# Patient Record
Sex: FEMALE | Race: WHITE | NOT HISPANIC OR LATINO | URBAN - METROPOLITAN AREA
[De-identification: names, ages, dates, MRNs, and addresses within clinical notes are randomized per-mention and may not be internally consistent; named-entity substitution may affect disease eponyms.]

---

## 2024-04-08 ENCOUNTER — APPOINTMENT (EMERGENCY)
Dept: CT IMAGING | Facility: HOSPITAL | Age: 34
DRG: 282 | End: 2024-04-08
Payer: COMMERCIAL

## 2024-04-08 ENCOUNTER — APPOINTMENT (EMERGENCY)
Dept: RADIOLOGY | Facility: HOSPITAL | Age: 34
DRG: 282 | End: 2024-04-08
Payer: COMMERCIAL

## 2024-04-08 ENCOUNTER — HOSPITAL ENCOUNTER (INPATIENT)
Facility: HOSPITAL | Age: 34
LOS: 1 days | Discharge: LEFT BEFORE DISCHARGE | DRG: 282 | End: 2024-04-10
Attending: EMERGENCY MEDICINE | Admitting: INTERNAL MEDICINE
Payer: COMMERCIAL

## 2024-04-08 DIAGNOSIS — R56.9 SEIZURE (HCC): Primary | ICD-10-CM

## 2024-04-08 DIAGNOSIS — R79.89 ELEVATED LFTS: ICD-10-CM

## 2024-04-08 DIAGNOSIS — F10.939 ALCOHOL WITHDRAWAL (HCC): ICD-10-CM

## 2024-04-08 DIAGNOSIS — D61.818 PANCYTOPENIA (HCC): ICD-10-CM

## 2024-04-08 DIAGNOSIS — E87.6 HYPOKALEMIA: ICD-10-CM

## 2024-04-08 PROBLEM — R74.01 TRANSAMINITIS: Status: ACTIVE | Noted: 2024-04-08

## 2024-04-08 PROBLEM — I10 ESSENTIAL HYPERTENSION: Status: ACTIVE | Noted: 2024-04-08

## 2024-04-08 PROBLEM — Z72.0 TOBACCO ABUSE: Status: ACTIVE | Noted: 2024-04-08

## 2024-04-08 PROBLEM — R94.31 PROLONGED QT INTERVAL: Status: ACTIVE | Noted: 2024-04-08

## 2024-04-08 LAB
2HR DELTA HS TROPONIN: 0 NG/L
ALBUMIN SERPL BCP-MCNC: 4.6 G/DL (ref 3.5–5)
ALP SERPL-CCNC: 66 U/L (ref 34–104)
ALT SERPL W P-5'-P-CCNC: 56 U/L (ref 7–52)
AMPHETAMINES SERPL QL SCN: NEGATIVE
ANION GAP SERPL CALCULATED.3IONS-SCNC: 11 MMOL/L (ref 4–13)
ANION GAP SERPL CALCULATED.3IONS-SCNC: 25 MMOL/L (ref 4–13)
APAP SERPL-MCNC: <2 UG/ML (ref 10–20)
AST SERPL W P-5'-P-CCNC: 228 U/L (ref 13–39)
BACTERIA UR QL AUTO: ABNORMAL /HPF
BARBITURATES UR QL: NEGATIVE
BASOPHILS # BLD AUTO: 0.02 THOUSANDS/ÂΜL (ref 0–0.1)
BASOPHILS NFR BLD AUTO: 0 % (ref 0–1)
BENZODIAZ UR QL: NEGATIVE
BILIRUB SERPL-MCNC: 1.02 MG/DL (ref 0.2–1)
BILIRUB UR QL STRIP: NEGATIVE
BUN SERPL-MCNC: 9 MG/DL (ref 5–25)
BUN SERPL-MCNC: 9 MG/DL (ref 5–25)
CALCIUM SERPL-MCNC: 8.3 MG/DL (ref 8.4–10.2)
CALCIUM SERPL-MCNC: 9.3 MG/DL (ref 8.4–10.2)
CARDIAC TROPONIN I PNL SERPL HS: 4 NG/L
CARDIAC TROPONIN I PNL SERPL HS: 4 NG/L
CHLORIDE SERPL-SCNC: 89 MMOL/L (ref 96–108)
CHLORIDE SERPL-SCNC: 95 MMOL/L (ref 96–108)
CLARITY UR: ABNORMAL
CO2 SERPL-SCNC: 20 MMOL/L (ref 21–32)
CO2 SERPL-SCNC: 29 MMOL/L (ref 21–32)
COCAINE UR QL: NEGATIVE
COLOR UR: YELLOW
CREAT SERPL-MCNC: 0.65 MG/DL (ref 0.6–1.3)
CREAT SERPL-MCNC: 0.8 MG/DL (ref 0.6–1.3)
EOSINOPHIL # BLD AUTO: 0.03 THOUSAND/ÂΜL (ref 0–0.61)
EOSINOPHIL NFR BLD AUTO: 1 % (ref 0–6)
ERYTHROCYTE [DISTWIDTH] IN BLOOD BY AUTOMATED COUNT: 11.5 % (ref 11.6–15.1)
ETHANOL SERPL-MCNC: <10 MG/DL
EXT PREGNANCY TEST URINE: NEGATIVE
EXT. CONTROL: NORMAL
FENTANYL UR QL SCN: NEGATIVE
GFR SERPL CREATININE-BSD FRML MDRD: 117 ML/MIN/1.73SQ M
GFR SERPL CREATININE-BSD FRML MDRD: 97 ML/MIN/1.73SQ M
GLUCOSE SERPL-MCNC: 104 MG/DL (ref 65–140)
GLUCOSE SERPL-MCNC: 140 MG/DL (ref 65–140)
GLUCOSE SERPL-MCNC: 90 MG/DL (ref 65–140)
GLUCOSE UR STRIP-MCNC: NEGATIVE MG/DL
HCT VFR BLD AUTO: 39.1 % (ref 34.8–46.1)
HGB BLD-MCNC: 13.1 G/DL (ref 11.5–15.4)
HGB UR QL STRIP.AUTO: NEGATIVE
HYDROCODONE UR QL SCN: NEGATIVE
IMM GRANULOCYTES # BLD AUTO: 0.02 THOUSAND/UL (ref 0–0.2)
IMM GRANULOCYTES NFR BLD AUTO: 0 % (ref 0–2)
KETONES UR STRIP-MCNC: ABNORMAL MG/DL
LEUKOCYTE ESTERASE UR QL STRIP: ABNORMAL
LYMPHOCYTES # BLD AUTO: 1.39 THOUSANDS/ÂΜL (ref 0.6–4.47)
LYMPHOCYTES NFR BLD AUTO: 30 % (ref 14–44)
MAGNESIUM SERPL-MCNC: 1.8 MG/DL (ref 1.9–2.7)
MCH RBC QN AUTO: 34.5 PG (ref 26.8–34.3)
MCHC RBC AUTO-ENTMCNC: 33.5 G/DL (ref 31.4–37.4)
MCV RBC AUTO: 103 FL (ref 82–98)
METHADONE UR QL: NEGATIVE
MONOCYTES # BLD AUTO: 0.57 THOUSAND/ÂΜL (ref 0.17–1.22)
MONOCYTES NFR BLD AUTO: 12 % (ref 4–12)
MUCOUS THREADS UR QL AUTO: ABNORMAL
NEUTROPHILS # BLD AUTO: 2.62 THOUSANDS/ÂΜL (ref 1.85–7.62)
NEUTS SEG NFR BLD AUTO: 57 % (ref 43–75)
NITRITE UR QL STRIP: NEGATIVE
NON-SQ EPI CELLS URNS QL MICRO: ABNORMAL /HPF
NRBC BLD AUTO-RTO: 0 /100 WBCS
OPIATES UR QL SCN: NEGATIVE
OXYCODONE+OXYMORPHONE UR QL SCN: NEGATIVE
PCP UR QL: NEGATIVE
PH UR STRIP.AUTO: 7 [PH]
PLATELET # BLD AUTO: 107 THOUSANDS/UL (ref 149–390)
PMV BLD AUTO: 11.6 FL (ref 8.9–12.7)
POTASSIUM SERPL-SCNC: 3.1 MMOL/L (ref 3.5–5.3)
POTASSIUM SERPL-SCNC: 3.1 MMOL/L (ref 3.5–5.3)
PROT SERPL-MCNC: 7.3 G/DL (ref 6.4–8.4)
PROT UR STRIP-MCNC: ABNORMAL MG/DL
RBC # BLD AUTO: 3.8 MILLION/UL (ref 3.81–5.12)
RBC #/AREA URNS AUTO: ABNORMAL /HPF
SALICYLATES SERPL-MCNC: <5 MG/DL (ref 3–20)
SODIUM SERPL-SCNC: 134 MMOL/L (ref 135–147)
SODIUM SERPL-SCNC: 135 MMOL/L (ref 135–147)
SP GR UR STRIP.AUTO: 1.01 (ref 1–1.03)
THC UR QL: NEGATIVE
UROBILINOGEN UR STRIP-ACNC: 4 MG/DL
WBC # BLD AUTO: 4.65 THOUSAND/UL (ref 4.31–10.16)
WBC #/AREA URNS AUTO: ABNORMAL /HPF

## 2024-04-08 PROCEDURE — 81025 URINE PREGNANCY TEST: CPT | Performed by: EMERGENCY MEDICINE

## 2024-04-08 PROCEDURE — 82077 ASSAY SPEC XCP UR&BREATH IA: CPT | Performed by: EMERGENCY MEDICINE

## 2024-04-08 PROCEDURE — 93005 ELECTROCARDIOGRAM TRACING: CPT

## 2024-04-08 PROCEDURE — 99285 EMERGENCY DEPT VISIT HI MDM: CPT | Performed by: EMERGENCY MEDICINE

## 2024-04-08 PROCEDURE — 99285 EMERGENCY DEPT VISIT HI MDM: CPT

## 2024-04-08 PROCEDURE — 96374 THER/PROPH/DIAG INJ IV PUSH: CPT

## 2024-04-08 PROCEDURE — 80307 DRUG TEST PRSMV CHEM ANLYZR: CPT | Performed by: EMERGENCY MEDICINE

## 2024-04-08 PROCEDURE — 80143 DRUG ASSAY ACETAMINOPHEN: CPT | Performed by: EMERGENCY MEDICINE

## 2024-04-08 PROCEDURE — 80048 BASIC METABOLIC PNL TOTAL CA: CPT

## 2024-04-08 PROCEDURE — 84484 ASSAY OF TROPONIN QUANT: CPT | Performed by: EMERGENCY MEDICINE

## 2024-04-08 PROCEDURE — 70450 CT HEAD/BRAIN W/O DYE: CPT

## 2024-04-08 PROCEDURE — 85025 COMPLETE CBC W/AUTO DIFF WBC: CPT | Performed by: EMERGENCY MEDICINE

## 2024-04-08 PROCEDURE — 81001 URINALYSIS AUTO W/SCOPE: CPT | Performed by: EMERGENCY MEDICINE

## 2024-04-08 PROCEDURE — 82948 REAGENT STRIP/BLOOD GLUCOSE: CPT

## 2024-04-08 PROCEDURE — 83735 ASSAY OF MAGNESIUM: CPT

## 2024-04-08 PROCEDURE — 96361 HYDRATE IV INFUSION ADD-ON: CPT

## 2024-04-08 PROCEDURE — 99223 1ST HOSP IP/OBS HIGH 75: CPT | Performed by: INTERNAL MEDICINE

## 2024-04-08 PROCEDURE — 80179 DRUG ASSAY SALICYLATE: CPT | Performed by: EMERGENCY MEDICINE

## 2024-04-08 PROCEDURE — 71045 X-RAY EXAM CHEST 1 VIEW: CPT

## 2024-04-08 PROCEDURE — 80053 COMPREHEN METABOLIC PANEL: CPT | Performed by: EMERGENCY MEDICINE

## 2024-04-08 PROCEDURE — 36415 COLL VENOUS BLD VENIPUNCTURE: CPT | Performed by: EMERGENCY MEDICINE

## 2024-04-08 RX ORDER — NICOTINE 21 MG/24HR
21 PATCH, TRANSDERMAL 24 HOURS TRANSDERMAL
Status: DISCONTINUED | OUTPATIENT
Start: 2024-04-09 | End: 2024-04-10 | Stop reason: HOSPADM

## 2024-04-08 RX ORDER — NICOTINE 21 MG/24HR
21 PATCH, TRANSDERMAL 24 HOURS TRANSDERMAL ONCE
Status: COMPLETED | OUTPATIENT
Start: 2024-04-08 | End: 2024-04-09

## 2024-04-08 RX ORDER — FOLIC ACID 1 MG/1
1 TABLET ORAL DAILY
Status: DISCONTINUED | OUTPATIENT
Start: 2024-04-09 | End: 2024-04-08

## 2024-04-08 RX ORDER — ONDANSETRON 2 MG/ML
1 INJECTION INTRAMUSCULAR; INTRAVENOUS ONCE
Status: COMPLETED | OUTPATIENT
Start: 2024-04-08 | End: 2024-04-08

## 2024-04-08 RX ORDER — HYDRALAZINE HYDROCHLORIDE 20 MG/ML
5 INJECTION INTRAMUSCULAR; INTRAVENOUS EVERY 6 HOURS PRN
Status: DISCONTINUED | OUTPATIENT
Start: 2024-04-08 | End: 2024-04-10 | Stop reason: HOSPADM

## 2024-04-08 RX ORDER — POTASSIUM CHLORIDE 20 MEQ/1
40 TABLET, EXTENDED RELEASE ORAL ONCE
Status: COMPLETED | OUTPATIENT
Start: 2024-04-08 | End: 2024-04-08

## 2024-04-08 RX ORDER — LEVETIRACETAM 500 MG/5ML
3000 INJECTION, SOLUTION, CONCENTRATE INTRAVENOUS ONCE
Status: DISCONTINUED | OUTPATIENT
Start: 2024-04-08 | End: 2024-04-08

## 2024-04-08 RX ORDER — LORAZEPAM 1 MG/1
2 TABLET ORAL ONCE
Status: COMPLETED | OUTPATIENT
Start: 2024-04-08 | End: 2024-04-08

## 2024-04-08 RX ORDER — ONDANSETRON 2 MG/ML
4 INJECTION INTRAMUSCULAR; INTRAVENOUS ONCE
Status: COMPLETED | OUTPATIENT
Start: 2024-04-08 | End: 2024-04-08

## 2024-04-08 RX ORDER — LEVETIRACETAM 500 MG/1
500 TABLET ORAL EVERY 12 HOURS SCHEDULED
Status: DISCONTINUED | OUTPATIENT
Start: 2024-04-09 | End: 2024-04-08

## 2024-04-08 RX ORDER — MAGNESIUM SULFATE HEPTAHYDRATE 40 MG/ML
2 INJECTION, SOLUTION INTRAVENOUS ONCE
Status: COMPLETED | OUTPATIENT
Start: 2024-04-08 | End: 2024-04-08

## 2024-04-08 RX ORDER — SODIUM CHLORIDE, SODIUM GLUCONATE, SODIUM ACETATE, POTASSIUM CHLORIDE, MAGNESIUM CHLORIDE, SODIUM PHOSPHATE, DIBASIC, AND POTASSIUM PHOSPHATE .53; .5; .37; .037; .03; .012; .00082 G/100ML; G/100ML; G/100ML; G/100ML; G/100ML; G/100ML; G/100ML
100 INJECTION, SOLUTION INTRAVENOUS CONTINUOUS
Status: DISCONTINUED | OUTPATIENT
Start: 2024-04-08 | End: 2024-04-09

## 2024-04-08 RX ORDER — LANOLIN ALCOHOL/MO/W.PET/CERES
100 CREAM (GRAM) TOPICAL DAILY
Status: DISCONTINUED | OUTPATIENT
Start: 2024-04-09 | End: 2024-04-08

## 2024-04-08 RX ORDER — ACETAMINOPHEN 325 MG/1
650 TABLET ORAL ONCE
Status: COMPLETED | OUTPATIENT
Start: 2024-04-08 | End: 2024-04-08

## 2024-04-08 RX ADMIN — ONDANSETRON 4 MG: 2 INJECTION INTRAMUSCULAR; INTRAVENOUS at 17:21

## 2024-04-08 RX ADMIN — POTASSIUM CHLORIDE 40 MEQ: 1500 TABLET, EXTENDED RELEASE ORAL at 19:26

## 2024-04-08 RX ADMIN — POTASSIUM CHLORIDE 40 MEQ: 1500 TABLET, EXTENDED RELEASE ORAL at 22:04

## 2024-04-08 RX ADMIN — ACETAMINOPHEN 650 MG: 325 TABLET, FILM COATED ORAL at 22:31

## 2024-04-08 RX ADMIN — MAGNESIUM SULFATE HEPTAHYDRATE 2 G: 2 INJECTION, SOLUTION INTRAVENOUS at 20:40

## 2024-04-08 RX ADMIN — LEVETIRACETAM 3000 MG: 500 INJECTION, SOLUTION INTRAVENOUS at 19:42

## 2024-04-08 RX ADMIN — SODIUM CHLORIDE 1000 ML: 0.9 INJECTION, SOLUTION INTRAVENOUS at 17:22

## 2024-04-08 RX ADMIN — LORAZEPAM 2 MG: 1 TABLET ORAL at 22:31

## 2024-04-08 RX ADMIN — THIAMINE HYDROCHLORIDE: 100 INJECTION, SOLUTION INTRAMUSCULAR; INTRAVENOUS at 20:22

## 2024-04-08 RX ADMIN — SODIUM CHLORIDE, SODIUM GLUCONATE, SODIUM ACETATE, POTASSIUM CHLORIDE, MAGNESIUM CHLORIDE, SODIUM PHOSPHATE, DIBASIC, AND POTASSIUM PHOSPHATE 100 ML/HR: .53; .5; .37; .037; .03; .012; .00082 INJECTION, SOLUTION INTRAVENOUS at 22:04

## 2024-04-08 RX ADMIN — NICOTINE 21 MG: 21 PATCH, EXTENDED RELEASE TRANSDERMAL at 20:40

## 2024-04-08 NOTE — ED PROVIDER NOTES
"History  Chief Complaint   Patient presents with    Seizure - New Onset     Pt to ED for new onset seizure. Pt at work today, someone heard rattling sound and came and checked on her. Notes \"full body shaking\" for about 30 seconds. Feels back to baseline but a little anxious.     Patient is a 33-year-old female who is brought in by ambulance due to seizure activity.  Patient is very confused and thinks that she seized 3 days ago and not today and is unsure of where she works.  Per EMS, a coworker heard wrestling and found the patient having a generalized tonic-clonic seizure lasting approximately 30 seconds.       None       History reviewed. No pertinent past medical history.    History reviewed. No pertinent surgical history.    History reviewed. No pertinent family history.  I have reviewed and agree with the history as documented.    E-Cigarette/Vaping     E-Cigarette/Vaping Substances     Social History     Tobacco Use    Smoking status: Every Day     Current packs/day: 0.50     Types: Cigarettes     Passive exposure: Never    Smokeless tobacco: Never   Substance Use Topics    Alcohol use: Yes     Comment: social    Drug use: Never       Review of Systems   Constitutional:  Negative for chills and fever.   Eyes:  Negative for photophobia and visual disturbance.   Respiratory:  Negative for shortness of breath.    Cardiovascular:  Negative for chest pain.   Gastrointestinal:  Negative for abdominal pain, nausea and vomiting.   Musculoskeletal:  Negative for back pain and neck pain.   Neurological:  Positive for seizures. Negative for dizziness, light-headedness and headaches.   Psychiatric/Behavioral:  Positive for confusion.        Physical Exam  Physical Exam  Vitals and nursing note reviewed.   Constitutional:       General: She is not in acute distress.     Appearance: Normal appearance. She is not ill-appearing, toxic-appearing or diaphoretic.   HENT:      Head: Normocephalic and atraumatic.      " Mouth/Throat:      Mouth: Mucous membranes are moist.   Eyes:      Extraocular Movements: Extraocular movements intact.      Conjunctiva/sclera: Conjunctivae normal.      Pupils: Pupils are equal, round, and reactive to light.   Cardiovascular:      Rate and Rhythm: Normal rate and regular rhythm.      Pulses: Normal pulses.      Heart sounds: Normal heart sounds. No murmur heard.  Pulmonary:      Effort: Pulmonary effort is normal. No respiratory distress.      Breath sounds: Normal breath sounds. No stridor. No wheezing, rhonchi or rales.   Chest:      Chest wall: No tenderness.   Abdominal:      General: Bowel sounds are normal. There is no distension.      Palpations: Abdomen is soft.      Tenderness: There is no abdominal tenderness. There is no guarding or rebound.   Musculoskeletal:      Cervical back: Neck supple.      Right lower leg: No edema.      Left lower leg: No edema.      Comments: No midline c-t-l-spine tenderness, step offs, deformities  Pelvis stable      Skin:     General: Skin is warm and dry.   Neurological:      General: No focal deficit present.      Mental Status: She is alert. Mental status is at baseline. She is disoriented.      GCS: GCS eye subscore is 4. GCS verbal subscore is 4. GCS motor subscore is 6.   Psychiatric:         Mood and Affect: Mood normal.         Behavior: Behavior normal.         Vital Signs  ED Triage Vitals [04/08/24 1650]   Temperature Pulse Respirations Blood Pressure SpO2   98 °F (36.7 °C) 90 18 138/86 99 %      Temp src Heart Rate Source Patient Position - Orthostatic VS BP Location FiO2 (%)   -- Monitor Sitting Left arm --      Pain Score       No Pain           Vitals:    04/08/24 1650 04/08/24 1800   BP: 138/86 140/91   Pulse: 90 88   Patient Position - Orthostatic VS: Sitting Sitting         Visual Acuity      ED Medications  Medications   folic acid 1 mg, thiamine (VITAMIN B1) 100 mg in sodium chloride 0.9 % 100 mL IV piggyback ( Intravenous New Bag 4/8/24  2022)   multivitamin-minerals (CENTRUM) tablet 1 tablet (has no administration in time range)   folic acid 1 mg, thiamine (VITAMIN B1) 100 mg in sodium chloride 0.9 % 100 mL IV piggyback (has no administration in time range)   magnesium sulfate 2 g/50 mL IVPB (premix) 2 g (has no administration in time range)   multi-electrolyte (PLASMALYTE-A/ISOLYTE-S PH 7.4) IV solution (has no administration in time range)   nicotine (NICODERM CQ) 21 mg/24 hr TD 24 hr patch 21 mg (has no administration in time range)   ondansetron (FOR EMS ONLY) (ZOFRAN) 4 mg/2 mL injection 4 mg (0 mg Does not apply Given to EMS 4/8/24 1722)   sodium chloride 0.9 % bolus 1,000 mL (0 mL Intravenous Stopped 4/8/24 1822)   ondansetron (ZOFRAN) injection 4 mg (4 mg Intravenous Given 4/8/24 1721)   potassium chloride (Klor-Con M20) CR tablet 40 mEq (40 mEq Oral Given 4/8/24 1926)   levETIRAcetam (KEPPRA) 3,000 mg in sodium chloride 0.9 % 250 mL IVPB (0 mg Intravenous Stopped 4/8/24 1957)       Diagnostic Studies  Results Reviewed       Procedure Component Value Units Date/Time    Urine Microscopic [[id number]] Collected: 04/08/24 2026    Lab Status: In process Specimen: Urine, Clean Catch Updated: 04/08/24 2032    UA w Reflex to Microscopic w Reflex to Culture [[id number]] Collected: 04/08/24 2026    Lab Status: In process Specimen: Urine, Clean Catch Updated: 04/08/24 2030    Rapid drug screen, urine [[id number]] Collected: 04/08/24 2026    Lab Status: In process Specimen: Urine, Clean Catch Updated: 04/08/24 2029    HS Troponin I 2hr [[id number]]  (Normal) Collected: 04/08/24 1940    Lab Status: Final result Specimen: Blood from Arm, Left Updated: 04/08/24 2015     hs TnI 2hr 4 ng/L      Delta 2hr hsTnI 0 ng/L     Magnesium [[id number]]  (Abnormal) Collected: 04/08/24 1940    Lab Status: Final result Specimen: Blood from Arm, Left Updated: 04/08/24 2008     Magnesium 1.8 mg/dL     HS Troponin I 4hr [[id number]]     Lab Status: No result Specimen: Blood      HS Troponin 0hr (reflex protocol) [[id number]]  (Normal) Collected: 04/08/24 1721    Lab Status: Final result Specimen: Blood from Arm, Left Updated: 04/08/24 1752     hs TnI 0hr 4 ng/L     Comprehensive metabolic panel [[id number]]  (Abnormal) Collected: 04/08/24 1721    Lab Status: Final result Specimen: Blood from Arm, Left Updated: 04/08/24 1745     Sodium 134 mmol/L      Potassium 3.1 mmol/L      Chloride 89 mmol/L      CO2 20 mmol/L      ANION GAP 25 mmol/L      BUN 9 mg/dL      Creatinine 0.80 mg/dL      Glucose 104 mg/dL      Calcium 9.3 mg/dL       U/L      ALT 56 U/L      Alkaline Phosphatase 66 U/L      Total Protein 7.3 g/dL      Albumin 4.6 g/dL      Total Bilirubin 1.02 mg/dL      eGFR 97 ml/min/1.73sq m     Narrative:      Unable to calculate concentration. Result is lower than the dynamic range.  National Kid[employer] guidelines for Chronic Kidney Disease (CKD):     Stage 1 with normal or high GFR (GFR > 90 mL/min/1.73 square meters)    Stage 2 Mild CKD (GFR = 60-89 mL/min/1.73 square meters)    Stage 3A Moderate CKD (GFR = 45-59 mL/min/1.73 square meters)    Stage 3B Moderate CKD (GFR = 30-44 mL/min/1.73 square meters)    Stage 4 Severe CKD (GFR = 15-29 mL/min/1.73 square meters)    Stage 5 End Stage CKD (GFR <15 mL/min/1.73 square meters)  Note: GFR calculation is accurate only with a steady state creatinine    Salicylate level [[id number]]  (Normal) Collected: 04/08/24 1721    Lab Status: Final result Specimen: Blood from Arm, Left Updated: 04/08/24 1745     Salicylate Lvl <5.0 mg/dL     Narrative:      Unable to calculate concentration. Result is lower than the dynamic range.    Acetaminophen level-If concentration is detectable, please discuss with medical  on call. [[id number]]  (Abnormal) Collected: 04/08/24 1721    Lab Status: Final result Specimen: Blood from Arm, Left Updated: 04/08/24 1745     Acetaminophen Level <2 ug/mL     Narrative:      Unable to  calculate concentration. Result is lower than the dynamic range.    Ethanol [[id number]]  (Normal) Collected: 04/08/24 1721    Lab Status: Final result Specimen: Blood from Arm, Left Updated: 04/08/24 1744     Ethanol Lvl <10 mg/dL     CBC and differential [[id number]]  (Abnormal) Collected: 04/08/24 1721    Lab Status: Final result Specimen: Blood from Arm, Left Updated: 04/08/24 1738     WBC 4.65 Thousand/uL      RBC 3.80 Million/uL      Hemoglobin 13.1 g/dL      Hematocrit 39.1 %       fL      MCH 34.5 pg      MCHC 33.5 g/dL      RDW 11.5 %      MPV 11.6 fL      Platelets 107 Thousands/uL      nRBC 0 /100 WBCs      Neutrophils Relative 57 %      Immature Grans % 0 %      Lymphocytes Relative 30 %      Monocytes Relative 12 %      Eosinophils Relative 1 %      Basophils Relative 0 %      Neutrophils Absolute 2.62 Thousands/µL      Absolute Immature Grans 0.02 Thousand/uL      Absolute Lymphocytes 1.39 Thousands/µL      Absolute Monocytes 0.57 Thousand/µL      Eosinophils Absolute 0.03 Thousand/µL      Basophils Absolute 0.02 Thousands/µL     POCT pregnancy, urine [[id number]]     Lab Status: No result     Fingerstick Glucose (POCT) [[id number]]  (Normal) Collected: 04/08/24 1651    Lab Status: Final result Specimen: Blood Updated: 04/08/24 1652     POC Glucose 140 mg/dl                    CT head without contrast   Final Result by E. Alec Schoenberger, MD (04/08 1805)      No acute intracranial abnormality.                  Workstation performed: [id number]         XR chest 1 view portable    (Results Pending)              Procedures  Procedures         ED Course  ED Course as of 04/08/24 2035 Mon Apr 08, 2024 1850 Discussed case with Dr. Aguilar, neurology who recommends admission for Mri brain with without sz protocol, UDS, start keppra 3g load and then 500mg BID for maintenance   2003 Though patient initially denied any alcohol use, it was odd that her LFTs were elevated so I asked her family to leave  and spoke with the patient without family present.  Patient now admits that she drinks over 2 bottles of wine daily and last drank yesterday evening.  She states that 3 days ago when she had her other seizure, she had been trying to quit cold turkey, but then after having that seizure she started drinking again.  She reports that all of her prior seizures were in the setting of alcohol withdrawal.  I updated the hospitalist about this update, added CIWA protocol and called critical care for evaluation for stepdown level 1 admission as the patient adamantly declines transfer to [facility] detox or any type of alcohol rehab facility.   2011 Updated Dr. Aguilar, neurology. With new info, recommends no maintenance, but continue with mri sz protocol to see if she has underlying epilepsy tendency along with eeg routine   2033 Critical Care NP, Bianca Gallegos evaluated patient. Believes patient can be admitted to a med/surg [facility] level of care bed. Bianca will discuss reccs with hospitalist.                                SBIRT 22yo+      Flowsheet Row Most Recent Value   Initial Alcohol Screen: US AUDIT-C     1. How often do you have a drink containing alcohol? 0 Filed at: 04/08/2024 1650   2. How many drinks containing alcohol do you have on a typical day you are drinking?  0 Filed at: 04/08/2024 1650   3a. Male UNDER 65: How often do you have five or more drinks on one occasion? 0 Filed at: 04/08/2024 1650   3b. FEMALE Any Age, or MALE 65+: How often do you have 4 or more drinks on one occassion? 0 Filed at: 04/08/2024 1650   Audit-C Score 0 Filed at: 04/08/2024 1650   DUANE: How many times in the past year have you...    Used an illegal drug or used a prescription medication for non-medical reasons? Never Filed at: 04/08/2024 1650                      Medical Decision Making  Assessment and plan:  Differential includes new onset seizure versus syncope though syncope less likely as there is described  tonic-clonic activity.  Will get labs to evaluate for leukocytosis, anemia, electrolyte abnormalities, kidney and liver function; EKG/troponin to evaluate for arrhythmia/ischemia; CT head to rule out intracranial hemorrhage or mass.     Patient becoming less postictal and back to her baseline mental status, she now was able to answer all questions appropriately and reports to me that this is actually her fourth seizure in total.  She states that her initial for seizure was a few years ago, had an evaluation that was negative and was not started on medications then had another seizure in January and another seizure 3 days ago for which she did not have any evaluation for.  At this time, family is at bedside and patient denies any alcohol or drug use.  As such, discussed case with neurology in order to obtain recommendations for plan of care.  Dr. Aguilar recommended loading dose of Keppra and admission for MRI seizure protocol.    Later, patient's LFTs came back elevated and I asked her family to leave the room.  At that time, the patient admitted that she drinks minimum 2 bottles of wine daily and all of her seizures have been in the setting of alcohol withdrawal.  She admitted that she tried to quit cold turkey a few days ago and that is when she had the seizure 3 days ago.  She then drank and her last drink was yesterday evening.  She had to go to work this morning so she tried not to drink and seized at work.  I updated neurology as well as hospitalist.    Amount and/or Complexity of Data Reviewed  Labs: ordered.  Radiology: ordered.    Risk  Prescription drug management.  Decision regarding hospitalization.             Disposition  Final diagnoses:   Seizure (HCC)   Hypokalemia   Elevated LFTs   Alcohol withdrawal (HCC)     Time reflects when diagnosis was documented in both MDM as applicable and the Disposition within this note       Time User Action Codes Description Comment    4/8/2024  6:49 PM Donato  Maria Elena Roque [R56.9] Seizure (HCC)     4/8/2024  6:49 PM Maria Elena Sewell [E87.6] Hypokalemia     4/8/2024  6:49 PM Maria Elena Sewell [R79.89] Elevated LFTs     4/8/2024  8:27 PM Maria Elena Sewell [F10.939] Alcohol withdrawal (HCC)           ED Disposition       ED Disposition   Admit    Condition   Stable    Date/Time   Mon Apr 8, 2024 1849    Comment   Case was discussed with hospitalist and the patient's admission status was agreed to be Admission Status: observation status to the service of Dr. Morales .               Follow-up Information    None         Patient's Medications    No medications on file       No discharge procedures on file.    PDMP Review       None            ED Provider  Electronically Signed by             Maria Elena Sewell DO  04/08/24 2035

## 2024-04-09 ENCOUNTER — APPOINTMENT (INPATIENT)
Dept: MRI IMAGING | Facility: HOSPITAL | Age: 34
DRG: 282 | End: 2024-04-09
Payer: COMMERCIAL

## 2024-04-09 ENCOUNTER — APPOINTMENT (OUTPATIENT)
Dept: NEUROLOGY | Facility: HOSPITAL | Age: 34
DRG: 282 | End: 2024-04-09
Attending: INTERNAL MEDICINE
Payer: COMMERCIAL

## 2024-04-09 ENCOUNTER — APPOINTMENT (INPATIENT)
Dept: RADIOLOGY | Facility: HOSPITAL | Age: 34
DRG: 282 | End: 2024-04-09
Payer: COMMERCIAL

## 2024-04-09 PROBLEM — F10.10 ALCOHOL ABUSE: Status: ACTIVE | Noted: 2024-04-09

## 2024-04-09 PROBLEM — R56.9 SEIZURE (HCC): Status: ACTIVE | Noted: 2024-04-09

## 2024-04-09 LAB
ALBUMIN SERPL BCP-MCNC: 3.6 G/DL (ref 3.5–5)
ALBUMIN SERPL BCP-MCNC: 3.8 G/DL (ref 3.5–5)
ALP SERPL-CCNC: 50 U/L (ref 34–104)
ALP SERPL-CCNC: 55 U/L (ref 34–104)
ALT SERPL W P-5'-P-CCNC: 43 U/L (ref 7–52)
ALT SERPL W P-5'-P-CCNC: 45 U/L (ref 7–52)
ANION GAP SERPL CALCULATED.3IONS-SCNC: 7 MMOL/L (ref 4–13)
ANION GAP SERPL CALCULATED.3IONS-SCNC: 8 MMOL/L (ref 4–13)
AST SERPL W P-5'-P-CCNC: 127 U/L (ref 13–39)
AST SERPL W P-5'-P-CCNC: 139 U/L (ref 13–39)
ATRIAL RATE: 85 BPM
ATRIAL RATE: 94 BPM
ATRIAL RATE: 94 BPM
ATRIAL RATE: 95 BPM
ATRIAL RATE: 99 BPM
BASOPHILS # BLD AUTO: 0.03 THOUSANDS/ÂΜL (ref 0–0.1)
BASOPHILS # BLD AUTO: 0.04 THOUSANDS/ÂΜL (ref 0–0.1)
BASOPHILS NFR BLD AUTO: 1 % (ref 0–1)
BASOPHILS NFR BLD AUTO: 1 % (ref 0–1)
BILIRUB SERPL-MCNC: 0.66 MG/DL (ref 0.2–1)
BILIRUB SERPL-MCNC: 0.83 MG/DL (ref 0.2–1)
BILIRUB UR QL STRIP: NEGATIVE
BUN SERPL-MCNC: 5 MG/DL (ref 5–25)
BUN SERPL-MCNC: 6 MG/DL (ref 5–25)
CALCIUM SERPL-MCNC: 7.5 MG/DL (ref 8.4–10.2)
CALCIUM SERPL-MCNC: 8.8 MG/DL (ref 8.4–10.2)
CHLORIDE SERPL-SCNC: 101 MMOL/L (ref 96–108)
CHLORIDE SERPL-SCNC: 101 MMOL/L (ref 96–108)
CLARITY UR: CLEAR
CO2 SERPL-SCNC: 27 MMOL/L (ref 21–32)
CO2 SERPL-SCNC: 28 MMOL/L (ref 21–32)
COLOR UR: YELLOW
CREAT SERPL-MCNC: 0.64 MG/DL (ref 0.6–1.3)
CREAT SERPL-MCNC: 0.68 MG/DL (ref 0.6–1.3)
EOSINOPHIL # BLD AUTO: 0.01 THOUSAND/ÂΜL (ref 0–0.61)
EOSINOPHIL # BLD AUTO: 0.05 THOUSAND/ÂΜL (ref 0–0.61)
EOSINOPHIL NFR BLD AUTO: 0 % (ref 0–6)
EOSINOPHIL NFR BLD AUTO: 1 % (ref 0–6)
ERYTHROCYTE [DISTWIDTH] IN BLOOD BY AUTOMATED COUNT: 11.6 % (ref 11.6–15.1)
ERYTHROCYTE [DISTWIDTH] IN BLOOD BY AUTOMATED COUNT: 12.1 % (ref 11.6–15.1)
GFR SERPL CREATININE-BSD FRML MDRD: 116 ML/MIN/1.73SQ M
GFR SERPL CREATININE-BSD FRML MDRD: 118 ML/MIN/1.73SQ M
GLUCOSE P FAST SERPL-MCNC: 81 MG/DL (ref 65–99)
GLUCOSE SERPL-MCNC: 120 MG/DL (ref 65–140)
GLUCOSE SERPL-MCNC: 81 MG/DL (ref 65–140)
GLUCOSE UR STRIP-MCNC: NEGATIVE MG/DL
HCT VFR BLD AUTO: 33.5 % (ref 34.8–46.1)
HCT VFR BLD AUTO: 35.6 % (ref 34.8–46.1)
HGB BLD-MCNC: 11.4 G/DL (ref 11.5–15.4)
HGB BLD-MCNC: 11.7 G/DL (ref 11.5–15.4)
HGB UR QL STRIP.AUTO: NEGATIVE
IMM GRANULOCYTES # BLD AUTO: 0.02 THOUSAND/UL (ref 0–0.2)
IMM GRANULOCYTES # BLD AUTO: 0.02 THOUSAND/UL (ref 0–0.2)
IMM GRANULOCYTES NFR BLD AUTO: 1 % (ref 0–2)
IMM GRANULOCYTES NFR BLD AUTO: 1 % (ref 0–2)
KETONES UR STRIP-MCNC: NEGATIVE MG/DL
LACTATE SERPL-SCNC: 0.9 MMOL/L (ref 0.5–2)
LEUKOCYTE ESTERASE UR QL STRIP: NEGATIVE
LYMPHOCYTES # BLD AUTO: 0.59 THOUSANDS/ÂΜL (ref 0.6–4.47)
LYMPHOCYTES # BLD AUTO: 1.09 THOUSANDS/ÂΜL (ref 0.6–4.47)
LYMPHOCYTES NFR BLD AUTO: 18 % (ref 14–44)
LYMPHOCYTES NFR BLD AUTO: 30 % (ref 14–44)
MAGNESIUM SERPL-MCNC: 2.1 MG/DL (ref 1.9–2.7)
MCH RBC QN AUTO: 35.1 PG (ref 26.8–34.3)
MCH RBC QN AUTO: 35.3 PG (ref 26.8–34.3)
MCHC RBC AUTO-ENTMCNC: 32.9 G/DL (ref 31.4–37.4)
MCHC RBC AUTO-ENTMCNC: 34 G/DL (ref 31.4–37.4)
MCV RBC AUTO: 104 FL (ref 82–98)
MCV RBC AUTO: 107 FL (ref 82–98)
MONOCYTES # BLD AUTO: 0.37 THOUSAND/ÂΜL (ref 0.17–1.22)
MONOCYTES # BLD AUTO: 0.47 THOUSAND/ÂΜL (ref 0.17–1.22)
MONOCYTES NFR BLD AUTO: 10 % (ref 4–12)
MONOCYTES NFR BLD AUTO: 15 % (ref 4–12)
NEUTROPHILS # BLD AUTO: 2.09 THOUSANDS/ÂΜL (ref 1.85–7.62)
NEUTROPHILS # BLD AUTO: 2.1 THOUSANDS/ÂΜL (ref 1.85–7.62)
NEUTS SEG NFR BLD AUTO: 57 % (ref 43–75)
NEUTS SEG NFR BLD AUTO: 65 % (ref 43–75)
NITRITE UR QL STRIP: NEGATIVE
NRBC BLD AUTO-RTO: 0 /100 WBCS
NRBC BLD AUTO-RTO: 0 /100 WBCS
P AXIS: 42 DEGREES
P AXIS: 48 DEGREES
P AXIS: 54 DEGREES
P AXIS: 55 DEGREES
P AXIS: 65 DEGREES
PH UR STRIP.AUTO: 8 [PH]
PLATELET # BLD AUTO: 82 THOUSANDS/UL (ref 149–390)
PLATELET # BLD AUTO: 89 THOUSANDS/UL (ref 149–390)
PMV BLD AUTO: 11.6 FL (ref 8.9–12.7)
PMV BLD AUTO: 12.2 FL (ref 8.9–12.7)
POTASSIUM SERPL-SCNC: 3.2 MMOL/L (ref 3.5–5.3)
POTASSIUM SERPL-SCNC: 3.5 MMOL/L (ref 3.5–5.3)
PR INTERVAL: 124 MS
PR INTERVAL: 126 MS
PR INTERVAL: 134 MS
PR INTERVAL: 134 MS
PR INTERVAL: 216 MS
PROCALCITONIN SERPL-MCNC: 0.15 NG/ML
PROT SERPL-MCNC: 6.1 G/DL (ref 6.4–8.4)
PROT SERPL-MCNC: 6.7 G/DL (ref 6.4–8.4)
PROT UR STRIP-MCNC: NEGATIVE MG/DL
QRS AXIS: 17 DEGREES
QRS AXIS: 24 DEGREES
QRS AXIS: 4 DEGREES
QRS AXIS: 41 DEGREES
QRS AXIS: 6 DEGREES
QRSD INTERVAL: 82 MS
QRSD INTERVAL: 84 MS
QRSD INTERVAL: 84 MS
QRSD INTERVAL: 86 MS
QRSD INTERVAL: 86 MS
QT INTERVAL: 364 MS
QT INTERVAL: 366 MS
QT INTERVAL: 378 MS
QT INTERVAL: 386 MS
QT INTERVAL: 494 MS
QTC INTERVAL: 455 MS
QTC INTERVAL: 459 MS
QTC INTERVAL: 469 MS
QTC INTERVAL: 475 MS
QTC INTERVAL: 617 MS
RBC # BLD AUTO: 3.23 MILLION/UL (ref 3.81–5.12)
RBC # BLD AUTO: 3.33 MILLION/UL (ref 3.81–5.12)
SODIUM SERPL-SCNC: 135 MMOL/L (ref 135–147)
SODIUM SERPL-SCNC: 137 MMOL/L (ref 135–147)
SP GR UR STRIP.AUTO: 1.01 (ref 1–1.03)
T WAVE AXIS: 14 DEGREES
T WAVE AXIS: 22 DEGREES
T WAVE AXIS: 40 DEGREES
T WAVE AXIS: 47 DEGREES
T WAVE AXIS: 50 DEGREES
TSH SERPL DL<=0.05 MIU/L-ACNC: 3.45 UIU/ML (ref 0.45–4.5)
UROBILINOGEN UR STRIP-ACNC: <2 MG/DL
VENTRICULAR RATE: 85 BPM
VENTRICULAR RATE: 94 BPM
VENTRICULAR RATE: 94 BPM
VENTRICULAR RATE: 95 BPM
VENTRICULAR RATE: 99 BPM
VIT B12 SERPL-MCNC: 530 PG/ML (ref 180–914)
WBC # BLD AUTO: 3.22 THOUSAND/UL (ref 4.31–10.16)
WBC # BLD AUTO: 3.66 THOUSAND/UL (ref 4.31–10.16)

## 2024-04-09 PROCEDURE — 99245 OFF/OP CONSLTJ NEW/EST HI 55: CPT | Performed by: PSYCHIATRY & NEUROLOGY

## 2024-04-09 PROCEDURE — 80053 COMPREHEN METABOLIC PANEL: CPT | Performed by: INTERNAL MEDICINE

## 2024-04-09 PROCEDURE — 84145 PROCALCITONIN (PCT): CPT | Performed by: INTERNAL MEDICINE

## 2024-04-09 PROCEDURE — 87493 C DIFF AMPLIFIED PROBE: CPT | Performed by: INTERNAL MEDICINE

## 2024-04-09 PROCEDURE — 99232 SBSQ HOSP IP/OBS MODERATE 35: CPT | Performed by: INTERNAL MEDICINE

## 2024-04-09 PROCEDURE — 85025 COMPLETE CBC W/AUTO DIFF WBC: CPT | Performed by: INTERNAL MEDICINE

## 2024-04-09 PROCEDURE — 83605 ASSAY OF LACTIC ACID: CPT | Performed by: INTERNAL MEDICINE

## 2024-04-09 PROCEDURE — 83735 ASSAY OF MAGNESIUM: CPT | Performed by: INTERNAL MEDICINE

## 2024-04-09 PROCEDURE — 95816 EEG AWAKE AND DROWSY: CPT | Performed by: PSYCHIATRY & NEUROLOGY

## 2024-04-09 PROCEDURE — 71045 X-RAY EXAM CHEST 1 VIEW: CPT

## 2024-04-09 PROCEDURE — 70553 MRI BRAIN STEM W/O & W/DYE: CPT

## 2024-04-09 PROCEDURE — 93010 ELECTROCARDIOGRAM REPORT: CPT | Performed by: INTERNAL MEDICINE

## 2024-04-09 PROCEDURE — 87505 NFCT AGENT DETECTION GI: CPT | Performed by: INTERNAL MEDICINE

## 2024-04-09 PROCEDURE — A9585 GADOBUTROL INJECTION: HCPCS | Performed by: INTERNAL MEDICINE

## 2024-04-09 PROCEDURE — 82607 VITAMIN B-12: CPT | Performed by: PHYSICIAN ASSISTANT

## 2024-04-09 PROCEDURE — 81003 URINALYSIS AUTO W/O SCOPE: CPT | Performed by: INTERNAL MEDICINE

## 2024-04-09 PROCEDURE — 95816 EEG AWAKE AND DROWSY: CPT

## 2024-04-09 PROCEDURE — 87040 BLOOD CULTURE FOR BACTERIA: CPT | Performed by: INTERNAL MEDICINE

## 2024-04-09 PROCEDURE — 93005 ELECTROCARDIOGRAM TRACING: CPT

## 2024-04-09 PROCEDURE — 84443 ASSAY THYROID STIM HORMONE: CPT | Performed by: PHYSICIAN ASSISTANT

## 2024-04-09 RX ORDER — POTASSIUM CHLORIDE 20 MEQ/1
40 TABLET, EXTENDED RELEASE ORAL ONCE
Status: COMPLETED | OUTPATIENT
Start: 2024-04-09 | End: 2024-04-09

## 2024-04-09 RX ORDER — GADOBUTROL 604.72 MG/ML
6 INJECTION INTRAVENOUS
Status: COMPLETED | OUTPATIENT
Start: 2024-04-09 | End: 2024-04-09

## 2024-04-09 RX ORDER — CEFTRIAXONE 1 G/50ML
1000 INJECTION, SOLUTION INTRAVENOUS EVERY 24 HOURS
Status: DISCONTINUED | OUTPATIENT
Start: 2024-04-09 | End: 2024-04-10 | Stop reason: HOSPADM

## 2024-04-09 RX ORDER — LORAZEPAM 1 MG/1
2 TABLET ORAL ONCE
Status: COMPLETED | OUTPATIENT
Start: 2024-04-09 | End: 2024-04-09

## 2024-04-09 RX ORDER — CHLORDIAZEPOXIDE HYDROCHLORIDE 25 MG/1
25 CAPSULE, GELATIN COATED ORAL EVERY 8 HOURS SCHEDULED
Status: DISCONTINUED | OUTPATIENT
Start: 2024-04-09 | End: 2024-04-10

## 2024-04-09 RX ORDER — LORAZEPAM 2 MG/ML
1 INJECTION INTRAMUSCULAR
Status: DISCONTINUED | OUTPATIENT
Start: 2024-04-09 | End: 2024-04-10 | Stop reason: HOSPADM

## 2024-04-09 RX ORDER — SODIUM CHLORIDE 9 MG/ML
75 INJECTION, SOLUTION INTRAVENOUS CONTINUOUS
Status: DISCONTINUED | OUTPATIENT
Start: 2024-04-09 | End: 2024-04-10 | Stop reason: HOSPADM

## 2024-04-09 RX ORDER — ACETAMINOPHEN 325 MG/1
650 TABLET ORAL EVERY 6 HOURS PRN
Status: DISCONTINUED | OUTPATIENT
Start: 2024-04-09 | End: 2024-04-10 | Stop reason: HOSPADM

## 2024-04-09 RX ADMIN — GADOBUTROL 6 ML: 604.72 INJECTION INTRAVENOUS at 17:52

## 2024-04-09 RX ADMIN — MULTIPLE VITAMINS W/ MINERALS TAB 1 TABLET: TAB ORAL at 08:19

## 2024-04-09 RX ADMIN — LORAZEPAM 2 MG: 1 TABLET ORAL at 21:16

## 2024-04-09 RX ADMIN — CHLORDIAZEPOXIDE HYDROCHLORIDE 25 MG: 25 CAPSULE ORAL at 21:16

## 2024-04-09 RX ADMIN — LORAZEPAM 2 MG: 1 TABLET ORAL at 08:32

## 2024-04-09 RX ADMIN — NICOTINE 21 MG: 21 PATCH, EXTENDED RELEASE TRANSDERMAL at 20:34

## 2024-04-09 RX ADMIN — CHLORDIAZEPOXIDE HYDROCHLORIDE 25 MG: 25 CAPSULE ORAL at 13:40

## 2024-04-09 RX ADMIN — THIAMINE HYDROCHLORIDE: 100 INJECTION, SOLUTION INTRAMUSCULAR; INTRAVENOUS at 08:19

## 2024-04-09 RX ADMIN — ACETAMINOPHEN 650 MG: 325 TABLET, FILM COATED ORAL at 15:28

## 2024-04-09 RX ADMIN — SODIUM CHLORIDE 100 ML/HR: 0.9 INJECTION, SOLUTION INTRAVENOUS at 15:27

## 2024-04-09 RX ADMIN — CEFTRIAXONE 1000 MG: 1 INJECTION, SOLUTION INTRAVENOUS at 15:40

## 2024-04-09 RX ADMIN — TRIMETHOBENZAMIDE HYDROCHLORIDE 200 MG: 100 INJECTION INTRAMUSCULAR at 08:32

## 2024-04-09 RX ADMIN — POTASSIUM CHLORIDE 40 MEQ: 1500 TABLET, EXTENDED RELEASE ORAL at 12:27

## 2024-04-09 RX ADMIN — LORAZEPAM 2 MG: 1 TABLET ORAL at 12:27

## 2024-04-09 RX ADMIN — LORAZEPAM 2 MG: 1 TABLET ORAL at 05:03

## 2024-04-09 NOTE — ASSESSMENT & PLAN NOTE
33-year-old female with alcohol abuse and prior alcohol withdrawal seizures, hypertension and tobacco use who presented to the ED on 4/8/2024 following a witnessed generalized tonic-clonic seizure event while at work. Patient is amnestic to the actual event. + Tongue bite and urinary incontinence. Patient postictal in the ED.  And subsequent conversations, patient reports having several seizures all in the context of alcohol withdrawal.    Imaging/Work-up:  Initial labs include: Na 134, K 3.1, , ALT 56, Tbili 1.02, Mg 1.08, WBC 4.65-->3.66  UA negative  UDS negative  Urine oxycodone negative  ETOH level negative  Pregnancy test negative  CTH negative for acute abnormality  Routine EEG: Normal. Enhanced beta activities are likely a medication effect related to benzodiazepine administration.   TSH WNL  B12 530  MRI brain w wo/seizure protocol: Motion degraded examination. No acute infarction, edema, or pathologic intra-axial enhancement.    Neuro exam continues to be unremarkable.  Seizure workup has also been unrevealing.  Continue to suspect recurrent seizures in the setting of alcohol withdrawal and thus maintenance AED was not initiated.  [employer] form was [place] due to patient having recurrent events of loss of consciousness/seizure and patient is aware that she is not to drive at this time.  No further inpatient neurologic recommendations.    Plan:  Patient loaded with Keppra 3g  Will continue to hold off on maintenance doing at this time as seizure currently thought to be in the setting of alcohol withdrawl  Ativan prn for motor seizure >2 minutes  Tele  Seizure precautions  [employer] form completed and faxed. Patient is not to drive at this time.
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
## 2024-04-09 NOTE — ASSESSMENT & PLAN NOTE
"Hx of ETOH use disorder, drinking 2 bottles wine daily. Patient attempted to quit cold turkey 3 weeks ago resulting in in increased tremors thus started drinking a \"couple glasses of wine each day\". Today at work patient found by staff having seizure activity. EMS called. Initially refusing alcohol intake in the ER but then became more forthcoming about her drinking. Not interested in [facility] detox unit however is interested in medically assisted withdrawal. Discussed with ICU, admit to SD2.   Hx: Patient reports she has had multiple seizures over the past couple of years due to alcohol withdrawal. Has required intubation due to seizures as well.   Last ETOH intake: Night of 4/7  ETOH level neg  UDS pending   Current withdrawal sx: Tremors, anxiety, nausea.   ED provider discussed case with neuro prior to ETOH use disclosure.   S/P IV keppra load per ED --> hold further keppra/neuro consult at this time   Monitor tele + CIWA  IV thiamine/folic acid   Gentle IVF + supportive care   Seizure precautions   "

## 2024-04-09 NOTE — ASSESSMENT & PLAN NOTE
Alcohol level negative  CIWA protocol  Librium as per primary team  Folic acid/thiamine/MVI  Tele  Management as per primary team

## 2024-04-09 NOTE — UTILIZATION REVIEW
"Initial Clinical Review    Admission: Date/Time/Statement: 4/8/24 1927 observation   Admission Orders (From admission, onward)       Ordered        04/08/24 1927  Place in Observation  Once                          Orders Placed This Encounter   Procedures    Place in Observation     Standing Status:   Standing     Number of Occurrences:   1     Order Specific Question:   Level of Care     Answer:   Med Surg [16]     ED Arrival Information       Expected   -    Arrival   4/8/2024 16:44    Acuity   Emergent              Means of arrival   Ambulance    Escorted by   [facility]    Service   Hospitalist    Admission type   Emergency              Arrival complaint   seizure             Chief Complaint   Patient presents with    Seizure - New Onset     Pt to ED for new onset seizure. Pt at work today, someone heard rattling sound and came and checked on her. Notes \"full body shaking\" for about 30 seconds. Feels back to baseline but a little anxious.       Initial Presentation: 33 y.o. female  female to ED via EMS from work.    Admitted to observation with Dx: Alcohol withdrawal seizure/prolonged QT/Transaminitis.  Presented to ED with seizure activity occurring just prior to arrival,  a co worker heard wrestling and found patient having generalized tonic clonic seizure lasting about 30 seconds.  On arrival to ED confused. Initially denied alcohol use, then admitted stopped drinking cold turkey about 3 weeks ago as drinks about 2 bottles of wine daily, then after 1 to 2 days became shaky and started drinking small amount.  Last drink night of 4/7 - couple of glasses of wine. PMHx:  alcohol abuse with history of withdrawal seizures that required intubation, hypertension. On exam: disoriented.  Tremor.  Anxious tearful.  Mg 1.8.  na 134.  K 3.1.   anion gap 25.   .  ALT 56.  Ethanol < 10.    Imaging shows no acute finding on ct head.  Ecg Qtc 617.   ED treatment:  1 liter IVF bolus, Zofran, KCL, Keppra IV " bolus.    Plan includes:  start CIWA, hold Keppra and neurology consult.   Telemetry.   IV thiamine/Folic acid.  Continue IVF.  Seizure precautions.  Continue home lisinopril  and use IV hydralazine for SBP > 160. Replete electrolytes to maintain mag > 2, K > 4.  Ecg tomorrow.   Trend LFTs     ED Triage Vitals   Temperature Pulse Respirations Blood Pressure SpO2   04/08/24 1650 04/08/24 1650 04/08/24 1650 04/08/24 1650 04/08/24 1650   98 °F (36.7 °C) 90 18 138/86 99 %      Temp Source Heart Rate Source Patient Position - Orthostatic VS BP Location FiO2 (%)   04/09/24 0457 04/08/24 1650 04/08/24 1650 04/08/24 1650 --   Oral Monitor Sitting Left arm       Pain Score       04/08/24 1650       No Pain          Wt Readings from Last 1 Encounters:   04/08/24 63.5 kg (140 lb)     Additional Vital Signs:   04/08/24 2350 -- 92 18 139/87 106 94 % -- -- --   04/08/24 2210 -- 94 -- 140/88 -- -- -- -- --   04/08/24 2200 -- -- -- -- -- 95 % -- None (Room air) --   04/08/24 2130 -- 88 18 135/84 105 96 % -- None (Room air) Sitting   04/08/24 1948 -- -- -- -- -- 94 % 0 L/min None (Room air) --   04/08/24 1800 -- 88 18 140/91 111 98 % -- None (Room air) Sitting     CIWA  4/8/24 11 at 2210.   3 at 2350.     4/9/24   8 at 0457.    0 at 0600    Pertinent Labs/Diagnostic Test Results:   CT head without contrast   Final Result by E. Alec Schoenberger, MD (04/08 1805)      No acute intracranial abnormality.                  Workstation performed: [id number]         XR chest 1 view portable    (Results Pending)     4/8/24 ecg Normal sinus rhythm  Prolonged QT  Abnormal ECG  No previous ECGs available    4/8/24 ecg Sinus rhythm with 1st degree A-V block  Otherwise normal ECG  When compared with ECG of 08-APR-2024 16:49, (unconfirmed)  LA interval has increased    4/8/24 ecg Normal sinus rhythm  Normal ECG  When compared with ECG of 08-APR-2024 22:07, (unconfirmed)  No significant change was found     Results from last 7 days   Lab Units  04/09/24  0455 04/08/24  1721   WBC Thousand/uL 3.66* 4.65   HEMOGLOBIN g/dL 11.7 13.1   HEMATOCRIT % 35.6 39.1   PLATELETS Thousands/uL 89* 107*   NEUTROS ABS Thousands/µL 2.10 2.62     Results from last 7 days   Lab Units 04/08/24 1940 04/08/24  1721   SODIUM mmol/L 135 134*   POTASSIUM mmol/L 3.1* 3.1*   CHLORIDE mmol/L 95* 89*   CO2 mmol/L 29 20*   ANION GAP mmol/L 11 25*   BUN mg/dL 9 9   CREATININE mg/dL 0.65 0.80   EGFR ml/min/1.73sq m 117 97   CALCIUM mg/dL 8.3* 9.3   MAGNESIUM mg/dL 1.8*  --      Results from last 7 days   Lab Units 04/08/24  1721   AST U/L 228*   ALT U/L 56*   ALK PHOS U/L 66   TOTAL PROTEIN g/dL 7.3   ALBUMIN g/dL 4.6   TOTAL BILIRUBIN mg/dL 1.02*     Results from last 7 days   Lab Units 04/08/24  1651   POC GLUCOSE mg/dl 140     Results from last 7 days   Lab Units 04/08/24  1940 04/08/24  1721   GLUCOSE RANDOM mg/dL 90 104     Results from last 7 days   Lab Units 04/08/24  1940 04/08/24  1721   HS TNI 0HR ng/L  --  4   HS TNI 2HR ng/L 4  --    HSTNI D2 ng/L 0  --      Results from last 7 days   Lab Units 04/08/24 2026   CLARITY UA  Hazy   COLOR UA  Yellow   SPEC GRAV UA  1.015   PH UA  7.0   GLUCOSE UA mg/dl Negative   KETONES UA mg/dl 10 (1+)*   BLOOD UA  Negative   PROTEIN UA mg/dl 100 (2+)*   NITRITE UA  Negative   BILIRUBIN UA  Negative   UROBILINOGEN UA (BE) mg/dl 4.0*   LEUKOCYTES UA  Small*   WBC UA /hpf 4-10*   RBC UA /hpf None Seen   BACTERIA UA /hpf None Seen   EPITHELIAL CELLS WET PREP /hpf Occasional   MUCUS THREADS  Occasional*     Results from last 7 days   Lab Units 04/08/24 2026   AMPH/METH  Negative   BARBITURATE UR  Negative   BENZODIAZEPINE UR  Negative   COCAINE UR  Negative   METHADONE URINE  Negative   OPIATE UR  Negative   PCP UR  Negative   THC UR  Negative     Results from last 7 days   Lab Units 04/08/24  1721   ETHANOL LVL mg/dL <10   ACETAMINOPHEN LVL ug/mL <2*   SALICYLATE LVL mg/dL <5.0         ED Treatment:   Medication Administration from 04/08/2024  1644 to 04/08/2024 2157         Date/Time Order Dose Route Action Comments     04/08/2024 1722 EDT ondansetron (FOR EMS ONLY) (ZOFRAN) 4 mg/2 mL injection 4 mg 0 mg Does not apply Given to EMS --     04/08/2024 1722 EDT sodium chloride 0.9 % bolus 1,000 mL 1,000 mL Intravenous New Bag --     04/08/2024 1721 EDT ondansetron (ZOFRAN) injection 4 mg 4 mg Intravenous Given --     04/08/2024 1926 EDT potassium chloride (Klor-Con M20) CR tablet 40 mEq 40 mEq Oral Given --     04/08/2024 1942 EDT levETIRAcetam (KEPPRA) 3,000 mg in sodium chloride 0.9 % 250 mL IVPB 3,000 mg Intravenous New Bag --     04/08/2024 2022 EDT folic acid 1 mg, thiamine (VITAMIN B1) 100 mg in sodium chloride 0.9 % 100 mL IV piggyback -- Intravenous New Bag --     04/08/2024 2040 EDT magnesium sulfate 2 g/50 mL IVPB (premix) 2 g 2 g Intravenous New Bag --     04/08/2024 2040 EDT nicotine (NICODERM CQ) 21 mg/24 hr TD 24 hr patch 21 mg 21 mg Transdermal Medication Applied --          History reviewed. No pertinent past medical history.  Present on Admission:  **None**      Admitting Diagnosis: Alcohol withdrawal (HCC) [F10.939]  Hypokalemia [E87.6]  Seizure (HCC) [R56.9]  Elevated LFTs [R79.89]  Age/Sex: 33 y.o. female  Admission Orders:  Scheduled Medications:  folic acid 1 mg, thiamine (VITAMIN B1) 100 mg in sodium chloride 0.9 % 100 mL IV piggyback, , Intravenous, Daily  multivitamin-minerals, 1 tablet, Oral, Daily  nicotine, 21 mg, Transdermal, Once  nicotine, 21 mg, Transdermal, HS    acetaminophen (TYLENOL) tablet 650 mg  Dose: 650 mg  Freq: Once Route: PO  Indications of Use: FEVER,PAIN  Start: 04/08/24 2230 End: 04/08/24 2231     LORazepam (ATIVAN) tablet 2 mg  Dose: 2 mg  Freq: Once Route: PO  Indications of Use: ALCOHOL WITHDRAWAL SYNDROME  Start: 04/09/24 0500 End: 04/09/24 0503   LORazepam (ATIVAN) tablet 2 mg  Dose: 2 mg  Freq: Once Route: PO  Indications of Use: ALCOHOL WITHDRAWAL SYNDROME  Start: 04/08/24 2230 End: 04/08/24 2231      potassium chloride (Klor-Con M20) CR tablet 40 mEq  Dose: 40 mEq  Freq: Once Route: PO  Start: 04/08/24 2115 End: 04/08/24 2204     Continuous IV Infusions:  multi-electrolyte, 100 mL/hr, Intravenous, Continuous      PRN Meds: not used   hydrALAZINE, 5 mg, Intravenous, Q6H PRN  LORazepam, 1 mg, Intravenous, Q1H PRN  phenol, 1 spray, Mouth/Throat, Q2H PRN  trimethobenzamide, 200 mg, Intramuscular, Q6H PRN      Telemetry  CIWA  Continuous pulse oximetry   Aspiration precautions   Seizure precautions     IP CONSULT TO NEUROLOGY    Network Utilization Review Department  ATTENTION: Please call with any questions or concerns to [phone] and carefully listen to the prompts so that you are directed to the right person. All voicemails are confidential.   For Discharge needs, contact Care Management DC Support Team at [phone] opt. 2  Send all requests for admission clinical reviews, approved or denied determinations and any other requests to dedicated fax number below belonging to the [facility] where the patient is receiving treatment. List of dedicated fax numbers for the Facilities:  FACILITY NAME UR FAX NUMBER   ADMISSION DENIALS (Administrative/Medical Necessity) [phone]   DISCHARGE SUPPORT TEAM (NETWORK) [phone]   PARENT CHILD HEALTH (Maternity/NICU/Pediatrics) [phone]   [facility] [phone]   [facility] [phone]   [facility] [phone]   [facility] [phone]   [facility] [phone]   [facility] [phone]   [facility] [phone]   [facility] [phone]   [facility] [phone]   [facility] [phone]   [facility] [phone]   [facility]  [place] [phone]

## 2024-04-09 NOTE — ASSESSMENT & PLAN NOTE
"Hx of ETOH use disorder, drinking 2 bottles wine daily. Patient attempted to quit cold turkey 3 weeks ago resulting in in increased tremors thus started drinking a \"couple glasses of wine each day\". Today at work patient found by staff having seizure activity. EMS called. Initially refusing alcohol intake in the ER but then became more forthcoming about her drinking. Not interested in [facility] detox unit however is interested in medically assisted withdrawal. Discussed with ICU, admit to SD2.   Hx: Patient reports she has had multiple seizures over the past couple of years due to alcohol withdrawal. Has required intubation due to seizures as well.   Last ETOH intake: Night of 4/7  ETOH level neg  UDS pending   Current withdrawal sx: Tremors, anxiety, nausea.   ED provider discussed case with neuro prior to ETOH use disclosure.   S/P IV keppra load per ED --> hold further keppra and Neuro consult  Monitor tele + CIWA  IV thiamine/folic acid   Started on Librium  Neurology ordered MRI brain with and without contrast  EEG  Hold off on AED as per neurology  Gentle IVF + supportive care   Seizure precautions   "

## 2024-04-09 NOTE — PLAN OF CARE
Problem: PAIN - ADULT  Goal: Verbalizes/displays adequate comfort level or baseline comfort level  Description: Interventions:  - Encourage patient to monitor pain and request assistance  - Assess pain using appropriate pain scale  - Administer analgesics based on type and severity of pain and evaluate response  - Implement non-pharmacological measures as appropriate and evaluate response  - Consider cultural and social influences on pain and pain management  - Notify physician/advanced practitioner if interventions unsuccessful or patient reports new pain  Outcome: Progressing     Problem: INFECTION - ADULT  Goal: Absence or prevention of progression during hospitalization  Description: INTERVENTIONS:  - Assess and monitor for signs and symptoms of infection  - Monitor lab/diagnostic results  - Monitor all insertion sites, i.e. indwelling lines, tubes, and drains  - Monitor endotracheal if appropriate and nasal secretions for changes in amount and color  - [place] appropriate cooling/warming therapies per order  - Administer medications as ordered  - Instruct and encourage patient and family to use good hand hygiene technique  - Identify and instruct in appropriate isolation precautions for identified infection/condition  Outcome: Progressing  Goal: Absence of fever/infection during neutropenic period  Description: INTERVENTIONS:  - Monitor WBC    Outcome: Progressing     Problem: SAFETY ADULT  Goal: Patient will remain free of falls  Description: INTERVENTIONS:  - Educate patient/family on patient safety including physical limitations  - Instruct patient to call for assistance with activity   - Consult OT/PT to assist with strengthening/mobility   - Keep Call bell within reach  - Keep bed low and locked with side rails adjusted as appropriate  - Keep care items and personal belongings within reach  - Initiate and maintain comfort rounds  - Make Fall Risk Sign visible to staff  - Offer Toileting every  Hours,  in advance of need  - Initiate/Maintain alarm  - Obtain necessary fall risk management equipment:   - Apply yellow socks and bracelet for high fall risk patients  - Consider moving patient to room near nurses station  Outcome: Progressing  Goal: Maintain or return to baseline ADL function  Description: INTERVENTIONS:  -  Assess patient's ability to carry out ADLs; assess patient's baseline for ADL function and identify physical deficits which impact ability to perform ADLs (bathing, care of mouth/teeth, toileting, grooming, dressing, etc.)  - Assess/evaluate cause of self-care deficits   - Assess range of motion  - Assess patient's mobility; develop plan if impaired  - Assess patient's need for assistive devices and provide as appropriate  - Encourage maximum independence but intervene and supervise when necessary  - Involve family in performance of ADLs  - Assess for home care needs following discharge   - Consider OT consult to assist with ADL evaluation and planning for discharge  - Provide patient education as appropriate  Outcome: Progressing  Goal: Maintains/Returns to pre admission functional level  Description: INTERVENTIONS:  - Perform AM-PAC 6 Click Basic Mobility/ Daily Activity assessment daily.  - Set and communicate daily mobility goal to care team and patient/family/caregiver.   - Collaborate with rehabilitation services on mobility goals if consulted  - Perform Range of Motion  times a day.  - Reposition patient every  hours.  - Dangle patient  times a day  - Stand patient  times a day  - Ambulate patient  times a day  - Out of bed to chair  times a day   - Out of bed for meals  times a day  - Out of bed for toileting  - Record patient progress and toleration of activity level   Outcome: Progressing     Problem: DISCHARGE PLANNING  Goal: Discharge to home or other facility with appropriate resources  Description: INTERVENTIONS:  - Identify barriers to discharge w/patient and caregiver  - Arrange for  needed discharge resources and transportation as appropriate  - Identify discharge learning needs (meds, wound care, etc.)  - Arrange for interpretive services to assist at discharge as needed  - Refer to Case Management Department for coordinating discharge planning if the patient needs post-hospital services based on physician/advanced practitioner order or complex needs related to functional status, cognitive ability, or social support system  Outcome: Progressing     Problem: Knowledge Deficit  Goal: Patient/family/caregiver demonstrates understanding of disease process, treatment plan, medications, and discharge instructions  Description: Complete learning assessment and assess knowledge base.  Interventions:  - Provide teaching at level of understanding  - Provide teaching via preferred learning methods  Outcome: Progressing     Problem: NEUROSENSORY - ADULT  Goal: Achieves stable or improved neurological status  Description: INTERVENTIONS  - Monitor and report changes in neurological status  - Monitor vital signs such as temperature, blood pressure, glucose, and any other labs ordered   - Initiate measures to prevent increased intracranial pressure  - Monitor for seizure activity and implement precautions if appropriate      Outcome: Progressing  Goal: Remains free of injury related to seizures activity  Description: INTERVENTIONS  - Maintain airway, patient safety  and administer oxygen as ordered  - Monitor patient for seizure activity, document and report duration and description of seizure to physician/advanced practitioner  - If seizure occurs,  ensure patient safety during seizure  - Reorient patient post seizure  - Seizure pads on all 4 side rails  - Instruct patient/family to notify RN of any seizure activity including if an aura is experienced  - Instruct patient/family to call for assistance with activity based on nursing assessment  - Administer anti-seizure medications if ordered    Outcome:  Progressing  Goal: Achieves maximal functionality and self care  Description: INTERVENTIONS  - Monitor swallowing and airway patency with patient fatigue and changes in neurological status  - Encourage and assist patient to increase activity and self care.   - Encourage visually impaired, hearing impaired and aphasic patients to use assistive/communication devices  Outcome: Progressing     Problem: METABOLIC, FLUID AND ELECTROLYTES - ADULT  Goal: Electrolytes maintained within normal limits  Description: INTERVENTIONS:  - Monitor labs and assess patient for signs and symptoms of electrolyte imbalances  - Administer electrolyte replacement as ordered  - Monitor response to electrolyte replacements, including repeat lab results as appropriate  - Instruct patient on fluid and nutrition as appropriate  Outcome: Progressing  Goal: Fluid balance maintained  Description: INTERVENTIONS:  - Monitor labs   - Monitor I/O and WT  - Instruct patient on fluid and nutrition as appropriate  - Assess for signs & symptoms of volume excess or deficit  Outcome: Progressing  Goal: Glucose maintained within target range  Description: INTERVENTIONS:  - Monitor Blood Glucose as ordered  - Assess for signs and symptoms of hyperglycemia and hypoglycemia  - Administer ordered medications to maintain glucose within target range  - Assess nutritional intake and initiate nutrition service referral as needed  Outcome: Progressing

## 2024-04-09 NOTE — ASSESSMENT & PLAN NOTE
Home regimen: Lisinopril daily (unknown dose - pts family will let staff know tomorrow)   Utilize IV hydralazine for BP > 160

## 2024-04-09 NOTE — ASSESSMENT & PLAN NOTE
Mag 1.8, K 3.4  Replete electrolytes to maintain mag > 2, K > 4  Repeat ECG tomorrow  Avoid QT prolonging agents

## 2024-04-09 NOTE — PROGRESS NOTES
"[facility]  Progress Note  Name: Kaykay Holland I  MRN: [id number]  Unit/Bed#: -01 SDU I Date of Admission: 4/8/2024   Date of Service: 4/9/2024 I Hospital Day: 0    Assessment/Plan   Alcohol abuse  Assessment & Plan  On alcohol withdrawal protocol with CIWA score  Was started on thiamine, folic acid and Librium    Tobacco abuse  Assessment & Plan  Smokes 1/2-1 ppd.   Encourage cessation     Essential hypertension  Assessment & Plan  Home regimen: Lisinopril daily (unknown dose - pts family will let staff know tomorrow)   Utilize IV hydralazine for BP > 160     Prolonged QT interval  Assessment & Plan    Mag 1.8, K 3.4  Replete electrolytes to maintain mag > 2, K > 4  Repeat ECG   Avoid QT prolonging agents     Transaminitis  Assessment & Plan    ALT 56  T bili 1.02  Suspect related to alcoholic pancreatitis   Right upper quadrant ultrasound  Trend LFTs     * Alcohol withdrawal seizure (HCC)  Assessment & Plan  Hx of ETOH use disorder, drinking 2 bottles wine daily. Patient attempted to quit cold turkey 3 weeks ago resulting in in increased tremors thus started drinking a \"couple glasses of wine each day\". Today at work patient found by staff having seizure activity. EMS called. Initially refusing alcohol intake in the ER but then became more forthcoming about her drinking. Not interested in [facility] detox unit however is interested in medically assisted withdrawal. Discussed with ICU, admit to SD2.   Hx: Patient reports she has had multiple seizures over the past couple of years due to alcohol withdrawal. Has required intubation due to seizures as well.   Last ETOH intake: Night of 4/7  ETOH level neg  UDS pending   Current withdrawal sx: Tremors, anxiety, nausea.   ED provider discussed case with neuro prior to ETOH use disclosure.   S/P IV keppra load per ED --> hold further keppra and Neuro consult  Monitor tele + CIWA  IV thiamine/folic acid   [facility] " [facility]  Neurology ordered MRI brain with and without contrast  EEG  Hold off on AED as per neurology  Gentle IVF + supportive care   Seizure precautions              Labs & Imaging: I have personally reviewed pertinent reports.      VTE Prophylaxis: in place.    Code Status:   Level 1 - Full Code    Patient Centered Rounds: I have performed bedside rounds with nursing staff today.    Mobility:   Basic Mobility Inpatient Raw Score: 24  JH-HLM Goal: 8: Walk 250 feet or more  JH-HLM Achieved: 7: Walk 25 feet or more (per ER pt walked to bathroom)  JH-HLM Goal achieved. Continue to encourage appropriate mobility.    Discussions with Specialists or Other Care Team Provider: Neurology    Education and Discussions with Family / Patient: Shyla on phone    Total Time Spent on Date of Encounter in care of patient: 35 mins. This time was spent on one or more of the following: performing physical exam; counseling and coordination of care; obtaining or reviewing history; documenting in the medical record; reviewing/ordering tests, medications or procedures; communicating with other healthcare professionals and discussing with patient's family/caregivers.    Current Length of Stay: 0 day(s)    Current Patient Status: Observation   Certification Statement: The patient will continue to require additional inpatient hospital stay due to see my assessment and plan.     Subjective:   Patient is seen and examined at bedside.  No new complaints.  Afebrile  All other ROS are negative.    Objective:    Vitals: Blood pressure 132/85, pulse 80, temperature 98.7 °F (37.1 °C), temperature source Oral, resp. rate 22, height 5' 4\" (1.626 m), weight 63.5 kg (140 lb), last menstrual period 04/01/2024, SpO2 94%.,Body mass index is 24.03 kg/m².  SPO2 RA Rest      Flowsheet Row ED to Hosp-Admission (Current) from 4/8/2024 in [facility] Intensive [facility] Unit   SpO2 94 %   SpO2 Activity At Rest   O2 Device None (Room air)   O2 Flow " Rate 0 L/min          I&O:   Intake/Output Summary (Last 24 hours) at 4/9/2024 0752  Last data filed at 4/9/2024 0400  Gross per 24 hour   Intake 2013.33 ml   Output --   Net 2013.33 ml       Physical Exam:    General- Alert, lying comfortably in bed. Not in any acute distress.  Neck- Supple, No JVD  CVS- regular, S1 and S2 normal  Chest- Bilateral Air entry, No rhochi, crackles or wheezing present.  Abdomen- soft, nontender, not distended, no guarding or rigidity, BS+  Extremities-  No pedal edema, No calf tenderness                         Normal ROM in all extremities.  CNS-   Alert, awake and orientedx3. No focal deficits present.    Invasive Devices       Peripheral Intravenous Line  Duration             Peripheral IV 04/08/24 Left;Proximal;Ventral (anterior) Forearm <1 day                          Social History  reviewed  History reviewed. No pertinent family history. reviewed    Meds:  Current Facility-Administered Medications   Medication Dose Route Frequency Provider Last Rate Last Admin    folic acid 1 mg, thiamine (VITAMIN B1) 100 mg in sodium chloride 0.9 % 100 mL IV piggyback   Intravenous Daily Stephanie Reid PA-C        hydrALAZINE (APRESOLINE) injection 5 mg  5 mg Intravenous Q6H PRN Stephanie Reid PA-C        LORazepam (ATIVAN) injection 1 mg  1 mg Intravenous Q1H PRN Stephanie Reid PA-C        multi-electrolyte (PLASMALYTE-A/ISOLYTE-S PH 7.4) IV solution  100 mL/hr Intravenous Continuous Sylvia Pappas PA-C 100 mL/hr at 04/08/24 2204 100 mL/hr at 04/08/24 2204    multivitamin-minerals (CENTRUM) tablet 1 tablet  1 tablet Oral Daily Stephanie Reid PA-C        nicotine (NICODERM CQ) 21 mg/24 hr TD 24 hr patch 21 mg  21 mg Transdermal Once Maria Elena Sewell, DO   21 mg at 04/08/24 2040    nicotine (NICODERM CQ) 21 mg/24 hr TD 24 hr patch 21 mg  21 mg Transdermal HS Stephanie Reid PA-C        phenol (CHLORASEPTIC) 1.4 % mucosal liquid 1 spray  1 spray Mouth/Throat Q2H PRN PATRICIA Canales         "trimethobenzamide (TIGAN) IM injection 200 mg  200 mg Intramuscular Q6H PRN PATRICIA Canales          Medications Prior to Admission   Medication    LISINOPRIL PO       Labs:  Results from last 7 days   Lab Units 04/09/24  0455 04/08/24  1721   WBC Thousand/uL 3.66* 4.65   HEMOGLOBIN g/dL 11.7 13.1   HEMATOCRIT % 35.6 39.1   PLATELETS Thousands/uL 89* 107*   NEUTROS PCT % 57 57   LYMPHS PCT % 30 30   MONOS PCT % 10 12   EOS PCT % 1 1     Results from last 7 days   Lab Units 04/08/24  1940 04/08/24  1721   POTASSIUM mmol/L 3.1* 3.1*   CHLORIDE mmol/L 95* 89*   CO2 mmol/L 29 20*   BUN mg/dL 9 9   CREATININE mg/dL 0.65 0.80   CALCIUM mg/dL 8.3* 9.3   ALK PHOS U/L  --  66   ALT U/L  --  56*   AST U/L  --  228*     Lab Results   Component Value Date    TROPONINI 0.01 02/20/2021    TROPONINI <0.02 07/30/2019    TROPONINI <0.02 07/18/2019         No results found for: \"BLOODCX\", \"URINECX\", \"WOUNDCULT\", \"SPUTUMCULTUR\"      Imaging:  No results found for this or any previous visit.    No results found for this or any previous visit.      Last 24 Hours Medication List:   Current Facility-Administered Medications   Medication Dose Route Frequency Provider Last Rate    folic acid 1 mg, thiamine (VITAMIN B1) 100 mg in sodium chloride 0.9 % 100 mL IV piggyback   Intravenous Daily Stephanie Reid PA-C      hydrALAZINE  5 mg Intravenous Q6H PRN Stephanie Reid PA-C      LORazepam  1 mg Intravenous Q1H PRN Stephanie Reid PA-C      multi-electrolyte  100 mL/hr Intravenous Continuous Sylvia Pappas PA-C 100 mL/hr (04/08/24 2204)    multivitamin-minerals  1 tablet Oral Daily Stephanie Reid PA-C      nicotine  21 mg Transdermal Once Maria Elena Sewell DO      nicotine  21 mg Transdermal HS Stephnaie Reid PA-C      phenol  1 spray Mouth/Throat Q2H PRN PATRICIA Canales      trimethobenzamide  200 mg Intramuscular Q6H PRN PATRICIA Canales          Today, Patient Was Seen By: Liban Bowden MD    ** Please Note: " Dictation voice to text software may have been used in the creation of this document. **

## 2024-04-09 NOTE — ASSESSMENT & PLAN NOTE
ALT 56  T bili 1.02  Suspect related to alcoholic pancreatitis   Right upper quadrant ultrasound  Trend LFTs

## 2024-04-09 NOTE — H&P
"[facility]  H&P  Name: Kaykay Holland 33 y.o. female I MRN: [id number]  Unit/Bed#: -01 SDU I Date of Admission: 4/8/2024   Date of Service: 4/8/2024 I Hospital Day: 0      Assessment/Plan   * Alcohol withdrawal seizure (HCC)  Assessment & Plan  Hx of ETOH use disorder, drinking 2 bottles wine daily. Patient attempted to quit cold turkey 3 weeks ago resulting in in increased tremors thus started drinking a \"couple glasses of wine each day\". Today at work patient found by staff having seizure activity. EMS called. Initially refusing alcohol intake in the ER but then became more forthcoming about her drinking. Not interested in [facility] detox unit however is interested in medically assisted withdrawal. Discussed with ICU, admit to SD2.   Hx: Patient reports she has had multiple seizures over the past couple of years due to alcohol withdrawal. Has required intubation due to seizures as well.   Last ETOH intake: Night of 4/7  ETOH level neg  UDS pending   Current withdrawal sx: Tremors, anxiety, nausea.   ED provider discussed case with neuro prior to ETOH use disclosure.   S/P IV keppra load per ED --> hold further keppra/neuro consult at this time   Monitor tele + CIWA  IV thiamine/folic acid   Gentle IVF + supportive care   Seizure precautions     Tobacco abuse  Assessment & Plan  Smokes 1/2-1 ppd.   Encourage cessation     Essential hypertension  Assessment & Plan  Home regimen: Lisinopril daily (unknown dose - pts family will let staff know tomorrow)   Utilize IV hydralazine for BP > 160     Prolonged QT interval  Assessment & Plan    Mag 1.8, K 3.4  Replete electrolytes to maintain mag > 2, K > 4  Repeat ECG tomorrow  Avoid QT prolonging agents     Transaminitis  Assessment & Plan    ALT 56  T bili 1.02  Suspect related to alcoholic pancreatitis   Trend LFTs            VTE Pharmacologic Prophylaxis: VTE Score: 0 Low Risk (Score 0-2) - Encourage Ambulation.  Code " "Status: Level 1 - Full Code   Discussion with family: Patient declined call to .     Anticipated Length of Stay: Patient will be admitted on an observation basis with an anticipated length of stay of less than 2 midnights secondary to Alcohol withdrawal seizures.    Total Time Spent on Date of Encounter in care of patient: 55 mins. This time was spent on one or more of the following: performing physical exam; counseling and coordination of care; obtaining or reviewing history; documenting in the medical record; reviewing/ordering tests, medications or procedures; communicating with other healthcare professionals and discussing with patient's family/caregivers.    Chief Complaint: \"They told me I seized\"    History of Present Illness:  Kaykay Holland is a 33 y.o. female with a PMH of alcohol abuse with history of withdrawal, hypertension who presents to the hospital via EMS after seizure activity.  Patient initially denying alcohol usage to ED staff prompting full seizure workup.  Normal CT head and loaded with Keppra.  However, after further discussion with the ED staff patient reported that she had been drinking alcohol daily and then approximately 3 weeks ago attempted to quit cold turkey.  After becoming shaky due to stopping for 1 to 2 days she restarted to drink but at a smaller amount.  Since then she has been slowly trying to wean herself.  Last drink was night of 4/7.  Reports she drank \"a couple glasses of wine\". Woke up and went to work. She recalls feeling shaky, nauseous and anxious at work. Last think she remembers prior to the seizure was looking at the clock in the break room and noting her shift would be done in about 10 minutes. Then next thing she is able to recall is being in the ambulance on her way to the hospital. She was confused on why they were bringing her here.     Denies hallucinations.     Patient reports prior history of alcohol withdrawal with seizures. Most recent " about 1 year ago. She also recalls requiring intubation after a seizure in a past admission. She is not interested in going to [place] or other detox services at this time. Smokes about 1/2-1ppd. Denies drug use.     Review of Systems:  Review of Systems   Constitutional:  Negative for fatigue and fever.   HENT:  Negative for sore throat.         Tongue pain   Respiratory:  Negative for cough, chest tightness and shortness of breath.    Cardiovascular:  Negative for chest pain.   Gastrointestinal:  Positive for nausea. Negative for abdominal distention, abdominal pain, diarrhea and vomiting.   Genitourinary:  Negative for difficulty urinating.   Musculoskeletal:  Negative for arthralgias.   Neurological:  Positive for tremors and seizures. Negative for weakness and headaches.   Psychiatric/Behavioral:  Positive for dysphoric mood. Negative for agitation and behavioral problems. The patient is nervous/anxious.    All other systems reviewed and are negative.      Past Medical and Surgical History:   History reviewed. No pertinent past medical history.    History reviewed. No pertinent surgical history.    Meds/Allergies:  Prior to Admission medications    Not on File     I have reviewed home medications with patient personally.    Allergies: No Known Allergies    Social History:  Marital Status: Single   Occupation: unknown   Patient Pre-hospital Living Situation: Home  Patient Pre-hospital Level of Mobility: walks  Patient Pre-hospital Diet Restrictions: regular  Substance Use History:   Social History     Substance and Sexual Activity   Alcohol Use Yes    Comment: social     Social History     Tobacco Use   Smoking Status Every Day    Current packs/day: 0.50    Types: Cigarettes    Passive exposure: Never   Smokeless Tobacco Never     Social History     Substance and Sexual Activity   Drug Use Never       Family History:  History reviewed. No pertinent family history.    Physical Exam:     Vitals:   Blood  "Pressure: 140/88 (04/08/24 2210)  Pulse: 94 (04/08/24 2210)  Temperature: 98 °F (36.7 °C) (04/08/24 1650)  Respirations: 18 (04/08/24 2130)  Height: 5' 4\" (162.6 cm) (04/08/24 1650)  Weight - Scale: 63.5 kg (140 lb) (04/08/24 1650)  SpO2: 96 % (04/08/24 2130)    Physical Exam  Vitals and nursing note reviewed.   Constitutional:       Appearance: Normal appearance.   HENT:      Head: Normocephalic.      Mouth/Throat:      Comments: Bite marks on right side of tongue  Eyes:      Extraocular Movements: Extraocular movements intact.      Pupils: Pupils are equal, round, and reactive to light.   Cardiovascular:      Rate and Rhythm: Normal rate and regular rhythm.      Heart sounds: No murmur heard.  Pulmonary:      Effort: Pulmonary effort is normal. No respiratory distress.      Breath sounds: Normal breath sounds. No wheezing.   Abdominal:      General: Bowel sounds are normal. There is no distension.      Tenderness: There is no abdominal tenderness. There is no guarding.   Musculoskeletal:         General: Normal range of motion.      Cervical back: Normal range of motion.      Right lower leg: No edema.      Left lower leg: No edema.   Skin:     General: Skin is warm.   Neurological:      General: No focal deficit present.      Mental Status: She is alert and oriented to person, place, and time.      Motor: Tremor present.   Psychiatric:         Mood and Affect: Mood is anxious. Affect is tearful.         Behavior: Behavior normal.         Thought Content: Thought content normal.          Additional Data:     Lab Results:  Results from last 7 days   Lab Units 04/08/24  1721   WBC Thousand/uL 4.65   HEMOGLOBIN g/dL 13.1   HEMATOCRIT % 39.1   PLATELETS Thousands/uL 107*   NEUTROS PCT % 57   LYMPHS PCT % 30   MONOS PCT % 12   EOS PCT % 1     Results from last 7 days   Lab Units 04/08/24  1940 04/08/24  1721   SODIUM mmol/L 135 134*   POTASSIUM mmol/L 3.1* 3.1*   CHLORIDE mmol/L 95* 89*   CO2 mmol/L 29 20*   BUN mg/dL " 9 9   CREATININE mg/dL 0.65 0.80   ANION GAP mmol/L 11 25*   CALCIUM mg/dL 8.3* 9.3   ALBUMIN g/dL  --  4.6   TOTAL BILIRUBIN mg/dL  --  1.02*   ALK PHOS U/L  --  66   ALT U/L  --  56*   AST U/L  --  228*   GLUCOSE RANDOM mg/dL 90 104         Results from last 7 days   Lab Units 04/08/24  1651   POC GLUCOSE mg/dl 140               Lines/Drains:  Invasive Devices       Peripheral Intravenous Line  Duration             Peripheral IV 04/08/24 Left;Proximal;Ventral (anterior) Forearm <1 day                        Imaging: Reviewed radiology reports from this admission including: CT head  CT head without contrast   Final Result by E. Alec Schoenberger, MD (04/08 1805)      No acute intracranial abnormality.                  Workstation performed: [email or web address] LÃ¡         XR chest 1 view portable    (Results Pending)       EKG and Other Studies Reviewed on Admission:   EKG: Sinus Tachycardia. HR 95.    ** Please Note: This note has been constructed using a voice recognition system. **

## 2024-04-09 NOTE — UTILIZATION REVIEW
INPATIENT Stay Review    Admission: Date/Time/Statement: 4/8/24 1927 observation  and CHANGED 4/9/24 TO INPATIENT DUE TO NEED FOR > 2 MIDNIGHT STAY FOR ALCOHOL WITHDRAW WITH SEIZURE.  CONTINUE CIWA AND STARTED ON LIBRIUM, TELEMETRY, IV THIAMINE AND FOLATE. EEG.  NEUROLOGY ON CONSULT AND ORDERED MRI BRAIN.   SEIZURE PRECAUTIONS AND MONITORING OFF AED  Admission Orders (From admission, onward)       Ordered        04/09/24 1043  INPATIENT ADMISSION  Once                           Orders Placed This Encounter   Procedures    INPATIENT ADMISSION     Standing Status:   Standing     Number of Occurrences:   1     Order Specific Question:   Level of Care     Answer:   Level 2 Stepdown / HOT [14]     Order Specific Question:   Estimated length of stay     Answer:   More than 2 Midnights     Order Specific Question:   Certification     Answer:   I certify that inpatient services are medically necessary for this patient for a duration of greater than two midnights. See H&P and MD Progress Notes for additional information about the patient's course of treatment.      Date: 4/9/24                           Current Patient Class: INPATIENT  Current Level of Care: Level 2 Stepdown/HOT    HPI:33 y.o. female initially admitted on 4/8/24 to observation due to alcohol withdrawal seizure.  History of alcohol use disorder, withdrawal with seizures and requiring intubation.   Drinks 2 bottles wine daily, stopped about 3 weeks ago and after 1 to 2 days, tremors so started drinking 1 to 2 glasses daily.  Last alcohol 4/7 in evening.  Ethanol < 10.   Coworker witnessed seizure.  Initially patient denied alcohol and in ED Loaded with Keppra, ct head no acute findings. Plan is neurology consult.  Hold Keppra, continue IVF, telemetry, pulse oximetry.   CIWA.   IV thiamine and folic acid.    Declines detox unit.     Assessment/Plan  4/9/24 CHANGED TO INPATIENT:   4/9/24 - + withdrawal.  No further seizures.  On exam tremulous.   K 3.2.    calcium 7.5   .   Wbc 3.66.  Continue CIWA.  Add scheduled Librium.   Continue MVI, IV thiamine and folic acid.   IV hydralazine as needed.  MRI brain and eeg pending.     Per neurology 4/9/24:  alcohol withdrawal seizure/alcohol abuse/transaminitis.   Patient amnesic to seizure event.  Has + tongue bite.   Thinks this is her 4th seizure related to withdrawal over past few years.   Plan: hold maintenance   anti seizure medication.  Routine eeg.  MRI brain.  Telemetry.  Seizure precautions.  On CIWA, folic acid, thiamine and MVI.       Vital Signs:   04/09/24 1340 -- 96 -- 154/99 121 93 % -- -- --   04/09/24 1223 -- 103 -- 163/112 Abnormal  132 94 % -- -- --   04/09/24 1100 98 °F (36.7 °C) 105 16 158/104 Abnormal  126 94 % -- None (Room air) --   04/09/24 0900 -- 96 -- -- -- -- -- -- --   04/09/24 0801 98.2 °F (36.8 °C) 85 18 137/87 107 91 %                    04/09/24 0457 98.7 °F (37.1 °C) 80 22 132/85 104 94 % -- -- --   04/08/24 2350 -- 92 18 139/87 106 94 % -- -- --   04/08/24 2210 -- 94 -- 140/88 -- -- -- -- --   04/08/24 2200 -- -- -- -- -- 95 % -- None (Room air)      CIWA  4/8/24 11 at 2210.   3 at 2350.     4/9/24   8 at 0457.    0 at 0600.   9 at 0801.   3 at 0900.   3 at 1100.    9 at 1223.   4 at 1340.      Pertinent Labs/Diagnostic Results:   US right upper quadrant   Final Result by Conner Angeles MD (04/10 5016)      No gallstones. The common duct is normal in caliber.      The liver is mildly echogenic.      Workstation performed: [id number]         MRI brain seizure wo and w contrast   Final Result by Dane Vaz MD (04/10 0834)   Motion degraded examination.      No acute infarction, edema, or pathologic intra-axial enhancement.         Workstation performed: [id number]         XR chest portable   Final Result by Joaquin Patterson MD (04/10 0654)      No acute cardiopulmonary disease.            Workstation performed: [id number]         CT head without contrast   Final  Result by E. Alec Schoenberger, MD (04/08 1805)      No acute intracranial abnormality.                  Workstation performed: [id number]         XR chest 1 view portable   Final Result by Rei Mcguire MD (04/09 1425)      No acute cardiopulmonary disease.            Workstation performed: [id number]            4/9/24 eeg This Routine EEG recorded during wakefulness and drowsiness is normal.   Enhanced beta activities are likely a medication effect related to benzodiazepine administration.      Results from last 7 days   Lab Units 04/10/24  0535 04/09/24  1522 04/09/24  0455 04/08/24  1721   WBC Thousand/uL 3.38* 3.22* 3.66* 4.65   HEMOGLOBIN g/dL 11.4* 11.4* 11.7 13.1   HEMATOCRIT % 34.3* 33.5* 35.6 39.1   PLATELETS Thousands/uL 76* 82* 89* 107*   TOTAL NEUT ABS Thousands/µL 1.50* 2.09 2.10 2.62     Results from last 7 days   Lab Units 04/10/24  1217 04/10/24  0535 04/09/24  1522 04/09/24  0804 04/08/24  1940   SODIUM mmol/L 135 135 135 137 135   POTASSIUM mmol/L 3.3* 3.2* 3.5 3.2* 3.1*   CHLORIDE mmol/L 101 101 101 101 95*   CO2 mmol/L 23 26 27 28 29   ANION GAP mmol/L 11 8 7 8 11   BUN mg/dL 6 6 5 6 9   CREATININE mg/dL 0.54* 0.59* 0.68 0.64 0.65   EGFR ml/min/1.73sq m 125 121 116 118 117   CALCIUM mg/dL 8.7 8.3* 8.8 7.5* 8.3*   MAGNESIUM mg/dL  --  1.4*  --  2.1 1.8*     Results from last 7 days   Lab Units 04/10/24  1217 04/10/24  0535 04/09/24  1522 04/09/24  0804 04/08/24  1721   AST U/L 165* 128* 127* 139* 228*   ALT U/L 64* 49 45 43 56*   ALK PHOS U/L 47 47 55 50 66   TOTAL PROTEIN g/dL 6.3* 6.6 6.7 6.1* 7.3   ALBUMIN g/dL 3.8 3.7 3.8 3.6 4.6   TOTAL BILIRUBIN mg/dL 0.59 0.58 0.66 0.83 1.02*     Results from last 7 days   Lab Units 04/08/24  1651   POC GLUCOSE mg/dl 140     Results from last 7 days   Lab Units 04/10/24  1217 04/10/24  0535 04/09/24  1522 04/09/24  0804 04/08/24  1940 04/08/24  1721   GLUCOSE RANDOM mg/dL 135 85 120 81 90 104     Results from last 7 days   Lab Units 04/08/24  1940  04/08/24  1721   HS TNI 0HR ng/L  --  4   HS TNI 2HR ng/L 4  --    HSTNI D2 ng/L 0  --      Results from last 7 days   Lab Units 04/09/24  1539 04/08/24 2026   CLARITY UA  Clear Hazy   COLOR UA  Yellow Yellow   SPEC GRAV UA  1.015 1.015   PH UA  8.0 7.0   GLUCOSE UA mg/dl Negative Negative   KETONES UA mg/dl Negative 10 (1+)*   BLOOD UA  Negative Negative   PROTEIN UA mg/dl Negative 100 (2+)*   NITRITE UA  Negative Negative   BILIRUBIN UA  Negative Negative   UROBILINOGEN UA (BE) mg/dl <2.0 4.0*   LEUKOCYTES UA  Negative Small*   WBC UA /hpf  --  4-10*   RBC UA /hpf  --  None Seen   BACTERIA UA /hpf  --  None Seen   EPITHELIAL CELLS WET PREP /hpf  --  Occasional   MUCUS THREADS   --  Occasional*     Results from last 7 days   Lab Units 04/08/24 2026   AMPH/METH  Negative   BARBITURATE UR  Negative   BENZODIAZEPINE UR  Negative   COCAINE UR  Negative   METHADONE URINE  Negative   OPIATE UR  Negative   PCP UR  Negative   THC UR  Negative     Results from last 7 days   Lab Units 04/08/24  1721   ETHANOL LVL mg/dL <10   ACETAMINOPHEN LVL ug/mL <2*   SALICYLATE LVL mg/dL <5.0       Medications:   Scheduled Medications:  folic acid 1 mg, thiamine (VITAMIN B1) 100 mg in sodium chloride 0.9 % 100 mL IV piggyback, , Intravenous, Daily  multivitamin-minerals, 1 tablet, Oral, Daily  nicotine, 21 mg, Transdermal, Once  nicotine, 21 mg, Transdermal, HS    LORazepam (ATIVAN) tablet 2 mg  Dose: 2 mg  Freq: Once Route: PO  Indications of Use: ALCOHOL WITHDRAWAL SYNDROME  Start: 04/09/24 0500 End: 04/09/24 0503   chlordiazePOXIDE (LIBRIUM) capsule 25 mg  Dose: 25 mg  Freq: Every 8 hours scheduled Route: PO  Start: 04/09/24 1400   LORazepam (ATIVAN) tablet 2 mg  Dose: 2 mg  Freq: Once Route: PO  Indications of Use: ALCOHOL WITHDRAWAL SYNDROME  Start: 04/09/24 1230 End: 04/09/24 1227   LORazepam (ATIVAN) tablet 2 mg  Dose: 2 mg  Freq: Once Route: PO  Indications of Use: ALCOHOL WITHDRAWAL SYNDROME  Start: 04/09/24 0830 End: 04/09/24  0832   potassium chloride (Klor-Con M20) CR tablet 40 mEq  Dose: 40 mEq  Freq: Once Route: PO  Start: 04/09/24 1045 End: 04/09/24 1227     Continuous IV Infusions:  multi-electrolyte, 100 mL/hr, Intravenous, Continuous dc 4/9 at 1003      PRN Meds: not used.   hydrALAZINE, 5 mg, Intravenous, Q6H PRN  LORazepam, 1 mg, Intravenous, Q1H PRN  phenol, 1 spray, Mouth/Throat, Q2H PRN  trimethobenzamide, 200 mg, Intramuscular, Q6H PRN x 1 at 0832 on 4/9        Discharge Plan:  to be determined.     Network Utilization Review Department  ATTENTION: Please call with any questions or concerns to [phone] and carefully listen to the prompts so that you are directed to the right person. All voicemails are confidential.   For Discharge needs, contact Care Management DC Support Team at [phone] opt. 2  Send all requests for admission clinical reviews, approved or denied determinations and any other requests to dedicated fax number below belonging to the [place] where the patient is receiving treatment. List of dedicated fax numbers for the Facilities:  FACILITY NAME UR FAX NUMBER   ADMISSION DENIALS (Administrative/Medical Necessity) [phone]   DISCHARGE SUPPORT TEAM (NETWORK) [phone]   PARENT CHILD HEALTH (Maternity/NICU/Pediatrics) [phone]   [facility] [phone]   [facility] [phone]   [facility] [phone]   [facility] [phone]   [facility] [phone]   [facility] [phone]   [facility] [phone]   [facility] [phone]   [facility] [phone]   [facility] [phone]   [facility] [phone]   [facility] [phone]

## 2024-04-09 NOTE — QUICK NOTE
Progress Note - Triage Assessment   Kaykay Holland 33 y.o. female MRN: [id number]    Time Called: 2003  Date Called: 04/08/24  Room#: ED 5  Time Evaluated: 2010   Person requesting evaluation: Dr. Sewell    Called to ED to evaluate patient for possible admission to Critical Care Service, chart reviewed and patient evaluated.     Patient present with seizure this afternoon following discontinuation of ETOH use. Last drink was the night of 4/7. Only reported seizure at work with afternoon at 1540. Patient has a history of withdrawal seizures anytime she stops her habitual ETOH use.     +Tremors and anxiety. Ambulates to the bathroom with ease. Oriented. Denies hallucinations, HA, vomiting or abdominal pain.   Hemodynamically stable.   No indication for CC or SD1 at this time.     Discussed case with Sylvia Pappas PA-C  and they have agreed to accept patient to their service.    Recommendations discussed with ED attending Dr. Sewell.          Triage Assessment:     Patient appropriate to be admitted to med-surg level of care or SD2 per SLIM.    If any questions or concerns please call the critical care team at ext [phone]

## 2024-04-09 NOTE — ASSESSMENT & PLAN NOTE
In the context of alcohol abuse   , ALT 56, Tbili 1.02  RUQ US pending  Management as per primary team

## 2024-04-09 NOTE — ASSESSMENT & PLAN NOTE
Mag 1.8, K 3.4  Replete electrolytes to maintain mag > 2, K > 4  Repeat ECG   Avoid QT prolonging agents

## 2024-04-10 ENCOUNTER — APPOINTMENT (INPATIENT)
Dept: ULTRASOUND IMAGING | Facility: HOSPITAL | Age: 34
DRG: 282 | End: 2024-04-10
Payer: COMMERCIAL

## 2024-04-10 ENCOUNTER — TELEPHONE (OUTPATIENT)
Dept: HEMATOLOGY ONCOLOGY | Facility: CLINIC | Age: 34
End: 2024-04-10

## 2024-04-10 VITALS
SYSTOLIC BLOOD PRESSURE: 156 MMHG | HEART RATE: 100 BPM | WEIGHT: 140.43 LBS | DIASTOLIC BLOOD PRESSURE: 100 MMHG | BODY MASS INDEX: 23.98 KG/M2 | HEIGHT: 64 IN | OXYGEN SATURATION: 99 % | TEMPERATURE: 98.2 F | RESPIRATION RATE: 27 BRPM

## 2024-04-10 PROBLEM — A41.9 SEPSIS (HCC): Status: ACTIVE | Noted: 2024-04-10

## 2024-04-10 PROBLEM — D61.818 PANCYTOPENIA (HCC): Status: ACTIVE | Noted: 2024-04-10

## 2024-04-10 PROBLEM — R65.10 SIRS (SYSTEMIC INFLAMMATORY RESPONSE SYNDROME) (HCC): Status: ACTIVE | Noted: 2024-04-10

## 2024-04-10 LAB
ALBUMIN SERPL BCP-MCNC: 3.7 G/DL (ref 3.5–5)
ALBUMIN SERPL BCP-MCNC: 3.8 G/DL (ref 3.5–5)
ALP SERPL-CCNC: 47 U/L (ref 34–104)
ALP SERPL-CCNC: 47 U/L (ref 34–104)
ALT SERPL W P-5'-P-CCNC: 49 U/L (ref 7–52)
ALT SERPL W P-5'-P-CCNC: 64 U/L (ref 7–52)
ANION GAP SERPL CALCULATED.3IONS-SCNC: 11 MMOL/L (ref 4–13)
ANION GAP SERPL CALCULATED.3IONS-SCNC: 8 MMOL/L (ref 4–13)
AST SERPL W P-5'-P-CCNC: 128 U/L (ref 13–39)
AST SERPL W P-5'-P-CCNC: 165 U/L (ref 13–39)
BASOPHILS # BLD AUTO: 0.04 THOUSANDS/ÂΜL (ref 0–0.1)
BASOPHILS NFR BLD AUTO: 1 % (ref 0–1)
BILIRUB SERPL-MCNC: 0.58 MG/DL (ref 0.2–1)
BILIRUB SERPL-MCNC: 0.59 MG/DL (ref 0.2–1)
BUN SERPL-MCNC: 6 MG/DL (ref 5–25)
BUN SERPL-MCNC: 6 MG/DL (ref 5–25)
C COLI+JEJUNI TUF STL QL NAA+PROBE: NEGATIVE
C DIFF TOX GENS STL QL NAA+PROBE: NEGATIVE
CALCIUM SERPL-MCNC: 8.3 MG/DL (ref 8.4–10.2)
CALCIUM SERPL-MCNC: 8.7 MG/DL (ref 8.4–10.2)
CHLORIDE SERPL-SCNC: 101 MMOL/L (ref 96–108)
CHLORIDE SERPL-SCNC: 101 MMOL/L (ref 96–108)
CO2 SERPL-SCNC: 23 MMOL/L (ref 21–32)
CO2 SERPL-SCNC: 26 MMOL/L (ref 21–32)
CREAT SERPL-MCNC: 0.54 MG/DL (ref 0.6–1.3)
CREAT SERPL-MCNC: 0.59 MG/DL (ref 0.6–1.3)
EC STX1+STX2 GENES STL QL NAA+PROBE: NEGATIVE
EOSINOPHIL # BLD AUTO: 0.05 THOUSAND/ÂΜL (ref 0–0.61)
EOSINOPHIL NFR BLD AUTO: 2 % (ref 0–6)
ERYTHROCYTE [DISTWIDTH] IN BLOOD BY AUTOMATED COUNT: 11.4 % (ref 11.6–15.1)
FERRITIN SERPL-MCNC: 2560 NG/ML (ref 11–307)
FOLATE SERPL-MCNC: >22.3 NG/ML
GFR SERPL CREATININE-BSD FRML MDRD: 121 ML/MIN/1.73SQ M
GFR SERPL CREATININE-BSD FRML MDRD: 125 ML/MIN/1.73SQ M
GLUCOSE SERPL-MCNC: 135 MG/DL (ref 65–140)
GLUCOSE SERPL-MCNC: 85 MG/DL (ref 65–140)
HCT VFR BLD AUTO: 34.3 % (ref 34.8–46.1)
HGB BLD-MCNC: 11.4 G/DL (ref 11.5–15.4)
IMM GRANULOCYTES # BLD AUTO: 0.01 THOUSAND/UL (ref 0–0.2)
IMM GRANULOCYTES NFR BLD AUTO: 0 % (ref 0–2)
IRON SATN MFR SERPL: 16 % (ref 15–50)
IRON SERPL-MCNC: 44 UG/DL (ref 50–212)
LYMPHOCYTES # BLD AUTO: 1.28 THOUSANDS/ÂΜL (ref 0.6–4.47)
LYMPHOCYTES NFR BLD AUTO: 38 % (ref 14–44)
MAGNESIUM SERPL-MCNC: 1.4 MG/DL (ref 1.9–2.7)
MCH RBC QN AUTO: 35.2 PG (ref 26.8–34.3)
MCHC RBC AUTO-ENTMCNC: 33.2 G/DL (ref 31.4–37.4)
MCV RBC AUTO: 106 FL (ref 82–98)
MONOCYTES # BLD AUTO: 0.5 THOUSAND/ÂΜL (ref 0.17–1.22)
MONOCYTES NFR BLD AUTO: 15 % (ref 4–12)
NEUTROPHILS # BLD AUTO: 1.5 THOUSANDS/ÂΜL (ref 1.85–7.62)
NEUTS SEG NFR BLD AUTO: 44 % (ref 43–75)
NRBC BLD AUTO-RTO: 0 /100 WBCS
PLATELET # BLD AUTO: 76 THOUSANDS/UL (ref 149–390)
PMV BLD AUTO: 11.4 FL (ref 8.9–12.7)
POTASSIUM SERPL-SCNC: 3.2 MMOL/L (ref 3.5–5.3)
POTASSIUM SERPL-SCNC: 3.3 MMOL/L (ref 3.5–5.3)
PROCALCITONIN SERPL-MCNC: 0.14 NG/ML
PROT SERPL-MCNC: 6.3 G/DL (ref 6.4–8.4)
PROT SERPL-MCNC: 6.6 G/DL (ref 6.4–8.4)
RBC # BLD AUTO: 3.24 MILLION/UL (ref 3.81–5.12)
SALMONELLA SP SPAO STL QL NAA+PROBE: NEGATIVE
SHIGELLA SP+EIEC IPAH STL QL NAA+PROBE: NEGATIVE
SODIUM SERPL-SCNC: 135 MMOL/L (ref 135–147)
SODIUM SERPL-SCNC: 135 MMOL/L (ref 135–147)
TIBC SERPL-MCNC: 275 UG/DL (ref 250–450)
UIBC SERPL-MCNC: 231 UG/DL (ref 155–355)
WBC # BLD AUTO: 3.38 THOUSAND/UL (ref 4.31–10.16)

## 2024-04-10 PROCEDURE — 76705 ECHO EXAM OF ABDOMEN: CPT

## 2024-04-10 PROCEDURE — 99255 IP/OBS CONSLTJ NEW/EST HI 80: CPT | Performed by: NURSE PRACTITIONER

## 2024-04-10 PROCEDURE — 83735 ASSAY OF MAGNESIUM: CPT | Performed by: INTERNAL MEDICINE

## 2024-04-10 PROCEDURE — 82728 ASSAY OF FERRITIN: CPT | Performed by: NURSE PRACTITIONER

## 2024-04-10 PROCEDURE — 83918 ORGANIC ACIDS TOTAL QUANT: CPT | Performed by: NURSE PRACTITIONER

## 2024-04-10 PROCEDURE — 99232 SBSQ HOSP IP/OBS MODERATE 35: CPT | Performed by: INTERNAL MEDICINE

## 2024-04-10 PROCEDURE — 82746 ASSAY OF FOLIC ACID SERUM: CPT | Performed by: NURSE PRACTITIONER

## 2024-04-10 PROCEDURE — 99239 HOSP IP/OBS DSCHRG MGMT >30: CPT | Performed by: INTERNAL MEDICINE

## 2024-04-10 PROCEDURE — 80053 COMPREHEN METABOLIC PANEL: CPT | Performed by: INTERNAL MEDICINE

## 2024-04-10 PROCEDURE — 84145 PROCALCITONIN (PCT): CPT | Performed by: INTERNAL MEDICINE

## 2024-04-10 PROCEDURE — 83550 IRON BINDING TEST: CPT | Performed by: NURSE PRACTITIONER

## 2024-04-10 PROCEDURE — 83540 ASSAY OF IRON: CPT | Performed by: NURSE PRACTITIONER

## 2024-04-10 PROCEDURE — 99233 SBSQ HOSP IP/OBS HIGH 50: CPT | Performed by: PSYCHIATRY & NEUROLOGY

## 2024-04-10 PROCEDURE — 85025 COMPLETE CBC W/AUTO DIFF WBC: CPT | Performed by: INTERNAL MEDICINE

## 2024-04-10 RX ORDER — MAGNESIUM SULFATE HEPTAHYDRATE 40 MG/ML
2 INJECTION, SOLUTION INTRAVENOUS ONCE
Status: COMPLETED | OUTPATIENT
Start: 2024-04-10 | End: 2024-04-10

## 2024-04-10 RX ORDER — CHLORDIAZEPOXIDE HYDROCHLORIDE 5 MG/1
5 CAPSULE, GELATIN COATED ORAL 2 TIMES DAILY
Qty: 6 CAPSULE | Refills: 0 | Status: SHIPPED | OUTPATIENT
Start: 2024-04-10 | End: 2024-04-25 | Stop reason: ALTCHOICE

## 2024-04-10 RX ORDER — NICOTINE 21 MG/24HR
1 PATCH, TRANSDERMAL 24 HOURS TRANSDERMAL
Qty: 28 PATCH | Refills: 0 | Status: SHIPPED | OUTPATIENT
Start: 2024-04-10 | End: 2024-04-25

## 2024-04-10 RX ORDER — LANOLIN ALCOHOL/MO/W.PET/CERES
400 CREAM (GRAM) TOPICAL 2 TIMES DAILY
Status: DISCONTINUED | OUTPATIENT
Start: 2024-04-10 | End: 2024-04-10 | Stop reason: HOSPADM

## 2024-04-10 RX ORDER — POTASSIUM CHLORIDE 20 MEQ/1
40 TABLET, EXTENDED RELEASE ORAL ONCE
Status: COMPLETED | OUTPATIENT
Start: 2024-04-10 | End: 2024-04-10

## 2024-04-10 RX ORDER — CHLORDIAZEPOXIDE HYDROCHLORIDE 5 MG/1
10 CAPSULE, GELATIN COATED ORAL EVERY 8 HOURS SCHEDULED
Status: DISCONTINUED | OUTPATIENT
Start: 2024-04-10 | End: 2024-04-10 | Stop reason: HOSPADM

## 2024-04-10 RX ORDER — CEFUROXIME AXETIL 500 MG/1
500 TABLET ORAL EVERY 12 HOURS SCHEDULED
Qty: 10 TABLET | Refills: 0 | Status: SHIPPED | OUTPATIENT
Start: 2024-04-10 | End: 2024-04-15

## 2024-04-10 RX ADMIN — THIAMINE HYDROCHLORIDE: 100 INJECTION, SOLUTION INTRAMUSCULAR; INTRAVENOUS at 09:00

## 2024-04-10 RX ADMIN — POTASSIUM CHLORIDE 40 MEQ: 1500 TABLET, EXTENDED RELEASE ORAL at 10:18

## 2024-04-10 RX ADMIN — CEFTRIAXONE 1000 MG: 1 INJECTION, SOLUTION INTRAVENOUS at 14:44

## 2024-04-10 RX ADMIN — MAGNESIUM SULFATE HEPTAHYDRATE 2 G: 2 INJECTION, SOLUTION INTRAVENOUS at 10:18

## 2024-04-10 RX ADMIN — CHLORDIAZEPOXIDE HYDROCHLORIDE 25 MG: 25 CAPSULE ORAL at 14:44

## 2024-04-10 RX ADMIN — MULTIPLE VITAMINS W/ MINERALS TAB 1 TABLET: TAB ORAL at 08:59

## 2024-04-10 RX ADMIN — CHLORDIAZEPOXIDE HYDROCHLORIDE 25 MG: 25 CAPSULE ORAL at 05:29

## 2024-04-10 RX ADMIN — MAGNESIUM SULFATE HEPTAHYDRATE 2 G: 2 INJECTION, SOLUTION INTRAVENOUS at 12:08

## 2024-04-10 RX ADMIN — SODIUM CHLORIDE 100 ML/HR: 0.9 INJECTION, SOLUTION INTRAVENOUS at 05:40

## 2024-04-10 RX ADMIN — Medication 400 MG: at 12:09

## 2024-04-10 NOTE — ASSESSMENT & PLAN NOTE
Patient pancytopenic: WBC 3.38, Hgb 11.4, Plts 76  In the context of alcohol abuse  Work-up and Management as per primary team

## 2024-04-10 NOTE — ASSESSMENT & PLAN NOTE
"Hx of ETOH use disorder, drinking 2 bottles wine daily. Patient attempted to quit cold turkey 3 weeks ago resulting in in increased tremors thus started drinking a \"couple glasses of wine each day\". Today at work patient found by staff having seizure activity. EMS called. Initially refusing alcohol intake in the ER but then became more forthcoming about her drinking. Not interested in [facility] detox unit however is interested in medically assisted withdrawal. Discussed with ICU, admit to SD2.   Hx: Patient reports she has had multiple seizures over the past couple of years due to alcohol withdrawal. Has required intubation due to seizures as well.   Last ETOH intake: Night of 4/7  ETOH level neg  UDS pending   Current withdrawal sx: Tremors, anxiety, nausea.   ED provider discussed case with neuro prior to ETOH use disclosure.   S/P IV keppra load per ED --> hold further keppra and Neuro consult  Monitor tele + CIWA  IV thiamine/folic acid   On Librium  MRI brain with and without contrast is unremarkable  EEG is normal  Hold off on AED as per neurology  Gentle IVF + supportive care   Seizure precautions   "

## 2024-04-10 NOTE — DISCHARGE INSTR - AVS FIRST PAGE
Follow-up with PCP ASAP and repeat CBC in 1 week  Follow-up with hematology oncology as outpatient  Follow-up with neurology as outpatient  Patient was at [facility] from 4/8/2024 to 4/10/2024

## 2024-04-10 NOTE — ASSESSMENT & PLAN NOTE
Tmax on 4/9 at 1454 of 101.3F - Temp this AM 99.5F. HR 75, /90  Procal 0.14, WBC 3.38  UA negative, CXR negative  Stool PCR, Cdiff PCR, Blood cultures pending  Patient currently on empiric Ceftriaxone  Infectious work-up and management as per primary team  If no infectious etiology identified, fever/tachycardia may also be withdrawal symptoms.

## 2024-04-10 NOTE — PROGRESS NOTES
"[facility]  Progress Note  Name: Kaykay Holland I  MRN: [id number]  Unit/Bed#: -01 SDU I Date of Admission: 4/8/2024   Date of Service: 4/10/2024 I Hospital Day: 1    Assessment/Plan   Pancytopenia (HCC)  Assessment & Plan  Patient noted to have pancytopenia  Right upper quadrant ultrasound  Possible secondary to alcohol use?  Cirrhosis  Trend CBC  Will request hematology input    Sepsis (HCC)  Assessment & Plan  Patient found to have sepsis on 4/9 as evidenced by fever, tachycardia  Possible GI source  UA is negative.  Chest x-ray is unremarkable  Follow-up stool culture results  Denies any abdominal pain  Follow-up blood culture results  On Rocephin  Trend WBC and fever curve    Alcohol abuse  Assessment & Plan  On alcohol withdrawal protocol with CIWA score  Was started on thiamine, folic acid and Librium    Tobacco abuse  Assessment & Plan  Smokes 1/2-1 ppd.   Encourage cessation     Essential hypertension  Assessment & Plan  Home regimen: Lisinopril daily (unknown dose - pts family will let staff know tomorrow)   Utilize IV hydralazine for BP > 160     Prolonged QT interval  Assessment & Plan    Mag 1.8, K 3.4  Replete electrolytes to maintain mag > 2, K > 4  Avoid QT prolonging agents     Transaminitis  Assessment & Plan    ALT 56  T bili 1.02  Suspect related to alcoholic pancreatitis   Right upper quadrant ultrasound pending  Trend LFTs     * Alcohol withdrawal seizure (HCC)  Assessment & Plan  Hx of ETOH use disorder, drinking 2 bottles wine daily. Patient attempted to quit cold turkey 3 weeks ago resulting in in increased tremors thus started drinking a \"couple glasses of wine each day\". Today at work patient found by staff having seizure activity. EMS called. Initially refusing alcohol intake in the ER but then became more forthcoming about her drinking. Not interested in [facility] detox unit however is interested in medically assisted withdrawal. Discussed " with ICU, admit to SD2.   Hx: Patient reports she has had multiple seizures over the past couple of years due to alcohol withdrawal. Has required intubation due to seizures as well.   Last ETOH intake: Night of 4/7  ETOH level neg  UDS pending   Current withdrawal sx: Tremors, anxiety, nausea.   ED provider discussed case with neuro prior to ETOH use disclosure.   S/P IV keppra load per ED --> hold further keppra and Neuro consult  Monitor tele + CIWA  IV thiamine/folic acid   On Librium  MRI brain with and without contrast is unremarkable  EEG is normal  Hold off on AED as per neurology  Gentle IVF + supportive care   Seizure precautions              Labs & Imaging: I have personally reviewed pertinent reports.      VTE Prophylaxis: in place.    Code Status:   Level 1 - Full Code    Patient Centered Rounds: I have performed bedside rounds with nursing staff today.    Mobility:   Basic Mobility Inpatient Raw Score: 24  JH-HLM Goal: 8: Walk 250 feet or more  JH-HLM Achieved: 7: Walk 25 feet or more  JH-HLM Goal achieved. Continue to encourage appropriate mobility.    Discussions with Specialists or Other Care Team Provider: Neurology    Education and Discussions with Family / Patient: Mother    Total Time Spent on Date of Encounter in care of patient: 35 mins. This time was spent on one or more of the following: performing physical exam; counseling and coordination of care; obtaining or reviewing history; documenting in the medical record; reviewing/ordering tests, medications or procedures; communicating with other healthcare professionals and discussing with patient's family/caregivers.    Current Length of Stay: 1 day(s)    Current Patient Status: Inpatient   Certification Statement: The patient will continue to require additional inpatient hospital stay due to see my assessment and plan.     Subjective:   Patient is seen and examined at bedside.  No new complains. Afebrile  All other ROS are  "negative.    Objective:    Vitals: Blood pressure 138/90, pulse 75, temperature 99.5 °F (37.5 °C), temperature source Oral, resp. rate 22, height 5' 4\" (1.626 m), weight 63.7 kg (140 lb 6.9 oz), last menstrual period 04/01/2024, SpO2 96%.,Body mass index is 24.11 kg/m².  SPO2 RA Rest      Flowsheet Row ED to Hosp-Admission (Current) from 4/8/2024 in [facility]   SpO2 96 %   SpO2 Activity At Rest   O2 Device None (Room air)   O2 Flow Rate 0 L/min          I&O:   Intake/Output Summary (Last 24 hours) at 4/10/2024 0951  Last data filed at 4/10/2024 0901  Gross per 24 hour   Intake 2783.34 ml   Output 400 ml   Net 2383.34 ml       Physical Exam:    General- Alert, lying comfortably in bed. Not in any acute distress.  Neck- Supple, No JVD  CVS- regular, S1 and S2 normal  Chest- Bilateral Air entry, No rhochi, crackles or wheezing present.  Abdomen- soft, nontender, not distended, no guarding or rigidity, BS+  Extremities-  No pedal edema, No calf tenderness                         Normal ROM in all extremities.  CNS-   Alert, awake and orientedx3. No focal deficits present.    Invasive Devices       Peripheral Intravenous Line  Duration             Peripheral IV 04/08/24 Left;Proximal;Ventral (anterior) Forearm 1 day    Peripheral IV 04/09/24 Right;Ventral (anterior) Forearm <1 day                          Social History  reviewed  History reviewed. No pertinent family history. reviewed    Meds:  Current Facility-Administered Medications   Medication Dose Route Frequency Provider Last Rate Last Admin    acetaminophen (TYLENOL) tablet 650 mg  650 mg Oral Q6H PRN Liban Bowden MD   650 mg at 04/09/24 1528    cefTRIAXone (ROCEPHIN) IVPB (premix in dextrose) 1,000 mg 50 mL  1,000 mg Intravenous Q24H Liban Bowden MD   Stopped at 04/09/24 1610    chlordiazePOXIDE (LIBRIUM) capsule 25 mg  25 mg Oral Q8H [facility] Liban Bowden MD   25 mg at 04/10/24 0529    folic acid 1 mg, thiamine " (VITAMIN B1) 100 mg in sodium chloride 0.9 % 100 mL IV piggyback   Intravenous Daily Stephanie Reid PA-C 0 mL/hr at 04/09/24 1001 New Bag at 04/10/24 0900    hydrALAZINE (APRESOLINE) injection 5 mg  5 mg Intravenous Q6H PRN Stephanie Reid PA-C        LORazepam (ATIVAN) injection 1 mg  1 mg Intravenous Q1H PRN Stephanie Reid PA-C        magnesium sulfate 2 g/50 mL IVPB (premix) 2 g  2 g Intravenous Once Liban Bowden MD        magnesium sulfate 2 g/50 mL IVPB (premix) 2 g  2 g Intravenous Once Liban Bowden MD        multivitamin-minerals (CENTRUM) tablet 1 tablet  1 tablet Oral Daily Stephanie Reid PA-C   1 tablet at 04/10/24 0859    nicotine (NICODERM CQ) 21 mg/24 hr TD 24 hr patch 21 mg  21 mg Transdermal HS Stephanie Ried PA-C   21 mg at 04/09/24 2034    phenol (CHLORASEPTIC) 1.4 % mucosal liquid 1 spray  1 spray Mouth/Throat Q2H PRN PATRICIA Canales        potassium chloride (Klor-Con M20) CR tablet 40 mEq  40 mEq Oral Once Liban Bowden MD        sodium chloride 0.9 % infusion  100 mL/hr Intravenous Continuous Liban Bowden  mL/hr at 04/10/24 0540 100 mL/hr at 04/10/24 0540    trimethobenzamide (TIGAN) IM injection 200 mg  200 mg Intramuscular Q6H PRN PATRICIA Canales   200 mg at 04/09/24 0832      Medications Prior to Admission   Medication    LISINOPRIL PO       Labs:  Results from last 7 days   Lab Units 04/10/24  0535 04/09/24  1522 04/09/24  0455   WBC Thousand/uL 3.38* 3.22* 3.66*   HEMOGLOBIN g/dL 11.4* 11.4* 11.7   HEMATOCRIT % 34.3* 33.5* 35.6   PLATELETS Thousands/uL 76* 82* 89*   SEGS PCT % 44 65 57   LYMPHO PCT % 38 18 30   MONO PCT % 15* 15* 10   EOS PCT % 2 0 1     Results from last 7 days   Lab Units 04/10/24  0535 04/09/24  1522 04/09/24  0804   POTASSIUM mmol/L 3.2* 3.5 3.2*   CHLORIDE mmol/L 101 101 101   CO2 mmol/L 26 27 28   BUN mg/dL 6 5 6   CREATININE mg/dL 0.59* 0.68 0.64   CALCIUM mg/dL 8.3* 8.8 7.5*   ALK PHOS U/L 47 55 50   ALT U/L 49 45 43   AST U/L 128*  127* 139*     Lab Results   Component Value Date    TROPONINI 0.01 02/20/2021    TROPONINI <0.02 07/30/2019    TROPONINI <0.02 07/18/2019         Lab Results   Component Value Date    BLOODCX Received in Microbiology Lab. Culture in Progress. 04/09/2024    BLOODCX Received in Microbiology Lab. Culture in Progress. 04/09/2024         Imaging:  Results for orders placed during the hospital encounter of 04/08/24    XR chest portable    Narrative  XR CHEST PORTABLE    INDICATION: fever. Seizure        COMPARISON: 04/08/2024    FINDINGS:    Clear lungs. No pneumothorax or pleural effusion.    Normal cardiomediastinal silhouette.    Bones are unremarkable for age.    Normal upper abdomen.    Impression  No acute cardiopulmonary disease.        Workstation performed: [id number]    No results found for this or any previous visit.      Last 24 Hours Medication List:   Current Facility-Administered Medications   Medication Dose Route Frequency Provider Last Rate    acetaminophen  650 mg Oral Q6H PRN Liban Bowden MD      cefTRIAXone  1,000 mg Intravenous Q24H Liban Bowden MD Stopped (04/09/24 1610)    chlordiazePOXIDE  25 mg Oral Q8H RODRICK Liban Bowden MD      folic acid 1 mg, thiamine (VITAMIN B1) 100 mg in sodium chloride 0.9 % 100 mL IV piggyback   Intravenous Daily Stephanie Reid PA-C 0 mL/hr at 04/09/24 1001    hydrALAZINE  5 mg Intravenous Q6H PRN Stephanie Reid PA-C      LORazepam  1 mg Intravenous Q1H PRN Stephanie Reid PA-C      magnesium sulfate  2 g Intravenous Once Liban Bowden MD      magnesium sulfate  2 g Intravenous Once Liban Bowden MD      multivitamin-minerals  1 tablet Oral Daily Stephanie Reid PA-C      nicotine  21 mg Transdermal HS Stephanie Reid PA-C      phenol  1 spray Mouth/Throat Q2H PRN PATRICIA Canales      potassium chloride  40 mEq Oral Once Liban Bowden MD      sodium chloride  100 mL/hr Intravenous Continuous Liban Bowden  mL/hr (04/10/24 0540)     trimethobenzamide  200 mg Intramuscular Q6H PRN PATRICIA Canales          Today, Patient Was Seen By: Liban Bowden MD    ** Please Note: Dictation voice to text software may have been used in the creation of this document. **

## 2024-04-10 NOTE — UTILIZATION REVIEW
SEE INITIAL REVIEW AT BOTTOM    Continued Stay Review    Date: 4/10                          Current Patient Class: IP  Current Level of Care: MS    HPI:33 y.o. female initially admitted on 4/8      Assessment/Plan:   MRI Brain is negative.     Vital Signs:   Date/Time Temp Pulse Resp BP MAP (mmHg) SpO2 O2 Flow Rate (L/min) O2 Device Patient Position - Orthostatic VS   04/10/24 0700 99.5 °F (37.5 °C) 75 22 138/90 109 96 % -- None (Room air) Lying   04/10/24 0306 99.2 °F (37.3 °C) 99 25 Abnormal  168/110 Abnormal  134 97 % -- None (Room air) Lying   04/09/24 2319 99.5 °F (37.5 °C) 103 23 Abnormal  157/98 -- -- -- -- --   04/09/24 2233 99.5 °F (37.5 °C) 103 23 Abnormal  157/98 121 96 % -- None (Room air) Lying   04/09/24 2100 -- 99 -- 152/104 Abnormal  -- -- -- -- --   04/09/24 2000 -- 99 -- 152/104 Abnormal  -- -- -- -- --   04/09/24 1832 99.2 °F (37.3 °C) -- -- -- -- -- -- -- --   04/09/24 1630 -- 97 -- 140/94 113 93 % -- -- --   04/09/24 1615 -- 96 -- 149/97 118 94 % -- -- --   04/09/24 1600 -- 99 -- 153/97 120 93 % -- -- --   04/09/24 1545 -- 93 -- 154/94 119 93 % -- -- --   04/09/24 1530 -- 101 -- 150/101 Abnormal  122 94 % -- -- --   04/09/24 1528 -- 92 -- 148/98 119 93 % -- -- --   04/09/24 1454 101.3 °F (38.5 °C) Abnormal  95 18 160/101 Abnormal  125 93 % -- None (Room air) Lying   04/09/24 1340 -- 96 -- 154/99 121 93 % -- -- --   04/09/24 1223 -- 103 -- 163/112 Abnormal  132 94 % -- -- --   04/09/24 1100 98 °F (36.7 °C) 105 16 158/104 Abnormal  126 94 % -- None (Room air) --   04/09/24 0900 -- 96 -- -- -- -- -- -- --   04/09/24 0801 98.2 °F (36.8 °C) 85 18 137/87 107 91 % -- None (Room air) Lying   04/09/24 0457 98.7 °F (37.1 °C) 80 22 132/85 104 94 % -- -- --   04/08/24 2350 -- 92 18 139/87 106 94 % -- -- --   04/08/24 2210 -- 94 -- 140/88 -- -- -- -- --   04/08/24 2200 -- -- -- -- -- 95 % -- None (Room air) --   04/08/24 2130 -- 88 18 135/84 105 96 % -- None (Room air) Sitting   04/08/24 1948 -- -- -- -- --  94 % 0 L/min None (Room air) --   04/08/24 1800 -- 88 18 140/91 111 98 % -- None (Room air) Sitting   04/08/24 1650 98 °F (36.7 °C) 90 18 138/86 -- 99 % -- None (Room air) Sitting       Pertinent Labs/Diagnostic Results:     4/9 MRI Brain seizure w/ and w/o contrast - Motion degraded examination.  No acute infarction, edema, or pathologic intra-axial enhancement.           Results from last 7 days   Lab Units 04/10/24  0535 04/09/24  1522 04/09/24  0455 04/08/24  1721   WBC Thousand/uL 3.38* 3.22* 3.66* 4.65   HEMOGLOBIN g/dL 11.4* 11.4* 11.7 13.1   HEMATOCRIT % 34.3* 33.5* 35.6 39.1   PLATELETS Thousands/uL 76* 82* 89* 107*   TOTAL NEUT ABS Thousands/µL 1.50* 2.09 2.10 2.62         Results from last 7 days   Lab Units 04/10/24  0535 04/09/24  1522 04/09/24  0804 04/08/24  1940 04/08/24  1721   SODIUM mmol/L 135 135 137 135 134*   POTASSIUM mmol/L 3.2* 3.5 3.2* 3.1* 3.1*   CHLORIDE mmol/L 101 101 101 95* 89*   CO2 mmol/L 26 27 28 29 20*   ANION GAP mmol/L 8 7 8 11 25*   BUN mg/dL 6 5 6 9 9   CREATININE mg/dL 0.59* 0.68 0.64 0.65 0.80   EGFR ml/min/1.73sq m 121 116 118 117 97   CALCIUM mg/dL 8.3* 8.8 7.5* 8.3* 9.3   MAGNESIUM mg/dL 1.4*  --  2.1 1.8*  --      Results from last 7 days   Lab Units 04/10/24  0535 04/09/24  1522 04/09/24  0804 04/08/24  1721   AST U/L 128* 127* 139* 228*   ALT U/L 49 45 43 56*   ALK PHOS U/L 47 55 50 66   TOTAL PROTEIN g/dL 6.6 6.7 6.1* 7.3   ALBUMIN g/dL 3.7 3.8 3.6 4.6   TOTAL BILIRUBIN mg/dL 0.58 0.66 0.83 1.02*     Results from last 7 days   Lab Units 04/08/24  1651   POC GLUCOSE mg/dl 140     Results from last 7 days   Lab Units 04/10/24  0535 04/09/24  1522 04/09/24  0804 04/08/24  1940 04/08/24  1721   GLUCOSE RANDOM mg/dL 85 120 81 90 104     Results from last 7 days   Lab Units 04/08/24 1940 04/08/24  1721   HS TNI 0HR ng/L  --  4   HS TNI 2HR ng/L 4  --    HSTNI D2 ng/L 0  --              Results from last 7 days   Lab Units 04/09/24  0804   TSH 3RD GENERATON uIU/mL 3.450      Results from last 7 days   Lab Units 04/10/24  0535 04/09/24  1522   PROCALCITONIN ng/ml 0.14 0.15     Results from last 7 days   Lab Units 04/09/24  1522   LACTIC ACID mmol/L 0.9     Results from last 7 days   Lab Units 04/09/24  1539 04/08/24  2026   CLARITY UA  Clear Hazy   COLOR UA  Yellow Yellow   SPEC GRAV UA  1.015 1.015   PH UA  8.0 7.0   GLUCOSE UA mg/dl Negative Negative   KETONES UA mg/dl Negative 10 (1+)*   BLOOD UA  Negative Negative   PROTEIN UA mg/dl Negative 100 (2+)*   NITRITE UA  Negative Negative   BILIRUBIN UA  Negative Negative   UROBILINOGEN UA (BE) mg/dl <2.0 4.0*   LEUKOCYTES UA  Negative Small*   WBC UA /hpf  --  4-10*   RBC UA /hpf  --  None Seen   BACTERIA UA /hpf  --  None Seen   EPITHELIAL CELLS WET PREP /hpf  --  Occasional   MUCUS THREADS   --  Occasional*             Results from last 7 days   Lab Units 04/08/24  2026   AMPH/METH  Negative   BARBITURATE UR  Negative   BENZODIAZEPINE UR  Negative   COCAINE UR  Negative   METHADONE URINE  Negative   OPIATE UR  Negative   PCP UR  Negative   THC UR  Negative     Results from last 7 days   Lab Units 04/08/24  1721   ETHANOL LVL mg/dL <10   ACETAMINOPHEN LVL ug/mL <2*   SALICYLATE LVL mg/dL <5.0                 Results from last 7 days   Lab Units 04/09/24  1524 04/09/24  1523   BLOOD CULTURE  Received in Microbiology Lab. Culture in Progress. Received in Microbiology Lab. Culture in Progress.     Medications:   Scheduled Medications:  cefTRIAXone, 1,000 mg, Intravenous, Q24H  chlordiazePOXIDE, 25 mg, Oral, Q8H RODRICK  folic acid 1 mg, thiamine (VITAMIN B1) 100 mg in sodium chloride 0.9 % 100 mL IV piggyback, , Intravenous, Daily  magnesium sulfate, 2 g, Intravenous, Once  magnesium sulfate, 2 g, Intravenous, Once  multivitamin-minerals, 1 tablet, Oral, Daily  nicotine, 21 mg, Transdermal, HS  potassium chloride, 40 mEq, Oral, Once      Continuous IV Infusions:  sodium chloride, 100 mL/hr, Intravenous, Continuous      PRN  Meds:  acetaminophen, 650 mg, Oral, Q6H PRN - x 1 4/9  hydrALAZINE, 5 mg, Intravenous, Q6H PRN  LORazepam, 1 mg, Intravenous, Q1H PRN  phenol, 1 spray, Mouth/Throat, Q2H PRN  trimethobenzamide, 200 mg, Intramuscular, Q6H PRN - x 14/9      Discharge Plan: TBD    Network Utilization Review Department  ATTENTION: Please call with any questions or concerns to [phone] and carefully listen to the prompts so that you are directed to the right person. All voicemails are confidential.   For Discharge needs, contact Care Management DC Support Team at [phone] opt. [phone]  Send all requests for admission clinical reviews, approved or denied determinations and any other requests to dedicated fax number below belonging to the [place] where the patient is receiving treatment. List of dedicated fax numbers for the Facilities:  FACILITY NAME UR FAX NUMBER   ADMISSION DENIALS (Administrative/Medical Necessity) [phone]   DISCHARGE SUPPORT TEAM (NETWORK) [phone]   PARENT CHILD HEALTH (Maternity/NICU/Pediatrics) [phone]   [facility] [phone]   [facility] [phone]   [facility] [phone]   [facility] [phone]   Critical access hospital [phone]   [facility] [phone]   [facility] [phone]   [facility] [phone]   [facility] [phone]   Critical access hospital [phone]   [facility] [phone]   [facility] [phone]

## 2024-04-10 NOTE — DISCHARGE SUMMARY
[facility]  Discharge- Kaykay Holland 1990, 33 y.o. female MRN: [id number]  Unit/Bed#: -01 SDU Encounter: [id number]  Primary Care Provider: No primary care provider on file.   Date and time admitted to hospital: 4/8/2024  4:46 PM    Pancytopenia (HCC)  Assessment & Plan  Patient noted to have pancytopenia  Right upper quadrant ultrasound  Possible secondary to alcohol use?  Cirrhosis  Trend CBC  Hematology oncology consult appreciated  Patient signed out AGAINST MEDICAL ADVICE and is recommended follow-up with them as outpatient and repeat CBC in 1 week as outpatient    Sepsis (HCC)  Assessment & Plan  Patient found to have sepsis on 4/9 as evidenced by fever, tachycardia  Possible GI source  UA is negative.  Chest x-ray is unremarkable  Stool for C. difficile and cultures are negative  Denies any abdominal pain  Blood cultures are pending  On Rocephin  Trend WBC and fever curve  Patient wants to go home and signed out AGAINST MEDICAL ADVICE.  Patient is advised that she is at high risk for leukopenia and subsequent worsening sepsis and possible death and she verbalized the understanding of the risk and proceeded to sign out AGAINST MEDICAL ADVICE.  Discussed with mother  also.  Patient is recommended to follow-up with PCP and hematology oncology ASAP  She will be discharged with Ceftin to complete the course    Alcohol abuse  Assessment & Plan  On alcohol withdrawal protocol with CIWA score  Was started on thiamine, folic acid and Librium  Will discharge on Librium for 2 more days    Tobacco abuse  Assessment & Plan  Smokes 1/2-1 ppd.   Encourage cessation     Essential hypertension  Assessment & Plan  Home regimen: Lisinopril daily (unknown dose - pts family will let staff know tomorrow)   Utilize IV hydralazine for BP > 160     Prolonged QT interval  Assessment & Plan    Mag 1.8, K 3.4  Replete electrolytes to maintain mag > 2, K > 4  Avoid QT prolonging agents  "    Transaminitis  Assessment & Plan    ALT 56  T bili 1.02  Suspect related to alcoholic pancreatitis   Right upper quadrant ultrasound--\" No gallstones. The common duct is normal in caliber.The liver is mildly echogenic.\"  Trend LFTs     * Alcohol withdrawal seizure (HCC)  Assessment & Plan  Hx of ETOH use disorder, drinking 2 bottles wine daily. Patient attempted to quit cold turkey 3 weeks ago resulting in in increased tremors thus started drinking a \"couple glasses of wine each day\". Today at work patient found by staff having seizure activity. EMS called. Initially refusing alcohol intake in the ER but then became more forthcoming about her drinking. Not interested in [facility] detox unit however is interested in medically assisted withdrawal. Discussed with ICU, admit to SD2.   Hx: Patient reports she has had multiple seizures over the past couple of years due to alcohol withdrawal. Has required intubation due to seizures as well.   Last ETOH intake: Night of 4/7  ETOH level neg  UDS pending   Current withdrawal sx: Tremors, anxiety, nausea.   ED provider discussed case with neuro prior to ETOH use disclosure.   S/P IV keppra load per ED --> hold further keppra and Neuro consult  Monitor tele + CIWA  IV thiamine/folic acid   On Librium  MRI brain with and without contrast is unremarkable  EEG is normal  Hold off on AED as per neurology  Neurology did fill out [employer] patient is recommended to not drive  Seizure precautions       Hospital Course:     Kaykay Holland is a 33 y.o. female patient who originally presented to the hospital on   Admission Orders (From admission, onward)       Ordered        04/09/24 1043  INPATIENT ADMISSION  Once            04/08/24 1927  Place in Observation  Once                         due to seizure.  Patient was admitted with alcohol withdrawal seizure.  Patient was seen by neurology and had MRI brain and EEG done.  Refer to above for results.  Patient was found to have " pancytopenia and was seen by hematology oncology.  Patient was diagnosed with sepsis and was started on Rocephin.  Patient proceeded to sign out AGAINST MEDICAL ADVICE.  Refer to above  Recommend [address] and follow-up with PCP and oncology ASAP repeat CBC in 1 week  On Exam-  Chest-bilateral air entry, clear to auscultation  Abdomen-soft, nontender  Heart-S1 S2 regular  Extremities-no pedal edema or calf tenderness  Neuro-alert awake oriented x3.  No focal deficits    Please see above list of diagnoses and related plan for additional information.   Follow-up with PCP ASAP and repeat CBC in 1 week  Follow-up with hematology oncology as outpatient  Follow-up with neurology as outpatient  Patient was at [facility] from 4/8/2024 to 4/10/2024    Condition at Discharge:  good      Discharge instructions/Information to patient and family:   See after visit summary for information provided to patient and family.      Provisions for Follow-Up Care:  See after visit summary for information related to follow-up care and any pertinent home health orders.      Disposition:     Other: Signed out AGAINST MEDICAL ADVICE       Discharge Statement:  I spent 40 minutes discharging the patient. This time was spent on the day of discharge. I had direct contact with the patient on the day of discharge. Greater than 50% of the total time was spent examining patient, answering all patient questions, arranging and discussing plan of care with patient as well as directly providing post-discharge instructions.  Additional time then spent on discharge activities.    Discharge Medications:  See after visit summary for reconciled discharge medications provided to patient and family.      ** Please Note: This note has been constructed using a voice recognition system **

## 2024-04-10 NOTE — ASSESSMENT & PLAN NOTE
Patient noted to have pancytopenia  Right upper quadrant ultrasound  Possible secondary to alcohol use?  Cirrhosis  Trend CBC  Hematology oncology consult appreciated  Patient signed out AGAINST MEDICAL ADVICE and is recommended follow-up with them as outpatient and repeat CBC in 1 week as outpatient

## 2024-04-10 NOTE — PROGRESS NOTES
Progress Note - Neurology   Kaykay Holland 33 y.o. female [id number]  Unit/Bed#:  SDU/-01 S*    Assessment/Plan:  * Alcohol withdrawal seizure (HCC)  Assessment & Plan  33-year-old female with alcohol abuse and prior alcohol withdrawal seizures, hypertension and tobacco use who presented to the ED on 4/8/2024 following a witnessed generalized tonic-clonic seizure event while at work. Patient is amnestic to the actual event. + Tongue bite and urinary incontinence. Patient postictal in the ED.  And subsequent conversations, patient reports having several seizures all in the context of alcohol withdrawal.    Imaging/Work-up:  Initial labs include: Na 134, K 3.1, , ALT 56, Tbili 1.02, Mg 1.08, WBC 4.65-->3.66  UA negative  UDS negative  Urine oxycodone negative  ETOH level negative  Pregnancy test negative  CTH negative for acute abnormality  Routine EEG: Normal. Enhanced beta activities are likely a medication effect related to benzodiazepine administration.   TSH WNL  B12 530  MRI brain w wo/seizure protocol: Motion degraded examination. No acute infarction, edema, or pathologic intra-axial enhancement.    Neuro exam continues to be unremarkable.  Seizure workup has also been unrevealing.  Continue to suspect recurrent seizures in the setting of alcohol withdrawal and thus maintenance AED was not initiated.  [employer] [place] due to patient having recurrent events of loss of consciousness/seizure and patient is aware that she is not to drive at this time.  No further inpatient neurologic recommendations.    Plan:  Patient loaded with Keppra 3g  Will continue to hold off on maintenance doing at this time as seizure currently thought to be in the setting of alcohol withdrawl  Ativan prn for motor seizure >2 minutes  Tele  Seizure precautions  [employer] form completed and faxed. Patient is not to drive at this time.    Alcohol abuse  Assessment & Plan  Alcohol level negative  [facility]  protocol  Librium as per primary team  Folic acid/thiamine/MVI  Tele  Management as per primary team    Transaminitis  Assessment & Plan  In the context of alcohol abuse   , ALT 56, Tbili 1.02  RUQ US pending  Management as per primary team    SIRS (systemic inflammatory response syndrome) (HCC)  Assessment & Plan  Tmax on 4/9 at 1454 of 101.3F - Temp this AM 99.5F. HR 75, /90  Procal 0.14, WBC 3.38  UA negative, CXR negative  Stool PCR, Cdiff PCR, Blood cultures pending  Patient currently on empiric Ceftriaxone  Infectious work-up and management as per primary team  If no infectious etiology identified, fever/tachycardia may also be withdrawal symptoms.    Pancytopenia (HCC)  Assessment & Plan  Patient pancytopenic: WBC 3.38, Hgb 11.4, Plts 76  In the context of alcohol abuse  Work-up and Management as per primary team    Tobacco abuse  Assessment & Plan  Nicotine patch  Smoking cessation advised    Essential hypertension  Assessment & Plan  Goal of normotension    Prolonged QT interval  Assessment & Plan  Avoid QT prolonging medications        Kaykay Holland will need follow up in 4-6 weeks with epilepsy attending or advance practitioner. She will not require outpatient neurological testing.    Subjective:   Tmax on 4/9 at 1454 of 101.3F - Temp this AM 99.5F. HR 75, /90  Procal 0.14  Patient pancytopenic: WBC 3.38, Hgb 11.4, Plts 76  K 3.2  UA negative.  Stool PCR, Cdiff PCR, Blood cultures pending    On exam this morning, patient reports that she is doing well overall.  She reports that tremors have improved.  MRI and EEG results were discussed in detail.  Patient was also informed that a [employer] form was completed due to the recurrent nature of her seizures thought to be secondary to alcohol withdrawal.  Patient did ask a lot of questions about this process and was interested in following up with neurology with the hope that she continues to do well with alcohol cessation and has no  recurrent seizures, ultimately being allowed to drive again.  Additionally, patient became very emotional when discussing her new job.  She states that she was texted this morning that she was fired.  She reports having significant stressors and situational anxiety though does acknowledge having a good support system.    History reviewed. No pertinent past medical history.  History reviewed. No pertinent surgical history.  History reviewed. No pertinent family history.  Social History     Socioeconomic History    Marital status: Single     Spouse name: None    Number of children: None    Years of education: None    Highest education level: None   Occupational History    None   Tobacco Use    Smoking status: Every Day     Current packs/day: 0.50     Types: Cigarettes     Passive exposure: Never    Smokeless tobacco: Never   Substance and Sexual Activity    Alcohol use: Yes     Comment: social    Drug use: Never    Sexual activity: None   Other Topics Concern    None   Social History Narrative    None     Social Determinants of Health     Financial Resource Strain: Low Risk  (11/3/2023)    Received from [facility]    Overall Financial Resource Strain (CARDIA)     Difficulty of Paying Living Expenses: Not hard at all   Food Insecurity: No Food Insecurity (4/10/2024)    Hunger Vital Sign     Worried About Running Out of Food in the Last Year: Never true     Ran Out of Food in the Last Year: Never true   Transportation Needs: No Transportation Needs (4/10/2024)    PRAPARE - Transportation     Lack of Transportation (Medical): No     Lack of Transportation (Non-Medical): No   Physical Activity: Not on file   Stress: Not on file   Social Connections: Not on file   Intimate Partner Violence: Not At Risk (11/3/2023)    Received from [facility]    Humiliation, Afraid, Rape, and Kick questionnaire     Fear of Current or Ex-Partner: No     Emotionally Abused: No     Physically Abused: No      "Sexually Abused: No   Housing Stability: Low Risk  (4/10/2024)    Housing Stability Vital Sign     Unable to Pay for Housing in the Last Year: No     Number of Places Lived in the Last Year: 1     Unstable Housing in the Last Year: No       Medications: Reviewed in detail by me.    ROS: Review of Systems   HENT:  Negative for trouble swallowing.    Respiratory:  Negative for shortness of breath.    Cardiovascular:  Negative for chest pain.   Gastrointestinal:  Negative for diarrhea.   Musculoskeletal:  Negative for gait problem.   Neurological:  Positive for tremors (improved) and seizures (No further seizures in the hospital). Negative for weakness.   Psychiatric/Behavioral:  Negative for confusion. The patient is nervous/anxious.         Vitals: /100 (BP Location: Right arm)   Pulse 100   Temp 98.2 °F (36.8 °C) (Oral)   Resp (!) 27   Ht 5' 4\" (1.626 m)   Wt 63.7 kg (140 lb 6.9 oz)   LMP 04/01/2024 (Approximate)   SpO2 99%   BMI 24.11 kg/m²     Physical Exam:   Physical Exam  Constitutional:       General: She is not in acute distress.     Appearance: Normal appearance. She is well-developed. She is not ill-appearing or toxic-appearing.   HENT:      Head: Normocephalic and atraumatic.   Eyes:      General: No scleral icterus.     Extraocular Movements: Extraocular movements intact and EOM normal.      Conjunctiva/sclera: Conjunctivae normal.   Cardiovascular:      Rate and Rhythm: Normal rate.   Pulmonary:      Effort: Pulmonary effort is normal. No respiratory distress.   Musculoskeletal:         General: No tenderness or deformity. Normal range of motion.   Skin:     General: Skin is warm and dry.      Findings: No erythema or rash.   Neurological:      Mental Status: She is alert and oriented to person, place, and time.      Motor: Motor strength is normal.     Coordination: Finger-Nose-Finger Test and Heel to Shin Test normal.   Psychiatric:         Speech: Speech normal.       Neurologic Exam "     Mental Status   Oriented to person, place, and time.   Follows 2 step commands.   Attention: normal. Concentration: normal.   Speech: speech is normal (No dysarthria or aphasia)  Level of consciousness: alert  Knowledge: good.   Normal comprehension.     Cranial Nerves     CN II   Visual acuity: normal (grossly)  Right visual field deficit: none  Left visual field deficit: none     CN III, IV, VI   Extraocular motions are normal.   Nystagmus: none   Conjugate gaze: present    CN V   Facial sensation intact.     CN VII   Facial expression full, symmetric.     CN VIII   Hearing: intact    CN XII   Tongue deviation: none    Motor Exam   Muscle bulk: normal  Overall muscle tone: normal  Right arm pronator drift: absent  Left arm pronator drift: absent    Strength   Strength 5/5 throughout.     Sensory Exam   Light touch normal.     Gait, Coordination, and Reflexes     Coordination   Finger to nose coordination: normal  Heel to shin coordination: normal      Labs: Reviewed in detail by me.     Imaging: I have personally reviewed pertinent imaging and PACS reports.     VTE Prophylaxis:None currently

## 2024-04-10 NOTE — CONSULTS
Medical Oncology/Hematology Consult Note  Kaykay Holland, 33 y.o., 1990   SDU/-01 S*, [id number]  04/10/24    Assessment and Plan:    1.  Pancytopenia  2. Alcohol abuse     Patient is a 33-year-old female with years of alcohol abuse, history of macrocytosis and intermittent thrombocytopenia dating back to 2019 admitted with alcohol withdrawal seizure.    Last EtOH intake night of 4/7/2024 11/2023 CBC demonstrated normal white count, hemoglobin 12.9, , platelet count 110    This admission patient with findings of pancytopenia:  Component      Latest Ref Rng 4/8/2024 4/9/2024 4/10/2024   WBC      4.31 - 10.16 Thousand/uL 4.65  3.22 (L)  3.38 (L)    WBC        3.66 (L)     RBC      3.81 - 5.12 Million/uL 3.80 (L)  3.23 (L)  3.24 (L)    RBC        3.33 (L)     Hemoglobin      11.5 - 15.4 g/dL 13.1  11.4 (L)  11.4 (L)    Hemoglobin        11.7     Hematocrit      34.8 - 46.1 % 39.1  33.5 (L)  34.3 (L)    Hematocrit        35.6     MCV      82 - 98 fL 103 (H)  104 (H)  106 (H)    MCV        107 (H)     MCH      26.8 - 34.3 pg 34.5 (H)  35.3 (H)  35.2 (H)    MCH        35.1 (H)     MCHC      31.4 - 37.4 g/dL 33.5  34.0  33.2    MCHC        32.9     RDW      11.6 - 15.1 % 11.5 (L)  11.6  11.4 (L)    RDW        12.1     MPV      8.9 - 12.7 fL 11.6  11.6  11.4    MPV        12.2     Platelet Count      149 - 390 Thousands/uL 107 (L)  82 (L)  76 (L)    Platelet Count        89 (L)     nRBC      /100 WBCs 0  0  0    nRBC        0     Segmented %      43 - 75 % 57  65  44    Segmented %        57     Immature Grans %      0 - 2 % 0  1  0    Immature Grans %        1     Lymphocytes %      14 - 44 % 30  18  38    Lymphocytes %        30     Monocytes %      4 - 12 % 12  15 (H)  15 (H)    Monocytes %        10     Eosinophils %      0 - 6 % 1  0  2    Eosinophils %        1     Basophils %      0 - 1 % 0  1  1    Basophils %        1     Absolute Neutrophils      1.85 - 7.62 Thousands/µL 2.62  2.09   1.50 (L)    Absolute Neutrophils        2.10     Absolute Immature Grans      0.00 - 0.20 Thousand/uL 0.02  0.02  0.01    Absolute Immature Grans        0.02     Absolute Lymphocytes      0.60 - 4.47 Thousands/µL 1.39  0.59 (L)  1.28    Absolute Lymphocytes        1.09     Absolute Monocytes      0.17 - 1.22 Thousand/µL 0.57  0.47  0.50    Absolute Monocytes        0.37     Absolute Eosinophils      0.00 - 0.61 Thousand/µL 0.03  0.01  0.05    Absolute Eosinophils        0.05     Absolute Basophils      0.00 - 0.10 Thousands/µL 0.02  0.04  0.04    Absolute Basophils        0.03        CT imaging on file demonstrates liver steatosis.  No obvious cirrhosis.  Right upper quadrant ultrasound pending read    Strongly suspect cytopenias are secondary to bone marrow suppression from alcohol use. Chronic heavy alcohol consumption has fairly reproducible effects on the hematologic system and can cause a variety of hematologic complications including anemia, leukopenia and/or thrombocytopenia as well as macrocytosis  No B symptoms       PLAN:  Patient does not have longstanding history of pancytopenia.    Recommend observation of her counts while inpatient.  Check iron panel, B12, folate, MMA  Strongly encouraged alcohol cessation  Follow up abdominal ultrasound    Recommend outpatient follow-up for close monitoring of CBC and evaluation of cytopenias in the absence of alcohol use.  If persistent abnormalities remain would then consider workup    Outpatient follow up plan: I will help coordinate outpatient follow-up with hematology on discharge.  I did explain to patient that continued alcohol use will have detrimental effects including bone marrow suppression, development of cirrhosis.  Strongly encouraged alcohol cessation and rehab.  Patient is agreeable to outpatient follow-up with hematology    Case discussed with neurology and slim attending Dr. Bowden      Please do not hesitate to contact me if you have any questions  or need additional information. Thank you for this consult.    Evi Todd MSN, CRNP, OCN  Hematology Oncology Nurse Practitioner      Reason for Consultation: Pancytopenia        History of present illness:   Kaykay Holland is a 33 y.o. female with a medical history significant for alcohol use disorder, HTN, anxiety and depression, tobacco use disorder, EtOH pancreatitis with previous hospitalizations for alcohol withdrawal who presents seizure activity.  She has been seen by neurology and undergone full seizure workup and her seizures are suspected to be from alcohol withdrawal.  Patient reported to drinking 2 bottles of wine daily to ED staff and developed symptoms of shakiness approximately 3 weeks ago after attempting to stop alcohol consumption cold turkey.  Last drink was night of 4/7/2024  Patient was found with seizure activity while at work and coworkers called 911 patient presented via EMS to the ED.  She is admitted with [facility] protocol  On admission, she met sepsis criteria with fever, tachycardia.  Blood cultures pending    Hematology is consulted as CBC with differential demonstrates some mild pancytopenia likely secondary to bone marrow suppression from alcohol abuse    CBC on presentation demonstrated normal white count, hemoglobin 13.1,  platelets 107.  Normal differential  Today, white count 3.38, hemoglobin 11.4, , platelets 76, ANC 1.5  Per review of blood work on care everywhere, patient has a history of macrocytosis and intermittent thrombocytopenia dating back to 2020.    11/4/23 CTA abdomen and pelvis shows evidence of liver steatosis, pancreatitis.  No evidence of cirrhosis  Right upper quadrant abdominal ultrasound done earlier today pending read    Interval history:  Patient seen at bedside.  Appeared very tearful, anxious.  She just learned she has been fired from her job.  Endorses history of alcohol use as recorded in her H&P.  States she has been drinking alcohol  since college.   She is requesting discharge.  Refusing rehab  Denies B symptoms    Review of Systems   Constitutional: Negative.    Psychiatric/Behavioral:  Positive for dysphoric mood. The patient is nervous/anxious.    All other systems reviewed and are negative.    All other review of systems were negative.    Past medical history: History reviewed. No pertinent past medical history.    Past surgical history: History reviewed. No pertinent surgical history.    Allergies: No Known Allergies    Home medications:   Medications Prior to Admission   Medication    LISINOPRIL PO       Hospital medications:   Current Facility-Administered Medications:     acetaminophen (TYLENOL) tablet 650 mg, 650 mg, Oral, Q6H PRN, Liban Bowden MD, 650 mg at 04/09/24 1528    cefTRIAXone (ROCEPHIN) IVPB (premix in dextrose) 1,000 mg 50 mL, 1,000 mg, Intravenous, Q24H, Liban Bowden MD, Stopped at 04/09/24 1610    chlordiazePOXIDE (LIBRIUM) capsule 25 mg, 25 mg, Oral, Q8H RODRICK, Liban Bowden MD, 25 mg at 04/10/24 0529    folic acid 1 mg, thiamine (VITAMIN B1) 100 mg in sodium chloride 0.9 % 100 mL IV piggyback, , Intravenous, Daily, Stpehanie Reid PA-C, Last Rate: 0 mL/hr at 04/09/24 1001, New Bag at 04/10/24 0900    hydrALAZINE (APRESOLINE) injection 5 mg, 5 mg, Intravenous, Q6H PRN, Stephanie Reid PA-C    LORazepam (ATIVAN) injection 1 mg, 1 mg, Intravenous, Q1H PRN, Stephanie Reid PA-C    magnesium sulfate 2 g/50 mL IVPB (premix) 2 g, 2 g, Intravenous, Once, Liban Bowden MD, Last Rate: 25 mL/hr at 04/10/24 1018, 2 g at 04/10/24 1018    magnesium sulfate 2 g/50 mL IVPB (premix) 2 g, 2 g, Intravenous, Once, Liban Bowden MD    multivitamin-minerals (CENTRUM) tablet 1 tablet, 1 tablet, Oral, Daily, Stephanie Reid PA-C, 1 tablet at 04/10/24 0859    nicotine (NICODERM CQ) 21 mg/24 hr TD 24 hr patch 21 mg, 21 mg, Transdermal, HS, Stephanie Reid PA-C, 21 mg at 04/09/24 2034    phenol (CHLORASEPTIC) 1.4 % mucosal liquid 1 spray, 1  spray, Mouth/Throat, Q2H PRN, PATRICIA Canales    sodium chloride 0.9 % infusion, 100 mL/hr, Intravenous, Continuous, Liban Bowden MD, Last Rate: 100 mL/hr at 04/10/24 0540, 100 mL/hr at 04/10/24 0540    trimethobenzamide (TIGAN) IM injection 200 mg, 200 mg, Intramuscular, Q6H PRN, PATRICIA Canales, 200 mg at 04/09/24 0832    Social history:   Social History     Tobacco Use    Smoking status: Every Day     Current packs/day: 0.50     Types: Cigarettes     Passive exposure: Never    Smokeless tobacco: Never   Substance Use Topics    Alcohol use: Yes     Comment: social    Drug use: Never       Family history: History reviewed. No pertinent family history.    Vitals:  Vitals:    04/10/24 0700   BP: 138/90   Pulse: 75   Resp: 22   Temp: 99.5 °F (37.5 °C)   SpO2: 96%       Physical Exam  Vitals reviewed.   Constitutional:       General: She is not in acute distress.     Appearance: She is not toxic-appearing.   HENT:      Head: Normocephalic and atraumatic.   Eyes:      General: No scleral icterus.  Cardiovascular:      Rate and Rhythm: Normal rate and regular rhythm.   Pulmonary:      Effort: Pulmonary effort is normal. No respiratory distress.   Musculoskeletal:         General: Normal range of motion.      Cervical back: Normal range of motion.   Skin:     General: Skin is warm and dry.   Neurological:      General: No focal deficit present.      Mental Status: She is alert and oriented to person, place, and time.   Psychiatric:      Comments: Tearful, anxious         Recent Results (from the past 48 hour(s))   ECG 12 lead    Collection Time: 04/08/24  4:49 PM   Result Value Ref Range    Ventricular Rate 94 BPM    Atrial Rate 94 BPM    VA Interval 126 ms    QRSD Interval 84 ms    QT Interval 494 ms    QTC Interval 617 ms    P Axis 55 degrees    QRS Axis 17 degrees    T Wave Axis 47 degrees   Fingerstick Glucose (POCT)    Collection Time: 04/08/24  4:51 PM   Result Value Ref Range    POC  "Glucose 140 65 - 140 mg/dl   CBC and differential    Collection Time: 04/08/24  5:21 PM   Result Value Ref Range    WBC 4.65 4.31 - 10.16 Thousand/uL    RBC 3.80 (L) 3.81 - 5.12 Million/uL    Hemoglobin 13.1 11.5 - 15.4 g/dL    Hematocrit 39.1 34.8 - 46.1 %     (H) 82 - 98 fL    MCH 34.5 (H) 26.8 - 34.3 pg    MCHC 33.5 31.4 - 37.4 g/dL    RDW 11.5 (L) 11.6 - 15.1 %    MPV 11.6 8.9 - 12.7 fL    Platelets 107 (L) 149 - 390 Thousands/uL    nRBC 0 /100 WBCs    Segmented % 57 43 - 75 %    Immature Grans % 0 0 - 2 %    Lymphocytes % 30 14 - 44 %    Monocytes % 12 4 - 12 %    Eosinophils Relative 1 0 - 6 %    Basophils Relative 0 0 - 1 %    Absolute Neutrophils 2.62 1.85 - 7.62 Thousands/µL    Absolute Immature Grans 0.02 0.00 - 0.20 Thousand/uL    Absolute Lymphocytes 1.39 0.60 - 4.47 Thousands/µL    Absolute Monocytes 0.57 0.17 - 1.22 Thousand/µL    Eosinophils Absolute 0.03 0.00 - 0.61 Thousand/µL    Basophils Absolute 0.02 0.00 - 0.10 Thousands/µL   Comprehensive metabolic panel    Collection Time: 04/08/24  5:21 PM   Result Value Ref Range    Sodium 134 (L) 135 - 147 mmol/L    Potassium 3.1 (L) 3.5 - 5.3 mmol/L    Chloride 89 (L) 96 - 108 mmol/L    CO2 20 (L) 21 - 32 mmol/L    ANION GAP 25 (H) 4 - 13 mmol/L    BUN 9 5 - 25 mg/dL    Creatinine 0.80 0.60 - 1.30 mg/dL    Glucose 104 65 - 140 mg/dL    Calcium 9.3 8.4 - 10.2 mg/dL     (H) 13 - 39 U/L    ALT 56 (H) 7 - 52 U/L    Alkaline Phosphatase 66 34 - 104 U/L    Total Protein 7.3 6.4 - 8.4 g/dL    Albumin 4.6 3.5 - 5.0 g/dL    Total Bilirubin 1.02 (H) 0.20 - 1.00 mg/dL    eGFR 97 ml/min/1.73sq m   HS Troponin 0hr (reflex protocol)    Collection Time: 04/08/24  5:21 PM   Result Value Ref Range    hs TnI 0hr 4 \"Refer to ACS Flowchart\"- see link ng/L   Ethanol    Collection Time: 04/08/24  5:21 PM   Result Value Ref Range    Ethanol Lvl <10 <10 mg/dL   Salicylate level    Collection Time: 04/08/24  5:21 PM   Result Value Ref Range    Salicylate Lvl <5.0 3 - " "20 mg/dL   Acetaminophen level-If concentration is detectable, please discuss with medical  on call.    Collection Time: 04/08/24  5:21 PM   Result Value Ref Range    Acetaminophen Level <2 (L) 10 - 20 ug/mL   HS Troponin I 2hr    Collection Time: 04/08/24  7:40 PM   Result Value Ref Range    hs TnI 2hr 4 \"Refer to ACS Flowchart\"- see link ng/L    Delta 2hr hsTnI 0 <20 ng/L   Magnesium    Collection Time: 04/08/24  7:40 PM   Result Value Ref Range    Magnesium 1.8 (L) 1.9 - 2.7 mg/dL   Basic metabolic panel    Collection Time: 04/08/24  7:40 PM   Result Value Ref Range    Sodium 135 135 - 147 mmol/L    Potassium 3.1 (L) 3.5 - 5.3 mmol/L    Chloride 95 (L) 96 - 108 mmol/L    CO2 29 21 - 32 mmol/L    ANION GAP 11 4 - 13 mmol/L    BUN 9 5 - 25 mg/dL    Creatinine 0.65 0.60 - 1.30 mg/dL    Glucose 90 65 - 140 mg/dL    Calcium 8.3 (L) 8.4 - 10.2 mg/dL    eGFR 117 ml/min/1.73sq m   Rapid drug screen, urine    Collection Time: 04/08/24  8:26 PM   Result Value Ref Range    Amph/Meth UR Negative Negative    Barbiturate Ur Negative Negative    Benzodiazepine Urine Negative Negative    Cocaine Urine Negative Negative    Methadone Urine Negative Negative    Opiate Urine Negative Negative    PCP Ur Negative Negative    THC Urine Negative Negative    Oxycodone Urine Negative Negative    Fentanyl Urine Negative Negative    HYDROCODONE URINE Negative Negative   UA w Reflex to Microscopic w Reflex to Culture    Collection Time: 04/08/24  8:26 PM    Specimen: Urine, Clean Catch   Result Value Ref Range    Color, UA Yellow     Clarity, UA Hazy     Specific [place], UA 1.015 1.005 - 1.030    pH, UA 7.0 4.5, 5.0, 5.5, 6.0, 6.5, 7.0, 7.5, 8.0    Leukocytes, UA Small (A) Negative    Nitrite, UA Negative Negative    Protein,  (2+) (A) Negative mg/dl    Glucose, UA Negative Negative mg/dl    Ketones, UA 10 (1+) (A) Negative mg/dl    Urobilinogen, UA 4.0 (A) <2.0 mg/dl mg/dl    Bilirubin, UA Negative Negative    Occult " Blood, UA Negative Negative   Urine Microscopic    Collection Time: 04/08/24  8:26 PM   Result Value Ref Range    RBC, UA None Seen None Seen, 0-1, 1-2, 2-4, 0-5 /hpf    WBC, UA 4-10 (A) None Seen, 0-1, 1-2, 0-5, 2-4 /hpf    Epithelial Cells Occasional None Seen, Occasional /hpf    Bacteria, UA None Seen None Seen, Occasional /hpf    MUCUS THREADS Occasional (A) None Seen   POCT pregnancy, urine    Collection Time: 04/08/24  8:30 PM   Result Value Ref Range    EXT Preg Test, Ur Negative     Control Valid    ECG 12 lead    Collection Time: 04/08/24 10:07 PM   Result Value Ref Range    Ventricular Rate 99 BPM    Atrial Rate 99 BPM    KS Interval 216 ms    QRSD Interval 82 ms    QT Interval 366 ms    QTC Interval 469 ms    P Axis 42 degrees    QRS Axis 4 degrees    T Wave Axis 14 degrees   ECG 12 lead    Collection Time: 04/08/24 10:09 PM   Result Value Ref Range    Ventricular Rate 95 BPM    Atrial Rate 95 BPM    KS Interval 134 ms    QRSD Interval 86 ms    QT Interval 378 ms    QTC Interval 475 ms    P Axis 48 degrees    QRS Axis 6 degrees    T Wave Axis 22 degrees   CBC and differential    Collection Time: 04/09/24  4:55 AM   Result Value Ref Range    WBC 3.66 (L) 4.31 - 10.16 Thousand/uL    RBC 3.33 (L) 3.81 - 5.12 Million/uL    Hemoglobin 11.7 11.5 - 15.4 g/dL    Hematocrit 35.6 34.8 - 46.1 %     (H) 82 - 98 fL    MCH 35.1 (H) 26.8 - 34.3 pg    MCHC 32.9 31.4 - 37.4 g/dL    RDW 12.1 11.6 - 15.1 %    MPV 12.2 8.9 - 12.7 fL    Platelets 89 (L) 149 - 390 Thousands/uL    nRBC 0 /100 WBCs    Segmented % 57 43 - 75 %    Immature Grans % 1 0 - 2 %    Lymphocytes % 30 14 - 44 %    Monocytes % 10 4 - 12 %    Eosinophils Relative 1 0 - 6 %    Basophils Relative 1 0 - 1 %    Absolute Neutrophils 2.10 1.85 - 7.62 Thousands/µL    Absolute Immature Grans 0.02 0.00 - 0.20 Thousand/uL    Absolute Lymphocytes 1.09 0.60 - 4.47 Thousands/µL    Absolute Monocytes 0.37 0.17 - 1.22 Thousand/µL    Eosinophils Absolute 0.05 0.00 -  0.61 Thousand/µL    Basophils Absolute 0.03 0.00 - 0.10 Thousands/µL   Comprehensive metabolic panel    Collection Time: 04/09/24  8:04 AM   Result Value Ref Range    Sodium 137 135 - 147 mmol/L    Potassium 3.2 (L) 3.5 - 5.3 mmol/L    Chloride 101 96 - 108 mmol/L    CO2 28 21 - 32 mmol/L    ANION GAP 8 4 - 13 mmol/L    BUN 6 5 - 25 mg/dL    Creatinine 0.64 0.60 - 1.30 mg/dL    Glucose 81 65 - 140 mg/dL    Glucose, Fasting 81 65 - 99 mg/dL    Calcium 7.5 (L) 8.4 - 10.2 mg/dL     (H) 13 - 39 U/L    ALT 43 7 - 52 U/L    Alkaline Phosphatase 50 34 - 104 U/L    Total Protein 6.1 (L) 6.4 - 8.4 g/dL    Albumin 3.6 3.5 - 5.0 g/dL    Total Bilirubin 0.83 0.20 - 1.00 mg/dL    eGFR 118 ml/min/1.73sq m   Magnesium    Collection Time: 04/09/24  8:04 AM   Result Value Ref Range    Magnesium 2.1 1.9 - 2.7 mg/dL   TSH, 3rd generation with Free T4 reflex    Collection Time: 04/09/24  8:04 AM   Result Value Ref Range    TSH 3RD GENERATON 3.450 0.450 - 4.500 uIU/mL   ECG 12 lead    Collection Time: 04/09/24  8:05 AM   Result Value Ref Range    Ventricular Rate 85 BPM    Atrial Rate 85 BPM    MS Interval 134 ms    QRSD Interval 86 ms    QT Interval 386 ms    QTC Interval 459 ms    P Axis 65 degrees    QRS Axis 41 degrees    T Wave Axis 50 degrees   ECG 12 lead    Collection Time: 04/09/24  8:23 AM   Result Value Ref Range    Ventricular Rate 94 BPM    Atrial Rate 94 BPM    MS Interval 124 ms    QRSD Interval 84 ms    QT Interval 364 ms    QTC Interval 455 ms    P Axis 54 degrees    QRS Axis 24 degrees    T Wave Axis 40 degrees   Procalcitonin    Collection Time: 04/09/24  3:22 PM   Result Value Ref Range    Procalcitonin 0.15 <=0.25 ng/ml   CBC and differential    Collection Time: 04/09/24  3:22 PM   Result Value Ref Range    WBC 3.22 (L) 4.31 - 10.16 Thousand/uL    RBC 3.23 (L) 3.81 - 5.12 Million/uL    Hemoglobin 11.4 (L) 11.5 - 15.4 g/dL    Hematocrit 33.5 (L) 34.8 - 46.1 %     (H) 82 - 98 fL    MCH 35.3 (H) 26.8 -  34.3 pg    MCHC 34.0 31.4 - 37.4 g/dL    RDW 11.6 11.6 - 15.1 %    MPV 11.6 8.9 - 12.7 fL    Platelets 82 (L) 149 - 390 Thousands/uL    nRBC 0 /100 WBCs    Segmented % 65 43 - 75 %    Immature Grans % 1 0 - 2 %    Lymphocytes % 18 14 - 44 %    Monocytes % 15 (H) 4 - 12 %    Eosinophils Relative 0 0 - 6 %    Basophils Relative 1 0 - 1 %    Absolute Neutrophils 2.09 1.85 - 7.62 Thousands/µL    Absolute Immature Grans 0.02 0.00 - 0.20 Thousand/uL    Absolute Lymphocytes 0.59 (L) 0.60 - 4.47 Thousands/µL    Absolute Monocytes 0.47 0.17 - 1.22 Thousand/µL    Eosinophils Absolute 0.01 0.00 - 0.61 Thousand/µL    Basophils Absolute 0.04 0.00 - 0.10 Thousands/µL   Comprehensive metabolic panel    Collection Time: 04/09/24  3:22 PM   Result Value Ref Range    Sodium 135 135 - 147 mmol/L    Potassium 3.5 3.5 - 5.3 mmol/L    Chloride 101 96 - 108 mmol/L    CO2 27 21 - 32 mmol/L    ANION GAP 7 4 - 13 mmol/L    BUN 5 5 - 25 mg/dL    Creatinine 0.68 0.60 - 1.30 mg/dL    Glucose 120 65 - 140 mg/dL    Calcium 8.8 8.4 - 10.2 mg/dL     (H) 13 - 39 U/L    ALT 45 7 - 52 U/L    Alkaline Phosphatase 55 34 - 104 U/L    Total Protein 6.7 6.4 - 8.4 g/dL    Albumin 3.8 3.5 - 5.0 g/dL    Total Bilirubin 0.66 0.20 - 1.00 mg/dL    eGFR 116 ml/min/1.73sq m   Lactic acid, plasma (w/reflex if result > 2.0)    Collection Time: 04/09/24  3:22 PM   Result Value Ref Range    LACTIC ACID 0.9 0.5 - 2.0 mmol/L   Vitamin B12    Collection Time: 04/09/24  3:22 PM   Result Value Ref Range    Vitamin B-12 530 180 - 914 pg/mL   Blood culture    Collection Time: 04/09/24  3:23 PM    Specimen: Arm, Right; Blood   Result Value Ref Range    Blood Culture Received in Microbiology Lab. Culture in Progress.    Blood culture    Collection Time: 04/09/24  3:24 PM    Specimen: Hand, Right; Blood   Result Value Ref Range    Blood Culture Received in Microbiology Lab. Culture in Progress.    UA w Reflex to Microscopic w Reflex to Culture    Collection Time:  04/09/24  3:39 PM    Specimen: Urine, Clean Catch   Result Value Ref Range    Color, UA Yellow     Clarity, UA Clear     Specific Gravity, UA 1.015 1.005 - 1.030    pH, UA 8.0 4.5, 5.0, 5.5, 6.0, 6.5, 7.0, 7.5, 8.0    Leukocytes, UA Negative Negative    Nitrite, UA Negative Negative    Protein, UA Negative Negative mg/dl    Glucose, UA Negative Negative mg/dl    Ketones, UA Negative Negative mg/dl    Urobilinogen, UA <2.0 <2.0 mg/dl mg/dl    Bilirubin, UA Negative Negative    Occult Blood, UA Negative Negative   CBC and differential    Collection Time: 04/10/24  5:35 AM   Result Value Ref Range    WBC 3.38 (L) 4.31 - 10.16 Thousand/uL    RBC 3.24 (L) 3.81 - 5.12 Million/uL    Hemoglobin 11.4 (L) 11.5 - 15.4 g/dL    Hematocrit 34.3 (L) 34.8 - 46.1 %     (H) 82 - 98 fL    MCH 35.2 (H) 26.8 - 34.3 pg    MCHC 33.2 31.4 - 37.4 g/dL    RDW 11.4 (L) 11.6 - 15.1 %    MPV 11.4 8.9 - 12.7 fL    Platelets 76 (L) 149 - 390 Thousands/uL    nRBC 0 /100 WBCs    Segmented % 44 43 - 75 %    Immature Grans % 0 0 - 2 %    Lymphocytes % 38 14 - 44 %    Monocytes % 15 (H) 4 - 12 %    Eosinophils Relative 2 0 - 6 %    Basophils Relative 1 0 - 1 %    Absolute Neutrophils 1.50 (L) 1.85 - 7.62 Thousands/µL    Absolute Immature Grans 0.01 0.00 - 0.20 Thousand/uL    Absolute Lymphocytes 1.28 0.60 - 4.47 Thousands/µL    Absolute Monocytes 0.50 0.17 - 1.22 Thousand/µL    Eosinophils Absolute 0.05 0.00 - 0.61 Thousand/µL    Basophils Absolute 0.04 0.00 - 0.10 Thousands/µL   Comprehensive metabolic panel    Collection Time: 04/10/24  5:35 AM   Result Value Ref Range    Sodium 135 135 - 147 mmol/L    Potassium 3.2 (L) 3.5 - 5.3 mmol/L    Chloride 101 96 - 108 mmol/L    CO2 26 21 - 32 mmol/L    ANION GAP 8 4 - 13 mmol/L    BUN 6 5 - 25 mg/dL    Creatinine 0.59 (L) 0.60 - 1.30 mg/dL    Glucose 85 65 - 140 mg/dL    Calcium 8.3 (L) 8.4 - 10.2 mg/dL     (H) 13 - 39 U/L    ALT 49 7 - 52 U/L    Alkaline Phosphatase 47 34 - 104 U/L    Total  Protein 6.6 6.4 - 8.4 g/dL    Albumin 3.7 3.5 - 5.0 g/dL    Total Bilirubin 0.58 0.20 - 1.00 mg/dL    eGFR 121 ml/min/1.73sq m   Magnesium    Collection Time: 04/10/24  5:35 AM   Result Value Ref Range    Magnesium 1.4 (L) 1.9 - 2.7 mg/dL   Procalcitonin    Collection Time: 04/10/24  5:35 AM   Result Value Ref Range    Procalcitonin 0.14 <=0.25 ng/ml       Imaging Studies:   MRI brain seizure wo and w contrast    Result Date: 4/10/2024  Narrative: MRI  BRAIN  - WITH AND WITHOUT CONTRAST, SEIZURE PROTOCOL INDICATION: new onset seizure.   Seizure disorder. COMPARISON: CT head 4/8/2024 TECHNIQUE:  Multiplanar, multisequence imaging of the brain was performed before and after gadolinium administration. IV Contrast:  6 mL of Gadobutrol injection (SINGLE-DOSE) IMAGE QUALITY:   Motion degraded examination. FINDINGS: BRAIN PARENCHYMA:  There is no discrete mass, mass effect or midline shift.  Brainstem and cerebellum demonstrate normal signal. There is no intracranial hemorrhage.  There is no evidence of acute infarction and diffusion imaging is unremarkable.  There are no white matter changes in the cerebral hemispheres. Symmetric hippocampal formations with regard to size and signal. The postcontrast sequences are limited due to motion. Within limitations of the examination, there is no pathologic intra-axial enhancement. VENTRICLES:  Normal for the patient's age. SELLA AND PITUITARY GLAND:  Normal. ORBITS:  Normal. PARANASAL SINUSES: Mild ethmoidal and right maxillary sinus mucosal thickening. VASCULATURE:  Evaluation of the major intracranial vasculature demonstrates appropriate flow voids. CALVARIUM AND SKULL BASE: Marrow signal heterogeneity which can be seen with underlying red marrow proliferation. EXTRACRANIAL SOFT TISSUES:  Normal.     Impression: Motion degraded examination. No acute infarction, edema, or pathologic intra-axial enhancement. Workstation performed: [id number]     XR chest portable    Result  Date: 4/10/2024  Narrative: XR CHEST PORTABLE INDICATION: fever. Seizure COMPARISON: 2024 FINDINGS: Clear lungs. No pneumothorax or pleural effusion. Normal cardiomediastinal silhouette. Bones are unremarkable for age. Normal upper abdomen.     Impression: No acute cardiopulmonary disease. Workstation performed: [id number]     EEG Routine and awake    Result Date: 2024  Narrative: Table formatting from the original result was not included. ELECTROENCEPHALOGRAM (EEG) Patient Name:  Kaykay Holland  MRN: [id number] :  1990 File #: [id number] Age: 33 y.o. Ordering Provider: Liban Bowden MD Date performed: 2024        Report date: 2024      Study type: Routine EEG ICD 10 diagnosis: Seizure R56.9 Start time:  End time: 1149 --------------------------------------------------------------------------- ---------------------------------------- Patient History: This recording was performed in a 33 y.o. female with history of alcohol withdrawal and seizures to evaluate seizure risk. Medications include: Lorazepam --------------------------------------------------------------------------- ---------------------------------------- Description of Procedure: 32 channel digital recording with electrodes placed according to the International 10-20 system with additional T1/T2 electrodes, EOG, EKG, and simultaneous video. The recording was technically satisfactory. --------------------------------------------------------------------------- ---------------------------------------- EEG Description: The recording was performed with the patient awake, drowsy. She was oriented. During wakefulness, there were runs of well regulated, low amplitude, posteriorly dominant, symmetric 9 cps alpha rhythm that attenuated with eye opening. There were symmetric enhance diffuse beta activities. With drowsiness, alpha activity attenuated and was replaced by diffusely distributed theta and beta activities. Stage 2  sleep was not attained.  Activation Procedures: Hyperventilation was performed for 3-4 minutes and did not produce any changes. Stepped photic stimulation between 1-30 cps induced symmetric photic driving. Other findings: Samples of the single channel ECG demonstrated a regular rhythm. Events: No significant push buttons were activated. --------------------------------------------------------------------------- ---------------------------------------- EEG Interpretation: This Routine EEG recorded during wakefulness and drowsiness is normal. Enhanced beta activities are likely a medication effect related to benzodiazepine administration.  Suhas Redmond MD [facility] Diplomate, ABPN Neurology and Epilepsy     XR chest 1 view portable    Result Date: 4/9/2024  Narrative: XR CHEST PORTABLE INDICATION: seizure. COMPARISON: None FINDINGS: Clear lungs. No pneumothorax or pleural effusion. Normal cardiomediastinal silhouette. Bones are unremarkable for age. Normal upper abdomen.     Impression: No acute cardiopulmonary disease. Workstation performed: [id number]     CT head without contrast    Result Date: 4/8/2024  Narrative: CT BRAIN - WITHOUT CONTRAST INDICATION:   questionable new onset seizure. COMPARISON:  None. TECHNIQUE:  CT examination of the brain was performed.  Multiplanar 2D reformatted images were created from the source data. Radiation dose length product (DLP) for this visit:  855 mGy-cm .  This examination, like all CT scans performed in the [facility], was performed utilizing techniques to minimize radiation dose exposure, including the use of iterative reconstruction and automated exposure control. IMAGE QUALITY:  Diagnostic. FINDINGS: PARENCHYMA:  No intracranial mass, mass effect or midline shift. No CT signs of acute infarction.  No acute parenchymal hemorrhage. VENTRICLES AND EXTRA-AXIAL SPACES:  Normal for the patient's age. VISUALIZED ORBITS: Normal  visualized orbits. PARANASAL SINUSES: Normal visualized paranasal sinuses. CALVARIUM AND EXTRACRANIAL SOFT TISSUES:  Normal.     Impression: No acute intracranial abnormality. Workstation performed: [id number]       Counseling / Coordination of Care  Total floor / unit time spent today 75 minutes. Greater than 50% of total time was spent with the patient and / or family counseling and / or coordination of care.   PATRICIA Mcintyre    Please note:  This report has been generated by voice recognition software system. Therefore, there may be syntax, spelling and/or grammatical errors.  Please call if you have any questions.

## 2024-04-10 NOTE — TELEPHONE ENCOUNTER
New Patient Intake Form   Patient Details:    Kaykay Holland  1990    Appointment Information   Who is calling to schedule? Faith Jacoboinley   If not self, what is the caller's name?   NA   DID YOU CONFIRM INSURANCE WITH PATIENT? [place] [employer] P[place], Routed to finance   Referring provider PATRICIA West   What is the diagnosis? 1. Pancytopenia 2. Alcohol abuse      Is there a confirmed tissue diagnosis?   NA     Is there a biopsy ordered or pending?  Please specify dates  If yes, route to NN/OCC   NA     Is patient aware of diagnosis?   Yes     Have you had any imaging or labs done?  If yes, where?  (If imaging done outside of [facility], please remind patient to bring a disk.) Yes-[facility]     If imaging done at outside facility, did you instruct patient to obtain discs and bring to visit? NA   Have you been seen by another Oncologist/Hematologist?  If so, who and where? No   Are the records in [facility] or Care Everywhere? Yes   Does the patient have records at another facility/hospital?    If yes, Name of facility, city and state where facility is located. [facility]     Did you instruct patient to have records faxed to rightfax and provide rightfax number?   NA   Preferred [place]   Is the patient willing to be seen by another provider?  (This is for breast patients only) NA     Did you send new patient paperwork?  Email or mail? NA   Miscellaneous Information: The patient is scheduled for her HFU appointment with PATRICIA West on 5/3 at 1330 in the [facility] office.

## 2024-04-10 NOTE — ASSESSMENT & PLAN NOTE
On alcohol withdrawal protocol with CIWA score  Was started on thiamine, folic acid and Librium  Will discharge on Librium for 2 more days

## 2024-04-10 NOTE — CASE MANAGEMENT
Case Management Assessment & Discharge Planning Note    Patient name Kaykay Holland  Location  SDU/-01 S* MRN [id number]  : 1990 Date 4/10/2024       Current Admission Date: 2024  Current Admission Diagnosis:Alcohol withdrawal seizure (HCC)   Patient Active Problem List    Diagnosis Date Noted    Sepsis (HCC) 04/10/2024    Pancytopenia (HCC) 04/10/2024    Alcohol abuse 2024    Seizure (HCC) 2024    Alcohol withdrawal seizure (HCC) 2024    Transaminitis 2024    Prolonged QT interval 2024    Essential hypertension 2024    Tobacco abuse 2024      LOS (days): 1  Geometric Mean LOS (GMLOS) (days):   Days to GMLOS:     OBJECTIVE:    Risk of Unplanned Readmission Score: 10.71         Current admission status: Inpatient       Preferred Pharmacy:   [facility]pharmacy [id number] - [place] - [address]  [address]  [place] [address]  Phone: [phone] Fax: [phone]    Primary Care Provider: No primary care provider on file.    Primary Insurance: [employer] PENDING  Secondary Insurance:     ASSESSMENT:  Active Health Care Proxies       Chichi George [facility] Representative - Mother   Primary Phone: [phone] (Mobile)                 Advance Directives  Does patient have a Health Care POA?: No  Was patient offered paperwork?: Yes         Readmission Root Cause  30 Day Readmission: No    Patient Information  Admitted from:: Home  Mental Status: Alert  During Assessment patient was accompanied by: Not accompanied during assessment  Assessment information provided by:: Patient  Primary Caregiver: Self  Support Systems: Self  What city do you [place] in[place]  Home entry access options. Select all that apply.: Stairs  Number of steps to enter home.: One Flight  Do the steps have railings?: Yes  Type of Current Residence: 2 story home  Upon entering residence, is there a bedroom on the main floor (no further  steps)?: No  A bedroom is located on the following floor levels of residence (select all that apply):: 2nd Floor  Upon entering residence, is there a bathroom on the main floor (no further steps)?: Yes  Number of steps to 2nd floor from main floor: 6  Living Arrangements: Lives Alone  Is patient a ?: No    Activities of Daily Living Prior to Admission  Functional Status: Independent  Completes ADLs independently?: Yes  Ambulates independently?: Yes  Does patient use assisted devices?: No  Does patient currently own DME?: No  Does patient have a history of Outpatient Therapy (PT/OT)?: No  Does the patient have a history of Short-Term Rehab?: No  Does patient have a history of HHC?: No  Does patient currently have HHC?: No         Patient Information Continued  Income Source: Employed  Does patient have prescription coverage?: No  Does patient receive dialysis treatments?: No  Does patient have a history of substance abuse?: Yes  Historical substance use preference: Alcohol/ETOH  History of Withdrawal Symptoms: Seizures  Is patient currently in treatment for substance abuse?: No. Treatment options provided  Does patient have a history of Mental Health Diagnosis?: No         Means of Transportation  Means of Transport to Appts:: Drives Self      Social Determinants of Health (SDOH)      Flowsheet Row Most Recent Value   Housing Stability    In the last 12 months, was there a time when you were not able to pay the mortgage or rent on time? N   In the last 12 months, how many places have you lived? 1   In the last 12 months, was there a time when you did not have a steady place to sleep or slept in a shelter (including now)? N   Transportation Needs    In the past 12 months, has lack of transportation kept you from medical appointments or from getting medications? no   In the past 12 months, has lack of transportation kept you from meetings, work, or from getting things needed for daily living? No   Food  Insecurity    Within the past 12 months, you worried that your food would run out before you got the money to buy more. Never true   Within the past 12 months, the food you bought just didn't last and you didn't have money to get more. Never true   Utilities    In the past 12 months has the electric, gas, oil, or water company threatened to shut off services in your home? No            DISCHARGE DETAILS:    Discharge planning discussed with:: Pt at bedside  [place] of Choice: Yes     CM contacted family/caregiver?: No- see comments  Were Treatment Team discharge recommendations reviewed with patient/caregiver?: Yes  Did patient/caregiver verbalize understanding of patient care needs?: Yes  Were patient/caregiver advised of the risks associated with not following Treatment Team discharge recommendations?: Yes         Requested Home Health Care         Is the patient interested in HHC at discharge?: No    DME Referral Provided  Referral made for DME?: No    Other Referral/Resources/Interventions Provided:  Financial Resources Provided: Financial Counselor    Additional Comments: Met with pt at bedside to review CM role and possible discharge planning needs. Pt stated that she lives alone in a 2SH with 12 steps to enter. Pt stated that she has been independent with all ADL care prior to admission. Pt does not have insurance at this time, pt just started a new job. CM made [facility] referral. Pt has no hx with DME, HHC or STR. Pt reports that she has been drinking 2 bottles of wine per day for years and recently stopped cold turkey resulting in a seziure. Pt has hx of Inpt rehab for ETOH abuse but is not interested in an inpt stay at this time. Pt has no hx with  intp stays. Reviewed options for support services for her ETOH abuse with the pt at bedside and explained TMOMY. Pt is agreeable to someone from [facility] talking to her about rehab vs outpt options. CM called and made [facility] referral for pt.  Made pt aware of  ongoing CM availability for d/c planning needs.     Spoke with Nery from [facility] and provided referral information for the pt.

## 2024-04-10 NOTE — SEPSIS NOTE
Sepsis Note   Kaykay Holland 33 y.o. female MRN: [id number]  Unit/Bed#: -01 SDU Encounter: [id number]       Initial Sepsis Screening       Row Name 04/09/24 1530                Is the patient's history suggestive of a new or worsening infection? Yes (Proceed)  -MS        Suspected source of infection suspect infection, source unknown  -MS        Indicate SIRS criteria Tachycardia > 90 bpm;Leukocytosis (WBC > 95158 IJL) OR Leukopenia (WBC <4000 IJL) OR Bandemia (WBC >10% bands);Hyperthemia > 38.3C (100.9F) OR Hypothermia <36C (96.8F)  -MS        Are two or more of the above signs & symptoms of infection both present and new to the patient? Yes (Proceed)  -MS        Assess for evidence of organ dysfunction: Are any of the below criteria present within 6 hours of suspected infection and SIRS criteria that are NOT considered to be chronic conditions? --  NO  -MS                  User Key  (r) = Recorded By, (t) = Taken By, (c) = Cosigned By      Initials Name Provider Type    MS Liban Bowden MD Physician                        Body mass index is 24.11 kg/m².  Wt Readings from Last 1 Encounters:   04/10/24 63.7 kg (140 lb 6.9 oz)     IBW (Ideal Body Weight): 54.7 kg    Ideal body weight: 54.7 kg (120 lb 9.5 oz)  Adjusted ideal body weight: 58.3 kg (128 lb 8.5 oz)

## 2024-04-10 NOTE — ASSESSMENT & PLAN NOTE
ALT 56  T bili 1.02  Suspect related to alcoholic pancreatitis   Right upper quadrant ultrasound pending  Trend LFTs

## 2024-04-10 NOTE — ASSESSMENT & PLAN NOTE
Patient found to have sepsis on 4/9 as evidenced by fever, tachycardia  Possible GI source  UA is negative.  Chest x-ray is unremarkable  Stool for C. difficile and cultures are negative  Denies any abdominal pain  Blood cultures are pending  On Rocephin  Trend WBC and fever curve  Patient wants to go home and signed out AGAINST MEDICAL ADVICE.  Patient is advised that she is at high risk for leukopenia and subsequent worsening sepsis and possible death and she verbalized the understanding of the risk and proceeded to sign out AGAINST MEDICAL ADVICE.  Discussed with mother  also.  Patient is recommended to follow-up with PCP and hematology oncology ASAP  She will be discharged with Ceftin to complete the course

## 2024-04-10 NOTE — ASSESSMENT & PLAN NOTE
Patient noted to have pancytopenia  Right upper quadrant ultrasound  Possible secondary to alcohol use?  Cirrhosis  Trend CBC  Will request hematology input

## 2024-04-10 NOTE — ASSESSMENT & PLAN NOTE
Patient found to have sepsis on 4/9 as evidenced by fever, tachycardia  Possible GI source  UA is negative.  Chest x-ray is unremarkable  Follow-up stool culture results  Denies any abdominal pain  Follow-up blood culture results  On Rocephin  Trend WBC and fever curve

## 2024-04-10 NOTE — ASSESSMENT & PLAN NOTE
"  ALT 56  T bili 1.02  Suspect related to alcoholic pancreatitis   Right upper quadrant ultrasound--\" No gallstones. The common duct is normal in caliber.The liver is mildly echogenic.\"  Trend LFTs   "

## 2024-04-10 NOTE — ASSESSMENT & PLAN NOTE
"Hx of ETOH use disorder, drinking 2 bottles wine daily. Patient attempted to quit cold turkey 3 weeks ago resulting in in increased tremors thus started drinking a \"couple glasses of wine each day\". Today at work patient found by staff having seizure activity. EMS called. Initially refusing alcohol intake in the ER but then became more forthcoming about her drinking. Not interested in SH detox unit however is interested in medically assisted withdrawal. Discussed with ICU, admit to SD2.   Hx: Patient reports she has had multiple seizures over the past couple of years due to alcohol withdrawal. Has required intubation due to seizures as well.   Last ETOH intake: Night of 4/7  ETOH level neg  UDS pending   Current withdrawal sx: Tremors, anxiety, nausea.   ED provider discussed case with neuro prior to ETOH use disclosure.   S/P IV keppra load per ED --> hold further keppra and Neuro consult  Monitor tele + CIWA  IV thiamine/folic acid   On Librium  MRI brain with and without contrast is unremarkable  EEG is normal  Hold off on AED as per neurology  Neurology did fill out [employer] patient is recommended to not drive  Seizure precautions   "

## 2024-04-11 ENCOUNTER — PATIENT OUTREACH (OUTPATIENT)
Dept: HEMATOLOGY ONCOLOGY | Facility: CLINIC | Age: 34
End: 2024-04-11

## 2024-04-11 NOTE — PROGRESS NOTES
Oncology Finance Advocacy Intake and Intervention    Oncology Finance Counselor/Advocate placed call to patient. This writer informed patient that this writer is here to assist patient with billing questions, financial assistance, payment/payment plans, quotes, copayment assistance, insurance optimization, and insurance navigation.      This writer conducted a thorough benefit review of copayment, deductible, and out of pocket cost. This information is documented below and has been reviewed with patient.     Copayment: Self-Pay  Deductible: Self-Pay  Out Of Pocket Cost: Self-Pay  Insurance Optimization (Limited Benefit Vs Self-Pay): Self-Pay  Patient Assistance Status: [place] and [place] [facility] (Drafted)  Free Drug Applications: N/A  Oral Chemo Application: N/A  BIN Number:   PCN Number:   GRP Number:   Copay Amount: $  Interventions:   Outreached PT to discuss financial assistance opportunities. PT did not answer. Voicemail was left detailing the reason for the call, this writer's contact information, and a high-level overview of options.     Information above was review thoroughly with patient and patient was advise of possible assistance programs/interventions. If any question arise patient can contact this writer at below information. This information was given to patient at time of contact.      Cinthia King MPH  Phone: [phone]  Email: [email or web address]

## 2024-04-12 ENCOUNTER — DOCUMENTATION (OUTPATIENT)
Dept: HEMATOLOGY ONCOLOGY | Facility: CLINIC | Age: 34
End: 2024-04-12

## 2024-04-12 NOTE — PROGRESS NOTES
"PT return call to discuss financial assistance.   PT lost job \"due to emergency room visit\". PT is unemployed, uninsured, and without savings.   PT's bills are all due EOM.   PT is interested in brennan care and [place] applications.     Cinthia King MPH  Phone:[phone]  Email: [email or web address]  "

## 2024-04-14 LAB
BACTERIA BLD CULT: NORMAL
BACTERIA BLD CULT: NORMAL

## 2024-04-16 ENCOUNTER — DOCUMENTATION (OUTPATIENT)
Dept: HEMATOLOGY ONCOLOGY | Facility: CLINIC | Age: 34
End: 2024-04-16

## 2024-04-16 ENCOUNTER — TELEPHONE (OUTPATIENT)
Dept: HEMATOLOGY ONCOLOGY | Facility: CLINIC | Age: 34
End: 2024-04-16

## 2024-04-16 NOTE — PROGRESS NOTES
Intake Form: Kaykay Holland  : 1990   Financial Assistance: [employer] [employer], [place] PCP Referral, SNAP Application, LIHEAP, Food Bank Referral, Housing Assistance Referral, Employment and Training Referral, Lifeline Referral, and FindHelp Referral for Diagnosis Support.  Insurance Assistance: Medical Assistance Application   SDOH: PT uninsured & recently unemployed    To Do:  Schedule Appointment PCP at [facility]   Address: [address] [place] [address]  Phone Number: [phone]    Check Out FindHelp:  [facility] [place] by [employer] - Search and Connect to Social Care    Medical Assistance Application:   Pages:   1   7   8   9   10   11   12   15   Appendix B     Josey Care Application:  Sign, Print Name, and Date Acknowledgement Statement   Sign and Date Hardship Letter  Provide Following Documents:   3 Months of Current Paystubs    3 Months of Current Bank Statements     Tax Return (Signed 2nd Page of 1040)    Unemployment Application:  [place]’s Un[place] Compensation [place]UC[place]

## 2024-04-16 NOTE — TELEPHONE ENCOUNTER
I phoned the patient and left a VM message indicating that I was calling from [facility] to remind her of her upcoming appointment. I provided the appointment details ( 5/3, PATRICIA West, [facility] office, 9877), the office address and location, and the [facility] number as the office contact.

## 2024-04-17 ENCOUNTER — DOCUMENTATION (OUTPATIENT)
Dept: HEMATOLOGY ONCOLOGY | Facility: CLINIC | Age: 34
End: 2024-04-17

## 2024-04-17 LAB — METHYLMALONATE SERPL-SCNC: 146 NMOL/L (ref 0–378)

## 2024-04-17 NOTE — PROGRESS NOTES
Per request of PT, mailed all financial assistance documents.       Cinthia King MPH  Phone:[phone]  Email: [email or web address]

## 2024-04-19 ENCOUNTER — TELEPHONE (OUTPATIENT)
Dept: NEUROLOGY | Facility: CLINIC | Age: 34
End: 2024-04-19

## 2024-04-19 NOTE — TELEPHONE ENCOUNTER
1ST ATTEMPT,     Called pt no answer, LMOM.    Thank you,     Celeste MEADE / [employer]/ Alcohol withdrawal seizure     DC- HOME- 4/10/2024    Kaykay Holland will need follow up in 4-6 weeks with epilepsy attending or advance practitioner. She will not require outpatient neurological testing.

## 2024-04-23 ENCOUNTER — TELEPHONE (OUTPATIENT)
Dept: OTHER | Facility: HOSPITAL | Age: 34
End: 2024-04-23

## 2024-04-23 NOTE — TELEPHONE ENCOUNTER
Pt dropped off seizure form to [facility] to have [employer] form filled out. Eloy from hospitalist office called to state that they do not normally fill this form out. Pt is persistent that she needs this form filled out by 04/24/2024 or she has to loose her license.     Eloy explained to the pt it needs to be filled out at her HFU appt. Pt is freaking out that if the form is not filled out she will be without a license.     Eloy will be calling pt back to advise that she notified neurology.    Eloy is wondering if  would even be willing to sign off on the form since her appt is not till 05/21/2024.       Eloy asked if pt can be called back to explain the process.     Phone: [phone]

## 2024-04-23 NOTE — TELEPHONE ENCOUNTER
Pt called in to our office. She is asking for [place] [employer] forms to be filled out. I did explain that typically this needs to happen after pt is seen out pt by our provider.   She is asking for someone to call her back as she is needing to turn her license in tomorrow.   Please assist.

## 2024-04-23 NOTE — TELEPHONE ENCOUNTER
Asked to call patient. Spoke with Kaykay and re-explained [employer]/initial reporting form process. She states she is doing well and denies alcohol use and recurrent seizures. She was emotional stating that her license is being revoked. I explained that she should establish with a PCP and keep her follow-up appt with Dr. Suarez. She should further discuss driving with those providers at the time of her appt. Patient uncertain how to establish with a PCP without health insurance. I provided her with [employer] [facility] phone number. Patient is aware she is not to drive. All questions answered and patient appreciative.

## 2024-04-23 NOTE — TELEPHONE ENCOUNTER
Message left for patient advising unable to complete forms until seen in office. Would recommend turning in license if being requested to do so. Also noted that our office does not make the decision regarding licenses - that is up to [employer].

## 2024-04-23 NOTE — TELEPHONE ENCOUNTER
Pt called back again stating that VIKKI told her that paper work needs to be filled out buy the Dr who saw her at the hospital.    She took the papers to the hospital last week and she told the provider will fill out and sent it. She will be fine.    But she has talked with many people who has told different things. She is confused and worried. She does not want to lose her job. She never had this happen before,never had a seizure.    PT was advised of note from nurse that called. Pt is asking to please call her back.    Please call to assist [phone]

## 2024-04-23 NOTE — TELEPHONE ENCOUNTER
Spoke to patient, all questions answered. Added follow up to waitlist.    Maria Del Rosario - if sooner availability, please schedule with .

## 2024-04-24 ENCOUNTER — TELEPHONE (OUTPATIENT)
Dept: NEUROLOGY | Facility: CLINIC | Age: 34
End: 2024-04-24

## 2024-04-24 NOTE — TELEPHONE ENCOUNTER
INFORMED PT WE WOULD NOT KNOW COST OF APPT UNTIL SEEN.  INFORMED PT THAT THEY WILL COLLECT $30.00 AT TIME OF APPT AND SHE COULD BE BILLED FOR THE REMAINDER.  ALSO, SHE CAN MAKE PAYMENT ARRANGEMENTS WITH FINANCE DEPT

## 2024-04-25 ENCOUNTER — TELEPHONE (OUTPATIENT)
Dept: NEUROLOGY | Facility: CLINIC | Age: 34
End: 2024-04-25

## 2024-04-25 ENCOUNTER — OFFICE VISIT (OUTPATIENT)
Dept: NEUROLOGY | Facility: CLINIC | Age: 34
End: 2024-04-25

## 2024-04-25 VITALS
TEMPERATURE: 97.6 F | WEIGHT: 139.3 LBS | HEART RATE: 78 BPM | SYSTOLIC BLOOD PRESSURE: 110 MMHG | DIASTOLIC BLOOD PRESSURE: 72 MMHG | OXYGEN SATURATION: 99 % | BODY MASS INDEX: 23.78 KG/M2 | RESPIRATION RATE: 16 BRPM | HEIGHT: 64 IN

## 2024-04-25 DIAGNOSIS — F10.10 ALCOHOL ABUSE: ICD-10-CM

## 2024-04-25 DIAGNOSIS — R56.9 PROVOKED SEIZURES (HCC): ICD-10-CM

## 2024-04-25 DIAGNOSIS — F32.A DEPRESSION, UNSPECIFIED DEPRESSION TYPE: ICD-10-CM

## 2024-04-25 DIAGNOSIS — F10.930 ALCOHOL WITHDRAWAL SEIZURE WITHOUT COMPLICATION (HCC): Primary | ICD-10-CM

## 2024-04-25 DIAGNOSIS — R56.9 ALCOHOL WITHDRAWAL SEIZURE WITHOUT COMPLICATION (HCC): Primary | ICD-10-CM

## 2024-04-25 PROBLEM — R65.10 SIRS (SYSTEMIC INFLAMMATORY RESPONSE SYNDROME) (HCC): Status: RESOLVED | Noted: 2024-04-10 | Resolved: 2024-04-25

## 2024-04-25 PROBLEM — K76.0 HEPATIC STEATOSIS: Status: ACTIVE | Noted: 2023-11-03

## 2024-04-25 PROBLEM — F41.9 ANXIETY: Status: ACTIVE | Noted: 2019-07-16

## 2024-04-25 PROBLEM — K27.9 PEPTIC ULCER DISEASE: Status: ACTIVE | Noted: 2021-01-14

## 2024-04-25 PROBLEM — K85.20 ACUTE ALCOHOLIC PANCREATITIS: Status: ACTIVE | Noted: 2023-11-03

## 2024-04-25 PROBLEM — K85.20 ACUTE ALCOHOLIC PANCREATITIS: Status: RESOLVED | Noted: 2023-11-03 | Resolved: 2024-04-25

## 2024-04-25 PROBLEM — F10.939 ALCOHOL WITHDRAWAL (HCC): Status: ACTIVE | Noted: 2020-02-29

## 2024-04-25 PROCEDURE — 99417 PROLNG OP E/M EACH 15 MIN: CPT | Performed by: PSYCHIATRY & NEUROLOGY

## 2024-04-25 PROCEDURE — 99215 OFFICE O/P EST HI 40 MIN: CPT | Performed by: PSYCHIATRY & NEUROLOGY

## 2024-04-25 NOTE — TELEPHONE ENCOUNTER
----- Message from Linda Suarez MD sent at 4/25/2024  8:50 AM EDT -----  Regarding: community resource assistance  Can you help this patient finding community resources to get her life together.  She recently was dismissed from her job because of a seizure, in the setting of alcohol withdrawal.  She has no insurance, no primary care provider, and needs guidance as to finding an addiction specialist.    She lives in [facility] [place] [facility].  She has a substance abuse form that needs to be completed (but this should be by an internist or mental health specialist).  I've placed referrals for family practice and psych services.  But she will need to enroll in medicaid.    Zarina

## 2024-04-25 NOTE — TELEPHONE ENCOUNTER
As I have explained to the patient, I am not going to sign off on Substance Abuse.   I am not going to attest that she has stopped drinking alcohol.  Alcoholism is a chronic medical condition that needs to be evaluated by a primary care doctor or behavioral health specialist.  She is at risk for relapsing into alcohol abuse again.   I cannot cannot complete the form because it is ask for a treatment plan regarding this condition.  This I cannot answer.

## 2024-04-25 NOTE — PROGRESS NOTES
Review of Systems   Constitutional:  Negative for appetite change, fatigue and fever.   HENT: Negative.  Negative for hearing loss, tinnitus, trouble swallowing and voice change.    Eyes: Negative.  Negative for photophobia, pain and visual disturbance.   Respiratory: Negative.  Negative for shortness of breath.    Cardiovascular: Negative.  Negative for palpitations.   Gastrointestinal: Negative.  Negative for nausea and vomiting.   Endocrine: Negative.  Negative for cold intolerance.   Genitourinary: Negative.  Negative for dysuria, frequency and urgency.   Musculoskeletal:  Negative for back pain, gait problem, myalgias, neck pain and neck stiffness.   Skin: Negative.  Negative for rash.   Allergic/Immunologic: Negative.    Neurological:  Positive for seizures (on 4/8/24 not sure about the time). Negative for dizziness, tremors, syncope, facial asymmetry, speech difficulty, weakness, light-headedness, numbness and headaches.   Hematological: Negative.  Does not bruise/bleed easily.   Psychiatric/Behavioral: Negative.  Negative for confusion, hallucinations and sleep disturbance.    All other systems reviewed and are negative.

## 2024-04-25 NOTE — TELEPHONE ENCOUNTER
Pt called PCP office to have substance abuse form filled out. Pt stated she was told by PCP office that  needs to fill this form out since he filled out the seizure form.    Pt is just looking for who should fill out the form.     Please advise pt on where form should be filled out.  [phone]

## 2024-04-25 NOTE — PATIENT INSTRUCTIONS
Plan:   Provoked seizure in the setting of alcohol withdrawal.  - no diagnosis of epilepsy or seizure disorder at this time.    Chronic use of alcohol can lead to multiple neurological disorders including neurocognitive impairment (dementia), ataxia (gait imbalance), sensori-motor neuropathies, and seizure disorder.  Alcohol is a toxin to the brain, excessive consumption can cause further brain damanage.  - follow-up with your therapist about alcohol dependency and management of this mental illness.  - call the office if you have a seizure without alcohol or in the absence of alcohol withdrawal; we will need another EEG study prior to your follow-up visit.  - if you have a convulsion go to hospital, call 911/EMS, or emergency department for evaluation of provocation.      Alcohol dependency  - referral to Psych Services    Referral to primary care.    Requested neurology social work to assist in finding community resources for insurance and alcohol rehab programs.    Follow-up as needed.

## 2024-04-25 NOTE — TELEPHONE ENCOUNTER
MSW phoned pt who informed that she called [facility] and they declined to complete substance abuse form.     MSW offered to contact  [facility]   Information Center with patient on the line. this writer phoned [phone] and spoke with Steph who confirmed that they received the seizure forms that neuro submitted today. At this point they only need the completed substance form. Steph added that the substance abuse form can be completed by a Neurologist or any MD, , ARJUN or PATRICIA.  Form cannot be completed by a mental health counselor, alternative option is a Psychiatrist.        Dr. Suarez,  Pt is tearful as she informed that she lost her job, has no income, and has no insurance. She saw her PCP 3 years ago and she is not established with a Psychiatrist. Pt is worried as she wants to move forward with her life, get back up again but she is unable to do so if she is not cleared to drive again by [employer]. Pt informed that she has a job interview tomorrow at 2pm.Pt is agreeable to apply for Medicaid tomorrow.  She is aware that it can take up to 30 days for [facility] assistance office to make a determination regarding her eligibility. This writer will help pt get established with a PCP/Psychiatry however this may take some time.      Pt would like to know if you can reconsider completing the form as she does not have any other provider who is willing to complete it at this time. Please advise and thank you

## 2024-04-26 NOTE — TELEPHONE ENCOUNTER
MSW phoned pt at [phone] and informed that she has to get established with a PCP in order for him to review paperwork from [facility]., pt is agreeable for MSS[facility]AN to schedule appt for her in the [place] area. Pt will be contacting [place] # to apply for Medicaid. Phone number was provided [phone]. Pt informed that she will apply today.       MSW phoned [facility] Primary care at   Phone: [phone]  Nurse Practitioner Filipe Ford [address] [place]   2nd floor   4/30/2024  Appt is 11:20am   $165  Deposit of $30    MSW phoned pt and lvm informing of appt information.

## 2024-04-29 ENCOUNTER — TELEPHONE (OUTPATIENT)
Dept: PSYCHIATRY | Facility: CLINIC | Age: 34
End: 2024-04-29

## 2024-04-29 NOTE — TELEPHONE ENCOUNTER
Pt referred to [facility] in [place] due to pts residency.   Pt was given treatment centers phone number  Message removed from IMB received from [facility].

## 2024-04-29 NOTE — TELEPHONE ENCOUNTER
MSW phoned pt and lvm to confirm if she applied for MA. Also to provide PCP appt info for tomorrow. Please call back.

## 2024-04-29 NOTE — TELEPHONE ENCOUNTER
Pt lvm requesting a call back as she was not able to complete MA ines.     MSW phoned pt and she is agreeable to call statewide number tomorrow to complete MA application. Pt is having job interviews today.

## 2024-04-30 ENCOUNTER — OFFICE VISIT (OUTPATIENT)
Dept: FAMILY MEDICINE CLINIC | Facility: HOSPITAL | Age: 34
End: 2024-04-30

## 2024-04-30 ENCOUNTER — TELEPHONE (OUTPATIENT)
Age: 34
End: 2024-04-30

## 2024-04-30 VITALS
TEMPERATURE: 98.6 F | SYSTOLIC BLOOD PRESSURE: 140 MMHG | HEIGHT: 64 IN | WEIGHT: 135.4 LBS | DIASTOLIC BLOOD PRESSURE: 98 MMHG | BODY MASS INDEX: 23.12 KG/M2 | HEART RATE: 89 BPM

## 2024-04-30 DIAGNOSIS — I10 PRIMARY HYPERTENSION: Primary | ICD-10-CM

## 2024-04-30 DIAGNOSIS — Z09 HOSPITAL DISCHARGE FOLLOW-UP: Primary | ICD-10-CM

## 2024-04-30 DIAGNOSIS — F32.A DEPRESSION, UNSPECIFIED DEPRESSION TYPE: ICD-10-CM

## 2024-04-30 DIAGNOSIS — I10 ESSENTIAL HYPERTENSION: ICD-10-CM

## 2024-04-30 DIAGNOSIS — F10.930 ALCOHOL WITHDRAWAL SEIZURE WITHOUT COMPLICATION (HCC): ICD-10-CM

## 2024-04-30 DIAGNOSIS — F10.10 ALCOHOL ABUSE: ICD-10-CM

## 2024-04-30 DIAGNOSIS — R56.9 ALCOHOL WITHDRAWAL SEIZURE WITHOUT COMPLICATION (HCC): ICD-10-CM

## 2024-04-30 PROCEDURE — 99203 OFFICE O/P NEW LOW 30 MIN: CPT | Performed by: NURSE PRACTITIONER

## 2024-04-30 RX ORDER — LISINOPRIL 10 MG/1
10 TABLET ORAL DAILY
Qty: 90 TABLET | Refills: 0 | Status: SHIPPED | OUTPATIENT
Start: 2024-04-30 | End: 2024-07-29

## 2024-04-30 NOTE — TELEPHONE ENCOUNTER
MSW phoned pt and she was agreeable to apply for Medicaid.     MSW and pt phoned in a 3 way line (statewide number [phone]) and applied for Medicaid.     [employer] account password   [email or web address]    All documents must have     [id number]    Can mail   Fax or hand deliver [place] :      Proof of ID   Medical bills   Proof of expenses (bills electric, water, phone, gas, trash,)   Checking [facility] ([place]   [address]  [address]   [place]   Fax [phone]      MSW will continue to follow up with patient tomorrow.

## 2024-04-30 NOTE — TELEPHONE ENCOUNTER
MSW phoned pt, PCP completed [employer]ot form. She will go to post office and send it as a certified mail.   Pt would like a follow up call on Tuesday.

## 2024-04-30 NOTE — TELEPHONE ENCOUNTER
Patient unaware of appt, offered to reschedule but patient preferred to speak to her primary to see if this appt is needed.  If yes patient will call back to reschedule.

## 2024-04-30 NOTE — ASSESSMENT & PLAN NOTE
Clinically improved/asymptomatic s/p hospitalization - negative seizure work up w/EEG and brain MRI  She remains seizure free abstaining from ETOH x23 days  [employer] substance form completed as she requests based on history provided by pt today

## 2024-04-30 NOTE — ASSESSMENT & PLAN NOTE
She has abstained from ETOH use x23 days since hospitalization  She is utilizing support from AA and plans to resume therapy as was previously beneficial  Commended her cessation efforts and encouraged ongoing

## 2024-04-30 NOTE — PROGRESS NOTES
Name: Kaykay Holland      : 1990      MRN: [id number]  Encounter Provider: PATRICIA Moran  Encounter Date: 2024   Encounter department: [facility] PRIMARY C[facility] [address]     Assessment & Plan     1. Hospital discharge follow-up    2. Alcohol withdrawal seizure without complication (HCC)  Assessment & Plan:  Clinically improved/asymptomatic s/p hospitalization - negative seizure work up w/EEG and brain MRI  She remains seizure free abstaining from ETOH x23 days  [employer] substance form completed as she requests based on history provided by pt today      3. Alcohol abuse  Assessment & Plan:  She has abstained from ETOH use x23 days since hospitalization  She is utilizing support from AA and plans to resume therapy as was previously beneficial  Commended her cessation efforts and encouraged ongoing    Orders:  -     Ambulatory referral to Family Practice    4. Essential hypertension  Assessment & Plan:  BP elevated in office - likely due to anxiety  Continue same lisinopril dose - confirmed w/pharmacy (10mg QD) & refill issued  Return in 4 weeks for BP recheck - continue to monitor and record home readings in the meantime      5. Depression, unspecified depression type  Assessment & Plan:  PHQ score of 5 today currently off medication  Return w/mood concerns/changes    Orders:  -     Ambulatory referral to Family Practice    Update annual PE and review other health needs at next appt        Subjective      New patient states she was referred here by neurology. States she had a seizure on  after she stopped drinking wine. She doesn't recall what happened, but it was witnessed by co-workers then she was taken to and admitted to the hospital. Hospital records available and reviewed/discussed. Was discharged home to family and didn't go to rehab.  States she has been sober since  and is going to AA. States she had her license suspended on  and has forms to be completed for DMV.  "[facility] (see scan in chart).  Saw neurology in follow up and no further work up recommended. Inpatient brain MRI and EEG were normal.   Has a home BP cuff and states it has been low recently  - last reading was 130/88.     PMH: ETOH abuse, HTN, seizures x2 secondary to alcohol withdrawal   Social: denies illicit drug use, daily cigarette smoker (1-2/day)  Meds: lisinopril (she is unsure of dose or who prescribes this)        Review of Systems   Neurological:  Positive for seizures.   Psychiatric/Behavioral:  Positive for dysphoric mood. Negative for suicidal ideas.        Current Outpatient Medications on File Prior to Visit   Medication Sig    [DISCONTINUED] LISINOPRIL PO Take 10 mg by mouth in the morning       Objective     /98   Pulse 89   Temp 98.6 °F (37 °C)   Ht 5' 4\" (1.626 m)   Wt 61.4 kg (135 lb 6.4 oz)   LMP 04/01/2024 (Approximate)   BMI 23.24 kg/m²       Physical Exam  Vitals reviewed.   Constitutional:       General: She is not in acute distress.     Appearance: Normal appearance.   HENT:      Head: Normocephalic.   Eyes:      General: No scleral icterus.  Neck:      Thyroid: No thyromegaly.   Cardiovascular:      Rate and Rhythm: Regular rhythm.      Heart sounds: No murmur heard.  Pulmonary:      Effort: Pulmonary effort is normal. No respiratory distress.      Breath sounds: Normal breath sounds.   Musculoskeletal:      Cervical back: Normal range of motion.   Lymphadenopathy:      Cervical: No cervical adenopathy.   Skin:     General: Skin is warm and dry.   Neurological:      General: No focal deficit present.      Mental Status: She is alert and oriented to person, place, and time.      Cranial Nerves: No cranial nerve deficit.      Motor: No weakness.      Gait: Gait normal.   Psychiatric:         Attention and Perception: Attention normal.         Mood and Affect: Mood is anxious. Affect is tearful.         Speech: Speech normal.         " Behavior: Behavior normal. Behavior is cooperative.         Thought Content: Thought content normal.         Cognition and Memory: Cognition and memory normal.         PATRICIA Moran

## 2024-04-30 NOTE — ASSESSMENT & PLAN NOTE
BP elevated in office - likely due to anxiety  Continue same lisinopril dose - confirmed w/pharmacy (10mg QD) & refill issued  Return in 4 weeks for BP recheck - continue to monitor and record home readings in the meantime

## 2024-05-01 ENCOUNTER — TELEPHONE (OUTPATIENT)
Age: 34
End: 2024-05-01

## 2024-05-01 NOTE — TELEPHONE ENCOUNTER
Patient called in, requesting the form that Filipe Oneill had filled out at the visit on 04/30/2024. Needs to be faxed or email, over to [place] department of transportation    Form is scanned into patients chart under media.     Fax Number: [phone]    Or Email: [email or web address]

## 2024-05-07 NOTE — TELEPHONE ENCOUNTER
MSW phoned pt to follow up with her regarding [employer] form. [facility]  [facility] that [facility] form.    Prophylactic measure

## 2024-05-09 NOTE — TELEPHONE ENCOUNTER
MSW phoned pt at [phone]  to follow up with her regarding [employer] form and regarding documents that [place] needs. Please call [facility]  to confirm that they received form. Please call back.

## 2024-06-03 ENCOUNTER — DOCUMENTATION (OUTPATIENT)
Dept: HEMATOLOGY ONCOLOGY | Facility: CLINIC | Age: 34
End: 2024-06-03

## 2024-06-03 NOTE — PROGRESS NOTES
PT inquired if financial assistance return documents have been received. They have not yet. PT is aware.       Cinthia King MPH  Phone:[phone]  Email: [email or web address]

## 2024-06-05 ENCOUNTER — PATIENT OUTREACH (OUTPATIENT)
Dept: FAMILY MEDICINE CLINIC | Facility: HOSPITAL | Age: 34
End: 2024-06-05

## 2024-06-05 DIAGNOSIS — Z78.9 NEED FOR FOLLOW-UP BY SOCIAL WORKER: Primary | ICD-10-CM

## 2024-06-05 NOTE — PROGRESS NOTES
BUDDY BLOOD received a referral from , cinthia King who stated that patient needed assistance with submitting MA application, [facility] application and other financial resources.     BUDDY BLOOD reviewed chart and found that Neurology  Elise Lopez is currently working with patient. BUDDY BLOOD called Elise via teams and she confirmed she is working with patient and has submitted above already.     BUDDY BLOOD sent Cinthia King an e-mail back to discuss that Elise is already following. BUDDY BLOOD will close, Please re consult BUDDY BLOOD as needed.

## 2024-09-24 ENCOUNTER — APPOINTMENT (EMERGENCY)
Dept: CT IMAGING | Facility: HOSPITAL | Age: 34
DRG: 438 | End: 2024-09-24
Payer: COMMERCIAL

## 2024-09-24 ENCOUNTER — HOSPITAL ENCOUNTER (INPATIENT)
Facility: HOSPITAL | Age: 34
LOS: 2 days | Discharge: HOME | DRG: 438 | End: 2024-09-26
Attending: EMERGENCY MEDICINE | Admitting: HOSPITALIST
Payer: COMMERCIAL

## 2024-09-24 DIAGNOSIS — K92.0 HEMATEMESIS: ICD-10-CM

## 2024-09-24 DIAGNOSIS — F10.10 ALCOHOL ABUSE: ICD-10-CM

## 2024-09-24 DIAGNOSIS — K92.0 HEMATEMESIS WITH NAUSEA: ICD-10-CM

## 2024-09-24 DIAGNOSIS — K85.90 ACUTE PANCREATITIS: Primary | ICD-10-CM

## 2024-09-24 DIAGNOSIS — E83.42 HYPOMAGNESEMIA: ICD-10-CM

## 2024-09-24 DIAGNOSIS — F10.930 ALCOHOL WITHDRAWAL SYNDROME WITHOUT COMPLICATION (HCC): ICD-10-CM

## 2024-09-24 DIAGNOSIS — R07.9 CHEST PAIN: ICD-10-CM

## 2024-09-24 PROBLEM — R79.89 ELEVATED LFTS: Status: ACTIVE | Noted: 2024-09-24

## 2024-09-24 LAB
2HR DELTA HS TROPONIN: -1 NG/L
ALBUMIN SERPL BCG-MCNC: 4.7 G/DL (ref 3.5–5)
ALP SERPL-CCNC: 81 U/L (ref 34–104)
ALT SERPL W P-5'-P-CCNC: 81 U/L (ref 7–52)
ANION GAP SERPL CALCULATED.3IONS-SCNC: 11 MMOL/L (ref 4–13)
AST SERPL W P-5'-P-CCNC: 213 U/L (ref 13–39)
BASOPHILS # BLD AUTO: 0.05 THOUSANDS/ΜL (ref 0–0.1)
BASOPHILS NFR BLD AUTO: 1 % (ref 0–1)
BILIRUB SERPL-MCNC: 0.73 MG/DL (ref 0.2–1)
BUN SERPL-MCNC: 8 MG/DL (ref 5–25)
CALCIUM SERPL-MCNC: 9.3 MG/DL (ref 8.4–10.2)
CARDIAC TROPONIN I PNL SERPL HS: 4 NG/L
CARDIAC TROPONIN I PNL SERPL HS: 5 NG/L
CHLORIDE SERPL-SCNC: 100 MMOL/L (ref 96–108)
CO2 SERPL-SCNC: 28 MMOL/L (ref 21–32)
CREAT SERPL-MCNC: 0.48 MG/DL (ref 0.6–1.3)
EOSINOPHIL # BLD AUTO: 0.11 THOUSAND/ΜL (ref 0–0.61)
EOSINOPHIL NFR BLD AUTO: 2 % (ref 0–6)
ERYTHROCYTE [DISTWIDTH] IN BLOOD BY AUTOMATED COUNT: 11.3 % (ref 11.6–15.1)
ETHANOL SERPL-MCNC: 388 MG/DL
EXT PREGNANCY TEST URINE: NEGATIVE
EXT. CONTROL: NORMAL
GFR SERPL CREATININE-BSD FRML MDRD: 129 ML/MIN/1.73SQ M
GLUCOSE SERPL-MCNC: 97 MG/DL (ref 65–140)
HCT VFR BLD AUTO: 38.7 % (ref 34.8–46.1)
HGB BLD-MCNC: 13.2 G/DL (ref 11.5–15.4)
IMM GRANULOCYTES # BLD AUTO: 0.02 THOUSAND/UL (ref 0–0.2)
IMM GRANULOCYTES NFR BLD AUTO: 0 % (ref 0–2)
LIPASE SERPL-CCNC: 523 U/L (ref 11–82)
LYMPHOCYTES # BLD AUTO: 2.23 THOUSANDS/ΜL (ref 0.6–4.47)
LYMPHOCYTES NFR BLD AUTO: 41 % (ref 14–44)
MAGNESIUM SERPL-MCNC: 1.6 MG/DL (ref 1.9–2.7)
MCH RBC QN AUTO: 37.2 PG (ref 26.8–34.3)
MCHC RBC AUTO-ENTMCNC: 34.1 G/DL (ref 31.4–37.4)
MCV RBC AUTO: 109 FL (ref 82–98)
MONOCYTES # BLD AUTO: 0.56 THOUSAND/ΜL (ref 0.17–1.22)
MONOCYTES NFR BLD AUTO: 10 % (ref 4–12)
NEUTROPHILS # BLD AUTO: 2.44 THOUSANDS/ΜL (ref 1.85–7.62)
NEUTS SEG NFR BLD AUTO: 46 % (ref 43–75)
NRBC BLD AUTO-RTO: 0 /100 WBCS
PHOSPHATE SERPL-MCNC: 3.9 MG/DL (ref 2.7–4.5)
PLATELET # BLD AUTO: 166 THOUSANDS/UL (ref 149–390)
PMV BLD AUTO: 10.9 FL (ref 8.9–12.7)
POTASSIUM SERPL-SCNC: 3.7 MMOL/L (ref 3.5–5.3)
PROT SERPL-MCNC: 7.5 G/DL (ref 6.4–8.4)
RBC # BLD AUTO: 3.55 MILLION/UL (ref 3.81–5.12)
SODIUM SERPL-SCNC: 139 MMOL/L (ref 135–147)
WBC # BLD AUTO: 5.41 THOUSAND/UL (ref 4.31–10.16)

## 2024-09-24 PROCEDURE — 36415 COLL VENOUS BLD VENIPUNCTURE: CPT | Performed by: EMERGENCY MEDICINE

## 2024-09-24 PROCEDURE — 74177 CT ABD & PELVIS W/CONTRAST: CPT

## 2024-09-24 PROCEDURE — 81025 URINE PREGNANCY TEST: CPT | Performed by: PHYSICIAN ASSISTANT

## 2024-09-24 PROCEDURE — 99285 EMERGENCY DEPT VISIT HI MDM: CPT

## 2024-09-24 PROCEDURE — 84484 ASSAY OF TROPONIN QUANT: CPT | Performed by: PHYSICIAN ASSISTANT

## 2024-09-24 PROCEDURE — 99223 1ST HOSP IP/OBS HIGH 75: CPT | Performed by: PHYSICIAN ASSISTANT

## 2024-09-24 PROCEDURE — 93005 ELECTROCARDIOGRAM TRACING: CPT

## 2024-09-24 PROCEDURE — 96365 THER/PROPH/DIAG IV INF INIT: CPT

## 2024-09-24 PROCEDURE — 84100 ASSAY OF PHOSPHORUS: CPT | Performed by: PHYSICIAN ASSISTANT

## 2024-09-24 PROCEDURE — 80053 COMPREHEN METABOLIC PANEL: CPT | Performed by: EMERGENCY MEDICINE

## 2024-09-24 PROCEDURE — 83735 ASSAY OF MAGNESIUM: CPT | Performed by: PHYSICIAN ASSISTANT

## 2024-09-24 PROCEDURE — 99285 EMERGENCY DEPT VISIT HI MDM: CPT | Performed by: PHYSICIAN ASSISTANT

## 2024-09-24 PROCEDURE — 82077 ASSAY SPEC XCP UR&BREATH IA: CPT | Performed by: PHYSICIAN ASSISTANT

## 2024-09-24 PROCEDURE — 85025 COMPLETE CBC W/AUTO DIFF WBC: CPT | Performed by: EMERGENCY MEDICINE

## 2024-09-24 PROCEDURE — 83690 ASSAY OF LIPASE: CPT | Performed by: EMERGENCY MEDICINE

## 2024-09-24 PROCEDURE — 96372 THER/PROPH/DIAG INJ SC/IM: CPT

## 2024-09-24 RX ORDER — SODIUM CHLORIDE, SODIUM GLUCONATE, SODIUM ACETATE, POTASSIUM CHLORIDE, MAGNESIUM CHLORIDE, SODIUM PHOSPHATE, DIBASIC, AND POTASSIUM PHOSPHATE .53; .5; .37; .037; .03; .012; .00082 G/100ML; G/100ML; G/100ML; G/100ML; G/100ML; G/100ML; G/100ML
200 INJECTION, SOLUTION INTRAVENOUS CONTINUOUS
Status: DISCONTINUED | OUTPATIENT
Start: 2024-09-24 | End: 2024-09-26 | Stop reason: HOSPADM

## 2024-09-24 RX ORDER — HYDROMORPHONE HCL/PF 1 MG/ML
0.5 SYRINGE (ML) INJECTION EVERY 6 HOURS PRN
Status: DISCONTINUED | OUTPATIENT
Start: 2024-09-24 | End: 2024-09-25

## 2024-09-24 RX ORDER — THIAMINE HYDROCHLORIDE 100 MG/ML
100 INJECTION, SOLUTION INTRAMUSCULAR; INTRAVENOUS ONCE
Status: COMPLETED | OUTPATIENT
Start: 2024-09-24 | End: 2024-09-24

## 2024-09-24 RX ORDER — NICOTINE 21 MG/24HR
1 PATCH, TRANSDERMAL 24 HOURS TRANSDERMAL DAILY
Status: DISCONTINUED | OUTPATIENT
Start: 2024-09-24 | End: 2024-09-26 | Stop reason: HOSPADM

## 2024-09-24 RX ORDER — NICOTINE 21 MG/24HR
1 PATCH, TRANSDERMAL 24 HOURS TRANSDERMAL DAILY
Status: DISCONTINUED | OUTPATIENT
Start: 2024-09-25 | End: 2024-09-24

## 2024-09-24 RX ORDER — LORAZEPAM 2 MG/ML
2 INJECTION INTRAMUSCULAR ONCE
Status: COMPLETED | OUTPATIENT
Start: 2024-09-24 | End: 2024-09-24

## 2024-09-24 RX ORDER — ONDANSETRON 2 MG/ML
4 INJECTION INTRAMUSCULAR; INTRAVENOUS EVERY 6 HOURS PRN
Status: DISCONTINUED | OUTPATIENT
Start: 2024-09-24 | End: 2024-09-26 | Stop reason: HOSPADM

## 2024-09-24 RX ORDER — MAGNESIUM SULFATE HEPTAHYDRATE 40 MG/ML
2 INJECTION, SOLUTION INTRAVENOUS ONCE
Status: COMPLETED | OUTPATIENT
Start: 2024-09-24 | End: 2024-09-24

## 2024-09-24 RX ORDER — FOLIC ACID 1 MG/1
1 TABLET ORAL ONCE
Status: COMPLETED | OUTPATIENT
Start: 2024-09-24 | End: 2024-09-24

## 2024-09-24 RX ADMIN — FOLIC ACID 1 MG: 1 TABLET ORAL at 15:24

## 2024-09-24 RX ADMIN — THIAMINE HYDROCHLORIDE 100 MG: 100 INJECTION, SOLUTION INTRAMUSCULAR; INTRAVENOUS at 15:24

## 2024-09-24 RX ADMIN — MAGNESIUM SULFATE HEPTAHYDRATE 2 G: 2 INJECTION, SOLUTION INTRAVENOUS at 15:23

## 2024-09-24 RX ADMIN — LORAZEPAM 2 MG: 2 INJECTION INTRAMUSCULAR; INTRAVENOUS at 20:49

## 2024-09-24 RX ADMIN — SODIUM CHLORIDE, SODIUM GLUCONATE, SODIUM ACETATE, POTASSIUM CHLORIDE, MAGNESIUM CHLORIDE, SODIUM PHOSPHATE, DIBASIC, AND POTASSIUM PHOSPHATE 200 ML/HR: .53; .5; .37; .037; .03; .012; .00082 INJECTION, SOLUTION INTRAVENOUS at 19:27

## 2024-09-24 RX ADMIN — SODIUM CHLORIDE 1000 ML: 0.9 INJECTION, SOLUTION INTRAVENOUS at 15:24

## 2024-09-24 RX ADMIN — NICOTINE 1 PATCH: 14 PATCH, EXTENDED RELEASE TRANSDERMAL at 19:59

## 2024-09-24 RX ADMIN — IOHEXOL 100 ML: 350 INJECTION, SOLUTION INTRAVENOUS at 16:05

## 2024-09-24 NOTE — ASSESSMENT & PLAN NOTE
Patient reports daily alcohol use, drinking upwards of 2-3 bottles of wine per day  Last drink earlier today prior to presentation  Does report history of seizures related to alcohol withdrawal  Ethanol 388  Monitor via CIWA protocol  IV thiamine  Consult CM - patient interested in alcohol treatment resources

## 2024-09-24 NOTE — LETTER
[facility]  [address]  [place] [id number]  Dept: [phone]    September 26, 2024     Patient: Kaykay Holland   YOB: 1990   Date of Visit: 9/24/2024       To Whom it May Concern:    Kaykay Holland is under my professional care. She was seen in the hospital from 9/24/2024 to 09/26/24. She may return to work on 09/27/24 without limitations.    If you have any questions or concerns, please don't hesitate to call.         Sincerely,          Jordana Lewis PA-C

## 2024-09-24 NOTE — ASSESSMENT & PLAN NOTE
Mildly elevated , ALT 81  Suspect in setting of alcohol abuse, also noted hepatic steatosis on imaging  Continue to trend  No gallstones seen on CT  Trend LFTs

## 2024-09-24 NOTE — ED PROVIDER NOTES
1. Acute pancreatitis    2. Alcohol abuse    3. Chest pain    4. Hypomagnesemia      ED Disposition       ED Disposition   Admit    Condition   Stable    Date/Time   Tue Sep 24, 2024  4:51 PM    Comment   Case was discussed with Dr. Tijerina and the patient's admission status was agreed to be Admission Status: inpatient status to the service of Dr. Tijerina .               Assessment & Plan       Medical Decision Making  Amount and/or Complexity of Data Reviewed  Labs: ordered.  Radiology: ordered.    Risk  Prescription drug management.  Decision regarding hospitalization.      Patient presents requesting detox. SHe also c/o chest pain and abdominal pain, will order labs, CT scan, EKG to r/o ACS, cardiac arrhythmia, electrolyte abnormality, anemia, pancreatitis, gastritis.  Patient declined detox.  Patient with elevated lipase and pancreatitis on CT scan, will admit for IV hydration and further monitoring and treatment.                   Medications   multi-electrolyte (PLASMALYTE-A/ISOLYTE-S PH 7.4) IV solution (has no administration in time range)   nicotine (NICODERM CQ) 14 mg/24hr TD 24 hr patch 1 patch (has no administration in time range)   thiamine (VITAMIN B1) 100 mg in sodium chloride 0.9 % 50 mL IVPB (has no administration in time range)   HYDROmorphone (DILAUDID) injection 0.5 mg (has no administration in time range)   ondansetron (ZOFRAN) injection 4 mg (has no administration in time range)   sodium chloride 0.9 % bolus 1,000 mL (0 mL Intravenous Stopped 9/24/24 1624)   thiamine (VITAMIN B1) injection 100 mg (100 mg Intramuscular Given 9/24/24 1524)   folic acid (FOLVITE) tablet 1 mg (1 mg Oral Given 9/24/24 1524)   magnesium sulfate 2 g/50 mL IVPB (premix) 2 g (0 g Intravenous Stopped 9/24/24 1623)   iohexol (OMNIPAQUE) 350 MG/ML injection (MULTI-DOSE) 100 mL (100 mL Intravenous Given 9/24/24 1605)       History of Present Illness     Patient is a 33 y/o F with h/o alcohol abuse, pancreatitis, depression that  presents to the ED requesting detox.  Patient states she drinks 2-3 bottles of wine daily.  She does have a history of withdrawal seizures.  She states her last drink was 2 hours before coming to the ER.  She states she drank half a bottle of wine today.  She denies headache, hallucinations, tremors, nausea or vomiting.  She is c/o left sided abdominal pain for about 3 weeks.  SHe also has chest pain for 3 weeks.  She denies SOB.  She denies history of liver disease.      Abdominal Pain  Associated symptoms: chest pain    Associated symptoms: no chills, no cough, no diarrhea, no dysuria, no fever, no nausea, no shortness of breath and no vomiting        Review of Systems   Constitutional:  Negative for chills and fever.   HENT: Negative.     Respiratory:  Negative for cough and shortness of breath.    Cardiovascular:  Positive for chest pain. Negative for palpitations and leg swelling.   Gastrointestinal:  Positive for abdominal pain. Negative for blood in stool, diarrhea, nausea and vomiting.   Genitourinary:  Negative for dysuria.   Musculoskeletal:  Negative for back pain and neck pain.   Skin:  Negative for color change and rash.   Neurological:  Negative for dizziness, weakness, numbness and headaches.   Psychiatric/Behavioral:  Negative for confusion.    All other systems reviewed and are negative.          Objective     ED Triage Vitals [09/24/24 1431]   Temperature Pulse Blood Pressure Respirations SpO2 Patient Position - Orthostatic VS   (!) 97.1 °F (36.2 °C) 101 (!) 149/108 18 98 % Sitting      Temp Source Heart Rate Source BP Location FiO2 (%) Pain Score    Temporal Monitor Right arm -- 7        Physical Exam  Vitals and nursing note reviewed.   Constitutional:       General: She is not in acute distress.     Appearance: She is well-developed and well-groomed. She is not ill-appearing or diaphoretic.      Comments: Patient appears intoxicated.    HENT:      Head: Normocephalic and atraumatic.      Right  Ear: Hearing normal.      Left Ear: Hearing normal.      Mouth/Throat:      Mouth: Mucous membranes are dry.   Eyes:      Conjunctiva/sclera: Conjunctivae normal.      Pupils: Pupils are equal.   Cardiovascular:      Rate and Rhythm: Regular rhythm. Tachycardia present.      Heart sounds: Normal heart sounds.   Pulmonary:      Effort: Pulmonary effort is normal.      Breath sounds: Normal breath sounds. No wheezing, rhonchi or rales.   Abdominal:      General: Abdomen is flat. Bowel sounds are normal.      Palpations: Abdomen is soft.      Tenderness: There is abdominal tenderness in the epigastric area, left upper quadrant and left lower quadrant. There is no guarding or rebound.   Musculoskeletal:         General: Normal range of motion.      Cervical back: Normal range of motion and neck supple.      Right lower leg: No edema.      Left lower leg: No edema.   Skin:     General: Skin is warm and dry.      Coloration: Skin is not jaundiced or pale.      Findings: No rash.   Neurological:      General: No focal deficit present.      Mental Status: She is alert and oriented to person, place, and time.      Motor: No weakness.   Psychiatric:         Mood and Affect: Mood normal.         Behavior: Behavior is cooperative.         Labs Reviewed   CBC AND DIFFERENTIAL - Abnormal       Result Value    WBC 5.41      RBC 3.55 (*)     Hemoglobin 13.2      Hematocrit 38.7       (*)     MCH 37.2 (*)     MCHC 34.1      RDW 11.3 (*)     MPV 10.9      Platelets 166      nRBC 0      Segmented % 46      Immature Grans % 0      Lymphocytes % 41      Monocytes % 10      Eosinophils Relative 2      Basophils Relative 1      Absolute Neutrophils 2.44      Absolute Immature Grans 0.02      Absolute Lymphocytes 2.23      Absolute Monocytes 0.56      Eosinophils Absolute 0.11      Basophils Absolute 0.05      Narrative:     This is an appended report.  These results have been appended to a previously verified report.    COMPREHENSIVE METABOLIC PANEL - Abnormal    Sodium 139      Potassium 3.7      Chloride 100      CO2 28      ANION GAP 11      BUN 8      Creatinine 0.48 (*)     Glucose 97      Calcium 9.3       (*)     ALT 81 (*)     Alkaline Phosphatase 81      Total Protein 7.5      Albumin 4.7      Total Bilirubin 0.73      eGFR 129      Narrative:     National Kidney Disease Foundation guidelines for Chronic Kidney Disease (CKD):     Stage 1 with normal or high GFR (GFR > 90 mL/min/1.73 square meters)    Stage 2 Mild CKD (GFR = 60-89 mL/min/1.73 square meters)    Stage 3A Moderate CKD (GFR = 45-59 mL/min/1.73 square meters)    Stage 3B Moderate CKD (GFR = 30-44 mL/min/1.73 square meters)    Stage 4 Severe CKD (GFR = 15-29 mL/min/1.73 square meters)    Stage 5 End Stage CKD (GFR <15 mL/min/1.73 square meters)  Note: GFR calculation is accurate only with a steady state creatinine   LIPASE - Abnormal    Lipase 523 (*)    MAGNESIUM - Abnormal    Magnesium 1.6 (*)    MEDICAL ALCOHOL - Abnormal    Ethanol Lvl 388 (*)    PHOSPHORUS - Normal    Phosphorus 3.9     HS TROPONIN I 0HR - Normal    hs TnI 0hr 5     POCT PREGNANCY, URINE - Normal    EXT Preg Test, Ur Negative      Control Valid     HS TROPONIN I 2HR   HS TROPONIN I 4HR     CT abdomen pelvis with contrast   Final Interpretation by Todd Mcguire MD (09/24 1628)      Acute pancreatitis as described above with surrounding peripancreatic fluid and inflammation. Small cystic foci seen within the head and tail of the pancreas. A follow-up MRI examination including MRCP may be helpful as follow-up.      Hepatic steatosis.      Tiny left renal cyst.               Workstation performed: [id number]             ECG 12 Lead Documentation Only    Date/Time: 9/24/2024 3:33 PM    Performed by: Maria Esther Luong PA-C  Authorized by: Maria Esther Luong PA-C    Indications / Diagnosis:  Tachycardia  ECG reviewed by me, the ED Provider: yes    Patient location:   ED  Previous ECG:     Previous ECG:  Compared to current    Similarity:  No change  Rate:     ECG rate:  86  Rhythm:     Rhythm: sinus rhythm    Conduction:     Conduction: normal    ST segments:     ST segments:  Normal  T waves:     T waves: normal        ED Medication and Procedure Management   Prior to Admission Medications   Prescriptions Last Dose Informant Patient Reported? Taking?   lisinopril (ZESTRIL) 10 mg tablet   No No   Sig: Take 1 tablet (10 mg total) by mouth in the morning      Facility-Administered Medications: None     Patient's Medications   Discharge Prescriptions    No medications on file     No discharge procedures on file.     Maria Esther Luong PA-C  09/24/24 4796

## 2024-09-24 NOTE — H&P
"H&P - Hospitalist   Name: Kaykay Holland 34 y.o. female I MRN: [id number]  Unit/Bed#: -01 I Date of Admission: 9/24/2024   Date of Service: 9/24/2024 I Hospital Day: 0     Assessment & Plan  Acute pancreatitis  Presented to the emergency department with worsening epigastric pain, nausea and vomiting  CT abdomen/pelvis: \"Acute pancreatitis as described above with surrounding peripancreatic fluid and inflammation. Small cystic foci seen within the head and tail of the pancreas. A follow-up MRI examination including MRCP may be helpful as follow-up.  Hepatic steatosis.\"  Defer to GI regarding inpt vs outpt MRI  Lipase 523  Suspect alcohol induced  Continue IV fluids, pain control, NPO  Recommend complete alcohol cessation  Gastroenterology consulted  Essential hypertension  Initially hypertensive, now improved without intervention  Hold lisinopril for now  Alcohol abuse  Patient reports daily alcohol use, drinking upwards of 2-3 bottles of wine per day  Last drink earlier today prior to presentation  Does report history of seizures related to alcohol withdrawal  Ethanol 388  Monitor via CIWA protocol  IV thiamine  Consult CM - patient interested in alcohol treatment resources  Elevated LFTs  Mildly elevated , ALT 81  Suspect in setting of alcohol abuse, also noted hepatic steatosis on imaging  Continue to trend  No gallstones seen on CT  Trend LFTs  Hypomagnesemia  Magnesium 1.6  Repleted  Repeat tomorrow    VTE Pharmacologic Prophylaxis: VTE Score: 0 Low Risk (Score 0-2) - Encourage Ambulation.  Code Status: Level 1 - Full Code   Discussion with family: Updated  (mother) at bedside.    Anticipated Length of Stay: Patient will be admitted on an inpatient basis with an anticipated length of stay of greater than 2 midnights secondary to acute pancreatitis.    History of Present Illness   Chief Complaint: Abdominal pain    Kaykay Holland is a 34 y.o. female with a PMH of alcohol abuse who " presents with abdominal pain.    Patient presents to the emergency department with worsening epigastric pain, bilious nausea and vomiting.  Denies chest pain/palpitations, shortness of breath.  Reports intermittent constipation and diarrhea, no rectal bleeding.  Denies fever/chills.  Does report daily alcohol use, per report upwards of 2-3 bottles of wine daily.  Last drink of alcohol earlier today.  Does report history of seizures due to alcohol withdrawal.    Review of Systems   Constitutional:  Positive for fatigue. Negative for chills, fever and unexpected weight change.   HENT:  Negative for congestion, sore throat and trouble swallowing.    Eyes:  Negative for photophobia, pain and visual disturbance.   Respiratory:  Negative for cough, shortness of breath and wheezing.    Cardiovascular:  Negative for chest pain, palpitations and leg swelling.   Gastrointestinal:  Positive for abdominal pain, nausea and vomiting. Negative for constipation and diarrhea.   Endocrine: Negative for polyuria.   Genitourinary:  Negative for difficulty urinating, dysuria, flank pain, hematuria and urgency.   Musculoskeletal:  Negative for back pain, myalgias, neck pain and neck stiffness.   Skin:  Negative for pallor and rash.   Neurological:  Negative for dizziness, tremors, syncope, weakness, light-headedness, numbness and headaches.   Hematological:  Does not bruise/bleed easily.   Psychiatric/Behavioral:  Negative for agitation and confusion.        I have reviewed the patient's PMH, PSH, Social History, Family History, Meds, and Allergies  Social History:  Marital Status: Single   Occupation:   Patient Pre-hospital Living Situation: Home  Patient Pre-hospital Level of Mobility: walks  Patient Pre-hospital Diet Restrictions: None    Objective     Vitals:   Blood Pressure: 123/86 (09/24/24 1633)  Pulse: 77 (09/24/24 1633)  Temperature: (!) 97.1 °F (36.2 °C) (09/24/24 1431)  Temp Source: Temporal (09/24/24 1431)  Respirations: 22  "(09/24/24 1633)  SpO2: 92 % (09/24/24 1633)    Physical Exam    Lines/Drains:  Lines/Drains/Airways       Active Status       None                        Additional Data:   Lab Results: I have reviewed the following results: CBC/BMP:   .     09/24/24  1445 09/24/24  1450 09/24/24  1458   WBC  --   --  5.41   HGB  --   --  13.2   HCT  --   --  38.7   PLT  --   --  166   SODIUM 139  --   --    K 3.7  --   --      --   --    CO2 28  --   --    BUN 8  --   --    CREATININE 0.48*  --   --    GLUC 97  --   --    MG  --  1.6*  --    PHOS  --  3.9  --     , Creatinine Clearance: Estimated Creatinine Clearance: 144 mL/min (A) (by C-G formula based on SCr of 0.48 mg/dL (L))., LFTs:   .     09/24/24  1445   *   ALT 81*   ALB 4.7   TBILI 0.73   ALKPHOS 81    , Lipase:   Lab Results   Component Value Date    LIPASE 523 (H) 09/24/2024     Results from last 7 days   Lab Units 09/24/24  1458   WBC Thousand/uL 5.41   HEMOGLOBIN g/dL 13.2   HEMATOCRIT % 38.7   PLATELETS Thousands/uL 166   SEGS PCT % 46   LYMPHO PCT % 41   MONO PCT % 10   EOS PCT % 2     Results from last 7 days   Lab Units 09/24/24  1445   SODIUM mmol/L 139   POTASSIUM mmol/L 3.7   CHLORIDE mmol/L 100   CO2 mmol/L 28   BUN mg/dL 8   CREATININE mg/dL 0.48*   ANION GAP mmol/L 11   CALCIUM mg/dL 9.3   ALBUMIN g/dL 4.7   TOTAL BILIRUBIN mg/dL 0.73   ALK PHOS U/L 81   ALT U/L 81*   AST U/L 213*   GLUCOSE RANDOM mg/dL 97             No results found for: \"HGBA1C\"        Imaging Review: Reviewed radiology reports from this admission including: CT abdomen/pelvis.  Other Studies: EKG was reviewed.     Administrative Statements   I have spent a total time of 60 minutes in caring for this patient on the day of the visit/encounter including Diagnostic results, Patient and family education, Importance of tx compliance, Risk factor reductions, Counseling / Coordination of care, Documenting in the medical record, Reviewing / ordering tests, medicine, procedures  , and " Obtaining or reviewing history  .    ** Please Note: This note has been constructed using a voice recognition system. **

## 2024-09-24 NOTE — ASSESSMENT & PLAN NOTE
"Presented to the emergency department with worsening epigastric pain, nausea and vomiting  CT abdomen/pelvis: \"Acute pancreatitis as described above with surrounding peripancreatic fluid and inflammation. Small cystic foci seen within the head and tail of the pancreas. A follow-up MRI examination including MRCP may be helpful as follow-up.  Hepatic steatosis.\"  Defer to GI regarding inpt vs outpt MRI  Lipase 523  Suspect alcohol induced  Continue IV fluids, pain control, NPO  Recommend complete alcohol cessation  Gastroenterology consulted  "

## 2024-09-24 NOTE — PLAN OF CARE
Problem: Potential for Falls  Goal: Patient will remain free of falls  Description: INTERVENTIONS:  - Educate patient/family on patient safety including physical limitations  - Instruct patient to call for assistance with activity   - Consult OT/PT to assist with strengthening/mobility   - Keep Call bell within reach  - Keep bed low and locked with side rails adjusted as appropriate  - Keep care items and personal belongings within reach  - Initiate and maintain comfort rounds  - Make Fall Risk Sign visible to staff  - Offer Toileting every 2 Hours, in advance of need  - Initiate/Maintain shift alarm  - Obtain necessary fall risk management equipment: socks   - Apply yellow socks and bracelet for high fall risk patients  - Consider moving patient to room near nurses station  Outcome: Progressing     Problem: PAIN - ADULT  Goal: Verbalizes/displays adequate comfort level or baseline comfort level  Description: Interventions:  - Encourage patient to monitor pain and request assistance  - Assess pain using appropriate pain scale  - Administer analgesics based on type and severity of pain and evaluate response  - Implement non-pharmacological measures as appropriate and evaluate response  - Consider cultural and social influences on pain and pain management  - Notify physician/advanced practitioner if interventions unsuccessful or patient reports new pain  Outcome: Progressing     Problem: INFECTION - ADULT  Goal: Absence or prevention of progression during hospitalization  Description: INTERVENTIONS:  - Assess and monitor for signs and symptoms of infection  - Monitor lab/diagnostic results  - Monitor all insertion sites, i.e. indwelling lines, tubes, and drains  - Monitor endotracheal if appropriate and nasal secretions for changes in amount and color  - [place] appropriate cooling/warming therapies per order  - Administer medications as ordered  - Instruct and encourage patient and family to use good hand hygiene  technique  - Identify and instruct in appropriate isolation precautions for identified infection/condition  Outcome: Progressing  Goal: Absence of fever/infection during neutropenic period  Description: INTERVENTIONS:  - Monitor WBC    Outcome: Progressing     Problem: SAFETY ADULT  Goal: Patient will remain free of falls  Description: INTERVENTIONS:  - Educate patient/family on patient safety including physical limitations  - Instruct patient to call for assistance with activity   - Consult OT/PT to assist with strengthening/mobility   - Keep Call bell within reach  - Keep bed low and locked with side rails adjusted as appropriate  - Keep care items and personal belongings within reach  - Initiate and maintain comfort rounds  - Make Fall Risk Sign visible to staff  - Offer Toileting every 2 Hours, in advance of need  - Initiate/Maintain shift alarm  - Obtain necessary fall risk management equipment: socks   - Apply yellow socks and bracelet for high fall risk patients  - Consider moving patient to room near nurses station  Outcome: Progressing  Goal: Maintain or return to baseline ADL function  Description: INTERVENTIONS:  - Educate patient/family on patient safety including physical limitations  - Instruct patient to call for assistance with activity   - Consult OT/PT to assist with strengthening/mobility   - Keep Call bell within reach  - Keep bed low and locked with side rails adjusted as appropriate  - Keep care items and personal belongings within reach  - Initiate and maintain comfort rounds  - Make Fall Risk Sign visible to staff  - Offer Toileting every 2 Hours, in advance of need  - Initiate/Maintain shift alarm  - Obtain necessary fall risk management equipment: socks   - Apply yellow socks and bracelet for high fall risk patients  - Consider moving patient to room near nurses station  Outcome: Progressing  Goal: Maintains/Returns to pre admission functional level  Description: INTERVENTIONS:  - Perform  AM-PAC 6 Click Basic Mobility/ Daily Activity assessment daily.  - Set and communicate daily mobility goal to care team and patient/family/caregiver.   - Collaborate with rehabilitation services on mobility goals if consulted  - Perform Range of Motion 2 times a day.  - Reposition patient every 2 hours.  - Dangle patient 2 times a day  - Stand patient 2 times a day  - Ambulate patient 2 times a day  - Out of bed to chair 2 times a day   - Out of bed for meals 2 times a day  - Out of bed for toileting  - Record patient progress and toleration of activity level   Outcome: Progressing     Problem: DISCHARGE PLANNING  Goal: Discharge to home or other facility with appropriate resources  Description: INTERVENTIONS:  - Identify barriers to discharge w/patient and caregiver  - Arrange for needed discharge resources and transportation as appropriate  - Identify discharge learning needs (meds, wound care, etc.)  - Arrange for interpretive services to assist at discharge as needed  - Refer to Case Management Department for coordinating discharge planning if the patient needs post-hospital services based on physician/advanced practitioner order or complex needs related to functional status, cognitive ability, or social support system  Outcome: Progressing     Problem: Knowledge Deficit  Goal: Patient/family/caregiver demonstrates understanding of disease process, treatment plan, medications, and discharge instructions  Description: Complete learning assessment and assess knowledge base.  Interventions:  - Provide teaching at level of understanding  - Provide teaching via preferred learning methods  Outcome: Progressing     Problem: GASTROINTESTINAL - ADULT  Goal: Minimal or absence of nausea and/or vomiting  Description: INTERVENTIONS:  - Administer IV fluids if ordered to ensure adequate hydration  - Maintain NPO status until nausea and vomiting are resolved  - Nasogastric tube if ordered  - Administer ordered antiemetic  medications as needed  - Provide nonpharmacologic comfort measures as appropriate  - Advance diet as tolerated, if ordered  - Consider nutrition services referral to assist patient with adequate nutrition and appropriate food choices  Outcome: Progressing  Goal: Maintains adequate nutritional intake  Description: INTERVENTIONS:  - Monitor percentage of each meal consumed  - Identify factors contributing to decreased intake, treat as appropriate  - Assist with meals as needed  - Monitor I&O, weight, and lab values if indicated  - Obtain nutrition services referral as needed  Outcome: Progressing

## 2024-09-25 PROBLEM — K92.0 HEMATEMESIS: Status: ACTIVE | Noted: 2024-09-25

## 2024-09-25 LAB
ALBUMIN SERPL BCG-MCNC: 3.6 G/DL (ref 3.5–5)
ALP SERPL-CCNC: 64 U/L (ref 34–104)
ALT SERPL W P-5'-P-CCNC: 57 U/L (ref 7–52)
ANION GAP SERPL CALCULATED.3IONS-SCNC: 10 MMOL/L (ref 4–13)
AST SERPL W P-5'-P-CCNC: 127 U/L (ref 13–39)
BASOPHILS # BLD AUTO: 0.03 THOUSANDS/ΜL (ref 0–0.1)
BASOPHILS NFR BLD AUTO: 1 % (ref 0–1)
BILIRUB SERPL-MCNC: 0.85 MG/DL (ref 0.2–1)
BUN SERPL-MCNC: 5 MG/DL (ref 5–25)
CALCIUM SERPL-MCNC: 7.6 MG/DL (ref 8.4–10.2)
CHLORIDE SERPL-SCNC: 102 MMOL/L (ref 96–108)
CO2 SERPL-SCNC: 26 MMOL/L (ref 21–32)
CREAT SERPL-MCNC: 0.38 MG/DL (ref 0.6–1.3)
EOSINOPHIL # BLD AUTO: 0.1 THOUSAND/ΜL (ref 0–0.61)
EOSINOPHIL NFR BLD AUTO: 3 % (ref 0–6)
ERYTHROCYTE [DISTWIDTH] IN BLOOD BY AUTOMATED COUNT: 11.4 % (ref 11.6–15.1)
FOLATE SERPL-MCNC: 5.9 NG/ML
GFR SERPL CREATININE-BSD FRML MDRD: 139 ML/MIN/1.73SQ M
GLUCOSE SERPL-MCNC: 77 MG/DL (ref 65–140)
HCT VFR BLD AUTO: 32.5 % (ref 34.8–46.1)
HGB BLD-MCNC: 10.8 G/DL (ref 11.5–15.4)
IMM GRANULOCYTES # BLD AUTO: 0.01 THOUSAND/UL (ref 0–0.2)
IMM GRANULOCYTES NFR BLD AUTO: 0 % (ref 0–2)
LIPASE SERPL-CCNC: 269 U/L (ref 11–82)
LYMPHOCYTES # BLD AUTO: 1.32 THOUSANDS/ΜL (ref 0.6–4.47)
LYMPHOCYTES NFR BLD AUTO: 37 % (ref 14–44)
MAGNESIUM SERPL-MCNC: 1.7 MG/DL (ref 1.9–2.7)
MCH RBC QN AUTO: 36.4 PG (ref 26.8–34.3)
MCHC RBC AUTO-ENTMCNC: 33.2 G/DL (ref 31.4–37.4)
MCV RBC AUTO: 109 FL (ref 82–98)
MONOCYTES # BLD AUTO: 0.35 THOUSAND/ΜL (ref 0.17–1.22)
MONOCYTES NFR BLD AUTO: 10 % (ref 4–12)
NEUTROPHILS # BLD AUTO: 1.73 THOUSANDS/ΜL (ref 1.85–7.62)
NEUTS SEG NFR BLD AUTO: 49 % (ref 43–75)
NRBC BLD AUTO-RTO: 1 /100 WBCS
PLATELET # BLD AUTO: 115 THOUSANDS/UL (ref 149–390)
PMV BLD AUTO: 11.3 FL (ref 8.9–12.7)
POTASSIUM SERPL-SCNC: 4.2 MMOL/L (ref 3.5–5.3)
PROT SERPL-MCNC: 5.8 G/DL (ref 6.4–8.4)
RBC # BLD AUTO: 2.97 MILLION/UL (ref 3.81–5.12)
SODIUM SERPL-SCNC: 138 MMOL/L (ref 135–147)
TRIGL SERPL-MCNC: 47 MG/DL
TSH SERPL DL<=0.05 MIU/L-ACNC: 4.2 UIU/ML (ref 0.45–4.5)
VIT B12 SERPL-MCNC: 281 PG/ML (ref 180–914)
WBC # BLD AUTO: 3.54 THOUSAND/UL (ref 4.31–10.16)

## 2024-09-25 PROCEDURE — 84478 ASSAY OF TRIGLYCERIDES: CPT | Performed by: PHYSICIAN ASSISTANT

## 2024-09-25 PROCEDURE — 82607 VITAMIN B-12: CPT | Performed by: PHYSICIAN ASSISTANT

## 2024-09-25 PROCEDURE — 85025 COMPLETE CBC W/AUTO DIFF WBC: CPT | Performed by: HOSPITALIST

## 2024-09-25 PROCEDURE — 84443 ASSAY THYROID STIM HORMONE: CPT | Performed by: PHYSICIAN ASSISTANT

## 2024-09-25 PROCEDURE — 82746 ASSAY OF FOLIC ACID SERUM: CPT | Performed by: PHYSICIAN ASSISTANT

## 2024-09-25 PROCEDURE — 83690 ASSAY OF LIPASE: CPT | Performed by: HOSPITALIST

## 2024-09-25 PROCEDURE — 99254 IP/OBS CNSLTJ NEW/EST MOD 60: CPT | Performed by: INTERNAL MEDICINE

## 2024-09-25 PROCEDURE — 83735 ASSAY OF MAGNESIUM: CPT | Performed by: HOSPITALIST

## 2024-09-25 PROCEDURE — 80053 COMPREHEN METABOLIC PANEL: CPT | Performed by: HOSPITALIST

## 2024-09-25 PROCEDURE — 99232 SBSQ HOSP IP/OBS MODERATE 35: CPT | Performed by: PHYSICIAN ASSISTANT

## 2024-09-25 RX ORDER — OXYCODONE HYDROCHLORIDE 5 MG/1
5 TABLET ORAL EVERY 6 HOURS PRN
Status: DISCONTINUED | OUTPATIENT
Start: 2024-09-25 | End: 2024-09-26 | Stop reason: HOSPADM

## 2024-09-25 RX ORDER — LORAZEPAM 2 MG/ML
2 INJECTION INTRAMUSCULAR ONCE
Status: COMPLETED | OUTPATIENT
Start: 2024-09-25 | End: 2024-09-25

## 2024-09-25 RX ORDER — PANTOPRAZOLE SODIUM 40 MG/10ML
40 INJECTION, POWDER, LYOPHILIZED, FOR SOLUTION INTRAVENOUS EVERY 12 HOURS SCHEDULED
Status: DISCONTINUED | OUTPATIENT
Start: 2024-09-25 | End: 2024-09-26 | Stop reason: HOSPADM

## 2024-09-25 RX ORDER — HYDROMORPHONE HCL/PF 1 MG/ML
0.5 SYRINGE (ML) INJECTION EVERY 6 HOURS PRN
Status: DISCONTINUED | OUTPATIENT
Start: 2024-09-25 | End: 2024-09-26 | Stop reason: HOSPADM

## 2024-09-25 RX ORDER — MAGNESIUM SULFATE HEPTAHYDRATE 40 MG/ML
2 INJECTION, SOLUTION INTRAVENOUS ONCE
Status: COMPLETED | OUTPATIENT
Start: 2024-09-25 | End: 2024-09-25

## 2024-09-25 RX ORDER — CHLORDIAZEPOXIDE HYDROCHLORIDE 25 MG/1
25 CAPSULE, GELATIN COATED ORAL EVERY 8 HOURS SCHEDULED
Status: DISCONTINUED | OUTPATIENT
Start: 2024-09-25 | End: 2024-09-26 | Stop reason: HOSPADM

## 2024-09-25 RX ADMIN — SODIUM CHLORIDE, SODIUM GLUCONATE, SODIUM ACETATE, POTASSIUM CHLORIDE, MAGNESIUM CHLORIDE, SODIUM PHOSPHATE, DIBASIC, AND POTASSIUM PHOSPHATE 200 ML/HR: .53; .5; .37; .037; .03; .012; .00082 INJECTION, SOLUTION INTRAVENOUS at 16:42

## 2024-09-25 RX ADMIN — OXYCODONE HYDROCHLORIDE 5 MG: 5 TABLET ORAL at 19:53

## 2024-09-25 RX ADMIN — SODIUM CHLORIDE, SODIUM GLUCONATE, SODIUM ACETATE, POTASSIUM CHLORIDE, MAGNESIUM CHLORIDE, SODIUM PHOSPHATE, DIBASIC, AND POTASSIUM PHOSPHATE 200 ML/HR: .53; .5; .37; .037; .03; .012; .00082 INJECTION, SOLUTION INTRAVENOUS at 00:48

## 2024-09-25 RX ADMIN — ONDANSETRON 4 MG: 2 INJECTION, SOLUTION INTRAMUSCULAR; INTRAVENOUS at 00:50

## 2024-09-25 RX ADMIN — PANTOPRAZOLE SODIUM 40 MG: 40 INJECTION, POWDER, FOR SOLUTION INTRAVENOUS at 10:57

## 2024-09-25 RX ADMIN — THIAMINE HYDROCHLORIDE 100 MG: 100 INJECTION, SOLUTION INTRAMUSCULAR; INTRAVENOUS at 10:11

## 2024-09-25 RX ADMIN — OXYCODONE HYDROCHLORIDE 5 MG: 5 TABLET ORAL at 12:41

## 2024-09-25 RX ADMIN — CHLORDIAZEPOXIDE HYDROCHLORIDE 25 MG: 25 CAPSULE ORAL at 21:16

## 2024-09-25 RX ADMIN — SODIUM CHLORIDE, SODIUM GLUCONATE, SODIUM ACETATE, POTASSIUM CHLORIDE, MAGNESIUM CHLORIDE, SODIUM PHOSPHATE, DIBASIC, AND POTASSIUM PHOSPHATE 200 ML/HR: .53; .5; .37; .037; .03; .012; .00082 INJECTION, SOLUTION INTRAVENOUS at 21:28

## 2024-09-25 RX ADMIN — LORAZEPAM 2 MG: 2 INJECTION INTRAMUSCULAR; INTRAVENOUS at 02:12

## 2024-09-25 RX ADMIN — PANTOPRAZOLE SODIUM 40 MG: 40 INJECTION, POWDER, FOR SOLUTION INTRAVENOUS at 21:18

## 2024-09-25 RX ADMIN — CHLORDIAZEPOXIDE HYDROCHLORIDE 25 MG: 25 CAPSULE ORAL at 07:58

## 2024-09-25 RX ADMIN — SODIUM CHLORIDE, SODIUM GLUCONATE, SODIUM ACETATE, POTASSIUM CHLORIDE, MAGNESIUM CHLORIDE, SODIUM PHOSPHATE, DIBASIC, AND POTASSIUM PHOSPHATE 200 ML/HR: .53; .5; .37; .037; .03; .012; .00082 INJECTION, SOLUTION INTRAVENOUS at 11:13

## 2024-09-25 RX ADMIN — CHLORDIAZEPOXIDE HYDROCHLORIDE 25 MG: 25 CAPSULE ORAL at 13:08

## 2024-09-25 RX ADMIN — NICOTINE 1 PATCH: 14 PATCH, EXTENDED RELEASE TRANSDERMAL at 19:53

## 2024-09-25 RX ADMIN — MAGNESIUM SULFATE HEPTAHYDRATE 2 G: 2 INJECTION, SOLUTION INTRAVENOUS at 07:58

## 2024-09-25 RX ADMIN — HYDROMORPHONE HYDROCHLORIDE 0.5 MG: 1 INJECTION, SOLUTION INTRAMUSCULAR; INTRAVENOUS; SUBCUTANEOUS at 08:23

## 2024-09-25 RX ADMIN — HYDROMORPHONE HYDROCHLORIDE 0.5 MG: 1 INJECTION, SOLUTION INTRAMUSCULAR; INTRAVENOUS; SUBCUTANEOUS at 00:55

## 2024-09-25 NOTE — CASE MANAGEMENT
Case Management Assessment & Discharge Planning Note    Patient name Kaykay Holland  Location /-01 MRN [id number]  : 1990 Date 2024       Current Admission Date: 2024  Current Admission Diagnosis:Acute pancreatitis   Patient Active Problem List    Diagnosis Date Noted Date Diagnosed    Hematemesis 2024     Acute pancreatitis 2024     Elevated LFTs 2024     Hypomagnesemia 2024     Pancytopenia (HCC) 04/10/2024     Alcohol abuse 2024     Provoked seizures (HCC) 2024     Alcohol withdrawal seizure (HCC) 2024     Transaminitis 2024     Prolonged QT interval 2024     Essential hypertension 2024     Tobacco abuse 2024     Hepatic steatosis 2023     Peptic ulcer disease 2021     Alcohol withdrawal (HCC) 2020     Anxiety 2019     Depression 2019       LOS (days): 1  Geometric Mean LOS (GMLOS) (days):   Days to GMLOS:     OBJECTIVE:    Risk of Unplanned Readmission Score: 12.84         Current admission status: Inpatient       Preferred Pharmacy:   [id number] [place] - [address]  [address]  [facility] [address]  Phone: [phone] Fax: [phone]    Primary Care Provider: PATRICIA Moran    Primary Insurance: AET[employer]  Secondary Insurance:     ASSESSMENT:  Active Health Care Proxies       Chichi George Health Care Representative - Mother   Primary Phone: [phone] (Mobile)                 Advance Directives  Does patient have a Health Care POA?: No  Was patient offered paperwork?: Yes (Declined.)  Does patient currently have a Health Care decision maker?: Yes, please see Health Care Proxy section  Does patient have Advance Directives?: No  Was patient offered paperwork?: Yes  Primary Contact: Chichi Beckham, .         Readmission Root Cause  30 Day Readmission: No    Patient Information  Admitted from:: Home  Mental Status:  Alert  During Assessment patient was accompanied by: Not accompanied during assessment  Assessment information provided by:: Patient  Primary Caregiver: Self  Support Systems: Self, Parent  County of Residence: [facility]  What city do you live in?: [place]  Home entry access options. Select all that apply.: Stairs  Number of steps to enter home.: 4  Do the steps have railings?: Yes  Type of Current Residence: 2 story home  Upon entering residence, is there a bedroom on the main floor (no further steps)?: No  A bedroom is located on the following floor levels of residence (select all that apply):: 2nd Floor  Upon entering residence, is there a bathroom on the main floor (no further steps)?: No  Indicate which floors of current residence have a bathroom (select all the apply):: 2nd Floor  Number of steps to 2nd floor from main floor: One Flight  Living Arrangements: Lives Alone  Is patient a ?: No    Activities of Daily Living Prior to Admission  Functional Status: Independent  Completes ADLs independently?: Yes  Ambulates independently?: Yes  Does patient use assisted devices?: No  Does patient currently own DME?: No  Does patient have a history of Outpatient Therapy (PT/OT)?: No  Does the patient have a history of Short-Term Rehab?: No  Does patient have a history of HHC?: No  Does patient currently have HHC?: No         Patient Information Continued  Income Source: Employed  Does patient have prescription coverage?: Yes  Does patient receive dialysis treatments?: No  Does patient have a history of substance abuse?: Yes  Historical substance use preference: Alcohol/ETOH  History of Withdrawal Symptoms: Seizures  Is patient currently in treatment for substance abuse?: No. Patient declined treatment information.  Does patient have a history of Mental Health Diagnosis?: Yes  Is patient receiving treatment for mental health?: Yes  Has patient received inpatient treatment related to mental health in the last 2 years?:  No         Means of Transportation  Means of Transport to Appts:: Drives Self      Social Determinants of Health (SDOH)      Flowsheet Row Most Recent Value   Housing Stability    In the last 12 months, was there a time when you were not able to pay the mortgage or rent on time? N   In the past 12 months, how many times have you moved where you were living? 0   At any time in the past 12 months, were you homeless or living in a shelter (including now)? N   Transportation Needs    In the past 12 months, has lack of transportation kept you from medical appointments or from getting medications? no   In the past 12 months, has lack of transportation kept you from meetings, work, or from getting things needed for daily living? No   Food Insecurity    Within the past 12 months, you worried that your food would run out before you got the money to buy more. Never true   Within the past 12 months, the food you bought just didn't last and you didn't have money to get more. Never true   Utilities    In the past 12 months has the electric, gas, oil, or water company threatened to shut off services in your home? No            DISCHARGE DETAILS:    Discharge planning discussed with:: PT  Freedom of Choice: Yes  Comments - Freedom of Choice: Pt is declining ETOH treatment options/[facility] referral.  CM contacted family/caregiver?: No- see comments (Pt is in contact with family. Asked not to contact.)  Were Treatment Team discharge recommendations reviewed with patient/caregiver?: Yes  Did patient/caregiver verbalize understanding of patient care needs?: Yes  Were patient/caregiver advised of the risks associated with not following Treatment Team discharge recommendations?: Yes         Requested Home Health Care         Is the patient interested in HHC at discharge?: No    DME Referral Provided  Referral made for DME?: No                    Transport at Discharge : Family                                      Additional Comments: JEREMI  met with patient at bedside to discern discharge needs. Pt lives alone in a 2sh, 4STE, bed and bath in the upstairs. Pt reports being independent with ADLs and walking PTA. Uses and owns no DME. Employed and drives. Asked CM if she could get a doctor's note for her abscence at work. No hx of OPPT, HHC, or STR. No current SDOH concerns. No inpt psych stays. No past hx of D&A treatment besides AA meetings. Pt is currently declining [facility] referral at this time. CM mentioned there was a note stating pt was interested in detox, but pt stated she changed her mind and would like to go home. CM stated that if she would like inpatient or outpatient Alcohol treatment options, she could ask for CM and CM could make a [facility] referral. Pt understood. No Medical POA. Declined information on how to obtain one. Mother of pt would be transport home.

## 2024-09-25 NOTE — PROGRESS NOTES
"Progress Note - Hospitalist   Name: Kaykay Holland 34 y.o. female I MRN: [id number]  Unit/Bed#: [facility] I Date of Admission: 9/24/2024   Date of Service: 9/25/2024 I Hospital Day: 1     Assessment & Plan  Acute pancreatitis  Presented to the emergency department with worsening epigastric pain, nausea and vomiting  CT abdomen/pelvis: \"Acute pancreatitis as described above with surrounding peripancreatic fluid and inflammation. Small cystic foci seen within the head and tail of the pancreas. A follow-up MRI examination including MRCP may be helpful as follow-up.  Hepatic steatosis.\"  Defer to GI regarding inpt vs outpt MRI  Lipase 523 -> 269  Suspect alcohol induced  Continue IV fluids, pain control, NPO  Recommend complete alcohol cessation  Gastroenterology consulted  Essential hypertension  Initially hypertensive, now improved without intervention  Hold lisinopril for now  Alcohol abuse  Patient reports daily alcohol use, drinking upwards of 2-3 bottles of wine per day  Last drink earlier today prior to presentation  Does report history of seizures related to alcohol withdrawal  Ethanol 388  Monitor via CIWA protocol  IV thiamine  Start lithium  Consult CM - patient currently declining resources  Elevated LFTs  Mildly elevated , ALT 81  Suspect in setting of alcohol abuse, also noted hepatic steatosis on imaging  Continue to trend  No gallstones seen on CT  Trend LFTs  Hypomagnesemia  Magnesium 1.7  Repleted  Repeat tomorrow  Hematemesis  Patient reports small amount of hematemesis overnight; primarily bilious vomiting with small streaks of blood  Possible gastritis/esophagitis, rich sliverman tear  Start IV protonix  Consider EGD - GI following    VTE Pharmacologic Prophylaxis: VTE Score: 0 Low Risk (Score 0-2) - Encourage Ambulation.    Mobility:   Basic Mobility Inpatient Raw Score: 24  JH-HLM Goal: 8: Walk 250 feet or more  JH-HLM Achieved: 6: Walk 10 steps or more  JH-HLM Goal NOT achieved. Continue " with multidisciplinary rounding and encourage appropriate mobility to improve upon [facility] goals.    Patient Centered Rounds: I performed bedside rounds with nursing staff today.   Discussions with Specialists or Other Care Team Provider: CM, GI    Education and Discussions with Family / Patient: Patient declined call to .     Current Length of Stay: 1 day(s)  Current Patient Status: Inpatient   Certification Statement: The patient will continue to require additional inpatient hospital stay due to acute pancreatitis  Discharge Plan: Anticipate discharge in 24-48 hrs to home.    Code Status: Level 1 - Full Code    Subjective   Patient reports feeling better from yesterday, did have episode of vomiting overnight.  Described as bilious but with specks of blood.  Denies chest pain/palpitations.  Epigastric pain improving.      Objective     Vitals:   Temp (24hrs), Av.1 °F (36.7 °C), Min:97.1 °F (36.2 °C), Max:98.8 °F (37.1 °C)    Temp:  [97.1 °F (36.2 °C)-98.8 °F (37.1 °C)] 98.8 °F (37.1 °C)  HR:  [] 76  Resp:  [16-22] 21  BP: (114-149)/() 118/84  SpO2:  [91 %-98 %] 95 %  Body mass index is 21.63 kg/m².     Input and Output Summary (last 24 hours):     Intake/Output Summary (Last 24 hours) at 2024 1031  Last data filed at 2024 0601  Gross per 24 hour   Intake 3163.33 ml   Output 10 ml   Net 3153.33 ml       Physical Exam  Vitals and nursing note reviewed.   Constitutional:       Appearance: Normal appearance.      Comments: No acute distress   HENT:      Head: Normocephalic.   Eyes:      General: No scleral icterus.     Extraocular Movements: Extraocular movements intact.      Conjunctiva/sclera: Conjunctivae normal.   Cardiovascular:      Rate and Rhythm: Normal rate and regular rhythm.      Heart sounds: Normal heart sounds. No murmur heard.  Pulmonary:      Effort: Pulmonary effort is normal. No respiratory distress.      Breath sounds: Normal breath sounds. No wheezing, rhonchi  or rales.   Abdominal:      General: Bowel sounds are normal.      Palpations: Abdomen is soft.      Tenderness: There is abdominal tenderness in the epigastric area.   Musculoskeletal:         General: No edema.      Cervical back: Normal range of motion.      Comments: Able to move upper/lower ext bilaterally, no edema   Skin:     General: Skin is warm and dry.   Neurological:      Mental Status: She is alert and oriented to person, place, and time.   Psychiatric:         Mood and Affect: Mood normal.         Speech: Speech normal.         Behavior: Behavior normal.          Lines/Drains:  Lines/Drains/Airways       Active Status       None                      Telemetry:  Telemetry Orders (From admission, onward)               24 Hour Telemetry Monitoring  (ED Bridging Orders Panel)  Continuous x 24 Hours (Telem)        Question:  Reason for 24 Hour Telemetry  Answer:  Metabolic/electrolyte disturbance with high probability of dysrhythmia. K level <3 or >6 OR KCL infusion >10mEq/hr                     Telemetry Reviewed: Normal Sinus Rhythm  Indication for Continued Telemetry Use: Metabolic/electrolyte disturbance with high probability of dysrhythmia               Lab Results: I have reviewed the following results: CBC/BMP:   .     09/24/24  1450 09/24/24  1458 09/25/24  0518   WBC  --    < > 3.54*   HGB  --    < > 10.8*   HCT  --    < > 32.5*   PLT  --    < > 115*   SODIUM  --   --  138   K  --   --  4.2   CL  --   --  102   CO2  --   --  26   BUN  --   --  5   CREATININE  --   --  0.38*   GLUC  --   --  77   MG 1.6*  --  1.7*   PHOS 3.9  --   --     < > = values in this interval not displayed.    , LFTs:   .     09/25/24  0518   *   ALT 57*   ALB 3.6   TBILI 0.85   ALKPHOS 64    , Lipase:   Lab Results   Component Value Date    LIPASE 269 (H) 09/25/2024      Results from last 7 days   Lab Units 09/25/24  0518   WBC Thousand/uL 3.54*   HEMOGLOBIN g/dL 10.8*   HEMATOCRIT % 32.5*   PLATELETS Thousands/uL  115*   SEGS PCT % 49   LYMPHO PCT % 37   MONO PCT % 10   EOS PCT % 3     Results from last 7 days   Lab Units 09/25/24  0518   SODIUM mmol/L 138   POTASSIUM mmol/L 4.2   CHLORIDE mmol/L 102   CO2 mmol/L 26   BUN mg/dL 5   CREATININE mg/dL 0.38*   ANION GAP mmol/L 10   CALCIUM mg/dL 7.6*   ALBUMIN g/dL 3.6   TOTAL BILIRUBIN mg/dL 0.85   ALK PHOS U/L 64   ALT U/L 57*   AST U/L 127*   GLUCOSE RANDOM mg/dL 77                       Recent Cultures (last 7 days):         Imaging Review: No pertinent imaging studies reviewed.  Other Studies: No additional pertinent studies reviewed.    Last 24 Hours Medication List:     Current Facility-Administered Medications:     chlordiazePOXIDE (LIBRIUM) capsule 25 mg, Q8H RODRICK    HYDROmorphone (DILAUDID) injection 0.5 mg, Q6H PRN    multi-electrolyte (PLASMALYTE-A/ISOLYTE-S PH 7.4) IV solution, Continuous, Last Rate: 200 mL/hr (09/25/24 0048)    nicotine (NICODERM CQ) 14 mg/24hr TD 24 hr patch 1 patch, Daily    ondansetron (ZOFRAN) injection 4 mg, Q6H PRN    oxyCODONE (ROXICODONE) IR tablet 5 mg, Q6H PRN    pantoprazole (PROTONIX) injection 40 mg, Q12H RODRICK    thiamine (VITAMIN B1) 100 mg in sodium chloride 0.9 % 50 mL IVPB, Daily, Last Rate: 100 mg (09/25/24 1011)    Administrative Statements   Today, Patient Was Seen By: Jordana Lewis PA-C  I have spent a total time of 40 minutes in caring for this patient on the day of the visit/encounter including Diagnostic results, Instructions for management, Patient and family education, Importance of tx compliance, Counseling / Coordination of care, Documenting in the medical record, Reviewing / ordering tests, medicine, procedures  , and Communicating with other healthcare professionals .    **Please Note: This note may have been constructed using a voice recognition system.**

## 2024-09-25 NOTE — PLAN OF CARE
Problem: Potential for Falls  Goal: Patient will remain free of falls  Description: INTERVENTIONS:  - Educate patient/family on patient safety including physical limitations  - Instruct patient to call for assistance with activity   - Consult OT/PT to assist with strengthening/mobility   - Keep Call bell within reach  - Keep bed low and locked with side rails adjusted as appropriate  - Keep care items and personal belongings within reach  - Initiate and maintain comfort rounds  - Make Fall Risk Sign visible to staff  - Offer Toileting every 2 Hours, in advance of need  - Initiate/Maintain shift alarm  - Obtain necessary fall risk management equipment: socks   - Apply yellow socks and bracelet for high fall risk patients  - Consider moving patient to room near nurses station  9/25/2024 0032 by Rebeca Karimi  Outcome: Progressing  9/25/2024 0032 by Rebeca Karimi  Outcome: Progressing     Problem: PAIN - ADULT  Goal: Verbalizes/displays adequate comfort level or baseline comfort level  Description: Interventions:  - Encourage patient to monitor pain and request assistance  - Assess pain using appropriate pain scale  - Administer analgesics based on type and severity of pain and evaluate response  - Implement non-pharmacological measures as appropriate and evaluate response  - Consider cultural and social influences on pain and pain management  - Notify physician/advanced practitioner if interventions unsuccessful or patient reports new pain  9/25/2024 0032 by Rebeca Karimi  Outcome: Progressing  9/25/2024 0032 by Rebeca Karimi  Outcome: Progressing     Problem: INFECTION - ADULT  Goal: Absence or prevention of progression during hospitalization  Description: INTERVENTIONS:  - Assess and monitor for signs and symptoms of infection  - Monitor lab/diagnostic results  - Monitor all insertion sites, i.e. indwelling lines, tubes, and drains  - Monitor endotracheal if appropriate and nasal secretions for changes in  amount and color  - [place] appropriate cooling/warming therapies per order  - Administer medications as ordered  - Instruct and encourage patient and family to use good hand hygiene technique  - Identify and instruct in appropriate isolation precautions for identified infection/condition  9/25/2024 0032 by Rebeca Karimi  Outcome: Progressing  9/25/2024 0032 by Rebeca Karimi  Outcome: Progressing  Goal: Absence of fever/infection during neutropenic period  Description: INTERVENTIONS:  - Monitor WBC    9/25/2024 0032 by Rebeca Karimi  Outcome: Progressing  9/25/2024 0032 by Rebeca Karimi  Outcome: Progressing     Problem: SAFETY ADULT  Goal: Patient will remain free of falls  Description: INTERVENTIONS:  - Educate patient/family on patient safety including physical limitations  - Instruct patient to call for assistance with activity   - Consult OT/PT to assist with strengthening/mobility   - Keep Call bell within reach  - Keep bed low and locked with side rails adjusted as appropriate  - Keep care items and personal belongings within reach  - Initiate and maintain comfort rounds  - Make Fall Risk Sign visible to staff  - Offer Toileting every 2 Hours, in advance of need  - Initiate/Maintain shift alarm  - Obtain necessary fall risk management equipment: socks   - Apply yellow socks and bracelet for high fall risk patients  - Consider moving patient to room near nurses station  9/25/2024 0032 by Rebeca Karimi  Outcome: Progressing  9/25/2024 0032 by Rebeca Karimi  Outcome: Progressing  Goal: Maintain or return to baseline ADL function  Description: INTERVENTIONS:  - Educate patient/family on patient safety including physical limitations  - Instruct patient to call for assistance with activity   - Consult OT/PT to assist with strengthening/mobility   - Keep Call bell within reach  - Keep bed low and locked with side rails adjusted as appropriate  - Keep care items and personal belongings within reach  -  Initiate and maintain comfort rounds  - Make Fall Risk Sign visible to staff  - Offer Toileting every 2 Hours, in advance of need  - Initiate/Maintain shift alarm  - Obtain necessary fall risk management equipment: socks   - Apply yellow socks and bracelet for high fall risk patients  - Consider moving patient to room near nurses station  9/25/2024 0032 by Rebeca Karimi  Outcome: Progressing  9/25/2024 0032 by Rebeca Karimi  Outcome: Progressing  Goal: Maintains/Returns to pre admission functional level  Description: INTERVENTIONS:  - Perform AM-PAC 6 Click Basic Mobility/ Daily Activity assessment daily.  - Set and communicate daily mobility goal to care team and patient/family/caregiver.   - Collaborate with rehabilitation services on mobility goals if consulted  - Perform Range of Motion 3 times a day.  - Reposition patient every 2 hours.  - Dangle patient 3 times a day  - Stand patient 3 times a day  - Ambulate patient 3 times a day  - Out of bed to chair 3 times a day   - Out of bed for meals 3 times a day  - Out of bed for toileting  - Record patient progress and toleration of activity level   9/25/2024 0032 by Rebeca Karimi  Outcome: Progressing  9/25/2024 0032 by Rebeca Karimi  Outcome: Progressing     Problem: DISCHARGE PLANNING  Goal: Discharge to home or other facility with appropriate resources  Description: INTERVENTIONS:  - Identify barriers to discharge w/patient and caregiver  - Arrange for needed discharge resources and transportation as appropriate  - Identify discharge learning needs (meds, wound care, etc.)  - Arrange for interpretive services to assist at discharge as needed  - Refer to Case Management Department for coordinating discharge planning if the patient needs post-hospital services based on physician/advanced practitioner order or complex needs related to functional status, cognitive ability, or social support system  9/25/2024 0032 by Rebeca Karimi  Outcome:  Progressing  9/25/2024 0032 by Rebeca Karimi  Outcome: Progressing     Problem: Knowledge Deficit  Goal: Patient/family/caregiver demonstrates understanding of disease process, treatment plan, medications, and discharge instructions  Description: Complete learning assessment and assess knowledge base.  Interventions:  - Provide teaching at level of understanding  - Provide teaching via preferred learning methods  9/25/2024 0032 by Rebeca Karimi  Outcome: Progressing  9/25/2024 0032 by Rebeca Karimi  Outcome: Progressing     Problem: GASTROINTESTINAL - ADULT  Goal: Minimal or absence of nausea and/or vomiting  Description: INTERVENTIONS:  - Administer IV fluids if ordered to ensure adequate hydration  - Maintain NPO status until nausea and vomiting are resolved  - Nasogastric tube if ordered  - Administer ordered antiemetic medications as needed  - Provide nonpharmacologic comfort measures as appropriate  - Advance diet as tolerated, if ordered  - Consider nutrition services referral to assist patient with adequate nutrition and appropriate food choices  9/25/2024 0032 by Rebeca Karimi  Outcome: Progressing  9/25/2024 0032 by Rebeca Karimi  Outcome: Progressing  Goal: Maintains adequate nutritional intake  Description: INTERVENTIONS:  - Monitor percentage of each meal consumed  - Identify factors contributing to decreased intake, treat as appropriate  - Assist with meals as needed  - Monitor I&O, weight, and lab values if indicated  - Obtain nutrition services referral as needed  9/25/2024 0032 by Rebeca Karimi  Outcome: Progressing  9/25/2024 0032 by Rebeac Karimi  Outcome: Progressing

## 2024-09-25 NOTE — ASSESSMENT & PLAN NOTE
Patient reports daily alcohol use, drinking upwards of 2-3 bottles of wine per day  Last drink earlier today prior to presentation  Does report history of seizures related to alcohol withdrawal  Ethanol 388  Monitor via CIWA protocol  IV thiamine  Start lithium  Consult CM - patient currently declining resources

## 2024-09-25 NOTE — ASSESSMENT & PLAN NOTE
Patient reports small amount of hematemesis overnight; primarily bilious vomiting with small streaks of blood  Possible gastritis/esophagitis, rich silverman tear  Start IV protonix  Consider EGD - GI following

## 2024-09-25 NOTE — UTILIZATION REVIEW
"Initial Clinical Review    Admission: Date/Time/Statement:   Admission Orders (From admission, onward)       Ordered        09/24/24 1651  INPATIENT ADMISSION  Once                          Orders Placed This Encounter   Procedures    INPATIENT ADMISSION     Standing Status:   Standing     Number of Occurrences:   1     Order Specific Question:   Level of Care     Answer:   Med Surg [16]     Order Specific Question:   Estimated length of stay     Answer:   More than 2 Midnights     Order Specific Question:   Certification     Answer:   I certify that inpatient services are medically necessary for this patient for a duration of greater than two midnights. See H&P and MD Progress Notes for additional information about the patient's course of treatment.     ED Arrival Information       Expected   -    Arrival   9/24/2024 14:25    Acuity   Emergent              Means of arrival   Walk-In    Escorted by   Family Member    Service   Hospitalist    Admission type   Emergency              Arrival complaint   ab pain  vomiting             Chief Complaint   Patient presents with    Abdominal Pain     Pt reports left sided pain. Pt reports stabbing pain constant. Pt reports lack of appetitite and unable to tolerate PO. Pt reports getting dizzy and lightheaded. Pt reports similar pain to pancreatitis. Pt reports \"I would like detox.\" Pt reports withdrawal seizures. Pt reports last drink PTA       Initial Presentation: 34 y.o. female  to ED via walk in from home.    Admitted to inpatient with Dx: Acute pancreatitis/alcohol abuse/hypertension/hypomagnesia/elevated LFTs.  Presented to ED with request of detox, drinks 2 to 3 bottles of wine daily with last drink 2 hours prior to arrival and drank 1/2 bottle on day of arrival.  Complaints of left sided abdominal pain  and chest pain starting 3 weeks ago. PMHx: alcohol abuse . On exam: appears intoxicated.  Mucous membranes dry.  Tachycardia.   Abdominal tenderness in epigastric, " left upper and lower abdominal quadrant.  .  ALT 81.   Lipase 523.  Mg 1.6.  ethanol 388.  .   Imaging shows acute pancreatitis.   ED treatment:  IVF bolus of 1 liter, Mg, started on IV thiamine, folic acid.    Plan includes to continue aggressive IVF, trend BMP, Mg and replete electrolytes.  Trend LFTs.   Alcohol cessation.  Start CIWA, IV thiamine, folic acid, ativan as needed.  Consult GI.   Hold home lisinopril.       Anticipated Length of Stay/Certification Statement: Patient will be admitted on an inpatient basis with an anticipated length of stay of greater than 2 midnights secondary to acute pancreatitis.     Date: 9/25/24    Day 2: overnight with episode of vomiting - bilious with specks of blood.   Epigastric pain is improving.  On exam: epigastric tenderness.   Wbc 3.54.  H&H 10.8/32.5.  platelets 115.  .  ALT 57.  Lipase 269.  Continue IV fluids, pain control, NPO.  Home lisinopril on hold.   Trend LFTs.  Replete electrolytes as needed.     9/25/24 per GI - acute recurrent alcoholic pancreatitis/abnormal ct abdomen/elevated LFTs/Hematemesis/Dysphagia and GERD/pancytopenia likely due to bone marrow suppression from alcohol abuse. Suspect hematemesis due to esophagitis, gastritis or Minnie-Morrell tear.  Continue CIWA, thiamine, folic acid, MVI.  NPO and aggressive IVF, antiemetics and analgesia.  MRI abdomen in 4 to 6 weeks. Monitor CBC, transfuse for Hg < 7 (Hg 10.8, baseline 11.4)     ED Triage Vitals [09/24/24 1431]   Temperature Pulse Respirations Blood Pressure SpO2 Pain Score   (!) 97.1 °F (36.2 °C) 101 18 (!) 149/108 98 % 7     Weight (last 2 days)       Date/Time Weight    09/24/24 2006 59 (130)            Vital Signs (last 3 days)       Date/Time Temp Pulse Resp BP MAP (mmHg) SpO2 O2 Device Patient Position - Orthostatic VS Dulce Maria Coma Scale Score CIWA-Ar Total Pain    09/25/24 1241 -- -- -- -- -- -- -- -- -- -- 7    09/25/24 1235 -- -- -- -- -- -- -- -- -- 7 --    09/25/24 0900 --  -- -- -- -- -- -- -- -- 4 --    09/25/24 0823 -- -- -- -- -- -- -- -- -- -- 8    09/25/24 07:50:24 98.8 °F (37.1 °C) 76 21 118/84 95 95 % None (Room air) Lying -- -- --    09/25/24 05:17:32 -- 72 -- 114/83 93 95 % -- -- -- -- --    09/25/24 0516 -- -- -- 114/83 -- -- -- -- -- 7 --    09/25/24 0300 -- -- -- 122/80 -- -- -- -- -- 4 --    09/25/24 02:07:29 -- 79 -- 130/88 102 91 % -- -- -- 14 --    09/25/24 0100 -- -- -- -- -- -- -- -- -- 14 --    09/25/24 0055 -- -- -- -- -- -- -- -- -- -- 9 09/25/24 00:41:44 -- 81 -- 131/90 104 96 % -- -- -- -- --    09/24/24 2200 -- -- -- 131/90 -- -- -- -- -- 7 --    09/24/24 20:13:15 98 °F (36.7 °C) 76 16 128/89 102 94 % None (Room air) Lying -- -- --    09/24/24 2006 98.3 °F (36.8 °C) 84 16 128/89 -- -- -- -- -- 8 --    09/24/24 2002 -- -- -- -- -- -- None (Room air) -- 15 -- 7 09/24/24 1830 -- -- -- 125/88 -- -- -- -- -- 1 --    09/24/24 1758 -- -- -- -- -- -- -- -- -- -- 7 09/24/24 17:36:31 98.3 °F (36.8 °C) 81 16 125/88 100 96 % -- -- -- -- --    09/24/24 1700 -- -- -- -- -- 95 % None (Room air) -- -- -- --    09/24/24 1633 -- 77 22 123/86 101 92 % -- Sitting -- -- --    09/24/24 1630 -- 74 17 123/86 101 96 % None (Room air) Sitting -- -- --    09/24/24 1530 -- 74 20 124/80 97 96 % -- -- -- -- --    09/24/24 1445 -- -- -- -- -- -- None (Room air) -- 15 -- --    09/24/24 1440 -- 77 -- 123/79 -- -- -- -- -- 4 --    09/24/24 1431 97.1 °F (36.2 °C) 101 18 149/108 -- 98 % None (Room air) Sitting -- -- 7           CIWA-Ar Score       Row Name 09/25/24 1235 09/25/24 0900 09/25/24 0516       CIWA-Ar    BP -- -- 114/83    Nausea and Vomiting 1 1 1    Tactile Disturbances 1 1 1    Tremor 2 2 2    Auditory Disturbances 0 0 0    Paroxysmal Sweats 0 0 0    Visual Disturbances 0 0 0    Anxiety 1 0 1    Headache, Fullness in Head 2 0 2    Agitation 0 0 0    Orientation and Clouding of Sensorium 0 0 0    CIWA-Ar Total 7 4 7      Row Name 09/25/24 0300 09/25/24 02:07:29 09/25/24 0100        CIWA-Ar    /80 -- --    Nausea and Vomiting 1 5 5    Tactile Disturbances 1 1 1    Tremor 1 2 2    Auditory Disturbances 0 0 0    Paroxysmal Sweats 0 1 1    Visual Disturbances 0 0 0    Anxiety 1 3 3    Headache, Fullness in Head 0 2 2    Agitation 0 0 0    Orientation and Clouding of Sensorium 0 0 0    CIWA-Ar Total 4 14 14      Row Name 09/24/24 2200 09/24/24 2006 09/24/24 1830       CIWA-Ar    /90 128/89 125/88    Nausea and Vomiting 5 1 0    Tactile Disturbances 0 1 0    Tremor 1 0 0    Auditory Disturbances 0 0 0    Paroxysmal Sweats 0 0 0    Visual Disturbances 0 0 0    Anxiety 1 4 1    Headache, Fullness in Head 0 2 0    Agitation 0 0 0    Orientation and Clouding of Sensorium 0 0 0    CIWA-Ar Total 7 8 1      Row Name 09/24/24 1617 09/24/24 1440          CIWA-Ar    BP -- 123/79     Pulse -- 77     Nausea and Vomiting -- 0     Tactile Disturbances -- 0     Tremor -- 0     Auditory Disturbances -- 0     Paroxysmal Sweats -- 0     Visual Disturbances -- 0     Anxiety -- 1     Headache, Fullness in Head -- 3     Agitation -- 0     Orientation and Clouding of Sensorium -- 0     CIWA-Ar Total -- 4                     Pertinent Labs/Diagnostic Test Results:   Radiology:  CT abdomen pelvis with contrast   Final Interpretation by Todd Mcguire MD (09/24 6728)      Acute pancreatitis as described above with surrounding peripancreatic fluid and inflammation. Small cystic foci seen within the head and tail of the pancreas. A follow-up MRI examination including MRCP may be helpful as follow-up.      Hepatic steatosis.      Tiny left renal cyst.               Workstation performed: [id number]           Cardiology:  No orders to display   Date/Time: 9/24/2024 3:33 PM   Indications / Diagnosis:  Tachycardia   ECG reviewed by the ED Provider: yes    Patient location:  ED   Previous ECG:     Previous ECG:  Compared to current     Similarity:  No change   Rate:     ECG rate:  86   Rhythm:     Rhythm: sinus  rhythm    Conduction:     Conduction: normal    ST segments:     ST segments:  Normal   T waves:     T waves: normal     GI:  No orders to display     Results from last 7 days   Lab Units 09/25/24  0518 09/24/24  1458   WBC Thousand/uL 3.54* 5.41   HEMOGLOBIN g/dL 10.8* 13.2   HEMATOCRIT % 32.5* 38.7   PLATELETS Thousands/uL 115* 166   TOTAL NEUT ABS Thousands/µL 1.73* 2.44     Results from last 7 days   Lab Units 09/25/24  0518 09/24/24  1450 09/24/24  1445   SODIUM mmol/L 138  --  139   POTASSIUM mmol/L 4.2  --  3.7   CHLORIDE mmol/L 102  --  100   CO2 mmol/L 26  --  28   ANION GAP mmol/L 10  --  11   BUN mg/dL 5  --  8   CREATININE mg/dL 0.38*  --  0.48*   EGFR ml/min/1.73sq m 139  --  129   CALCIUM mg/dL 7.6*  --  9.3   MAGNESIUM mg/dL 1.7* 1.6*  --    PHOSPHORUS mg/dL  --  3.9  --      Results from last 7 days   Lab Units 09/25/24  0518 09/24/24  1445   AST U/L 127* 213*   ALT U/L 57* 81*   ALK PHOS U/L 64 81   TOTAL PROTEIN g/dL 5.8* 7.5   ALBUMIN g/dL 3.6 4.7   TOTAL BILIRUBIN mg/dL 0.85 0.73     Results from last 7 days   Lab Units 09/25/24  0518 09/24/24  1445   GLUCOSE RANDOM mg/dL 77 97     Results from last 7 days   Lab Units 09/24/24  1655 09/24/24  1450   HS TNI 0HR ng/L  --  5   HS TNI 2HR ng/L 4  --    HSTNI D2 ng/L -1  --      Results from last 7 days   Lab Units 09/25/24  0518 09/24/24  1445   LIPASE u/L 269* 523*     Results from last 7 days   Lab Units 09/24/24  1450   ETHANOL LVL mg/dL 388*       ED Treatment-Medication Administration from 09/24/2024 1425 to 09/24/2024 1723         Date/Time Order Dose Route Action     09/24/2024 1524 sodium chloride 0.9 % bolus 1,000 mL 1,000 mL Intravenous New Bag     09/24/2024 1524 thiamine (VITAMIN B1) injection 100 mg 100 mg Intramuscular Given     09/24/2024 1524 folic acid (FOLVITE) tablet 1 mg 1 mg Oral Given     09/24/2024 1523 magnesium sulfate 2 g/50 mL IVPB (premix) 2 g 2 g Intravenous New Bag            Past Medical History:   Diagnosis Date     Acute alcoholic pancreatitis 11/03/2023    Hypertension     SIRS (systemic inflammatory response syndrome) ([facility]) 04/10/2024     Present on Admission:   Essential hypertension   Alcohol abuse      Admitting Diagnosis: Acute pancreatitis [K85.90]  Hypomagnesemia [E83.42]  Alcohol abuse [F10.10]  Chest pain [R07.9]  Abdominal pain [R10.9]  Age/Sex: 34 y.o. female  Admission Orders:  Scheduled Medications:  chlordiazePOXIDE, 25 mg, Oral, Q8H RODRICK  magnesium sulfate, 2 g, Intravenous, Once 0758 on 9/25/24   nicotine, 1 patch, Transdermal, Daily  thiamine, 100 mg, Intravenous, Daily    LORazepam (ATIVAN) injection 2 mg  Dose: 2 mg  Freq: Once Route: IV  Start: 09/25/24 0215 End: 09/25/24 0212    Continuous IV Infusions:  multi-electrolyte, 200 mL/hr, Intravenous, Continuous      PRN Meds:  HYDROmorphone, 0.5 mg, Intravenous, Q6H PRN x 2 9/25/24   ondansetron, 4 mg, Intravenous, Q6H PRN x 1 9/25/24     Telemetry.  Continuous pulse oximetry   CIWA  Seizure precautions.  Aspiration precautions.   NPO    IP CONSULT TO GASTROENTEROLOGY      Network Utilization Review Department  ATTENTION: Please call with any questions or concerns to [phone] and carefully listen to the prompts so that you are directed to the right person. All voicemails are confidential.   For Discharge needs, contact Care Management DC Support Team at [phone] opt. [phone]  Send all requests for admission clinical reviews, approved or denied determinations and any other requests to dedicated fax number below belonging to the [facility] where the patient is receiving treatment. List of dedicated fax numbers for the Facilities:  FACILITY NAME UR FAX NUMBER   ADMISSION DENIALS (Administrative/Medical Necessity) [phone]   DISCHARGE SUPPORT TEAM (NETWORK) [phone]   PARENT CHILD HEALTH (Maternity/NICU/Pediatrics) [phone]   [facility] [phone]   [facility] [phone]   [facility]  [facility] [phone]   [facility] [phone]   [facility] [phone]   [facility] [phone]   [facility] [phone]   [facility] [phone]   [facility] [phone]   [facility] [phone]   [facility] [phone]   [facility] [phone]

## 2024-09-25 NOTE — PLAN OF CARE
Problem: Potential for Falls  Goal: Patient will remain free of falls  Description: INTERVENTIONS:  - Educate patient/family on patient safety including physical limitations  - Instruct patient to call for assistance with activity   - Consult OT/PT to assist with strengthening/mobility   - Keep Call bell within reach  - Keep bed low and locked with side rails adjusted as appropriate  - Keep care items and personal belongings within reach  - Initiate and maintain comfort rounds  - Make Fall Risk Sign visible to staff  - Offer Toileting every 2 Hours, in advance of need  - Initiate/Maintain shift alarm  - Obtain necessary fall risk management equipment: socks   - Apply yellow socks and bracelet for high fall risk patients  - Consider moving patient to room near nurses station  Outcome: Progressing     Problem: PAIN - ADULT  Goal: Verbalizes/displays adequate comfort level or baseline comfort level  Description: Interventions:  - Encourage patient to monitor pain and request assistance  - Assess pain using appropriate pain scale  - Administer analgesics based on type and severity of pain and evaluate response  - Implement non-pharmacological measures as appropriate and evaluate response  - Consider cultural and social influences on pain and pain management  - Notify physician/advanced practitioner if interventions unsuccessful or patient reports new pain  Outcome: Progressing     Problem: INFECTION - ADULT  Goal: Absence or prevention of progression during hospitalization  Description: INTERVENTIONS:  - Assess and monitor for signs and symptoms of infection  - Monitor lab/diagnostic results  - Monitor all insertion sites, i.e. indwelling lines, tubes, and drains  - Monitor endotracheal if appropriate and nasal secretions for changes in amount and color  - [place] appropriate cooling/warming therapies per order  - Administer medications as ordered  - Instruct and encourage patient and family to use good hand hygiene  technique  - Identify and instruct in appropriate isolation precautions for identified infection/condition  Outcome: Progressing  Goal: Absence of fever/infection during neutropenic period  Description: INTERVENTIONS:  - Monitor WBC    Outcome: Progressing     Problem: SAFETY ADULT  Goal: Patient will remain free of falls  Description: INTERVENTIONS:  - Educate patient/family on patient safety including physical limitations  - Instruct patient to call for assistance with activity   - Consult OT/PT to assist with strengthening/mobility   - Keep Call bell within reach  - Keep bed low and locked with side rails adjusted as appropriate  - Keep care items and personal belongings within reach  - Initiate and maintain comfort rounds  - Make Fall Risk Sign visible to staff  - Offer Toileting every 2 Hours, in advance of need  - Initiate/Maintain shift alarm  - Obtain necessary fall risk management equipment: socks   - Apply yellow socks and bracelet for high fall risk patients  - Consider moving patient to room near nurses station  Outcome: Progressing  Goal: Maintain or return to baseline ADL function  Description: INTERVENTIONS:  - Educate patient/family on patient safety including physical limitations  - Instruct patient to call for assistance with activity   - Consult OT/PT to assist with strengthening/mobility   - Keep Call bell within reach  - Keep bed low and locked with side rails adjusted as appropriate  - Keep care items and personal belongings within reach  - Initiate and maintain comfort rounds  - Make Fall Risk Sign visible to staff  - Offer Toileting every 2 Hours, in advance of need  - Initiate/Maintain shift alarm  - Obtain necessary fall risk management equipment: socks   - Apply yellow socks and bracelet for high fall risk patients  - Consider moving patient to room near nurses station  Outcome: Progressing  Goal: Maintains/Returns to pre admission functional level  Description: INTERVENTIONS:  - Educate  patient/family on patient safety including physical limitations  - Instruct patient to call for assistance with activity   - Consult OT/PT to assist with strengthening/mobility   - Keep Call bell within reach  - Keep bed low and locked with side rails adjusted as appropriate  - Keep care items and personal belongings within reach  - Initiate and maintain comfort rounds  - Make Fall Risk Sign visible to staff  - Offer Toileting every 2 Hours, in advance of need  - Initiate/Maintain shift alarm  - Obtain necessary fall risk management equipment: socks   - Apply yellow socks and bracelet for high fall risk patients  - Consider moving patient to room near nurses station  Outcome: Progressing     Problem: DISCHARGE PLANNING  Goal: Discharge to home or other facility with appropriate resources  Description: INTERVENTIONS:  - Identify barriers to discharge w/patient and caregiver  - Arrange for needed discharge resources and transportation as appropriate  - Identify discharge learning needs (meds, wound care, etc.)  - Arrange for interpretive services to assist at discharge as needed  - Refer to Case Management Department for coordinating discharge planning if the patient needs post-hospital services based on physician/advanced practitioner order or complex needs related to functional status, cognitive ability, or social support system  Outcome: Progressing     Problem: Knowledge Deficit  Goal: Patient/family/caregiver demonstrates understanding of disease process, treatment plan, medications, and discharge instructions  Description: Complete learning assessment and assess knowledge base.  Interventions:  - Provide teaching at level of understanding  - Provide teaching via preferred learning methods  Outcome: Progressing     Problem: GASTROINTESTINAL - ADULT  Goal: Minimal or absence of nausea and/or vomiting  Description: INTERVENTIONS:  - Administer IV fluids if ordered to ensure adequate hydration  - Maintain NPO status  until nausea and vomiting are resolved  - Nasogastric tube if ordered  - Administer ordered antiemetic medications as needed  - Provide nonpharmacologic comfort measures as appropriate  - Advance diet as tolerated, if ordered  - Consider nutrition services referral to assist patient with adequate nutrition and appropriate food choices  Outcome: Progressing  Goal: Maintains adequate nutritional intake  Description: INTERVENTIONS:  - Monitor percentage of each meal consumed  - Identify factors contributing to decreased intake, treat as appropriate  - Assist with meals as needed  - Monitor I&O, weight, and lab values if indicated  - Obtain nutrition services referral as needed  Outcome: Progressing

## 2024-09-25 NOTE — CONSULTS
Consultation - [facility]     Kaykay Holland 34 y.o. female MRN: [id number]  Unit/Bed#: -01 Encounter: [id number]    Inpatient consult to gastroenterology  Consult performed by: Tamie Richardson PA-C  Consult ordered by: Jordana Lewis PA-C          ASSESSMENT and PLAN  Kaykay Holland is a 34 y.o. year old female with a past medical history of hypertension, alcohol abuse (2-3 bottles of wine daily), withdrawal seizures, pancreatitis in the past with necrosis, grade D esophagitis presents to the ER with worsening chronic epigastric pain and vomiting with blood streaks.  States this has been going on on a daily basis for the past year but worse in the last week.  This admission labs show pancytopenia, ALT 57, , lipase 269.  Alcohol level 388.  CT A/P with contrast shows Acute pancreatitis with surrounding peripancreatic fluid and inflammation. Small cystic foci seen within the head and tail of the pancreas. A follow-up MRI/MRCP may be helpful, Hepatic steatosis.     Patient with acute recurrent alcoholic pancreatitis.  CT with cystic foci in the pancreas for which follow-up MRI recommended.  Suspect poor oral intake due to vomiting and alcohol abuse    1.  Acute recurrent alcoholic pancreatitis  2.  Abnormal CT abdomen  3.  Elevated LFTs   -N.p.o., aggressive IV fluids, antiemetics, pain control   -Check triglyceride level consider repeating right upper quadrant ultrasound   -Check acute viral hepatitis panel and INR, trend LFT's   -EtOH cessation (pt not interested)   -Counseled on complications of continued alcohol use including chronic pancreatitis cirrhosis, ETC  -agree with CIWA protocol, thiamine/folic acid/MVI replacement  --MRI abd likely in 4--6 weeks after acute inflammation resolved rule out pancreatic pathology    4.  Hematemesis  5.  Dysphagia and GERD   -NPO/IVF/antiemetics   -start PPI 40 mg IV BID   -Suspect hematemesis due to esophagitis, gastritis or  "Minnie-Morrell tear   -if active GI bleeding consider EGD    6.  Pancytopenia- suspect due to bone marrow suppression from ETOH abuse   -Monitor CBC, transfuse for Hg < 7 (Hg 10.8, baseline 11.4)   -Check B12 and folate    7.  Anorexia and weight loss-Suspect poor oral intake due to vomiting and alcohol abuse  8.  Irregular bowel habits    -check TSH   -MRI abd likely in 4--6 weeks after acute inflammation resolved rule out pancreatic pathology   -outpt colonoscopy       Chief Complaint   Patient presents with    Abdominal Pain     Pt reports left sided pain. Pt reports stabbing pain constant. Pt reports lack of appetitite and unable to tolerate PO. Pt reports getting dizzy and lightheaded. Pt reports similar pain to pancreatitis. Pt reports \"I would like detox.\" Pt reports withdrawal seizures. Pt reports last drink PTA       Physician Requesting Consult: Kevan Tijerina MD    HPI    Kaykay Holland is a 34 y.o. year old female with a past medical history of hypertension, alcohol abuse, withdrawal seizures, pancreatitis in the past with necrosis, grade D esophagitis presents to the ER with worsening chronic epigastric pain and vomiting.  States this has been going on on a daily basis for the past year but worse in the last week.  Feels like prior pancreatitis.  Last hospitalized for ETOH withdrawal seizure April 2024.  States she drinks 2-3 bottles of wine per day, drank earlier yesterday prior to admission.  Reports intermittent nonradiating 10/10 upper abdominal pain worse with eating.  Daily vomiting, for the last 1 week has had blood streaks in emesis.  Has reflux and takes PPI as needed without much improvement. She notes intermittent dysphagia to solids and liquids without regurgitation.  Has alternating loose and formed stools without bleeding.  Reports poor appetite and 15 pound unintentional weight loss in last 4 months.  Denies history of cirrhosis, she does smoke cigarettes daily.    Never had " colonoscopy.  EGD 8/2019 at [facility] for hematemesis showed grade D esophagitis and gastritis biopsy negative for H. Pylori, recommend repeat in 8 weeks but patient did not follow-up.    This admission labs show pancytopenia, ALT 57, , lipase 269.  Alcohol level 388.  CT A/P with contrast shows Acute pancreatitis with surrounding peripancreatic fluid and inflammation. Small cystic foci seen within the head and tail of the pancreas. A follow-up MRI/MRCP may be helpful, Hepatic steatosis. RUQ US 4/2024 showed fatty liver.           ROS:   Constitutional: denies fatigue, fever.  HEENT: denies visual disturbance, postnasal drip, sore throat.  Respiratory: denies cough, shortness of breath.  Cardiovascular: denies chest pain, leg swelling.  Gastrointestinal: as noted above in HPI.  : denies difficulty urinating, dysuria.  Musculoskeletal: denies arthralgias, back pain.  Neurological: denies dizziness, syncope.  Psychiatric: denies confusion, anxiety.    Historical Information   Past Medical History:   Diagnosis Date    Acute alcoholic pancreatitis 11/03/2023    Hypertension     SIRS (systemic inflammatory response syndrome) (HCC) 04/10/2024     Past Surgical History:   Procedure Laterality Date    WISDOM TOOTH EXTRACTION       Social History   Social History     Substance and Sexual Activity   Alcohol Use Yes    Alcohol/week: 21.0 standard drinks of alcohol    Types: 21 Glasses of wine per week    Comment: she reports stopping on 4/4/2024. Per pt last drink was today  //     Social History     Substance and Sexual Activity   Drug Use Never     Social History     Tobacco Use   Smoking Status Every Day    Current packs/day: 0.50    Types: Cigarettes    Passive exposure: Never   Smokeless Tobacco Never   Tobacco Comments    Per pt last drink was today 9/24/24 one glass of wine. Had previously stop on 4/4/24 for 1 month      History reviewed. No pertinent family history.    Meds/Allergies       Current  Facility-Administered Medications:     chlordiazePOXIDE (LIBRIUM) capsule 25 mg, Q8H RODRICK    HYDROmorphone (DILAUDID) injection 0.5 mg, Q6H PRN    multi-electrolyte (PLASMALYTE-A/ISOLYTE-S PH 7.4) IV solution, Continuous, Last Rate: 200 mL/hr (09/25/24 0048)    nicotine (NICODERM CQ) 14 mg/24hr TD 24 hr patch 1 patch, Daily    ondansetron (ZOFRAN) injection 4 mg, Q6H PRN    pantoprazole (PROTONIX) injection 40 mg, Q12H RODRICK    thiamine (VITAMIN B1) 100 mg in sodium chloride 0.9 % 50 mL IVPB, Daily    Medications Prior to Admission:     lisinopril (ZESTRIL) 10 mg tablet    Allergies   Allergen Reactions    Other Edema     Bee stings (localized edema)       PHYSICAL EXAM:    Constitutional: Well-developed, no acute distress  HEENT: normocephalic, mucous membranes moist.  Neck: Supple  Skin: warm and dry  Respiratory: Lungs are clear to auscultation B/L.  Cardiovascular: Heart is regular rate and rhythm.  Gastrointestinal: Soft, +upper abd tenderness, nondistended with normal active bowel sounds.  No masses, guarding, rebound.   Rectal Exam: Deferred.  Extremities: No edema.  Neurologic: Nonfocal. A & O ×3.   Psychiatric: Normal affect.      Lab Results   Component Value Date    CALCIUM 7.6 (L) 09/25/2024    K 4.2 09/25/2024    CO2 26 09/25/2024     09/25/2024    BUN 5 09/25/2024    CREATININE 0.38 (L) 09/25/2024    CREATININE 0.48 (L) 09/24/2024    CREATININE 0.54 (L) 04/10/2024     Lab Results   Component Value Date    WBC 3.54 (L) 09/25/2024    WBC 5.41 09/24/2024    WBC 3.38 (L) 04/10/2024    HGB 10.8 (L) 09/25/2024    HGB 13.2 09/24/2024    HGB 11.4 (L) 04/10/2024     (H) 09/25/2024     (L) 09/25/2024     09/24/2024    PLT 76 (L) 04/10/2024     Lab Results   Component Value Date    ALT 57 (H) 09/25/2024    ALT 81 (H) 09/24/2024    ALT 64 (H) 04/10/2024     (H) 09/25/2024     (H) 09/24/2024     (H) 04/10/2024    ALKPHOS 64 09/25/2024    ALKPHOS 81 09/24/2024    ALKPHOS  "47 04/10/2024    TBILI 0.85 09/25/2024    TBILI 0.73 09/24/2024    TBILI 0.59 04/10/2024     No results found for: \"AMYLASE\"  Lab Results   Component Value Date    LIPASE 269 (H) 09/25/2024     Lab Results   Component Value Date    IRON 44 (L) 04/10/2024    TIBC 275 04/10/2024    FERRITIN 2,560 (H) 04/10/2024     Lab Results   Component Value Date    INR 1.2 11/04/2023    INR 1.1 07/30/2019       CT abdomen pelvis with contrast    Result Date: 9/24/2024  Narrative: CT ABDOMEN AND PELVIS WITH IV CONTRAST INDICATION: epigastric pain, h/o pancreatitis.. . COMPARISON: Correlation is made with the prior ultrasound study dated 4/10/2024. TECHNIQUE: CT examination of the abdomen and pelvis was performed. Multiplanar 2D reformatted images were created from the source data. This examination, like all CT scans performed in the [facility], was performed utilizing techniques to minimize radiation dose exposure, including the use of iterative reconstruction and automated exposure control. Radiation dose length product (DLP) for this visit: 459.73 mGy-cm IV Contrast: 100 mL of iohexol (OMNIPAQUE) Enteric Contrast: Not administered. FINDINGS: ABDOMEN LOWER CHEST: Minimal dependent atelectasis in the visualized lower lobes. LIVER/BILIARY TREE: There is mild fatty infiltration with an area of fatty sparing seen adjacent to the gallbladder. No biliary ductal dilatation. GALLBLADDER: No calcified gallstones. No pericholecystic inflammatory change. SPLEEN: Unremarkable. PANCREAS: There is evidence of acute pancreatitis, which will be described based on the revised Rebekah Classification of Acute Pancreatitis: - TIME OF ONSET: less than or equal to 4 weeks - NECROSIS: There is peripancreatic edema but no evidence of pancreatic or peripancreatic necrosis, therefore this is interstitial edematous pancreatitis (IEP.) - LOCAL COMPLICATIONS: There are small cystic foci within the head of the pancreas as well as in the distal " tail. - INFECTION: There is no abnormal gas in or around the pancreas to suggest infection. ADRENAL GLANDS: Unremarkable. KIDNEYS/URETERS: Tiny left renal cyst. No hydronephrosis or hydroureter. STOMACH AND BOWEL: There is no bowel obstruction or focal inflammatory process. APPENDIX: Normal. ABDOMINOPELVIC CAVITY: Trace peripancreatic fluid. Otherwise, no significant ascites. No pneumoperitoneum. No lymphadenopathy VESSELS: Unremarkable for patient's age. PELVIS REPRODUCTIVE ORGANS: There is a 1.5 cm dominant follicle in the right ovary. URINARY BLADDER: Unremarkable. ABDOMINAL WALL/INGUINAL REGIONS: Tiny fat-containing periumbilical hernia. 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 No spondylolisthesis.     Impression: Acute pancreatitis as described above with surrounding peripancreatic fluid and inflammation. Small cystic foci seen within the head and tail of the pancreas. A follow-up MRI examination including MRCP may be helpful as follow-up. Hepatic steatosis. Tiny left renal cyst. Workstation performed: [id number]       Imaging Studies: I have personally reviewed pertinent reports.      Pathology, and Other Studies: I have personally reviewed pertinent reports.      Patient expressed understanding and had all questions and concerns addressed.    Tamie Richardson PA-C  09/25/24   10:03 AM     Counseling / Coordination of Care  Total floor / unit time spent today 45 minutes.     This chart was completed in part utilizing [employer] speech voice recognition software. Random word insertions, pronoun errors, and incomplete sentences are an occasional consequence of this system due to software limitations, and ambient noise. Any questions or concerns about the content, text, or information contained within the body of this dictation should be directly addressed to the provider for clarification.

## 2024-09-25 NOTE — ASSESSMENT & PLAN NOTE
"Presented to the emergency department with worsening epigastric pain, nausea and vomiting  CT abdomen/pelvis: \"Acute pancreatitis as described above with surrounding peripancreatic fluid and inflammation. Small cystic foci seen within the head and tail of the pancreas. A follow-up MRI examination including MRCP may be helpful as follow-up.  Hepatic steatosis.\"  Defer to GI regarding inpt vs outpt MRI  Lipase 523 -> 269  Suspect alcohol induced  Continue IV fluids, pain control, NPO  Recommend complete alcohol cessation  Gastroenterology consulted  "

## 2024-09-26 ENCOUNTER — TELEPHONE (OUTPATIENT)
Dept: GASTROENTEROLOGY | Facility: CLINIC | Age: 34
End: 2024-09-26

## 2024-09-26 ENCOUNTER — TRANSITIONAL CARE MANAGEMENT (OUTPATIENT)
Dept: FAMILY MEDICINE CLINIC | Facility: HOSPITAL | Age: 34
End: 2024-09-26

## 2024-09-26 ENCOUNTER — HOSPITAL ENCOUNTER (EMERGENCY)
Facility: HOSPITAL | Age: 34
Discharge: HOME | End: 2024-09-26
Attending: EMERGENCY MEDICINE
Payer: COMMERCIAL

## 2024-09-26 VITALS
RESPIRATION RATE: 18 BRPM | DIASTOLIC BLOOD PRESSURE: 96 MMHG | OXYGEN SATURATION: 96 % | HEIGHT: 65 IN | SYSTOLIC BLOOD PRESSURE: 147 MMHG | WEIGHT: 130 LBS | TEMPERATURE: 98.6 F | BODY MASS INDEX: 21.66 KG/M2 | HEART RATE: 76 BPM

## 2024-09-26 VITALS
HEIGHT: 65 IN | WEIGHT: 130 LBS | HEART RATE: 67 BPM | DIASTOLIC BLOOD PRESSURE: 85 MMHG | SYSTOLIC BLOOD PRESSURE: 133 MMHG | BODY MASS INDEX: 21.66 KG/M2 | RESPIRATION RATE: 24 BRPM | OXYGEN SATURATION: 98 % | TEMPERATURE: 98.2 F

## 2024-09-26 DIAGNOSIS — K85.20 ACUTE ALCOHOLIC PANCREATITIS: Primary | ICD-10-CM

## 2024-09-26 PROBLEM — E83.42 HYPOMAGNESEMIA: Status: RESOLVED | Noted: 2024-09-24 | Resolved: 2024-09-26

## 2024-09-26 PROBLEM — K85.90 ACUTE PANCREATITIS: Status: RESOLVED | Noted: 2024-09-24 | Resolved: 2024-09-26

## 2024-09-26 PROBLEM — K92.0 HEMATEMESIS: Status: RESOLVED | Noted: 2024-09-25 | Resolved: 2024-09-26

## 2024-09-26 PROBLEM — R79.89 ELEVATED LFTS: Status: RESOLVED | Noted: 2024-09-24 | Resolved: 2024-09-26

## 2024-09-26 LAB
ALBUMIN SERPL BCG-MCNC: 4.3 G/DL (ref 3.5–5)
ALBUMIN SERPL BCG-MCNC: 4.4 G/DL (ref 3.5–5)
ALP SERPL-CCNC: 75 U/L (ref 34–104)
ALP SERPL-CCNC: 76 U/L (ref 34–104)
ALT SERPL W P-5'-P-CCNC: 44 U/L (ref 7–52)
ALT SERPL W P-5'-P-CCNC: 47 U/L (ref 7–52)
ANION GAP SERPL CALCULATED.3IONS-SCNC: 10 MMOL/L (ref 4–13)
ANION GAP SERPL CALCULATED.3IONS-SCNC: 12 MMOL/L (ref 4–13)
AST SERPL W P-5'-P-CCNC: 68 U/L (ref 13–39)
AST SERPL W P-5'-P-CCNC: 84 U/L (ref 13–39)
BASOPHILS # BLD AUTO: 0.03 THOUSANDS/ΜL (ref 0–0.1)
BASOPHILS # BLD AUTO: 0.03 THOUSANDS/ΜL (ref 0–0.1)
BASOPHILS NFR BLD AUTO: 1 % (ref 0–1)
BASOPHILS NFR BLD AUTO: 1 % (ref 0–1)
BILIRUB SERPL-MCNC: 0.95 MG/DL (ref 0.2–1)
BILIRUB SERPL-MCNC: 1.69 MG/DL (ref 0.2–1)
BUN SERPL-MCNC: 3 MG/DL (ref 5–25)
BUN SERPL-MCNC: 4 MG/DL (ref 5–25)
CALCIUM SERPL-MCNC: 8.6 MG/DL (ref 8.4–10.2)
CALCIUM SERPL-MCNC: 9.6 MG/DL (ref 8.4–10.2)
CHLORIDE SERPL-SCNC: 101 MMOL/L (ref 96–108)
CHLORIDE SERPL-SCNC: 95 MMOL/L (ref 96–108)
CO2 SERPL-SCNC: 23 MMOL/L (ref 21–32)
CO2 SERPL-SCNC: 29 MMOL/L (ref 21–32)
CREAT SERPL-MCNC: 0.48 MG/DL (ref 0.6–1.3)
CREAT SERPL-MCNC: 0.5 MG/DL (ref 0.6–1.3)
EOSINOPHIL # BLD AUTO: 0.13 THOUSAND/ΜL (ref 0–0.61)
EOSINOPHIL # BLD AUTO: 0.16 THOUSAND/ΜL (ref 0–0.61)
EOSINOPHIL NFR BLD AUTO: 3 % (ref 0–6)
EOSINOPHIL NFR BLD AUTO: 4 % (ref 0–6)
ERYTHROCYTE [DISTWIDTH] IN BLOOD BY AUTOMATED COUNT: 10.9 % (ref 11.6–15.1)
ERYTHROCYTE [DISTWIDTH] IN BLOOD BY AUTOMATED COUNT: 11 % (ref 11.6–15.1)
GFR SERPL CREATININE-BSD FRML MDRD: 127 ML/MIN/1.73SQ M
GFR SERPL CREATININE-BSD FRML MDRD: 129 ML/MIN/1.73SQ M
GLUCOSE SERPL-MCNC: 84 MG/DL (ref 65–140)
GLUCOSE SERPL-MCNC: 94 MG/DL (ref 65–140)
HAV IGM SER QL: NORMAL
HBV CORE IGM SER QL: NORMAL
HBV SURFACE AG SER QL: NORMAL
HCT VFR BLD AUTO: 38.5 % (ref 34.8–46.1)
HCT VFR BLD AUTO: 38.5 % (ref 34.8–46.1)
HCV AB SER QL: NORMAL
HGB BLD-MCNC: 13 G/DL (ref 11.5–15.4)
HGB BLD-MCNC: 13.1 G/DL (ref 11.5–15.4)
IMM GRANULOCYTES # BLD AUTO: 0.01 THOUSAND/UL (ref 0–0.2)
IMM GRANULOCYTES # BLD AUTO: 0.01 THOUSAND/UL (ref 0–0.2)
IMM GRANULOCYTES NFR BLD AUTO: 0 % (ref 0–2)
IMM GRANULOCYTES NFR BLD AUTO: 0 % (ref 0–2)
INR PPP: 1.04 (ref 0.85–1.19)
LIPASE SERPL-CCNC: 593 U/L (ref 11–82)
LYMPHOCYTES # BLD AUTO: 1.22 THOUSANDS/ΜL (ref 0.6–4.47)
LYMPHOCYTES # BLD AUTO: 1.32 THOUSANDS/ΜL (ref 0.6–4.47)
LYMPHOCYTES NFR BLD AUTO: 27 % (ref 14–44)
LYMPHOCYTES NFR BLD AUTO: 34 % (ref 14–44)
MAGNESIUM SERPL-MCNC: 2.1 MG/DL (ref 1.9–2.7)
MCH RBC QN AUTO: 37.4 PG (ref 26.8–34.3)
MCH RBC QN AUTO: 37.6 PG (ref 26.8–34.3)
MCHC RBC AUTO-ENTMCNC: 33.8 G/DL (ref 31.4–37.4)
MCHC RBC AUTO-ENTMCNC: 34 G/DL (ref 31.4–37.4)
MCV RBC AUTO: 111 FL (ref 82–98)
MCV RBC AUTO: 111 FL (ref 82–98)
MONOCYTES # BLD AUTO: 0.37 THOUSAND/ΜL (ref 0.17–1.22)
MONOCYTES # BLD AUTO: 0.57 THOUSAND/ΜL (ref 0.17–1.22)
MONOCYTES NFR BLD AUTO: 10 % (ref 4–12)
MONOCYTES NFR BLD AUTO: 13 % (ref 4–12)
NEUTROPHILS # BLD AUTO: 1.99 THOUSANDS/ΜL (ref 1.85–7.62)
NEUTROPHILS # BLD AUTO: 2.56 THOUSANDS/ΜL (ref 1.85–7.62)
NEUTS SEG NFR BLD AUTO: 52 % (ref 43–75)
NEUTS SEG NFR BLD AUTO: 55 % (ref 43–75)
NRBC BLD AUTO-RTO: 0 /100 WBCS
NRBC BLD AUTO-RTO: 1 /100 WBCS
PLATELET # BLD AUTO: 119 THOUSANDS/UL (ref 149–390)
PLATELET # BLD AUTO: 135 THOUSANDS/UL (ref 149–390)
PMV BLD AUTO: 11.5 FL (ref 8.9–12.7)
PMV BLD AUTO: 11.6 FL (ref 8.9–12.7)
POTASSIUM SERPL-SCNC: 3.9 MMOL/L (ref 3.5–5.3)
POTASSIUM SERPL-SCNC: 4.2 MMOL/L (ref 3.5–5.3)
PROT SERPL-MCNC: 7 G/DL (ref 6.4–8.4)
PROT SERPL-MCNC: 7.2 G/DL (ref 6.4–8.4)
PROTHROMBIN TIME: 14.1 SECONDS (ref 12.3–15)
RBC # BLD AUTO: 3.48 MILLION/UL (ref 3.81–5.12)
RBC # BLD AUTO: 3.48 MILLION/UL (ref 3.81–5.12)
SODIUM SERPL-SCNC: 134 MMOL/L (ref 135–147)
SODIUM SERPL-SCNC: 136 MMOL/L (ref 135–147)
WBC # BLD AUTO: 3.85 THOUSAND/UL (ref 4.31–10.16)
WBC # BLD AUTO: 4.55 THOUSAND/UL (ref 4.31–10.16)

## 2024-09-26 PROCEDURE — 83690 ASSAY OF LIPASE: CPT | Performed by: EMERGENCY MEDICINE

## 2024-09-26 PROCEDURE — 99232 SBSQ HOSP IP/OBS MODERATE 35: CPT | Performed by: INTERNAL MEDICINE

## 2024-09-26 PROCEDURE — 96375 TX/PRO/DX INJ NEW DRUG ADDON: CPT

## 2024-09-26 PROCEDURE — 85610 PROTHROMBIN TIME: CPT | Performed by: PHYSICIAN ASSISTANT

## 2024-09-26 PROCEDURE — 80074 ACUTE HEPATITIS PANEL: CPT | Performed by: PHYSICIAN ASSISTANT

## 2024-09-26 PROCEDURE — 99284 EMERGENCY DEPT VISIT MOD MDM: CPT | Performed by: EMERGENCY MEDICINE

## 2024-09-26 PROCEDURE — 83735 ASSAY OF MAGNESIUM: CPT | Performed by: PHYSICIAN ASSISTANT

## 2024-09-26 PROCEDURE — 36415 COLL VENOUS BLD VENIPUNCTURE: CPT | Performed by: EMERGENCY MEDICINE

## 2024-09-26 PROCEDURE — 80053 COMPREHEN METABOLIC PANEL: CPT | Performed by: EMERGENCY MEDICINE

## 2024-09-26 PROCEDURE — 85025 COMPLETE CBC W/AUTO DIFF WBC: CPT | Performed by: PHYSICIAN ASSISTANT

## 2024-09-26 PROCEDURE — 85025 COMPLETE CBC W/AUTO DIFF WBC: CPT | Performed by: EMERGENCY MEDICINE

## 2024-09-26 PROCEDURE — 99239 HOSP IP/OBS DSCHRG MGMT >30: CPT | Performed by: PHYSICIAN ASSISTANT

## 2024-09-26 PROCEDURE — 96365 THER/PROPH/DIAG IV INF INIT: CPT

## 2024-09-26 PROCEDURE — 99284 EMERGENCY DEPT VISIT MOD MDM: CPT

## 2024-09-26 PROCEDURE — 80053 COMPREHEN METABOLIC PANEL: CPT | Performed by: PHYSICIAN ASSISTANT

## 2024-09-26 RX ORDER — FOLIC ACID 1 MG/1
1 TABLET ORAL DAILY
Qty: 30 TABLET | Refills: 0 | Status: SHIPPED | OUTPATIENT
Start: 2024-09-26

## 2024-09-26 RX ORDER — PANTOPRAZOLE SODIUM 40 MG/1
40 TABLET, DELAYED RELEASE ORAL DAILY
Qty: 30 TABLET | Refills: 0 | Status: SHIPPED | OUTPATIENT
Start: 2024-09-26 | End: 2024-09-26

## 2024-09-26 RX ORDER — LORAZEPAM 1 MG/1
2 TABLET ORAL ONCE
Status: COMPLETED | OUTPATIENT
Start: 2024-09-26 | End: 2024-09-26

## 2024-09-26 RX ORDER — LORAZEPAM 2 MG/ML
0.5 INJECTION INTRAMUSCULAR ONCE
Status: COMPLETED | OUTPATIENT
Start: 2024-09-26 | End: 2024-09-26

## 2024-09-26 RX ORDER — PANTOPRAZOLE SODIUM 40 MG/1
40 TABLET, DELAYED RELEASE ORAL 2 TIMES DAILY
Qty: 60 TABLET | Refills: 0 | Status: SHIPPED | OUTPATIENT
Start: 2024-09-26

## 2024-09-26 RX ORDER — MORPHINE SULFATE 4 MG/ML
4 INJECTION, SOLUTION INTRAMUSCULAR; INTRAVENOUS ONCE
Status: COMPLETED | OUTPATIENT
Start: 2024-09-26 | End: 2024-09-26

## 2024-09-26 RX ORDER — ONDANSETRON 2 MG/ML
4 INJECTION INTRAMUSCULAR; INTRAVENOUS ONCE
Status: COMPLETED | OUTPATIENT
Start: 2024-09-26 | End: 2024-09-26

## 2024-09-26 RX ORDER — OXYCODONE HYDROCHLORIDE 5 MG/1
5 TABLET ORAL EVERY 6 HOURS PRN
Qty: 10 TABLET | Refills: 0 | Status: SHIPPED | OUTPATIENT
Start: 2024-09-26

## 2024-09-26 RX ORDER — ONDANSETRON 4 MG/1
4 TABLET, ORALLY DISINTEGRATING ORAL EVERY 6 HOURS PRN
Qty: 20 TABLET | Refills: 0 | Status: SHIPPED | OUTPATIENT
Start: 2024-09-26

## 2024-09-26 RX ORDER — CHLORDIAZEPOXIDE HYDROCHLORIDE 25 MG/1
CAPSULE, GELATIN COATED ORAL
Qty: 6 CAPSULE | Refills: 0 | Status: SHIPPED | OUTPATIENT
Start: 2024-09-26 | End: 2024-09-29

## 2024-09-26 RX ADMIN — THIAMINE HYDROCHLORIDE 100 MG: 100 INJECTION, SOLUTION INTRAMUSCULAR; INTRAVENOUS at 08:12

## 2024-09-26 RX ADMIN — MORPHINE SULFATE 4 MG: 4 INJECTION, SOLUTION INTRAMUSCULAR; INTRAVENOUS at 21:33

## 2024-09-26 RX ADMIN — SODIUM CHLORIDE, SODIUM GLUCONATE, SODIUM ACETATE, POTASSIUM CHLORIDE, MAGNESIUM CHLORIDE, SODIUM PHOSPHATE, DIBASIC, AND POTASSIUM PHOSPHATE 200 ML/HR: .53; .5; .37; .037; .03; .012; .00082 INJECTION, SOLUTION INTRAVENOUS at 02:25

## 2024-09-26 RX ADMIN — SODIUM CHLORIDE, SODIUM LACTATE, POTASSIUM CHLORIDE, AND CALCIUM CHLORIDE 1000 ML: .6; .31; .03; .02 INJECTION, SOLUTION INTRAVENOUS at 21:18

## 2024-09-26 RX ADMIN — NICOTINE 1 PATCH: 14 PATCH, EXTENDED RELEASE TRANSDERMAL at 08:17

## 2024-09-26 RX ADMIN — ONDANSETRON 4 MG: 2 INJECTION, SOLUTION INTRAMUSCULAR; INTRAVENOUS at 21:33

## 2024-09-26 RX ADMIN — CHLORDIAZEPOXIDE HYDROCHLORIDE 25 MG: 25 CAPSULE ORAL at 05:41

## 2024-09-26 RX ADMIN — OXYCODONE HYDROCHLORIDE 5 MG: 5 TABLET ORAL at 05:52

## 2024-09-26 RX ADMIN — LORAZEPAM 0.5 MG: 2 INJECTION INTRAMUSCULAR; INTRAVENOUS at 08:47

## 2024-09-26 RX ADMIN — PANTOPRAZOLE SODIUM 40 MG: 40 INJECTION, POWDER, FOR SOLUTION INTRAVENOUS at 08:07

## 2024-09-26 RX ADMIN — HYDROMORPHONE HYDROCHLORIDE 0.5 MG: 1 INJECTION, SOLUTION INTRAMUSCULAR; INTRAVENOUS; SUBCUTANEOUS at 00:18

## 2024-09-26 RX ADMIN — SODIUM CHLORIDE, SODIUM GLUCONATE, SODIUM ACETATE, POTASSIUM CHLORIDE, MAGNESIUM CHLORIDE, SODIUM PHOSPHATE, DIBASIC, AND POTASSIUM PHOSPHATE 200 ML/HR: .53; .5; .37; .037; .03; .012; .00082 INJECTION, SOLUTION INTRAVENOUS at 07:44

## 2024-09-26 RX ADMIN — LORAZEPAM 2 MG: 1 TABLET ORAL at 04:41

## 2024-09-26 NOTE — ASSESSMENT & PLAN NOTE
Patient reports daily alcohol use, drinking upwards of 2-3 bottles of wine per day  Consult CM - patient currently declining resources

## 2024-09-26 NOTE — PLAN OF CARE
Problem: Potential for Falls  Goal: Patient will remain free of falls  Description: INTERVENTIONS:  - Educate patient/family on patient safety including physical limitations  - Instruct patient to call for assistance with activity   - Consult OT/PT to assist with strengthening/mobility   - Keep Call bell within reach  - Keep bed low and locked with side rails adjusted as appropriate  - Keep care items and personal belongings within reach  - Initiate and maintain comfort rounds  - Make Fall Risk Sign visible to staff  - Offer Toileting every 2 Hours, in advance of need  - Initiate/Maintain shift alarm  - Obtain necessary fall risk management equipment: socks   - Apply yellow socks and bracelet for high fall risk patients  - Consider moving patient to room near nurses station  Outcome: Progressing     Problem: PAIN - ADULT  Goal: Verbalizes/displays adequate comfort level or baseline comfort level  Description: Interventions:  - Encourage patient to monitor pain and request assistance  - Assess pain using appropriate pain scale  - Administer analgesics based on type and severity of pain and evaluate response  - Implement non-pharmacological measures as appropriate and evaluate response  - Consider cultural and social influences on pain and pain management  - Notify physician/advanced practitioner if interventions unsuccessful or patient reports new pain  Outcome: Progressing     Problem: INFECTION - ADULT  Goal: Absence or prevention of progression during hospitalization  Description: INTERVENTIONS:  - Assess and monitor for signs and symptoms of infection  - Monitor lab/diagnostic results  - Monitor all insertion sites, i.e. indwelling lines, tubes, and drains  - Monitor endotracheal if appropriate and nasal secretions for changes in amount and color  - [place] appropriate cooling/warming therapies per order  - Administer medications as ordered  - Instruct and encourage patient and family to use good hand hygiene  technique  - Identify and instruct in appropriate isolation precautions for identified infection/condition  Outcome: Progressing  Goal: Absence of fever/infection during neutropenic period  Description: INTERVENTIONS:  - Monitor WBC    Outcome: Progressing     Problem: SAFETY ADULT  Goal: Patient will remain free of falls  Description: INTERVENTIONS:  - Educate patient/family on patient safety including physical limitations  - Instruct patient to call for assistance with activity   - Consult OT/PT to assist with strengthening/mobility   - Keep Call bell within reach  - Keep bed low and locked with side rails adjusted as appropriate  - Keep care items and personal belongings within reach  - Initiate and maintain comfort rounds  - Make Fall Risk Sign visible to staff  - Offer Toileting every 2 Hours, in advance of need  - Initiate/Maintain shift alarm  - Obtain necessary fall risk management equipment: socks   - Apply yellow socks and bracelet for high fall risk patients  - Consider moving patient to room near nurses station  Outcome: Progressing  Goal: Maintain or return to baseline ADL function  Description: INTERVENTIONS:  - Educate patient/family on patient safety including physical limitations  - Instruct patient to call for assistance with activity   - Consult OT/PT to assist with strengthening/mobility   - Keep Call bell within reach  - Keep bed low and locked with side rails adjusted as appropriate  - Keep care items and personal belongings within reach  - Initiate and maintain comfort rounds  - Make Fall Risk Sign visible to staff  - Offer Toileting every 2 Hours, in advance of need  - Initiate/Maintain shift alarm  - Obtain necessary fall risk management equipment: socks   - Apply yellow socks and bracelet for high fall risk patients  - Consider moving patient to room near nurses station  Outcome: Progressing  Goal: Maintains/Returns to pre admission functional level  Description: INTERVENTIONS:  - Perform  AM-PAC 6 Click Basic Mobility/ Daily Activity assessment daily.  - Set and communicate daily mobility goal to care team and patient/family/caregiver.   - Collaborate with rehabilitation services on mobility goals if consulted  - Perform Range of Motion 3 times a day.  - Reposition patient every 2 hours.  - Dangle patient 3 times a day  - Stand patient 3 times a day  - Ambulate patient 3 times a day  - Out of bed to chair 3 times a day   - Out of bed for meals 3 times a day  - Out of bed for toileting  - Record patient progress and toleration of activity level   Outcome: Progressing     Problem: DISCHARGE PLANNING  Goal: Discharge to home or other facility with appropriate resources  Description: INTERVENTIONS:  - Identify barriers to discharge w/patient and caregiver  - Arrange for needed discharge resources and transportation as appropriate  - Identify discharge learning needs (meds, wound care, etc.)  - Arrange for interpretive services to assist at discharge as needed  - Refer to Case Management Department for coordinating discharge planning if the patient needs post-hospital services based on physician/advanced practitioner order or complex needs related to functional status, cognitive ability, or social support system  Outcome: Progressing     Problem: Knowledge Deficit  Goal: Patient/family/caregiver demonstrates understanding of disease process, treatment plan, medications, and discharge instructions  Description: Complete learning assessment and assess knowledge base.  Interventions:  - Provide teaching at level of understanding  - Provide teaching via preferred learning methods  Outcome: Progressing     Problem: GASTROINTESTINAL - ADULT  Goal: Minimal or absence of nausea and/or vomiting  Description: INTERVENTIONS:  - Administer IV fluids if ordered to ensure adequate hydration  - Maintain NPO status until nausea and vomiting are resolved  - Nasogastric tube if ordered  - Administer ordered antiemetic  medications as needed  - Provide nonpharmacologic comfort measures as appropriate  - Advance diet as tolerated, if ordered  - Consider nutrition services referral to assist patient with adequate nutrition and appropriate food choices  Outcome: Progressing  Goal: Maintains adequate nutritional intake  Description: INTERVENTIONS:  - Monitor percentage of each meal consumed  - Identify factors contributing to decreased intake, treat as appropriate  - Assist with meals as needed  - Monitor I&O, weight, and lab values if indicated  - Obtain nutrition services referral as needed  Outcome: Progressing

## 2024-09-26 NOTE — DISCHARGE SUMMARY
"Discharge Summary - Hospitalist   Name: Kaykay Holland 34 y.o. female I MRN: [id number]  Unit/Bed#: -01 I Date of Admission: 9/24/2024   Date of Service: 9/26/2024 I Hospital Day: 2     Assessment & Plan  Essential hypertension  Initially hypertensive, now improved without intervention  Reports no longer taking lisinopril  Alcohol abuse  Patient reports daily alcohol use, drinking upwards of 2-3 bottles of wine per day  Consult CM - patient currently declining resources  Acute pancreatitis  Presented to the emergency department with worsening epigastric pain, nausea and vomiting  CT abdomen/pelvis: \"Acute pancreatitis as described above with surrounding peripancreatic fluid and inflammation. Small cystic foci seen within the head and tail of the pancreas. A follow-up MRI examination including MRCP may be helpful as follow-up.  Hepatic steatosis.\"  Outpt MRI in 4-6 weeks  Lipase 523 -> 269  Suspect alcohol induced  Diet advanced without difficulty  Recommend complete alcohol cessation.  Not interested in outpt resources  Hematemesis  Patient reports small amount of hematemesis prior to admit; primarily bilious vomiting with small streaks of blood  Possible gastritis/esophagitis, rich silverman tear  Patient denies any further episodes of vomiting.  Hgb stable  Patient declining inpatient EGD at this time  Discharge on protonix 40 mg BID  Outpt follow up with GI     Medical Problems       Resolved Problems  Date Reviewed: 9/25/2024            Resolved    Elevated LFTs 9/26/2024     Resolved by  Jordana Lewis PA-C    Hypomagnesemia 9/26/2024     Resolved by  Jordana Lewis PA-C    Hematemesis 9/26/2024     Resolved by  Jordana Lewis PA-C        Discharging Physician / Practitioner: Jordana Lewis PA-C  PCP: PATRICIA Moran  Admission Date:   Admission Orders (From admission, onward)       Ordered        09/24/24 1651  INPATIENT ADMISSION  Once                          Discharge Date: " "09/26/24    Consultations During Hospital Stay:  Gastroenterology    Procedures Performed:   None    Significant Findings / Test Results:   CT abdomen/pelvis: Acute pancreatitis as described above with surrounding peripancreatic fluid and inflammation. Small cystic foci seen within the head and tail of the pancreas. A follow-up MRI examination including MRCP may be helpful as follow-up.  Hepatic steatosis.  Tiny left renal cyst.  Lipase 523    Incidental Findings:   None     Test Results Pending at Discharge (will require follow up):   Acute hepatitis panel     Outpatient Tests Requested:  Outpt EGD per GI  Outpt MRI per GI    Complications:  None    Reason for Admission: Acute pancreatitis    Hospital Course:   Kaykay Holland is a 34 y.o. female patient who originally presented to the hospital on 9/24/2024 due to abdominal pain.    Past medical history significant for alcohol abuse.  Presented to the emergency department with epigastric pain, found to have pancreatitis.  Started on bowel rest, aggressive IV fluids, pain control.  Gastroenterology was consulted, given prior report of hematemesis originally was recommended for EGD.  No further episodes of vomiting or hematemesis since admission.  Patient declining inpatient EGD at this time.  Diet advanced without difficulty.  Also declined alcohol resources.  On day of discharge patient was afebrile, hemodynamically stable and verbalized understanding for requested outpatient follow-up.    Please see above list of diagnoses and related plan for additional information.     Condition at Discharge: stable    Discharge Day Visit / Exam:   Subjective:  Reports feeling back to baseline, eager for discharge.  Declining inpatient EGD.  Vitals: Blood Pressure: 133/85 (09/26/24 0854)  Pulse: 67 (09/26/24 0854)  Temperature: 98.2 °F (36.8 °C) (09/26/24 0754)  Temp Source: Oral (09/26/24 0430)  Respirations: (!) 24 (09/26/24 0754)  Height: 5' 5\" (165.1 cm) (09/24/24 " 2006)  Weight - Scale: 59 kg (130 lb) (09/24/24 2006)  SpO2: 98 % (09/26/24 0854)  Exam:   Physical Exam  Vitals and nursing note reviewed.   Constitutional:       Appearance: Normal appearance.      Comments: No acute distress   HENT:      Head: Normocephalic.   Eyes:      General: No scleral icterus.     Extraocular Movements: Extraocular movements intact.      Conjunctiva/sclera: Conjunctivae normal.   Cardiovascular:      Rate and Rhythm: Normal rate and regular rhythm.      Heart sounds: Normal heart sounds. No murmur heard.  Pulmonary:      Effort: Pulmonary effort is normal. No respiratory distress.      Breath sounds: Normal breath sounds. No wheezing, rhonchi or rales.   Abdominal:      General: Bowel sounds are normal.      Palpations: Abdomen is soft.      Tenderness: There is no abdominal tenderness. There is no guarding or rebound.   Musculoskeletal:         General: No edema.      Cervical back: Normal range of motion.      Comments: Ambulating room without difficulty, no edema   Skin:     General: Skin is warm and dry.   Neurological:      Mental Status: She is alert and oriented to person, place, and time.   Psychiatric:         Mood and Affect: Mood is anxious.          Discussion with Family: Patient declined call to .     Discharge instructions/Information to patient and family:   See after visit summary for information provided to patient and family.      Provisions for Follow-Up Care:  See after visit summary for information related to follow-up care and any pertinent home health orders.      Mobility at time of Discharge:   Basic Mobility Inpatient Raw Score: 23  JH-HLM Goal: 7: Walk 25 feet or more  JH-HLM Achieved: 7: Walk 25 feet or more  HLM Goal achieved. Continue to encourage appropriate mobility.     Disposition:   Home    Planned Readmission: None    Discharge Medications:  See after visit summary for reconciled discharge medications provided to patient and/or family.       Administrative Statements   Discharge Statement:  I have spent a total time of 50 minutes in caring for this patient on the day of the visit/encounter. >30 minutes of time was spent on: Diagnostic results, Instructions for management, Importance of tx compliance, Counseling / Coordination of care, Documenting in the medical record, Reviewing / ordering tests, medicine, procedures  , and Communicating with other healthcare professionals .    **Please Note: This note may have been constructed using a voice recognition system**

## 2024-09-26 NOTE — TELEPHONE ENCOUNTER
Admitted to [facility] with acute recurrent alcoholic pancreatitis.  Had hematemesis but refused EGD inpt,  also with possible pancreatic cysts on CT and recommend outpatient MRI.  Please set up for office visit in 4 weeks to set up EGD and MRI.    Thanks,  Tamie Richardson PA-C

## 2024-09-26 NOTE — ASSESSMENT & PLAN NOTE
"Presented to the emergency department with worsening epigastric pain, nausea and vomiting  CT abdomen/pelvis: \"Acute pancreatitis as described above with surrounding peripancreatic fluid and inflammation. Small cystic foci seen within the head and tail of the pancreas. A follow-up MRI examination including MRCP may be helpful as follow-up.  Hepatic steatosis.\"  Outpt MRI in 4-6 weeks  Lipase 523 -> 269  Suspect alcohol induced  Diet advanced without difficulty  Recommend complete alcohol cessation.  Not interested in outpt resources  "

## 2024-09-26 NOTE — ASSESSMENT & PLAN NOTE
Patient reports small amount of hematemesis prior to admit; primarily bilious vomiting with small streaks of blood  Possible gastritis/esophagitis, rich silverman tear  Patient denies any further episodes of vomiting.  Hgb stable  Patient declining inpatient EGD at this time  Discharge on protonix 40 mg BID  Outpt follow up with GI

## 2024-09-26 NOTE — DISCHARGE INSTR - AVS FIRST PAGE
Follow-up with PCP within 1 week for posthospitalization follow-up  Recommend outpatient follow-up with gastroenterology  Recommend outpatient MRI in 4-6 weeks per GI    Return to the emergency department for further evaluation with any chest pain/palpitations, shortness of breath, nausea/vomiting, abdominal pain, fever/chills.

## 2024-09-26 NOTE — PROGRESS NOTES
Progress note - [facility] Gastroenterology   Kaykya Holland 34 y.o. female MRN: [id number]  Unit/Bed#: -01 Encounter: [id number]    ASSESSMENT and PLAN  Kaykay Holland is a 34 y.o. year old female with a past medical history of hypertension, alcohol abuse (2-3 bottles of wine daily), withdrawal seizures, pancreatitis in the past with necrosis, grade D esophagitis presents to the ER with worsening chronic epigastric pain and vomiting with blood streaks.  States this has been going on on a daily basis for the past year but worse in the last week.  This admission labs show pancytopenia, ALT 57, , lipase 269.  Alcohol level 388.  CT A/P with contrast shows Acute pancreatitis with surrounding peripancreatic fluid and inflammation. Small cystic foci seen within the head and tail of the pancreas. A follow-up MRI/MRCP may be helpful, Hepatic steatosis.      Patient with acute recurrent alcoholic pancreatitis.  CT with cystic foci in the pancreas for which follow-up MRI recommended.  Suspect poor oral intake due to vomiting and alcohol abuse.  Patient refusing EGD.  Prefers to advance diet and if tolerates be DC'd today.  Recommend outpatient GI follow-up in 4 weeks     1.  Acute recurrent alcoholic pancreatitis  2.  Abnormal CT abdomen  3.  Elevated LFTs              -advance to low fat diet              -trig level normal.               -acute viral hepatitis pending, INR normal, LFT's improving              -EtOH cessation (pt not interested)              -Counseled on complications of continued alcohol use including chronic pancreatitis cirrhosis, ETC  -agree with CIWA protocol, thiamine/folic acid/MVI replacement  --MRI abd likely in 4--6 weeks after acute inflammation resolved rule out pancreatic pathology  -outpt GI follow up 4w     4.  Hematemesis  5.  Dysphagia and GERD              -advance to low fat diet              -cont PPI 40 mg BID change IV to PO              -Suspect hematemesis  "due to esophagitis, gastritis or Minnie-Morrell tear              -Pt declined EGD     6.  Pancytopenia- suspect due to bone marrow suppression from ETOH abuse              -Monitor CBC, transfuse for Hg < 7 (Hg 10.8, baseline 11.4) 9/26 Hg 13 (? Accurate result)              -B12 low normal, folate low, start supplementation on d/c d/w SLIM     7.  Anorexia and weight loss-Suspect poor oral intake due to vomiting and alcohol abuse  8.  Irregular bowel habits                   -TSH normal              -MRI abd likely in 4-6 weeks after acute inflammation resolved rule out pancreatic pathology              -outpt colonoscopy      Chief Complaint   Patient presents with    Abdominal Pain     Pt reports left sided pain. Pt reports stabbing pain constant. Pt reports lack of appetitite and unable to tolerate PO. Pt reports getting dizzy and lightheaded. Pt reports similar pain to pancreatitis. Pt reports \"I would like detox.\" Pt reports withdrawal seizures. Pt reports last drink PTA       SUBJECTIVE  Patient seen and examined.  Tremulous, very anxious to go home.  No nausea vomiting hematemesis or abdominal pain.  Refusing to wait until this afternoon for EGD. Wants to try food and if tolerates D/C today.  Discussed with hospitalist, agreeable      Temp:  [97.9 °F (36.6 °C)-98.3 °F (36.8 °C)] 98.2 °F (36.8 °C)  HR:  [66-86] 67  Resp:  [17-24] 24  BP: (119-157)/() 133/85     PHYSICAL EXAM:    Constitutional: Well-developed, no acute distress  HEENT: normocephalic, mucous membranes moist.  Neck: Supple  Skin: warm and dry  Respiratory: Lungs are clear to auscultation B/L.  Cardiovascular: Heart is regular rate and rhythm.  Gastrointestinal: Soft, nontender, nondistended with normal active bowel sounds.  No masses, guarding, rebound.   Rectal Exam: Deferred.  Extremities: No edema.  Neurologic: Nonfocal. A & O ×3.   Psychiatric: Normal affect.      LABS & STUDIES  Lab Results   Component Value Date    CALCIUM 8.6 " "09/26/2024    K 3.9 09/26/2024    CO2 29 09/26/2024    CL 95 (L) 09/26/2024    BUN 4 (L) 09/26/2024    CREATININE 0.50 (L) 09/26/2024    CREATININE 0.38 (L) 09/25/2024    CREATININE 0.48 (L) 09/24/2024     Lab Results   Component Value Date    WBC 3.85 (L) 09/26/2024    WBC 3.54 (L) 09/25/2024    WBC 5.41 09/24/2024    HGB 13.0 09/26/2024    HGB 10.8 (L) 09/25/2024    HGB 13.2 09/24/2024     (H) 09/26/2024     (L) 09/26/2024     (L) 09/25/2024     09/24/2024     Lab Results   Component Value Date    ALT 47 09/26/2024    ALT 57 (H) 09/25/2024    ALT 81 (H) 09/24/2024    AST 68 (H) 09/26/2024     (H) 09/25/2024     (H) 09/24/2024    ALKPHOS 76 09/26/2024    ALKPHOS 64 09/25/2024    ALKPHOS 81 09/24/2024    TBILI 1.69 (H) 09/26/2024    TBILI 0.85 09/25/2024    TBILI 0.73 09/24/2024     No results found for: \"AMYLASE\"  Lab Results   Component Value Date    LIPASE 269 (H) 09/25/2024     Lab Results   Component Value Date    IRON 44 (L) 04/10/2024    TIBC 275 04/10/2024    FERRITIN 2,560 (H) 04/10/2024     Lab Results   Component Value Date    INR 1.04 09/26/2024    INR 1.2 11/04/2023    INR 1.1 07/30/2019       CT abdomen pelvis with contrast    Result Date: 9/24/2024  Narrative: CT ABDOMEN AND PELVIS WITH IV CONTRAST INDICATION: epigastric pain, h/o pancreatitis.. . COMPARISON: Correlation is made with the prior ultrasound study dated 4/10/2024. TECHNIQUE: CT examination of the abdomen and pelvis was performed. Multiplanar 2D reformatted images were created from the source data. This examination, like all CT scans performed in the [facility], was performed utilizing techniques to minimize radiation dose exposure, including the use of iterative reconstruction and automated exposure control. Radiation dose length product (DLP) for this visit: 459.73 mGy-cm IV Contrast: 100 mL of iohexol (OMNIPAQUE) Enteric Contrast: Not administered. FINDINGS: ABDOMEN LOWER CHEST: " Minimal dependent atelectasis in the visualized lower lobes. LIVER/BILIARY TREE: There is mild fatty infiltration with an area of fatty sparing seen adjacent to the gallbladder. No biliary ductal dilatation. GALLBLADDER: No calcified gallstones. No pericholecystic inflammatory change. SPLEEN: Unremarkable. PANCREAS: There is evidence of acute pancreatitis, which will be described based on the revised Rebekah Classification of Acute Pancreatitis: - TIME OF ONSET: less than or equal to 4 weeks - NECROSIS: There is peripancreatic edema but no evidence of pancreatic or peripancreatic necrosis, therefore this is interstitial edematous pancreatitis (IEP.) - LOCAL COMPLICATIONS: There are small cystic foci within the head of the pancreas as well as in the distal tail. - INFECTION: There is no abnormal gas in or around the pancreas to suggest infection. ADRENAL GLANDS: Unremarkable. KIDNEYS/URETERS: Tiny left renal cyst. No hydronephrosis or hydroureter. STOMACH AND BOWEL: There is no bowel obstruction or focal inflammatory process. APPENDIX: Normal. ABDOMINOPELVIC CAVITY: Trace peripancreatic fluid. Otherwise, no significant ascites. No pneumoperitoneum. No lymphadenopathy VESSELS: Unremarkable for patient's age. PELVIS REPRODUCTIVE ORGANS: There is a 1.5 cm dominant follicle in the right ovary. URINARY BLADDER: Unremarkable. ABDOMINAL WALL/INGUINAL REGIONS: Tiny fat-containing periumbilical hernia. 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 No spondylolisthesis.     Impression: Acute pancreatitis as described above with surrounding peripancreatic fluid and inflammation. Small cystic foci seen within the head and tail of the pancreas. A follow-up MRI examination including MRCP may be helpful as follow-up. Hepatic steatosis. Tiny left renal cyst. Workstation performed: [id number]       Patient expressed understanding and had all questions and concerns addressed.    Tamie Richardson PA-C    09/26/24  12:05 PM    This chart was completed in part utilizing [email or web address] speech voice recognition software. Random word insertions, pronoun errors, and incomplete sentences are an occasional consequence of this system due to software limitations, and ambient noise. Any questions or concerns about the content, text, or information contained within the body of this dictation should be directly addressed to the provider for clarification.

## 2024-09-27 ENCOUNTER — PATIENT OUTREACH (OUTPATIENT)
Dept: CASE MANAGEMENT | Facility: OTHER | Age: 34
End: 2024-09-27

## 2024-09-27 DIAGNOSIS — Z71.89 COMPLEX CARE COORDINATION: Primary | ICD-10-CM

## 2024-09-27 LAB
ATRIAL RATE: 86 BPM
P AXIS: 52 DEGREES
PR INTERVAL: 136 MS
QRS AXIS: 12 DEGREES
QRSD INTERVAL: 88 MS
QT INTERVAL: 398 MS
QTC INTERVAL: 476 MS
T WAVE AXIS: 46 DEGREES
VENTRICULAR RATE: 86 BPM

## 2024-09-27 PROCEDURE — 93010 ELECTROCARDIOGRAM REPORT: CPT | Performed by: INTERNAL MEDICINE

## 2024-09-27 NOTE — UTILIZATION REVIEW
NOTIFICATION OF ADMISSION DISCHARGE   This is a Notification of Discharge from [facility]. Please be advised that this patient has been discharge from our facility. Below you will find the admission and discharge date and time including the patient’s disposition.   UTILIZATION REVIEW CONTACT:  Sana Mckeon  Utilization   Network Utilization Review Department  Phone: [phone] x carefully listen to the prompts. All voicemails are confidential.  Email: [email or web address]     ADMISSION INFORMATION  PRESENTATION DATE: 9/24/2024  2:37 PM  OBERVATION ADMISSION DATE: N/A  INPATIENT ADMISSION DATE: 9/24/24  4:51 PM   DISCHARGE DATE: 9/26/2024  1:25 PM   DISPOSITION:Home/Self Care    Network Utilization Review Department  ATTENTION: Please call with any questions or concerns to [phone] and carefully listen to the prompts so that you are directed to the right person. All voicemails are confidential.   For Discharge needs, contact Care Management DC Support Team at [phone] opt. 2  Send all requests for admission clinical reviews, approved or denied determinations and any other requests to dedicated fax number below belonging to the campus where the patient is receiving treatment. List of dedicated fax numbers for the Facilities:  FACILITY NAME UR FAX NUMBER   ADMISSION DENIALS (Administrative/Medical Necessity) [phone]   DISCHARGE SUPPORT TEAM ([facility]) [phone]   PARENT CHILD HEALTH (Maternity/NICU/Pediatrics) [phone]   [facility] [phone]   [facility] [phone]   [facility] [phone]   [facility] [phone]   [facility] [phone]   [facility] [phone]   [facility] [phone]   [facility] [phone]    [facility] [phone]   Critical access hospital [phone]   [facility] [phone]   [facility] [phone]

## 2024-09-27 NOTE — PROGRESS NOTES
"Outpatient Care Management Note;  In basket for care management received after recent hospitalization.  Chart review completed.     History includes HTN,  alcohol withdrawal induced seizures, alcohol abuse, depression and anxiety.     9/24/2024-Presented to ER with abdominal pain, N/V and hematemesis.  CT scan showed acute pancreatitis.  Alcohol level on admission 388. Discharged on Protonix with GI follow up on 9/26/2024.     9/26/2024-Presented to ER with abdominal pain.  Discharged on oxycodone and zofran.      Outreach call placed to Ms. Holland.  Introduced myself and my role.  She states she is dong \"OK.\"  Declined any need for outreach, she is not interested in ETOH resources.   "

## 2024-09-27 NOTE — DISCHARGE INSTRUCTIONS
You have been seen for abdominal pain in the setting of pancreatitis. Please take Zofran as needed for nausea.  Take oxycodone for severe pain.. Return to the emergency department if you develop worsening pain, vomiting, fevers or any other symptoms of concern. Please follow up with a PCP and GI by calling the number provided.

## 2024-09-27 NOTE — ED PROVIDER NOTES
Final diagnoses:   Acute alcoholic pancreatitis     ED Disposition       ED Disposition   Discharge    Condition   Stable    Date/Time   Thu Sep 26, 2024 10:02 PM    Comment   Kaykaykevin Holland discharge to home/self care.                   Assessment & Plan       Medical Decision Making    34 y.o. female presenting for abdominal pain.  Chart reviewed. Patient recently admitted for pancreatitis and symptoms are similar to prior.  Vital stable, nontoxic-appearing.  Will order labs to screen for SIRS, worsening biliary disease or pancreatitis.  If labs worsening may require repeat CT to evaluate for worsening pancreatitis.  No tremor on exam, I do not suspect symptoms are due to alcohol withdrawal.  Will treat symptomatically.    Reassessment: Pain resolved, resting comfortably in the room.  Reviewed lab results with the patient.  Mild elevation of lipase noted. No evidence of sepsis or anemia.  As patient's symptoms have resolved and she is comfortable appearing she is agreeable to forego repeat CT at this time.    Disposition: I have discussed with the patient our plan to discharge them from the ED and the patient is in agreement with this plan.     Discharge Plan: Rx for oxycodone, zofran. Given prior hematemesis would avoid NSAIDs. Cautioned extensively regarding concomitant use of librium and oxycodone. Will provide Rx for naloxone. Discussed possibility of disease progression and failure of outpatient treatment. RTED precautions emphasized. The patient was provided a written after visit summary with strict RTED precautions.     Followup: I have discussed with the patient plan to follow up with a PCP and GI. Contact information provided in AVS.    Amount and/or Complexity of Data Reviewed  Labs: ordered. Decision-making details documented in ED Course.    Risk  Prescription drug management.        ED Course as of 09/26/24 2327   Thu Sep 26, 2024   2131 WBC: 4.55   2131 Hemoglobin: 13.1   2131 Platelet Count(!):  135  Improved from prior   2157 Patient reassessed.  She is resting comfortably.  No vomiting.  Her pain is considerably improved.  I reviewed the lab results.  No evidence of sepsis.  Mild elevation of lipase noted.  Given symptom resolution patient agreeable to forego CT at this time and will prescribe medications for symptom management.  Emphasized strict return to ED symptoms and need for prompt follow-up with a PCP and GI.       Medications   lactated ringers bolus 1,000 mL (0 mL Intravenous Stopped 9/26/24 2158)   ondansetron (ZOFRAN) injection 4 mg (4 mg Intravenous Given 9/26/24 2133)   morphine injection 4 mg (4 mg Intravenous Given 9/26/24 2133)       ED Risk Strat Scores                           SBIRT 22yo+      Flowsheet Row Most Recent Value   Initial Alcohol Screen: US AUDIT-C     1. How often do you have a drink containing alcohol? 0 Filed at: 09/26/2024 2110   2. How many drinks containing alcohol do you have on a typical day you are drinking?  0 Filed at: 09/26/2024 2110   3a. Male UNDER 65: How often do you have five or more drinks on one occasion? 0 Filed at: 09/26/2024 2110   3b. FEMALE Any Age, or MALE 65+: How often do you have 4 or more drinks on one occassion? 0 Filed at: 09/26/2024 2110   Audit-C Score 0 Filed at: 09/26/2024 2110   DUANE: How many times in the past year have you...    Used an illegal drug or used a prescription medication for non-medical reasons? Never Filed at: 09/26/2024 2110                            History of Present Illness       Chief Complaint   Patient presents with    Abdominal Pain     Pt was admitted here, d/venkata today dx with pancreatitis. Pt said she is unable to manage the pain at home       Past Medical History:   Diagnosis Date    Acute alcoholic pancreatitis 11/03/2023    Hypertension     SIRS (systemic inflammatory response syndrome) (HCC) 04/10/2024      Past Surgical History:   Procedure Laterality Date    WISDOM TOOTH EXTRACTION        History reviewed.  No pertinent family history.   Social History     Tobacco Use    Smoking status: Every Day     Current packs/day: 0.50     Types: Cigarettes     Passive exposure: Never    Smokeless tobacco: Never    Tobacco comments:     Per pt last drink was today 9/24/24 one glass of wine. Had previously stop on 4/4/24 for 1 month    Vaping Use    Vaping status: Never Used   Substance Use Topics    Alcohol use: Yes     Alcohol/week: 21.0 standard drinks of alcohol     Types: 21 Glasses of wine per week     Comment: she reports stopping on 4/4/2024. Per pt last drink was today  //    Drug use: Never      E-Cigarette/Vaping    E-Cigarette Use Never User       E-Cigarette/Vaping Substances    Nicotine No     THC No     CBD No     Flavoring No     Other No     Unknown No       I have reviewed and agree with the history as documented.     Kaykay Holland is a 34 y.o. year old female presenting to the [facility] ED for abdominal pain. Patient admitted to hospital 9/24 - 9/26 for alcoholic pancreatitis. Patient felt well at time of discharge earlier today however two hours after leaving hospital developed recurrence of abdominal pain. Pain is similar to prior located in the LUQ of the abdomen and radiating toward the back. Patient had three episodes of nonbloody vomiting this afternoon/evening. Patient denies chest pain or dyspnea. Patient denies associated fevers/chills. The patient has not taken/received any medications at home for relief of symptoms. She had medications prescribed for pain however was unable to get them from the pharmacy.  She denies alcohol consumption since hospital discharge.      History provided by:  Medical records and patient   used: No    Abdominal Pain  Associated symptoms: nausea and vomiting    Associated symptoms: no chest pain, no chills, no diarrhea, no dysuria, no fever, no hematuria and no shortness of breath        Review of Systems   Constitutional:  Negative for chills and  fever.   Respiratory:  Negative for shortness of breath.    Cardiovascular:  Negative for chest pain.   Gastrointestinal:  Positive for abdominal pain, nausea and vomiting. Negative for diarrhea.   Genitourinary:  Negative for dysuria, flank pain and hematuria.   Musculoskeletal:  Negative for back pain.   All other systems reviewed and are negative.          Objective       ED Triage Vitals   Temperature Pulse Blood Pressure Respirations SpO2 Patient Position - Orthostatic VS   09/26/24 2110 09/26/24 2110 09/26/24 2110 09/26/24 2110 09/26/24 2110 09/26/24 2110   98.6 °F (37 °C) 85 159/99 20 98 % Lying      Temp Source Heart Rate Source BP Location FiO2 (%) Pain Score    09/26/24 2110 09/26/24 2110 09/26/24 2110 -- 09/26/24 2108    Temporal Monitor Right arm  9      Vitals      Date and Time Temp Pulse SpO2 Resp BP Pain Score FACES Pain Rating User   09/26/24 2200 -- 76 96 % 18 147/96 -- -- SV   09/26/24 2133 -- -- -- -- -- 9 -- SV   09/26/24 2130 -- 77 99 % -- 158/98 -- -- SV   09/26/24 2110 98.6 °F (37 °C) 85 98 % 20 159/99 -- -- KK   09/26/24 2108 -- -- -- -- -- 9 -- KK            Physical Exam  Vitals and nursing note reviewed.   Constitutional:       General: She is not in acute distress.     Appearance: Normal appearance. She is well-developed. She is not ill-appearing, toxic-appearing or diaphoretic.   HENT:      Head: Normocephalic and atraumatic.      Nose: No congestion or rhinorrhea.   Eyes:      General:         Right eye: No discharge.         Left eye: No discharge.   Cardiovascular:      Rate and Rhythm: Normal rate and regular rhythm.   Pulmonary:      Effort: Pulmonary effort is normal. No accessory muscle usage or respiratory distress.      Breath sounds: Normal breath sounds. No stridor. No decreased breath sounds, wheezing, rhonchi or rales.   Abdominal:      General: Bowel sounds are normal. There is no distension.      Palpations: Abdomen is soft.      Tenderness: There is abdominal tenderness  in the left upper quadrant. There is left CVA tenderness. There is no right CVA tenderness, guarding or rebound.   Musculoskeletal:      Cervical back: Normal range of motion. No rigidity.      Right lower leg: No tenderness.      Left lower leg: No tenderness.   Skin:     Capillary Refill: Capillary refill takes less than 2 seconds.      Findings: No bruising, ecchymosis or rash.      Comments: No bruising or ecchymosis over the abdomen or flank area.   Neurological:      Mental Status: She is alert and oriented to person, place, and time.      GCS: GCS eye subscore is 4. GCS verbal subscore is 5. GCS motor subscore is 6.      Motor: No tremor.   Psychiatric:         Mood and Affect: Mood normal.         Behavior: Behavior normal.         Results Reviewed       Procedure Component Value Units Date/Time    Lipase [[id number]]  (Abnormal) Collected: 09/26/24 2118    Lab Status: Final result Specimen: Blood from Arm, Left Updated: 09/26/24 2141     Lipase 593 u/L     Comprehensive metabolic panel [[id number]]  (Abnormal) Collected: 09/26/24 2118    Lab Status: Final result Specimen: Blood from Arm, Left Updated: 09/26/24 2141     Sodium 136 mmol/L      Potassium 4.2 mmol/L      Chloride 101 mmol/L      CO2 23 mmol/L      ANION GAP 12 mmol/L      BUN 3 mg/dL      Creatinine 0.48 mg/dL      Glucose 84 mg/dL      Calcium 9.6 mg/dL      AST 84 U/L      ALT 44 U/L      Alkaline Phosphatase 75 U/L      Total Protein 7.2 g/dL      Albumin 4.4 g/dL      Total Bilirubin 0.95 mg/dL      eGFR 129 ml/min/1.73sq m     Narrative:      National Kid[employer] Foundation guidelines for Chronic Kidney Disease (CKD):     Stage 1 with normal or high GFR (GFR > 90 mL/min/1.73 square meters)    Stage 2 Mild CKD (GFR = 60-89 mL/min/1.73 square meters)    Stage 3A Moderate CKD (GFR = 45-59 mL/min/1.73 square meters)    Stage 3B Moderate CKD (GFR = 30-44 mL/min/1.73 square meters)    Stage 4 Severe CKD (GFR = 15-29 mL/min/1.73 square meters)     Stage 5 End Stage CKD (GFR <15 mL/min/1.73 square meters)  Note: GFR calculation is accurate only with a steady state creatinine    CBC and differential [[id number]]  (Abnormal) Collected: 09/26/24 2118    Lab Status: Final result Specimen: Blood from Arm, Left Updated: 09/26/24 2130     WBC 4.55 Thousand/uL      RBC 3.48 Million/uL      Hemoglobin 13.1 g/dL      Hematocrit 38.5 %       fL      MCH 37.6 pg      MCHC 34.0 g/dL      RDW 11.0 %      MPV 11.6 fL      Platelets 135 Thousands/uL      nRBC 0 /100 WBCs      Segmented % 55 %      Immature Grans % 0 %      Lymphocytes % 27 %      Monocytes % 13 %      Eosinophils Relative 4 %      Basophils Relative 1 %      Absolute Neutrophils 2.56 Thousands/µL      Absolute Immature Grans 0.01 Thousand/uL      Absolute Lymphocytes 1.22 Thousands/µL      Absolute Monocytes 0.57 Thousand/µL      Eosinophils Absolute 0.16 Thousand/µL      Basophils Absolute 0.03 Thousands/µL             No orders to display       Procedures    ED Medication and Procedure Management   Prior to Admission Medications   Prescriptions Last Dose Informant Patient Reported? Taking?   chlordiazePOXIDE (LIBRIUM) 25 mg capsule   No No   Sig: Take 1 capsule (25 mg total) by mouth every 8 (eight) hours for 1 day, THEN 1 capsule (25 mg total) every 12 (twelve) hours for 1 day, THEN 1 capsule (25 mg total) in the morning for 1 day.   folic acid (KP Folic Acid) 1 mg tablet   No No   Sig: Take 1 tablet (1 mg total) by mouth daily   pantoprazole (PROTONIX) 40 mg tablet   No No   Sig: Take 1 tablet (40 mg total) by mouth 2 (two) times a day      Facility-Administered Medications: None     Discharge Medication List as of 9/26/2024 10:09 PM        START taking these medications    Details   naloxone (NARCAN) 4 mg/0.1 mL nasal spray Administer 1 spray into a nostril. If no response after 2-3 minutes, give another dose in the other nostril using a new spray., Normal      ondansetron (ZOFRAN-ODT) 4 mg  disintegrating tablet Take 1 tablet (4 mg total) by mouth every 6 (six) hours as needed for nausea or vomiting, Starting u 9/26/2024, Normal      oxyCODONE (Roxicodone) 5 immediate release tablet Take 1 tablet (5 mg total) by mouth every 6 (six) hours as needed for moderate pain or severe pain for up to 10 doses Max Daily Amount: 20 mg, Starting u 9/26/2024, Normal           CONTINUE these medications which have NOT CHANGED    Details   chlordiazePOXIDE (LIBRIUM) 25 mg capsule Multiple Dosages:Starting Thu 9/26/2024, Until Thu 9/26/2024 at 2359, THEN Starting Fri 9/27/2024, Until Fri 9/27/2024 at 2359, THEN Starting Sat 9/28/2024, Until Sat 9/28/2024 at 2359Take 1 capsule (25 mg total) by mouth every 8 (eight) hours for 1  day, THEN 1 capsule (25 mg total) every 12 (twelve) hours for 1 day, THEN 1 capsule (25 mg total) in the morning for 1 day., Normal      folic acid (KP Folic Acid) 1 mg tablet Take 1 tablet (1 mg total) by mouth daily, Starting Thu 9/26/2024, Normal      pantoprazole (PROTONIX) 40 mg tablet Take 1 tablet (40 mg total) by mouth 2 (two) times a day, Starting Thu 9/26/2024, Normal           No discharge procedures on file.  ED SEPSIS DOCUMENTATION   Time reflects when diagnosis was documented in both MDM as applicable and the Disposition within this note       Time User Action Codes Description Comment    9/26/2024  9:59 PM Jorge Lyles Add [K85.20] Acute alcoholic pancreatitis                  Jorge Lyles, DO  09/26/24 [id number]

## 2024-09-30 ENCOUNTER — TELEPHONE (OUTPATIENT)
Dept: NEUROLOGY | Facility: CLINIC | Age: 34
End: 2024-09-30

## 2024-09-30 NOTE — TELEPHONE ENCOUNTER
MSW phoned [facility] [employer] at [phone] and spoke with Tanil   She was able that she e-verified insurance     MSW phoned pt and lvm informing that [facility] is aware that she had [place] coverage in the month of April.     -at this time patient does not have any medical bills     MSW remains available for future social needs.

## 2024-10-10 ENCOUNTER — TELEPHONE (OUTPATIENT)
Dept: GASTROENTEROLOGY | Facility: CLINIC | Age: 34
End: 2024-10-10

## 2024-11-07 ENCOUNTER — APPOINTMENT (EMERGENCY)
Dept: CT IMAGING | Facility: HOSPITAL | Age: 34
End: 2024-11-07
Payer: COMMERCIAL

## 2024-11-07 ENCOUNTER — HOSPITAL ENCOUNTER (EMERGENCY)
Facility: HOSPITAL | Age: 34
Discharge: HOME | End: 2024-11-07
Attending: EMERGENCY MEDICINE
Payer: COMMERCIAL

## 2024-11-07 VITALS
OXYGEN SATURATION: 100 % | SYSTOLIC BLOOD PRESSURE: 148 MMHG | TEMPERATURE: 98.4 F | DIASTOLIC BLOOD PRESSURE: 92 MMHG | RESPIRATION RATE: 16 BRPM | HEART RATE: 70 BPM

## 2024-11-07 DIAGNOSIS — K85.90 ACUTE PANCREATITIS: Primary | ICD-10-CM

## 2024-11-07 LAB
ALBUMIN SERPL BCG-MCNC: 4.2 G/DL (ref 3.5–5)
ALP SERPL-CCNC: 79 U/L (ref 34–104)
ALT SERPL W P-5'-P-CCNC: 52 U/L (ref 7–52)
ANION GAP SERPL CALCULATED.3IONS-SCNC: 11 MMOL/L (ref 4–13)
AST SERPL W P-5'-P-CCNC: 103 U/L (ref 13–39)
BASOPHILS # BLD AUTO: 0.08 THOUSANDS/ΜL (ref 0–0.1)
BASOPHILS NFR BLD AUTO: 1 % (ref 0–1)
BILIRUB SERPL-MCNC: 0.64 MG/DL (ref 0.2–1)
BILIRUB UR QL STRIP: NEGATIVE
BUN SERPL-MCNC: 6 MG/DL (ref 5–25)
CALCIUM SERPL-MCNC: 8.5 MG/DL (ref 8.4–10.2)
CARDIAC TROPONIN I PNL SERPL HS: 4 NG/L
CHLORIDE SERPL-SCNC: 98 MMOL/L (ref 96–108)
CLARITY UR: CLEAR
CO2 SERPL-SCNC: 28 MMOL/L (ref 21–32)
COLOR UR: ABNORMAL
CREAT SERPL-MCNC: 0.45 MG/DL (ref 0.6–1.3)
EOSINOPHIL # BLD AUTO: 0.05 THOUSAND/ΜL (ref 0–0.61)
EOSINOPHIL NFR BLD AUTO: 1 % (ref 0–6)
ERYTHROCYTE [DISTWIDTH] IN BLOOD BY AUTOMATED COUNT: 12 % (ref 11.6–15.1)
EXT PREGNANCY TEST URINE: NEGATIVE
EXT. CONTROL: NORMAL
GFR SERPL CREATININE-BSD FRML MDRD: 132 ML/MIN/1.73SQ M
GLUCOSE SERPL-MCNC: 92 MG/DL (ref 65–140)
GLUCOSE UR STRIP-MCNC: NEGATIVE MG/DL
HCT VFR BLD AUTO: 39.7 % (ref 34.8–46.1)
HGB BLD-MCNC: 13.5 G/DL (ref 11.5–15.4)
HGB UR QL STRIP.AUTO: NEGATIVE
IMM GRANULOCYTES # BLD AUTO: 0.03 THOUSAND/UL (ref 0–0.2)
IMM GRANULOCYTES NFR BLD AUTO: 1 % (ref 0–2)
KETONES UR STRIP-MCNC: NEGATIVE MG/DL
LEUKOCYTE ESTERASE UR QL STRIP: NEGATIVE
LIPASE SERPL-CCNC: 488 U/L (ref 11–82)
LYMPHOCYTES # BLD AUTO: 1.58 THOUSANDS/ΜL (ref 0.6–4.47)
LYMPHOCYTES NFR BLD AUTO: 24 % (ref 14–44)
MCH RBC QN AUTO: 36.3 PG (ref 26.8–34.3)
MCHC RBC AUTO-ENTMCNC: 34 G/DL (ref 31.4–37.4)
MCV RBC AUTO: 107 FL (ref 82–98)
MONOCYTES # BLD AUTO: 0.94 THOUSAND/ΜL (ref 0.17–1.22)
MONOCYTES NFR BLD AUTO: 14 % (ref 4–12)
NEUTROPHILS # BLD AUTO: 3.94 THOUSANDS/ΜL (ref 1.85–7.62)
NEUTS SEG NFR BLD AUTO: 59 % (ref 43–75)
NITRITE UR QL STRIP: NEGATIVE
NRBC BLD AUTO-RTO: 0 /100 WBCS
PH UR STRIP.AUTO: 6 [PH]
PLATELET # BLD AUTO: 191 THOUSANDS/UL (ref 149–390)
PMV BLD AUTO: 11 FL (ref 8.9–12.7)
POTASSIUM SERPL-SCNC: 3.7 MMOL/L (ref 3.5–5.3)
PROT SERPL-MCNC: 7.2 G/DL (ref 6.4–8.4)
PROT UR STRIP-MCNC: NEGATIVE MG/DL
RBC # BLD AUTO: 3.72 MILLION/UL (ref 3.81–5.12)
SODIUM SERPL-SCNC: 137 MMOL/L (ref 135–147)
SP GR UR STRIP.AUTO: <1.005 (ref 1–1.03)
UROBILINOGEN UR STRIP-ACNC: <2 MG/DL
WBC # BLD AUTO: 6.62 THOUSAND/UL (ref 4.31–10.16)

## 2024-11-07 PROCEDURE — 36415 COLL VENOUS BLD VENIPUNCTURE: CPT

## 2024-11-07 PROCEDURE — 96374 THER/PROPH/DIAG INJ IV PUSH: CPT

## 2024-11-07 PROCEDURE — 81025 URINE PREGNANCY TEST: CPT

## 2024-11-07 PROCEDURE — 84484 ASSAY OF TROPONIN QUANT: CPT

## 2024-11-07 PROCEDURE — 85025 COMPLETE CBC W/AUTO DIFF WBC: CPT

## 2024-11-07 PROCEDURE — 99284 EMERGENCY DEPT VISIT MOD MDM: CPT

## 2024-11-07 PROCEDURE — 81003 URINALYSIS AUTO W/O SCOPE: CPT

## 2024-11-07 PROCEDURE — 99285 EMERGENCY DEPT VISIT HI MDM: CPT

## 2024-11-07 PROCEDURE — 83690 ASSAY OF LIPASE: CPT

## 2024-11-07 PROCEDURE — 93005 ELECTROCARDIOGRAM TRACING: CPT

## 2024-11-07 PROCEDURE — 96375 TX/PRO/DX INJ NEW DRUG ADDON: CPT

## 2024-11-07 PROCEDURE — 96361 HYDRATE IV INFUSION ADD-ON: CPT

## 2024-11-07 PROCEDURE — 80053 COMPREHEN METABOLIC PANEL: CPT

## 2024-11-07 PROCEDURE — 74177 CT ABD & PELVIS W/CONTRAST: CPT

## 2024-11-07 RX ORDER — OXYCODONE HYDROCHLORIDE 5 MG/1
5 TABLET ORAL EVERY 6 HOURS PRN
Qty: 10 TABLET | Refills: 0 | Status: SHIPPED | OUTPATIENT
Start: 2024-11-07

## 2024-11-07 RX ORDER — ONDANSETRON 4 MG/1
4 TABLET, ORALLY DISINTEGRATING ORAL EVERY 6 HOURS PRN
Qty: 20 TABLET | Refills: 0 | Status: SHIPPED | OUTPATIENT
Start: 2024-11-07

## 2024-11-07 RX ORDER — ACETAMINOPHEN 500 MG
1000 TABLET ORAL 3 TIMES DAILY PRN
Qty: 30 TABLET | Refills: 0 | Status: SHIPPED | OUTPATIENT
Start: 2024-11-07 | End: 2024-11-12

## 2024-11-07 RX ORDER — IBUPROFEN 600 MG/1
TABLET, FILM COATED ORAL
Qty: 18 TABLET | Refills: 0 | Status: SHIPPED | OUTPATIENT
Start: 2024-11-07 | End: 2024-11-12

## 2024-11-07 RX ORDER — ACETAMINOPHEN 325 MG/1
650 TABLET ORAL ONCE
Status: COMPLETED | OUTPATIENT
Start: 2024-11-07 | End: 2024-11-07

## 2024-11-07 RX ORDER — KETOROLAC TROMETHAMINE 30 MG/ML
15 INJECTION, SOLUTION INTRAMUSCULAR; INTRAVENOUS ONCE
Status: COMPLETED | OUTPATIENT
Start: 2024-11-07 | End: 2024-11-07

## 2024-11-07 RX ORDER — ONDANSETRON 2 MG/ML
4 INJECTION INTRAMUSCULAR; INTRAVENOUS ONCE
Status: COMPLETED | OUTPATIENT
Start: 2024-11-07 | End: 2024-11-07

## 2024-11-07 RX ORDER — OXYCODONE HYDROCHLORIDE 5 MG/1
5 TABLET ORAL ONCE
Status: COMPLETED | OUTPATIENT
Start: 2024-11-07 | End: 2024-11-07

## 2024-11-07 RX ADMIN — KETOROLAC TROMETHAMINE 15 MG: 30 INJECTION, SOLUTION INTRAMUSCULAR; INTRAVENOUS at 10:31

## 2024-11-07 RX ADMIN — OXYCODONE HYDROCHLORIDE 5 MG: 5 TABLET ORAL at 12:40

## 2024-11-07 RX ADMIN — ACETAMINOPHEN 650 MG: 325 TABLET, FILM COATED ORAL at 12:40

## 2024-11-07 RX ADMIN — ONDANSETRON 4 MG: 2 INJECTION INTRAMUSCULAR; INTRAVENOUS at 10:32

## 2024-11-07 RX ADMIN — SODIUM CHLORIDE 1000 ML: 0.9 INJECTION, SOLUTION INTRAVENOUS at 10:31

## 2024-11-07 RX ADMIN — IOHEXOL 100 ML: 350 INJECTION, SOLUTION INTRAVENOUS at 11:13

## 2024-11-07 NOTE — ED PROVIDER NOTES
Time reflects when diagnosis was documented in both MDM as applicable and the Disposition within this note       Time User Action Codes Description Comment    11/7/2024 12:30 PM Lala Coulter Add [K85.90] Acute pancreatitis           ED Disposition       ED Disposition   Discharge    Condition   Stable    Date/Time   Thu Nov 7, 2024 12:30 PM    Comment   Kaykay Holland discharge to home/self care.                   Assessment & Plan       Medical Decision Making  DDx including but not limited to: GERD, gastritis, pancreatitis, cholecystitis, appendicitis, UTI    Patient with 1 day of acute left upper quadrant abdominal pain with associated N/V/D.  On exam does have most notable tenderness to the left upper quadrant as well as guarding of the right lower quadrant.  Suspicion is lower for acute appendicitis, however given her exam will proceed with labs, UA, and CT imaging to further evaluate for acute appendicitis as well as pancreatitis.  Will medicate with fluids, Zofran, and Toradol.    Problems Addressed:  Acute pancreatitis: acute illness or injury    Amount and/or Complexity of Data Reviewed  Labs: ordered. Decision-making details documented in ED Course.  Radiology: ordered. Decision-making details documented in ED Course.  ECG/medicine tests: ordered and independent interpretation performed.    Risk  OTC drugs.  Prescription drug management.  Parenteral controlled substances.  Decision regarding hospitalization.        ED Course as of 11/07/24 1242   Thu Nov 07, 2024   1034 PREGNANCY TEST URINE: Negative  Noted negative   1101 LIPASE(!): 488  Consistent with episode of acute on chronic pancreatitis, awaiting CT results   1102 hs TnI 0hr: 4  Doubt atypical ACS, will discontinue 2 and 4-hour troponins   1139 CT abdomen pelvis with contrast     IMPRESSION:     Acute pancreatitis as described with surrounding peripancreatic fluid and small cystic foci within the head and tail of the pancreas, slightly increased  compared to 9/24/2024.     Hepatic steatosis.     1201 On reevaluation, patient resting comfortably.  No current complaint of pain.  I reviewed the results with her.  She does have another case of acute pancreatitis.  She reports she does not want to stay in the hospital because she is afraid she will lose her job.  Will start with clear liquids to p.o. challenge and determine if she is able to tolerate them as well as the pain.  If she is able to, will prepare her for discharge with nausea medications, pain medications, and strict return precautions.  I message Dr. Coleman to run this plan by her and to get her formal recommendations from the inpatient team   1230 Patient able to tolerate fluids without nausea.  She does report a return of the left upper quadrant abdominal pain that she rates as a 5 out of 10.  I offered her admission once more, she continues to want to go home to try to go to her job.  Plan made for scheduled course of Tylenol, NSAIDs, Zofran as needed, and small amount of oxycodone as needed for severe pain.  I discussed strict return precautions which the patient demonstrates by teach back method.       Medications   sodium chloride 0.9 % bolus 1,000 mL (0 mL Intravenous Stopped 11/7/24 1131)   ondansetron (ZOFRAN) injection 4 mg (4 mg Intravenous Given 11/7/24 1032)   ketorolac (TORADOL) injection 15 mg (15 mg Intravenous Given 11/7/24 1031)   iohexol (OMNIPAQUE) 350 MG/ML injection (MULTI-DOSE) 100 mL (100 mL Intravenous Given 11/7/24 1113)   acetaminophen (TYLENOL) tablet 650 mg (650 mg Oral Given 11/7/24 1240)   oxyCODONE (ROXICODONE) IR tablet 5 mg (5 mg Oral Given 11/7/24 1240)       ED Risk Strat Scores                           SBIRT 20yo+      Flowsheet Row Most Recent Value   Initial Alcohol Screen: US AUDIT-C     1. How often do you have a drink containing alcohol? 0 Filed at: 11/07/2024 0957   2. How many drinks containing alcohol do you have on a typical day you are drinking?   "0 Filed at: 11/07/2024 0957   3a. Male UNDER 65: How often do you have five or more drinks on one occasion? 0 Filed at: 11/07/2024 0957   3b. FEMALE Any Age, or MALE 65+: How often do you have 4 or more drinks on one occassion? 0 Filed at: 11/07/2024 0957   Audit-C Score 0 Filed at: 11/07/2024 0957   DUANE: How many times in the past year have you...    Used an illegal drug or used a prescription medication for non-medical reasons? Never Filed at: 11/07/2024 0957                            History of Present Illness       Chief Complaint   Patient presents with    Abdominal Pain     Left abd, history of pancreatitis         Past Medical History:   Diagnosis Date    Acute alcoholic pancreatitis 11/03/2023    Hypertension     SIRS (systemic inflammatory response syndrome) (HCC) 04/10/2024      Past Surgical History:   Procedure Laterality Date    WISDOM TOOTH EXTRACTION        No family history on file.   Social History     Tobacco Use    Smoking status: Every Day     Current packs/day: 0.50     Types: Cigarettes     Passive exposure: Never    Smokeless tobacco: Never    Tobacco comments:     Per pt last drink was today 9/24/24 one glass of wine. Had previously stop on 4/4/24 for 1 month    Vaping Use    Vaping status: Never Used   Substance Use Topics    Alcohol use: Yes     Alcohol/week: 21.0 standard drinks of alcohol     Types: 21 Glasses of wine per week     Comment: \"occasional\"    Drug use: Never      E-Cigarette/Vaping    E-Cigarette Use Never User       E-Cigarette/Vaping Substances    Nicotine No     THC No     CBD No     Flavoring No     Other No     Unknown No       I have reviewed and agree with the history as documented.     The patient is a 34-year-old female with PMH of acute alcoholic pancreatitis and HTN presenting for evaluation of 1 day of left upper quadrant abdominal pain.  The patient notes starting last night with intermittent severe cramping spasm-like pains left upper quadrant which is most " like her last case of acute pancreatitis.  She endorses associated nausea and is vomited 2 times yellow bile-like appearance.  She has not yet taken anything for this pain.  She notes drinking 2-3 times weekly with last drink last night being wine.  She also reports having 3 loose within normal bowel movements today (denies blood and melanotic appearance).  She denies associated fevers, chills, chest pain, shortness of breath, and palpitations.  She continues to smoke half a pack a day of cigarettes.      History provided by:  Patient   used: No        Review of Systems   Constitutional:  Negative for chills and fever.   Respiratory:  Negative for cough and shortness of breath.    Cardiovascular:  Negative for chest pain.   Gastrointestinal:  Positive for abdominal pain, diarrhea, nausea and vomiting.   Musculoskeletal:  Positive for back pain (Some radiation of the left upper quadrant pain to the left lower back). Negative for gait problem.   Skin:  Negative for rash and wound.   All other systems reviewed and are negative.          Objective       ED Triage Vitals   Temperature Pulse Blood Pressure Respirations SpO2 Patient Position - Orthostatic VS   11/07/24 0951 11/07/24 0951 11/07/24 0951 11/07/24 0951 11/07/24 0951 11/07/24 0951   98.4 °F (36.9 °C) 78 145/95 18 99 % Sitting      Temp Source Heart Rate Source BP Location FiO2 (%) Pain Score    11/07/24 0951 11/07/24 0951 11/07/24 0951 -- 11/07/24 1031    Oral Monitor Right arm  9      Vitals      Date and Time Temp Pulse SpO2 Resp BP Pain Score FACES Pain Rating User   11/07/24 1240 -- -- -- -- -- 7 -- MS   11/07/24 1200 -- 74 99 % 18 140/86 -- -- MS   11/07/24 1045 -- 58 98 % 20 129/80 -- -- RD   11/07/24 1031 -- -- -- -- -- 9 -- RD   11/07/24 0951 98.4 °F (36.9 °C) 78 99 % 18 145/95 -- -- RD            Physical Exam  Vitals and nursing note reviewed.   Constitutional:       General: She is not in acute distress.     Appearance: Normal  appearance. She is well-developed. She is not ill-appearing, toxic-appearing or diaphoretic.      Comments: Evidencing moderate discomfort   HENT:      Head: Normocephalic and atraumatic.      Jaw: There is normal jaw occlusion.      Nose: Nose normal.      Mouth/Throat:      Mouth: Mucous membranes are moist.      Pharynx: Oropharynx is clear.   Eyes:      Extraocular Movements: Extraocular movements intact.      Conjunctiva/sclera: Conjunctivae normal.   Cardiovascular:      Rate and Rhythm: Normal rate and regular rhythm.      Pulses:           Radial pulses are 2+ on the right side and 2+ on the left side.        Dorsalis pedis pulses are 2+ on the right side and 2+ on the left side.      Heart sounds: Normal heart sounds, S1 normal and S2 normal.   Pulmonary:      Effort: Pulmonary effort is normal. No respiratory distress.      Breath sounds: Normal breath sounds and air entry.   Abdominal:      General: Bowel sounds are normal. There is no distension.      Palpations: Abdomen is soft.      Tenderness: There is generalized abdominal tenderness and tenderness in the right lower quadrant and left upper quadrant. There is guarding. There is no rebound. Negative signs include Watson's sign.   Musculoskeletal:         General: Normal range of motion.      Cervical back: Normal range of motion and neck supple.   Skin:     General: Skin is warm and dry.      Capillary Refill: Capillary refill takes less than 2 seconds.   Neurological:      General: No focal deficit present.      Mental Status: She is alert and oriented to person, place, and time. Mental status is at baseline.         Results Reviewed       Procedure Component Value Units Date/Time    HS Troponin I 4hr [[id number]]     Lab Status: No result Specimen: Blood     HS Troponin 0hr (reflex protocol) [[id number]]  (Normal) Collected: 11/07/24 1028    Lab Status: Final result Specimen: Blood from Arm, Left Updated: 11/07/24 1101     hs TnI 0hr 4 ng/L     HS  Troponin I 2hr [[id number]]     Lab Status: No result Specimen: Blood     Comprehensive metabolic panel [[id number]]  (Abnormal) Collected: 11/07/24 1028    Lab Status: Final result Specimen: Blood from Arm, Left Updated: 11/07/24 1101     Sodium 137 mmol/L      Potassium 3.7 mmol/L      Chloride 98 mmol/L      CO2 28 mmol/L      ANION GAP 11 mmol/L      BUN 6 mg/dL      Creatinine 0.45 mg/dL      Glucose 92 mg/dL      Calcium 8.5 mg/dL       U/L      ALT 52 U/L      Alkaline Phosphatase 79 U/L      Total Protein 7.2 g/dL      Albumin 4.2 g/dL      Total Bilirubin 0.64 mg/dL      eGFR 132 ml/min/1.73sq m     Narrative:      National Kid[employer] Foundation guidelines for Chronic Kidney Disease (CKD):     Stage 1 with normal or high GFR (GFR > 90 mL/min/1.73 square meters)    Stage 2 Mild CKD (GFR = 60-89 mL/min/1.73 square meters)    Stage 3A Moderate CKD (GFR = 45-59 mL/min/1.73 square meters)    Stage 3B Moderate CKD (GFR = 30-44 mL/min/1.73 square meters)    Stage 4 Severe CKD (GFR = 15-29 mL/min/1.73 square meters)    Stage 5 End Stage CKD (GFR <15 mL/min/1.73 square meters)  Note: GFR calculation is accurate only with a steady state creatinine    Lipase [[id number]]  (Abnormal) Collected: 11/07/24 1028    Lab Status: Final result Specimen: Blood from Arm, Left Updated: 11/07/24 1101     Lipase 488 u/L     UA w Reflex to Microscopic w Reflex to Culture [[id number]]  (Abnormal) Collected: 11/07/24 1030    Lab Status: Final result Specimen: Urine, Clean Catch Updated: 11/07/24 1048     Color, UA Light Yellow     Clarity, UA Clear     Specific Gravity, UA <1.005     pH, UA 6.0     Leukocytes, UA Negative     Nitrite, UA Negative     Protein, UA Negative mg/dl      Glucose, UA Negative mg/dl      Ketones, UA Negative mg/dl      Urobilinogen, UA <2.0 mg/dl      Bilirubin, UA Negative     Occult Blood, UA Negative    CBC and differential [[id number]]  (Abnormal) Collected: 11/07/24 1028    Lab Status: Final result  Specimen: Blood from Arm, Left Updated: 11/07/24 1039     WBC 6.62 Thousand/uL      RBC 3.72 Million/uL      Hemoglobin 13.5 g/dL      Hematocrit 39.7 %       fL      MCH 36.3 pg      MCHC 34.0 g/dL      RDW 12.0 %      MPV 11.0 fL      Platelets 191 Thousands/uL      nRBC 0 /100 WBCs      Segmented % 59 %      Immature Grans % 1 %      Lymphocytes % 24 %      Monocytes % 14 %      Eosinophils Relative 1 %      Basophils Relative 1 %      Absolute Neutrophils 3.94 Thousands/µL      Absolute Immature Grans 0.03 Thousand/uL      Absolute Lymphocytes 1.58 Thousands/µL      Absolute Monocytes 0.94 Thousand/µL      Eosinophils Absolute 0.05 Thousand/µL      Basophils Absolute 0.08 Thousands/µL     POCT pregnancy, urine [[id number]]  (Normal) Resulted: 11/07/24 1030    Lab Status: Final result Updated: 11/07/24 1030     EXT Preg Test, Ur Negative     Control Valid            CT abdomen pelvis with contrast   Final Interpretation by Reymundo Moura MD (11/07 1127)      Acute pancreatitis as described with surrounding peripancreatic fluid and small cystic foci within the head and tail of the pancreas, slightly increased compared to 9/24/2024.      Hepatic steatosis.      Workstation performed: [id number]             ECG 12 Lead Documentation Only    Date/Time: 11/7/2024 10:38 AM    Performed by: PATRICIA Dorado  Authorized by: PATRICIA Dorado    Indications / Diagnosis:  Left upper quadrant abdominal pain, N/V  ECG reviewed by me, the ED Provider: yes    Patient location:  ED  Previous ECG:     Previous ECG:  Compared to current    Comparison ECG info:  September 24, 2024    Similarity:  No change    Comparison to cardiac monitor: Yes    Interpretation:     Interpretation: normal    Rate:     ECG rate:  73    ECG rate assessment: normal    Rhythm:     Rhythm: sinus rhythm    Ectopy:     Ectopy: none    QRS:     QRS axis:  Normal    QRS intervals:  Normal  Conduction:     Conduction: normal    ST  segments:     ST segments:  Normal  T waves:     T waves: normal    Comments:      Sinus rhythm, normal axis, normal intervals, no acute ischemic changes read by me      ED Medication and Procedure Management   Prior to Admission Medications   Prescriptions Last Dose Informant Patient Reported? Taking?   chlordiazePOXIDE (LIBRIUM) 25 mg capsule   No No   Sig: Take 1 capsule (25 mg total) by mouth every 8 (eight) hours for 1 day, THEN 1 capsule (25 mg total) every 12 (twelve) hours for 1 day, THEN 1 capsule (25 mg total) in the morning for 1 day.   folic acid ( Folic Acid) 1 mg tablet   No No   Sig: Take 1 tablet (1 mg total) by mouth daily   naloxone (NARCAN) 4 mg/0.1 mL nasal spray   No No   Sig: Administer 1 spray into a nostril. If no response after 2-3 minutes, give another dose in the other nostril using a new spray.   ondansetron (ZOFRAN-ODT) 4 mg disintegrating tablet   No No   Sig: Take 1 tablet (4 mg total) by mouth every 6 (six) hours as needed for nausea or vomiting   oxyCODONE (Roxicodone) 5 immediate release tablet   No No   Sig: Take 1 tablet (5 mg total) by mouth every 6 (six) hours as needed for moderate pain or severe pain for up to 10 doses Max Daily Amount: 20 mg   pantoprazole (PROTONIX) 40 mg tablet   No No   Sig: Take 1 tablet (40 mg total) by mouth 2 (two) times a day      Facility-Administered Medications: None     Patient's Medications   Discharge Prescriptions    ACETAMINOPHEN (TYLENOL) 500 MG TABLET    Take 2 tablets (1,000 mg total) by mouth 3 (three) times a day as needed for mild pain or moderate pain for up to 5 days       Start Date: 11/7/2024 End Date: 11/12/2024       Order Dose: 1,000 mg       Quantity: 30 tablet    Refills: 0    IBUPROFEN (MOTRIN) 600 MG TABLET    Take 1 tablet (600 mg total) by mouth 3 (three) times a day with meals for 3 days, THEN 1 tablet (600 mg total) 3 (three) times a day as needed for mild pain for up to 3 days.       Start Date: 11/7/2024 End Date:  11/12/2024       Order Dose: --       Quantity: 18 tablet    Refills: 0    ONDANSETRON (ZOFRAN-ODT) 4 MG DISINTEGRATING TABLET    Take 1 tablet (4 mg total) by mouth every 6 (six) hours as needed for nausea or vomiting       Start Date: 11/7/2024 End Date: --       Order Dose: 4 mg       Quantity: 20 tablet    Refills: 0    OXYCODONE (ROXICODONE) 5 IMMEDIATE RELEASE TABLET    Take 1 tablet (5 mg total) by mouth every 6 (six) hours as needed for severe pain Max Daily Amount: 20 mg       Start Date: 11/7/2024 End Date: --       Order Dose: 5 mg       Quantity: 10 tablet    Refills: 0       ED SEPSIS DOCUMENTATION   Time reflects when diagnosis was documented in both MDM as applicable and the Disposition within this note       Time User Action Codes Description Comment    11/7/2024 12:30 PM Lala Coulter [K85.90] Acute pancreatitis                  PATRICIA Dorado  11/07/24 1243

## 2024-11-07 NOTE — DISCHARGE INSTRUCTIONS
Abstain from drinking alcohol.  Cut down on your cigarette use and try to quit smoking.  Drink clear liquids for the next 1 to 2 days.  Use the prescribed medications as directed for pain and nausea.  Follow-up with primary care and GI for reevaluation in 2 to 3 days.  Return to the ER if develop fever, persistent vomiting, uncontrolled pain, weakness, confusion, or lethargy.

## 2024-11-08 LAB
ATRIAL RATE: 73 BPM
P AXIS: 80 DEGREES
PR INTERVAL: 134 MS
QRS AXIS: 95 DEGREES
QRSD INTERVAL: 88 MS
QT INTERVAL: 430 MS
QTC INTERVAL: 473 MS
T WAVE AXIS: 73 DEGREES
VENTRICULAR RATE: 73 BPM

## 2024-11-08 PROCEDURE — 93010 ELECTROCARDIOGRAM REPORT: CPT | Performed by: INTERNAL MEDICINE

## 2024-11-23 ENCOUNTER — APPOINTMENT (EMERGENCY)
Dept: CT IMAGING | Facility: HOSPITAL | Age: 34
DRG: 439 | End: 2024-11-23
Payer: COMMERCIAL

## 2024-11-23 ENCOUNTER — HOSPITAL ENCOUNTER (INPATIENT)
Facility: HOSPITAL | Age: 34
LOS: 6 days | Discharge: HOME | DRG: 439 | End: 2024-11-29
Attending: EMERGENCY MEDICINE | Admitting: STUDENT IN AN ORGANIZED HEALTH CARE EDUCATION/TRAINING PROGRAM
Payer: COMMERCIAL

## 2024-11-23 DIAGNOSIS — K85.20 ALCOHOL-INDUCED ACUTE PANCREATITIS WITHOUT INFECTION OR NECROSIS: Primary | ICD-10-CM

## 2024-11-23 DIAGNOSIS — K85.90 ACUTE PANCREATITIS: ICD-10-CM

## 2024-11-23 DIAGNOSIS — F10.10 ALCOHOL ABUSE: ICD-10-CM

## 2024-11-23 DIAGNOSIS — K85.91 NECROTIZING PANCREATITIS: ICD-10-CM

## 2024-11-23 DIAGNOSIS — K85.90 PANCREATITIS: ICD-10-CM

## 2024-11-23 PROBLEM — E87.20 METABOLIC ACIDOSIS: Status: ACTIVE | Noted: 2024-11-23

## 2024-11-23 PROBLEM — F10.90 ALCOHOL USE DISORDER: Status: ACTIVE | Noted: 2024-11-23

## 2024-11-23 LAB
2HR DELTA HS TROPONIN: -1 NG/L
ALBUMIN SERPL BCG-MCNC: 4.1 G/DL (ref 3.5–5)
ALP SERPL-CCNC: 90 U/L (ref 34–104)
ALT SERPL W P-5'-P-CCNC: 48 U/L (ref 7–52)
ANION GAP SERPL CALCULATED.3IONS-SCNC: 13 MMOL/L (ref 4–13)
ANION GAP SERPL CALCULATED.3IONS-SCNC: 17 MMOL/L (ref 4–13)
AST SERPL W P-5'-P-CCNC: 84 U/L (ref 13–39)
ATRIAL RATE: 85 BPM
BACTERIA UR QL AUTO: ABNORMAL /HPF
BASOPHILS # BLD AUTO: 0.05 THOUSANDS/ÂΜL (ref 0–0.1)
BASOPHILS NFR BLD AUTO: 1 % (ref 0–1)
BILIRUB SERPL-MCNC: 1.58 MG/DL (ref 0.2–1)
BILIRUB UR QL STRIP: ABNORMAL
BUN SERPL-MCNC: 8 MG/DL (ref 5–25)
BUN SERPL-MCNC: 8 MG/DL (ref 5–25)
CALCIUM SERPL-MCNC: 7.8 MG/DL (ref 8.4–10.2)
CALCIUM SERPL-MCNC: 8.9 MG/DL (ref 8.4–10.2)
CARDIAC TROPONIN I PNL SERPL HS: 3 NG/L (ref ?–50)
CARDIAC TROPONIN I PNL SERPL HS: 4 NG/L (ref ?–50)
CHLORIDE SERPL-SCNC: 94 MMOL/L (ref 96–108)
CHLORIDE SERPL-SCNC: 98 MMOL/L (ref 96–108)
CLARITY UR: ABNORMAL
CO2 SERPL-SCNC: 28 MMOL/L (ref 21–32)
CO2 SERPL-SCNC: 28 MMOL/L (ref 21–32)
COLOR UR: YELLOW
CREAT SERPL-MCNC: 0.46 MG/DL (ref 0.6–1.3)
CREAT SERPL-MCNC: 0.49 MG/DL (ref 0.6–1.3)
EOSINOPHIL # BLD AUTO: 0.02 THOUSAND/ÂΜL (ref 0–0.61)
EOSINOPHIL NFR BLD AUTO: 0 % (ref 0–6)
ERYTHROCYTE [DISTWIDTH] IN BLOOD BY AUTOMATED COUNT: 12 % (ref 11.6–15.1)
EXT PREGNANCY TEST URINE: NEGATIVE
EXT. CONTROL: NORMAL
GFR SERPL CREATININE-BSD FRML MDRD: 128 ML/MIN/1.73SQ M
GFR SERPL CREATININE-BSD FRML MDRD: 131 ML/MIN/1.73SQ M
GLUCOSE SERPL-MCNC: 92 MG/DL (ref 65–140)
GLUCOSE SERPL-MCNC: 99 MG/DL (ref 65–140)
GLUCOSE UR STRIP-MCNC: NEGATIVE MG/DL
HCT VFR BLD AUTO: 42 % (ref 34.8–46.1)
HGB BLD-MCNC: 13.9 G/DL (ref 11.5–15.4)
HGB UR QL STRIP.AUTO: ABNORMAL
IMM GRANULOCYTES # BLD AUTO: 0.02 THOUSAND/UL (ref 0–0.2)
IMM GRANULOCYTES NFR BLD AUTO: 0 % (ref 0–2)
KETONES UR STRIP-MCNC: ABNORMAL MG/DL
LACTATE SERPL-SCNC: 1.3 MMOL/L (ref 0.5–2)
LDH SERPL-CCNC: 188 U/L (ref 140–271)
LEUKOCYTE ESTERASE UR QL STRIP: ABNORMAL
LIPASE SERPL-CCNC: 993 U/L (ref 11–82)
LYMPHOCYTES # BLD AUTO: 0.82 THOUSANDS/ÂΜL (ref 0.6–4.47)
LYMPHOCYTES NFR BLD AUTO: 10 % (ref 14–44)
MCH RBC QN AUTO: 35.6 PG (ref 26.8–34.3)
MCHC RBC AUTO-ENTMCNC: 33.1 G/DL (ref 31.4–37.4)
MCV RBC AUTO: 108 FL (ref 82–98)
MONOCYTES # BLD AUTO: 0.83 THOUSAND/ÂΜL (ref 0.17–1.22)
MONOCYTES NFR BLD AUTO: 10 % (ref 4–12)
MUCOUS THREADS UR QL AUTO: ABNORMAL
NEUTROPHILS # BLD AUTO: 6.61 THOUSANDS/ÂΜL (ref 1.85–7.62)
NEUTS SEG NFR BLD AUTO: 79 % (ref 43–75)
NITRITE UR QL STRIP: NEGATIVE
NON-SQ EPI CELLS URNS QL MICRO: ABNORMAL /HPF
NRBC BLD AUTO-RTO: 0 /100 WBCS
P AXIS: 60 DEGREES
PH UR STRIP.AUTO: 6 [PH]
PLATELET # BLD AUTO: 201 THOUSANDS/UL (ref 149–390)
PMV BLD AUTO: 10.9 FL (ref 8.9–12.7)
POTASSIUM SERPL-SCNC: 3.5 MMOL/L (ref 3.5–5.3)
POTASSIUM SERPL-SCNC: 3.8 MMOL/L (ref 3.5–5.3)
PR INTERVAL: 114 MS
PROT SERPL-MCNC: 7.3 G/DL (ref 6.4–8.4)
PROT UR STRIP-MCNC: ABNORMAL MG/DL
QRS AXIS: 20 DEGREES
QRSD INTERVAL: 100 MS
QT INTERVAL: 342 MS
QTC INTERVAL: 406 MS
RBC # BLD AUTO: 3.9 MILLION/UL (ref 3.81–5.12)
RBC #/AREA URNS AUTO: ABNORMAL /HPF
SODIUM SERPL-SCNC: 139 MMOL/L (ref 135–147)
SODIUM SERPL-SCNC: 139 MMOL/L (ref 135–147)
SP GR UR STRIP.AUTO: >=1.03 (ref 1–1.03)
T WAVE AXIS: 89 DEGREES
UROBILINOGEN UR STRIP-ACNC: 2 MG/DL
VENTRICULAR RATE: 85 BPM
WBC # BLD AUTO: 8.35 THOUSAND/UL (ref 4.31–10.16)
WBC #/AREA URNS AUTO: ABNORMAL /HPF

## 2024-11-23 PROCEDURE — 99284 EMERGENCY DEPT VISIT MOD MDM: CPT

## 2024-11-23 PROCEDURE — 85025 COMPLETE CBC W/AUTO DIFF WBC: CPT | Performed by: EMERGENCY MEDICINE

## 2024-11-23 PROCEDURE — 96361 HYDRATE IV INFUSION ADD-ON: CPT

## 2024-11-23 PROCEDURE — 74177 CT ABD & PELVIS W/CONTRAST: CPT

## 2024-11-23 PROCEDURE — 80048 BASIC METABOLIC PNL TOTAL CA: CPT | Performed by: PHYSICIAN ASSISTANT

## 2024-11-23 PROCEDURE — 80053 COMPREHEN METABOLIC PANEL: CPT | Performed by: EMERGENCY MEDICINE

## 2024-11-23 PROCEDURE — 96375 TX/PRO/DX INJ NEW DRUG ADDON: CPT

## 2024-11-23 PROCEDURE — 99254 IP/OBS CNSLTJ NEW/EST MOD 60: CPT | Performed by: PHYSICIAN ASSISTANT

## 2024-11-23 PROCEDURE — 99285 EMERGENCY DEPT VISIT HI MDM: CPT | Performed by: EMERGENCY MEDICINE

## 2024-11-23 PROCEDURE — 99223 1ST HOSP IP/OBS HIGH 75: CPT | Performed by: INTERNAL MEDICINE

## 2024-11-23 PROCEDURE — 83605 ASSAY OF LACTIC ACID: CPT | Performed by: PHYSICIAN ASSISTANT

## 2024-11-23 PROCEDURE — 87086 URINE CULTURE/COLONY COUNT: CPT | Performed by: EMERGENCY MEDICINE

## 2024-11-23 PROCEDURE — 83615 LACTATE (LD) (LDH) ENZYME: CPT

## 2024-11-23 PROCEDURE — 81001 URINALYSIS AUTO W/SCOPE: CPT | Performed by: EMERGENCY MEDICINE

## 2024-11-23 PROCEDURE — 93010 ELECTROCARDIOGRAM REPORT: CPT | Performed by: INTERNAL MEDICINE

## 2024-11-23 PROCEDURE — 96374 THER/PROPH/DIAG INJ IV PUSH: CPT

## 2024-11-23 PROCEDURE — 83690 ASSAY OF LIPASE: CPT | Performed by: EMERGENCY MEDICINE

## 2024-11-23 PROCEDURE — 84484 ASSAY OF TROPONIN QUANT: CPT | Performed by: EMERGENCY MEDICINE

## 2024-11-23 PROCEDURE — 93005 ELECTROCARDIOGRAM TRACING: CPT

## 2024-11-23 PROCEDURE — 81025 URINE PREGNANCY TEST: CPT | Performed by: EMERGENCY MEDICINE

## 2024-11-23 PROCEDURE — 36415 COLL VENOUS BLD VENIPUNCTURE: CPT | Performed by: EMERGENCY MEDICINE

## 2024-11-23 RX ORDER — SODIUM CHLORIDE, SODIUM LACTATE, POTASSIUM CHLORIDE, CALCIUM CHLORIDE 600; 310; 30; 20 MG/100ML; MG/100ML; MG/100ML; MG/100ML
100 INJECTION, SOLUTION INTRAVENOUS CONTINUOUS
Status: DISCONTINUED | OUTPATIENT
Start: 2024-11-23 | End: 2024-11-27

## 2024-11-23 RX ORDER — DROPERIDOL 2.5 MG/ML
0.62 INJECTION, SOLUTION INTRAMUSCULAR; INTRAVENOUS ONCE
Status: COMPLETED | OUTPATIENT
Start: 2024-11-23 | End: 2024-11-23

## 2024-11-23 RX ORDER — HYDROMORPHONE HCL/PF 1 MG/ML
1 SYRINGE (ML) INJECTION EVERY 4 HOURS PRN
Status: DISCONTINUED | OUTPATIENT
Start: 2024-11-23 | End: 2024-11-26

## 2024-11-23 RX ORDER — SODIUM CHLORIDE, SODIUM GLUCONATE, SODIUM ACETATE, POTASSIUM CHLORIDE, MAGNESIUM CHLORIDE, SODIUM PHOSPHATE, DIBASIC, AND POTASSIUM PHOSPHATE .53; .5; .37; .037; .03; .012; .00082 G/100ML; G/100ML; G/100ML; G/100ML; G/100ML; G/100ML; G/100ML
200 INJECTION, SOLUTION INTRAVENOUS CONTINUOUS
Status: DISCONTINUED | OUTPATIENT
Start: 2024-11-23 | End: 2024-11-23

## 2024-11-23 RX ORDER — HYDROMORPHONE HCL/PF 1 MG/ML
1 SYRINGE (ML) INJECTION ONCE
Refills: 0 | Status: COMPLETED | OUTPATIENT
Start: 2024-11-23 | End: 2024-11-23

## 2024-11-23 RX ORDER — PANTOPRAZOLE SODIUM 40 MG/10ML
40 INJECTION, POWDER, LYOPHILIZED, FOR SOLUTION INTRAVENOUS EVERY 12 HOURS SCHEDULED
Status: DISCONTINUED | OUTPATIENT
Start: 2024-11-23 | End: 2024-11-29 | Stop reason: HOSPADM

## 2024-11-23 RX ORDER — ONDANSETRON 2 MG/ML
4 INJECTION INTRAMUSCULAR; INTRAVENOUS ONCE
Status: COMPLETED | OUTPATIENT
Start: 2024-11-23 | End: 2024-11-23

## 2024-11-23 RX ORDER — CHLORDIAZEPOXIDE HYDROCHLORIDE 25 MG/1
25 CAPSULE, GELATIN COATED ORAL EVERY 8 HOURS
Status: DISCONTINUED | OUTPATIENT
Start: 2024-11-23 | End: 2024-11-25

## 2024-11-23 RX ORDER — ONDANSETRON 2 MG/ML
4 INJECTION INTRAMUSCULAR; INTRAVENOUS EVERY 6 HOURS PRN
Status: DISCONTINUED | OUTPATIENT
Start: 2024-11-23 | End: 2024-11-29 | Stop reason: HOSPADM

## 2024-11-23 RX ORDER — NICOTINE 21 MG/24HR
21 PATCH, TRANSDERMAL 24 HOURS TRANSDERMAL EVERY EVENING
Status: DISCONTINUED | OUTPATIENT
Start: 2024-11-23 | End: 2024-11-29 | Stop reason: HOSPADM

## 2024-11-23 RX ADMIN — PANTOPRAZOLE SODIUM 40 MG: 40 INJECTION, POWDER, FOR SOLUTION INTRAVENOUS at 15:34

## 2024-11-23 RX ADMIN — THIAMINE HYDROCHLORIDE: 100 INJECTION, SOLUTION INTRAMUSCULAR; INTRAVENOUS at 16:48

## 2024-11-23 RX ADMIN — CHLORDIAZEPOXIDE HYDROCHLORIDE 25 MG: 25 CAPSULE ORAL at 22:04

## 2024-11-23 RX ADMIN — HYDROMORPHONE HYDROCHLORIDE 1 MG: 1 INJECTION, SOLUTION INTRAMUSCULAR; INTRAVENOUS; SUBCUTANEOUS at 20:15

## 2024-11-23 RX ADMIN — HYDROMORPHONE HYDROCHLORIDE 1 MG: 1 INJECTION, SOLUTION INTRAMUSCULAR; INTRAVENOUS; SUBCUTANEOUS at 15:24

## 2024-11-23 RX ADMIN — SODIUM CHLORIDE, SODIUM LACTATE, POTASSIUM CHLORIDE, AND CALCIUM CHLORIDE 200 ML/HR: .6; .31; .03; .02 INJECTION, SOLUTION INTRAVENOUS at 15:23

## 2024-11-23 RX ADMIN — CHLORDIAZEPOXIDE HYDROCHLORIDE 25 MG: 25 CAPSULE ORAL at 15:34

## 2024-11-23 RX ADMIN — IOHEXOL 100 ML: 350 INJECTION, SOLUTION INTRAVENOUS at 13:47

## 2024-11-23 RX ADMIN — HYDROMORPHONE HYDROCHLORIDE 1 MG: 1 INJECTION, SOLUTION INTRAMUSCULAR; INTRAVENOUS; SUBCUTANEOUS at 11:43

## 2024-11-23 RX ADMIN — ONDANSETRON 4 MG: 2 INJECTION INTRAMUSCULAR; INTRAVENOUS at 11:42

## 2024-11-23 RX ADMIN — ONDANSETRON 4 MG: 2 INJECTION INTRAMUSCULAR; INTRAVENOUS at 22:04

## 2024-11-23 RX ADMIN — NICOTINE 21 MG: 21 PATCH, EXTENDED RELEASE TRANSDERMAL at 15:34

## 2024-11-23 RX ADMIN — DROPERIDOL 0.62 MG: 2.5 INJECTION, SOLUTION INTRAMUSCULAR; INTRAVENOUS at 13:43

## 2024-11-23 RX ADMIN — SODIUM CHLORIDE, SODIUM LACTATE, POTASSIUM CHLORIDE, AND CALCIUM CHLORIDE 200 ML/HR: .6; .31; .03; .02 INJECTION, SOLUTION INTRAVENOUS at 22:03

## 2024-11-23 RX ADMIN — SODIUM CHLORIDE 1000 ML: 0.9 INJECTION, SOLUTION INTRAVENOUS at 11:46

## 2024-11-23 NOTE — LETTER
[facility] MED [facility]G [facility]  [address] [id number]  Dept: [phone]    November 29, 2024     Patient: Kaykay Holland   YOB: 1990   Date of Visit: 11/23/2024       To Whom it May Concern:    Kaykay Holland is under my professional care. She was seen in the hospital from 11/23/2024 to 11/29/24. She may return to work on 12/2/24 without limitations.    If you have any questions or concerns, please don't hesitate to call.         Sincerely,          Amira Ruiz MD

## 2024-11-23 NOTE — ASSESSMENT & PLAN NOTE
Patient reports ongoing alcohol use, previously had reported drinking upwards of 2-3 bottles daily  States recently she has been having intermittent glasses of wine, last reported drink earlier this week  Does have remote history of withdrawal seizures  IV thiamine/folic acid  Start librium  CIWA protocol  Encouraged complete alcohol cessation.  Not currently interested in rehab resources

## 2024-11-23 NOTE — ASSESSMENT & PLAN NOTE
Presented to the emergency department with 1 day history of epigastric pain, nausea and bilious vomiting similar to prior episodes of pancreatitis  Suspect alcohol induced  CT abdomen/pelvis: Ongoing acute pancreatitis, now with findings of pancreatic necrosis and acute necrotic collection as described.  Hepatic steatosis.  Lipase 993  S/p 2 L IVF with normal saline in the ED - start IV resuscitation with lactated ringers  NPO, pain control  Discussed with GI - hold off on abx unless leukocytosis, fever  Given new findings of necrosis and necrotic collection, will consult general surgery   Discussed complete alcohol cessation

## 2024-11-23 NOTE — PLAN OF CARE
Problem: Potential for Falls  Goal: Patient will remain free of falls  Description: INTERVENTIONS:  - Educate patient/family on patient safety including physical limitations  - Instruct patient to call for assistance with activity   - Consult OT/PT to assist with strengthening/mobility   - Keep Call bell within reach  - Keep bed low and locked with side rails adjusted as appropriate  - Keep care items and personal belongings within reach  - Initiate and maintain comfort rounds  - Make Fall Risk Sign visible to staff  - Offer Toileting every 2 Hours, in advance of need  - Initiate/Maintain shift alarm  - Obtain necessary fall risk management equipment: socks  - Apply yellow socks and bracelet for high fall risk patients  - Consider moving patient to room near nurses station  Outcome: Progressing     Problem: PAIN - ADULT  Goal: Verbalizes/displays adequate comfort level or baseline comfort level  Description: Interventions:  - Encourage patient to monitor pain and request assistance  - Assess pain using appropriate pain scale  - Administer analgesics based on type and severity of pain and evaluate response  - Implement non-pharmacological measures as appropriate and evaluate response  - Consider cultural and social influences on pain and pain management  - Notify physician/advanced practitioner if interventions unsuccessful or patient reports new pain  Outcome: Progressing     Problem: INFECTION - ADULT  Goal: Absence or prevention of progression during hospitalization  Description: INTERVENTIONS:  - Assess and monitor for signs and symptoms of infection  - Monitor lab/diagnostic results  - Monitor all insertion sites, i.e. indwelling lines, tubes, and drains  - Monitor endotracheal if appropriate and nasal secretions for changes in amount and color  - [place] appropriate cooling/warming therapies per order  - Administer medications as ordered  - Instruct and encourage patient and family to use good hand hygiene  technique  - Identify and instruct in appropriate isolation precautions for identified infection/condition  Outcome: Progressing  Goal: Absence of fever/infection during neutropenic period  Description: INTERVENTIONS:  - Monitor WBC    Outcome: Progressing     Problem: SAFETY ADULT  Goal: Patient will remain free of falls  Description: INTERVENTIONS:  - Educate patient/family on patient safety including physical limitations  - Instruct patient to call for assistance with activity   - Consult OT/PT to assist with strengthening/mobility   - Keep Call bell within reach  - Keep bed low and locked with side rails adjusted as appropriate  - Keep care items and personal belongings within reach  - Initiate and maintain comfort rounds  - Make Fall Risk Sign visible to staff  - Offer Toileting every 2 Hours, in advance of need  - Initiate/Maintain shift alarm  - Obtain necessary fall risk management equipment: socks  - Apply yellow socks and bracelet for high fall risk patients  - Consider moving patient to room near nurses station  Outcome: Progressing  Goal: Maintain or return to baseline ADL function  Description: INTERVENTIONS:  -  Assess patient's ability to carry out ADLs; assess patient's baseline for ADL function and identify physical deficits which impact ability to perform ADLs (bathing, care of mouth/teeth, toileting, grooming, dressing, etc.)  - Assess/evaluate cause of self-care deficits   - Assess range of motion  - Assess patient's mobility; develop plan if impaired  - Assess patient's need for assistive devices and provide as appropriate  - Encourage maximum independence but intervene and supervise when necessary  - Involve family in performance of ADLs  - Assess for home care needs following discharge   - Consider OT consult to assist with ADL evaluation and planning for discharge  - Provide patient education as appropriate  Outcome: Progressing  Goal: Maintains/Returns to pre admission functional  level  Description: INTERVENTIONS:  - Perform AM-PAC 6 Click Basic Mobility/ Daily Activity assessment daily.  - Set and communicate daily mobility goal to care team and patient/family/caregiver.   - Collaborate with rehabilitation services on mobility goals if consulted  - Perform Range of Motion 2 times a day.  - Reposition patient every 2 hours.  - Dangle patient 2 times a day  - Stand patient 2 times a day  - Ambulate patient 2 times a day  - Out of bed to chair 2 times a day   - Out of bed for meals 2 times a day  - Out of bed for toileting  - Record patient progress and toleration of activity level   Outcome: Progressing     Problem: DISCHARGE PLANNING  Goal: Discharge to home or other facility with appropriate resources  Description: INTERVENTIONS:  - Identify barriers to discharge w/patient and caregiver  - Arrange for needed discharge resources and transportation as appropriate  - Identify discharge learning needs (meds, wound care, etc.)  - Arrange for interpretive services to assist at discharge as needed  - Refer to Case Management Department for coordinating discharge planning if the patient needs post-hospital services based on physician/advanced practitioner order or complex needs related to functional status, cognitive ability, or social support system  Outcome: Progressing     Problem: Knowledge Deficit  Goal: Patient/family/caregiver demonstrates understanding of disease process, treatment plan, medications, and discharge instructions  Description: Complete learning assessment and assess knowledge base.  Interventions:  - Provide teaching at level of understanding  - Provide teaching via preferred learning methods  Outcome: Progressing     Problem: GASTROINTESTINAL - ADULT  Goal: Minimal or absence of nausea and/or vomiting  Description: INTERVENTIONS:  - Administer IV fluids if ordered to ensure adequate hydration  - Maintain NPO status until nausea and vomiting are resolved  - Nasogastric tube if  ordered  - Administer ordered antiemetic medications as needed  - Provide nonpharmacologic comfort measures as appropriate  - Advance diet as tolerated, if ordered  - Consider nutrition services referral to assist patient with adequate nutrition and appropriate food choices  Outcome: Progressing  Goal: Maintains adequate nutritional intake  Description: INTERVENTIONS:  - Monitor percentage of each meal consumed  - Identify factors contributing to decreased intake, treat as appropriate  - Assist with meals as needed  - Monitor I&O, weight, and lab values if indicated  - Obtain nutrition services referral as needed  Outcome: Progressing

## 2024-11-23 NOTE — CONSULTS
Consultation - Surgery-General   Name: Kaykay Holland 34 y.o. female I MRN: [id number]  Unit/Bed#: ED 08 I Date of Admission: 11/23/2024   Date of Service: 11/23/2024 I Hospital Day: 0   Inpatient consult to Acute Care Surgery  Consult performed by: Kari Azar PA-C  Consult ordered by: Jordana Lewis PA-C        Physician Requesting Evaluation: Soheila Coleman MD   Reason for Evaluation / Principal Problem: Acute on chronic alcoholic pancreatitis    Assessment & Plan  Necrotizing pancreatitis  Pt is 35 y/o female with pmh for HTN, prolonged QT, tobacco use, ETOH abuse, prior h/o ETOH pancreatitis, PUD and seizures who presents to the ER with 2 day h/o intractable N/V.    CT: Ongoing acute pancreatitis, now with findings of pancreatic necrosis and acute necrotic collection as described.     VSS, afeb  WBC 8.35  Lipase 993  Tbil 1.58  Lactic pending    Exam: LUQ TTP with guarding, decreased BS    Plan:  Strict NPO  Aggressive IVF  GI eval  Serial exams  Trend lipase, vitals, wbc  Pain control  Close monitoring   If continues to vomit may require NGT  If pt becomes septic/infected would consider tx to BE  Plan discussed with Dr Saba   No plan for acute surgical intervention at this time.    Tobacco abuse  Nicotine patch   Hepatic steatosis  H/o ETOH abuse  Alcohol use disorder  -CIWA  Metabolic acidosis  -anion gap of 17 on admit  -IVF resuscitation  -trend labs, lactic bmp         History of Present Illness   Kaykay Holland is a 34 y.o. female with pmh for HTN, prolonged QT, tobacco use, ETOH abuse, ETOH pancreatitis, PUD and seizures who presents to the ER with 2 day h/o intractable N/V. She states no BM for 2 days but, also unable to eat for 2 days. No fevers, chills. No CP or SOB. She does report LUQ abd pain.     Review of Systems   Constitutional:  Negative for chills and fever.   HENT:  Negative for ear pain and sore throat.    Eyes:  Negative for pain and visual disturbance.    Respiratory:  Negative for cough and shortness of breath.    Cardiovascular:  Negative for chest pain and palpitations.   Gastrointestinal:  Positive for abdominal pain, nausea and vomiting.   Genitourinary:  Negative for dysuria and hematuria.   Musculoskeletal:  Negative for arthralgias and back pain.   Skin:  Negative for color change and rash.   Neurological:  Positive for dizziness and seizures. Negative for syncope.   All other systems reviewed and are negative.    I have reviewed the patient's PMH, PSH, Social History, Family History, Meds, and Allergies    Objective :  Temp:  [97.5 °F (36.4 °C)] 97.5 °F (36.4 °C)  HR:  [69-90] 84  BP: (145-187)/() 187/95  Resp:  [16-20] 20  SpO2:  [95 %-100 %] 95 %  O2 Device: None (Room air)      Physical Exam  Vitals and nursing note reviewed.   Constitutional:       General: She is not in acute distress.     Appearance: She is well-developed.   HENT:      Head: Normocephalic and atraumatic.   Eyes:      Conjunctiva/sclera: Conjunctivae normal.   Cardiovascular:      Rate and Rhythm: Normal rate and regular rhythm.      Heart sounds: No murmur heard.  Pulmonary:      Effort: Pulmonary effort is normal. No respiratory distress.      Breath sounds: Normal breath sounds.   Abdominal:      General: There is no distension.      Palpations: Abdomen is soft.      Tenderness: There is abdominal tenderness. There is guarding.      Comments: LUQ tenderness with guarding noted. Decreased BS noted.    Musculoskeletal:         General: No swelling.      Cervical back: Neck supple.   Skin:     General: Skin is warm and dry.      Capillary Refill: Capillary refill takes less than 2 seconds.   Neurological:      Mental Status: She is alert.   Psychiatric:         Mood and Affect: Mood normal.         Lab Results: I have reviewed the following results:  Recent Labs     11/23/24  1135   WBC 8.35   HGB 13.9   HCT 42.0      SODIUM 139   K 3.5   CL 94*   CO2 28   BUN 8    CREATININE 0.46*   GLUC 92   AST 84*   ALT 48   ALB 4.1   TBILI 1.58*   ALKPHOS 90   HSTNI0 4       Imaging Results Review: I reviewed radiology reports from this admission including: CT abdomen/pelvis.  Other Study Results Review: No additional pertinent studies reviewed.    VTE Pharmacologic Prophylaxis: Sequential compression device (Venodyne)   VTE Mechanical Prophylaxis: sequential compression device

## 2024-11-23 NOTE — ASSESSMENT & PLAN NOTE
Noted anion gap of 17 on admission  Suspect acidosis in setting of dehydration/pancreatitis, alcohol abuse  Check lactic acid and repeat BMP  Received 2 L IVF NSS - avoid further normal saline

## 2024-11-23 NOTE — ASSESSMENT & PLAN NOTE
Pt is 35 y/o female with pmh for HTN, prolonged QT, tobacco use, ETOH abuse, prior h/o ETOH pancreatitis, PUD and seizures who presents to the ER with 2 day h/o intractable N/V.    CT: Ongoing acute pancreatitis, now with findings of pancreatic necrosis and acute necrotic collection as described.     VSS, afeb  WBC 8.35  Lipase 993  Tbil 1.58  Lactic pending    Exam: LUQ TTP with guarding, decreased BS    Plan:  Strict NPO  Aggressive IVF  GI eval  Serial exams  Trend lipase, vitals, wbc  Pain control  Close monitoring   If continues to vomit may require NGT  If pt becomes septic/infected would consider tx to BE  Plan discussed with Dr Saba   No plan for acute surgical intervention at this time.

## 2024-11-23 NOTE — H&P
H&P - Hospitalist   Name: Kaykay Holland 34 y.o. female I MRN: [id number]  Unit/Bed#: ED 08 I Date of Admission: 11/23/2024   Date of Service: 11/23/2024 I Hospital Day: 0     Assessment & Plan  Necrotizing pancreatitis  Presented to the emergency department with 1 day history of epigastric pain, nausea and bilious vomiting similar to prior episodes of pancreatitis  Suspect alcohol induced  CT abdomen/pelvis: Ongoing acute pancreatitis, now with findings of pancreatic necrosis and acute necrotic collection as described.  Hepatic steatosis.  Lipase 993  S/p 2 L IVF with normal saline in the ED - start IV resuscitation with lactated ringers  NPO, pain control  Discussed with GI - hold off on abx unless leukocytosis, fever  Given new findings of necrosis and necrotic collection, will consult general surgery   Discussed complete alcohol cessation  Alcohol use disorder  Patient reports ongoing alcohol use, previously had reported drinking upwards of 2-3 bottles daily  States recently she has been having intermittent glasses of wine, last reported drink earlier this week  Does have remote history of withdrawal seizures  IV thiamine/folic acid  Start librium  CIWA protocol  Encouraged complete alcohol cessation.  Not currently interested in rehab resources  Tobacco abuse  Smokes 1 ppd  Nicotine patch ordered  Hepatic steatosis  Noted on CT imaging  Likely in setting of ongoing alcohol abuse  Metabolic acidosis  Noted anion gap of 17 on admission  Suspect acidosis in setting of dehydration/pancreatitis, alcohol abuse  Check lactic acid and repeat BMP  Received 2 L IVF NSS - avoid further normal saline     VTE Pharmacologic Prophylaxis: VTE Score: 0 Low Risk (Score 0-2) - Encourage Ambulation.  Code Status: Level 1 - Full Code   Discussion with family: Updated  (friend) at bedside.    Anticipated Length of Stay: Patient will be admitted on an inpatient basis with an anticipated length of stay of greater than  2 midnights secondary to necrotizing pancreatitis.    History of Present Illness   Chief Complaint: Abdominal pain    Kaykay Holland is a 34 y.o. female with a PMH of alcohol use disorder, prior pancreatitis, tobacco use, hepatic steatosis who presents with abdominal pain.    Patient presented to the emergency department with recurrent epigastric pain, nausea and bilious vomiting.  Of note patient has had several occurrences of acute pancreatitis related to alcohol use disorder.  Patient currently reports she is drinking intermittently several glasses of wine, last drink earlier this week.  Does report remote history of alcohol withdrawal seizures.  Currently denies chest pain/palpitations, constipation, diarrhea, fever/chills.    Review of Systems   Constitutional:  Negative for chills, fatigue, fever and unexpected weight change.   HENT:  Negative for congestion, sore throat and trouble swallowing.    Eyes:  Negative for photophobia, pain and visual disturbance.   Respiratory:  Negative for cough, shortness of breath and wheezing.    Cardiovascular:  Negative for chest pain, palpitations and leg swelling.   Gastrointestinal:  Positive for abdominal pain, nausea and vomiting. Negative for constipation and diarrhea.   Endocrine: Negative for polyuria.   Genitourinary:  Negative for difficulty urinating, dysuria, flank pain, hematuria and urgency.   Musculoskeletal:  Negative for back pain, myalgias, neck pain and neck stiffness.   Skin:  Negative for pallor and rash.   Neurological:  Negative for dizziness, tremors, syncope, weakness, light-headedness, numbness and headaches.   Hematological:  Does not bruise/bleed easily.   Psychiatric/Behavioral:  Negative for agitation and confusion.        Historical Information   Past Medical History:   Diagnosis Date    Acute alcoholic pancreatitis 11/03/2023    Hypertension     SIRS (systemic inflammatory response syndrome) (HCC) 04/10/2024     Past Surgical History:  "  Procedure Laterality Date    WISDOM TOOTH EXTRACTION       Social History     Tobacco Use    Smoking status: Every Day     Current packs/day: 0.50     Types: Cigarettes     Passive exposure: Never    Smokeless tobacco: Never    Tobacco comments:     Per pt last drink was today 9/24/24 one glass of wine. Had previously stop on 4/4/24 for 1 month    Vaping Use    Vaping status: Never Used   Substance and Sexual Activity    Alcohol use: Yes     Alcohol/week: 21.0 standard drinks of alcohol     Types: 21 Glasses of wine per week     Comment: \"occasional\"    Drug use: Never    Sexual activity: Not on file     E-Cigarette/Vaping    E-Cigarette Use Never User      E-Cigarette/Vaping Substances    Nicotine No     THC No     CBD No     Flavoring No     Other No     Unknown No      History reviewed. No pertinent family history.  Social History:  Marital Status: Single   Occupation:   Patient Pre-hospital Living Situation: Home  Patient Pre-hospital Level of Mobility: walks  Patient Pre-hospital Diet Restrictions:     Meds/Allergies   I have reviewed home medications with patient personally.  Prior to Admission medications    Medication Sig Start Date End Date Taking? Authorizing Provider   chlordiazePOXIDE (LIBRIUM) 25 mg capsule Take 1 capsule (25 mg total) by mouth every 8 (eight) hours for 1 day, THEN 1 capsule (25 mg total) every 12 (twelve) hours for 1 day, THEN 1 capsule (25 mg total) in the morning for 1 day. 9/26/24 9/29/24  Jordana Lewis PA-C   folic acid (KP Folic Acid) 1 mg tablet Take 1 tablet (1 mg total) by mouth daily 9/26/24   Jordana Lewis PA-C   ibuprofen (MOTRIN) 600 mg tablet Take 1 tablet (600 mg total) by mouth 3 (three) times a day with meals for 3 days, THEN 1 tablet (600 mg total) 3 (three) times a day as needed for mild pain for up to 3 days. 11/7/24 11/12/24  PATRICIA Dorado   naloxone (NARCAN) 4 mg/0.1 mL nasal spray Administer 1 spray into a nostril. If no response after 2-3 " minutes, give another dose in the other nostril using a new spray. 9/26/24 9/26/25  Jorge Lyles,    ondansetron (ZOFRAN-ODT) 4 mg disintegrating tablet Take 1 tablet (4 mg total) by mouth every 6 (six) hours as needed for nausea or vomiting 11/7/24   Lala Shugars, CRNP   oxyCODONE (Roxicodone) 5 immediate release tablet Take 1 tablet (5 mg total) by mouth every 6 (six) hours as needed for severe pain Max Daily Amount: 20 mg 11/7/24   Lala Shugars, CRNP   pantoprazole (PROTONIX) 40 mg tablet Take 1 tablet (40 mg total) by mouth 2 (two) times a day 9/26/24   Jordana Lewis PA-C     Allergies   Allergen Reactions    Other Edema     Bee stings (localized edema)       Objective :  Temp:  [97.5 °F (36.4 °C)] 97.5 °F (36.4 °C)  HR:  [69-90] 84  BP: (145-187)/() 187/95  Resp:  [16-20] 20  SpO2:  [95 %-100 %] 95 %  O2 Device: None (Room air)    Physical Exam  Vitals and nursing note reviewed.   Constitutional:       Appearance: Normal appearance.      Comments: No acute distress   HENT:      Head: Normocephalic.   Eyes:      General: No scleral icterus.     Extraocular Movements: Extraocular movements intact.      Conjunctiva/sclera: Conjunctivae normal.   Cardiovascular:      Rate and Rhythm: Normal rate and regular rhythm.   Pulmonary:      Effort: Pulmonary effort is normal.      Breath sounds: Normal breath sounds. No wheezing, rhonchi or rales.   Abdominal:      General: Bowel sounds are normal.      Palpations: Abdomen is soft.      Tenderness: There is abdominal tenderness in the epigastric area. There is no guarding or rebound.   Musculoskeletal:         General: No swelling, tenderness or deformity.      Cervical back: Normal range of motion.      Comments: Able to move upper/lower extremities bilaterally, no edema   Skin:     General: Skin is warm and dry.   Neurological:      Mental Status: She is alert and oriented to person, place, and time.   Psychiatric:         Mood and Affect: Mood is  "anxious.          Lines/Drains:            Lab Results: I have reviewed the following results:  Results from last 7 days   Lab Units 11/23/24  1135   WBC Thousand/uL 8.35   HEMOGLOBIN g/dL 13.9   HEMATOCRIT % 42.0   PLATELETS Thousands/uL 201   SEGS PCT % 79*   LYMPHO PCT % 10*   MONO PCT % 10   EOS PCT % 0     Results from last 7 days   Lab Units 11/23/24  1135   SODIUM mmol/L 139   POTASSIUM mmol/L 3.5   CHLORIDE mmol/L 94*   CO2 mmol/L 28   BUN mg/dL 8   CREATININE mg/dL 0.46*   ANION GAP mmol/L 17*   CALCIUM mg/dL 8.9   ALBUMIN g/dL 4.1   TOTAL BILIRUBIN mg/dL 1.58*   ALK PHOS U/L 90   ALT U/L 48   AST U/L 84*   GLUCOSE RANDOM mg/dL 92             No results found for: \"HGBA1C\"        Imaging Results Review: I reviewed radiology reports from this admission including: CT abdomen/pelvis.  Other Study Results Review: EKG was reviewed.     Administrative Statements   I have spent a total time of 70 minutes in caring for this patient on the day of the visit/encounter including Diagnostic results, Instructions for management, Patient and family education, Importance of tx compliance, Risk factor reductions, Counseling / Coordination of care, Documenting in the medical record, Reviewing / ordering tests, medicine, procedures  , Obtaining or reviewing history  , and Communicating with other healthcare professionals .    ** Please Note: This note has been constructed using a voice recognition system. **    "

## 2024-11-24 PROBLEM — E87.20 METABOLIC ACIDOSIS: Status: RESOLVED | Noted: 2024-11-23 | Resolved: 2024-11-24

## 2024-11-24 LAB
ALBUMIN SERPL BCG-MCNC: 3.1 G/DL (ref 3.5–5)
ALP SERPL-CCNC: 62 U/L (ref 34–104)
ALT SERPL W P-5'-P-CCNC: 28 U/L (ref 7–52)
ANION GAP SERPL CALCULATED.3IONS-SCNC: 9 MMOL/L (ref 4–13)
AST SERPL W P-5'-P-CCNC: 40 U/L (ref 13–39)
BASOPHILS # BLD AUTO: 0.03 THOUSANDS/ΜL (ref 0–0.1)
BASOPHILS NFR BLD AUTO: 1 % (ref 0–1)
BILIRUB SERPL-MCNC: 1.62 MG/DL (ref 0.2–1)
BUN SERPL-MCNC: 7 MG/DL (ref 5–25)
CALCIUM ALBUM COR SERPL-MCNC: 8.6 MG/DL (ref 8.3–10.1)
CALCIUM SERPL-MCNC: 7.9 MG/DL (ref 8.4–10.2)
CHLORIDE SERPL-SCNC: 96 MMOL/L (ref 96–108)
CO2 SERPL-SCNC: 29 MMOL/L (ref 21–32)
CREAT SERPL-MCNC: 0.49 MG/DL (ref 0.6–1.3)
EOSINOPHIL # BLD AUTO: 0.1 THOUSAND/ΜL (ref 0–0.61)
EOSINOPHIL NFR BLD AUTO: 2 % (ref 0–6)
ERYTHROCYTE [DISTWIDTH] IN BLOOD BY AUTOMATED COUNT: 12 % (ref 11.6–15.1)
GFR SERPL CREATININE-BSD FRML MDRD: 128 ML/MIN/1.73SQ M
GLUCOSE SERPL-MCNC: 66 MG/DL (ref 65–140)
HCT VFR BLD AUTO: 32.2 % (ref 34.8–46.1)
HGB BLD-MCNC: 10.5 G/DL (ref 11.5–15.4)
IMM GRANULOCYTES # BLD AUTO: 0.02 THOUSAND/UL (ref 0–0.2)
IMM GRANULOCYTES NFR BLD AUTO: 0 % (ref 0–2)
LIPASE SERPL-CCNC: 479 U/L (ref 11–82)
LYMPHOCYTES # BLD AUTO: 1.35 THOUSANDS/ΜL (ref 0.6–4.47)
LYMPHOCYTES NFR BLD AUTO: 29 % (ref 14–44)
MCH RBC QN AUTO: 35.8 PG (ref 26.8–34.3)
MCHC RBC AUTO-ENTMCNC: 32.6 G/DL (ref 31.4–37.4)
MCV RBC AUTO: 110 FL (ref 82–98)
MONOCYTES # BLD AUTO: 0.51 THOUSAND/ΜL (ref 0.17–1.22)
MONOCYTES NFR BLD AUTO: 11 % (ref 4–12)
NEUTROPHILS # BLD AUTO: 2.59 THOUSANDS/ΜL (ref 1.85–7.62)
NEUTS SEG NFR BLD AUTO: 57 % (ref 43–75)
NRBC BLD AUTO-RTO: 0 /100 WBCS
PLATELET # BLD AUTO: 120 THOUSANDS/UL (ref 149–390)
PMV BLD AUTO: 11.8 FL (ref 8.9–12.7)
POTASSIUM SERPL-SCNC: 3.6 MMOL/L (ref 3.5–5.3)
PROT SERPL-MCNC: 5.4 G/DL (ref 6.4–8.4)
RBC # BLD AUTO: 2.93 MILLION/UL (ref 3.81–5.12)
SODIUM SERPL-SCNC: 134 MMOL/L (ref 135–147)
WBC # BLD AUTO: 4.6 THOUSAND/UL (ref 4.31–10.16)

## 2024-11-24 PROCEDURE — 83690 ASSAY OF LIPASE: CPT | Performed by: INTERNAL MEDICINE

## 2024-11-24 PROCEDURE — 99232 SBSQ HOSP IP/OBS MODERATE 35: CPT | Performed by: SPECIALIST

## 2024-11-24 PROCEDURE — 85025 COMPLETE CBC W/AUTO DIFF WBC: CPT | Performed by: INTERNAL MEDICINE

## 2024-11-24 PROCEDURE — 99232 SBSQ HOSP IP/OBS MODERATE 35: CPT | Performed by: PHYSICIAN ASSISTANT

## 2024-11-24 PROCEDURE — 80053 COMPREHEN METABOLIC PANEL: CPT | Performed by: INTERNAL MEDICINE

## 2024-11-24 PROCEDURE — 99222 1ST HOSP IP/OBS MODERATE 55: CPT | Performed by: INTERNAL MEDICINE

## 2024-11-24 RX ORDER — DIPHENHYDRAMINE HCL 25 MG
25 TABLET ORAL EVERY 6 HOURS PRN
Status: DISCONTINUED | OUTPATIENT
Start: 2024-11-24 | End: 2024-11-29 | Stop reason: HOSPADM

## 2024-11-24 RX ORDER — DIPHENHYDRAMINE HYDROCHLORIDE 50 MG/ML
25 INJECTION INTRAMUSCULAR; INTRAVENOUS ONCE
Status: COMPLETED | OUTPATIENT
Start: 2024-11-24 | End: 2024-11-24

## 2024-11-24 RX ADMIN — DIPHENHYDRAMINE HYDROCHLORIDE 25 MG: 50 INJECTION, SOLUTION INTRAMUSCULAR; INTRAVENOUS at 03:14

## 2024-11-24 RX ADMIN — HYDROMORPHONE HYDROCHLORIDE 1 MG: 1 INJECTION, SOLUTION INTRAMUSCULAR; INTRAVENOUS; SUBCUTANEOUS at 14:23

## 2024-11-24 RX ADMIN — CHLORDIAZEPOXIDE HYDROCHLORIDE 25 MG: 25 CAPSULE ORAL at 21:46

## 2024-11-24 RX ADMIN — NICOTINE 21 MG: 21 PATCH, EXTENDED RELEASE TRANSDERMAL at 16:58

## 2024-11-24 RX ADMIN — THIAMINE HYDROCHLORIDE: 100 INJECTION, SOLUTION INTRAMUSCULAR; INTRAVENOUS at 09:31

## 2024-11-24 RX ADMIN — CHLORDIAZEPOXIDE HYDROCHLORIDE 25 MG: 25 CAPSULE ORAL at 08:03

## 2024-11-24 RX ADMIN — DIPHENHYDRAMINE HYDROCHLORIDE 25 MG: 25 TABLET ORAL at 22:58

## 2024-11-24 RX ADMIN — CHLORDIAZEPOXIDE HYDROCHLORIDE 25 MG: 25 CAPSULE ORAL at 15:05

## 2024-11-24 RX ADMIN — HYDROMORPHONE HYDROCHLORIDE 1 MG: 1 INJECTION, SOLUTION INTRAMUSCULAR; INTRAVENOUS; SUBCUTANEOUS at 04:17

## 2024-11-24 RX ADMIN — SODIUM CHLORIDE, SODIUM LACTATE, POTASSIUM CHLORIDE, AND CALCIUM CHLORIDE 200 ML/HR: .6; .31; .03; .02 INJECTION, SOLUTION INTRAVENOUS at 17:00

## 2024-11-24 RX ADMIN — PANTOPRAZOLE SODIUM 40 MG: 40 INJECTION, POWDER, FOR SOLUTION INTRAVENOUS at 08:03

## 2024-11-24 RX ADMIN — SODIUM CHLORIDE, SODIUM LACTATE, POTASSIUM CHLORIDE, AND CALCIUM CHLORIDE 200 ML/HR: .6; .31; .03; .02 INJECTION, SOLUTION INTRAVENOUS at 11:52

## 2024-11-24 RX ADMIN — HYDROMORPHONE HYDROCHLORIDE 1 MG: 1 INJECTION, SOLUTION INTRAMUSCULAR; INTRAVENOUS; SUBCUTANEOUS at 20:41

## 2024-11-24 RX ADMIN — PANTOPRAZOLE SODIUM 40 MG: 40 INJECTION, POWDER, FOR SOLUTION INTRAVENOUS at 20:41

## 2024-11-24 NOTE — ED PROVIDER NOTES
Time reflects when diagnosis was documented in both MDM as applicable and the Disposition within this note       Time User Action Codes Description Comment    11/23/2024  3:03 PM Jordana Lewis [K85.20] Alcohol-induced acute pancreatitis without infection or necrosis     11/23/2024  3:03 PM EdouardVel [K85.90] Pancreatitis     11/23/2024  3:11 PM Jordana Lewis [K85.91] Necrotizing pancreatitis           ED Disposition       ED Disposition   Admit    Condition   Stable    Date/Time   Sat Nov 23, 2024  3:03 PM    Comment   Case was discussed with JIMMY and the patient's admission status was agreed to be Admission Status: inpatient status to the service of Dr. Coleman .               Assessment & Plan       Medical Decision Making  34-year-old female presenting with abdominal pain.  Suspect pancreatitis.  Obtain labs, symptom control, CT abdomen pelvis.    Discussed all results with patient.  Agreeable for admission.    Amount and/or Complexity of Data Reviewed  Labs: ordered.  Radiology: ordered.    Risk  Prescription drug management.  Decision regarding hospitalization.             Medications   sodium chloride 0.9 % bolus 1,000 mL (1,000 mL Intravenous Not Given 11/23/24 1529)   folic acid 1 mg, thiamine (VITAMIN B1) 100 mg in sodium chloride 0.9 % 100 mL IV piggyback (has no administration in time range)   lactated ringers infusion (200 mL/hr Intravenous New Bag 11/23/24 1523)   nicotine (NICODERM CQ) 21 mg/24 hr TD 24 hr patch 21 mg (21 mg Transdermal Medication Applied 11/23/24 1534)   ondansetron (ZOFRAN) injection 4 mg (has no administration in time range)   HYDROmorphone (DILAUDID) injection 1 mg (has no administration in time range)   pantoprazole (PROTONIX) injection 40 mg (40 mg Intravenous Given 11/23/24 1534)   chlordiazePOXIDE (LIBRIUM) capsule 25 mg (25 mg Oral Given 11/23/24 1534)   ondansetron (ZOFRAN) injection 4 mg (4 mg Intravenous Given 11/23/24 1142)   HYDROmorphone  (DILAUDID) injection 1 mg (1 mg Intravenous Given 11/23/24 1143)   sodium chloride 0.9 % bolus 1,000 mL (0 mL Intravenous Stopped 11/23/24 1339)   droperidol (INAPSINE) injection 0.625 mg (0.625 mg Intravenous Given 11/23/24 1343)   iohexol (OMNIPAQUE) 350 MG/ML injection (MULTI-DOSE) 100 mL (100 mL Intravenous Given 11/23/24 1347)   HYDROmorphone (DILAUDID) injection 1 mg (1 mg Intravenous Given 11/23/24 1524)       ED Risk Strat Scores                CIWA-Ar Score       Row Name 11/23/24 1529             CIWA-Ar    Blood Pressure 187/95      Pulse 84      Nausea and Vomiting 5      Tactile Disturbances 0      Tremor 5      Auditory Disturbances 0      Paroxysmal Sweats 0      Visual Disturbances 0      Anxiety 1      Headache, Fullness in Head 0      Agitation 0      Orientation and Clouding of Sensorium 0      CIWA-Ar Total 11                              SBIRT 22yo+      Flowsheet Row Most Recent Value   Initial Alcohol Screen: US AUDIT-C     1. How often do you have a drink containing alcohol? 6  [Pt states that she hasn't had any drinks in a couple of weeks] Filed at: 11/23/2024 1137   2. How many drinks containing alcohol do you have on a typical day you are drinking?  2 Filed at: 11/23/2024 1137   3b. FEMALE Any Age, or MALE 65+: How often do you have 4 or more drinks on one occassion? 1 Filed at: 11/23/2024 1137   Audit-C Score 9 Filed at: 11/23/2024 1137   Full Alcohol Screen: US AUDIT    4. How often during the last year have you found that you were not able to stop drinking once you had started? 0 Filed at: 11/23/2024 1137   5. How often during past year have you failed to do what was normally expected of you because of drinking?  0 Filed at: 11/23/2024 1137   6. How often in past year have you needed a first drink in the morning to get yourself going after a heavy drinking session?  0 Filed at: 11/23/2024 1137   7. How often in past year have you had feeling of guilt or remorse after drinking?  0 Filed  "at: 11/23/2024 1137   8. How often in past year have you been unable to remember what happened night before because you had been drinking?  0 Filed at: 11/23/2024 1137   9. Have you or someone else been injured as a result of your drinking?  0 Filed at: 11/23/2024 1137   10. Has a relative, friend, doctor or other health worker been concerned about your drinking and suggested you cut down?  0 Filed at: 11/23/2024 1137   AUDIT Total Score 9 Filed at: 11/23/2024 1137   DUANE: How many times in the past year have you...    Used an illegal drug or used a prescription medication for non-medical reasons? Never Filed at: 11/23/2024 1132                            History of Present Illness       Chief Complaint   Patient presents with    Abdominal Pain     Pt reports b/l lower abd pain with vomiting. Pt reports Vomiting started last evening.       Past Medical History:   Diagnosis Date    Acute alcoholic pancreatitis 11/03/2023    Hypertension     SIRS (systemic inflammatory response syndrome) (HCC) 04/10/2024      Past Surgical History:   Procedure Laterality Date    WISDOM TOOTH EXTRACTION        History reviewed. No pertinent family history.   Social History     Tobacco Use    Smoking status: Every Day     Current packs/day: 0.50     Types: Cigarettes     Passive exposure: Never    Smokeless tobacco: Never    Tobacco comments:     Per pt last drink was today 9/24/24 one glass of wine. Had previously stop on 4/4/24 for 1 month    Vaping Use    Vaping status: Never Used   Substance Use Topics    Alcohol use: Yes     Alcohol/week: 21.0 standard drinks of alcohol     Types: 21 Glasses of wine per week     Comment: \"occasional\"    Drug use: Never      E-Cigarette/Vaping    E-Cigarette Use Never User       E-Cigarette/Vaping Substances    Nicotine No     THC No     CBD No     Flavoring No     Other No     Unknown No       I have reviewed and agree with the history as documented.     34-year-old female presents for evaluation " of diffuse abdominal pain.  Patient with a history of pancreatitis and states it feels similar.  Last episode was a few weeks ago.  Patient denies alcohol consumption.  Uncertain of etiology of pancreatitis per patient.        Review of Systems   Gastrointestinal:  Positive for abdominal pain.           Objective       ED Triage Vitals [11/23/24 1115]   Temperature Pulse Blood Pressure Respirations SpO2 Patient Position - Orthostatic VS   97.5 °F (36.4 °C) 90 (!) 169/101 16 100 % Lying      Temp Source Heart Rate Source BP Location FiO2 (%) Pain Score    Temporal Monitor Right arm -- 10 - Worst Possible Pain      Vitals      Date and Time Temp Pulse SpO2 Resp BP Pain Score FACES Pain Rating User   11/23/24 1810 97.8 °F (36.6 °C) 67 -- 20 152/99 -- -- KR   11/23/24 1725 -- -- -- -- -- 6 -- KR   11/23/24 1557 97.8 °F (36.6 °C) 67 93 % 20 152/99 -- -- DII   11/23/24 1529 -- 84 -- -- 187/95 -- -- AS   11/23/24 1524 -- -- -- -- -- 8 -- AS   11/23/24 1500 -- 82 95 % 20 147/96 -- -- MB   11/23/24 1300 -- 69 98 % 17 148/92 -- -- MB   11/23/24 1200 -- 74 99 % 20 145/95 -- -- LK   11/23/24 1143 -- -- -- -- -- 10 - Worst Possible Pain -- AS   11/23/24 1130 -- 71 100 % 19 163/98 -- -- MB   11/23/24 1115 97.5 °F (36.4 °C) 90 100 % 16 169/101 10 - Worst Possible Pain -- CM            Physical Exam  Vitals and nursing note reviewed.   Constitutional:       Appearance: She is well-developed.   HENT:      Head: Normocephalic and atraumatic.      Right Ear: External ear normal.      Left Ear: External ear normal.      Nose: Nose normal.   Eyes:      General: No scleral icterus.  Cardiovascular:      Rate and Rhythm: Normal rate.   Pulmonary:      Effort: Pulmonary effort is normal. No respiratory distress.   Abdominal:      General: There is no distension.      Tenderness: There is generalized abdominal tenderness and tenderness in the epigastric area.   Musculoskeletal:         General: No deformity. Normal range of motion.       Cervical back: Normal range of motion.   Skin:     Findings: No rash.   Neurological:      General: No focal deficit present.      Mental Status: She is alert and oriented to person, place, and time.   Psychiatric:         Mood and Affect: Mood normal.         Results Reviewed       Procedure Component Value Units Date/Time    HS Troponin I 2hr [[id number]]  (Normal) Collected: 11/23/24 1636    Lab Status: Final result Specimen: Blood from Arm, Left Updated: 11/23/24 1705     hs TnI 2hr 3 ng/L      Delta 2hr hsTnI -1 ng/L     Basic metabolic panel [[id number]]  (Abnormal) Collected: 11/23/24 1636    Lab Status: Final result Specimen: Blood from Arm, Left Updated: 11/23/24 1701     Sodium 139 mmol/L      Potassium 3.8 mmol/L      Chloride 98 mmol/L      CO2 28 mmol/L      ANION GAP 13 mmol/L      BUN 8 mg/dL      Creatinine 0.49 mg/dL      Glucose 99 mg/dL      Calcium 7.8 mg/dL      eGFR 128 ml/min/1.73sq m     Narrative:      National Kidney Disease Foundation guidelines for Chronic Kidney Disease (CKD):     Stage 1 with normal or high GFR (GFR > 90 mL/min/1.73 square meters)    Stage 2 Mild CKD (GFR = 60-89 mL/min/1.73 square meters)    Stage 3A Moderate CKD (GFR = 45-59 mL/min/1.73 square meters)    Stage 3B Moderate CKD (GFR = 30-44 mL/min/1.73 square meters)    Stage 4 Severe CKD (GFR = 15-29 mL/min/1.73 square meters)    Stage 5 End Stage CKD (GFR <15 mL/min/1.73 square meters)  Note: GFR calculation is accurate only with a steady state creatinine    Lactic acid, plasma (w/reflex if result > 2.0) [[id number]]  (Normal) Collected: 11/23/24 1636    Lab Status: Final result Specimen: Blood from Arm, Left Updated: 11/23/24 1701     LACTIC ACID 1.3 mmol/L     Narrative:      Result may be elevated if tourniquet was used during collection.    Urine Microscopic [[id number]]  (Abnormal) Collected: 11/23/24 1335    Lab Status: Final result Specimen: Urine, Clean Catch Updated: 11/23/24 1421     RBC, UA 10-20 /hpf      WBC,  UA 10-20 /hpf      Epithelial Cells Moderate /hpf      Bacteria, UA Occasional /hpf      MUCUS THREADS Moderate    Urine culture [[id number]] Collected: 11/23/24 1335    Lab Status: In process Specimen: Urine, Clean Catch Updated: 11/23/24 1421    UA w Reflex to Microscopic w Reflex to Culture [[id number]]  (Abnormal) Collected: 11/23/24 1335    Lab Status: Final result Specimen: Urine, Clean Catch Updated: 11/23/24 1421     Color, UA Yellow     Clarity, UA Slightly Cloudy     Specific Gravity, UA >=1.030     pH, UA 6.0     Leukocytes, UA Moderate     Nitrite, UA Negative     Protein, UA 30 (1+) mg/dl      Glucose, UA Negative mg/dl      Ketones, UA 80 (3+) mg/dl      Urobilinogen, UA 2.0 mg/dl      Bilirubin, UA Small     Occult Blood, UA Large    POCT pregnancy, urine [[id number]]  (Normal) Collected: 11/23/24 1330    Lab Status: Final result Specimen: Urine Updated: 11/23/24 1335     EXT Preg Test, Ur Negative     Control Valid    Lipase [[id number]]  (Abnormal) Collected: 11/23/24 1135    Lab Status: Final result Specimen: Blood from Arm, Right Updated: 11/23/24 1213     Lipase 993 u/L     CMP [[id number]]  (Abnormal) Collected: 11/23/24 1135    Lab Status: Final result Specimen: Blood from Arm, Right Updated: 11/23/24 1205     Sodium 139 mmol/L      Potassium 3.5 mmol/L      Chloride 94 mmol/L      CO2 28 mmol/L      ANION GAP 17 mmol/L      BUN 8 mg/dL      Creatinine 0.46 mg/dL      Glucose 92 mg/dL      Calcium 8.9 mg/dL      AST 84 U/L      ALT 48 U/L      Alkaline Phosphatase 90 U/L      Total Protein 7.3 g/dL      Albumin 4.1 g/dL      Total Bilirubin 1.58 mg/dL      eGFR 131 ml/min/1.73sq m     Narrative:      National Kidney Disease Foundation guidelines for Chronic Kidney Disease (CKD):     Stage 1 with normal or high GFR (GFR > 90 mL/min/1.73 square meters)    Stage 2 Mild CKD (GFR = 60-89 mL/min/1.73 square meters)    Stage 3A Moderate CKD (GFR = 45-59 mL/min/1.73 square meters)    Stage 3B Moderate CKD  (GFR = 30-44 mL/min/1.73 square meters)    Stage 4 Severe CKD (GFR = 15-29 mL/min/1.73 square meters)    Stage 5 End Stage CKD (GFR <15 mL/min/1.73 square meters)  Note: GFR calculation is accurate only with a steady state creatinine    HS Troponin I 4hr [[id number]]     Lab Status: No result Specimen: Blood     HS Troponin 0hr (reflex protocol) [[id number]]  (Normal) Collected: 11/23/24 1135    Lab Status: Final result Specimen: Blood from Arm, Right Updated: 11/23/24 1205     hs TnI 0hr 4 ng/L     CBC and differential [[id number]]  (Abnormal) Collected: 11/23/24 1135    Lab Status: Final result Specimen: Blood from Arm, Right Updated: 11/23/24 1143     WBC 8.35 Thousand/uL      RBC 3.90 Million/uL      Hemoglobin 13.9 g/dL      Hematocrit 42.0 %       fL      MCH 35.6 pg      MCHC 33.1 g/dL      RDW 12.0 %      MPV 10.9 fL      Platelets 201 Thousands/uL      nRBC 0 /100 WBCs      Segmented % 79 %      Immature Grans % 0 %      Lymphocytes % 10 %      Monocytes % 10 %      Eosinophils Relative 0 %      Basophils Relative 1 %      Absolute Neutrophils 6.61 Thousands/µL      Absolute Immature Grans 0.02 Thousand/uL      Absolute Lymphocytes 0.82 Thousands/µL      Absolute Monocytes 0.83 Thousand/µL      Eosinophils Absolute 0.02 Thousand/µL      Basophils Absolute 0.05 Thousands/µL             CT Abdomen pelvis with contrast   Final Interpretation by Reymundo Moura MD (11/23 1452)      Ongoing acute pancreatitis, now with findings of pancreatic necrosis and acute necrotic collection as described.      Hepatic steatosis.      The study was marked in EPIC for immediate notification.      Workstation performed: [id number]             Procedures    ED Medication and Procedure Management   Prior to Admission Medications   Prescriptions Last Dose Informant Patient Reported? Taking?   chlordiazePOXIDE (LIBRIUM) 25 mg capsule 11/22/2024  No Yes   Sig: Take 1 capsule (25 mg total) by mouth every 8 (eight)  hours for 1 day, THEN 1 capsule (25 mg total) every 12 (twelve) hours for 1 day, THEN 1 capsule (25 mg total) in the morning for 1 day.   folic acid ( Folic Acid) 1 mg tablet 11/22/2024  No Yes   Sig: Take 1 tablet (1 mg total) by mouth daily   ibuprofen (MOTRIN) 600 mg tablet 11/22/2024  No Yes   Sig: Take 1 tablet (600 mg total) by mouth 3 (three) times a day with meals for 3 days, THEN 1 tablet (600 mg total) 3 (three) times a day as needed for mild pain for up to 3 days.   naloxone (NARCAN) 4 mg/0.1 mL nasal spray Not Taking  No No   Sig: Administer 1 spray into a nostril. If no response after 2-3 minutes, give another dose in the other nostril using a new spray.   Patient not taking: Reported on 11/23/2024   ondansetron (ZOFRAN-ODT) 4 mg disintegrating tablet 11/22/2024  No Yes   Sig: Take 1 tablet (4 mg total) by mouth every 6 (six) hours as needed for nausea or vomiting   oxyCODONE (Roxicodone) 5 immediate release tablet 11/22/2024  No Yes   Sig: Take 1 tablet (5 mg total) by mouth every 6 (six) hours as needed for severe pain Max Daily Amount: 20 mg   pantoprazole (PROTONIX) 40 mg tablet Not Taking  No No   Sig: Take 1 tablet (40 mg total) by mouth 2 (two) times a day   Patient not taking: Reported on 11/23/2024      Facility-Administered Medications: None     Current Discharge Medication List        CONTINUE these medications which have NOT CHANGED    Details   chlordiazePOXIDE (LIBRIUM) 25 mg capsule Take 1 capsule (25 mg total) by mouth every 8 (eight) hours for 1 day, THEN 1 capsule (25 mg total) every 12 (twelve) hours for 1 day, THEN 1 capsule (25 mg total) in the morning for 1 day.  Qty: 6 capsule, Refills: 0    Associated Diagnoses: Alcohol withdrawal syndrome without complication (HCC)      folic acid (KP Folic Acid) 1 mg tablet Take 1 tablet (1 mg total) by mouth daily  Qty: 30 tablet, Refills: 0    Associated Diagnoses: Alcohol abuse      ibuprofen (MOTRIN) 600 mg tablet Take 1 tablet (600 mg  total) by mouth 3 (three) times a day with meals for 3 days, THEN 1 tablet (600 mg total) 3 (three) times a day as needed for mild pain for up to 3 days.  Qty: 18 tablet, Refills: 0    Associated Diagnoses: Acute pancreatitis      ondansetron (ZOFRAN-ODT) 4 mg disintegrating tablet Take 1 tablet (4 mg total) by mouth every 6 (six) hours as needed for nausea or vomiting  Qty: 20 tablet, Refills: 0    Associated Diagnoses: Acute pancreatitis      oxyCODONE (Roxicodone) 5 immediate release tablet Take 1 tablet (5 mg total) by mouth every 6 (six) hours as needed for severe pain Max Daily Amount: 20 mg  Qty: 10 tablet, Refills: 0    Associated Diagnoses: Acute pancreatitis      naloxone (NARCAN) 4 mg/0.1 mL nasal spray Administer 1 spray into a nostril. If no response after 2-3 minutes, give another dose in the other nostril using a new spray.  Qty: 1 each, Refills: 0    Associated Diagnoses: Acute alcoholic pancreatitis      pantoprazole (PROTONIX) 40 mg tablet Take 1 tablet (40 mg total) by mouth 2 (two) times a day  Qty: 60 tablet, Refills: 0    Associated Diagnoses: Hematemesis           No discharge procedures on file.  ED SEPSIS DOCUMENTATION   Time reflects when diagnosis was documented in both MDM as applicable and the Disposition within this note       Time User Action Codes Description Comment    11/23/2024  3:03 PM Jordana Lewis [K85.20] Alcohol-induced acute pancreatitis without infection or necrosis     11/23/2024  3:03 PM Vel Edouard [K85.90] Pancreatitis     11/23/2024  3:11 PM Jordana Lewis [K85.91] Necrotizing pancreatitis                  Vel Edouard DO  11/23/24 1948

## 2024-11-24 NOTE — ASSESSMENT & PLAN NOTE
Noted anion gap of 17 on admission  Suspect acidosis in setting of dehydration/pancreatitis, alcohol abuse  Received 2 L IVF NSS - avoid further normal saline   Resolved

## 2024-11-24 NOTE — PLAN OF CARE
Problem: Potential for Falls  Goal: Patient will remain free of falls  Description: INTERVENTIONS:  - Educate patient/family on patient safety including physical limitations  - Instruct patient to call for assistance with activity   - Consult OT/PT to assist with strengthening/mobility   - Keep Call bell within reach  - Keep bed low and locked with side rails adjusted as appropriate  - Keep care items and personal belongings within reach  - Initiate and maintain comfort rounds  - Make Fall Risk Sign visible to staff  - Offer Toileting every 2 Hours, in advance of need  - Initiate/Maintain shift alarm  - Obtain necessary fall risk management equipment: socks  - Apply yellow socks and bracelet for high fall risk patients  - Consider moving patient to room near nurses station  Outcome: Progressing     Problem: PAIN - ADULT  Goal: Verbalizes/displays adequate comfort level or baseline comfort level  Description: Interventions:  - Encourage patient to monitor pain and request assistance  - Assess pain using appropriate pain scale  - Administer analgesics based on type and severity of pain and evaluate response  - Implement non-pharmacological measures as appropriate and evaluate response  - Consider cultural and social influences on pain and pain management  - Notify physician/advanced practitioner if interventions unsuccessful or patient reports new pain  Outcome: Progressing     Problem: INFECTION - ADULT  Goal: Absence or prevention of progression during hospitalization  Description: INTERVENTIONS:  - Assess and monitor for signs and symptoms of infection  - Monitor lab/diagnostic results  - Monitor all insertion sites, i.e. indwelling lines, tubes, and drains  - Monitor endotracheal if appropriate and nasal secretions for changes in amount and color  - [place] appropriate cooling/warming therapies per order  - Administer medications as ordered  - Instruct and encourage patient and family to use good hand hygiene  technique  - Identify and instruct in appropriate isolation precautions for identified infection/condition  Outcome: Progressing  Goal: Absence of fever/infection during neutropenic period  Description: INTERVENTIONS:  - Monitor WBC    Outcome: Progressing     Problem: SAFETY ADULT  Goal: Patient will remain free of falls  Description: INTERVENTIONS:  - Educate patient/family on patient safety including physical limitations  - Instruct patient to call for assistance with activity   - Consult OT/PT to assist with strengthening/mobility   - Keep Call bell within reach  - Keep bed low and locked with side rails adjusted as appropriate  - Keep care items and personal belongings within reach  - Initiate and maintain comfort rounds  - Make Fall Risk Sign visible to staff  - Offer Toileting every 2 Hours, in advance of need  - Initiate/Maintain shift alarm  - Obtain necessary fall risk management equipment: socks  - Apply yellow socks and bracelet for high fall risk patients  - Consider moving patient to room near nurses station  Outcome: Progressing  Goal: Maintain or return to baseline ADL function  Description: INTERVENTIONS:  -  Assess patient's ability to carry out ADLs; assess patient's baseline for ADL function and identify physical deficits which impact ability to perform ADLs (bathing, care of mouth/teeth, toileting, grooming, dressing, etc.)  - Assess/evaluate cause of self-care deficits   - Assess range of motion  - Assess patient's mobility; develop plan if impaired  - Assess patient's need for assistive devices and provide as appropriate  - Encourage maximum independence but intervene and supervise when necessary  - Involve family in performance of ADLs  - Assess for home care needs following discharge   - Consider OT consult to assist with ADL evaluation and planning for discharge  - Provide patient education as appropriate  Outcome: Progressing  Goal: Maintains/Returns to pre admission functional  level  Description: INTERVENTIONS:  - Perform AM-PAC 6 Click Basic Mobility/ Daily Activity assessment daily.  - Set and communicate daily mobility goal to care team and patient/family/caregiver.   - Collaborate with rehabilitation services on mobility goals if consulted  - Perform Range of Motion 2 times a day.  - Reposition patient every 2 hours.  - Dangle patient 2 times a day  - Stand patient 2 times a day  - Ambulate patient 2 times a day  - Out of bed to chair 2 times a day   - Out of bed for meals 2 times a day  - Out of bed for toileting  - Record patient progress and toleration of activity level   Outcome: Progressing     Problem: DISCHARGE PLANNING  Goal: Discharge to home or other facility with appropriate resources  Description: INTERVENTIONS:  - Identify barriers to discharge w/patient and caregiver  - Arrange for needed discharge resources and transportation as appropriate  - Identify discharge learning needs (meds, wound care, etc.)  - Arrange for interpretive services to assist at discharge as needed  - Refer to Case Management Department for coordinating discharge planning if the patient needs post-hospital services based on physician/advanced practitioner order or complex needs related to functional status, cognitive ability, or social support system  Outcome: Progressing     Problem: Knowledge Deficit  Goal: Patient/family/caregiver demonstrates understanding of disease process, treatment plan, medications, and discharge instructions  Description: Complete learning assessment and assess knowledge base.  Interventions:  - Provide teaching at level of understanding  - Provide teaching via preferred learning methods  Outcome: Progressing     Problem: GASTROINTESTINAL - ADULT  Goal: Minimal or absence of nausea and/or vomiting  Description: INTERVENTIONS:  - Administer IV fluids if ordered to ensure adequate hydration  - Maintain NPO status until nausea and vomiting are resolved  - Nasogastric tube if  ordered  - Administer ordered antiemetic medications as needed  - Provide nonpharmacologic comfort measures as appropriate  - Advance diet as tolerated, if ordered  - Consider nutrition services referral to assist patient with adequate nutrition and appropriate food choices  Outcome: Progressing  Goal: Maintains adequate nutritional intake  Description: INTERVENTIONS:  - Monitor percentage of each meal consumed  - Identify factors contributing to decreased intake, treat as appropriate  - Assist with meals as needed  - Monitor I&O, weight, and lab values if indicated  - Obtain nutrition services referral as needed  Outcome: Progressing

## 2024-11-24 NOTE — PLAN OF CARE
Problem: Potential for Falls  Goal: Patient will remain free of falls  Description: INTERVENTIONS:  - Educate patient/family on patient safety including physical limitations  - Instruct patient to call for assistance with activity   - Consult OT/PT to assist with strengthening/mobility   - Keep Call bell within reach  - Keep bed low and locked with side rails adjusted as appropriate  - Keep care items and personal belongings within reach  - Initiate and maintain comfort rounds  - Make Fall Risk Sign visible to staff  - Offer Toileting every 2 Hours, in advance of need  - Initiate/Maintain shift alarm  - Obtain necessary fall risk management equipment: socks  - Apply yellow socks and bracelet for high fall risk patients  - Consider moving patient to room near nurses station  Outcome: Progressing     Problem: PAIN - ADULT  Goal: Verbalizes/displays adequate comfort level or baseline comfort level  Description: Interventions:  - Encourage patient to monitor pain and request assistance  - Assess pain using appropriate pain scale  - Administer analgesics based on type and severity of pain and evaluate response  - Implement non-pharmacological measures as appropriate and evaluate response  - Consider cultural and social influences on pain and pain management  - Notify physician/advanced practitioner if interventions unsuccessful or patient reports new pain  Outcome: Progressing     Problem: INFECTION - ADULT  Goal: Absence or prevention of progression during hospitalization  Description: INTERVENTIONS:  - Assess and monitor for signs and symptoms of infection  - Monitor lab/diagnostic results  - Monitor all insertion sites, i.e. indwelling lines, tubes, and drains  - Monitor endotracheal if appropriate and nasal secretions for changes in amount and color  - [place] appropriate cooling/warming therapies per order  - Administer medications as ordered  - Instruct and encourage patient and family to use good hand hygiene  technique  - Identify and instruct in appropriate isolation precautions for identified infection/condition  Outcome: Progressing  Goal: Absence of fever/infection during neutropenic period  Description: INTERVENTIONS:  - Monitor WBC    Outcome: Progressing     Problem: SAFETY ADULT  Goal: Patient will remain free of falls  Description: INTERVENTIONS:  - Educate patient/family on patient safety including physical limitations  - Instruct patient to call for assistance with activity   - Consult OT/PT to assist with strengthening/mobility   - Keep Call bell within reach  - Keep bed low and locked with side rails adjusted as appropriate  - Keep care items and personal belongings within reach  - Initiate and maintain comfort rounds  - Make Fall Risk Sign visible to staff  - Offer Toileting every 2 Hours, in advance of need  - Initiate/Maintain shift alarm  - Obtain necessary fall risk management equipment: socks  - Apply yellow socks and bracelet for high fall risk patients  - Consider moving patient to room near nurses station  Outcome: Progressing  Goal: Maintain or return to baseline ADL function  Description: INTERVENTIONS:  -  Assess patient's ability to carry out ADLs; assess patient's baseline for ADL function and identify physical deficits which impact ability to perform ADLs (bathing, care of mouth/teeth, toileting, grooming, dressing, etc.)  - Assess/evaluate cause of self-care deficits   - Assess range of motion  - Assess patient's mobility; develop plan if impaired  - Assess patient's need for assistive devices and provide as appropriate  - Encourage maximum independence but intervene and supervise when necessary  - Involve family in performance of ADLs  - Assess for home care needs following discharge   - Consider OT consult to assist with ADL evaluation and planning for discharge  - Provide patient education as appropriate  Outcome: Progressing  Goal: Maintains/Returns to pre admission functional  level  Description: INTERVENTIONS:  - Perform AM-PAC 6 Click Basic Mobility/ Daily Activity assessment daily.  - Set and communicate daily mobility goal to care team and patient/family/caregiver.   - Collaborate with rehabilitation services on mobility goals if consulted  - Perform Range of Motion 2 times a day.  - Reposition patient every 2 hours.  - Dangle patient 2 times a day  - Stand patient 2 times a day  - Ambulate patient 2 times a day  - Out of bed to chair 2 times a day   - Out of bed for meals 2 times a day  - Out of bed for toileting  - Record patient progress and toleration of activity level   Outcome: Progressing     Problem: DISCHARGE PLANNING  Goal: Discharge to home or other facility with appropriate resources  Description: INTERVENTIONS:  - Identify barriers to discharge w/patient and caregiver  - Arrange for needed discharge resources and transportation as appropriate  - Identify discharge learning needs (meds, wound care, etc.)  - Arrange for interpretive services to assist at discharge as needed  - Refer to Case Management Department for coordinating discharge planning if the patient needs post-hospital services based on physician/advanced practitioner order or complex needs related to functional status, cognitive ability, or social support system  Outcome: Progressing     Problem: Knowledge Deficit  Goal: Patient/family/caregiver demonstrates understanding of disease process, treatment plan, medications, and discharge instructions  Description: Complete learning assessment and assess knowledge base.  Interventions:  - Provide teaching at level of understanding  - Provide teaching via preferred learning methods  Outcome: Progressing     Problem: GASTROINTESTINAL - ADULT  Goal: Minimal or absence of nausea and/or vomiting  Description: INTERVENTIONS:  - Administer IV fluids if ordered to ensure adequate hydration  - Maintain NPO status until nausea and vomiting are resolved  - Nasogastric tube if  ordered  - Administer ordered antiemetic medications as needed  - Provide nonpharmacologic comfort measures as appropriate  - Advance diet as tolerated, if ordered  - Consider nutrition services referral to assist patient with adequate nutrition and appropriate food choices  Outcome: Progressing  Goal: Maintains adequate nutritional intake  Description: INTERVENTIONS:  - Monitor percentage of each meal consumed  - Identify factors contributing to decreased intake, treat as appropriate  - Assist with meals as needed  - Monitor I&O, weight, and lab values if indicated  - Obtain nutrition services referral as needed  Outcome: Progressing

## 2024-11-24 NOTE — ASSESSMENT & PLAN NOTE
Pt is 33 y/o female with pmh for HTN, prolonged QT, tobacco use, ETOH abuse, prior h/o ETOH pancreatitis, PUD and seizures who presents to the ER with 2 day h/o intractable N/V.    CT: Ongoing acute pancreatitis, now with findings of pancreatic necrosis and acute necrotic collection as described.     VSS, afeb  WBC 4.6  Lipase 479 from 993  Tbil 1.62 from 1.58  Lactic acid: 1.3      Plan:  NPO, diet advancement per primary team  Aggressive IVF  GI eval  Serial exams  Trend lipase, vitals, wbc  Pain control  Close monitoring   If continues to vomit may require NGT  If pt becomes septic/infected would consider tx to BE  No plan for acute surgical intervention at this time, care per primary team and GI

## 2024-11-24 NOTE — ASSESSMENT & PLAN NOTE
Presented to the emergency department with 1 day history of epigastric pain, nausea and bilious vomiting similar to prior episodes of pancreatitis  Suspect alcohol induced  CT abdomen/pelvis: Ongoing acute pancreatitis, now with findings of pancreatic necrosis and acute necrotic collection as described.  Hepatic steatosis.  Lipase 993 -> 479  S/p 2 L IVF with normal saline in the ED - continue IV resuscitation with lactated ringers  Continue pain control  OK for NPO w/ sips of clears per GI  Discussed with GI - hold off on abx unless leukocytosis, fever  Given new findings of necrosis and necrotic collection, general surgery following  Discussed complete alcohol cessation

## 2024-11-24 NOTE — PROGRESS NOTES
Progress Note - Surgery-General   Name: Kaykay Holland 34 y.o. female I MRN: [id number]  Unit/Bed#: -[facility] I Date of Admission: 11/23/2024   Date of Service: 11/24/2024 I Hospital Day: 1    Assessment & Plan  Necrotizing pancreatitis  Pt is 35 y/o female with pmh for HTN, prolonged QT, tobacco use, ETOH abuse, prior h/o ETOH pancreatitis, PUD and seizures who presents to the ER with 2 day h/o intractable N/V.    CT: Ongoing acute pancreatitis, now with findings of pancreatic necrosis and acute necrotic collection as described.     VSS, afeb  WBC 4.6  Lipase 479 from 993  Tbil 1.62 from 1.58  Lactic acid: 1.3      Plan:  NPO, diet advancement per primary team  Aggressive IVF  GI eval  Serial exams  Trend lipase, vitals, wbc  Pain control  Close monitoring   If continues to vomit may require NGT  If pt becomes septic/infected would consider tx to BE  No plan for acute surgical intervention at this time, care per primary team and GI    Tobacco abuse  Nicotine patch   Hepatic steatosis  H/o ETOH abuse  Alcohol use disorder  -CIWA    Please contact the SecureChat role for the Surgery-General service with any questions/concerns.    Subjective   Improving this morning, less abdominal pain. Feels hungry today. Voiding, reports dark urine.     Objective :  Temp:  [97.5 °F (36.4 °C)-98 °F (36.7 °C)] 98 °F (36.7 °C)  HR:  [67-98] 85  BP: (126-187)/() 131/95  Resp:  [16-20] 16  SpO2:  [93 %-100 %] 97 %  O2 Device: None (Room air)    I/O         11/22 0701  11/23 0700 11/23 0701  11/24 0700 11/24 0701  11/25 0700    P.O.  0     I.V. (mL/kg)  2000 (36.8)     Total Intake(mL/kg)  2000 (36.8)     Urine (mL/kg/hr)  500     Total Output  500     Net  +1500                    Physical Exam  Constitutional:       General: She is not in acute distress.     Appearance: She is normal weight. She is not ill-appearing or toxic-appearing.   Cardiovascular:      Rate and Rhythm: Normal rate.   Pulmonary:      Effort: Pulmonary  effort is normal. No respiratory distress.   Abdominal:      General: Abdomen is flat. There is no distension.      Palpations: Abdomen is soft.      Tenderness: There is abdominal tenderness in the left upper quadrant. There is no guarding or rebound.   Musculoskeletal:      Right lower leg: No edema.      Left lower leg: No edema.   Skin:     General: Skin is warm and dry.   Neurological:      General: No focal deficit present.      Mental Status: She is alert.   Psychiatric:         Mood and Affect: Mood normal.         Behavior: Behavior normal.           Lab Results: I have reviewed the following results:  Recent Labs     11/23/24  1135 11/23/24  1636 11/24/24  0255   WBC 8.35  --  4.60   HGB 13.9  --  10.5*   HCT 42.0  --  32.2*     --  120*   SODIUM 139 139 134*   K 3.5 3.8 3.6   CL 94* 98 96   CO2 28 28 29   BUN 8 8 7   CREATININE 0.46* 0.49* 0.49*   GLUC 92 99 66   AST 84*  --  40*   ALT 48  --  28   ALB 4.1  --  3.1*   TBILI 1.58*  --  1.62*   ALKPHOS 90  --  62   HSTNI0 4  --   --    HSTNI2  --  3  --    LACTICACID  --  1.3  --        Imaging Results Review: No pertinent imaging studies reviewed.  Other Study Results Review: No additional pertinent studies reviewed.    VTE Pharmacologic Prophylaxis: VTE covered by:    None     VTE Mechanical Prophylaxis: sequential compression device

## 2024-11-24 NOTE — ASSESSMENT & PLAN NOTE
Patient reports ongoing alcohol use, previously had reported drinking upwards of 2-3 bottles daily  States recently she has been having intermittent glasses of wine, last reported drink earlier this week  Does have remote history of withdrawal seizures  IV thiamine/folic acid  Continue librium  CIWA protocol  Encouraged complete alcohol cessation.  Not currently interested in rehab resources

## 2024-11-24 NOTE — CONSULTS
Consultation - GI   Kaykay Holland 34 y.o. female MRN: [id number]  Unit/Bed#: -01 Encounter: [id number]      Assessment & Plan     34 y.o. year old female with past medical history of alcohol use disorder, tobacco use, hepatic steatosis and prior pancreatitis presented with abdominal pain.  Patient states she has been having reoccurring epigastric discomfort, nausea and vomiting.  [facility] and was instructed to come to the hospital however patient continue to self treat at home with clear liquids.  She states by Thursday or Friday she could not keep any liquids down and finally presented to the emergency room last evening.  Since October 2023 this is patient's fifth admission for acute pancreatitis.  GI consulted for alcohol induced necrotizing acute pancreatitis.    1.  Necrotizing pancreatitis  2.  Alcohol use disorder  3.  Hepatic steatosis  4.  Nausea and vomiting  5.  Elevated liver enzymes  Patient presented to the emergency room last evening with abdominal pain, nausea and vomiting.  Patient continues with generalized abdominal tenderness with palpation.  States since admission has had some nausea however no vomiting.  CT A/P on admission noted acute pancreatitis with necrosis.  First CAT scan to note necrosis.  Liver enzymes; T. bili 1.62 (1.58, 0.64), AST 40 (84, 103), hemoglobin 10.5 (13.9), platelets 120 (201), lipase 479 (993).  Afebrile/VSS.    -Monitor liver enzymes, lipase and CBC  -Continue IV fluids 200 mL/h  -Sips of clears as tolerated  -Pain management and antiemetics as necessary  -Encourage out of bed to the chair and ambulation  -Monitor for alcohol withdrawal  -Discussed with patient importance of cessation of alcohol use  -Discussed with the patient outpatient rehab/B cares, patient deferred at this time.  -Encouraged patient to follow-up with GI as outpatient.  She was scheduled for appointment in September however canceled due to work  conflict.    Surgery consulted; no plan for acute surgical intervention at this time.  If patient becomes septic/infected would consider transfer to [facility].    With last admission CT did note small cystic foci seen within the head and tail of the pancreas.  Follow-up MRI MRCP may be helpful.  Patient had canceled her outpatient follow-up appointment.    6.  Tobacco use  Admits to 1 pack/day    7.  Gastroesophageal reflux disease.  Patient states she does have occasional acid reflux symptoms which she does take omeprazole 20 mg OTC.    -Pantoprazole 40 mg IVP twice daily.      Discuss patient with Dr. Antonio on rounds.  Conservative measures at this time and reinforce alcohol cessation and follow-up.    8.  Tremors  Upon exam noted patient having increased upper extremity tremors with upper body tremors at times.  Discussed with patient's nurse.    -Withdrawal protocol      Inpatient consult to gastroenterology  Consult performed by: PATRICIA Donato  Consult ordered by: Jordana Lewis PA-C        Physician Requesting Consult: Kevan Tijerina MD  Reason for Consult / Principal Problem: Alcohol acute pancreatitis with necrosis    HPI: Kaykay Holland is a 34 y.o. year old female with past medical history of alcohol use disorder, tobacco use, hepatic steatosis and prior pancreatitis presented with abdominal pain.  Patient states she has been having reoccurring epigastric discomfort, nausea and vomiting.  [facility] and was instructed to come to the hospital however patient continue to self treat at home with clear liquids.  She states by Thursday or Friday she could not keep any liquids down and finally presented to the emergency room last evening.  Patient's mother and grandmother are at the bedside.  Patient does admit to continued alcohol use.  Patient has also had at least 5 admissions since November 2023 for acute pancreatitis.  Apparently  "[facility] having symptoms and self treats at home.  CT A/P 11/23, evidence of acute pancreatitis.  Both pancreatic parenchyma necrosis and peripancreatic necrosis.  Hepatic steatosis.  Begin lab work on admission; lipase 993, T. bili 158, WBCs 8.35, platelets 201, hemoglobin 13.9.  Patient afebrile.  On exam, patient denies nausea or vomiting today however states she had prior to coming into the hospital and her last episode of vomiting was yesterday.  She states she does take Prilosec OTC over at home for acid reflux symptoms.  Patient does have generalized abdominal tenderness increased to the left side.  She states her last bowel movement was yesterday and was normal.  Discussed with patient importance of cessation of alcohol and follow-up with GI.  Patient was scheduled to have a GI appointment after last admission however canceled due to work conflict.  Patient does state is difficult to get off work for appointments.  Discussed with patient to have outpatient rehab however patient declines at this time.      Review of Systems: Negative for 14 point exam except for HPI.    Historical Information   Past Medical History:   Diagnosis Date   • Acute alcoholic pancreatitis 11/03/2023   • Hypertension    • SIRS (systemic inflammatory response syndrome) (HCC) 04/10/2024     Past Surgical History:   Procedure Laterality Date   • WISDOM TOOTH EXTRACTION       Social History   Social History     Substance and Sexual Activity   Alcohol Use Yes   • Alcohol/week: 21.0 standard drinks of alcohol   • Types: 21 Glasses of wine per week    Comment: \"occasional\"     Social History     Substance and Sexual Activity   Drug Use Never     Social History     Tobacco Use   Smoking Status Every Day   • Current packs/day: 0.50   • Types: Cigarettes   • Passive exposure: Never   Smokeless Tobacco Never   Tobacco Comments    Per pt last drink was today 9/24/24 one glass of wine. Had previously stop on 4/4/24 for 1 " month      History reviewed. No pertinent family history.    Meds/Allergies     Current Facility-Administered Medications:   •  chlordiazePOXIDE (LIBRIUM) capsule 25 mg, Q8H  •  folic acid 1 mg, thiamine (VITAMIN B1) 100 mg in sodium chloride 0.9 % 100 mL IV piggyback, Daily  •  HYDROmorphone (DILAUDID) injection 1 mg, Q4H PRN  •  lactated ringers infusion, Continuous, Last Rate: 200 mL/hr (11/23/24 2203)  •  nicotine (NICODERM CQ) 21 mg/24 hr TD 24 hr patch 21 mg, QPM  •  ondansetron (ZOFRAN) injection 4 mg, Q6H PRN  •  pantoprazole (PROTONIX) injection 40 mg, Q12H RODRICK  •  sodium chloride 0.9 % bolus 1,000 mL, Once    Medications Prior to Admission:   •  chlordiazePOXIDE (LIBRIUM) 25 mg capsule  •  folic acid (KP Folic Acid) 1 mg tablet  •  ibuprofen (MOTRIN) 600 mg tablet  •  ondansetron (ZOFRAN-ODT) 4 mg disintegrating tablet  •  oxyCODONE (Roxicodone) 5 immediate release tablet  •  naloxone (NARCAN) 4 mg/0.1 mL nasal spray  •  pantoprazole (PROTONIX) 40 mg tablet    Allergies   Allergen Reactions   • Other Edema     Bee stings (localized edema)           Physical Exam   Constitutional: Is oriented to person, place, and time. Appears well-developed and well-nourished.   HENT: WNL  Head: Normocephalic and atraumatic.   Eyes: Pupils are equal, round, and reactive to light.   Neck: Normal range of motion. Neck supple.   Cardiovascular: Normal rate and regular rhythm.    Pulmonary/Chest: Effort normal and breath sounds normal.   Abdominal: Soft.  Generalized abdominal tenderness increased on the left with palpation.  Bowel sounds are normal.  Complains of nausea no vomiting.  Musculoskeletal: Normal range of motion.   Extremities:  No edema  Neurological: Is alert and oriented to person, place, and time.   Skin: Skin is warm and dry.   Psychiatric: Has a normal mood and affect.       Lab Results:   Admission on 11/23/2024   Component Date Value   • WBC 11/23/2024 8.35    • RBC 11/23/2024 3.90    • Hemoglobin  11/23/2024 13.9    • Hematocrit 11/23/2024 42.0    • MCV 11/23/2024 108 (H)    • MCH 11/23/2024 35.6 (H)    • MCHC 11/23/2024 33.1    • RDW 11/23/2024 12.0    • MPV 11/23/2024 10.9    • Platelets 11/23/2024 201    • nRBC 11/23/2024 0    • Segmented % 11/23/2024 79 (H)    • Immature Grans % 11/23/2024 0    • Lymphocytes % 11/23/2024 10 (L)    • Monocytes % 11/23/2024 10    • Eosinophils Relative 11/23/2024 0    • Basophils Relative 11/23/2024 1    • Absolute Neutrophils 11/23/2024 6.61    • Absolute Immature Grans 11/23/2024 0.02    • Absolute Lymphocytes 11/23/2024 0.82    • Absolute Monocytes 11/23/2024 0.83    • Eosinophils Absolute 11/23/2024 0.02    • Basophils Absolute 11/23/2024 0.05    • Sodium 11/23/2024 139    • Potassium 11/23/2024 3.5    • Chloride 11/23/2024 94 (L)    • CO2 11/23/2024 28    • ANION GAP 11/23/2024 17 (H)    • BUN 11/23/2024 8    • Creatinine 11/23/2024 0.46 (L)    • Glucose 11/23/2024 92    • Calcium 11/23/2024 8.9    • AST 11/23/2024 84 (H)    • ALT 11/23/2024 48    • Alkaline Phosphatase 11/23/2024 90    • Total Protein 11/23/2024 7.3    • Albumin 11/23/2024 4.1    • Total Bilirubin 11/23/2024 1.58 (H)    • eGFR 11/23/2024 131    • Lipase 11/23/2024 993 (H)    • Color, UA 11/23/2024 Yellow    • Clarity, UA 11/23/2024 Slightly Cloudy    • Specific Gravity, UA 11/23/2024 >=1.030    • pH, UA 11/23/2024 6.0    • Leukocytes, UA 11/23/2024 Moderate (A)    • Nitrite, UA 11/23/2024 Negative    • Protein, UA 11/23/2024 30 (1+) (A)    • Glucose, UA 11/23/2024 Negative    • Ketones, UA 11/23/2024 80 (3+) (A)    • Urobilinogen, UA 11/23/2024 2.0 (A)    • Bilirubin, UA 11/23/2024 Small (A)    • Occult Blood, UA 11/23/2024 Large (A)    • EXT Preg Test, Ur 11/23/2024 Negative    • Control 11/23/2024 Valid    • hs TnI 0hr 11/23/2024 4    • Ventricular Rate 11/23/2024 85    • Atrial Rate 11/23/2024 85    • MO Interval 11/23/2024 114    • QRSD Interval 11/23/2024 100    • QT Interval 11/23/2024 342    •  QTC Interval 11/23/2024 406    • P Axis 11/23/2024 60    • QRS Axis 11/23/2024 20    • T Wave Axis 11/23/2024 89    • hs TnI 2hr 11/23/2024 3    • Delta 2hr hsTnI 11/23/2024 -1    • RBC, UA 11/23/2024 10-20 (A)    • WBC, UA 11/23/2024 10-20 (A)    • Epithelial Cells 11/23/2024 Moderate (A)    • Bacteria, UA 11/23/2024 Occasional    • MUCUS THREADS 11/23/2024 Moderate (A)    • Sodium 11/23/2024 139    • Potassium 11/23/2024 3.8    • Chloride 11/23/2024 98    • CO2 11/23/2024 28    • ANION GAP 11/23/2024 13    • BUN 11/23/2024 8    • Creatinine 11/23/2024 0.49 (L)    • Glucose 11/23/2024 99    • Calcium 11/23/2024 7.8 (L)    • eGFR 11/23/2024 128    • LACTIC ACID 11/23/2024 1.3    • LD 11/23/2024 188    • WBC 11/24/2024 4.60    • RBC 11/24/2024 2.93 (L)    • Hemoglobin 11/24/2024 10.5 (L)    • Hematocrit 11/24/2024 32.2 (L)    • MCV 11/24/2024 110 (H)    • MCH 11/24/2024 35.8 (H)    • MCHC 11/24/2024 32.6    • RDW 11/24/2024 12.0    • MPV 11/24/2024 11.8    • Platelets 11/24/2024 120 (L)    • nRBC 11/24/2024 0    • Segmented % 11/24/2024 57    • Immature Grans % 11/24/2024 0    • Lymphocytes % 11/24/2024 29    • Monocytes % 11/24/2024 11    • Eosinophils Relative 11/24/2024 2    • Basophils Relative 11/24/2024 1    • Absolute Neutrophils 11/24/2024 2.59    • Absolute Immature Grans 11/24/2024 0.02    • Absolute Lymphocytes 11/24/2024 1.35    • Absolute Monocytes 11/24/2024 0.51    • Eosinophils Absolute 11/24/2024 0.10    • Basophils Absolute 11/24/2024 0.03    • Sodium 11/24/2024 134 (L)    • Potassium 11/24/2024 3.6    • Chloride 11/24/2024 96    • CO2 11/24/2024 29    • ANION GAP 11/24/2024 9    • BUN 11/24/2024 7    • Creatinine 11/24/2024 0.49 (L)    • Glucose 11/24/2024 66    • Calcium 11/24/2024 7.9 (L)    • Corrected Calcium 11/24/2024 8.6    • AST 11/24/2024 40 (H)    • ALT 11/24/2024 28    • Alkaline Phosphatase 11/24/2024 62    • Total Protein 11/24/2024 5.4 (L)    • Albumin 11/24/2024 3.1 (L)    • Total  Bilirubin 11/24/2024 1.62 (H)    • eGFR 11/24/2024 128    • Lipase 11/24/2024 479 (H)      Imaging Studies: Results Review Statement: I reviewed radiology reports from this admission including:   and CT abdomen/pelvis.    EKG, Pathology, and Other Studies: Results Review Statement: I reviewed radiology reports from this admission including: CT abdomen/pelvis.  Ultrasound, lab work and previous record including admission notes, imaging, lab work   Counseling / Coordination of Care  Total floor / unit time spent today 45 minutes.

## 2024-11-24 NOTE — PROGRESS NOTES
Progress Note - Hospitalist   Name: Kaykay Holland 34 y.o. female I MRN: [id number]  Unit/Bed#: -[facility] I Date of Admission: 11/23/2024   Date of Service: 11/24/2024 I Hospital Day: 1     Assessment & Plan  Necrotizing pancreatitis  Presented to the emergency department with 1 day history of epigastric pain, nausea and bilious vomiting similar to prior episodes of pancreatitis  Suspect alcohol induced  CT abdomen/pelvis: Ongoing acute pancreatitis, now with findings of pancreatic necrosis and acute necrotic collection as described.  Hepatic steatosis.  Lipase 993 -> 479  S/p 2 L IVF with normal saline in the ED - continue IV resuscitation with lactated ringers  Continue pain control  OK for NPO w/ sips of clears per GI  Discussed with GI - hold off on abx unless leukocytosis, fever  Given new findings of necrosis and necrotic collection, general surgery following  Discussed complete alcohol cessation  Alcohol use disorder  Patient reports ongoing alcohol use, previously had reported drinking upwards of 2-3 bottles daily  States recently she has been having intermittent glasses of wine, last reported drink earlier this week  Does have remote history of withdrawal seizures  IV thiamine/folic acid  Continue librium  CIWA protocol  Encouraged complete alcohol cessation.  Not currently interested in rehab resources  Tobacco abuse  Smokes 1 ppd  Nicotine patch ordered  Hepatic steatosis  Noted on CT imaging  Likely in setting of ongoing alcohol abuse  Metabolic acidosis (Resolved: 11/24/2024)  Noted anion gap of 17 on admission  Suspect acidosis in setting of dehydration/pancreatitis, alcohol abuse  Received 2 L IVF NSS - avoid further normal saline   Resolved    VTE Pharmacologic Prophylaxis: VTE Score: 0 Low Risk (Score 0-2) - Encourage Ambulation.    Mobility:   Basic Mobility Inpatient Raw Score: 22  JH-HLM Goal: 7: Walk 25 feet or more  JH-HLM Achieved: 7: Walk 25 feet or more  JH-HLM Goal achieved. Continue to  encourage appropriate mobility.    Patient Centered Rounds: I performed bedside rounds with nursing staff today.   Discussions with Specialists or Other Care Team Provider: GI AP    Education and Discussions with Family / Patient: Patient declined call to .     Current Length of Stay: 1 day(s)  Current Patient Status: Inpatient   Certification Statement: The patient will continue to require additional inpatient hospital stay due to acute pancreatitis w/ necrosis  Discharge Plan: Anticipate discharge in 48 hrs to home.    Code Status: Level 1 - Full Code    Subjective   Patient feels improved from yesterday.  Pain improved.  Denies further vomiting, mild nausea.  States she is hungry.      Objective :  Temp:  [97.5 °F (36.4 °C)-98 °F (36.7 °C)] 98 °F (36.7 °C)  HR:  [67-98] 85  BP: (126-187)/() 131/95  Resp:  [16-20] 16  SpO2:  [93 %-100 %] 97 %  O2 Device: None (Room air)    Body mass index is 19.97 kg/m².     Input and Output Summary (last 24 hours):     Intake/Output Summary (Last 24 hours) at 11/24/2024 1029  Last data filed at 11/24/2024 0501  Gross per 24 hour   Intake 2000 ml   Output 500 ml   Net 1500 ml       Physical Exam  Vitals and nursing note reviewed.   Constitutional:       Appearance: Normal appearance.      Comments: No acute distress   HENT:      Head: Normocephalic.   Eyes:      General: No scleral icterus.     Extraocular Movements: Extraocular movements intact.      Conjunctiva/sclera: Conjunctivae normal.   Cardiovascular:      Rate and Rhythm: Normal rate and regular rhythm.   Pulmonary:      Effort: Pulmonary effort is normal.      Breath sounds: Normal breath sounds. No wheezing, rhonchi or rales.   Abdominal:      General: Bowel sounds are normal.      Palpations: Abdomen is soft.      Tenderness: There is abdominal tenderness in the epigastric area. There is no guarding or rebound.   Musculoskeletal:         General: No swelling, tenderness or deformity.      Cervical  back: Normal range of motion.      Comments: Able to move upper/lower ext bilaterally, no edema   Skin:     General: Skin is warm and dry.   Neurological:      Mental Status: She is alert and oriented to person, place, and time.   Psychiatric:         Mood and Affect: Mood normal.         Speech: Speech normal.         Behavior: Behavior normal.           Lines/Drains:              Lab Results: I have reviewed the following results:   Results from last 7 days   Lab Units 11/24/24  0255   WBC Thousand/uL 4.60   HEMOGLOBIN g/dL 10.5*   HEMATOCRIT % 32.2*   PLATELETS Thousands/uL 120*   SEGS PCT % 57   LYMPHO PCT % 29   MONO PCT % 11   EOS PCT % 2     Results from last 7 days   Lab Units 11/24/24  0255   SODIUM mmol/L 134*   POTASSIUM mmol/L 3.6   CHLORIDE mmol/L 96   CO2 mmol/L 29   BUN mg/dL 7   CREATININE mg/dL 0.49*   ANION GAP mmol/L 9   CALCIUM mg/dL 7.9*   ALBUMIN g/dL 3.1*   TOTAL BILIRUBIN mg/dL 1.62*   ALK PHOS U/L 62   ALT U/L 28   AST U/L 40*   GLUCOSE RANDOM mg/dL 66                 Results from last 7 days   Lab Units 11/23/24  1636   LACTIC ACID mmol/L 1.3       Recent Cultures (last 7 days):         Imaging Results Review: No pertinent imaging studies reviewed.  Other Study Results Review: No additional pertinent studies reviewed.    Last 24 Hours Medication List:     Current Facility-Administered Medications:     chlordiazePOXIDE (LIBRIUM) capsule 25 mg, Q8H    folic acid 1 mg, thiamine (VITAMIN B1) 100 mg in sodium chloride 0.9 % 100 mL IV piggyback, Daily    HYDROmorphone (DILAUDID) injection 1 mg, Q4H PRN    lactated ringers infusion, Continuous, Last Rate: 200 mL/hr (11/23/24 2203)    nicotine (NICODERM CQ) 21 mg/24 hr TD 24 hr patch 21 mg, QPM    ondansetron (ZOFRAN) injection 4 mg, Q6H PRN    pantoprazole (PROTONIX) injection 40 mg, Q12H RODRICK    sodium chloride 0.9 % bolus 1,000 mL, Once    Administrative Statements   Today, Patient Was Seen By: Jordana Lewis PA-C  I have spent a total time  of 35 minutes in caring for this patient on the day of the visit/encounter including Diagnostic results, Instructions for management, Patient and family education, Importance of tx compliance, Risk factor reductions, Counseling / Coordination of care, Documenting in the medical record, Reviewing / ordering tests, medicine, procedures  , and Communicating with other healthcare professionals .    **Please Note: This note may have been constructed using a voice recognition system.**

## 2024-11-25 PROBLEM — E87.8 ELECTROLYTE ABNORMALITY: Status: ACTIVE | Noted: 2024-11-25

## 2024-11-25 LAB
ALBUMIN SERPL BCG-MCNC: 3 G/DL (ref 3.5–5)
ALP SERPL-CCNC: 55 U/L (ref 34–104)
ALT SERPL W P-5'-P-CCNC: 22 U/L (ref 7–52)
ANION GAP SERPL CALCULATED.3IONS-SCNC: 7 MMOL/L (ref 4–13)
AST SERPL W P-5'-P-CCNC: 30 U/L (ref 13–39)
BACTERIA UR CULT: NORMAL
BASOPHILS # BLD AUTO: 0.02 THOUSANDS/ΜL (ref 0–0.1)
BASOPHILS NFR BLD AUTO: 0 % (ref 0–1)
BILIRUB SERPL-MCNC: 1.19 MG/DL (ref 0.2–1)
BUN SERPL-MCNC: 4 MG/DL (ref 5–25)
CALCIUM ALBUM COR SERPL-MCNC: 8.9 MG/DL (ref 8.3–10.1)
CALCIUM SERPL-MCNC: 8.1 MG/DL (ref 8.4–10.2)
CHLORIDE SERPL-SCNC: 97 MMOL/L (ref 96–108)
CO2 SERPL-SCNC: 32 MMOL/L (ref 21–32)
CREAT SERPL-MCNC: 0.46 MG/DL (ref 0.6–1.3)
EOSINOPHIL # BLD AUTO: 0.16 THOUSAND/ΜL (ref 0–0.61)
EOSINOPHIL NFR BLD AUTO: 4 % (ref 0–6)
ERYTHROCYTE [DISTWIDTH] IN BLOOD BY AUTOMATED COUNT: 11.7 % (ref 11.6–15.1)
GFR SERPL CREATININE-BSD FRML MDRD: 131 ML/MIN/1.73SQ M
GLUCOSE SERPL-MCNC: 77 MG/DL (ref 65–140)
HCT VFR BLD AUTO: 35.9 % (ref 34.8–46.1)
HGB BLD-MCNC: 11.8 G/DL (ref 11.5–15.4)
IMM GRANULOCYTES # BLD AUTO: 0.01 THOUSAND/UL (ref 0–0.2)
IMM GRANULOCYTES NFR BLD AUTO: 0 % (ref 0–2)
LIPASE SERPL-CCNC: 251 U/L (ref 11–82)
LYMPHOCYTES # BLD AUTO: 1.54 THOUSANDS/ΜL (ref 0.6–4.47)
LYMPHOCYTES NFR BLD AUTO: 34 % (ref 14–44)
MAGNESIUM SERPL-MCNC: 1.3 MG/DL (ref 1.9–2.7)
MCH RBC QN AUTO: 36.3 PG (ref 26.8–34.3)
MCHC RBC AUTO-ENTMCNC: 32.9 G/DL (ref 31.4–37.4)
MCV RBC AUTO: 111 FL (ref 82–98)
MONOCYTES # BLD AUTO: 0.47 THOUSAND/ΜL (ref 0.17–1.22)
MONOCYTES NFR BLD AUTO: 10 % (ref 4–12)
NEUTROPHILS # BLD AUTO: 2.34 THOUSANDS/ΜL (ref 1.85–7.62)
NEUTS SEG NFR BLD AUTO: 52 % (ref 43–75)
NRBC BLD AUTO-RTO: 0 /100 WBCS
PLATELET # BLD AUTO: 133 THOUSANDS/UL (ref 149–390)
PMV BLD AUTO: 11.4 FL (ref 8.9–12.7)
POTASSIUM SERPL-SCNC: 3 MMOL/L (ref 3.5–5.3)
PROT SERPL-MCNC: 5.3 G/DL (ref 6.4–8.4)
RBC # BLD AUTO: 3.25 MILLION/UL (ref 3.81–5.12)
SODIUM SERPL-SCNC: 136 MMOL/L (ref 135–147)
WBC # BLD AUTO: 4.54 THOUSAND/UL (ref 4.31–10.16)

## 2024-11-25 PROCEDURE — 80053 COMPREHEN METABOLIC PANEL: CPT | Performed by: HOSPITALIST

## 2024-11-25 PROCEDURE — 99232 SBSQ HOSP IP/OBS MODERATE 35: CPT | Performed by: INTERNAL MEDICINE

## 2024-11-25 PROCEDURE — 83735 ASSAY OF MAGNESIUM: CPT | Performed by: HOSPITALIST

## 2024-11-25 PROCEDURE — 83690 ASSAY OF LIPASE: CPT | Performed by: HOSPITALIST

## 2024-11-25 PROCEDURE — 85025 COMPLETE CBC W/AUTO DIFF WBC: CPT | Performed by: HOSPITALIST

## 2024-11-25 PROCEDURE — 99232 SBSQ HOSP IP/OBS MODERATE 35: CPT | Performed by: PHYSICIAN ASSISTANT

## 2024-11-25 PROCEDURE — 99233 SBSQ HOSP IP/OBS HIGH 50: CPT | Performed by: PHYSICIAN ASSISTANT

## 2024-11-25 PROCEDURE — 99255 IP/OBS CONSLTJ NEW/EST HI 80: CPT | Performed by: INTERNAL MEDICINE

## 2024-11-25 PROCEDURE — NC001 PR NO CHARGE: Performed by: INTERNAL MEDICINE

## 2024-11-25 RX ORDER — OLANZAPINE 10 MG/2ML
10 INJECTION, POWDER, FOR SOLUTION INTRAMUSCULAR ONCE
Status: COMPLETED | OUTPATIENT
Start: 2024-11-25 | End: 2024-11-25

## 2024-11-25 RX ORDER — DEXMEDETOMIDINE HYDROCHLORIDE 4 UG/ML
.1-.7 INJECTION, SOLUTION INTRAVENOUS
Status: DISCONTINUED | OUTPATIENT
Start: 2024-11-25 | End: 2024-11-26

## 2024-11-25 RX ORDER — MAGNESIUM SULFATE HEPTAHYDRATE 40 MG/ML
2 INJECTION, SOLUTION INTRAVENOUS ONCE
Status: COMPLETED | OUTPATIENT
Start: 2024-11-25 | End: 2024-11-26

## 2024-11-25 RX ORDER — LORAZEPAM 2 MG/ML
1 INJECTION INTRAMUSCULAR ONCE
Status: COMPLETED | OUTPATIENT
Start: 2024-11-25 | End: 2024-11-25

## 2024-11-25 RX ORDER — HEPARIN SODIUM 5000 [USP'U]/ML
5000 INJECTION, SOLUTION INTRAVENOUS; SUBCUTANEOUS EVERY 8 HOURS SCHEDULED
Status: DISCONTINUED | OUTPATIENT
Start: 2024-11-25 | End: 2024-11-29 | Stop reason: HOSPADM

## 2024-11-25 RX ORDER — LORAZEPAM 1 MG/1
2 TABLET ORAL EVERY 8 HOURS PRN
Status: COMPLETED | OUTPATIENT
Start: 2024-11-25 | End: 2024-11-25

## 2024-11-25 RX ORDER — LORAZEPAM 2 MG/ML
0.5 INJECTION INTRAMUSCULAR ONCE
Status: COMPLETED | OUTPATIENT
Start: 2024-11-25 | End: 2024-11-25

## 2024-11-25 RX ORDER — POTASSIUM CHLORIDE 14.9 MG/ML
20 INJECTION INTRAVENOUS
Status: COMPLETED | OUTPATIENT
Start: 2024-11-25 | End: 2024-11-26

## 2024-11-25 RX ORDER — LORAZEPAM 1 MG/1
2 TABLET ORAL ONCE
Status: DISCONTINUED | OUTPATIENT
Start: 2024-11-25 | End: 2024-11-25

## 2024-11-25 RX ORDER — LORAZEPAM 1 MG/1
2 TABLET ORAL ONCE
Status: COMPLETED | OUTPATIENT
Start: 2024-11-25 | End: 2024-11-25

## 2024-11-25 RX ORDER — PHENOBARBITAL SODIUM 65 MG/ML
130 INJECTION, SOLUTION INTRAMUSCULAR; INTRAVENOUS EVERY 6 HOURS PRN
Status: DISCONTINUED | OUTPATIENT
Start: 2024-11-25 | End: 2024-11-26

## 2024-11-25 RX ADMIN — HYDROMORPHONE HYDROCHLORIDE 1 MG: 1 INJECTION, SOLUTION INTRAMUSCULAR; INTRAVENOUS; SUBCUTANEOUS at 08:13

## 2024-11-25 RX ADMIN — PHENOBARBITAL SODIUM 130 MG: 65 INJECTION INTRAMUSCULAR at 22:13

## 2024-11-25 RX ADMIN — THIAMINE HYDROCHLORIDE: 100 INJECTION, SOLUTION INTRAMUSCULAR; INTRAVENOUS at 08:13

## 2024-11-25 RX ADMIN — PANTOPRAZOLE SODIUM 40 MG: 40 INJECTION, POWDER, FOR SOLUTION INTRAVENOUS at 08:13

## 2024-11-25 RX ADMIN — POTASSIUM CHLORIDE 20 MEQ: 14.9 INJECTION, SOLUTION INTRAVENOUS at 12:19

## 2024-11-25 RX ADMIN — SODIUM CHLORIDE, SODIUM LACTATE, POTASSIUM CHLORIDE, AND CALCIUM CHLORIDE 200 ML/HR: .6; .31; .03; .02 INJECTION, SOLUTION INTRAVENOUS at 08:13

## 2024-11-25 RX ADMIN — NICOTINE 21 MG: 21 PATCH, EXTENDED RELEASE TRANSDERMAL at 18:06

## 2024-11-25 RX ADMIN — MAGNESIUM SULFATE HEPTAHYDRATE 2 G: 2 INJECTION, SOLUTION INTRAVENOUS at 10:55

## 2024-11-25 RX ADMIN — HEPARIN SODIUM 5000 UNITS: 5000 INJECTION, SOLUTION INTRAVENOUS; SUBCUTANEOUS at 23:24

## 2024-11-25 RX ADMIN — HYDROMORPHONE HYDROCHLORIDE 1 MG: 1 INJECTION, SOLUTION INTRAMUSCULAR; INTRAVENOUS; SUBCUTANEOUS at 01:41

## 2024-11-25 RX ADMIN — SODIUM CHLORIDE, SODIUM LACTATE, POTASSIUM CHLORIDE, AND CALCIUM CHLORIDE 200 ML/HR: .6; .31; .03; .02 INJECTION, SOLUTION INTRAVENOUS at 11:00

## 2024-11-25 RX ADMIN — PHENOBARBITAL SODIUM 544 MG: 65 INJECTION INTRAMUSCULAR; INTRAVENOUS at 17:07

## 2024-11-25 RX ADMIN — CHLORDIAZEPOXIDE HYDROCHLORIDE 25 MG: 25 CAPSULE ORAL at 08:13

## 2024-11-25 RX ADMIN — LORAZEPAM 1 MG: 2 INJECTION INTRAMUSCULAR; INTRAVENOUS at 15:51

## 2024-11-25 RX ADMIN — OLANZAPINE 10 MG: 10 INJECTION, POWDER, FOR SOLUTION INTRAMUSCULAR at 16:32

## 2024-11-25 RX ADMIN — LORAZEPAM 2 MG: 1 TABLET ORAL at 11:40

## 2024-11-25 RX ADMIN — LORAZEPAM 0.5 MG: 2 INJECTION INTRAMUSCULAR; INTRAVENOUS at 15:09

## 2024-11-25 RX ADMIN — SODIUM CHLORIDE, SODIUM LACTATE, POTASSIUM CHLORIDE, AND CALCIUM CHLORIDE 200 ML/HR: .6; .31; .03; .02 INJECTION, SOLUTION INTRAVENOUS at 03:17

## 2024-11-25 RX ADMIN — POTASSIUM CHLORIDE 20 MEQ: 14.9 INJECTION, SOLUTION INTRAVENOUS at 14:38

## 2024-11-25 RX ADMIN — THIAMINE HYDROCHLORIDE 500 MG: 100 INJECTION, SOLUTION INTRAMUSCULAR; INTRAVENOUS at 18:04

## 2024-11-25 RX ADMIN — PANTOPRAZOLE SODIUM 40 MG: 40 INJECTION, POWDER, FOR SOLUTION INTRAVENOUS at 21:01

## 2024-11-25 RX ADMIN — CHLORDIAZEPOXIDE HYDROCHLORIDE 25 MG: 25 CAPSULE ORAL at 14:38

## 2024-11-25 RX ADMIN — DEXMEDETOMIDINE HYDROCHLORIDE 0.2 MCG/KG/HR: 400 INJECTION INTRAVENOUS at 17:13

## 2024-11-25 RX ADMIN — SODIUM CHLORIDE, SODIUM LACTATE, POTASSIUM CHLORIDE, AND CALCIUM CHLORIDE 200 ML/HR: .6; .31; .03; .02 INJECTION, SOLUTION INTRAVENOUS at 21:02

## 2024-11-25 RX ADMIN — LORAZEPAM 2 MG: 1 TABLET ORAL at 14:40

## 2024-11-25 NOTE — ASSESSMENT & PLAN NOTE
Presented to the emergency department with 1 day history of epigastric pain, nausea and bilious vomiting similar to prior episodes of pancreatitis  Suspect alcohol induced  CT abdomen/pelvis: Ongoing acute pancreatitis, now with findings of pancreatic necrosis and acute necrotic collection as described.  Hepatic steatosis.  Lipase 993 -> 479 -> 251  S/p 2 L IVF with normal saline in the ED - continue IV resuscitation with lactated ringers  Continue pain control  Diet advanced to crackers and toast, tolerating OK without postprandial n/v/worsening pain although overall still with abdominal pain/tenderness   Discussed with GI - hold off on abx unless leukocytosis, fever  Given new findings of necrosis and necrotic collection, general surgery following but no plans for surgical intervention thusfar  Discussed complete alcohol cessation

## 2024-11-25 NOTE — PLAN OF CARE
Problem: Potential for Falls  Goal: Patient will remain free of falls  Description: INTERVENTIONS:  - Educate patient/family on patient safety including physical limitations  - Instruct patient to call for assistance with activity   - Consult OT/PT to assist with strengthening/mobility   - Keep Call bell within reach  - Keep bed low and locked with side rails adjusted as appropriate  - Keep care items and personal belongings within reach  - Initiate and maintain comfort rounds  - Make Fall Risk Sign visible to staff  - Offer Toileting every 2 Hours, in advance of need  - Initiate/Maintain shift alarm  - Obtain necessary fall risk management equipment: socks  - Apply yellow socks and bracelet for high fall risk patients  - Consider moving patient to room near nurses station  Outcome: Progressing     Problem: PAIN - ADULT  Goal: Verbalizes/displays adequate comfort level or baseline comfort level  Description: Interventions:  - Encourage patient to monitor pain and request assistance  - Assess pain using appropriate pain scale  - Administer analgesics based on type and severity of pain and evaluate response  - Implement non-pharmacological measures as appropriate and evaluate response  - Consider cultural and social influences on pain and pain management  - Notify physician/advanced practitioner if interventions unsuccessful or patient reports new pain  Outcome: Progressing     Problem: INFECTION - ADULT  Goal: Absence or prevention of progression during hospitalization  Description: INTERVENTIONS:  - Assess and monitor for signs and symptoms of infection  - Monitor lab/diagnostic results  - Monitor all insertion sites, i.e. indwelling lines, tubes, and drains  - Monitor endotracheal if appropriate and nasal secretions for changes in amount and color  - Flushing appropriate cooling/warming therapies per order  - Administer medications as ordered  - Instruct and encourage patient and family to use good hand hygiene  technique  - Identify and instruct in appropriate isolation precautions for identified infection/condition  Outcome: Progressing  Goal: Absence of fever/infection during neutropenic period  Description: INTERVENTIONS:  - Monitor WBC    Outcome: Progressing     Problem: SAFETY ADULT  Goal: Patient will remain free of falls  Description: INTERVENTIONS:  - Educate patient/family on patient safety including physical limitations  - Instruct patient to call for assistance with activity   - Consult OT/PT to assist with strengthening/mobility   - Keep Call bell within reach  - Keep bed low and locked with side rails adjusted as appropriate  - Keep care items and personal belongings within reach  - Initiate and maintain comfort rounds  - Make Fall Risk Sign visible to staff  - Offer Toileting every 2 Hours, in advance of need  - Initiate/Maintain shift alarm  - Obtain necessary fall risk management equipment: socks  - Apply yellow socks and bracelet for high fall risk patients  - Consider moving patient to room near nurses station  Outcome: Progressing  Goal: Maintain or return to baseline ADL function  Description: INTERVENTIONS:  -  Assess patient's ability to carry out ADLs; assess patient's baseline for ADL function and identify physical deficits which impact ability to perform ADLs (bathing, care of mouth/teeth, toileting, grooming, dressing, etc.)  - Assess/evaluate cause of self-care deficits   - Assess range of motion  - Assess patient's mobility; develop plan if impaired  - Assess patient's need for assistive devices and provide as appropriate  - Encourage maximum independence but intervene and supervise when necessary  - Involve family in performance of ADLs  - Assess for home care needs following discharge   - Consider OT consult to assist with ADL evaluation and planning for discharge  - Provide patient education as appropriate  Outcome: Progressing  Goal: Maintains/Returns to pre admission functional  level  Description: INTERVENTIONS:  - Perform AM-PAC 6 Click Basic Mobility/ Daily Activity assessment daily.  - Set and communicate daily mobility goal to care team and patient/family/caregiver.   - Collaborate with rehabilitation services on mobility goals if consulted  - Perform Range of Motion 2 times a day.  - Reposition patient every 2 hours.  - Dangle patient 2 times a day  - Stand patient 2 times a day  - Ambulate patient 2 times a day  - Out of bed to chair 2 times a day   - Out of bed for meals 2 times a day  - Out of bed for toileting  - Record patient progress and toleration of activity level   Outcome: Progressing     Problem: DISCHARGE PLANNING  Goal: Discharge to home or other facility with appropriate resources  Description: INTERVENTIONS:  - Identify barriers to discharge w/patient and caregiver  - Arrange for needed discharge resources and transportation as appropriate  - Identify discharge learning needs (meds, wound care, etc.)  - Arrange for interpretive services to assist at discharge as needed  - Refer to Case Management Department for coordinating discharge planning if the patient needs post-hospital services based on physician/advanced practitioner order or complex needs related to functional status, cognitive ability, or social support system  Outcome: Progressing     Problem: Knowledge Deficit  Goal: Patient/family/caregiver demonstrates understanding of disease process, treatment plan, medications, and discharge instructions  Description: Complete learning assessment and assess knowledge base.  Interventions:  - Provide teaching at level of understanding  - Provide teaching via preferred learning methods  Outcome: Progressing     Problem: GASTROINTESTINAL - ADULT  Goal: Minimal or absence of nausea and/or vomiting  Description: INTERVENTIONS:  - Administer IV fluids if ordered to ensure adequate hydration  - Maintain NPO status until nausea and vomiting are resolved  - Nasogastric tube if  ordered  - Administer ordered antiemetic medications as needed  - Provide nonpharmacologic comfort measures as appropriate  - Advance diet as tolerated, if ordered  - Consider nutrition services referral to assist patient with adequate nutrition and appropriate food choices  Outcome: Progressing  Goal: Maintains adequate nutritional intake  Description: INTERVENTIONS:  - Monitor percentage of each meal consumed  - Identify factors contributing to decreased intake, treat as appropriate  - Assist with meals as needed  - Monitor I&O, weight, and lab values if indicated  - Obtain nutrition services referral as needed  Outcome: Progressing

## 2024-11-25 NOTE — PROGRESS NOTES
Progress Note - Hospitalist   Name: Kaykay Holland 34 y.o. female I MRN: [id number]  Unit/Bed#: [facility] I Date of Admission: 11/23/2024   Date of Service: 11/25/2024 I Hospital Day: 2    Assessment & Plan  Necrotizing pancreatitis  Presented to the emergency department with 1 day history of epigastric pain, nausea and bilious vomiting similar to prior episodes of pancreatitis  Suspect alcohol induced  CT abdomen/pelvis: Ongoing acute pancreatitis, now with findings of pancreatic necrosis and acute necrotic collection as described.  Hepatic steatosis.  Lipase 993 -> 479 -> 251  S/p 2 L IVF with normal saline in the ED - continue IV resuscitation with lactated ringers  Continue pain control  Diet advanced to crackers and toast, tolerating OK without postprandial n/v/worsening pain although overall still with abdominal pain/tenderness   Discussed with GI - hold off on abx unless leukocytosis, fever  Given new findings of necrosis and necrotic collection, general surgery following but no plans for surgical intervention thusfar  Discussed complete alcohol cessation  Alcohol use disorder  Patient reports ongoing alcohol use, previously had reported drinking upwards of 2-3 bottles daily  States recently she has been having intermittent glasses of wine, last reported drink earlier this week  Does have remote history of withdrawal seizures  IV thiamine/folic acid  Continue librium  CIWA protocol  Encouraged complete alcohol cessation.  Not currently interested in rehab resources  Tobacco abuse  Smokes 1 ppd  Nicotine patch ordered  Hepatic steatosis  Noted on CT imaging  Likely in setting of ongoing alcohol abuse  Electrolyte abnormality  Hypomagnesemic and hypokalemic today  Repleted  Check a.m. lytes    VTE Pharmacologic Prophylaxis: VTE Score: 0 Low Risk (Score 0-2) - Encourage Ambulation.    Mobility:   Basic Mobility Inpatient Raw Score: 22  -M Goal: 7: Walk 25 feet or more  -HLM Achieved: 7: Walk 25 feet or  more  JH-HLM Goal achieved. Continue to encourage appropriate mobility.    Patient Centered Rounds: I performed bedside rounds with nursing staff today.   Discussions with Specialists or Other Care Team Provider: nursing, case management    Education and Discussions with Family / Patient: Updated  (mother) via phone.    Current Length of Stay: 2 day(s)  Current Patient Status: Inpatient   Certification Statement: The patient will continue to require additional inpatient hospital stay due to continued pain and nausea on IVF  Discharge Plan: Anticipate discharge in 48-72 hrs to home.    Code Status: Level 1 - Full Code    Subjective   States she feels very dizzy and fatigued today.  She does states she had some crackers and toast for lunch and had no worsening of pain or nausea, did not vomit but overall states she still feels abdominal tenderness at a 6 most times of the day.  She tells me she became very dizzy after bending to  her purse.  She is worried about being able to return to work on Friday and requests a work note.  Denies fever or chills.    Objective :  Temp:  [97.5 °F (36.4 °C)-98.7 °F (37.1 °C)] 97.5 °F (36.4 °C)  HR:  [73-82] 73  BP: (131-146)/(92-96) 135/92  Resp:  [16-18] 16  SpO2:  [94 %-95 %] 94 %  O2 Device: None (Room air)    Body mass index is 19.97 kg/m².     Input and Output Summary (last 24 hours):     Intake/Output Summary (Last 24 hours) at 11/25/2024 1143  Last data filed at 11/25/2024 0813  Gross per 24 hour   Intake 1380 ml   Output 0 ml   Net 1380 ml       Physical Exam  Vitals and nursing note reviewed.   Constitutional:       General: She is not in acute distress.     Appearance: Normal appearance. She is normal weight. She is not ill-appearing or toxic-appearing.   HENT:      Head: Normocephalic and atraumatic.      Right Ear: External ear normal.      Left Ear: External ear normal.      Nose: Nose normal.      Mouth/Throat:      Mouth: Mucous membranes are  moist.      Pharynx: Oropharynx is clear.   Eyes:      Conjunctiva/sclera: Conjunctivae normal.   Cardiovascular:      Rate and Rhythm: Normal rate and regular rhythm.      Pulses: Normal pulses.      Heart sounds: Normal heart sounds. No murmur heard.     No gallop.   Pulmonary:      Effort: Pulmonary effort is normal. No respiratory distress.      Breath sounds: Normal breath sounds. No stridor. No wheezing, rhonchi or rales.   Abdominal:      General: Abdomen is flat. Bowel sounds are normal. There is no distension.      Palpations: Abdomen is soft. There is no mass.      Tenderness: There is abdominal tenderness. There is no guarding.      Hernia: No hernia is present.      Comments: Diffuse mild abdominal tenderness to palpation   Musculoskeletal:      Cervical back: Normal range of motion.      Right lower leg: No edema.      Left lower leg: No edema.   Skin:     General: Skin is warm and dry.   Neurological:      Mental Status: She is alert. Mental status is at baseline.   Psychiatric:         Mood and Affect: Mood normal.           Lines/Drains:              Lab Results: I have reviewed the following results:   Results from last 7 days   Lab Units 11/25/24  0601   WBC Thousand/uL 4.54   HEMOGLOBIN g/dL 11.8   HEMATOCRIT % 35.9   PLATELETS Thousands/uL 133*   SEGS PCT % 52   LYMPHO PCT % 34   MONO PCT % 10   EOS PCT % 4     Results from last 7 days   Lab Units 11/25/24  0405   SODIUM mmol/L 136   POTASSIUM mmol/L 3.0*   CHLORIDE mmol/L 97   CO2 mmol/L 32   BUN mg/dL 4*   CREATININE mg/dL 0.46*   ANION GAP mmol/L 7   CALCIUM mg/dL 8.1*   ALBUMIN g/dL 3.0*   TOTAL BILIRUBIN mg/dL 1.19*   ALK PHOS U/L 55   ALT U/L 22   AST U/L 30   GLUCOSE RANDOM mg/dL 77                 Results from last 7 days   Lab Units 11/23/24  1636   LACTIC ACID mmol/L 1.3       Recent Cultures (last 7 days):   Results from last 7 days   Lab Units 11/23/24  1335   URINE CULTURE  Culture too young- will reincubate       Imaging Results  Review: I reviewed radiology reports from this admission including: CT abdomen/pelvis.  Other Study Results Review: EKG was reviewed.     Last 24 Hours Medication List:     Current Facility-Administered Medications:     chlordiazePOXIDE (LIBRIUM) capsule 25 mg, Q8H    diphenhydrAMINE (BENADRYL) tablet 25 mg, Q6H PRN    folic acid 1 mg, thiamine (VITAMIN B1) 100 mg in sodium chloride 0.9 % 100 mL IV piggyback, Daily, Last Rate: 200 mL/hr at 11/25/24 0813    HYDROmorphone (DILAUDID) injection 1 mg, Q4H PRN    lactated ringers infusion, Continuous, Last Rate: 200 mL/hr (11/25/24 1100)    magnesium sulfate 2 g/50 mL IVPB (premix) 2 g, Once, Last Rate: 2 g (11/25/24 1055)    nicotine (NICODERM CQ) 21 mg/24 hr TD 24 hr patch 21 mg, QPM    ondansetron (ZOFRAN) injection 4 mg, Q6H PRN    pantoprazole (PROTONIX) injection 40 mg, Q12H RODRICK    potassium chloride 20 mEq IVPB (premix), Q2H    sodium chloride 0.9 % bolus 1,000 mL, Once    Administrative Statements   Today, Patient Was Seen By: Faith Huggins PA-C  I have spent a total time of 45 minutes in caring for this patient on the day of the visit/encounter including Diagnostic results, Patient and family education, Impressions, Counseling / Coordination of care, Documenting in the medical record, Reviewing / ordering tests, medicine, procedures  , Obtaining or reviewing history  , and Communicating with other healthcare professionals .    **Please Note: This note may have been constructed using a voice recognition system.**

## 2024-11-25 NOTE — CASE MANAGEMENT
Case Management Assessment & Discharge Planning Note    Patient name Kaykay Holland  Location /-01 MRN [id number]  : 1990 Date 2024       Current Admission Date: 2024  Current Admission Diagnosis:Necrotizing pancreatitis   Patient Active Problem List    Diagnosis Date Noted Date Diagnosed    Electrolyte abnormality 2024     Alcohol use disorder 2024     Hematemesis 2024     Necrotizing pancreatitis 2024     Pancytopenia (HCC) 04/10/2024     Alcohol abuse 2024     Provoked seizures (HCC) 2024     Alcohol withdrawal seizure (HCC) 2024     Transaminitis 2024     Prolonged QT interval 2024     Essential hypertension 2024     Tobacco abuse 2024     Acute alcoholic pancreatitis 2023     Hepatic steatosis 2023     Peptic ulcer disease 2021     Alcohol withdrawal (HCC) 2020     Anxiety 2019     Depression 2019       LOS (days): 2  Geometric Mean LOS (GMLOS) (days):   Days to GMLOS:     OBJECTIVE:    Risk of Unplanned Readmission Score: 13.68         Current admission status: Inpatient       Preferred Pharmacy:   [id number] - [place] - [address]  [address]  [place] [address]  Phone: [phone] Fax: [phone]    Primary Care Provider: PATRICIA Moran    Primary Insurance: [employer]ET[employer]  Secondary Insurance:     ASSESSMENT:  Active Health Care Proxies       [facility] Care Representative - Mother   Primary Phone: [phone] (Mobile)                 Advance Directives  Does patient have a Health Care POA?: No  Was patient offered paperwork?: Yes  Does patient currently have a Health Care decision maker?: Yes, please see Health Care Proxy section  Does patient have Advance Directives?: No  Was patient offered paperwork?: Yes  Primary Contact: José Gaspar         Readmission Root Cause  30 Day Readmission:  No    Patient Information  Admitted from:: Home  Mental Status: Alert  During Assessment patient was accompanied by: Not accompanied during assessment  Assessment information provided by:: Patient  Primary Caregiver: Self  Support Systems: Friend, Family members, Self  County of Residence: [facility]  What city do you live in?: [place]  Home entry access options. Select all that apply.: Stairs  Number of steps to enter home.: 4  Type of Current Residence: 2 story home  Upon entering residence, is there a bedroom on the main floor (no further steps)?: No  A bedroom is located on the following floor levels of residence (select all that apply):: 2nd Floor  Upon entering residence, is there a bathroom on the main floor (no further steps)?: No  Indicate which floors of current residence have a bathroom (select all the apply):: 2nd Floor  Number of steps to 2nd floor from main floor: One Flight  Living Arrangements: Lives Alone  Is patient a ?: No    Activities of Daily Living Prior to Admission  Functional Status: Independent  Completes ADLs independently?: Yes  Ambulates independently?: Yes  Does patient use assisted devices?: No  Does patient currently own DME?: No  Does patient have a history of Outpatient Therapy (PT/OT)?: No  Does the patient have a history of Short-Term Rehab?: No  Does patient have a history of HHC?: No  Does patient currently have HHC?: No         Patient Information Continued  Income Source: Unemployed  Does patient have prescription coverage?: Yes  Does patient receive dialysis treatments?: No  Does patient have a history of substance abuse?: Yes  Historical substance use preference: Alcohol/ETOH  History of Withdrawal Symptoms: Seizures  Is patient currently in treatment for substance abuse?: No. Patient declined treatment information.  Does patient have a history of Mental Health Diagnosis?: Yes  Is patient receiving treatment for mental health?: Yes  Has patient received inpatient treatment  related to mental health in the last 2 years?: No         Means of Transportation  Means of Transport to Appts:: Drives Self          DISCHARGE DETAILS:    Discharge planning discussed with:: Pt  Freedom of Choice: Yes     CM contacted family/caregiver?: No- see comments (Pt reports being in contact with family.)  Were Treatment Team discharge recommendations reviewed with patient/caregiver?: Yes  Did patient/caregiver verbalize understanding of patient care needs?: Yes  Were patient/caregiver advised of the risks associated with not following Treatment Team discharge recommendations?: Yes         Requested Home Health Care         Is the patient interested in HHC at discharge?: No    DME Referral Provided  Referral made for DME?: No    Other Referral/Resources/Interventions Provided:  Referral Comments: CM talked with pt about alcohol treatment options. Pt declining [facility] referral at this time. CM stated that if pt changes mind, she can asked for CM and CM could make referral. CM to follow.                                                      Additional Comments: CM met with pt to discern discharge needs. Pt lives alone in a 2sh, 4STE, bed and bathroom upstairs. Pt reports being independent with ADLs and walking PTA. Does not use or own DME. Drives. No hx of OPPT, HHC, or STR. Hx of inpatient psych in [id number]. Hx of D&A treatment, but pt could not remember exactly when she was in treatment. Reported being there for two weeks. Hx of AA meetings. Does not have a medical POA. Open to receiving information on how to obtain one. Friend will be transport at discharge. Per chart review, [facility] contracted for services, but pt declined.

## 2024-11-25 NOTE — PLAN OF CARE
Problem: Potential for Falls  Goal: Patient will remain free of falls  Description: INTERVENTIONS:  - Educate patient/family on patient safety including physical limitations  - Instruct patient to call for assistance with activity   - Consult OT/PT to assist with strengthening/mobility   - Keep Call bell within reach  - Keep bed low and locked with side rails adjusted as appropriate  - Keep care items and personal belongings within reach  - Initiate and maintain comfort rounds  - Make Fall Risk Sign visible to staff  - Offer Toileting every 2 Hours, in advance of need  - Initiate/Maintain shift alarm  - Obtain necessary fall risk management equipment: socks  - Apply yellow socks and bracelet for high fall risk patients  - Consider moving patient to room near nurses station  Outcome: Progressing     Problem: PAIN - ADULT  Goal: Verbalizes/displays adequate comfort level or baseline comfort level  Description: Interventions:  - Encourage patient to monitor pain and request assistance  - Assess pain using appropriate pain scale  - Administer analgesics based on type and severity of pain and evaluate response  - Implement non-pharmacological measures as appropriate and evaluate response  - Consider cultural and social influences on pain and pain management  - Notify physician/advanced practitioner if interventions unsuccessful or patient reports new pain  Outcome: Progressing

## 2024-11-25 NOTE — ASSESSMENT & PLAN NOTE
Pt is 35 y/o female with pmh for HTN, prolonged QT, tobacco use, ETOH abuse, prior h/o ETOH pancreatitis, PUD and seizures who presents to the ER with 2 day h/o intractable N/V.    CT: Ongoing acute pancreatitis, now with findings of pancreatic necrosis and acute necrotic collection as described.     VSS, afeb  WBC 4.54, 4.6  Lipase 251, 479 from 993  Tbil 1.19, 1.62 from 1.58    Fei clears, abd is soft with mild LUQ TTP    Plan:  Aggressive IVF  GI eval  Serial exams  Trend lipase, vitals, wbc  Pain control  No plan for acute surgical intervention at this time, care per primary team and GI

## 2024-11-25 NOTE — PROGRESS NOTES
"Progress note - [facility]   Kaykay Holland 34 y.o. female MRN: [id number]  Unit/Bed#: -[facility] Encounter: [id number]    ASSESSMENT and PLAN    34F with alcohol use disorder, tobacco use, hepatic steatosis and prior pancreatitis presented with abdominal pain, found to have pancreatitis with acute necrotic collection on CT. Since October 2023 this is patient's fifth admission for acute pancreatitis.      1.  Necrotizing pancreatitis  2.  Alcohol use disorder  3.  Hepatic steatosis  4.  Elevated liver enzymes    Gradually responding to supportive care including IV fluids and bowel rest.  Tolerating toast and crackers without exacerbation of pain.  I ordered a trial of low-fat diet for dinner    Taper analgesics as tolerated  Continue withdrawal protocol along with multivitamin thiamine and folate is not meet criteria for steroids for alcoholic hepatitis      Reviewed the importance of total alcohol cessation    Will require close outpatient follow-up and surveillance imaging to ensure resolution of necrosis.       5.  Tobacco use  1 pack/day, discussed tobacco cessation in the setting of pancreatitis     6.  Gastroesophageal reflux disease.  Prilosec OTC at home, continue IV Protonix, transitioning to p.o. when she is reliably tolerating a diet        Chief Complaint   Patient presents with    Abdominal Pain     Pt reports b/l lower abd pain with vomiting. Pt reports Vomiting started last evening.       SUBJECTIVE/HPI   Tolerating clears with toast and crackers with less abdominal pain.  No nausea or vomiting.  Denies fevers or chills.    /92   Pulse 73   Temp 97.5 °F (36.4 °C)   Resp 16   Ht 5' 5\" (1.651 m)   Wt 54.4 kg (120 lb)   LMP 11/18/2024 (Exact Date)   SpO2 94%   BMI 19.97 kg/m²     PHYSICALEXAM  General appearance: alert, appears stated age and cooperative  Eyes: PERLLA, EOMI, no icterus   Head: Normocephalic, without obvious abnormality, atraumatic  Lungs: clear to " "auscultation bilaterally  Heart: regular rate and rhythm, S1, S2 normal, no murmur, click, rub or gallop  Abdomen: soft, improving epigastric tenderness without rebound.; bowel sounds normal; no masses,  no organomegaly  Extremities: extremities normal, atraumatic, no cyanosis or edema  Neurologic: Grossly normal    Lab Results   Component Value Date    CALCIUM 8.1 (L) 11/25/2024    K 3.0 (L) 11/25/2024    CO2 32 11/25/2024    CL 97 11/25/2024    BUN 4 (L) 11/25/2024    CREATININE 0.46 (L) 11/25/2024     Lab Results   Component Value Date    WBC 4.54 11/25/2024    HGB 11.8 11/25/2024    HCT 35.9 11/25/2024     (H) 11/25/2024     (L) 11/25/2024     Lab Results   Component Value Date    ALT 22 11/25/2024    AST 30 11/25/2024    ALKPHOS 55 11/25/2024     No results found for: \"AMYLASE\"  Lab Results   Component Value Date    LIPASE 251 (H) 11/25/2024     Lab Results   Component Value Date    IRON 44 (L) 04/10/2024    TIBC 275 04/10/2024    FERRITIN 2,560 (H) 04/10/2024     Lab Results   Component Value Date    INR 1.04 09/26/2024     "

## 2024-11-25 NOTE — PROGRESS NOTES
Progress Note - Surgery-General   Name: Kaykay Holland 34 y.o. female I MRN: [id number]  Unit/Bed#: -[facility] I Date of Admission: 11/23/2024   Date of Service: 11/25/2024 I Hospital Day: 2    Assessment & Plan  Necrotizing pancreatitis  Pt is 33 y/o female with pmh for HTN, prolonged QT, tobacco use, ETOH abuse, prior h/o ETOH pancreatitis, PUD and seizures who presents to the ER with 2 day h/o intractable N/V.    CT: Ongoing acute pancreatitis, now with findings of pancreatic necrosis and acute necrotic collection as described.     VSS, afeb  WBC 4.54, 4.6  Lipase 251, 479 from 993  Tbil 1.19, 1.62 from 1.58    Fei clears, abd is soft with mild LUQ TTP    Plan:  Aggressive IVF  GI eval  Serial exams  Trend lipase, vitals, wbc  Pain control  No plan for acute surgical intervention at this time, care per primary team and GI    Tobacco abuse  Nicotine patch   Hepatic steatosis  H/o ETOH abuse  Alcohol use disorder  -CIWA        Subjective   I feel much better today. Still no BM.     Objective :  Temp:  [97.5 °F (36.4 °C)-98.7 °F (37.1 °C)] 97.5 °F (36.4 °C)  HR:  [73-82] 73  BP: (131-146)/(92-96) 135/92  Resp:  [16-18] 16  SpO2:  [94 %-95 %] 94 %  O2 Device: None (Room air)    I/O         11/23 0701  11/24 0700 11/24 0701  11/25 0700 11/25 0701  11/26 0700    P.O. 0 900     I.V. (mL/kg) 2000 (36.8)      Total Intake(mL/kg) 2000 (36.8) 900 (16.5)     Urine (mL/kg/hr) 500 0 (0)     Total Output 500 0     Net +1500 +900            Unmeasured Urine Occurrence  5 x             Physical Exam  Vitals and nursing note reviewed.   Constitutional:       General: She is not in acute distress.     Appearance: She is well-developed.   HENT:      Head: Normocephalic and atraumatic.   Eyes:      Conjunctiva/sclera: Conjunctivae normal.   Cardiovascular:      Rate and Rhythm: Normal rate and regular rhythm.      Heart sounds: No murmur heard.  Pulmonary:      Effort: Pulmonary effort is normal. No respiratory distress.       Breath sounds: Normal breath sounds.   Abdominal:      General: Bowel sounds are normal. There is no distension.      Palpations: Abdomen is soft.      Tenderness: There is abdominal tenderness. There is no guarding.      Comments: TTP LUQ without guarding.    Musculoskeletal:         General: No swelling.      Cervical back: Neck supple.   Skin:     General: Skin is warm and dry.      Capillary Refill: Capillary refill takes less than 2 seconds.   Neurological:      General: No focal deficit present.      Mental Status: She is alert.   Psychiatric:         Mood and Affect: Mood normal.           Lab Results: I have reviewed the following results:  Recent Labs     11/23/24  1135 11/23/24  1636 11/24/24  0255 11/25/24  0405 11/25/24  0601   WBC 8.35  --    < >  --  4.54   HGB 13.9  --    < >  --  11.8   HCT 42.0  --    < >  --  35.9     --    < >  --  133*   SODIUM 139 139   < > 136  --    K 3.5 3.8   < > 3.0*  --    CL 94* 98   < > 97  --    CO2 28 28   < > 32  --    BUN 8 8   < > 4*  --    CREATININE 0.46* 0.49*   < > 0.46*  --    GLUC 92 99   < > 77  --    MG  --   --   --  1.3*  --    AST 84*  --    < > 30  --    ALT 48  --    < > 22  --    ALB 4.1  --    < > 3.0*  --    TBILI 1.58*  --    < > 1.19*  --    ALKPHOS 90  --    < > 55  --    HSTNI0 4  --   --   --   --    HSTNI2  --  3  --   --   --    LACTICACID  --  1.3  --   --   --     < > = values in this interval not displayed.       Imaging Results Review: No pertinent imaging studies reviewed.  Other Study Results Review: No additional pertinent studies reviewed.    VTE Pharmacologic Prophylaxis: Sequential compression device (Venodyne)   VTE Mechanical Prophylaxis: sequential compression device

## 2024-11-25 NOTE — UTILIZATION REVIEW
Initial Clinical Review    Admission: Date/Time/Statement:   Admission Orders (From admission, onward)       Ordered        11/23/24 1503  INPATIENT ADMISSION  Once                          Orders Placed This Encounter   Procedures    INPATIENT ADMISSION     Standing Status:   Standing     Number of Occurrences:   1     Level of Care:   Med Surg [16]     Estimated length of stay:   More than 2 Midnights     Certification:   I certify that inpatient services are medically necessary for this patient for a duration of greater than two midnights. See H&P and MD Progress Notes for additional information about the patient's course of treatment.     ED Arrival Information       Expected   -    Arrival   11/23/2024 11:08    Acuity   Urgent              Means of arrival   Walk-In    Escorted by   Family Member    Service   Critical Care/ICU    Admission type   Emergency              Arrival complaint   Abdominal pain & vomiting             Chief Complaint   Patient presents with    Abdominal Pain     Pt reports b/l lower abd pain with vomiting. Pt reports Vomiting started last evening.       Initial Presentation: 34 y.o. female who presented with family to [facility] ED. Admitted as Inpatient for evaluation and treatment of Necrotizing Pancreatitis.     PMHx:  has a past medical history of Acute alcoholic pancreatitis (11/03/2023), Hypertension, and SIRS (systemic inflammatory response syndrome) (HCC) (04/10/2024).      Presented w/ one day of epigastric pain, nausea and bilious vomiting. On exam, abd tenderness in the epigastric area without guarding. . Abnormal Labs Lipase 993. Anion gap 17. CT abdomen/pelvis: Ongoing acute pancreatitis, now with findings of pancreatic necrosis and acute necrotic collection as described.  Hepatic steatosis. See below med list for meds given in the ED.    Plan: NPO, IV fluids, Pain control, Monitor off abx for now, serial abd exam. Start Librium, IV thiamine/folic acid. Check  lactic acid, BMP and CBC in am.GI and Surgery consulted.    General Surgery: Acute on chronic alcoholic pancreatitis. Plan: Strict NPO, Aggressive IVF, GI eval, Serial exams, Trend Lipase, WBC and vitals, Pain control. If continues to vomit, may require NGT. No surgical intervention at this time.      Anticipated Length of Stay/Certification Statement: Patient will be admitted on an inpatient basis with an anticipated length of stay of greater than 2 midnights secondary to necrotizing pancreatitis.     Date: 11/24/24   Day 2:   PT reports feels improved from yesterday. Pain improved. Denies further vomiting, still has mild nausea but reports she's hungry.  Abnormal labs: K 30, Crat 0.46, Total bilit 1.19, Mg 1.3, Lipase 251. Plan: continue IV fluids, advance diet per GI, continue Pain control, Antiemetics, serial abd exam. Librium, IV thiamine/folic acid. CBC and CMP in am.  GI consult: alcohol induced necrotizing acute pancreatitis. Plan: continue supportive care w/ IV fluids, Analgesics and sips of clears advance diet as tolerated. Continue withdrawal  protocol w/ multivitamin, thiamine folate.     Certification Statement: The patient will continue to require additional inpatient hospital stay due to acute pancreatitis w/ necrosis  Discharge Plan: Anticipate discharge in 48 hrs to home.    11/25/24 Pt reports feeling dizzy and fatigued today. Pt had some crackers and toast for lunch and had no worsening pain or nausea but did vomit. Abd tenderness 6/10. CIWA 9 - Pt required the following today: Ativan PO  x 2, Dilaudid IV x2  Precedex gtt ordered to start, Zyprexa IM given x1, Ativan IV x2 given. Plan: continue to monitor alcohol withdrawal, Continue Multivitamin, thiamine/folate IV, advance diet as tolerated. IV fluids, IV Protonix, Pain and antiemetics PRN.     Certification Statement: The patient will continue to require additional inpatient hospital stay due to continued pain and nausea on IVF  Discharge Plan:  Anticipate discharge in 48-72 hrs to home    ED Treatment-Medication Administration from 11/23/2024 1108 to 11/23/2024 1551         Date/Time Order Dose Route Action     11/23/2024 1142 ondansetron (ZOFRAN) injection 4 mg 4 mg Intravenous Given     11/23/2024 1143 HYDROmorphone (DILAUDID) injection 1 mg 1 mg Intravenous Given     11/23/2024 1146 sodium chloride 0.9 % bolus 1,000 mL 1,000 mL Intravenous New Bag     11/23/2024 1343 droperidol (INAPSINE) injection 0.625 mg 0.625 mg Intravenous Given     11/23/2024 1347 iohexol (OMNIPAQUE) 350 MG/ML injection (MULTI-DOSE) 100 mL 100 mL Intravenous Given     11/23/2024 1524 HYDROmorphone (DILAUDID) injection 1 mg 1 mg Intravenous Given     11/23/2024 1523 lactated ringers infusion 200 mL/hr Intravenous New Bag     11/23/2024 1534 nicotine (NICODERM CQ) 21 mg/24 hr TD 24 hr patch 21 mg 21 mg Transdermal Medication Applied     11/23/2024 1534 pantoprazole (PROTONIX) injection 40 mg 40 mg Intravenous Given     11/23/2024 1534 chlordiazePOXIDE (LIBRIUM) capsule 25 mg 25 mg Oral Given            Scheduled Medications:  chlordiazePOXIDE, 25 mg, Oral, Q8H  folic acid 1 mg, thiamine (VITAMIN B1) 100 mg in sodium chloride 0.9 % 100 mL IV piggyback, , Intravenous, Daily  nicotine, 21 mg, Transdermal, QPM  pantoprazole, 40 mg, Intravenous, Q12H RODRICK  PHENobarbital, 10 mg/kg, Intravenous, Once  potassium chloride, 20 mEq, Intravenous, Q2H given 11/25  sodium chloride, 1,000 mL, Intravenous, Once  thiamine, 500 mg, Intravenous, Q8H   Followed by  [START ON 11/27/2024] thiamine, 250 mg, Intravenous, Q8H   Followed by  [START ON 11/29/2024] thiamine, 100 mg, Intravenous, Q8H  LORazepam (ATIVAN) injection 0.5 mg  Dose: 0.5 mg  Freq: Once Route: IV  Start: 11/25/24 1515 End: 11/25/24 1509     LORazepam (ATIVAN) injection 1 mg  Dose: 1 mg  Freq: Once Route: IM  Start: 11/25/24 1545 End: 11/25/24 1551     LORazepam (ATIVAN) tablet 2 mg  Dose: 2 mg  Freq: Once Route: PO  Indications of Use:  ALCOHOL WITHDRAWAL SYNDROME  Start: 11/25/24 1445 End: 11/25/24 1440   OLANZapine (ZyPREXA) IM injection 10 mg  Dose: 10 mg  Freq: Once Route: IM  Start: 11/25/24 1630 End: 11/25/24 1632      magnesium sulfate 2 g/50 mL IVPB (premix) 2 g  Dose: 2 g  Freq: Once Route: IV  Last Dose: 2 g (11/25/24 1055)  Start: 11/25/24 1000 End: 11/25/24 1255       Continuous IV Infusions:  dexmedetomidine, 0.1-0.7 mcg/kg/hr, Intravenous, Titrated  lactated ringers, 200 mL/hr, Intravenous, Continuous      PRN Meds:  diphenhydrAMINE, 25 mg, Oral, Q6H PRN  HYDROmorphone, 1 mg, Intravenous, Q4H PRN given x1 11/23, x3 11/24, x2 11/25   ondansetron, 4 mg, Intravenous, Q6H PRN  PHENobarbital, 130 mg, Intravenous, Q6H PRN  LORazepam (ATIVAN) tablet 2 mg  Dose: 2 mg  Freq: Every 8 hours PRN Route: PO  PRN Reason: anxiety  Indications of Use: ALCOHOL WITHDRAWAL SYNDROME  Start: 11/25/24 1059 End: 11/25/24 1140- given x1 11/25       ED Triage Vitals [11/23/24 1115]   Temperature Pulse Respirations Blood Pressure SpO2 Pain Score   97.5 °F (36.4 °C) 90 16 (!) 169/101 100 % 10 - Worst Possible Pain     Weight (last 2 days)       Date/Time Weight    11/23/24 1810 54.4 (120)    11/23/24 1115 54.4 (120)            Vital Signs (last 3 days)       Date/Time Temp Pulse Resp BP MAP (mmHg) SpO2 O2 Device Patient Position - Orthostatic VS Dulce Maria Coma Scale Score CIWA-Ar Total Pain    11/25/24 14:27:03 -- 81 -- 164/115 131 96 % -- -- -- 9 --    11/25/24 0813 -- -- -- -- -- -- -- -- -- -- 7    11/25/24 08:07:24 97.5 °F (36.4 °C) 73 16 135/92 106 94 % -- -- -- 9 --    11/25/24 0141 -- -- -- -- -- -- -- -- -- -- 8    11/24/24 2251 -- -- -- 138/96 -- -- None (Room air) -- 15 4 --    11/24/24 2041 -- -- -- -- -- -- -- -- -- -- 9    11/24/24 20:39:32 97.9 °F (36.6 °C) 82 18 146/96 113 95 % None (Room air) Lying -- 4 --    11/24/24 16:43:12 98.7 °F (37.1 °C) -- 16 131/92 105 -- None (Room air) Sitting -- -- --    11/24/24 1600 -- -- -- 131/92 -- -- -- -- -- 4 --     11/24/24 1423 -- -- -- -- -- -- -- -- -- -- 9    11/24/24 1200 -- -- -- 131/92 -- -- -- -- -- 4 --    11/24/24 0900 -- -- -- -- -- -- -- -- 15 -- --    11/24/24 0800 -- -- -- 131/95 -- -- -- -- -- 4 --    11/24/24 07:20:01 98 °F (36.7 °C) 85 16 131/95 107 97 % None (Room air) -- -- -- 5    11/24/24 0538 -- -- -- 126/78 -- -- -- -- -- 5 --    11/24/24 0417 -- -- -- -- -- -- -- -- -- -- 10 - Worst Possible Pain    11/24/24 0200 -- -- -- 132/75 -- -- -- -- -- 5 --    11/23/24 22:07:46 97.5 °F (36.4 °C) 98 18 137/94 108 95 % None (Room air) Lying -- 6 --    11/23/24 2100 -- -- -- -- -- -- None (Room air) -- 15 -- --    11/23/24 2015 -- -- -- -- -- -- -- -- -- -- 9    11/23/24 1810 97.8 °F (36.6 °C) 67 20 152/99 -- -- -- -- -- 4 --    11/23/24 1725 -- -- -- -- -- -- -- -- 15 -- 6    11/23/24 15:57:10 97.8 °F (36.6 °C) 67 20 152/99 117 93 % None (Room air) Sitting -- -- --    11/23/24 1529 -- 84 -- 187/95 -- -- -- -- -- 11 --    11/23/24 1524 -- -- -- -- -- -- -- -- -- -- 8    11/23/24 1500 -- 82 20 147/96 117 95 % -- -- -- -- --    11/23/24 1300 -- 69 17 148/92 115 98 % -- -- -- -- --    11/23/24 1200 -- 74 20 145/95 115 99 % None (Room air) -- -- -- --    11/23/24 1143 -- -- -- -- -- -- -- -- -- -- 10 - Worst Possible Pain    11/23/24 1135 -- -- -- -- -- -- None (Room air) -- -- -- --    11/23/24 1133 -- -- -- -- -- -- None (Room air) -- 15 -- --    11/23/24 1130 -- 71 19 163/98 125 100 % -- -- -- -- --    11/23/24 1115 97.5 °F (36.4 °C) 90 16 169/101 -- 100 % None (Room air) Lying -- -- 10 - Worst Possible Pain           CIWA-Ar Score       Row Name 11/25/24 14:27:03 11/25/24 08:07:24 11/24/24 7439       CIWA-Ar    Nausea and Vomiting 1 1 0    Tactile Disturbances 0 0 1    Tremor 3 3 2    Auditory Disturbances 0 0 0    Paroxysmal Sweats 0 0 0    Visual Disturbances 0 0 0    Anxiety 2 2 1    Headache, Fullness in Head 1 2 0    Agitation 0 0 0    Orientation and Clouding of Sensorium 2 1 0    CIWA-Ar Total 9 9 4       Row Name 11/24/24 20:39:32 11/24/24 1600 11/24/24 1200       CIWA-Ar    BP -- 131/92 131/92    Nausea and Vomiting 0 0 0    Tactile Disturbances 1 1 1    Tremor 2 1 1    Auditory Disturbances 0 0 0    Paroxysmal Sweats 0 0 0    Visual Disturbances 0 0 0    Anxiety 1 2 2    Headache, Fullness in Head 0 0 0    Agitation 0 0 0    Orientation and Clouding of Sensorium 0 0 0    CIWA-Ar Total 4 4 4      Row Name 11/24/24 0800 11/24/24 0538 11/24/24 0200       CIWA-Ar    /95 -- 132/75    Nausea and Vomiting 0 0 0    Tactile Disturbances 0 1 1    Tremor 2 2 2    Auditory Disturbances 0 0 0    Paroxysmal Sweats 0 1 1    Visual Disturbances 0 0 0    Anxiety 2 1 1    Headache, Fullness in Head 0 0 0    Agitation 0 0 0    Orientation and Clouding of Sensorium 0 0 0    CIWA-Ar Total 4 5 5      Row Name 11/23/24 22:07:46 11/23/24 1810 11/23/24 1529       CIWA-Ar    BP -- -- 187/95    Pulse -- -- 84    Nausea and Vomiting 1 1 5    Tactile Disturbances 1 0 0    Tremor 2 2 5    Auditory Disturbances 0 0 0    Paroxysmal Sweats 1 0 0    Visual Disturbances 0 0 0    Anxiety 1 1 1    Headache, Fullness in Head 0 0 0    Agitation 0 0 0    Orientation and Clouding of Sensorium 0 0 0    CIWA-Ar Total 6 4 11                    Pertinent Labs/Diagnostic Test Results:   Radiology:  CT Abdomen pelvis with contrast   Final Interpretation by Reymundo Moura MD (11/23 3642)      Ongoing acute pancreatitis, now with findings of pancreatic necrosis and acute necrotic collection as described.      Hepatic steatosis.      The study was marked in EPIC for immediate notification.      Workstation performed: [id number]           Cardiology:  ECG 12 lead   Final Result by Puneet Tan MD (11/23 0652)   Normal sinus rhythm   Nonspecific ST and T wave abnormality   Abnormal ECG   When compared with ECG of 07-Nov-2024 10:32,   Questionable change in QRS axis   Nonspecific T wave abnormality now evident in Anterolateral leads   Confirmed by  Dominick Puneet ([id number]) on 11/23/2024 3:52:21 PM        GI:  No orders to display           Results from last 7 days   Lab Units 11/25/24  0601 11/24/24  0255 11/23/24  1135   WBC Thousand/uL 4.54 4.60 8.35   HEMOGLOBIN g/dL 11.8 10.5* 13.9   HEMATOCRIT % 35.9 32.2* 42.0   PLATELETS Thousands/uL 133* 120* 201   TOTAL NEUT ABS Thousands/µL 2.34 2.59 6.61         Results from last 7 days   Lab Units 11/25/24  0405 11/24/24  0255 11/23/24  1636 11/23/24  1135   SODIUM mmol/L 136 134* 139 139   POTASSIUM mmol/L 3.0* 3.6 3.8 3.5   CHLORIDE mmol/L 97 96 98 94*   CO2 mmol/L 32 29 28 28   ANION GAP mmol/L 7 9 13 17*   BUN mg/dL 4* 7 8 8   CREATININE mg/dL 0.46* 0.49* 0.49* 0.46*   EGFR ml/min/1.73sq m 131 128 128 131   CALCIUM mg/dL 8.1* 7.9* 7.8* 8.9   MAGNESIUM mg/dL 1.3*  --   --   --      Results from last 7 days   Lab Units 11/25/24  0405 11/24/24  0255 11/23/24  1135   AST U/L 30 40* 84*   ALT U/L 22 28 48   ALK PHOS U/L 55 62 90   TOTAL PROTEIN g/dL 5.3* 5.4* 7.3   ALBUMIN g/dL 3.0* 3.1* 4.1   TOTAL BILIRUBIN mg/dL 1.19* 1.62* 1.58*         Results from last 7 days   Lab Units 11/25/24  0405 11/24/24  0255 11/23/24  1636 11/23/24  1135   GLUCOSE RANDOM mg/dL 77 66 99 92           Results from last 7 days   Lab Units 11/23/24  1636 11/23/24  1135   HS TNI 0HR ng/L  --  4   HS TNI 2HR ng/L 3  --    HSTNI D2 ng/L -1  --                      Results from last 7 days   Lab Units 11/23/24  1636   LACTIC ACID mmol/L 1.3         Results from last 7 days   Lab Units 11/25/24  0405 11/24/24  0255 11/23/24  1135   LIPASE u/L 251* 479* 993*     Results from last 7 days   Lab Units 11/23/24  1335   CLARITY UA  Slightly Cloudy   COLOR UA  Yellow   SPEC GRAV UA  >=1.030   PH UA  6.0   GLUCOSE UA mg/dl Negative   KETONES UA mg/dl 80 (3+)*   BLOOD UA  Large*   PROTEIN UA mg/dl 30 (1+)*   NITRITE UA  Negative   BILIRUBIN UA  Small*   UROBILINOGEN UA (BE) mg/dl 2.0*   LEUKOCYTES UA  Moderate*   WBC UA /hpf 10-20*   RBC UA /hpf 10-20*    BACTERIA UA /hpf Occasional   EPITHELIAL CELLS WET PREP /hpf Moderate*   MUCUS THREADS  Moderate*         Results from last 7 days   Lab Units 11/23/24  1335   URINE CULTURE  Culture too young- will reincubate         Past Medical History:   Diagnosis Date    Acute alcoholic pancreatitis 11/03/2023    Hypertension     SIRS (systemic inflammatory response syndrome) (HCC) 04/10/2024     Present on Admission:   Necrotizing pancreatitis   Tobacco abuse   Hepatic steatosis      Admitting Diagnosis: Pancreatitis [K85.90]  Vomiting [R11.10]  Abdominal pain [R10.9]  Necrotizing pancreatitis [K85.91]  Alcohol-induced acute pancreatitis without infection or necrosis [K85.20]  Age/Sex: 34 y.o. female    Network Utilization Review Department  ATTENTION: Please call with any questions or concerns to [phone] and carefully listen to the prompts so that you are directed to the right person. All voicemails are confidential.   For Discharge needs, contact Care Management DC Support Team at [phone] opt. [phone]  Send all requests for admission clinical reviews, approved or denied determinations and any other requests to dedicated fax number below belonging to the [place] where the patient is receiving treatment. List of dedicated fax numbers for the Facilities:  FACILITY NAME UR FAX NUMBER   ADMISSION DENIALS (Administrative/Medical Necessity) [phone]   DISCHARGE SUPPORT TEAM (NETWORK) [phone]   PARENT CHILD HEALTH (Maternity/NICU/Pediatrics) [phone]   [facility] [phone]   [facility] [phone]   [facility] [phone]   [facility] [phone]   [facility] [phone]   [facility] [phone]   [facility] [phone]   [facility] [phone]   [facility]  [phone]   [facility] [phone]   [facility] [phone]   [facility] [phone]

## 2024-11-26 PROBLEM — K21.9 GASTROESOPHAGEAL REFLUX DISEASE WITHOUT ESOPHAGITIS: Status: ACTIVE | Noted: 2024-11-26

## 2024-11-26 LAB
ALBUMIN SERPL BCG-MCNC: 3.1 G/DL (ref 3.5–5)
ALP SERPL-CCNC: 61 U/L (ref 34–104)
ALT SERPL W P-5'-P-CCNC: 20 U/L (ref 7–52)
ANION GAP SERPL CALCULATED.3IONS-SCNC: 6 MMOL/L (ref 4–13)
AST SERPL W P-5'-P-CCNC: 33 U/L (ref 13–39)
BASOPHILS # BLD AUTO: 0.02 THOUSANDS/ΜL (ref 0–0.1)
BASOPHILS NFR BLD AUTO: 1 % (ref 0–1)
BILIRUB SERPL-MCNC: 0.82 MG/DL (ref 0.2–1)
BUN SERPL-MCNC: 3 MG/DL (ref 5–25)
CALCIUM ALBUM COR SERPL-MCNC: 9.1 MG/DL (ref 8.3–10.1)
CALCIUM SERPL-MCNC: 8.4 MG/DL (ref 8.4–10.2)
CHLORIDE SERPL-SCNC: 101 MMOL/L (ref 96–108)
CO2 SERPL-SCNC: 30 MMOL/L (ref 21–32)
CREAT SERPL-MCNC: 0.38 MG/DL (ref 0.6–1.3)
EOSINOPHIL # BLD AUTO: 0.16 THOUSAND/ΜL (ref 0–0.61)
EOSINOPHIL NFR BLD AUTO: 5 % (ref 0–6)
ERYTHROCYTE [DISTWIDTH] IN BLOOD BY AUTOMATED COUNT: 11.7 % (ref 11.6–15.1)
GFR SERPL CREATININE-BSD FRML MDRD: 139 ML/MIN/1.73SQ M
GLUCOSE SERPL-MCNC: 83 MG/DL (ref 65–140)
HCT VFR BLD AUTO: 33.8 % (ref 34.8–46.1)
HGB BLD-MCNC: 11.3 G/DL (ref 11.5–15.4)
IMM GRANULOCYTES # BLD AUTO: 0.02 THOUSAND/UL (ref 0–0.2)
IMM GRANULOCYTES NFR BLD AUTO: 1 % (ref 0–2)
LIPASE SERPL-CCNC: 160 U/L (ref 11–82)
LYMPHOCYTES # BLD AUTO: 0.97 THOUSANDS/ΜL (ref 0.6–4.47)
LYMPHOCYTES NFR BLD AUTO: 28 % (ref 14–44)
MAGNESIUM SERPL-MCNC: 1.4 MG/DL (ref 1.9–2.7)
MCH RBC QN AUTO: 36.2 PG (ref 26.8–34.3)
MCHC RBC AUTO-ENTMCNC: 33.4 G/DL (ref 31.4–37.4)
MCV RBC AUTO: 108 FL (ref 82–98)
MONOCYTES # BLD AUTO: 0.32 THOUSAND/ΜL (ref 0.17–1.22)
MONOCYTES NFR BLD AUTO: 9 % (ref 4–12)
NEUTROPHILS # BLD AUTO: 2.04 THOUSANDS/ΜL (ref 1.85–7.62)
NEUTS SEG NFR BLD AUTO: 56 % (ref 43–75)
NRBC BLD AUTO-RTO: 0 /100 WBCS
PLATELET # BLD AUTO: 129 THOUSANDS/UL (ref 149–390)
PLATELET BLD QL SMEAR: ABNORMAL
PMV BLD AUTO: 11 FL (ref 8.9–12.7)
POTASSIUM SERPL-SCNC: 3.6 MMOL/L (ref 3.5–5.3)
PROT SERPL-MCNC: 5.5 G/DL (ref 6.4–8.4)
RBC # BLD AUTO: 3.12 MILLION/UL (ref 3.81–5.12)
RBC MORPH BLD: NORMAL
SODIUM SERPL-SCNC: 137 MMOL/L (ref 135–147)
WBC # BLD AUTO: 3.53 THOUSAND/UL (ref 4.31–10.16)

## 2024-11-26 PROCEDURE — 85025 COMPLETE CBC W/AUTO DIFF WBC: CPT

## 2024-11-26 PROCEDURE — 83690 ASSAY OF LIPASE: CPT

## 2024-11-26 PROCEDURE — 80053 COMPREHEN METABOLIC PANEL: CPT

## 2024-11-26 PROCEDURE — 99233 SBSQ HOSP IP/OBS HIGH 50: CPT | Performed by: PHYSICIAN ASSISTANT

## 2024-11-26 PROCEDURE — 83735 ASSAY OF MAGNESIUM: CPT

## 2024-11-26 PROCEDURE — 99232 SBSQ HOSP IP/OBS MODERATE 35: CPT | Performed by: INTERNAL MEDICINE

## 2024-11-26 PROCEDURE — 99233 SBSQ HOSP IP/OBS HIGH 50: CPT | Performed by: INTERNAL MEDICINE

## 2024-11-26 RX ORDER — PHENOBARBITAL SODIUM 130 MG/ML
130 INJECTION, SOLUTION INTRAMUSCULAR; INTRAVENOUS ONCE
Status: COMPLETED | OUTPATIENT
Start: 2024-11-26 | End: 2024-11-26

## 2024-11-26 RX ORDER — MAGNESIUM SULFATE HEPTAHYDRATE 40 MG/ML
4 INJECTION, SOLUTION INTRAVENOUS ONCE
Status: COMPLETED | OUTPATIENT
Start: 2024-11-26 | End: 2024-11-26

## 2024-11-26 RX ORDER — POTASSIUM CHLORIDE 14.9 MG/ML
20 INJECTION INTRAVENOUS ONCE
Status: COMPLETED | OUTPATIENT
Start: 2024-11-26 | End: 2024-11-26

## 2024-11-26 RX ORDER — HYDROMORPHONE HCL/PF 1 MG/ML
1 SYRINGE (ML) INJECTION
Status: DISCONTINUED | OUTPATIENT
Start: 2024-11-26 | End: 2024-11-27

## 2024-11-26 RX ADMIN — NICOTINE 21 MG: 21 PATCH, EXTENDED RELEASE TRANSDERMAL at 17:19

## 2024-11-26 RX ADMIN — PHENOBARBITAL SODIUM 130 MG: 130 INJECTION INTRAMUSCULAR at 07:49

## 2024-11-26 RX ADMIN — HYDROMORPHONE HYDROCHLORIDE 1 MG: 1 INJECTION, SOLUTION INTRAMUSCULAR; INTRAVENOUS; SUBCUTANEOUS at 10:16

## 2024-11-26 RX ADMIN — HYDROMORPHONE HYDROCHLORIDE 1 MG: 1 INJECTION, SOLUTION INTRAMUSCULAR; INTRAVENOUS; SUBCUTANEOUS at 20:18

## 2024-11-26 RX ADMIN — THIAMINE HYDROCHLORIDE 500 MG: 100 INJECTION, SOLUTION INTRAMUSCULAR; INTRAVENOUS at 02:39

## 2024-11-26 RX ADMIN — MAGNESIUM SULFATE HEPTAHYDRATE 4 G: 40 INJECTION, SOLUTION INTRAVENOUS at 12:30

## 2024-11-26 RX ADMIN — PANTOPRAZOLE SODIUM 40 MG: 40 INJECTION, POWDER, FOR SOLUTION INTRAVENOUS at 07:50

## 2024-11-26 RX ADMIN — THIAMINE HYDROCHLORIDE 500 MG: 100 INJECTION, SOLUTION INTRAMUSCULAR; INTRAVENOUS at 17:18

## 2024-11-26 RX ADMIN — PANTOPRAZOLE SODIUM 40 MG: 40 INJECTION, POWDER, FOR SOLUTION INTRAVENOUS at 20:18

## 2024-11-26 RX ADMIN — POTASSIUM CHLORIDE 20 MEQ: 14.9 INJECTION, SOLUTION INTRAVENOUS at 10:22

## 2024-11-26 RX ADMIN — SODIUM CHLORIDE, SODIUM LACTATE, POTASSIUM CHLORIDE, AND CALCIUM CHLORIDE 200 ML/HR: .6; .31; .03; .02 INJECTION, SOLUTION INTRAVENOUS at 08:20

## 2024-11-26 RX ADMIN — THIAMINE HYDROCHLORIDE 500 MG: 100 INJECTION, SOLUTION INTRAMUSCULAR; INTRAVENOUS at 08:30

## 2024-11-26 RX ADMIN — SODIUM CHLORIDE, SODIUM LACTATE, POTASSIUM CHLORIDE, AND CALCIUM CHLORIDE 200 ML/HR: .6; .31; .03; .02 INJECTION, SOLUTION INTRAVENOUS at 18:30

## 2024-11-26 RX ADMIN — HYDROMORPHONE HYDROCHLORIDE 1 MG: 1 INJECTION, SOLUTION INTRAMUSCULAR; INTRAVENOUS; SUBCUTANEOUS at 15:21

## 2024-11-26 RX ADMIN — MAGNESIUM SULFATE HEPTAHYDRATE 4 G: 40 INJECTION, SOLUTION INTRAVENOUS at 03:53

## 2024-11-26 RX ADMIN — HEPARIN SODIUM 5000 UNITS: 5000 INJECTION, SOLUTION INTRAVENOUS; SUBCUTANEOUS at 15:21

## 2024-11-26 RX ADMIN — PHENOBARBITAL SODIUM 130 MG: 130 INJECTION INTRAMUSCULAR at 17:41

## 2024-11-26 RX ADMIN — HEPARIN SODIUM 5000 UNITS: 5000 INJECTION, SOLUTION INTRAVENOUS; SUBCUTANEOUS at 05:53

## 2024-11-26 RX ADMIN — SODIUM CHLORIDE, SODIUM LACTATE, POTASSIUM CHLORIDE, AND CALCIUM CHLORIDE 200 ML/HR: .6; .31; .03; .02 INJECTION, SOLUTION INTRAVENOUS at 02:38

## 2024-11-26 RX ADMIN — SODIUM CHLORIDE, SODIUM LACTATE, POTASSIUM CHLORIDE, AND CALCIUM CHLORIDE 200 ML/HR: .6; .31; .03; .02 INJECTION, SOLUTION INTRAVENOUS at 13:32

## 2024-11-26 NOTE — CONSULTS
Consultation - Critical Care/ICU   Name: Kaykay Holland 34 y.o. female I MRN: [id number]  Unit/Bed#: -01 I Date of Admission: 11/23/2024   Date of Service: 11/26/2024 I Hospital Day: 3   Consults  Physician Requesting Evaluation: Jorge Peña MD   Reason for Evaluation / Principal Problem: Alcohol withdrawal      Assessment & Plan  Necrotizing pancreatitis  History of alcohol abuse  Presented to the emergency department with epigastric pain, nausea, and vomiting  CT abdomen pelvis with evidence of acute pancreatitis along with pancreatic necrosis  Lipase elevated  GI consulted    Plan:  Continue IV fluid resuscitation  N.p.o.  Serial abdominal exams  As needed pain medication  Encourage cessation of alcohol  Alcohol use disorder  Patient reports drinking 2-3 bottles of wine daily  Initially placed on CIWA protocol and received Librium and as needed Ativan  11/25 patient had worsening of withdrawal symptoms and required 4 point locked restraints and phenobarbital  Received 10 mg/kg phenobarbital load and started on Precedex    Plan:  Continue Precedex for RASS of 0  As needed phenobarbital with dosing based upon CIWA score  Continue thiamine and folic acid  Tobacco abuse  Offer NRT  Hepatic steatosis  Noted on CT abdomen pelvis  Likely secondary to alcohol abuse  Encourage cessation  Trend LFTs daily  Electrolyte abnormality  Aggressive electrolyte repletion to maintain Mg greater than 2, Phos greater than 3, K greater than 4  Disposition: Stepdown Level 1    History of Present Illness   Kaykay Holland is a 34 y.o. female with past medical history significant for alcohol abuse, pancreatitis, hepatic steatosis, and tobacco abuse who presented to the emergency department on 11/23 with abdominal pain.  She was admitted to internal medicine service with necrotizing pancreatitis and placed on CIWA protocol for alcohol withdrawal.  On 11/25 patient had worsening withdrawal symptoms that were poorly managed  "with benzodiazepines.  She was upgraded to stepdown when under critical care for phenobarbital and Precedex administration.    History obtained from chart review.  Review of Systems: See HPI for Review of Systems    Historical Information   Past Medical History:  11/03/2023: Acute alcoholic pancreatitis  No date: Hypertension  04/10/2024: SIRS (systemic inflammatory response syndrome) (HCC) Past Surgical History:  No date: WISDOM TOOTH EXTRACTION   Current Outpatient Medications   Medication Instructions    chlordiazePOXIDE (LIBRIUM) 25 mg capsule Take 1 capsule (25 mg total) by mouth every 8 (eight) hours for 1 day, THEN 1 capsule (25 mg total) every 12 (twelve) hours for 1 day, THEN 1 capsule (25 mg total) in the morning for 1 day.    folic acid ( FOLIC ACID) 1 mg, Oral, Daily    ibuprofen (MOTRIN) 600 mg tablet Take 1 tablet (600 mg total) by mouth 3 (three) times a day with meals for 3 days, THEN 1 tablet (600 mg total) 3 (three) times a day as needed for mild pain for up to 3 days.    naloxone (NARCAN) 4 mg/0.1 mL nasal spray Administer 1 spray into a nostril. If no response after 2-3 minutes, give another dose in the other nostril using a new spray.    ondansetron (ZOFRAN-ODT) 4 mg, Oral, Every 6 hours PRN    oxyCODONE (ROXICODONE) 5 mg, Oral, Every 6 hours PRN    pantoprazole (PROTONIX) 40 mg, Oral, 2 times daily    Allergies   Allergen Reactions    Other Edema     Bee stings (localized edema)      Social History     Tobacco Use    Smoking status: Every Day     Current packs/day: 0.50     Types: Cigarettes     Passive exposure: Never    Smokeless tobacco: Never    Tobacco comments:     Per pt last drink was today 9/24/24 one glass of wine. Had previously stop on 4/4/24 for 1 month    Vaping Use    Vaping status: Never Used   Substance Use Topics    Alcohol use: Yes     Alcohol/week: 21.0 standard drinks of alcohol     Types: 21 Glasses of wine per week     Comment: \"occasional\"    Drug use: Never    History " reviewed. No pertinent family history.       Objective :                   Vitals I/O      Most Recent Min/Max in 24hrs   Temp 98.2 °F (36.8 °C) Temp  Min: 97.5 °F (36.4 °C)  Max: 98.2 °F (36.8 °C)   Pulse 79 Pulse  Min: 73  Max: 115   Resp 22 Resp  Min: 15  Max: 25   /79 BP  Min: 123/80  Max: 164/115   O2 Sat 94 % SpO2  Min: 93 %  Max: 96 %      Intake/Output Summary (Last 24 hours) at 11/26/2024 0103  Last data filed at 11/26/2024 0024  Gross per 24 hour   Intake 853.51 ml   Output 1250 ml   Net -396.49 ml       Diet Regular; Regular House; Lo Fat    Invasive Monitoring           Physical Exam   Physical Exam  Eyes:      Pupils: Pupils are equal, round, and reactive to light.   Skin:     General: Skin is warm and dry.      Capillary Refill: Capillary refill takes 2 to 3 seconds.   HENT:      Head: Normocephalic and atraumatic.      Nose: No congestion.      Mouth/Throat:      Mouth: Mucous membranes are moist.   Cardiovascular:      Rate and Rhythm: Normal rate and regular rhythm.      Heart sounds: No murmur heard.     No friction rub. No gallop.   Musculoskeletal:      Right lower leg: No edema.   Abdominal: General: There is no distension.      Palpations: Abdomen is soft.      Tenderness: There is no abdominal tenderness. There is no guarding.   Constitutional:       Appearance: She is well-developed and well-nourished.   Pulmonary:      Breath sounds: No wheezing, rhonchi or rales.   Neurological:      General: No focal deficit present.      Mental Status: She is alert. She is agitated, disoriented to place, disoriented to time and disoriented to situation.      Motor: Strength full and intact in all extremities.          Diagnostic Studies        Lab Results: I have reviewed the following results:     Medications:  Scheduled PRN   heparin (porcine), 5,000 Units, Q8H RODRICK  magnesium sulfate, 4 g, Once  nicotine, 21 mg, QPM  pantoprazole, 40 mg, Q12H RODRICK  thiamine, 500 mg, Q8H   Followed by  [START ON  11/27/2024] thiamine, 250 mg, Q8H   Followed by  [START ON 11/29/2024] thiamine, 100 mg, Q8H      diphenhydrAMINE, 25 mg, Q6H PRN  HYDROmorphone, 1 mg, Q4H PRN  ondansetron, 4 mg, Q6H PRN  PHENobarbital, 130 mg, Q6H PRN       Continuous    dexmedetomidine, 0.1-0.7 mcg/kg/hr, Last Rate: 0.3 mcg/kg/hr (11/25/24 2325)  lactated ringers, 200 mL/hr, Last Rate: 200 mL/hr (11/25/24 2102)         Labs:   CBC    Recent Labs     11/24/24 0255 11/25/24  0601   WBC 4.60 4.54   HGB 10.5* 11.8   HCT 32.2* 35.9   * 133*     BMP    Recent Labs     11/24/24 0255 11/25/24  0405   SODIUM 134* 136   K 3.6 3.0*   CL 96 97   CO2 29 32   AGAP 9 7   BUN 7 4*   CREATININE 0.49* 0.46*   CALCIUM 7.9* 8.1*       Coags    No recent results     Additional Electrolytes  Recent Labs     11/25/24  0405   MG 1.3*          Blood Gas    No recent results  No recent results LFTs  Recent Labs     11/24/24 0255 11/25/24  0405   ALT 28 22   AST 40* 30   ALKPHOS 62 55   ALB 3.1* 3.0*   TBILI 1.62* 1.19*       Infectious  No recent results  Glucose  Recent Labs     11/24/24 0255 11/25/24  0405   GLUC 66 77

## 2024-11-26 NOTE — ASSESSMENT & PLAN NOTE
Pt is 33 y/o female with pmh for HTN, prolonged QT, tobacco use, ETOH abuse, prior h/o ETOH pancreatitis, PUD and seizures who presents to the ER with 2 day h/o intractable N/V.    CT: Ongoing acute pancreatitis, now with findings of pancreatic necrosis and acute necrotic collection as described.     VSS, afeb  WBC 3.53  Hgb 11.3  Lipase  160  Tbil  0.82    Overnight pt with alcohol withdrawal, encephalopathy, hallucinations requrigin restraints, control team. Given ativan and I'm zyprexa. Admitted to icu and iv precedex was started as well as phenobarb    Drowsy this am, alert to slef, awakens and falls asleep. TTP diffusly in all abdominal quadrants      Plan:  Aggressive IVF  GI eval-started on lo fat diet on 11/25  Serial exams  Trend llabs, vitals  Pain control  No plan for acute surgical intervention at this time, care per primary team and GI

## 2024-11-26 NOTE — PROGRESS NOTES
Progress Note - Gastroenterology   Name: Kaykay Holland 34 y.o. female I MRN: [id number]  Unit/Bed#: -01 I Date of Admission: 11/23/2024   Date of Service: 11/26/2024 I Hospital Day: 3    Assessment & Plan  Necrotizing pancreatitis  34F with hx/o EtOH use disorder, current cigarette smoker, hepatic steatosis, prior EtOH pancreatitis who presented to the ED from home on 11/23/2024, found to have pancreatitis with acute necrotic collection on admission CT. This is patient's 5th admission for acute pancreatitis since October 2023.    11/26: still very sedated from phenobarbital administered at 07:45 for acute EtOH withdrawal. Still with some upper abdominal pain. Lipase downtrending.    Lo fat diet  Taper analgesics as tolerated  Continue folic acid/thiamine supplementation  Discussed importance of absolute EtOH cessation  Will require close outpatient follow up with GI and surveillance imaging to ensure resolution of necrosis  Alcohol withdrawal (HCC)  Acute withdrawal 11/25 requiring ICU placement, restraints and phenobarbital, lorazepam, Precedex  Continue to monitor for withdrawal, treatment per primary team  Hx/o withdrawal seizures  Alcohol use disorder  Endorsed 2-3 bottles of wine daily  Encourage cessation  Hepatic steatosis  Noted on CT A/P, likely 2/2 chronic EtOH abuse  Trend LFTs  Will need outpatient GI follow up at discharge  Tobacco abuse  Current 0.5 PPD smoker  Encourage cessation in the setting of necrotizing pancreatitis; offer nicotine replacement  Gastroesophageal reflux disease without esophagitis  Chronic, stable; home regimen: OTC omeprazole 20 mg QD  Can transition pantoprazole 40 mg IV Q12 to PO when reliably tolerating diet      Subjective   Still very sedated from phenobarb for acute EtOH withdrawal.    Objective :  Temp:  [97.6 °F (36.4 °C)-98.2 °F (36.8 °C)] 97.9 °F (36.6 °C)  HR:  [] 80  BP: (115-164)/() 120/78  Resp:  [14-25] 15  SpO2:  [93 %-100 %] 96 %  O2  Device: None (Room air)    Physical Exam  Vitals and nursing note reviewed.   Constitutional:       General: She is sleeping. She is not in acute distress.     Appearance: She is ill-appearing. She is not toxic-appearing.      Interventions: She is restrained.   HENT:      Head: Normocephalic.      Mouth/Throat:      Mouth: Mucous membranes are moist.      Pharynx: Oropharynx is clear.   Eyes:      General: No scleral icterus.  Neck:      Trachea: Phonation normal.   Cardiovascular:      Comments: Appears well perfused  Pulmonary:      Effort: Pulmonary effort is normal. No respiratory distress.   Abdominal:      General: Bowel sounds are normal. There is no distension or abdominal bruit.      Palpations: Abdomen is soft. There is no hepatomegaly or splenomegaly.      Tenderness: There is abdominal tenderness (moderate) in the right upper quadrant, epigastric area, periumbilical area and left upper quadrant. There is no guarding or rebound.   Musculoskeletal:      Cervical back: Neck supple.      Right lower leg: No edema.      Left lower leg: No edema.      Comments: Moving all 4 extremities spontaneously   Skin:     General: Skin is warm and dry.      Capillary Refill: Capillary refill takes 2 to 3 seconds.      Coloration: Skin is not jaundiced or pale.   Neurological:      General: No focal deficit present.      Mental Status: She is oriented to person, place, and time and easily aroused.   Psychiatric:         Behavior: Behavior normal. Behavior is cooperative.         Thought Content: Thought content normal.         Lab Results: I have reviewed the following results:CBC/BMP:   .     11/26/24  0526   WBC 3.53*   HGB 11.3*   HCT 33.8*   *   SODIUM 137   K 3.6      CO2 30   BUN 3*   CREATININE 0.38*   GLUC 83   MG 1.4*    , Creatinine Clearance: Estimated Creatinine Clearance: 180.8 mL/min (A) (by C-G formula based on SCr of 0.38 mg/dL (L))., LFTs:   .     11/26/24  0526   AST 33   ALT 20   ALB 3.1*    TBILI 0.82   ALKPHOS 61    , PTT/INR:No new results in last 24 hours. , Lipase:   Lab Results   Component Value Date    LIPASE 160 (H) 11/26/2024       Imaging Results Review: I reviewed radiology reports from this admission including: CT abdomen/pelvis.  Other Study Results Review: No additional pertinent studies reviewed.

## 2024-11-26 NOTE — PROGRESS NOTES
Progress Note - Surgery-General   Name: Kaykay Holland 34 y.o. female I MRN: [id number]  Unit/Bed#: -01 I Date of Admission: 11/23/2024   Date of Service: 11/26/2024 I Hospital Day: 3    Assessment & Plan  Necrotizing pancreatitis  Pt is 33 y/o female with pmh for HTN, prolonged QT, tobacco use, ETOH abuse, prior h/o ETOH pancreatitis, PUD and seizures who presents to the ER with 2 day h/o intractable N/V.    CT: Ongoing acute pancreatitis, now with findings of pancreatic necrosis and acute necrotic collection as described.     VSS, afeb  WBC 3.53  Hgb 11.3  Lipase  160  Tbil  0.82    Overnight pt with alcohol withdrawal, encephalopathy, hallucinations requrigin restraints, control team. Given ativan and I'm zyprexa. Admitted to icu and iv precedex was started as well as phenobarb    Drowsy this am, alert to slef, awakens and falls asleep. TTP diffusly in all abdominal quadrants      Plan:  Aggressive IVF  GI eval-started on lo fat diet on 11/25  Serial exams  Trend llabs, vitals  Pain control  No plan for acute surgical intervention at this time, care per primary team and GI    Tobacco abuse  Nicotine patch   Hepatic steatosis  H/o ETOH abuse  Alcohol use disorder  -CIWA  Electrolyte abnormality      24 Hour Events : see above, eoth withdrawal requiring admission to the icu  Subjective : drowsy, awakens to name  Denies any pain, alert to self and place    Objective :  Temp:  [97.6 °F (36.4 °C)-98.2 °F (36.8 °C)] 97.6 °F (36.4 °C)  HR:  [] 72  BP: (123-164)/() 130/89  Resp:  [15-25] 21  SpO2:  [93 %-100 %] 97 %  O2 Device: None (Room air)     I/O         11/24 0701 11/25 0700 11/25 0701 11/26 0700 11/26 0701 11/27 0700    P.O. 900 100 240    I.V. (mL/kg)  4872.3 (89.6)     IV Piggyback  150 100    Total Intake(mL/kg) 900 (16.5) 5122.3 (94.2) 340 (6.3)    Urine (mL/kg/hr) 0 (0) 1900 (1.5) 500 (3.3)    Total Output 0 1900 500    Net +900 +3222.3 -160           Unmeasured Urine Occurrence 5 x    "           Physical Exam  Drowsy, eyes closed  Responds to her name, opens eyes and then closes again  Alert to self and place  RRR, ctab  Extremities in restraints  Abdominal exam shows ttp diffusely throughout, mild distention, +BS        Lab Results: I have reviewed the following results:  CBC with diff:   Lab Results   Component Value Date    WBC 3.53 (L) 11/26/2024    HGB 11.3 (L) 11/26/2024    HCT 33.8 (L) 11/26/2024     (H) 11/26/2024     (L) 11/26/2024    RBC 3.12 (L) 11/26/2024    MCH 36.2 (H) 11/26/2024    MCHC 33.4 11/26/2024    RDW 11.7 11/26/2024    MPV 11.0 11/26/2024    NRBC 0 11/26/2024   ,   BMP/CMP:  Lab Results   Component Value Date    SODIUM 137 11/26/2024    SODIUM 129 (L) 11/04/2023    K 3.6 11/26/2024    K 3.9 11/04/2023     11/26/2024    CL 97 (L) 11/04/2023    CO2 30 11/26/2024    CO2 24 11/04/2023    BUN 3 (L) 11/26/2024    BUN 7 11/04/2023    CREATININE 0.38 (L) 11/26/2024    CREATININE 0.60 11/04/2023    CALCIUM 8.4 11/26/2024    CALCIUM 7.1 (L) 11/04/2023    AST 33 11/26/2024    AST 51 (H) 11/04/2023     (H) 02/13/2023    ALT 20 11/26/2024    ALT 37 11/04/2023    ALT 51 02/13/2023    ALKPHOS 61 11/26/2024    ALKPHOS 40 11/04/2023    EGFR 139 11/26/2024    EGFR 122 11/04/2023    EGFR 99.23 02/13/2023    EGFR >60.0 09/21/2020    EGFR 124 07/23/2020   ,   Lipid Panel: No results found for: \"CHOL\",   Coags:   Lab Results   Component Value Date    PT 14.6 11/04/2023    INR 1.04 09/26/2024    INR 1.2 11/04/2023   ,   Blood Culture:   Lab Results   Component Value Date    BLOODCX No Growth After 5 Days. 04/09/2024   ,   Urinalysis:   Lab Results   Component Value Date    COLORU Yellow 11/23/2024    CLARITYU Slightly Cloudy 11/23/2024    SPECGRAV >=1.030 11/23/2024    PHUR 6.0 11/23/2024    LEUKOCYTESUR Moderate (A) 11/23/2024    NITRITE Negative 11/23/2024    GLUCOSEU Negative 11/23/2024    KETONESU 80 (3+) (A) 11/23/2024    BILIRUBINUR Small (A) 11/23/2024    BLOODU " "Large (A) 11/23/2024   ,   Urine Culture:   Lab Results   Component Value Date    URINECX >100,000 cfu/ml 11/23/2024   ,   Wound Culure: No results found for: \"WOUNDCULT\"    VTE Prophylaxis: VTE covered by:  heparin (porcine), Subcutaneous, 5,000 Units at 11/26/24 0553     "

## 2024-11-26 NOTE — ASSESSMENT & PLAN NOTE
Chronic, stable; home regimen: OTC omeprazole 20 mg QD  Can transition pantoprazole 40 mg IV Q12 to PO when reliably tolerating diet

## 2024-11-26 NOTE — ASSESSMENT & PLAN NOTE
Aggressive electrolyte repletion to maintain Mg greater than 2, Phos greater than 3, K greater than 4

## 2024-11-26 NOTE — ASSESSMENT & PLAN NOTE
Acute withdrawal 11/25 requiring ICU placement, restraints and phenobarbital, lorazepam, Precedex  Continue to monitor for withdrawal, treatment per primary team  Hx/o withdrawal seizures

## 2024-11-26 NOTE — PROGRESS NOTES
Progress Note - Critical Care/ICU   Name: Kaykay Holland 34 y.o. female I MRN: [id number]  Unit/Bed#: -01 I Date of Admission: 11/23/2024   Date of Service: 11/26/2024 I Hospital Day: 3      Assessment & Plan  Necrotizing pancreatitis  History of alcohol abuse  Presented to the emergency department with epigastric pain, nausea, and vomiting  CT abdomen pelvis with evidence of acute pancreatitis along with pancreatic necrosis  Lipase elevated  GI consulted    Plan:  Continue IV fluid resuscitation  N.p.o.  Serial abdominal exams  As needed pain medication  Encourage cessation of alcohol  Alcohol use disorder  Patient reports drinking 2-3 bottles of wine daily  Initially placed on CIWA protocol and received Librium and as needed Ativan  11/25 patient had worsening of withdrawal symptoms and required 4 point locked restraints and phenobarbital  Received 10 mg/kg phenobarbital load and started on Precedex    Plan:  Continue Precedex for RASS of 0  As needed phenobarbital with dosing based upon CIWA score  Continue thiamine and folic acid  Tobacco abuse  Offer NRT  Hepatic steatosis  Noted on CT abdomen pelvis  Likely secondary to alcohol abuse  Encourage cessation  Trend LFTs daily  Electrolyte abnormality  Aggressive electrolyte repletion to maintain Mg greater than 2, Phos greater than 3, K greater than 4  Disposition: Stepdown Level 1    ICU Core Measures     A: Assess, Prevent, and Manage Pain Has pain been assessed? Yes  Need for changes to pain regimen? No   B: Both SAT/SAT  N/A   C: Choice of Sedation RASS Goal: 0 Alert and Calm  Need for changes to sedation or analgesia regimen? No   D: Delirium CAM-ICU: Unable to perform secondary to Acute cognitive dysfunction   E: Early Mobility  Plan for early mobility? Yes   F: Family Engagement Plan for family engagement today? Yes         Prophylaxis:  VTE VTE covered by:  heparin (porcine), Subcutaneous, 5,000 Units at 11/26/24 0569       Stress Ulcer  covered  bypantoprazole (PROTONIX) 40 mg tablet [[id number]] (Long-Term Med), pantoprazole (PROTONIX) injection 40 mg [[id number]]         24 Hour Events : Control team x 1, placed back in locked restraints. Given 130 mg phenobarbital x1.  Subjective   Review of Systems: Review of Systems not obtainable due to Altered mental status    Objective :                   Vitals I/O      Most Recent Min/Max in 24hrs   Temp 97.6 °F (36.4 °C) Temp  Min: 97.5 °F (36.4 °C)  Max: 98.2 °F (36.8 °C)   Pulse 67 Pulse  Min: 62  Max: 115   Resp (!) 23 Resp  Min: 15  Max: 25   /99 BP  Min: 123/80  Max: 164/115   O2 Sat 100 % SpO2  Min: 93 %  Max: 100 %      Intake/Output Summary (Last 24 hours) at 11/26/2024 0700  Last data filed at 11/26/2024 0600  Gross per 24 hour   Intake 5122.32 ml   Output 1900 ml   Net 3222.32 ml       Diet Regular; Regular House; Lo Fat    Invasive Monitoring           Physical Exam   Physical Exam  Vitals reviewed.   Eyes:      Extraocular Movements: Extraocular movements intact.      Pupils: Pupils are equal, round, and reactive to light.   Skin:     General: Skin is warm and dry.      Capillary Refill: Capillary refill takes 2 to 3 seconds.   HENT:      Head: Normocephalic and atraumatic.      Mouth/Throat:      Mouth: Mucous membranes are moist.   Cardiovascular:      Rate and Rhythm: Normal rate and regular rhythm.      Heart sounds: No murmur heard.     No friction rub. No gallop.   Musculoskeletal:      Right lower leg: No edema.      Left lower leg: No edema.   Abdominal: General: There is no distension.      Palpations: Abdomen is soft.      Tenderness: There is no abdominal tenderness.   Constitutional:       Appearance: She is well-developed and well-nourished.      Interventions: She is restrained.   Pulmonary:      Effort: Pulmonary effort is normal.      Breath sounds: No wheezing, rhonchi or rales.   Neurological:      General: No focal deficit present.      Mental Status: She is alert. She is  disoriented to place, disoriented to time and disoriented to situation.      Motor: Strength full and intact in all extremities.          Diagnostic Studies        Lab Results: I have reviewed the following results:     Medications:  Scheduled PRN   heparin (porcine), 5,000 Units, Q8H RODRICK  magnesium sulfate, 4 g, Once  nicotine, 21 mg, QPM  pantoprazole, 40 mg, Q12H RODRICK  thiamine, 500 mg, Q8H   Followed by  [START ON 11/27/2024] thiamine, 250 mg, Q8H   Followed by  [START ON 11/29/2024] thiamine, 100 mg, Q8H      diphenhydrAMINE, 25 mg, Q6H PRN  HYDROmorphone, 1 mg, Q4H PRN  ondansetron, 4 mg, Q6H PRN  PHENobarbital, 130 mg, Q6H PRN       Continuous    dexmedetomidine, 0.1-0.7 mcg/kg/hr, Last Rate: Stopped (11/26/24 0513)  lactated ringers, 200 mL/hr, Last Rate: 200 mL/hr (11/26/24 0238)         Labs:   CBC    Recent Labs     11/25/24  0601 11/26/24  0526   WBC 4.54 3.53*   HGB 11.8 11.3*   HCT 35.9 33.8*   * 129*     BMP    Recent Labs     11/25/24 0405 11/26/24  0526   SODIUM 136 137   K 3.0* 3.6   CL 97 101   CO2 32 30   AGAP 7 6   BUN 4* 3*   CREATININE 0.46* 0.38*   CALCIUM 8.1* 8.4       Coags    No recent results     Additional Electrolytes  Recent Labs     11/25/24 0405 11/26/24  0526   MG 1.3* 1.4*          Blood Gas    No recent results  No recent results LFTs  Recent Labs     11/25/24 0405 11/26/24  0526   ALT 22 20   AST 30 33   ALKPHOS 55 61   ALB 3.0* 3.1*   TBILI 1.19* 0.82       Infectious  No recent results  Glucose  Recent Labs     11/25/24 0405 11/26/24  0526   GLUC 77 83

## 2024-11-26 NOTE — CONSULTS
Critical care attending accept note    Hospital day # 2    HPI: 34-year-old female with recurrent alcohol use, recurrent pancreatitis due to alcohol presented initially 11/23/2024 to the ED for abdominal pain and on CT found to have necrotizing pancreatitis and acute necrotic collection.  Patient was given IV fluid resuscitation and GI and surgery was consulted.  Over the next 24 hours patient developed worsening alcohol withdrawal, encephalopathy and was started on Librium and Ativan.  In the afternoon 11/25 the patient had worsening hallucinations requiring restraints, control team, and lost IV access and received IM Ativan and IM Zyprexa.  Admitted to ICU where IV Precedex was started as well as phenobarbital load    Vitals: Vitals personally reviewed  Vitals:    11/25/24 1700 11/25/24 1800 11/25/24 1900 11/25/24 1930   BP: 135/82 127/80 123/80    Pulse: (!) 115 97 104    Resp: 15 (!) 23 (!) 25    Temp:    98.2 °F (36.8 °C)   TempSrc:    Axillary   SpO2: 94% 93% 94%    Weight:       Height:          Body mass index is 19.97 kg/m².    Intake/Output Summary (Last 24 hours) at 11/25/2024 2113  Last data filed at 11/25/2024 1931  Gross per 24 hour   Intake 853.51 ml   Output 150 ml   Net 703.51 ml       Exam:  General: Young female lying in bed drowsy but arousable with deep stimulation  HEET: head NC/AT, neck supple    Cardiovascular:  Regular rhythm, +S1 S2  Respiratory: Diminished breath sounds bilaterally, not using accessory muscles  Abdomen: Soft, mildly distended  Extremities: No cyanosis.  No significant edema  Neuro: Drowsy appearing, opens eyes to deep stimuli  Skin: Warm, dry    I personally reviewed all appropriate labs and imaging  Laboratory and Diagnostics  Results from last 7 days   Lab Units 11/25/24  0601 11/24/24  0255 11/23/24  1135   WBC Thousand/uL 4.54 4.60 8.35   HEMOGLOBIN g/dL 11.8 10.5* 13.9   HEMATOCRIT % 35.9 32.2* 42.0   PLATELETS Thousands/uL 133* 120* 201   SEGS PCT % 52 57 79*   MONO  PCT % 10 11 10   EOS PCT % 4 2 0     Results from last 7 days   Lab Units 11/25/24  0405 11/24/24  0255 11/23/24  1636 11/23/24  1135   SODIUM mmol/L 136 134* 139 139   POTASSIUM mmol/L 3.0* 3.6 3.8 3.5   CHLORIDE mmol/L 97 96 98 94*   CO2 mmol/L 32 29 28 28   ANION GAP mmol/L 7 9 13 17*   BUN mg/dL 4* 7 8 8   CREATININE mg/dL 0.46* 0.49* 0.49* 0.46*   CALCIUM mg/dL 8.1* 7.9* 7.8* 8.9   GLUCOSE RANDOM mg/dL 77 66 99 92   ALT U/L 22 28  --  48   AST U/L 30 40*  --  84*   ALK PHOS U/L 55 62  --  90   ALBUMIN g/dL 3.0* 3.1*  --  4.1   TOTAL BILIRUBIN mg/dL 1.19* 1.62*  --  1.58*     Results from last 7 days   Lab Units 11/25/24  0405   MAGNESIUM mg/dL 1.3*               Results from last 7 days   Lab Units 11/23/24  1636   LACTIC ACID mmol/L 1.3       Other Labs: Lipase 251    Micro:  None    Imaging:  I personally reviewed the following radiographs  11/23 CT abdomen pelvis with contrast-acute pancreatitis with necrosis and necrotic collection.  Hepatic steatosis    Drips: Dexmedetomidine    Assessment   Severe alcohol withdrawal, delirium tremens  Acute encephalopathy due to alcohol withdrawal  Alcohol abuse  Recurrent acute pancreatitis with necrotic collection  Hepatic steatosis  Hypertension  QTc prolongation  Tobacco use  PUD    Plan  -Status post phenobarbital load, airway watch in ICU  -IV Precedex if needed  -One-to-one sitter, restraints to prevent self-harm and interference with medical care  -Appreciate GI and surgery recommendations for pancreatitis, continue aggressive IV fluids, monitor urine output, BUN, lipase, LFTs  -Thiamine, multivitamins, folate  -Continue IV Protonix  -N.p.o. after phenobarbital  -DVT prophylaxis -SQ H    Jorge Peña MD

## 2024-11-26 NOTE — RESTRAINT FACE TO FACE
Restraint Face to Face   Kaykay Holland 34 y.o. female MRN: [id number]  Unit/Bed#: -01 Encounter: [id number]      Physical Evaluation  Physical Exam  Constitutional:       General: She is not in acute distress.     Appearance: She is not ill-appearing, toxic-appearing or diaphoretic.   HENT:      Head: Normocephalic and atraumatic.      Nose: Nose normal.      Mouth/Throat:      Mouth: Mucous membranes are moist.   Eyes:      Extraocular Movements: Extraocular movements intact.      Pupils: Pupils are equal, round, and reactive to light.   Cardiovascular:      Rate and Rhythm: Normal rate and regular rhythm.      Heart sounds: No murmur heard.     No friction rub. No gallop.   Pulmonary:      Effort: No respiratory distress.      Breath sounds: No wheezing or rales.   Chest:      Chest wall: No tenderness.   Abdominal:      General: Abdomen is flat. There is no distension.      Palpations: Abdomen is soft.      Tenderness: There is no abdominal tenderness. There is no guarding or rebound.   Musculoskeletal:      Right lower leg: No edema.      Left lower leg: No edema.   Skin:     General: Skin is warm.      Capillary Refill: Capillary refill takes 2 to 3 seconds.   Neurological:      General: No focal deficit present.      Comments: Waxing and waning mental status. When calm, awakens to sternal rub with mumbling and withdrawal to pain in all extremities. Other times extremely agitated, aggressive, climbing out of bed         Purpose for Restraints/ Seclusion High risk for causing significant disruption of treatment environment  and High risk for harm to others  Patient's reaction to the intervention calm  Patient's medical condition Alcohol withdrawal  Patient's Behavioral condition Agitation  Restraints to be Continued

## 2024-11-26 NOTE — ASSESSMENT & PLAN NOTE
Noted on CT A/P, likely 2/2 chronic EtOH abuse  Trend LFTs  Will need outpatient GI follow up at discharge

## 2024-11-26 NOTE — ASSESSMENT & PLAN NOTE
History of alcohol abuse  Presented to the emergency department with epigastric pain, nausea, and vomiting  CT abdomen pelvis with evidence of acute pancreatitis along with pancreatic necrosis  Lipase elevated  GI consulted    Plan:  Continue IV fluid resuscitation  N.p.o.  Serial abdominal exams  As needed pain medication  Encourage cessation of alcohol

## 2024-11-26 NOTE — ASSESSMENT & PLAN NOTE
Patient reports drinking 2-3 bottles of wine daily  Initially placed on CIWA protocol and received Librium and as needed Ativan  11/25 patient had worsening of withdrawal symptoms and required 4 point locked restraints and phenobarbital  Received 10 mg/kg phenobarbital load and started on Precedex gtt 11/25 PM    Plan:  Total phenobarb: 934mg   Hold further Precedex gtt   Restraints weaned off and 1:1 discontinued  Continue thiamine and folic acid

## 2024-11-26 NOTE — ASSESSMENT & PLAN NOTE
Noted on CT abdomen pelvis  Likely secondary to alcohol abuse  Encourage cessation  Trend LFTs daily

## 2024-11-26 NOTE — ASSESSMENT & PLAN NOTE
34F with hx/o EtOH use disorder, current cigarette smoker, hepatic steatosis, prior EtOH pancreatitis who presented to the ED from home on 11/23/2024, found to have pancreatitis with acute necrotic collection on admission CT. This is patient's 5th admission for acute pancreatitis since October 2023.    11/26: still very sedated from phenobarbital administered at 07:45 for acute EtOH withdrawal. Still with some upper abdominal pain. Lipase downtrending.    Lo fat diet  Taper analgesics as tolerated  Continue folic acid/thiamine supplementation  Discussed importance of absolute EtOH cessation  Will require close outpatient follow up with GI and surveillance imaging to ensure resolution of necrosis

## 2024-11-26 NOTE — ASSESSMENT & PLAN NOTE
Current 0.5 PPD smoker  Encourage cessation in the setting of necrotizing pancreatitis; offer nicotine replacement

## 2024-11-26 NOTE — ASSESSMENT & PLAN NOTE
History of alcohol abuse  Presented to the emergency department with epigastric pain, nausea, and vomiting  CT abdomen pelvis with evidence of acute pancreatitis along with pancreatic necrosis  Lipase elevated -- downtrending on serial draws   GI consulted    Plan:  Continue IV fluid resuscitation  Serial abdominal exams  As needed pain medication  Encourage cessation of alcohol

## 2024-11-26 NOTE — ASSESSMENT & PLAN NOTE
Patient reports drinking 2-3 bottles of wine daily  Initially placed on CIWA protocol and received Librium and as needed Ativan  11/25 patient had worsening of withdrawal symptoms and required 4 point locked restraints and phenobarbital  Received 10 mg/kg phenobarbital load and started on Precedex    Plan:  Continue Precedex for RASS of 0  As needed phenobarbital with dosing based upon CIWA score  Continue thiamine and folic acid

## 2024-11-27 LAB
ANION GAP SERPL CALCULATED.3IONS-SCNC: 4 MMOL/L (ref 4–13)
ANION GAP SERPL CALCULATED.3IONS-SCNC: 8 MMOL/L (ref 4–13)
BUN SERPL-MCNC: 3 MG/DL (ref 5–25)
BUN SERPL-MCNC: 3 MG/DL (ref 5–25)
CA-I BLD-SCNC: 1.13 MMOL/L (ref 1.12–1.32)
CALCIUM SERPL-MCNC: 5.6 MG/DL (ref 8.4–10.2)
CALCIUM SERPL-MCNC: 8.1 MG/DL (ref 8.4–10.2)
CHLORIDE SERPL-SCNC: 101 MMOL/L (ref 96–108)
CHLORIDE SERPL-SCNC: 113 MMOL/L (ref 96–108)
CO2 SERPL-SCNC: 20 MMOL/L (ref 21–32)
CO2 SERPL-SCNC: 26 MMOL/L (ref 21–32)
CREAT SERPL-MCNC: 0.28 MG/DL (ref 0.6–1.3)
CREAT SERPL-MCNC: 0.41 MG/DL (ref 0.6–1.3)
GFR SERPL CREATININE-BSD FRML MDRD: 136 ML/MIN/1.73SQ M
GFR SERPL CREATININE-BSD FRML MDRD: 154 ML/MIN/1.73SQ M
GLUCOSE SERPL-MCNC: 66 MG/DL (ref 65–140)
GLUCOSE SERPL-MCNC: 89 MG/DL (ref 65–140)
MAGNESIUM SERPL-MCNC: 1 MG/DL (ref 1.9–2.7)
MAGNESIUM SERPL-MCNC: 1.4 MG/DL (ref 1.9–2.7)
POTASSIUM SERPL-SCNC: 2.7 MMOL/L (ref 3.5–5.3)
POTASSIUM SERPL-SCNC: 3.4 MMOL/L (ref 3.5–5.3)
SODIUM SERPL-SCNC: 135 MMOL/L (ref 135–147)
SODIUM SERPL-SCNC: 137 MMOL/L (ref 135–147)

## 2024-11-27 PROCEDURE — 80048 BASIC METABOLIC PNL TOTAL CA: CPT | Performed by: PHYSICIAN ASSISTANT

## 2024-11-27 PROCEDURE — 80048 BASIC METABOLIC PNL TOTAL CA: CPT | Performed by: INTERNAL MEDICINE

## 2024-11-27 PROCEDURE — 99232 SBSQ HOSP IP/OBS MODERATE 35: CPT | Performed by: INTERNAL MEDICINE

## 2024-11-27 PROCEDURE — 83735 ASSAY OF MAGNESIUM: CPT | Performed by: PHYSICIAN ASSISTANT

## 2024-11-27 PROCEDURE — 83735 ASSAY OF MAGNESIUM: CPT | Performed by: INTERNAL MEDICINE

## 2024-11-27 PROCEDURE — 99232 SBSQ HOSP IP/OBS MODERATE 35: CPT | Performed by: PHYSICIAN ASSISTANT

## 2024-11-27 PROCEDURE — 82330 ASSAY OF CALCIUM: CPT | Performed by: PHYSICIAN ASSISTANT

## 2024-11-27 PROCEDURE — NC001 PR NO CHARGE

## 2024-11-27 RX ORDER — HYDROMORPHONE HCL/PF 1 MG/ML
1 SYRINGE (ML) INJECTION EVERY 4 HOURS PRN
Status: DISCONTINUED | OUTPATIENT
Start: 2024-11-27 | End: 2024-11-27

## 2024-11-27 RX ORDER — MAGNESIUM SULFATE HEPTAHYDRATE 40 MG/ML
2 INJECTION, SOLUTION INTRAVENOUS ONCE
Status: COMPLETED | OUTPATIENT
Start: 2024-11-27 | End: 2024-11-27

## 2024-11-27 RX ORDER — OXYCODONE HYDROCHLORIDE 5 MG/1
5 TABLET ORAL EVERY 4 HOURS PRN
Refills: 0 | Status: DISCONTINUED | OUTPATIENT
Start: 2024-11-27 | End: 2024-11-28

## 2024-11-27 RX ORDER — OXYCODONE HYDROCHLORIDE 5 MG/1
5 TABLET ORAL EVERY 6 HOURS PRN
Refills: 0 | Status: DISCONTINUED | OUTPATIENT
Start: 2024-11-27 | End: 2024-11-27

## 2024-11-27 RX ORDER — TAMSULOSIN HYDROCHLORIDE 0.4 MG/1
0.4 CAPSULE ORAL
Status: DISCONTINUED | OUTPATIENT
Start: 2024-11-27 | End: 2024-11-29 | Stop reason: HOSPADM

## 2024-11-27 RX ORDER — OXYCODONE HYDROCHLORIDE 5 MG/1
5 TABLET ORAL EVERY 4 HOURS PRN
Refills: 0 | Status: DISCONTINUED | OUTPATIENT
Start: 2024-11-27 | End: 2024-11-27

## 2024-11-27 RX ORDER — OXYCODONE HYDROCHLORIDE 5 MG/1
5 TABLET ORAL EVERY 8 HOURS PRN
Refills: 0 | Status: DISCONTINUED | OUTPATIENT
Start: 2024-11-27 | End: 2024-11-27

## 2024-11-27 RX ORDER — POTASSIUM CHLORIDE 1500 MG/1
40 TABLET, EXTENDED RELEASE ORAL ONCE
Status: COMPLETED | OUTPATIENT
Start: 2024-11-27 | End: 2024-11-27

## 2024-11-27 RX ADMIN — NICOTINE 21 MG: 21 PATCH, EXTENDED RELEASE TRANSDERMAL at 17:39

## 2024-11-27 RX ADMIN — HEPARIN SODIUM 5000 UNITS: 5000 INJECTION, SOLUTION INTRAVENOUS; SUBCUTANEOUS at 00:25

## 2024-11-27 RX ADMIN — TAMSULOSIN HYDROCHLORIDE 0.4 MG: 0.4 CAPSULE ORAL at 16:21

## 2024-11-27 RX ADMIN — OXYCODONE HYDROCHLORIDE 5 MG: 5 TABLET ORAL at 13:47

## 2024-11-27 RX ADMIN — THIAMINE HYDROCHLORIDE 500 MG: 100 INJECTION, SOLUTION INTRAMUSCULAR; INTRAVENOUS at 00:20

## 2024-11-27 RX ADMIN — HEPARIN SODIUM 5000 UNITS: 5000 INJECTION, SOLUTION INTRAVENOUS; SUBCUTANEOUS at 21:14

## 2024-11-27 RX ADMIN — THIAMINE HYDROCHLORIDE 250 MG: 100 INJECTION, SOLUTION INTRAMUSCULAR; INTRAVENOUS at 16:21

## 2024-11-27 RX ADMIN — HEPARIN SODIUM 5000 UNITS: 5000 INJECTION, SOLUTION INTRAVENOUS; SUBCUTANEOUS at 07:47

## 2024-11-27 RX ADMIN — THIAMINE HYDROCHLORIDE 500 MG: 100 INJECTION, SOLUTION INTRAMUSCULAR; INTRAVENOUS at 07:47

## 2024-11-27 RX ADMIN — OXYCODONE HYDROCHLORIDE 5 MG: 5 TABLET ORAL at 22:13

## 2024-11-27 RX ADMIN — HYDROMORPHONE HYDROCHLORIDE 1 MG: 1 INJECTION, SOLUTION INTRAMUSCULAR; INTRAVENOUS; SUBCUTANEOUS at 03:15

## 2024-11-27 RX ADMIN — OXYCODONE HYDROCHLORIDE 5 MG: 5 TABLET ORAL at 18:20

## 2024-11-27 RX ADMIN — ONDANSETRON 4 MG: 2 INJECTION INTRAMUSCULAR; INTRAVENOUS at 22:15

## 2024-11-27 RX ADMIN — OXYCODONE HYDROCHLORIDE 5 MG: 5 TABLET ORAL at 07:27

## 2024-11-27 RX ADMIN — MAGNESIUM SULFATE HEPTAHYDRATE 2 G: 2 INJECTION, SOLUTION INTRAVENOUS at 11:18

## 2024-11-27 RX ADMIN — PANTOPRAZOLE SODIUM 40 MG: 40 INJECTION, POWDER, FOR SOLUTION INTRAVENOUS at 21:14

## 2024-11-27 RX ADMIN — POTASSIUM CHLORIDE 40 MEQ: 1500 TABLET, EXTENDED RELEASE ORAL at 11:18

## 2024-11-27 RX ADMIN — PANTOPRAZOLE SODIUM 40 MG: 40 INJECTION, POWDER, FOR SOLUTION INTRAVENOUS at 08:51

## 2024-11-27 RX ADMIN — HEPARIN SODIUM 5000 UNITS: 5000 INJECTION, SOLUTION INTRAVENOUS; SUBCUTANEOUS at 13:47

## 2024-11-27 NOTE — UTILIZATION REVIEW
Continued Stay Review    Date: 11-27-24                           Current Patient Class: Inpatient Current Level of Care: step down     HPI:34 y.o. female initially admitted on 11-23-24 for pancreatitis and acute necrotic collection on CT.    Acute withdrawal 11/25 requiring ICU placement, restraints and phenobarbital, lorazepam, Precedex       Assessment/Plan:   CIWA 5. Wbc 3.53.  K 2.7> 3.4. Pain 6/10.  Lethargic today.   Restraint and 1-1 continual observation discontinued on 11-26.   Tolerating lo fat diet.   Continue supportive care with iv thiamine q8 hr, iv protonix bid, iv Mag x 1,  bowel rest, antiemetics, analgesia.       Scheduled Medications:    heparin (porcine), 5,000 Units, Subcutaneous, Q8H RODRICK  magnesium sulfate, 2 g, Intravenous, Once  nicotine, 21 mg, Transdermal, QPM  pantoprazole, 40 mg, Intravenous, Q12H RODRICK  tamsulosin, 0.4 mg, Oral, Daily With Dinner  thiamine, 250 mg, Intravenous, Q8H   Followed by  [START ON 11/29/2024] thiamine, 100 mg, Intravenous, Q8H      Continuous IV Infusions:     PRN Meds:  diphenhydrAMINE, 25 mg, Oral, Q6H PRN  ondansetron, 4 mg, Intravenous, Q6H PRN  oxyCODONE, 5 mg, Oral, Q4H PRN      Discharge Plan: to be determined     Vital Signs (last 3 days)       Date/Time Temp Pulse Resp BP MAP  SpO2 O2 Device [place] Coma Scale Score CIWA-Ar Total Pain    11/27/24 1132 98.4 °F (36.9 °C) 81 18 154/96 117 96 % None (Room air) -- -- --    11/27/24 1100 -- -- -- -- -- -- -- -- 5 --    11/27/24 0800 -- 83 35 136/94 110 99 % -- 15 -- --    11/27/24 0728 -- -- -- -- -- -- -- -- 1 --    11/27/24 0727 98.4 °F (36.9 °C) -- -- 129/86 103 -- None (Room air) -- -- 7    11/27/24 0500 -- -- -- -- -- -- -- -- 4 --    11/27/24 0343 98.6 °F   (37 °C) -- -- -- -- -- -- -- -- --    11/27/24 0315 -- -- -- -- -- -- -- -- -- 10 - Worst Possible Pain    11/27/24 0310 -- 78 20 150/93 115 97 % -- -- -- --    11/27/24 0258 -- -- -- -- -- -- -- 15 -- --    11/27/24 0254 -- -- -- -- -- -- -- -- 3  --    11/27/24 0100 -- -- -- -- -- -- -- -- 4 1    11/26/24 2330 -- -- -- -- -- -- -- 15 -- --    11/26/24 2311 99 °F   (37.2 °C) 113 41 138/91 110 91 % None (Room air) -- -- --    11/26/24 2300 -- 90 -- -- -- -- -- -- 4 --    11/26/24 2100 -- 99 -- -- -- -- -- -- 3 --    11/26/24 2018 -- -- -- -- -- -- -- -- -- 10 - Worst Possible Pain    11/26/24 2000 -- 96 -- 139/90 -- -- -- -- -- --    11/26/24 1930 -- -- -- -- -- -- -- 15 -- --    11/26/24 1916 100.1 °F (37.8 °C) -- -- -- -- -- -- -- -- --    11/26/24 1900 -- 92 23 131/83 102 97 % -- -- 1 --    11/26/24 1800 -- 102 22 123/76 95 97 % -- -- -- --    11/26/24 1725 -- 109 -- 120/80 -- -- -- -- 16 --    11/26/24 1700 -- 110 26 120/80 96 96 % -- -- -- --    11/26/24 1600 -- 108 20 128/86 103 95 % -- 15 0 --    11/26/24 1521 -- -- -- -- -- -- -- -- -- 8    11/26/24 1500 98.2 °F (36.8 °C) 108 20 130/82 101 97 % None (Room air) -- -- 8    11/26/24 1400 -- 99 22 132/86 103 97 % -- -- -- --    11/26/24 1300 -- 109 26 132/88 107 97 % -- -- -- --    11/26/24 1200 -- 80 15 120/78 96 96 % -- 15 0 --    11/26/24 1100 -- 79 14 116/72 90 95 % -- -- -- --    11/26/24 1044 97.9 °F (36.6 °C) 80 15 115/70 87 94 % None (Room air) -- -- --    11/26/24 1016 -- -- -- -- -- -- -- -- -- 7    11/26/24 1000 -- 81 21 156/94 118 96 % -- -- -- --    11/26/24 0900 -- 84 18 149/96 118 97 % -- -- -- --    11/26/24 0800 -- 72 21 130/89 105 97 % -- 14 18 No Pain    11/26/24 0700 -- 63 17 132/81 101 96 % -- -- -- --    11/26/24 0600 -- 67 23 148/99 119 100 % -- -- -- --    11/26/24 0500 -- 62 18 145/93 112 96 % -- -- -- --    11/26/24 0400 -- 63 19 152/96 117 97 % None (Room air) 14 -- No Pain    11/26/24 0356 97.6 °F (36.4 °C) -- -- -- -- -- -- -- -- --    11/26/24 0300 -- 69 20 146/81 105 95 % -- -- -- --    11/26/24 0200 -- 68 20 135/86 106 96 % -- -- -- --    11/26/24 0100 -- 98 20 149/87 112 97 % -- -- -- --    11/26/24 0000 -- 75 21 133/81 100 94 % -- 14 -- No Pain       Weight (last 2 days)        None           CIWA-Ar Score       Row Name 11/27/24 1100 11/27/24 0728 11/27/24 0500       CIWA-Ar    Nausea and Vomiting 0 0 0    Tactile Disturbances 0 0 0    Tremor 0 0 2    Auditory Disturbances 0 0 0    Paroxysmal Sweats 0 0 0    Visual Disturbances 0 0 0    Anxiety 3 1 1    Headache, Fullness in Head 0 0 0    Agitation 2 0 1    Orientation and Clouding of Sensorium 0 0 0    CIWA-Ar Total 5 1 4      Row Name 11/27/24 0254 11/27/24 0100 11/26/24 2300       CIWA-Ar    Pulse -- -- 90    Nausea and Vomiting 0 0 1    Tactile Disturbances 0 0 0    Tremor 3 3 3    Auditory Disturbances 0 0 0    Paroxysmal Sweats 0 0 0    Visual Disturbances 0 0 0    Anxiety 0 1 0    Headache, Fullness in Head 0 0 0    Agitation 0 0 0    Orientation and Clouding of Sensorium 0 0 0    CIWA-Ar Total 3 4 4                Pertinent Labs/Diagnostic Results:   Radiology:    Cardiology:    GI:  No orders to display           Results from last 7 days   Lab Units 11/26/24  0526 11/25/24  0601 11/24/24  0255 11/23/24  1135   WBC Thousand/uL 3.53* 4.54 4.60 8.35   HEMOGLOBIN g/dL 11.3* 11.8 10.5* 13.9   HEMATOCRIT % 33.8* 35.9 32.2* 42.0   PLATELETS Thousands/uL 129* 133* 120* 201   TOTAL NEUT ABS Thousands/µL 2.04 2.34 2.59 6.61         Results from last 7 days   Lab Units 11/27/24  0438 11/27/24  0319 11/26/24  0526 11/25/24  0405 11/24/24  0255   SODIUM mmol/L 135 137 137 136 134*   POTASSIUM mmol/L 3.4* 2.7* 3.6 3.0* 3.6   CHLORIDE mmol/L 101 113* 101 97 96   CO2 mmol/L 26 20* 30 32 29   ANION GAP mmol/L 8 4 6 7 9   BUN mg/dL 3* 3* 3* 4* 7   CREATININE mg/dL 0.41* 0.28* 0.38* 0.46* 0.49*   EGFR ml/min/1.73sq m 136 154 139 131 128   CALCIUM mg/dL 8.1* 5.6* 8.4 8.1* 7.9*   CALCIUM, IONIZED mmol/L 1.13  --   --   --   --    MAGNESIUM mg/dL 1.4* 1.0* 1.4* 1.3*  --      Results from last 7 days   Lab Units 11/26/24  0526 11/25/24  0405 11/24/24  0255 11/23/24  1135   AST U/L 33 30 40* 84*   ALT U/L 20 22 28 48   ALK PHOS U/L 61 55 62 90    TOTAL PROTEIN g/dL 5.5* 5.3* 5.4* 7.3   ALBUMIN g/dL 3.1* 3.0* 3.1* 4.1   TOTAL BILIRUBIN mg/dL 0.82 1.19* 1.62* 1.58*         Results from last 7 days   Lab Units 11/27/24  0438 11/27/24  0319 11/26/24  0526 11/25/24  0405 11/24/24  0255 11/23/24  1636 11/23/24  1135   GLUCOSE RANDOM mg/dL 89 66 83 77 66 99 92     Results from last 7 days   Lab Units 11/23/24  1636 11/23/24  1135   HS TNI 0HR ng/L  --  4   HS TNI 2HR ng/L 3  --    HSTNI D2 ng/L -1  --      Results from last 7 days   Lab Units 11/23/24  1636   LACTIC ACID mmol/L 1.3       Results from last 7 days   Lab Units 11/26/24  0526 11/25/24  0405 11/24/24  0255   LIPASE u/L 160* 251* 479*     Results from last 7 days   Lab Units 11/23/24  1335   CLARITY UA  Slightly Cloudy   COLOR UA  Yellow   SPEC GRAV UA  >=1.030   PH UA  6.0   GLUCOSE UA mg/dl Negative   KETONES UA mg/dl 80 (3+)*   BLOOD UA  Large*   PROTEIN UA mg/dl 30 (1+)*   NITRITE UA  Negative   BILIRUBIN UA  Small*   UROBILINOGEN UA (BE) mg/dl 2.0*   LEUKOCYTES UA  Moderate*   WBC UA /hpf 10-20*   RBC UA /hpf 10-20*   BACTERIA UA /hpf Occasional   EPITHELIAL CELLS WET PREP /hpf Moderate*   MUCUS THREADS  Moderate*       Results from last 7 days   Lab Units 11/23/24  1335   URINE CULTURE  >100,000 cfu/ml       Network Utilization Review Department  ATTENTION: Please call with any questions or concerns to [phone] and carefully listen to the prompts so that you are directed to the right person. All voicemails are confidential.   For Discharge needs, contact Care Management DC Support Team at [phone] opt. [phone]  Send all requests for admission clinical reviews, approved or denied determinations and any other requests to dedicated fax number below belonging to the campus where the patient is receiving treatment. List of dedicated fax numbers for the Facilities:  FACILITY NAME UR FAX NUMBER   ADMISSION DENIALS (Administrative/Medical Necessity) [phone]   DISCHARGE SUPPORT TEAM (NETWORK)  [phone]   PARENT CHILD HEALTH (Maternity/NICU/Pediatrics) [phone]   [facility] [phone]   [facility] [phone]   [facility] [phone]   [facility] [phone]   [facility] [phone]   [facility] [phone]   [facility] [phone]   [facility] [phone]   [facility] [phone]   Critical access hospital [phone]   [facility] [phone]   [facility] [phone]

## 2024-11-27 NOTE — PROGRESS NOTES
Progress Note - Gastroenterology   Name: Kaykay Holland 34 y.o. female I MRN: [id number]  Unit/Bed#: -01 I Date of Admission: 11/23/2024   Date of Service: 11/27/2024 I Hospital Day: 4    Assessment & Plan  Necrotizing pancreatitis  34F with hx/o EtOH use disorder, current cigarette smoker, hepatic steatosis, prior EtOH pancreatitis who presented to the ED from home on 11/23/2024, found to have pancreatitis with acute necrotic collection on admission CT. This is patient's 5th admission for acute pancreatitis since October 2023.    11/27: 6/10 reported abdominal pain today. Tolerating lo fat breakfast. No barriers to discharge from GI standpoint as long as she is tolerating diet.    Lo fat diet  Taper analgesics as tolerated  Continue folic acid/thiamine supplementation  Discussed importance of absolute EtOH cessation  Will require close outpatient follow up with GI and surveillance imaging to ensure resolution of necrosis  Alcohol withdrawal (HCC)  Acute withdrawal 11/25 requiring ICU placement, restraints and phenobarbital, lorazepam, Precedex  Continue to monitor for withdrawal, treatment per primary team  Hx/o withdrawal seizures  Alcohol use disorder  Endorsed 2-3 bottles of wine daily  Encourage cessation  Hepatic steatosis  Noted on CT A/P, likely 2/2 chronic EtOH abuse  Trend LFTs  Will need outpatient GI follow up at discharge  Tobacco abuse  Current 0.5 PPD smoker  Encourage cessation in the setting of necrotizing pancreatitis; offer nicotine replacement  Gastroesophageal reflux disease without esophagitis  Chronic, stable; home regimen: OTC omeprazole 20 mg QD  Can transition pantoprazole 40 mg IV Q12 to PO when reliably tolerating diet  Electrolyte abnormality        Subjective   Abdominal pain improving today. Tolerated lo fat breakfast.    Objective :  Temp:  [97.9 °F (36.6 °C)-100.1 °F (37.8 °C)] 98.4 °F (36.9 °C)  HR:  [] 83  BP: (115-150)/(70-94) 136/94  Resp:  [14-41] 35  SpO2:  [91  "%-99 %] 99 %  O2 Device: None (Room air)    Physical Exam  Vitals and nursing note reviewed.   Constitutional:       General: She is not in acute distress.     Appearance: She is ill-appearing. She is not toxic-appearing.   HENT:      Head: Normocephalic.      Mouth/Throat:      Mouth: Mucous membranes are moist.      Pharynx: Oropharynx is clear.   Eyes:      General: No scleral icterus.  Neck:      Trachea: Phonation normal.   Cardiovascular:      Comments: Appears well perfused  Pulmonary:      Effort: Pulmonary effort is normal. No respiratory distress.   Abdominal:      General: Bowel sounds are normal. There is no distension or abdominal bruit.      Palpations: Abdomen is soft. There is no hepatomegaly or splenomegaly.      Tenderness: There is abdominal tenderness (mild) in the right upper quadrant, epigastric area and left upper quadrant. There is no guarding or rebound.   Musculoskeletal:      Cervical back: Neck supple.      Right lower leg: No edema.      Left lower leg: No edema.      Comments: Moving all 4 extremities spontaneously   Skin:     General: Skin is warm and dry.      Capillary Refill: Capillary refill takes less than 2 seconds.      Coloration: Skin is not jaundiced or pale.   Neurological:      General: No focal deficit present.      Mental Status: She is oriented to person, place, and time and easily aroused. She is lethargic.   Psychiatric:         Behavior: Behavior normal. Behavior is cooperative.         Thought Content: Thought content normal.         Lab Results: I have reviewed the following results:CBC/BMP:   .     11/27/24  0438   SODIUM 135   K 3.4*      CO2 26   BUN 3*   CREATININE 0.41*   GLUC 89   CAIONIZED 1.13   MG 1.4*    , Creatinine Clearance: Estimated Creatinine Clearance: 167.6 mL/min (A) (by C-G formula based on SCr of 0.41 mg/dL (L))., LFTs: No new results in last 24 hours. , Lipase: No results found for: \"LIPASE\"    Imaging Results Review: I reviewed radiology " reports from this admission including: CT abdomen/pelvis.  Other Study Results Review: No additional pertinent studies reviewed.

## 2024-11-27 NOTE — ASSESSMENT & PLAN NOTE
Pt is 35 y/o female with pmh for HTN, prolonged QT, tobacco use, ETOH abuse, prior h/o ETOH pancreatitis, PUD and seizures who presents to the ER with 2 day h/o intractable N/V.    CT: Ongoing acute pancreatitis, now with findings of pancreatic necrosis and acute necrotic collection as described.     VS stable except pt is tachypnea  WBC 3.53  K 3.4  MG 1.4  Hgb 11.3  Lipase  160(11/26)  Tbil  0.82  Cbc not done this am        Plan:  Aggressive IVF  GI eval-started on lo fat diet on 11/25  Serial exams  Trend labs, vitals  Pain control  No plan for acute surgical intervention at this time, care per primary team and GI

## 2024-11-27 NOTE — PROGRESS NOTES
Progress Note - Critical Care/ICU   Name: Kaykay Holland 34 y.o. female I MRN: [id number]  Unit/Bed#: -01 I Date of Admission: 11/23/2024   Date of Service: 11/27/2024 I Hospital Day: 4      Assessment & Plan  Necrotizing pancreatitis  History of alcohol abuse  Presented to the emergency department with epigastric pain, nausea, and vomiting  CT abdomen pelvis with evidence of acute pancreatitis along with pancreatic necrosis  Lipase elevated -- downtrending on serial draws   GI consulted    Plan:  Continue IV fluid resuscitation  Serial abdominal exams  As needed pain medication  Encourage cessation of alcohol  Alcohol use disorder  Patient reports drinking 2-3 bottles of wine daily  Initially placed on CIWA protocol and received Librium and as needed Ativan  11/25 patient had worsening of withdrawal symptoms and required 4 point locked restraints and phenobarbital  Received 10 mg/kg phenobarbital load and started on Precedex gtt 11/25 PM    Plan:  Total phenobarb: 934mg   Hold further Precedex gtt   Restraints weaned off and 1:1 discontinued  Continue thiamine and folic acid  Tobacco abuse  Offer NRT, cessation counseling   Hepatic steatosis  Noted on CT abdomen pelvis  Likely secondary to alcohol abuse  Encourage cessation  Trend LFTs daily  Electrolyte abnormality  Aggressive electrolyte repletion to maintain Mg greater than 2, Phos greater than 3, K greater than 4  Alcohol withdrawal (HCC)    Gastroesophageal reflux disease without esophagitis  Cont home protonix   Disposition: Stepdown Level 1    ICU Core Measures     A: Assess, Prevent, and Manage Pain Has pain been assessed? Yes  Need for changes to pain regimen? No   B: Both SAT/SAT  N/A   C: Choice of Sedation RASS Goal: 0 Alert and Calm or N/A patient not on sedation  Need for changes to sedation or analgesia regimen? NA   D: Delirium CAM-ICU: Negative   E: Early Mobility  Plan for early mobility? Yes   F: Family Engagement Plan for family  engagement today? Yes         Prophylaxis:  VTE VTE covered by:  heparin (porcine), Subcutaneous, 5,000 Units at 11/27/24 0025       Stress Ulcer  covered bypantoprazole (PROTONIX) 40 mg tablet [[id number]] (Long-Term Med), pantoprazole (PROTONIX) injection 40 mg [[id number]]         24 Hour Events : Mental status significantly improved. No further signs or symptoms of withdrawal. Restraints weaned off and 1:1 discontinued. No further phenobarbital given overnight. Tolerating PO diet with intermittent nausea/pain.    Subjective   Review of Systems: See HPI for Review of Systems    Objective :                   Vitals I/O      Most Recent Min/Max in 24hrs   Temp 99 °F (37.2 °C) Temp  Min: 97.6 °F (36.4 °C)  Max: 100.1 °F (37.8 °C)   Pulse (!) 113 Pulse  Min: 62  Max: 113   Resp (!) 41 Resp  Min: 14  Max: 41   /91 BP  Min: 115/70  Max: 156/94   O2 Sat 91 % SpO2  Min: 91 %  Max: 100 %      Intake/Output Summary (Last 24 hours) at 11/27/2024 0319  Last data filed at 11/27/2024 0201  Gross per 24 hour   Intake 8278.81 ml   Output 4275 ml   Net 4003.81 ml       Diet Regular; Regular House; Lo Fat    Invasive Monitoring           Physical Exam   Physical Exam  Vitals reviewed.   Eyes:      Extraocular Movements: Extraocular movements intact.      Pupils: Pupils are equal, round, and reactive to light.   Skin:     General: Skin is warm and dry.      Capillary Refill: Capillary refill takes 2 to 3 seconds.   HENT:      Head: Normocephalic and atraumatic.      Mouth/Throat:      Mouth: Mucous membranes are moist.   Cardiovascular:      Rate and Rhythm: Normal rate and regular rhythm.      Heart sounds: No murmur heard.     No friction rub. No gallop.   Musculoskeletal:      Right lower leg: No edema.      Left lower leg: No edema.   Abdominal: General: There is no distension.      Palpations: Abdomen is soft.      Tenderness: There is abdominal tenderness.   Constitutional:       Appearance: She is well-developed and  well-nourished.   Pulmonary:      Effort: Pulmonary effort is normal. No accessory muscle usage, respiratory distress or accessory muscle usage.      Breath sounds: No wheezing, rhonchi or rales.   Neurological:      General: No focal deficit present.      Mental Status: She is alert and oriented to person, place and time. Mental status is at baseline.      Motor: Strength full and intact in all extremities.          Diagnostic Studies        Lab Results: I have reviewed the following results:     Medications:  Scheduled PRN   heparin (porcine), 5,000 Units, Q8H RODRICK  nicotine, 21 mg, QPM  pantoprazole, 40 mg, Q12H RODRICK  thiamine, 500 mg, Q8H   Followed by  thiamine, 250 mg, Q8H   Followed by  [START ON 11/29/2024] thiamine, 100 mg, Q8H      diphenhydrAMINE, 25 mg, Q6H PRN  HYDROmorphone, 1 mg, Q3H PRN  ondansetron, 4 mg, Q6H PRN       Continuous    lactated ringers, 100 mL/hr, Last Rate: 200 mL/hr (11/26/24 1830)         Labs:   CBC    Recent Labs     11/25/24  0601 11/26/24  0526   WBC 4.54 3.53*   HGB 11.8 11.3*   HCT 35.9 33.8*   * 129*     BMP    Recent Labs     11/25/24 0405 11/26/24  0526   SODIUM 136 137   K 3.0* 3.6   CL 97 101   CO2 32 30   AGAP 7 6   BUN 4* 3*   CREATININE 0.46* 0.38*   CALCIUM 8.1* 8.4       Coags    No recent results     Additional Electrolytes  Recent Labs     11/25/24 0405 11/26/24  0526   MG 1.3* 1.4*          Blood Gas    No recent results  No recent results LFTs  Recent Labs     11/25/24 0405 11/26/24  0526   ALT 22 20   AST 30 33   ALKPHOS 55 61   ALB 3.0* 3.1*   TBILI 1.19* 0.82       Infectious  No recent results  Glucose  Recent Labs     11/25/24 0405 11/26/24  0526   GLUC 77 83

## 2024-11-27 NOTE — PROGRESS NOTES
Progress Note - Critical Care/ICU   Name: Kaykay Holland 34 y.o. female I MRN: [id number]  Unit/Bed#: -01 I Date of Admission: 11/23/2024   Date of Service: 11/27/2024 I Hospital Day: 4       Critical Care Interval Transfer Note:    Brief Hospital Summary: 34-year-old female with past medical history of EtOH abuse, pancreatitis, hepatic steatosis. Initially presented to the emergency room 1123 with a 2-day history of abdominal pain nausea vomiting. CT imaging concerning for necrotizing pancreatitis likely in the setting of binge drinking. She was initially admitted to medicine and was receiving IV fluids and IV pain medications. On 11/25 worsening withdrawal symptoms requiring critical care evaluation and transferred to [facility] for phenobarbital. Status post phenobarbital load, patient much improved from a withdrawal standpoint with CIWA's 0-1 today. From a pancreatitis standpoint patient is tolerating a solid diet. Still reporting abdominal pain. Holding on IV pain medications today, I started her on p.o. Oxy. Pain seems to be more manageable on oral management. She would likely need another day or 2 in the hospital for pain management.     Barriers to discharge:   Continue tolerance of diet  Pain management, still having pain requiring narcotics     Consults: IP CONSULT TO GASTROENTEROLOGY  IP CONSULT TO ACUTE CARE SURGERY    Recommended to review admission imaging for incidental findings and document in discharge navigator: Chart reviewed, no known incidental findings noted at this time.      Discharge Plan: Anticipate discharge in 24-48 hrs to home.       Patient seen and evaluated by Critical Care today and deemed to be appropriate for transfer to Med Surg. Spoke to MD Maynard from [facility] to accept transfer. Critical care can be contacted via SecureChat with any questions or concerns. Please use the Critical Care AP Role in Secure Chat for any provider inquires until the patient is transferred out of the  ICU or until tomorrow at 0600.

## 2024-11-27 NOTE — ASSESSMENT & PLAN NOTE
Patient reports drinking 2-3 bottles of wine daily  Initially placed on CIWA protocol and received Librium and as needed Ativan  11/25 patient had worsening of withdrawal symptoms and required 4 point locked restraints and phenobarbital  Received 10 mg/kg phenobarbital load and started on Precedex gtt 11/25 PM    Plan:  Total phenobarb: 934mg   Continue thiamine and folic acid  ETOH cessation counseling ; refused alcohol counseling rehab

## 2024-11-27 NOTE — PLAN OF CARE
Problem: Potential for Falls  Goal: Patient will remain free of falls  Description: INTERVENTIONS:  - Educate patient/family on patient safety including physical limitations  - Instruct patient to call for assistance with activity   - Consult OT/PT to assist with strengthening/mobility   - Keep Call bell within reach  - Keep bed low and locked with side rails adjusted as appropriate  - Keep care items and personal belongings within reach  - Initiate and maintain comfort rounds  - Make Fall Risk Sign visible to staff  - Offer Toileting every 2 Hours, in advance of need  - Initiate/Maintain shift alarm  - Obtain necessary fall risk management equipment: socks  - Apply yellow socks and bracelet for high fall risk patients  - Consider moving patient to room near nurses station  Outcome: Progressing     Problem: PAIN - ADULT  Goal: Verbalizes/displays adequate comfort level or baseline comfort level  Description: Interventions:  - Encourage patient to monitor pain and request assistance  - Assess pain using appropriate pain scale  - Administer analgesics based on type and severity of pain and evaluate response  - Implement non-pharmacological measures as appropriate and evaluate response  - Consider cultural and social influences on pain and pain management  - Notify physician/advanced practitioner if interventions unsuccessful or patient reports new pain  Outcome: Progressing     Problem: INFECTION - ADULT  Goal: Absence or prevention of progression during hospitalization  Description: INTERVENTIONS:  - Assess and monitor for signs and symptoms of infection  - Monitor lab/diagnostic results  - Monitor all insertion sites, i.e. indwelling lines, tubes, and drains  - Monitor endotracheal if appropriate and nasal secretions for changes in amount and color  - [place] appropriate cooling/warming therapies per order  - Administer medications as ordered  - Instruct and encourage patient and family to use good hand hygiene  technique  - Identify and instruct in appropriate isolation precautions for identified infection/condition  Outcome: Progressing  Goal: Absence of fever/infection during neutropenic period  Description: INTERVENTIONS:  - Monitor WBC    Outcome: Progressing     Problem: SAFETY ADULT  Goal: Patient will remain free of falls  Description: INTERVENTIONS:  - Educate patient/family on patient safety including physical limitations  - Instruct patient to call for assistance with activity   - Consult OT/PT to assist with strengthening/mobility   - Keep Call bell within reach  - Keep bed low and locked with side rails adjusted as appropriate  - Keep care items and personal belongings within reach  - Initiate and maintain comfort rounds  - Make Fall Risk Sign visible to staff  - Offer Toileting every 2 Hours, in advance of need  - Initiate/Maintain shift alarm  - Obtain necessary fall risk management equipment: socks  - Apply yellow socks and bracelet for high fall risk patients  - Consider moving patient to room near nurses station  Outcome: Progressing  Goal: Maintain or return to baseline ADL function  Description: INTERVENTIONS:  -  Assess patient's ability to carry out ADLs; assess patient's baseline for ADL function and identify physical deficits which impact ability to perform ADLs (bathing, care of mouth/teeth, toileting, grooming, dressing, etc.)  - Assess/evaluate cause of self-care deficits   - Assess range of motion  - Assess patient's mobility; develop plan if impaired  - Assess patient's need for assistive devices and provide as appropriate  - Encourage maximum independence but intervene and supervise when necessary  - Involve family in performance of ADLs  - Assess for home care needs following discharge   - Consider OT consult to assist with ADL evaluation and planning for discharge  - Provide patient education as appropriate  Outcome: Progressing  Goal: Maintains/Returns to pre admission functional  lacerations level  Description: INTERVENTIONS:  - Perform AM-PAC 6 Click Basic Mobility/ Daily Activity assessment daily.  - Set and communicate daily mobility goal to care team and patient/family/caregiver.   - Collaborate with rehabilitation services on mobility goals if consulted  - Perform Range of Motion 2 times a day.  - Reposition patient every 2 hours.  - Dangle patient 2 times a day  - Stand patient 2 times a day  - Ambulate patient 2 times a day  - Out of bed to chair 2 times a day   - Out of bed for meals 2 times a day  - Out of bed for toileting  - Record patient progress and toleration of activity level   Outcome: Progressing     Problem: DISCHARGE PLANNING  Goal: Discharge to home or other facility with appropriate resources  Description: INTERVENTIONS:  - Identify barriers to discharge w/patient and caregiver  - Arrange for needed discharge resources and transportation as appropriate  - Identify discharge learning needs (meds, wound care, etc.)  - Arrange for interpretive services to assist at discharge as needed  - Refer to Case Management Department for coordinating discharge planning if the patient needs post-hospital services based on physician/advanced practitioner order or complex needs related to functional status, cognitive ability, or social support system  Outcome: Progressing     Problem: Knowledge Deficit  Goal: Patient/family/caregiver demonstrates understanding of disease process, treatment plan, medications, and discharge instructions  Description: Complete learning assessment and assess knowledge base.  Interventions:  - Provide teaching at level of understanding  - Provide teaching via preferred learning methods  Outcome: Progressing     Problem: GASTROINTESTINAL - ADULT  Goal: Minimal or absence of nausea and/or vomiting  Description: INTERVENTIONS:  - Administer IV fluids if ordered to ensure adequate hydration  - Maintain NPO status until nausea and vomiting are resolved  - Nasogastric tube if  ordered  - Administer ordered antiemetic medications as needed  - Provide nonpharmacologic comfort measures as appropriate  - Advance diet as tolerated, if ordered  - Consider nutrition services referral to assist patient with adequate nutrition and appropriate food choices  Outcome: Progressing  Goal: Maintains adequate nutritional intake  Description: INTERVENTIONS:  - Monitor percentage of each meal consumed  - Identify factors contributing to decreased intake, treat as appropriate  - Assist with meals as needed  - Monitor I&O, weight, and lab values if indicated  - Obtain nutrition services referral as needed  Outcome: Progressing     Problem: Prexisting or High Potential for Compromised Skin Integrity  Goal: Skin integrity is maintained or improved  Description: INTERVENTIONS:  - Identify patients at risk for skin breakdown  - Assess and monitor skin integrity  - Assess and monitor nutrition and hydration status  - Monitor labs   - Assess for incontinence   - Turn and reposition patient  - Assist with mobility/ambulation  - Relieve pressure over bony prominences  - Avoid friction and shearing  - Provide appropriate hygiene as needed including keeping skin clean and dry  - Evaluate need for skin moisturizer/barrier cream  - Collaborate with interdisciplinary team   - Patient/family teaching  - Consider wound care consult   Outcome: Progressing     Problem: Nutrition/Hydration-ADULT  Goal: Nutrient/Hydration intake appropriate for improving, restoring or maintaining nutritional needs  Description: Monitor and assess patient's nutrition/hydration status for malnutrition. Collaborate with interdisciplinary team and initiate plan and interventions as ordered.  Monitor patient's weight and dietary intake as ordered or per policy. Utilize nutrition screening tool and intervene as necessary. Determine patient's food preferences and provide high-protein, high-caloric foods as appropriate.     INTERVENTIONS:  - Monitor  oral intake, urinary output, labs, and treatment plans  - Assess nutrition and hydration status and recommend course of action  - Evaluate amount of meals eaten  - Assist patient with eating if necessary   - Allow adequate time for meals  - Recommend/ encourage appropriate diets, oral nutritional supplements, and vitamin/mineral supplements  - Order, calculate, and assess calorie counts as needed  - Recommend, monitor, and adjust tube feedings and TPN/PPN based on assessed needs  - Assess need for intravenous fluids  - Provide specific nutrition/hydration education as appropriate  - Include patient/family/caregiver in decisions related to nutrition  Outcome: Progressing     Problem: SAFETY,RESTRAINT: NV/NON-SELF DESTRUCTIVE BEHAVIOR  Goal: Remains free of harm/injury (restraint for non violent/non self-detsructive behavior)  Description: INTERVENTIONS:  - Instruct patient/family regarding restraint use   - Assess and monitor physiologic and psychological status   - Provide interventions and comfort measures to meet assessed patient needs   - Identify and implement measures to help patient regain control  - Assess readiness for release of restraint   Outcome: Progressing  Goal: Returns to optimal restraint-free functioning  Description: INTERVENTIONS:  - Assess the patient's behavior and symptoms that indicate continued need for restraint  - Identify and implement measures to help patient regain control  - Assess readiness for release of restraint   Outcome: Progressing

## 2024-11-27 NOTE — PLAN OF CARE
Problem: PAIN - ADULT  Goal: Verbalizes/displays adequate comfort level or baseline comfort level  Description: Interventions:  - Encourage patient to monitor pain and request assistance  - Assess pain using appropriate pain scale  - Administer analgesics based on type and severity of pain and evaluate response  - Implement non-pharmacological measures as appropriate and evaluate response  - Consider cultural and social influences on pain and pain management  - Notify physician/advanced practitioner if interventions unsuccessful or patient reports new pain  Outcome: Progressing     Problem: INFECTION - ADULT  Goal: Absence or prevention of progression during hospitalization  Description: INTERVENTIONS:  - Assess and monitor for signs and symptoms of infection  - Monitor lab/diagnostic results  - Monitor all insertion sites, i.e. indwelling lines, tubes, and drains  - Monitor endotracheal if appropriate and nasal secretions for changes in amount and color  - [place] appropriate cooling/warming therapies per order  - Administer medications as ordered  - Instruct and encourage patient and family to use good hand hygiene technique  - Identify and instruct in appropriate isolation precautions for identified infection/condition  Outcome: Progressing     Problem: INFECTION - ADULT  Goal: Absence of fever/infection during neutropenic period  Description: INTERVENTIONS:  - Monitor WBC    Outcome: Progressing     Problem: GASTROINTESTINAL - ADULT  Goal: Maintains adequate nutritional intake  Description: INTERVENTIONS:  - Monitor percentage of each meal consumed  - Identify factors contributing to decreased intake, treat as appropriate  - Assist with meals as needed  - Monitor I&O, weight, and lab values if indicated  - Obtain nutrition services referral as needed  Outcome: Progressing     Problem: Knowledge Deficit  Goal: Patient/family/caregiver demonstrates understanding of disease process, treatment plan, medications,  and discharge instructions  Description: Complete learning assessment and assess knowledge base.  Interventions:  - Provide teaching at level of understanding  - Provide teaching via preferred learning methods  Outcome: Progressing     Problem: SAFETY,RESTRAINT: NV/NON-SELF DESTRUCTIVE BEHAVIOR  Goal: Returns to optimal restraint-free functioning  Description: INTERVENTIONS:  - Assess the patient's behavior and symptoms that indicate continued need for restraint  - Identify and implement measures to help patient regain control  - Assess readiness for release of restraint   Outcome: Progressing     Problem: SAFETY,RESTRAINT: NV/NON-SELF DESTRUCTIVE BEHAVIOR  Goal: Remains free of harm/injury (restraint for non violent/non self-detsructive behavior)  Description: INTERVENTIONS:  - Instruct patient/family regarding restraint use   - Assess and monitor physiologic and psychological status   - Provide interventions and comfort measures to meet assessed patient needs   - Identify and implement measures to help patient regain control  - Assess readiness for release of restraint   Outcome: Progressing

## 2024-11-27 NOTE — PROGRESS NOTES
Progress Note - Surgery-General   Name: Kaykay Holland 34 y.o. female I MRN: [id number]  Unit/Bed#: -01 I Date of Admission: 11/23/2024   Date of Service: 11/27/2024 I Hospital Day: 4    Assessment & Plan  Necrotizing pancreatitis  Pt is 33 y/o female with pmh for HTN, prolonged QT, tobacco use, ETOH abuse, prior h/o ETOH pancreatitis, PUD and seizures who presents to the ER with 2 day h/o intractable N/V.    CT: Ongoing acute pancreatitis, now with findings of pancreatic necrosis and acute necrotic collection as described.     VS stable except pt is tachypnea  WBC 3.53  K 3.4  MG 1.4  Hgb 11.3  Lipase  160(11/26)  Tbil  0.82  Cbc not done this am        Plan:  Aggressive IVF  GI eval-started on lo fat diet on 11/25  Serial exams  Trend labs, vitals  Pain control  No plan for acute surgical intervention at this time, care per primary team and GI    Tobacco abuse  Nicotine patch   Hepatic steatosis  H/o ETOH abuse  Alcohol use disorder  -CIWA  Electrolyte abnormality    Alcohol withdrawal (HCC)    Gastroesophageal reflux disease without esophagitis      Please contact the SecureChat role for the Surgery-General service with any questions/concerns.    Subjective   Doing well  Awake, denies pain  Tolerating po    Objective :  Temp:  [97.9 °F (36.6 °C)-100.1 °F (37.8 °C)] 98.4 °F (36.9 °C)  HR:  [] 83  BP: (115-150)/(70-94) 136/94  Resp:  [14-41] 35  SpO2:  [91 %-99 %] 99 %  O2 Device: None (Room air)    I/O         11/25 0701  11/26 0700 11/26 0701  11/27 0700 11/27 0701  11/28 0700    P.O. 100 1050     I.V. (mL/kg) 4872.3 (89.6) 2460 (45.2) 2351.7 (43.2)    IV Piggyback 150 400 50    Total Intake(mL/kg) 5122.3 (94.2) 3910 (71.9) 2401.7 (44.1)    Urine (mL/kg/hr) 1900 (1.5) 3625 (2.8) 300 (1.8)    Stool  0     Total Output 1900 3625 300    Net +3222.3 +285 +2101.7           Unmeasured Urine Occurrence  6 x     Unmeasured Stool Occurrence  1 x             Physical Exam  Awake, alert, non toxic  Oriented  to self, time, location  At, NC  RRR  CTAB  Abdomen soft, mildly tender in all quadrants  +BS  Non distended        Lab Results: I have reviewed the following results:  Recent Labs     11/26/24  0526 11/27/24  0319 11/27/24  0438   WBC 3.53*  --   --    HGB 11.3*  --   --    HCT 33.8*  --   --    *  --   --    SODIUM 137   < > 135   K 3.6   < > 3.4*      < > 101   CO2 30   < > 26   BUN 3*   < > 3*   CREATININE 0.38*   < > 0.41*   GLUC 83   < > 89   CAIONIZED  --   --  1.13   MG 1.4*   < > 1.4*   AST 33  --   --    ALT 20  --   --    ALB 3.1*  --   --    TBILI 0.82  --   --    ALKPHOS 61  --   --     < > = values in this interval not displayed.         VTE Pharmacologic Prophylaxis: VTE covered by:  heparin (porcine), Subcutaneous, 5,000 Units at 11/27/24 1347     VTE Mechanical Prophylaxis: sequential compression device

## 2024-11-27 NOTE — ASSESSMENT & PLAN NOTE
34F with hx/o EtOH use disorder, current cigarette smoker, hepatic steatosis, prior EtOH pancreatitis who presented to the ED from home on 11/23/2024, found to have pancreatitis with acute necrotic collection on admission CT. This is patient's 5th admission for acute pancreatitis since October 2023.    11/27: 6/10 reported abdominal pain today. Tolerating lo fat breakfast. No barriers to discharge from GI standpoint as long as she is tolerating diet.    Lo fat diet  Taper analgesics as tolerated  Continue folic acid/thiamine supplementation  Discussed importance of absolute EtOH cessation  Will require close outpatient follow up with GI and surveillance imaging to ensure resolution of necrosis

## 2024-11-27 NOTE — ASSESSMENT & PLAN NOTE
History of alcohol abuse  Presented to the emergency department with epigastric pain, nausea, and vomiting  CT abdomen pelvis with evidence of acute pancreatitis along with pancreatic necrosis  GI consulted  Diet advanced to low-fat, IV fluids stopped reports improvement however still has difficulty tolerating diet and endorse abdominal pain    Plan:  Unable to de-escalate pain regimen patient continues to endorse tenderness and pain with decreased appetite  Serial abdominal exams  As needed pain medication  Encourage cessation of alcohol

## 2024-11-28 LAB
ANION GAP SERPL CALCULATED.3IONS-SCNC: 6 MMOL/L (ref 4–13)
BASOPHILS # BLD AUTO: 0.02 THOUSANDS/ΜL (ref 0–0.1)
BASOPHILS NFR BLD AUTO: 0 % (ref 0–1)
BUN SERPL-MCNC: 6 MG/DL (ref 5–25)
CALCIUM SERPL-MCNC: 8.6 MG/DL (ref 8.4–10.2)
CHLORIDE SERPL-SCNC: 100 MMOL/L (ref 96–108)
CO2 SERPL-SCNC: 28 MMOL/L (ref 21–32)
CREAT SERPL-MCNC: 0.46 MG/DL (ref 0.6–1.3)
EOSINOPHIL # BLD AUTO: 0.16 THOUSAND/ΜL (ref 0–0.61)
EOSINOPHIL NFR BLD AUTO: 3 % (ref 0–6)
ERYTHROCYTE [DISTWIDTH] IN BLOOD BY AUTOMATED COUNT: 11.9 % (ref 11.6–15.1)
GFR SERPL CREATININE-BSD FRML MDRD: 131 ML/MIN/1.73SQ M
GLUCOSE SERPL-MCNC: 98 MG/DL (ref 65–140)
HCT VFR BLD AUTO: 35.2 % (ref 34.8–46.1)
HGB BLD-MCNC: 11.5 G/DL (ref 11.5–15.4)
IMM GRANULOCYTES # BLD AUTO: 0.01 THOUSAND/UL (ref 0–0.2)
IMM GRANULOCYTES NFR BLD AUTO: 0 % (ref 0–2)
LYMPHOCYTES # BLD AUTO: 1.33 THOUSANDS/ΜL (ref 0.6–4.47)
LYMPHOCYTES NFR BLD AUTO: 28 % (ref 14–44)
MAGNESIUM SERPL-MCNC: 1.5 MG/DL (ref 1.9–2.7)
MCH RBC QN AUTO: 35.1 PG (ref 26.8–34.3)
MCHC RBC AUTO-ENTMCNC: 32.7 G/DL (ref 31.4–37.4)
MCV RBC AUTO: 107 FL (ref 82–98)
MONOCYTES # BLD AUTO: 0.67 THOUSAND/ΜL (ref 0.17–1.22)
MONOCYTES NFR BLD AUTO: 14 % (ref 4–12)
NEUTROPHILS # BLD AUTO: 2.57 THOUSANDS/ΜL (ref 1.85–7.62)
NEUTS SEG NFR BLD AUTO: 55 % (ref 43–75)
NRBC BLD AUTO-RTO: 0 /100 WBCS
PHOSPHATE SERPL-MCNC: 3.9 MG/DL (ref 2.7–4.5)
PLATELET # BLD AUTO: 136 THOUSANDS/UL (ref 149–390)
PMV BLD AUTO: 12.3 FL (ref 8.9–12.7)
POTASSIUM SERPL-SCNC: 3.9 MMOL/L (ref 3.5–5.3)
RBC # BLD AUTO: 3.28 MILLION/UL (ref 3.81–5.12)
SODIUM SERPL-SCNC: 134 MMOL/L (ref 135–147)
WBC # BLD AUTO: 4.76 THOUSAND/UL (ref 4.31–10.16)

## 2024-11-28 PROCEDURE — 83735 ASSAY OF MAGNESIUM: CPT

## 2024-11-28 PROCEDURE — 84100 ASSAY OF PHOSPHORUS: CPT

## 2024-11-28 PROCEDURE — 99232 SBSQ HOSP IP/OBS MODERATE 35: CPT

## 2024-11-28 PROCEDURE — 80048 BASIC METABOLIC PNL TOTAL CA: CPT

## 2024-11-28 PROCEDURE — 99232 SBSQ HOSP IP/OBS MODERATE 35: CPT | Performed by: INTERNAL MEDICINE

## 2024-11-28 PROCEDURE — 85025 COMPLETE CBC W/AUTO DIFF WBC: CPT | Performed by: STUDENT IN AN ORGANIZED HEALTH CARE EDUCATION/TRAINING PROGRAM

## 2024-11-28 RX ORDER — ACETAMINOPHEN 325 MG/1
650 TABLET ORAL EVERY 6 HOURS SCHEDULED
Status: DISCONTINUED | OUTPATIENT
Start: 2024-11-28 | End: 2024-11-29 | Stop reason: HOSPADM

## 2024-11-28 RX ORDER — MAGNESIUM SULFATE HEPTAHYDRATE 40 MG/ML
2 INJECTION, SOLUTION INTRAVENOUS ONCE
Status: COMPLETED | OUTPATIENT
Start: 2024-11-28 | End: 2024-11-28

## 2024-11-28 RX ORDER — OXYCODONE HYDROCHLORIDE 5 MG/1
5 TABLET ORAL ONCE
Refills: 0 | Status: COMPLETED | OUTPATIENT
Start: 2024-11-28 | End: 2024-11-28

## 2024-11-28 RX ORDER — OXYCODONE HYDROCHLORIDE 5 MG/1
5 TABLET ORAL EVERY 4 HOURS PRN
Refills: 0 | Status: DISCONTINUED | OUTPATIENT
Start: 2024-11-28 | End: 2024-11-29 | Stop reason: HOSPADM

## 2024-11-28 RX ORDER — HYDROMORPHONE HCL IN WATER/PF 6 MG/30 ML
0.2 PATIENT CONTROLLED ANALGESIA SYRINGE INTRAVENOUS EVERY 2 HOUR PRN
Refills: 0 | Status: DISCONTINUED | OUTPATIENT
Start: 2024-11-28 | End: 2024-11-29 | Stop reason: HOSPADM

## 2024-11-28 RX ADMIN — THIAMINE HYDROCHLORIDE 250 MG: 100 INJECTION, SOLUTION INTRAMUSCULAR; INTRAVENOUS at 17:23

## 2024-11-28 RX ADMIN — HEPARIN SODIUM 5000 UNITS: 5000 INJECTION, SOLUTION INTRAVENOUS; SUBCUTANEOUS at 21:50

## 2024-11-28 RX ADMIN — OXYCODONE HYDROCHLORIDE 5 MG: 5 TABLET ORAL at 11:51

## 2024-11-28 RX ADMIN — ACETAMINOPHEN 650 MG: 325 TABLET, FILM COATED ORAL at 13:36

## 2024-11-28 RX ADMIN — THIAMINE HYDROCHLORIDE 250 MG: 100 INJECTION, SOLUTION INTRAMUSCULAR; INTRAVENOUS at 01:07

## 2024-11-28 RX ADMIN — NICOTINE 21 MG: 21 PATCH, EXTENDED RELEASE TRANSDERMAL at 17:24

## 2024-11-28 RX ADMIN — HEPARIN SODIUM 5000 UNITS: 5000 INJECTION, SOLUTION INTRAVENOUS; SUBCUTANEOUS at 13:36

## 2024-11-28 RX ADMIN — HEPARIN SODIUM 5000 UNITS: 5000 INJECTION, SOLUTION INTRAVENOUS; SUBCUTANEOUS at 06:18

## 2024-11-28 RX ADMIN — MAGNESIUM SULFATE HEPTAHYDRATE 2 G: 2 INJECTION, SOLUTION INTRAVENOUS at 09:25

## 2024-11-28 RX ADMIN — PANTOPRAZOLE SODIUM 40 MG: 40 INJECTION, POWDER, FOR SOLUTION INTRAVENOUS at 21:50

## 2024-11-28 RX ADMIN — PANTOPRAZOLE SODIUM 40 MG: 40 INJECTION, POWDER, FOR SOLUTION INTRAVENOUS at 07:47

## 2024-11-28 RX ADMIN — TAMSULOSIN HYDROCHLORIDE 0.4 MG: 0.4 CAPSULE ORAL at 17:23

## 2024-11-28 RX ADMIN — OXYCODONE HYDROCHLORIDE 5 MG: 5 TABLET ORAL at 13:36

## 2024-11-28 RX ADMIN — ONDANSETRON 4 MG: 2 INJECTION INTRAMUSCULAR; INTRAVENOUS at 17:29

## 2024-11-28 RX ADMIN — OXYCODONE HYDROCHLORIDE 5 MG: 5 TABLET ORAL at 17:43

## 2024-11-28 RX ADMIN — ACETAMINOPHEN 650 MG: 325 TABLET, FILM COATED ORAL at 18:49

## 2024-11-28 RX ADMIN — OXYCODONE HYDROCHLORIDE 5 MG: 5 TABLET ORAL at 06:33

## 2024-11-28 RX ADMIN — OXYCODONE HYDROCHLORIDE 5 MG: 5 TABLET ORAL at 21:50

## 2024-11-28 RX ADMIN — THIAMINE HYDROCHLORIDE 250 MG: 100 INJECTION, SOLUTION INTRAMUSCULAR; INTRAVENOUS at 07:52

## 2024-11-28 NOTE — ASSESSMENT & PLAN NOTE
34F with hx/o ETOH and tobacco use disorder, hepatic steatosis, prior ETOH pancreatitis who is readmitted with pancreatitis and acute necrotic collection. This is patient's 5th admission for acute pancreatitis since October 2023. Symptoms improved with supportive care. Currently tolerating low-fat diet.    No barriers to discharge from GI standpoint  Continue low fat diet  Continue folic acid/thiamine supplementation  Discussed importance of absolute ETOH cessation  Will require close outpatient GI follow up

## 2024-11-28 NOTE — ASSESSMENT & PLAN NOTE
Endorsed 2-3 bottles of wine daily prior to admission  Encouraged continued alcohol abstinence and participation in alcohol rehabilitation program after discharge  Patient declines formal rehab program citing that she does not have time and needs to work  I did explain to the patient and her mother that ongoing alcohol use can lead to recurrent pancreatitis, infection, organ failure, even death

## 2024-11-28 NOTE — ASSESSMENT & PLAN NOTE
Acute withdrawal 11/25 requiring ICU placement, restraints and phenobarbital, lorazepam, Precedex  Continue to monitor for withdrawal, treatment per primary team

## 2024-11-28 NOTE — PLAN OF CARE
Problem: Potential for Falls  Goal: Patient will remain free of falls  Description: INTERVENTIONS:  - Educate patient/family on patient safety including physical limitations  - Instruct patient to call for assistance with activity   - Consult OT/PT to assist with strengthening/mobility   - Keep Call bell within reach  - Keep bed low and locked with side rails adjusted as appropriate  - Keep care items and personal belongings within reach  - Initiate and maintain comfort rounds  - Make Fall Risk Sign visible to staff  - Offer Toileting every 2 Hours, in advance of need  - Initiate/Maintain shift alarm  - Obtain necessary fall risk management equipment: socks  - Apply yellow socks and bracelet for high fall risk patients  - Consider moving patient to room near nurses station  Outcome: Progressing     Problem: PAIN - ADULT  Goal: Verbalizes/displays adequate comfort level or baseline comfort level  Description: Interventions:  - Encourage patient to monitor pain and request assistance  - Assess pain using appropriate pain scale  - Administer analgesics based on type and severity of pain and evaluate response  - Implement non-pharmacological measures as appropriate and evaluate response  - Consider cultural and social influences on pain and pain management  - Notify physician/advanced practitioner if interventions unsuccessful or patient reports new pain  Outcome: Progressing     Problem: INFECTION - ADULT  Goal: Absence or prevention of progression during hospitalization  Description: INTERVENTIONS:  - Assess and monitor for signs and symptoms of infection  - Monitor lab/diagnostic results  - Monitor all insertion sites, i.e. indwelling lines, tubes, and drains  - Monitor endotracheal if appropriate and nasal secretions for changes in amount and color  - [place] appropriate cooling/warming therapies per order  - Administer medications as ordered  - Instruct and encourage patient and family to use good hand hygiene  technique  - Identify and instruct in appropriate isolation precautions for identified infection/condition  Outcome: Progressing  Goal: Absence of fever/infection during neutropenic period  Description: INTERVENTIONS:  - Monitor WBC    Outcome: Progressing     Problem: SAFETY ADULT  Goal: Patient will remain free of falls  Description: INTERVENTIONS:  - Educate patient/family on patient safety including physical limitations  - Instruct patient to call for assistance with activity   - Consult OT/PT to assist with strengthening/mobility   - Keep Call bell within reach  - Keep bed low and locked with side rails adjusted as appropriate  - Keep care items and personal belongings within reach  - Initiate and maintain comfort rounds  - Make Fall Risk Sign visible to staff  - Offer Toileting every 2 Hours, in advance of need  - Initiate/Maintain shift alarm  - Obtain necessary fall risk management equipment: socks  - Apply yellow socks and bracelet for high fall risk patients  - Consider moving patient to room near nurses station  Outcome: Progressing  Goal: Maintain or return to baseline ADL function  Description: INTERVENTIONS:  -  Assess patient's ability to carry out ADLs; assess patient's baseline for ADL function and identify physical deficits which impact ability to perform ADLs (bathing, care of mouth/teeth, toileting, grooming, dressing, etc.)  - Assess/evaluate cause of self-care deficits   - Assess range of motion  - Assess patient's mobility; develop plan if impaired  - Assess patient's need for assistive devices and provide as appropriate  - Encourage maximum independence but intervene and supervise when necessary  - Involve family in performance of ADLs  - Assess for home care needs following discharge   - Consider OT consult to assist with ADL evaluation and planning for discharge  - Provide patient education as appropriate  Outcome: Progressing  Goal: Maintains/Returns to pre admission functional  level  Description: INTERVENTIONS:  - Perform AM-PAC 6 Click Basic Mobility/ Daily Activity assessment daily.  - Set and communicate daily mobility goal to care team and patient/family/caregiver.   - Collaborate with rehabilitation services on mobility goals if consulted  - Perform Range of Motion 2 times a day.  - Reposition patient every 2 hours.  - Dangle patient 2 times a day  - Stand patient 2 times a day  - Ambulate patient 2 times a day  - Out of bed to chair 2 times a day   - Out of bed for meals 2 times a day  - Out of bed for toileting  - Record patient progress and toleration of activity level   Outcome: Progressing     Problem: DISCHARGE PLANNING  Goal: Discharge to home or other facility with appropriate resources  Description: INTERVENTIONS:  - Identify barriers to discharge w/patient and caregiver  - Arrange for needed discharge resources and transportation as appropriate  - Identify discharge learning needs (meds, wound care, etc.)  - Arrange for interpretive services to assist at discharge as needed  - Refer to Case Management Department for coordinating discharge planning if the patient needs post-hospital services based on physician/advanced practitioner order or complex needs related to functional status, cognitive ability, or social support system  Outcome: Progressing     Problem: Knowledge Deficit  Goal: Patient/family/caregiver demonstrates understanding of disease process, treatment plan, medications, and discharge instructions  Description: Complete learning assessment and assess knowledge base.  Interventions:  - Provide teaching at level of understanding  - Provide teaching via preferred learning methods  Outcome: Progressing     Problem: GASTROINTESTINAL - ADULT  Goal: Minimal or absence of nausea and/or vomiting  Description: INTERVENTIONS:  - Administer IV fluids if ordered to ensure adequate hydration  - Maintain NPO status until nausea and vomiting are resolved  - Nasogastric tube if  ordered  - Administer ordered antiemetic medications as needed  - Provide nonpharmacologic comfort measures as appropriate  - Advance diet as tolerated, if ordered  - Consider nutrition services referral to assist patient with adequate nutrition and appropriate food choices  Outcome: Progressing  Goal: Maintains adequate nutritional intake  Description: INTERVENTIONS:  - Monitor percentage of each meal consumed  - Identify factors contributing to decreased intake, treat as appropriate  - Assist with meals as needed  - Monitor I&O, weight, and lab values if indicated  - Obtain nutrition services referral as needed  Outcome: Progressing     Problem: Prexisting or High Potential for Compromised Skin Integrity  Goal: Skin integrity is maintained or improved  Description: INTERVENTIONS:  - Identify patients at risk for skin breakdown  - Assess and monitor skin integrity  - Assess and monitor nutrition and hydration status  - Monitor labs   - Assess for incontinence   - Turn and reposition patient  - Assist with mobility/ambulation  - Relieve pressure over bony prominences  - Avoid friction and shearing  - Provide appropriate hygiene as needed including keeping skin clean and dry  - Evaluate need for skin moisturizer/barrier cream  - Collaborate with interdisciplinary team   - Patient/family teaching  - Consider wound care consult   Outcome: Progressing     Problem: Nutrition/Hydration-ADULT  Goal: Nutrient/Hydration intake appropriate for improving, restoring or maintaining nutritional needs  Description: Monitor and assess patient's nutrition/hydration status for malnutrition. Collaborate with interdisciplinary team and initiate plan and interventions as ordered.  Monitor patient's weight and dietary intake as ordered or per policy. Utilize nutrition screening tool and intervene as necessary. Determine patient's food preferences and provide high-protein, high-caloric foods as appropriate.     INTERVENTIONS:  - Monitor  oral intake, urinary output, labs, and treatment plans  - Assess nutrition and hydration status and recommend course of action  - Evaluate amount of meals eaten  - Assist patient with eating if necessary   - Allow adequate time for meals  - Recommend/ encourage appropriate diets, oral nutritional supplements, and vitamin/mineral supplements  - Order, calculate, and assess calorie counts as needed  - Recommend, monitor, and adjust tube feedings and TPN/PPN based on assessed needs  - Assess need for intravenous fluids  - Provide specific nutrition/hydration education as appropriate  - Include patient/family/caregiver in decisions related to nutrition  Outcome: Progressing     Problem: SAFETY,RESTRAINT: NV/NON-SELF DESTRUCTIVE BEHAVIOR  Goal: Remains free of harm/injury (restraint for non violent/non self-detsructive behavior)  Description: INTERVENTIONS:  - Instruct patient/family regarding restraint use   - Assess and monitor physiologic and psychological status   - Provide interventions and comfort measures to meet assessed patient needs   - Identify and implement measures to help patient regain control  - Assess readiness for release of restraint   Outcome: Progressing  Goal: Returns to optimal restraint-free functioning  Description: INTERVENTIONS:  - Assess the patient's behavior and symptoms that indicate continued need for restraint  - Identify and implement measures to help patient regain control  - Assess readiness for release of restraint   Outcome: Progressing

## 2024-11-28 NOTE — PROGRESS NOTES
Progress Note - Gastroenterology   Name: Kaykay Holland 34 y.o. female I MRN: [id number]  Unit/Bed#: -01 I Date of Admission: 11/23/2024   Date of Service: 11/28/2024 I Hospital Day: 5     Assessment & Plan  Necrotizing pancreatitis  34F with hx/o ETOH and tobacco use disorder, hepatic steatosis, prior ETOH pancreatitis who is readmitted with pancreatitis and acute necrotic collection. This is patient's 5th admission for acute pancreatitis since October 2023. Symptoms improved with supportive care. Currently tolerating low-fat diet.    No barriers to discharge from GI standpoint  Continue low fat diet  Continue folic acid/thiamine supplementation  Discussed importance of absolute ETOH cessation  Will require close outpatient GI follow up   Alcohol withdrawal (HCC)  Acute withdrawal 11/25 requiring ICU placement, restraints and phenobarbital, lorazepam, Precedex  Continue to monitor for withdrawal, treatment per primary team  Alcohol use disorder  Endorsed 2-3 bottles of wine daily prior to admission  Encouraged continued alcohol abstinence and participation in alcohol rehabilitation program after discharge  Patient declines formal rehab program citing that she does not have time and needs to work  I did explain to the patient and her mother that ongoing alcohol use can lead to recurrent pancreatitis, infection, organ failure, even death  Hepatic steatosis  Noted on CT A/P, likely 2/2 chronic ETOH abuse  Will need outpatient GI follow up at discharge  Gastroesophageal reflux disease without esophagitis  Stable, currently on pantoprazole 40 mg twice daily  Resume omeprazole 20 mg daily upon discharge      Subjective : Patient seen and examined.  Mother present at bedside.  Patient states she is tolerating low-fat diet.  She did have some abdominal pain this morning which improved with medication.  No significant nausea or vomiting.  She states she has been passing gas.    Objective :  Temp:  [96.8 °F (36  °C)-98.4 °F (36.9 °C)] 96.8 °F (36 °C)  HR:  [81-89] 87  BP: (124-154)/() 144/96  Resp:  [16-18] 16  SpO2:  [96 %-99 %] 99 %  O2 Device: None (Room air)    Physical Exam  Vitals and nursing note reviewed.   Constitutional:       General: She is not in acute distress.     Appearance: She is well-developed.   HENT:      Head: Normocephalic and atraumatic.   Eyes:      Conjunctiva/sclera: Conjunctivae normal.   Cardiovascular:      Rate and Rhythm: Normal rate and regular rhythm.      Heart sounds: No murmur heard.  Pulmonary:      Effort: Pulmonary effort is normal. No respiratory distress.      Breath sounds: Normal breath sounds.   Abdominal:      Palpations: Abdomen is soft.      Tenderness: There is abdominal tenderness.      Comments: epigastric   Musculoskeletal:         General: No swelling.      Cervical back: Neck supple.   Skin:     General: Skin is warm and dry.   Neurological:      Mental Status: She is alert.   Psychiatric:         Mood and Affect: Mood normal.         Lab Results: I have reviewed the following results:CBC/BMP:   .     11/28/24  0333   WBC 4.76   HGB 11.5   HCT 35.2   *   SODIUM 134*   K 3.9      CO2 28   BUN 6   CREATININE 0.46*   GLUC 98   MG 1.5*   PHOS 3.9    , Creatinine Clearance: Estimated Creatinine Clearance: 149.4 mL/min (A) (by C-G formula based on SCr of 0.46 mg/dL (L))., LFTs: No new results in last 24 hours. , PTT/INR:No new results in last 24 hours.     Imaging Results Review: I reviewed radiology reports from this admission including: CT abdomen/pelvis.  Other Study Results Review: No additional pertinent studies reviewed.

## 2024-11-28 NOTE — PROGRESS NOTES
Progress Note - Hospitalist   Name: Kaykay Holland 34 y.o. female I MRN: [id number]  Unit/Bed#: -01 I Date of Admission: 11/23/2024   Date of Service: 11/28/2024 I Hospital Day: 5    Assessment & Plan  Necrotizing pancreatitis  History of alcohol abuse  Presented to the emergency department with epigastric pain, nausea, and vomiting  CT abdomen pelvis with evidence of acute pancreatitis along with pancreatic necrosis  GI consulted  Diet advanced to low-fat, IV fluids stopped reports improvement however still has difficulty tolerating diet and endorse abdominal pain    Plan:  Unable to de-escalate pain regimen patient continues to endorse tenderness and pain with decreased appetite  Serial abdominal exams  As needed pain medication  Encourage cessation of alcohol  Alcohol use disorder  Patient reports drinking 2-3 bottles of wine daily  Initially placed on CIWA protocol and received Librium and as needed Ativan  11/25 patient had worsening of withdrawal symptoms and required 4 point locked restraints and phenobarbital  Received 10 mg/kg phenobarbital load and started on Precedex gtt 11/25 PM    Plan:  Total phenobarb: 934mg   Continue thiamine and folic acid  ETOH cessation counseling ; refused alcohol counseling rehab  Tobacco abuse  Smokes 1 ppd  Nicotine patch ordered  Hepatic steatosis  Noted on CT imaging  Likely in setting of ongoing alcohol abuse  Electrolyte abnormality  Aggressive electrolyte repletion to maintain Mg greater than 2, Phos greater than 3, K greater than 4  Gastroesophageal reflux disease without esophagitis  Cont home protonix     VTE Pharmacologic Prophylaxis: VTE Score: 0  DVT prophylaxis for pancreatitis    Mobility:   Basic Mobility Inpatient Raw Score: 22  JH-HLM Goal: 7: Walk 25 feet or more  JH-HLM Achieved: 7: Walk 25 feet or more  JH-HLM Goal achieved. Continue to encourage appropriate mobility.    Patient Centered Rounds: I performed bedside rounds with nursing staff today.    Discussions with Specialists or Other Care Team Provider: GI    Education and Discussions with Family / Patient: Updated  (mother) at bedside.    Current Length of Stay: 5 day(s)  Current Patient Status: Inpatient   Certification Statement: The patient will continue to require additional inpatient hospital stay due to limited p.o. intake, abdominal pain and tenderness  Discharge Plan: Anticipate discharge tomorrow to home.    Code Status: Level 1 - Full Code    Subjective   Patient initially reported improvement of her abdominal pain and tolerating diet.  Later reported difficulty tolerating diet with decreased appetite, abdominal pain and tenderness.  Discussed wanting to stay another night for pain control and monitoring improvement especially given holiday and ability to  any pain medications.  Discussed she is stable for discharge as long as she is tolerating diet and pain is better controlled we will plan for discharge tomorrow patient is agreeable.  Patient refused alcohol rehab.    Objective :  Temp:  [96.8 °F (36 °C)-98.1 °F (36.7 °C)] 98.1 °F (36.7 °C)  HR:  [84-97] 97  BP: (120-147)/() 120/74  Resp:  [16] 16  SpO2:  [94 %-99 %] 94 %  O2 Device: None (Room air)    Body mass index is 19.97 kg/m².     Input and Output Summary (last 24 hours):     Intake/Output Summary (Last 24 hours) at 11/28/2024 1335  Last data filed at 11/28/2024 1306  Gross per 24 hour   Intake 530 ml   Output 200 ml   Net 330 ml       Physical Exam  Vitals and nursing note reviewed.   Constitutional:       General: She is not in acute distress.     Appearance: Normal appearance. She is well-developed. She is not ill-appearing or toxic-appearing.   HENT:      Head: Normocephalic and atraumatic.   Eyes:      Conjunctiva/sclera: Conjunctivae normal.   Cardiovascular:      Rate and Rhythm: Normal rate and regular rhythm.      Heart sounds: No murmur heard.  Pulmonary:      Effort: Pulmonary effort is normal. No  respiratory distress.      Breath sounds: Normal breath sounds. No wheezing or rales.   Abdominal:      General: There is no distension.      Palpations: Abdomen is soft. There is no mass.      Tenderness: There is abdominal tenderness. There is no guarding or rebound.   Musculoskeletal:      Cervical back: Neck supple.      Right lower leg: No edema.      Left lower leg: No edema.   Skin:     General: Skin is warm and dry.   Neurological:      Mental Status: She is alert and oriented to person, place, and time.   Psychiatric:         Mood and Affect: Mood normal.         Behavior: Behavior normal.           Lines/Drains:              Lab Results: I have reviewed the following results:   Results from last 7 days   Lab Units 11/28/24  0333   WBC Thousand/uL 4.76   HEMOGLOBIN g/dL 11.5   HEMATOCRIT % 35.2   PLATELETS Thousands/uL 136*   SEGS PCT % 55   LYMPHO PCT % 28   MONO PCT % 14*   EOS PCT % 3     Results from last 7 days   Lab Units 11/28/24  0333 11/27/24  0319 11/26/24  0526   SODIUM mmol/L 134*   < > 137   POTASSIUM mmol/L 3.9   < > 3.6   CHLORIDE mmol/L 100   < > 101   CO2 mmol/L 28   < > 30   BUN mg/dL 6   < > 3*   CREATININE mg/dL 0.46*   < > 0.38*   ANION GAP mmol/L 6   < > 6   CALCIUM mg/dL 8.6   < > 8.4   ALBUMIN g/dL  --   --  3.1*   TOTAL BILIRUBIN mg/dL  --   --  0.82   ALK PHOS U/L  --   --  61   ALT U/L  --   --  20   AST U/L  --   --  33   GLUCOSE RANDOM mg/dL 98   < > 83    < > = values in this interval not displayed.                 Results from last 7 days   Lab Units 11/23/24  1636   LACTIC ACID mmol/L 1.3       Recent Cultures (last 7 days):   Results from last 7 days   Lab Units 11/23/24  1335   URINE CULTURE  >100,000 cfu/ml       Imaging Results Review: I reviewed radiology reports from this admission including: CT abdomen/pelvis.  Other Study Results Review: EKG was reviewed.     Last 24 Hours Medication List:     Current Facility-Administered Medications:     acetaminophen (TYLENOL)  tablet 650 mg, Q6H RODRICK    diphenhydrAMINE (BENADRYL) tablet 25 mg, Q6H PRN    heparin (porcine) subcutaneous injection 5,000 Units, Q8H RODRICK    HYDROmorphone HCl (DILAUDID) injection 0.2 mg, Q2H PRN    melatonin tablet 6 mg, HS    nicotine (NICODERM CQ) 21 mg/24 hr TD 24 hr patch 21 mg, QPM    ondansetron (ZOFRAN) injection 4 mg, Q6H PRN    oxyCODONE (ROXICODONE) IR tablet 5 mg, Once    oxyCODONE (ROXICODONE) split tablet 2.5 mg, Q4H PRN **OR** oxyCODONE (ROXICODONE) IR tablet 5 mg, Q4H PRN    pantoprazole (PROTONIX) injection 40 mg, Q12H RODRICK    tamsulosin (FLOMAX) capsule 0.4 mg, Daily With Dinner    [COMPLETED] thiamine (VITAMIN B1) 500 mg in sodium chloride 0.9 % 50 mL IVPB, Q8H, Last Rate: Stopped (11/27/24 0820) **FOLLOWED BY** thiamine (VITAMIN B1) 250 mg in sodium chloride 0.9 % 50 mL IVPB, Q8H, Last Rate: 250 mg (11/28/24 0752) **FOLLOWED BY** [START ON 11/29/2024] thiamine (VITAMIN B1) 100 mg in sodium chloride 0.9 % 50 mL IVPB, Q8H    Administrative Statements   Today, Patient Was Seen By: Germán Elam, DO  I have spent a total time of 35 minutes in caring for this patient on the day of the visit/encounter including Diagnostic results, Prognosis, Risks and benefits of tx options, Instructions for management, Patient and family education, Importance of tx compliance, Counseling / Coordination of care, Reviewing / ordering tests, medicine, procedures  , Obtaining or reviewing history  , and Communicating with other healthcare professionals .    **Please Note: This note may have been constructed using a voice recognition system.**

## 2024-11-28 NOTE — PLAN OF CARE
Problem: Potential for Falls  Goal: Patient will remain free of falls  Description: INTERVENTIONS:  - Educate patient/family on patient safety including physical limitations  - Instruct patient to call for assistance with activity   - Consult OT/PT to assist with strengthening/mobility   - Keep Call bell within reach  - Keep bed low and locked with side rails adjusted as appropriate  - Keep care items and personal belongings within reach  - Initiate and maintain comfort rounds  - Make Fall Risk Sign visible to staff  - Offer Toileting every 2 Hours, in advance of need  - Initiate/Maintain shift alarm  - Obtain necessary fall risk management equipment: socks  - Apply yellow socks and bracelet for high fall risk patients  - Consider moving patient to room near nurses station  Outcome: Progressing     Problem: PAIN - ADULT  Goal: Verbalizes/displays adequate comfort level or baseline comfort level  Description: Interventions:  - Encourage patient to monitor pain and request assistance  - Assess pain using appropriate pain scale  - Administer analgesics based on type and severity of pain and evaluate response  - Implement non-pharmacological measures as appropriate and evaluate response  - Consider cultural and social influences on pain and pain management  - Notify physician/advanced practitioner if interventions unsuccessful or patient reports new pain  Outcome: Progressing     Problem: INFECTION - ADULT  Goal: Absence or prevention of progression during hospitalization  Description: INTERVENTIONS:  - Assess and monitor for signs and symptoms of infection  - Monitor lab/diagnostic results  - Monitor all insertion sites, i.e. indwelling lines, tubes, and drains  - Monitor endotracheal if appropriate and nasal secretions for changes in amount and color  - [place] appropriate cooling/warming therapies per order  - Administer medications as ordered  - Instruct and encourage patient and family to use good hand hygiene  technique  - Identify and instruct in appropriate isolation precautions for identified infection/condition  Outcome: Progressing  Goal: Absence of fever/infection during neutropenic period  Description: INTERVENTIONS:  - Monitor WBC    Outcome: Progressing     Problem: SAFETY ADULT  Goal: Patient will remain free of falls  Description: INTERVENTIONS:  - Educate patient/family on patient safety including physical limitations  - Instruct patient to call for assistance with activity   - Consult OT/PT to assist with strengthening/mobility   - Keep Call bell within reach  - Keep bed low and locked with side rails adjusted as appropriate  - Keep care items and personal belongings within reach  - Initiate and maintain comfort rounds  - Make Fall Risk Sign visible to staff  - Offer Toileting every 2 Hours, in advance of need  - Initiate/Maintain shift alarm  - Obtain necessary fall risk management equipment: socks  - Apply yellow socks and bracelet for high fall risk patients  - Consider moving patient to room near nurses station  Outcome: Progressing  Goal: Maintain or return to baseline ADL function  Description: INTERVENTIONS:  -  Assess patient's ability to carry out ADLs; assess patient's baseline for ADL function and identify physical deficits which impact ability to perform ADLs (bathing, care of mouth/teeth, toileting, grooming, dressing, etc.)  - Assess/evaluate cause of self-care deficits   - Assess range of motion  - Assess patient's mobility; develop plan if impaired  - Assess patient's need for assistive devices and provide as appropriate  - Encourage maximum independence but intervene and supervise when necessary  - Involve family in performance of ADLs  - Assess for home care needs following discharge   - Consider OT consult to assist with ADL evaluation and planning for discharge  - Provide patient education as appropriate  Outcome: Progressing  Goal: Maintains/Returns to pre admission functional  level  Description: INTERVENTIONS:  - Perform AM-PAC 6 Click Basic Mobility/ Daily Activity assessment daily.  - Set and communicate daily mobility goal to care team and patient/family/caregiver.   - Collaborate with rehabilitation services on mobility goals if consulted  - Perform Range of Motion 2 times a day.  - Reposition patient every 2 hours.  - Dangle patient 2 times a day  - Stand patient 2 times a day  - Ambulate patient 2 times a day  - Out of bed to chair 2 times a day   - Out of bed for meals 2 times a day  - Out of bed for toileting  - Record patient progress and toleration of activity level   Outcome: Progressing     Problem: DISCHARGE PLANNING  Goal: Discharge to home or other facility with appropriate resources  Description: INTERVENTIONS:  - Identify barriers to discharge w/patient and caregiver  - Arrange for needed discharge resources and transportation as appropriate  - Identify discharge learning needs (meds, wound care, etc.)  - Arrange for interpretive services to assist at discharge as needed  - Refer to Case Management Department for coordinating discharge planning if the patient needs post-hospital services based on physician/advanced practitioner order or complex needs related to functional status, cognitive ability, or social support system  Outcome: Progressing     Problem: Knowledge Deficit  Goal: Patient/family/caregiver demonstrates understanding of disease process, treatment plan, medications, and discharge instructions  Description: Complete learning assessment and assess knowledge base.  Interventions:  - Provide teaching at level of understanding  - Provide teaching via preferred learning methods  Outcome: Progressing     Problem: GASTROINTESTINAL - ADULT  Goal: Minimal or absence of nausea and/or vomiting  Description: INTERVENTIONS:  - Administer IV fluids if ordered to ensure adequate hydration  - Maintain NPO status until nausea and vomiting are resolved  - Nasogastric tube if  ordered  - Administer ordered antiemetic medications as needed  - Provide nonpharmacologic comfort measures as appropriate  - Advance diet as tolerated, if ordered  - Consider nutrition services referral to assist patient with adequate nutrition and appropriate food choices  Outcome: Progressing  Goal: Maintains adequate nutritional intake  Description: INTERVENTIONS:  - Monitor percentage of each meal consumed  - Identify factors contributing to decreased intake, treat as appropriate  - Assist with meals as needed  - Monitor I&O, weight, and lab values if indicated  - Obtain nutrition services referral as needed  Outcome: Progressing     Problem: Prexisting or High Potential for Compromised Skin Integrity  Goal: Skin integrity is maintained or improved  Description: INTERVENTIONS:  - Identify patients at risk for skin breakdown  - Assess and monitor skin integrity  - Assess and monitor nutrition and hydration status  - Monitor labs   - Assess for incontinence   - Turn and reposition patient  - Assist with mobility/ambulation  - Relieve pressure over bony prominences  - Avoid friction and shearing  - Provide appropriate hygiene as needed including keeping skin clean and dry  - Evaluate need for skin moisturizer/barrier cream  - Collaborate with interdisciplinary team   - Patient/family teaching  - Consider wound care consult   Outcome: Progressing     Problem: Nutrition/Hydration-ADULT  Goal: Nutrient/Hydration intake appropriate for improving, restoring or maintaining nutritional needs  Description: Monitor and assess patient's nutrition/hydration status for malnutrition. Collaborate with interdisciplinary team and initiate plan and interventions as ordered.  Monitor patient's weight and dietary intake as ordered or per policy. Utilize nutrition screening tool and intervene as necessary. Determine patient's food preferences and provide high-protein, high-caloric foods as appropriate.     INTERVENTIONS:  - Monitor  oral intake, urinary output, labs, and treatment plans  - Assess nutrition and hydration status and recommend course of action  - Evaluate amount of meals eaten  - Assist patient with eating if necessary   - Allow adequate time for meals  - Recommend/ encourage appropriate diets, oral nutritional supplements, and vitamin/mineral supplements  - Order, calculate, and assess calorie counts as needed  - Recommend, monitor, and adjust tube feedings and TPN/PPN based on assessed needs  - Assess need for intravenous fluids  - Provide specific nutrition/hydration education as appropriate  - Include patient/family/caregiver in decisions related to nutrition  Outcome: Progressing

## 2024-11-29 VITALS
BODY MASS INDEX: 19.99 KG/M2 | HEIGHT: 65 IN | RESPIRATION RATE: 16 BRPM | DIASTOLIC BLOOD PRESSURE: 84 MMHG | HEART RATE: 75 BPM | WEIGHT: 120 LBS | OXYGEN SATURATION: 97 % | TEMPERATURE: 97 F | SYSTOLIC BLOOD PRESSURE: 122 MMHG

## 2024-11-29 LAB
ANION GAP SERPL CALCULATED.3IONS-SCNC: 7 MMOL/L (ref 4–13)
BASOPHILS # BLD AUTO: 0.04 THOUSANDS/ΜL (ref 0–0.1)
BASOPHILS NFR BLD AUTO: 1 % (ref 0–1)
BUN SERPL-MCNC: 7 MG/DL (ref 5–25)
CALCIUM SERPL-MCNC: 8.5 MG/DL (ref 8.4–10.2)
CHLORIDE SERPL-SCNC: 101 MMOL/L (ref 96–108)
CO2 SERPL-SCNC: 27 MMOL/L (ref 21–32)
CREAT SERPL-MCNC: 0.44 MG/DL (ref 0.6–1.3)
EOSINOPHIL # BLD AUTO: 0.18 THOUSAND/ΜL (ref 0–0.61)
EOSINOPHIL NFR BLD AUTO: 3 % (ref 0–6)
ERYTHROCYTE [DISTWIDTH] IN BLOOD BY AUTOMATED COUNT: 11.9 % (ref 11.6–15.1)
GFR SERPL CREATININE-BSD FRML MDRD: 133 ML/MIN/1.73SQ M
GLUCOSE SERPL-MCNC: 87 MG/DL (ref 65–140)
HCT VFR BLD AUTO: 35.4 % (ref 34.8–46.1)
HGB BLD-MCNC: 11.5 G/DL (ref 11.5–15.4)
IMM GRANULOCYTES # BLD AUTO: 0.01 THOUSAND/UL (ref 0–0.2)
IMM GRANULOCYTES NFR BLD AUTO: 0 % (ref 0–2)
LYMPHOCYTES # BLD AUTO: 1.61 THOUSANDS/ΜL (ref 0.6–4.47)
LYMPHOCYTES NFR BLD AUTO: 27 % (ref 14–44)
MAGNESIUM SERPL-MCNC: 1.5 MG/DL (ref 1.9–2.7)
MCH RBC QN AUTO: 35.2 PG (ref 26.8–34.3)
MCHC RBC AUTO-ENTMCNC: 32.5 G/DL (ref 31.4–37.4)
MCV RBC AUTO: 108 FL (ref 82–98)
MONOCYTES # BLD AUTO: 1.01 THOUSAND/ΜL (ref 0.17–1.22)
MONOCYTES NFR BLD AUTO: 17 % (ref 4–12)
NEUTROPHILS # BLD AUTO: 3.17 THOUSANDS/ΜL (ref 1.85–7.62)
NEUTS SEG NFR BLD AUTO: 52 % (ref 43–75)
NRBC BLD AUTO-RTO: 0 /100 WBCS
PLATELET # BLD AUTO: 143 THOUSANDS/UL (ref 149–390)
PMV BLD AUTO: 12.1 FL (ref 8.9–12.7)
POTASSIUM SERPL-SCNC: 3.9 MMOL/L (ref 3.5–5.3)
RBC # BLD AUTO: 3.27 MILLION/UL (ref 3.81–5.12)
SODIUM SERPL-SCNC: 135 MMOL/L (ref 135–147)
WBC # BLD AUTO: 6.02 THOUSAND/UL (ref 4.31–10.16)

## 2024-11-29 PROCEDURE — 99232 SBSQ HOSP IP/OBS MODERATE 35: CPT | Performed by: PHYSICIAN ASSISTANT

## 2024-11-29 PROCEDURE — 85025 COMPLETE CBC W/AUTO DIFF WBC: CPT

## 2024-11-29 PROCEDURE — 80048 BASIC METABOLIC PNL TOTAL CA: CPT

## 2024-11-29 PROCEDURE — 83735 ASSAY OF MAGNESIUM: CPT

## 2024-11-29 RX ORDER — MAGNESIUM SULFATE HEPTAHYDRATE 40 MG/ML
2 INJECTION, SOLUTION INTRAVENOUS ONCE
Status: COMPLETED | OUTPATIENT
Start: 2024-11-29 | End: 2024-11-29

## 2024-11-29 RX ADMIN — PANTOPRAZOLE SODIUM 40 MG: 40 INJECTION, POWDER, FOR SOLUTION INTRAVENOUS at 09:36

## 2024-11-29 RX ADMIN — ACETAMINOPHEN 650 MG: 325 TABLET, FILM COATED ORAL at 13:15

## 2024-11-29 RX ADMIN — OXYCODONE HYDROCHLORIDE 5 MG: 5 TABLET ORAL at 07:07

## 2024-11-29 RX ADMIN — HEPARIN SODIUM 5000 UNITS: 5000 INJECTION, SOLUTION INTRAVENOUS; SUBCUTANEOUS at 13:16

## 2024-11-29 RX ADMIN — MAGNESIUM SULFATE HEPTAHYDRATE 2 G: 2 INJECTION, SOLUTION INTRAVENOUS at 10:37

## 2024-11-29 RX ADMIN — THIAMINE HYDROCHLORIDE 250 MG: 100 INJECTION, SOLUTION INTRAMUSCULAR; INTRAVENOUS at 00:07

## 2024-11-29 RX ADMIN — ACETAMINOPHEN 650 MG: 325 TABLET, FILM COATED ORAL at 00:07

## 2024-11-29 RX ADMIN — OXYCODONE HYDROCHLORIDE 5 MG: 5 TABLET ORAL at 13:23

## 2024-11-29 RX ADMIN — HEPARIN SODIUM 5000 UNITS: 5000 INJECTION, SOLUTION INTRAVENOUS; SUBCUTANEOUS at 05:35

## 2024-11-29 RX ADMIN — THIAMINE HYDROCHLORIDE 250 MG: 100 INJECTION, SOLUTION INTRAMUSCULAR; INTRAVENOUS at 09:35

## 2024-11-29 RX ADMIN — ACETAMINOPHEN 650 MG: 325 TABLET, FILM COATED ORAL at 07:07

## 2024-11-29 NOTE — PROGRESS NOTES
Progress Note - Surgery-General   Name: Kaykay Holland 34 y.o. female I MRN: [id number]  Unit/Bed#: -01 I Date of Admission: 11/23/2024   Date of Service: 11/29/2024 I Hospital Day: 6     Assessment & Plan  Necrotizing pancreatitis  Pt is 35 y/o female with pmh for HTN, prolonged QT, tobacco use, ETOH abuse, prior h/o ETOH pancreatitis, PUD and seizures who presented to the ER with 2 day h/o intractable N/V and abdominal pain.     Imaging this admission:  CT: Ongoing acute pancreatitis, now with findings of pancreatic necrosis and acute necrotic collection as described.     AVSS, 1 episode tachycardia last night, now improved  WBC 6.02  Mg 1.5  Hgb stable  Lipase 160(11/26)  Tbil  0.82 (11/26)    Plan:  Continue medical management; no surgical plans  IVF  Low fat diet as tolerated  Pain control  Abstain from alcohol  Care per primary team and GI    General surgery will sign off at this time. Please reach out as needed.   Tobacco abuse  -Nicotine patch   Hepatic steatosis  -H/o ETOH abuse  Alcohol use disorder  -CIWA protocol  Electrolyte abnormality  -Electrolyte repletion to maintain Mg greater than 2, Phos greater than 3, K greater than 4   Gastroesophageal reflux disease without esophagitis  -Protonix    Subjective   Feeling ok this morning. Still with abdominal pain that comes and goes throughout the day. Last 1-2 hours then improves. About the same but with mild improvement since she came in. Analgesia helps. She is able to sleep through the night mostly, but at times if the pain starts it  can wake her up. + N, no vomiting. Tolerating low fat diet. Voiding. Having Bms.     Objective :  Temp:  [97 °F (36.1 °C)-98.7 °F (37.1 °C)] 97 °F (36.1 °C)  HR:  [] 75  BP: (116-122)/(74-84) 122/84  SpO2:  [94 %-99 %] 97 %  O2 Device: None (Room air)    I/O         11/27 0701 11/28 0700 11/28 0701 11/29 0700 11/29 0701 11/30 0700    P.O. 0 600     I.V. (mL/kg) 2553.3 (46.9)      IV Piggyback 100      Total  Intake(mL/kg) 2653.3 (48.8) 600 (11)     Urine (mL/kg/hr) 500 (0.4)      Stool 0      Total Output 500      Net +2153.3 +600            Unmeasured Urine Occurrence  2 x     Unmeasured Stool Occurrence 1 x              Physical Exam  Vitals reviewed.   Constitutional:       General: She is not in acute distress.  Cardiovascular:      Rate and Rhythm: Normal rate.   Pulmonary:      Effort: Pulmonary effort is normal.      Breath sounds: Normal breath sounds.   Abdominal:      General: Bowel sounds are normal. There is no distension.      Palpations: Abdomen is soft.      Tenderness: There is abdominal tenderness (epigastric and left upper quadrant). There is no guarding or rebound.   Musculoskeletal:         General: No tenderness.      Right lower leg: No edema.      Left lower leg: No edema.   Skin:     General: Skin is warm and dry.   Neurological:      Mental Status: She is alert.           Lab Results: I have reviewed the following results:  Recent Labs     11/27/24  0438 11/27/24  0438 11/28/24  0333 11/29/24  0321   WBC  --    < > 4.76 6.02   HGB  --    < > 11.5 11.5   HCT  --    < > 35.2 35.4   PLT  --    < > 136* 143*   SODIUM 135  --  134* 135   K 3.4*  --  3.9 3.9     --  100 101   CO2 26  --  28 27   BUN 3*  --  6 7   CREATININE 0.41*  --  0.46* 0.44*   GLUC 89  --  98 87   CAIONIZED 1.13  --   --   --    MG 1.4*  --  1.5* 1.5*   PHOS  --   --  3.9  --     < > = values in this interval not displayed.     Tisha Serrano  11/29/2024  8:51 AM

## 2024-11-29 NOTE — DISCHARGE SUMMARY
Discharge Summary - Hospitalist   Name: Kaykay Holland 34 y.o. female I MRN: [id number]  Unit/Bed#: -01 I Date of Admission: 11/23/2024   Date of Service: 11/29/2024 I Hospital Day: 6     Assessment & Plan  Necrotizing pancreatitis  History of alcohol abuse  Presented to the emergency department with epigastric pain, nausea, and vomiting  CT abdomen pelvis with evidence of acute pancreatitis along with pancreatic necrosis  GI consulted and cleared patient for discharge  Diet advanced to low-fat, and patient tolerated it before dishcharge  Plan:  As needed pain medication  Encourage cessation of alcohol  Alcohol use disorder  Patient reports drinking 2-3 bottles of wine daily  Initially placed on CIWA protocol and received Librium and as needed Ativan  11/25 patient had worsening of withdrawal symptoms and required 4 point locked restraints and phenobarbital  Received 10 mg/kg phenobarbital load and started on Precedex gtt 11/25 PM    Plan:  Total phenobarb: 934mg   Continue thiamine and folic acid  ETOH cessation counseling ; refused alcohol counseling rehab  Tobacco abuse  Smokes 1 ppd  Nicotine patch ordered  Hepatic steatosis  Noted on CT imaging  Likely in setting of ongoing alcohol abuse  Electrolyte abnormality  Aggressive electrolyte repletion to maintain Mg greater than 2,   Provided with another 2mg Mg infusion today  Phos greater than 3, K greater than 4  Gastroesophageal reflux disease without esophagitis  Cont home protonix   Alcohol withdrawal (HCC)  Encourage alcohol abstinence     Medical Problems       Resolved Problems  Date Reviewed: 11/26/2024          Resolved    Metabolic acidosis 11/24/2024     Resolved by  Jordana Lewis PA-C        Discharging Physician / Practitioner: Amira Ruiz MD  PCP: PATRICIA Moran  Admission Date:   Admission Orders (From admission, onward)       Ordered        11/23/24 1503  INPATIENT ADMISSION  Once                          Discharge Date:  24    Consultations During Hospital Stay:  GI  General surgery    Procedures Performed:   None    Significant Findings / Test Results:   CT abdomen: Ongoing acute pancreatitis, now with findings of pancreatic necrosis and acute necrotic collection as described.  Hepatic steatosis.    Incidental Findings:   M.0    Test Results Pending at Discharge (will require follow up):   None     Outpatient Tests Requested:  None    Complications:  None    Reason for Admission: abdominal pain    Hospital Course:   Kaykay Holland is a 34 y.o. female with PMH of alcohol use disorder, prior pancreatitis, tobacco use, hepatic steatosis who presents with abdominal pain.     Patient presented to the emergency department with recurrent epigastric pain, nausea and bilious vomiting.  Of note patient has had several occurrences of acute pancreatitis related to alcohol use disorder.  Patient currently reports she is drinking intermittently several glasses of wine, last drink earlier this week.  Does report remote history of alcohol withdrawal seizures.  Currently denies chest pain/palpitations, constipation, diarrhea, fever/chills.   On admission, CT abdomen was done and revealed ongoing acute pancreatitis. Patient was treated conservatively with pain management, bowel rest and IV fluids. She was seen by both GI and General surgery and is now cleared for discharge once she tolerated low fat diet. I replaced her magnesium IV and encouraged alcohol cessation.          Please see above list of diagnoses and related plan for additional information.     Condition at Discharge: stable    Discharge Day Visit / Exam:   Subjective:  Kaykay was seen and examined. She is doing well today, but reports being tired. She was able to eat breakfast and lunch with only mild nausea. No abdominal pain reported.   Vitals: Blood Pressure: 122/84 (24 0731)  Pulse: 75 (24 0731)  Temperature: (!) 97 °F (36.1 °C) (24 2259)  Temp  "Source: Temporal (11/28/24 2300)  Respirations: 16 (11/27/24 2005)  Height: 5' 5\" (165.1 cm) (11/23/24 1810)  Weight - Scale: 54.4 kg (120 lb) (11/23/24 1810)  SpO2: 97 % (11/29/24 0731)  Physical Exam  Vitals and nursing note reviewed.   Constitutional:       General: She is not in acute distress.     Appearance: She is well-developed.   HENT:      Head: Normocephalic and atraumatic.   Cardiovascular:      Rate and Rhythm: Normal rate and regular rhythm.   Pulmonary:      Effort: Pulmonary effort is normal. No respiratory distress.      Breath sounds: Normal breath sounds.   Abdominal:      Palpations: Abdomen is soft.      Tenderness: There is abdominal tenderness.   Musculoskeletal:         General: No swelling.      Cervical back: Neck supple.   Skin:     General: Skin is warm and dry.   Neurological:      Mental Status: She is alert.   Psychiatric:         Mood and Affect: Mood normal.          Discussion with Family: Patient declined call to .     Discharge instructions/Information to patient and family:   See after visit summary for information provided to patient and family.      Provisions for Follow-Up Care:  See after visit summary for information related to follow-up care and any pertinent home health orders.      Mobility at time of Discharge:   Basic Mobility Inpatient Raw Score: 22  JH-HLM Goal: 7: Walk 25 feet or more  JH-HLM Achieved: 7: Walk 25 feet or more  HLM Goal achieved. Continue to encourage appropriate mobility.     Disposition:   Home    Planned Readmission: No    Discharge Medications:  See after visit summary for reconciled discharge medications provided to patient and/or family.      Administrative Statements   Discharge Statement:  I have spent a total time of 35 minutes in caring for this patient on the day of the visit/encounter. .    **Please Note: This note may have been constructed using a voice recognition system**  "

## 2024-11-29 NOTE — UTILIZATION REVIEW
Continued Stay Review    Date:   11/29                          Current Patient Class: Inpatient  Current Level of Care:  med surg    HPI:34 y.o. female initially admitted on 11-23-24 for pancreatitis and acute necrotic collection on CT.  Acute withdrawal 11/25 requiring ICU placement, restraints and phenobarbital, lorazepam, Precedex           Assessment/Plan:   11/29    Continue  IV  protonix/MVI.  Continue pain  control as needed.   Tolerating lo fat diet.   Still with intermittent abdominal pain, lasts   1-2  hours  then improves.   Pain  mildly  improved since admission.   Meds  help.  +  nausea   - vomiting     +  BM    Replace electrolytes as needed.   Refuses  alcohol rehab.    Consider  d/c  later this  pm.    Medications:   Scheduled Medications:  acetaminophen, 650 mg, Oral, Q6H RODRICK  heparin (porcine), 5,000 Units, Subcutaneous, Q8H RODRICK  melatonin, 6 mg, Oral, HS  nicotine, 21 mg, Transdermal, QPM  pantoprazole, 40 mg, Intravenous, Q12H RODRICK  tamsulosin, 0.4 mg, Oral, Daily With Dinner  thiamine, 100 mg, Intravenous, Q8H      Continuous IV Infusions:  IVF  - d/c   11/27     PRN Meds:  diphenhydrAMINE, 25 mg, Oral, Q6H PRN  HYDROmorphone, 0.2 mg, Intravenous, Q2H PRN  ondansetron, 4 mg, Intravenous, Q6H PRN  oxyCODONE, 2.5 mg, Oral, Q4H PRN   Or  oxyCODONE, 5 mg, Oral, Q4H PRN  ( x2  11/29 thus far)        Discharge Plan:   TBD    Vital Signs (last 3 days)       Date/Time Temp Pulse Resp BP MAP (mmHg) SpO2 O2 Device Patient Position - Orthostatic VS [place] Coma Scale Score CIWA-Ar Total Pain    11/29/24 1315 -- -- -- -- -- -- -- -- -- -- 7    11/29/24 0900 -- -- -- -- -- -- -- -- 15 -- --    11/29/24 07:31:47 -- 75 -- 122/84 97 97 % -- Lying -- -- --    11/29/24 0707 -- -- -- -- -- -- -- -- -- -- 9    11/29/24 0400 -- -- -- -- -- -- -- -- -- 0 --    11/29/24 0007 -- -- -- -- -- -- -- -- 15 -- 5    11/29/24 0000 -- -- -- -- -- -- -- -- -- 0 --    11/28/24 2300 -- -- -- -- -- -- None (Room air) Lying -- --  --    11/28/24 22:59:45 97 °F (36.1 °C) 88 -- 116/81 93 98 % -- -- -- -- --    11/28/24 22:59:13 97 °F (36.1 °C) 85 -- 116/81 93 99 % -- -- -- -- --    11/28/24 2150 -- -- -- -- -- -- -- -- -- -- 7 11/28/24 2000 -- -- -- -- -- -- -- -- -- 0 --    11/28/24 18:50:01 -- 106 -- 118/82 94 97 % -- -- -- -- --    11/28/24 1849 -- -- -- -- -- -- -- -- -- -- 7 11/28/24 1743 -- -- -- -- -- -- -- -- -- -- 8 11/28/24 14:45:03 -- 80 -- 120/83 95 95 % -- Lying -- 0 --    11/28/24 1336 98.7 °F (37.1 °C) -- -- -- -- -- -- -- -- -- 9 11/28/24 1151 -- -- -- -- -- -- -- -- -- -- 7 11/28/24 11:12:52 98.1 °F (36.7 °C) 97 -- 120/74 89 94 % -- -- -- 0 --    11/28/24 0948 -- -- -- -- -- 99 % None (Room air) -- 15 -- 7 11/28/24 07:50:41 96.8 °F (36 °C) 87 -- 144/96 112 99 % -- Lying -- 0 --    11/28/24 0633 -- -- -- -- -- -- -- -- -- -- 8 11/28/24 0400 -- -- -- -- -- -- -- -- -- 0 --    11/28/24 0000 -- -- -- -- -- -- -- -- -- 2 --    11/27/24 2320 -- 84 -- 143/100 114 -- -- -- -- -- --    11/27/24 2213 -- -- -- -- -- -- -- -- 15 -- 7 11/27/24 20:05:17 -- 88 16 124/93 103 96 % None (Room air) Lying -- 2 --    11/27/24 1820 -- -- -- -- -- -- -- -- -- -- 7 11/27/24 18:17:26 97.3 °F (36.3 °C) 89 16 147/102 117 99 % -- -- -- -- --    11/27/24 1600 -- -- -- -- -- -- -- -- -- 1 --    11/27/24 1347 -- -- -- -- -- -- -- -- -- -- 8    11/27/24 1200 -- -- -- -- -- -- -- -- 15 -- --    11/27/24 1132 98.4 °F (36.9 °C) 81 18 154/96 117 96 % None (Room air) Lying -- -- --    11/27/24 1100 -- -- -- -- -- -- -- -- -- 5 --    11/27/24 0800 -- 83 35 136/94 110 99 % -- -- 15 -- --    11/27/24 0728 -- -- -- -- -- -- -- -- -- 1 --    11/27/24 0727 98.4 °F (36.9 °C) -- -- 129/86 103 -- None (Room air) Sitting -- -- 7 11/27/24 0500 -- -- -- -- -- -- -- -- -- 4 --    11/27/24 0343 98.6 °F (37 °C) -- -- -- -- -- -- -- -- -- --    11/27/24 0315 -- -- -- -- -- -- -- -- -- -- 10 - Worst Possible Pain    11/27/24 0310 -- 78 20 150/93 115  97 % -- -- -- -- --    11/27/24 0258 -- -- -- -- -- -- -- -- 15 -- --    11/27/24 0254 -- -- -- -- -- -- -- -- -- 3 --    11/27/24 0100 -- -- -- -- -- -- -- -- -- 4 1    11/26/24 2330 -- -- -- -- -- -- -- -- 15 -- --    11/26/24 2311 99 °F (37.2 °C) 113 41 138/91 110 91 % None (Room air) Lying -- -- --    11/26/24 2300 -- 90 -- -- -- -- -- -- -- 4 --    11/26/24 2100 -- 99 -- -- -- -- -- -- -- 3 --    11/26/24 2018 -- -- -- -- -- -- -- -- -- -- 10 - Worst Possible Pain    11/26/24 2000 -- 96 -- 139/90 -- -- -- -- -- -- --    11/26/24 1930 -- -- -- -- -- -- -- -- 15 -- --    11/26/24 1916 100.1 °F (37.8 °C) -- -- -- -- -- -- -- -- -- --    11/26/24 1900 -- 92 23 131/83 102 97 % -- -- -- 1 --    11/26/24 1800 -- 102 22 123/76 95 97 % -- -- -- -- --    11/26/24 1725 -- 109 -- 120/80 -- -- -- -- -- 16 --    11/26/24 1700 -- 110 26 120/80 96 96 % -- -- -- -- --    11/26/24 1600 -- 108 20 128/86 103 95 % -- -- 15 0 --    11/26/24 1521 -- -- -- -- -- -- -- -- -- -- 8    11/26/24 1500 98.2 °F (36.8 °C) 108 20 130/82 101 97 % None (Room air) Lying -- -- 8    11/26/24 1400 -- 99 22 132/86 103 97 % -- -- -- -- --    11/26/24 1300 -- 109 26 132/88 107 97 % -- -- -- -- --    11/26/24 1200 -- 80 15 120/78 96 96 % -- -- 15 0 --    11/26/24 1100 -- 79 14 116/72 90 95 % -- -- -- -- --    11/26/24 1044 97.9 °F (36.6 °C) 80 15 115/70 87 94 % None (Room air) Lying -- -- --    11/26/24 1016 -- -- -- -- -- -- -- -- -- -- 7    11/26/24 1000 -- 81 21 156/94 118 96 % -- -- -- -- --    11/26/24 0900 -- 84 18 149/96 118 97 % -- -- -- -- --    11/26/24 0800 -- 72 21 130/89 105 97 % -- -- 14 18 No Pain    11/26/24 0700 -- 63 17 132/81 101 96 % -- -- -- -- --    11/26/24 0600 -- 67 23 148/99 119 100 % -- -- -- -- --    11/26/24 0500 -- 62 18 145/93 112 96 % -- -- -- -- --    11/26/24 0400 -- 63 19 152/96 117 97 % None (Room air) Lying 14 -- No Pain    11/26/24 0356 97.6 °F (36.4 °C) -- -- -- -- -- -- -- -- -- --    11/26/24 0300 -- 69 20 146/81  105 95 % -- -- -- -- --    11/26/24 0200 -- 68 20 135/86 106 96 % -- -- -- -- --    11/26/24 0100 -- 98 20 149/87 112 97 % -- -- -- -- --    11/26/24 0000 -- 75 21 133/81 100 94 % -- -- 14 -- No Pain               CIWA-Ar Score       Row Name 11/29/24 0400 11/29/24 0000 11/28/24 2000       CIWA-Ar    Nausea and Vomiting 0 0 0    Tactile Disturbances 0 0 0    Tremor 0 0 0    Auditory Disturbances 0 0 0    Paroxysmal Sweats 0 0 0    Visual Disturbances 0 0 0    Anxiety 0 0 0    Headache, Fullness in Head 0 0 0    Agitation 0 0 0    Orientation and Clouding of Sensorium 0 0 0    CIWA-Ar Total 0 0 0      Row Name 11/28/24 14:45:03 11/28/24 11:12:52 11/28/24 07:50:41       CIWA-Ar    Nausea and Vomiting 0 0 0    Tactile Disturbances 0 0 0    Tremor 0 0 0    Auditory Disturbances 0 0 0    Paroxysmal Sweats 0 0 0    Visual Disturbances 0 0 0    Anxiety 0 0 0    Headache, Fullness in Head 0 0 0    Agitation 0 0 0    Orientation and Clouding of Sensorium 0 0 0    CIWA-Ar Total 0 0 0      Row Name 11/28/24 0400 11/28/24 0000 11/27/24 2320       CIWA-Ar    BP -- -- 143/100    Pulse -- -- 84    Nausea and Vomiting 0 0 --    Tactile Disturbances 0 0 --    Tremor 0 0 --    Auditory Disturbances 0 0 --    Paroxysmal Sweats 0 0 --    Visual Disturbances 0 0 --    Anxiety 0 2 --    Headache, Fullness in Head 0 0 --    Agitation 0 0 --    Orientation and Clouding of Sensorium 0 0 --    CIWA-Ar Total 0 2 --      Row Name 11/27/24 20:05:17 11/27/24 1600 11/27/24 1100       CIWA-Ar    Nausea and Vomiting 0 0 0    Tactile Disturbances 0 0 0    Tremor 0 0 0    Auditory Disturbances 0 0 0    Paroxysmal Sweats 0 0 0    Visual Disturbances 0 0 0    Anxiety 2 1 3    Headache, Fullness in Head 0 0 0    Agitation 0 0 2    Orientation and Clouding of Sensorium 0 0 0    CIWA-Ar Total 2 1 5      Row Name 11/27/24 0728 11/27/24 0500 11/27/24 0254       CIWA-Ar    Nausea and Vomiting 0 0 0    Tactile Disturbances 0 0 0    Tremor 0 2 3    Auditory  Disturbances 0 0 0    Paroxysmal Sweats 0 0 0    Visual Disturbances 0 0 0    Anxiety 1 1 0    Headache, Fullness in Head 0 0 0    Agitation 0 1 0    Orientation and Clouding of Sensorium 0 0 0    CIWA-Ar Total 1 4 3      Row Name 11/27/24 0100 11/26/24 2300 11/26/24 2100       CIWA-Ar    Pulse -- 90 99    Nausea and Vomiting 0 1 1    Tactile Disturbances 0 0 0    Tremor 3 3 2    Auditory Disturbances 0 0 0    Paroxysmal Sweats 0 0 0    Visual Disturbances 0 0 0    Anxiety 1 0 0    Headache, Fullness in Head 0 0 0    Agitation 0 0 0    Orientation and Clouding of Sensorium 0 0 0    CIWA-Ar Total 4 4 --      Row Name 11/26/24 2000 11/26/24 1900 11/26/24 1725       CIWA-Ar    /90 -- 120/80    Pulse 96 -- 109    Nausea and Vomiting -- 0 0    Tactile Disturbances -- 0 0    Tremor -- 0 5    Auditory Disturbances -- 0 2    Paroxysmal Sweats -- 0 0    Visual Disturbances -- 0 0    Anxiety -- 1 5    Headache, Fullness in Head -- 0 0    Agitation -- 0 2    Orientation and Clouding of Sensorium -- 0 2    CIWA-Ar Total -- 1 16      Row Name 11/26/24 1600             CIWA-Ar    Nausea and Vomiting 0      Tactile Disturbances 0      Tremor 0      Auditory Disturbances 0      Paroxysmal Sweats 0      Visual Disturbances 0      Anxiety 0      Headache, Fullness in Head 0      Agitation 0      Orientation and Clouding of Sensorium 0      CIWA-Ar Total 0                    Pertinent Labs/Diagnostic Results:   Radiology:  CT Abdomen pelvis with contrast   Final Interpretation by Reymundo Moura MD (11/23 4182)      Ongoing acute pancreatitis, now with findings of pancreatic necrosis and acute necrotic collection as described.      Hepatic steatosis.      The study was marked in EPIC for immediate notification.      Workstation performed: [id number]           Cardiology:  ECG 12 lead   Final Result by Puneet Tan MD (11/23 1602)   Normal sinus rhythm   Nonspecific ST and T wave abnormality   Abnormal ECG   When  compared with ECG of 07-Nov-2024 10:32,   Questionable change in QRS axis   Nonspecific T wave abnormality now evident in Anterolateral leads   Confirmed by Puneet Tan ([id number]) on 11/23/2024 3:52:21 PM              Results from last 7 days   Lab Units 11/29/24  0321 11/28/24  0333 11/26/24  0526 11/25/24  0601 11/24/24  0255   WBC Thousand/uL 6.02 4.76 3.53* 4.54 4.60   HEMOGLOBIN g/dL 11.5 11.5 11.3* 11.8 10.5*   HEMATOCRIT % 35.4 35.2 33.8* 35.9 32.2*   PLATELETS Thousands/uL 143* 136* 129* 133* 120*   TOTAL NEUT ABS Thousands/µL 3.17 2.57 2.04 2.34 2.59         Results from last 7 days   Lab Units 11/29/24 0321 11/28/24  0333 11/27/24  0438 11/27/24  0319 11/26/24  0526   SODIUM mmol/L 135 134* 135 137 137   POTASSIUM mmol/L 3.9 3.9 3.4* 2.7* 3.6   CHLORIDE mmol/L 101 100 101 113* 101   CO2 mmol/L 27 28 26 20* 30   ANION GAP mmol/L 7 6 8 4 6   BUN mg/dL 7 6 3* 3* 3*   CREATININE mg/dL 0.44* 0.46* 0.41* 0.28* 0.38*   EGFR ml/min/1.73sq m 133 131 136 154 139   CALCIUM mg/dL 8.5 8.6 8.1* 5.6* 8.4   CALCIUM, IONIZED mmol/L  --   --  1.13  --   --    MAGNESIUM mg/dL 1.5* 1.5* 1.4* 1.0* 1.4*   PHOSPHORUS mg/dL  --  3.9  --   --   --      Results from last 7 days   Lab Units 11/26/24  0526 11/25/24  0405 11/24/24  0255 11/23/24  1135   AST U/L 33 30 40* 84*   ALT U/L 20 22 28 48   ALK PHOS U/L 61 55 62 90   TOTAL PROTEIN g/dL 5.5* 5.3* 5.4* 7.3   ALBUMIN g/dL 3.1* 3.0* 3.1* 4.1   TOTAL BILIRUBIN mg/dL 0.82 1.19* 1.62* 1.58*         Results from last 7 days   Lab Units 11/29/24  0321 11/28/24  0333 11/27/24  0438 11/27/24  0319 11/26/24  0526 11/25/24  0405 11/24/24  0255 11/23/24  1636 11/23/24  1135   GLUCOSE RANDOM mg/dL 87 98 89 66 83 77 66 99 92                   Results from last 7 days   Lab Units 11/26/24  0526 11/25/24  0405 11/24/24  0255   LIPASE u/L 160* 251* 479*                         Network Utilization Review Department  ATTENTION: Please call with any questions or concerns to [phone] and  carefully listen to the prompts so that you are directed to the right person. All voicemails are confidential.   For Discharge needs, contact Care Management DC Support Team at [phone] opt. [phone]  Send all requests for admission clinical reviews, approved or denied determinations and any other requests to dedicated fax number below belonging to the campus where the patient is receiving treatment. List of dedicated fax numbers for the Facilities:  FACILITY NAME UR FAX NUMBER   ADMISSION DENIALS (Administrative/Medical Necessity) [phone]   DISCHARGE SUPPORT TEAM (NETWORK) [phone]   PARENT CHILD HEALTH (Maternity/NICU/Pediatrics) [phone]   [facility] [phone]   [facility] [phone]   [facility] [phone]   [facility] [phone]   [facility] [phone]   [facility] [phone]   [facility] [phone]   [facility] [phone]   [facility] [phone]   [facility] [phone]   [facility] [phone]   [facility] [phone]

## 2024-11-29 NOTE — PLAN OF CARE
Problem: Potential for Falls  Goal: Patient will remain free of falls  Description: INTERVENTIONS:  - Educate patient/family on patient safety including physical limitations  - Instruct patient to call for assistance with activity   - Consult OT/PT to assist with strengthening/mobility   - Keep Call bell within reach  - Keep bed low and locked with side rails adjusted as appropriate  - Keep care items and personal belongings within reach  - Initiate and maintain comfort rounds  - Make Fall Risk Sign visible to staff  - Offer Toileting every 2 Hours, in advance of need  - Initiate/Maintain shift alarm  - Obtain necessary fall risk management equipment: socks  - Apply yellow socks and bracelet for high fall risk patients  - Consider moving patient to room near nurses station  Outcome: Progressing     Problem: PAIN - ADULT  Goal: Verbalizes/displays adequate comfort level or baseline comfort level  Description: Interventions:  - Encourage patient to monitor pain and request assistance  - Assess pain using appropriate pain scale  - Administer analgesics based on type and severity of pain and evaluate response  - Implement non-pharmacological measures as appropriate and evaluate response  - Consider cultural and social influences on pain and pain management  - Notify physician/advanced practitioner if interventions unsuccessful or patient reports new pain  Outcome: Progressing     Problem: INFECTION - ADULT  Goal: Absence or prevention of progression during hospitalization  Description: INTERVENTIONS:  - Assess and monitor for signs and symptoms of infection  - Monitor lab/diagnostic results  - Monitor all insertion sites, i.e. indwelling lines, tubes, and drains  - Monitor endotracheal if appropriate and nasal secretions for changes in amount and color  - [place] appropriate cooling/warming therapies per order  - Administer medications as ordered  - Instruct and encourage patient and family to use good hand hygiene  technique  - Identify and instruct in appropriate isolation precautions for identified infection/condition  Outcome: Progressing  Goal: Absence of fever/infection during neutropenic period  Description: INTERVENTIONS:  - Monitor WBC    Outcome: Progressing     Problem: SAFETY ADULT  Goal: Patient will remain free of falls  Description: INTERVENTIONS:  - Educate patient/family on patient safety including physical limitations  - Instruct patient to call for assistance with activity   - Consult OT/PT to assist with strengthening/mobility   - Keep Call bell within reach  - Keep bed low and locked with side rails adjusted as appropriate  - Keep care items and personal belongings within reach  - Initiate and maintain comfort rounds  - Make Fall Risk Sign visible to staff  - Offer Toileting every 2 Hours, in advance of need  - Initiate/Maintain shift alarm  - Obtain necessary fall risk management equipment: socks  - Apply yellow socks and bracelet for high fall risk patients  - Consider moving patient to room near nurses station  Outcome: Progressing  Goal: Maintain or return to baseline ADL function  Description: INTERVENTIONS:  -  Assess patient's ability to carry out ADLs; assess patient's baseline for ADL function and identify physical deficits which impact ability to perform ADLs (bathing, care of mouth/teeth, toileting, grooming, dressing, etc.)  - Assess/evaluate cause of self-care deficits   - Assess range of motion  - Assess patient's mobility; develop plan if impaired  - Assess patient's need for assistive devices and provide as appropriate  - Encourage maximum independence but intervene and supervise when necessary  - Involve family in performance of ADLs  - Assess for home care needs following discharge   - Consider OT consult to assist with ADL evaluation and planning for discharge  - Provide patient education as appropriate  Outcome: Progressing  Goal: Maintains/Returns to pre admission functional  level  Description: INTERVENTIONS:  - Perform AM-PAC 6 Click Basic Mobility/ Daily Activity assessment daily.  - Set and communicate daily mobility goal to care team and patient/family/caregiver.   - Collaborate with rehabilitation services on mobility goals if consulted  - Perform Range of Motion 2 times a day.  - Reposition patient every 2 hours.  - Dangle patient 2 times a day  - Stand patient 2 times a day  - Ambulate patient 2 times a day  - Out of bed to chair 2 times a day   - Out of bed for meals 2 times a day  - Out of bed for toileting  - Record patient progress and toleration of activity level   Outcome: Progressing     Problem: DISCHARGE PLANNING  Goal: Discharge to home or other facility with appropriate resources  Description: INTERVENTIONS:  - Identify barriers to discharge w/patient and caregiver  - Arrange for needed discharge resources and transportation as appropriate  - Identify discharge learning needs (meds, wound care, etc.)  - Arrange for interpretive services to assist at discharge as needed  - Refer to Case Management Department for coordinating discharge planning if the patient needs post-hospital services based on physician/advanced practitioner order or complex needs related to functional status, cognitive ability, or social support system  Outcome: Progressing     Problem: Knowledge Deficit  Goal: Patient/family/caregiver demonstrates understanding of disease process, treatment plan, medications, and discharge instructions  Description: Complete learning assessment and assess knowledge base.  Interventions:  - Provide teaching at level of understanding  - Provide teaching via preferred learning methods  Outcome: Progressing     Problem: GASTROINTESTINAL - ADULT  Goal: Minimal or absence of nausea and/or vomiting  Description: INTERVENTIONS:  - Administer IV fluids if ordered to ensure adequate hydration  - Maintain NPO status until nausea and vomiting are resolved  - Nasogastric tube if  ordered  - Administer ordered antiemetic medications as needed  - Provide nonpharmacologic comfort measures as appropriate  - Advance diet as tolerated, if ordered  - Consider nutrition services referral to assist patient with adequate nutrition and appropriate food choices  Outcome: Progressing  Goal: Maintains adequate nutritional intake  Description: INTERVENTIONS:  - Monitor percentage of each meal consumed  - Identify factors contributing to decreased intake, treat as appropriate  - Assist with meals as needed  - Monitor I&O, weight, and lab values if indicated  - Obtain nutrition services referral as needed  Outcome: Progressing     Problem: Prexisting or High Potential for Compromised Skin Integrity  Goal: Skin integrity is maintained or improved  Description: INTERVENTIONS:  - Identify patients at risk for skin breakdown  - Assess and monitor skin integrity  - Assess and monitor nutrition and hydration status  - Monitor labs   - Assess for incontinence   - Turn and reposition patient  - Assist with mobility/ambulation  - Relieve pressure over bony prominences  - Avoid friction and shearing  - Provide appropriate hygiene as needed including keeping skin clean and dry  - Evaluate need for skin moisturizer/barrier cream  - Collaborate with interdisciplinary team   - Patient/family teaching  - Consider wound care consult   Outcome: Progressing     Problem: Nutrition/Hydration-ADULT  Goal: Nutrient/Hydration intake appropriate for improving, restoring or maintaining nutritional needs  Description: Monitor and assess patient's nutrition/hydration status for malnutrition. Collaborate with interdisciplinary team and initiate plan and interventions as ordered.  Monitor patient's weight and dietary intake as ordered or per policy. Utilize nutrition screening tool and intervene as necessary. Determine patient's food preferences and provide high-protein, high-caloric foods as appropriate.     INTERVENTIONS:  - Monitor  oral intake, urinary output, labs, and treatment plans  - Assess nutrition and hydration status and recommend course of action  - Evaluate amount of meals eaten  - Assist patient with eating if necessary   - Allow adequate time for meals  - Recommend/ encourage appropriate diets, oral nutritional supplements, and vitamin/mineral supplements  - Order, calculate, and assess calorie counts as needed  - Recommend, monitor, and adjust tube feedings and TPN/PPN based on assessed needs  - Assess need for intravenous fluids  - Provide specific nutrition/hydration education as appropriate  - Include patient/family/caregiver in decisions related to nutrition  Outcome: Progressing

## 2024-11-29 NOTE — ASSESSMENT & PLAN NOTE
Aggressive electrolyte repletion to maintain Mg greater than 2,   Provided with another 2mg Mg infusion today  Phos greater than 3, K greater than 4   Statement Selected

## 2024-11-29 NOTE — ASSESSMENT & PLAN NOTE
Pt is 35 y/o female with pmh for HTN, prolonged QT, tobacco use, ETOH abuse, prior h/o ETOH pancreatitis, PUD and seizures who presented to the ER with 2 day h/o intractable N/V and abdominal pain.     Imaging this admission:  CT: Ongoing acute pancreatitis, now with findings of pancreatic necrosis and acute necrotic collection as described.     AVSS, 1 episode tachycardia last night, now improved  WBC 6.02  Mg 1.5  Hgb stable  Lipase 160(11/26)  Tbil  0.82 (11/26)    Plan:  Continue medical management; no surgical plans  IVF  Low fat diet as tolerated  Pain control  Abstain from alcohol  Care per primary team and GI    General surgery will sign off at this time. Please reach out as needed.

## 2024-11-29 NOTE — PLAN OF CARE
Problem: Potential for Falls  Goal: Patient will remain free of falls  Description: INTERVENTIONS:  - Educate patient/family on patient safety including physical limitations  - Instruct patient to call for assistance with activity   - Consult OT/PT to assist with strengthening/mobility   - Keep Call bell within reach  - Keep bed low and locked with side rails adjusted as appropriate  - Keep care items and personal belongings within reach  - Initiate and maintain comfort rounds  - Make Fall Risk Sign visible to staff  - Offer Toileting every 2 Hours, in advance of need  - Initiate/Maintain shift alarm  - Obtain necessary fall risk management equipment: socks  - Apply yellow socks and bracelet for high fall risk patients  - Consider moving patient to room near nurses station  Outcome: Progressing     Problem: PAIN - ADULT  Goal: Verbalizes/displays adequate comfort level or baseline comfort level  Description: Interventions:  - Encourage patient to monitor pain and request assistance  - Assess pain using appropriate pain scale  - Administer analgesics based on type and severity of pain and evaluate response  - Implement non-pharmacological measures as appropriate and evaluate response  - Consider cultural and social influences on pain and pain management  - Notify physician/advanced practitioner if interventions unsuccessful or patient reports new pain  Outcome: Progressing     Problem: INFECTION - ADULT  Goal: Absence or prevention of progression during hospitalization  Description: INTERVENTIONS:  - Assess and monitor for signs and symptoms of infection  - Monitor lab/diagnostic results  - Monitor all insertion sites, i.e. indwelling lines, tubes, and drains  - Monitor endotracheal if appropriate and nasal secretions for changes in amount and color  - [place] appropriate cooling/warming therapies per order  - Administer medications as ordered  - Instruct and encourage patient and family to use good hand hygiene  technique  - Identify and instruct in appropriate isolation precautions for identified infection/condition  Outcome: Progressing  Goal: Absence of fever/infection during neutropenic period  Description: INTERVENTIONS:  - Monitor WBC    Outcome: Progressing     Problem: SAFETY ADULT  Goal: Patient will remain free of falls  Description: INTERVENTIONS:  - Educate patient/family on patient safety including physical limitations  - Instruct patient to call for assistance with activity   - Consult OT/PT to assist with strengthening/mobility   - Keep Call bell within reach  - Keep bed low and locked with side rails adjusted as appropriate  - Keep care items and personal belongings within reach  - Initiate and maintain comfort rounds  - Make Fall Risk Sign visible to staff  - Offer Toileting every 2 Hours, in advance of need  - Initiate/Maintain shift alarm  - Obtain necessary fall risk management equipment: socks  - Apply yellow socks and bracelet for high fall risk patients  - Consider moving patient to room near nurses station  Outcome: Progressing  Goal: Maintain or return to baseline ADL function  Description: INTERVENTIONS:  -  Assess patient's ability to carry out ADLs; assess patient's baseline for ADL function and identify physical deficits which impact ability to perform ADLs (bathing, care of mouth/teeth, toileting, grooming, dressing, etc.)  - Assess/evaluate cause of self-care deficits   - Assess range of motion  - Assess patient's mobility; develop plan if impaired  - Assess patient's need for assistive devices and provide as appropriate  - Encourage maximum independence but intervene and supervise when necessary  - Involve family in performance of ADLs  - Assess for home care needs following discharge   - Consider OT consult to assist with ADL evaluation and planning for discharge  - Provide patient education as appropriate  Outcome: Progressing  Goal: Maintains/Returns to pre admission functional  level  Description: INTERVENTIONS:  - Perform AM-PAC 6 Click Basic Mobility/ Daily Activity assessment daily.  - Set and communicate daily mobility goal to care team and patient/family/caregiver.   - Collaborate with rehabilitation services on mobility goals if consulted  - Perform Range of Motion 2 times a day.  - Reposition patient every 2 hours.  - Dangle patient 2 times a day  - Stand patient 2 times a day  - Ambulate patient 2 times a day  - Out of bed to chair 2 times a day   - Out of bed for meals 2 times a day  - Out of bed for toileting  - Record patient progress and toleration of activity level   Outcome: Progressing     Problem: DISCHARGE PLANNING  Goal: Discharge to home or other facility with appropriate resources  Description: INTERVENTIONS:  - Identify barriers to discharge w/patient and caregiver  - Arrange for needed discharge resources and transportation as appropriate  - Identify discharge learning needs (meds, wound care, etc.)  - Arrange for interpretive services to assist at discharge as needed  - Refer to Case Management Department for coordinating discharge planning if the patient needs post-hospital services based on physician/advanced practitioner order or complex needs related to functional status, cognitive ability, or social support system  Outcome: Progressing     Problem: Knowledge Deficit  Goal: Patient/family/caregiver demonstrates understanding of disease process, treatment plan, medications, and discharge instructions  Description: Complete learning assessment and assess knowledge base.  Interventions:  - Provide teaching at level of understanding  - Provide teaching via preferred learning methods  Outcome: Progressing     Problem: GASTROINTESTINAL - ADULT  Goal: Minimal or absence of nausea and/or vomiting  Description: INTERVENTIONS:  - Administer IV fluids if ordered to ensure adequate hydration  - Maintain NPO status until nausea and vomiting are resolved  - Nasogastric tube if  ordered  - Administer ordered antiemetic medications as needed  - Provide nonpharmacologic comfort measures as appropriate  - Advance diet as tolerated, if ordered  - Consider nutrition services referral to assist patient with adequate nutrition and appropriate food choices  Outcome: Progressing  Goal: Maintains adequate nutritional intake  Description: INTERVENTIONS:  - Monitor percentage of each meal consumed  - Identify factors contributing to decreased intake, treat as appropriate  - Assist with meals as needed  - Monitor I&O, weight, and lab values if indicated  - Obtain nutrition services referral as needed  Outcome: Progressing     Problem: Prexisting or High Potential for Compromised Skin Integrity  Goal: Skin integrity is maintained or improved  Description: INTERVENTIONS:  - Identify patients at risk for skin breakdown  - Assess and monitor skin integrity  - Assess and monitor nutrition and hydration status  - Monitor labs   - Assess for incontinence   - Turn and reposition patient  - Assist with mobility/ambulation  - Relieve pressure over bony prominences  - Avoid friction and shearing  - Provide appropriate hygiene as needed including keeping skin clean and dry  - Evaluate need for skin moisturizer/barrier cream  - Collaborate with interdisciplinary team   - Patient/family teaching  - Consider wound care consult   Outcome: Progressing     Problem: Nutrition/Hydration-ADULT  Goal: Nutrient/Hydration intake appropriate for improving, restoring or maintaining nutritional needs  Description: Monitor and assess patient's nutrition/hydration status for malnutrition. Collaborate with interdisciplinary team and initiate plan and interventions as ordered.  Monitor patient's weight and dietary intake as ordered or per policy. Utilize nutrition screening tool and intervene as necessary. Determine patient's food preferences and provide high-protein, high-caloric foods as appropriate.     INTERVENTIONS:  - Monitor  oral intake, urinary output, labs, and treatment plans  - Assess nutrition and hydration status and recommend course of action  - Evaluate amount of meals eaten  - Assist patient with eating if necessary   - Allow adequate time for meals  - Recommend/ encourage appropriate diets, oral nutritional supplements, and vitamin/mineral supplements  - Order, calculate, and assess calorie counts as needed  - Recommend, monitor, and adjust tube feedings and TPN/PPN based on assessed needs  - Assess need for intravenous fluids  - Provide specific nutrition/hydration education as appropriate  - Include patient/family/caregiver in decisions related to nutrition  Outcome: Progressing     Problem: SAFETY,RESTRAINT: NV/NON-SELF DESTRUCTIVE BEHAVIOR  Goal: Remains free of harm/injury (restraint for non violent/non self-detsructive behavior)  Description: INTERVENTIONS:  - Instruct patient/family regarding restraint use   - Assess and monitor physiologic and psychological status   - Provide interventions and comfort measures to meet assessed patient needs   - Identify and implement measures to help patient regain control  - Assess readiness for release of restraint   Outcome: Progressing  Goal: Returns to optimal restraint-free functioning  Description: INTERVENTIONS:  - Assess the patient's behavior and symptoms that indicate continued need for restraint  - Identify and implement measures to help patient regain control  - Assess readiness for release of restraint   Outcome: Progressing

## 2024-11-29 NOTE — ASSESSMENT & PLAN NOTE
History of alcohol abuse  Presented to the emergency department with epigastric pain, nausea, and vomiting  CT abdomen pelvis with evidence of acute pancreatitis along with pancreatic necrosis  GI consulted and cleared patient for discharge  Diet advanced to low-fat, and patient tolerated it before dishcharge  Plan:  As needed pain medication  Encourage cessation of alcohol

## 2024-12-01 RX ORDER — FOLIC ACID 1 MG/1
1 TABLET ORAL DAILY
Qty: 30 TABLET | Refills: 0 | Status: ON HOLD | OUTPATIENT
Start: 2024-12-01

## 2024-12-01 RX ORDER — ONDANSETRON 4 MG/1
4 TABLET, ORALLY DISINTEGRATING ORAL EVERY 6 HOURS PRN
Qty: 20 TABLET | Refills: 0 | Status: ON HOLD | OUTPATIENT
Start: 2024-12-01

## 2024-12-01 RX ORDER — OXYCODONE HYDROCHLORIDE 5 MG/1
5 TABLET ORAL EVERY 6 HOURS PRN
Qty: 10 TABLET | Refills: 0 | Status: SHIPPED | OUTPATIENT
Start: 2024-12-01 | End: 2024-12-06

## 2024-12-02 ENCOUNTER — TRANSITIONAL CARE MANAGEMENT (OUTPATIENT)
Dept: FAMILY MEDICINE CLINIC | Facility: HOSPITAL | Age: 34
End: 2024-12-02

## 2024-12-03 NOTE — UTILIZATION REVIEW
NOTIFICATION OF ADMISSION DISCHARGE   This is a Notification of Discharge from [facility]. Please be advised that this patient has been discharge from our facility. Below you will find the admission and discharge date and time including the patient’s disposition.   UTILIZATION REVIEW CONTACT:  Sana Mckeon  Utilization   Network Utilization Review Department  Phone: [phone] x carefully listen to the prompts. All voicemails are confidential.  Email: [email or web address]     ADMISSION INFORMATION  PRESENTATION DATE: 11/23/2024 11:09 AM  OBERVATION ADMISSION DATE: N/A  INPATIENT ADMISSION DATE: 11/23/24  3:03 PM   DISCHARGE DATE: 11/29/2024  3:12 PM   DISPOSITION:Home/Self Care    Network Utilization Review Department  ATTENTION: Please call with any questions or concerns to [phone] and carefully listen to the prompts so that you are directed to the right person. All voicemails are confidential.   For Discharge needs, contact Care Management DC Support Team at [phone] opt. 2  Send all requests for admission clinical reviews, approved or denied determinations and any other requests to dedicated fax number below belonging to the campus where the patient is receiving treatment. List of dedicated fax numbers for the Facilities:  FACILITY NAME UR FAX NUMBER   ADMISSION DENIALS (Administrative/Medical Necessity) [phone]   DISCHARGE SUPPORT TEAM ([facility]) [phone]   PARENT CHILD HEALTH (Maternity/NICU/Pediatrics) [phone]   [facility] [phone]   [facility] [phone]   [facility] [phone]   [facility] [phone]   Critical access hospital [phone]   [facility] [phone]   [facility] [phone]   [facility]  [phone]   [facility] [phone]   [facility] [phone]   [facility] [phone]   [facility] [phone]

## 2024-12-13 ENCOUNTER — HOSPITAL ENCOUNTER (INPATIENT)
Facility: HOSPITAL | Age: 34
LOS: 1 days | DRG: 439 | End: 2024-12-15
Attending: EMERGENCY MEDICINE | Admitting: STUDENT IN AN ORGANIZED HEALTH CARE EDUCATION/TRAINING PROGRAM
Payer: COMMERCIAL

## 2024-12-13 ENCOUNTER — APPOINTMENT (EMERGENCY)
Dept: CT IMAGING | Facility: HOSPITAL | Age: 34
DRG: 439 | End: 2024-12-13
Payer: COMMERCIAL

## 2024-12-13 DIAGNOSIS — K86.3 PSEUDOCYST OF PANCREAS: ICD-10-CM

## 2024-12-13 DIAGNOSIS — K85.90 PANCREATITIS: Primary | ICD-10-CM

## 2024-12-13 DIAGNOSIS — K29.70 GASTRITIS: ICD-10-CM

## 2024-12-13 DIAGNOSIS — K86.3 PANCREATIC PSEUDOCYST: ICD-10-CM

## 2024-12-13 LAB
ALBUMIN SERPL BCG-MCNC: 3.8 G/DL (ref 3.5–5)
ALP SERPL-CCNC: 72 U/L (ref 34–104)
ALT SERPL W P-5'-P-CCNC: 13 U/L (ref 7–52)
ANION GAP SERPL CALCULATED.3IONS-SCNC: 12 MMOL/L (ref 4–13)
AST SERPL W P-5'-P-CCNC: 25 U/L (ref 13–39)
BASOPHILS # BLD AUTO: 0.1 THOUSANDS/ÂΜL (ref 0–0.1)
BASOPHILS NFR BLD AUTO: 1 % (ref 0–1)
BILIRUB SERPL-MCNC: 0.45 MG/DL (ref 0.2–1)
BUN SERPL-MCNC: 5 MG/DL (ref 5–25)
CALCIUM SERPL-MCNC: 8.7 MG/DL (ref 8.4–10.2)
CHLORIDE SERPL-SCNC: 103 MMOL/L (ref 96–108)
CO2 SERPL-SCNC: 24 MMOL/L (ref 21–32)
CREAT SERPL-MCNC: 0.4 MG/DL (ref 0.6–1.3)
CRP SERPL QL: 17.9 MG/L
EOSINOPHIL # BLD AUTO: 0.65 THOUSAND/ÂΜL (ref 0–0.61)
EOSINOPHIL NFR BLD AUTO: 5 % (ref 0–6)
ERYTHROCYTE [DISTWIDTH] IN BLOOD BY AUTOMATED COUNT: 11.5 % (ref 11.6–15.1)
ETHANOL SERPL-MCNC: 157 MG/DL
GFR SERPL CREATININE-BSD FRML MDRD: 137 ML/MIN/1.73SQ M
GLUCOSE SERPL-MCNC: 91 MG/DL (ref 65–140)
HCT VFR BLD AUTO: 38.8 % (ref 34.8–46.1)
HGB BLD-MCNC: 12.8 G/DL (ref 11.5–15.4)
IMM GRANULOCYTES # BLD AUTO: 0.09 THOUSAND/UL (ref 0–0.2)
IMM GRANULOCYTES NFR BLD AUTO: 1 % (ref 0–2)
LACTATE SERPL-SCNC: 1.4 MMOL/L (ref 0.5–2)
LACTATE SERPL-SCNC: 2.2 MMOL/L (ref 0.5–2)
LIPASE SERPL-CCNC: 165 U/L (ref 11–82)
LYMPHOCYTES # BLD AUTO: 3.24 THOUSANDS/ÂΜL (ref 0.6–4.47)
LYMPHOCYTES NFR BLD AUTO: 23 % (ref 14–44)
MCH RBC QN AUTO: 35 PG (ref 26.8–34.3)
MCHC RBC AUTO-ENTMCNC: 33 G/DL (ref 31.4–37.4)
MCV RBC AUTO: 106 FL (ref 82–98)
MONOCYTES # BLD AUTO: 0.75 THOUSAND/ÂΜL (ref 0.17–1.22)
MONOCYTES NFR BLD AUTO: 5 % (ref 4–12)
NEUTROPHILS # BLD AUTO: 9.55 THOUSANDS/ÂΜL (ref 1.85–7.62)
NEUTS SEG NFR BLD AUTO: 65 % (ref 43–75)
NRBC BLD AUTO-RTO: 0 /100 WBCS
PLATELET # BLD AUTO: 369 THOUSANDS/UL (ref 149–390)
PMV BLD AUTO: 10.6 FL (ref 8.9–12.7)
POTASSIUM SERPL-SCNC: 3.8 MMOL/L (ref 3.5–5.3)
PROCALCITONIN SERPL-MCNC: <0.05 NG/ML
PROT SERPL-MCNC: 7.3 G/DL (ref 6.4–8.4)
RBC # BLD AUTO: 3.66 MILLION/UL (ref 3.81–5.12)
SODIUM SERPL-SCNC: 139 MMOL/L (ref 135–147)
WBC # BLD AUTO: 14.38 THOUSAND/UL (ref 4.31–10.16)

## 2024-12-13 PROCEDURE — 99285 EMERGENCY DEPT VISIT HI MDM: CPT | Performed by: EMERGENCY MEDICINE

## 2024-12-13 PROCEDURE — 85025 COMPLETE CBC W/AUTO DIFF WBC: CPT | Performed by: EMERGENCY MEDICINE

## 2024-12-13 PROCEDURE — 86140 C-REACTIVE PROTEIN: CPT | Performed by: EMERGENCY MEDICINE

## 2024-12-13 PROCEDURE — 96375 TX/PRO/DX INJ NEW DRUG ADDON: CPT

## 2024-12-13 PROCEDURE — 74177 CT ABD & PELVIS W/CONTRAST: CPT

## 2024-12-13 PROCEDURE — 96376 TX/PRO/DX INJ SAME DRUG ADON: CPT

## 2024-12-13 PROCEDURE — 83690 ASSAY OF LIPASE: CPT | Performed by: EMERGENCY MEDICINE

## 2024-12-13 PROCEDURE — 96361 HYDRATE IV INFUSION ADD-ON: CPT

## 2024-12-13 PROCEDURE — 84145 PROCALCITONIN (PCT): CPT | Performed by: EMERGENCY MEDICINE

## 2024-12-13 PROCEDURE — 83605 ASSAY OF LACTIC ACID: CPT | Performed by: EMERGENCY MEDICINE

## 2024-12-13 PROCEDURE — 87040 BLOOD CULTURE FOR BACTERIA: CPT | Performed by: EMERGENCY MEDICINE

## 2024-12-13 PROCEDURE — 96374 THER/PROPH/DIAG INJ IV PUSH: CPT

## 2024-12-13 PROCEDURE — 36415 COLL VENOUS BLD VENIPUNCTURE: CPT

## 2024-12-13 PROCEDURE — 99285 EMERGENCY DEPT VISIT HI MDM: CPT

## 2024-12-13 PROCEDURE — 82077 ASSAY SPEC XCP UR&BREATH IA: CPT | Performed by: EMERGENCY MEDICINE

## 2024-12-13 PROCEDURE — 80053 COMPREHEN METABOLIC PANEL: CPT | Performed by: EMERGENCY MEDICINE

## 2024-12-13 RX ORDER — MORPHINE SULFATE 4 MG/ML
4 INJECTION, SOLUTION INTRAMUSCULAR; INTRAVENOUS ONCE
Status: COMPLETED | OUTPATIENT
Start: 2024-12-13 | End: 2024-12-13

## 2024-12-13 RX ORDER — ONDANSETRON 2 MG/ML
4 INJECTION INTRAMUSCULAR; INTRAVENOUS ONCE
Status: COMPLETED | OUTPATIENT
Start: 2024-12-13 | End: 2024-12-13

## 2024-12-13 RX ADMIN — SODIUM CHLORIDE 1000 ML: 0.9 INJECTION, SOLUTION INTRAVENOUS at 20:29

## 2024-12-13 RX ADMIN — ONDANSETRON 4 MG: 2 INJECTION INTRAMUSCULAR; INTRAVENOUS at 20:29

## 2024-12-13 RX ADMIN — MORPHINE SULFATE 4 MG: 4 INJECTION, SOLUTION INTRAMUSCULAR; INTRAVENOUS at 20:31

## 2024-12-13 RX ADMIN — MORPHINE SULFATE 4 MG: 4 INJECTION, SOLUTION INTRAMUSCULAR; INTRAVENOUS at 23:44

## 2024-12-13 RX ADMIN — IOHEXOL 100 ML: 350 INJECTION, SOLUTION INTRAVENOUS at 21:23

## 2024-12-14 PROBLEM — K86.3 PANCREATIC PSEUDOCYST: Status: ACTIVE | Noted: 2024-12-14

## 2024-12-14 LAB
ANION GAP SERPL CALCULATED.3IONS-SCNC: 10 MMOL/L (ref 4–13)
ANION GAP SERPL CALCULATED.3IONS-SCNC: 8 MMOL/L (ref 4–13)
BUN SERPL-MCNC: 5 MG/DL (ref 5–25)
BUN SERPL-MCNC: 6 MG/DL (ref 5–25)
CALCIUM SERPL-MCNC: 7.5 MG/DL (ref 8.4–10.2)
CALCIUM SERPL-MCNC: 7.7 MG/DL (ref 8.4–10.2)
CHLORIDE SERPL-SCNC: 101 MMOL/L (ref 96–108)
CHLORIDE SERPL-SCNC: 103 MMOL/L (ref 96–108)
CO2 SERPL-SCNC: 24 MMOL/L (ref 21–32)
CO2 SERPL-SCNC: 25 MMOL/L (ref 21–32)
CREAT SERPL-MCNC: 0.39 MG/DL (ref 0.6–1.3)
CREAT SERPL-MCNC: 0.44 MG/DL (ref 0.6–1.3)
ERYTHROCYTE [DISTWIDTH] IN BLOOD BY AUTOMATED COUNT: 11.5 % (ref 11.6–15.1)
ERYTHROCYTE [DISTWIDTH] IN BLOOD BY AUTOMATED COUNT: 11.7 % (ref 11.6–15.1)
GFR SERPL CREATININE-BSD FRML MDRD: 133 ML/MIN/1.73SQ M
GFR SERPL CREATININE-BSD FRML MDRD: 138 ML/MIN/1.73SQ M
GLUCOSE SERPL-MCNC: 61 MG/DL (ref 65–140)
GLUCOSE SERPL-MCNC: 63 MG/DL (ref 65–140)
GLUCOSE SERPL-MCNC: 70 MG/DL (ref 65–140)
GLUCOSE SERPL-MCNC: 87 MG/DL (ref 65–140)
HCT VFR BLD AUTO: 34 % (ref 34.8–46.1)
HCT VFR BLD AUTO: 34.2 % (ref 34.8–46.1)
HGB BLD-MCNC: 10.8 G/DL (ref 11.5–15.4)
HGB BLD-MCNC: 11 G/DL (ref 11.5–15.4)
LIPASE SERPL-CCNC: 77 U/L (ref 11–82)
MAGNESIUM SERPL-MCNC: 1.4 MG/DL (ref 1.9–2.7)
MAGNESIUM SERPL-MCNC: 2.5 MG/DL (ref 1.9–2.7)
MCH RBC QN AUTO: 34.6 PG (ref 26.8–34.3)
MCH RBC QN AUTO: 34.8 PG (ref 26.8–34.3)
MCHC RBC AUTO-ENTMCNC: 31.8 G/DL (ref 31.4–37.4)
MCHC RBC AUTO-ENTMCNC: 32.2 G/DL (ref 31.4–37.4)
MCV RBC AUTO: 108 FL (ref 82–98)
MCV RBC AUTO: 110 FL (ref 82–98)
PHOSPHATE SERPL-MCNC: 4 MG/DL (ref 2.7–4.5)
PLATELET # BLD AUTO: 240 THOUSANDS/UL (ref 149–390)
PLATELET # BLD AUTO: 296 THOUSANDS/UL (ref 149–390)
PMV BLD AUTO: 10.9 FL (ref 8.9–12.7)
PMV BLD AUTO: 11.1 FL (ref 8.9–12.7)
POTASSIUM SERPL-SCNC: 4 MMOL/L (ref 3.5–5.3)
POTASSIUM SERPL-SCNC: 4.2 MMOL/L (ref 3.5–5.3)
RBC # BLD AUTO: 3.1 MILLION/UL (ref 3.81–5.12)
RBC # BLD AUTO: 3.18 MILLION/UL (ref 3.81–5.12)
SODIUM SERPL-SCNC: 135 MMOL/L (ref 135–147)
SODIUM SERPL-SCNC: 136 MMOL/L (ref 135–147)
WBC # BLD AUTO: 10.45 THOUSAND/UL (ref 4.31–10.16)
WBC # BLD AUTO: 8.13 THOUSAND/UL (ref 4.31–10.16)

## 2024-12-14 PROCEDURE — 83690 ASSAY OF LIPASE: CPT | Performed by: PHYSICIAN ASSISTANT

## 2024-12-14 PROCEDURE — 82948 REAGENT STRIP/BLOOD GLUCOSE: CPT

## 2024-12-14 PROCEDURE — 83735 ASSAY OF MAGNESIUM: CPT | Performed by: STUDENT IN AN ORGANIZED HEALTH CARE EDUCATION/TRAINING PROGRAM

## 2024-12-14 PROCEDURE — 99223 1ST HOSP IP/OBS HIGH 75: CPT | Performed by: STUDENT IN AN ORGANIZED HEALTH CARE EDUCATION/TRAINING PROGRAM

## 2024-12-14 PROCEDURE — 85027 COMPLETE CBC AUTOMATED: CPT | Performed by: STUDENT IN AN ORGANIZED HEALTH CARE EDUCATION/TRAINING PROGRAM

## 2024-12-14 PROCEDURE — 99223 1ST HOSP IP/OBS HIGH 75: CPT | Performed by: INTERNAL MEDICINE

## 2024-12-14 PROCEDURE — 83735 ASSAY OF MAGNESIUM: CPT | Performed by: PHYSICIAN ASSISTANT

## 2024-12-14 PROCEDURE — 80048 BASIC METABOLIC PNL TOTAL CA: CPT | Performed by: STUDENT IN AN ORGANIZED HEALTH CARE EDUCATION/TRAINING PROGRAM

## 2024-12-14 PROCEDURE — 99255 IP/OBS CONSLTJ NEW/EST HI 80: CPT | Performed by: SURGERY

## 2024-12-14 PROCEDURE — 99222 1ST HOSP IP/OBS MODERATE 55: CPT | Performed by: PHYSICIAN ASSISTANT

## 2024-12-14 PROCEDURE — 84100 ASSAY OF PHOSPHORUS: CPT | Performed by: STUDENT IN AN ORGANIZED HEALTH CARE EDUCATION/TRAINING PROGRAM

## 2024-12-14 RX ORDER — PANTOPRAZOLE SODIUM 40 MG/10ML
40 INJECTION, POWDER, LYOPHILIZED, FOR SOLUTION INTRAVENOUS EVERY 12 HOURS SCHEDULED
Status: DISCONTINUED | OUTPATIENT
Start: 2024-12-14 | End: 2024-12-15 | Stop reason: HOSPADM

## 2024-12-14 RX ORDER — PANTOPRAZOLE SODIUM 40 MG/10ML
40 INJECTION, POWDER, LYOPHILIZED, FOR SOLUTION INTRAVENOUS ONCE
Status: COMPLETED | OUTPATIENT
Start: 2024-12-14 | End: 2024-12-14

## 2024-12-14 RX ORDER — SODIUM CHLORIDE, SODIUM GLUCONATE, SODIUM ACETATE, POTASSIUM CHLORIDE, MAGNESIUM CHLORIDE, SODIUM PHOSPHATE, DIBASIC, AND POTASSIUM PHOSPHATE .53; .5; .37; .037; .03; .012; .00082 G/100ML; G/100ML; G/100ML; G/100ML; G/100ML; G/100ML; G/100ML
125 INJECTION, SOLUTION INTRAVENOUS CONTINUOUS
Status: DISPENSED | OUTPATIENT
Start: 2024-12-14 | End: 2024-12-15

## 2024-12-14 RX ORDER — HYDROMORPHONE HCL/PF 1 MG/ML
0.5 SYRINGE (ML) INJECTION EVERY 4 HOURS PRN
Refills: 0 | Status: DISCONTINUED | OUTPATIENT
Start: 2024-12-14 | End: 2024-12-15 | Stop reason: HOSPADM

## 2024-12-14 RX ORDER — SENNOSIDES 8.6 MG
1 TABLET ORAL
Status: DISCONTINUED | OUTPATIENT
Start: 2024-12-14 | End: 2024-12-15 | Stop reason: HOSPADM

## 2024-12-14 RX ORDER — NICOTINE 21 MG/24HR
1 PATCH, TRANSDERMAL 24 HOURS TRANSDERMAL DAILY
Status: DISCONTINUED | OUTPATIENT
Start: 2024-12-14 | End: 2024-12-15 | Stop reason: HOSPADM

## 2024-12-14 RX ORDER — CHLORDIAZEPOXIDE HYDROCHLORIDE 25 MG/1
25 CAPSULE, GELATIN COATED ORAL EVERY 6 HOURS
Status: DISCONTINUED | OUTPATIENT
Start: 2024-12-15 | End: 2024-12-15 | Stop reason: HOSPADM

## 2024-12-14 RX ORDER — CHLORDIAZEPOXIDE HYDROCHLORIDE 25 MG/1
50 CAPSULE, GELATIN COATED ORAL EVERY 6 HOURS
Status: COMPLETED | OUTPATIENT
Start: 2024-12-14 | End: 2024-12-14

## 2024-12-14 RX ORDER — MAGNESIUM SULFATE HEPTAHYDRATE 40 MG/ML
2 INJECTION, SOLUTION INTRAVENOUS
Status: COMPLETED | OUTPATIENT
Start: 2024-12-14 | End: 2024-12-14

## 2024-12-14 RX ORDER — DEXTROSE MONOHYDRATE 50 MG/ML
20 INJECTION, SOLUTION INTRAVENOUS CONTINUOUS
Status: DISPENSED | OUTPATIENT
Start: 2024-12-14 | End: 2024-12-15

## 2024-12-14 RX ORDER — HYDROMORPHONE HCL/PF 1 MG/ML
1 SYRINGE (ML) INJECTION EVERY 4 HOURS PRN
Status: DISCONTINUED | OUTPATIENT
Start: 2024-12-14 | End: 2024-12-15 | Stop reason: HOSPADM

## 2024-12-14 RX ORDER — ONDANSETRON 2 MG/ML
4 INJECTION INTRAMUSCULAR; INTRAVENOUS EVERY 4 HOURS PRN
Status: DISCONTINUED | OUTPATIENT
Start: 2024-12-14 | End: 2024-12-15 | Stop reason: HOSPADM

## 2024-12-14 RX ORDER — FOLIC ACID 1 MG/1
1 TABLET ORAL DAILY
Status: DISCONTINUED | OUTPATIENT
Start: 2024-12-14 | End: 2024-12-15 | Stop reason: HOSPADM

## 2024-12-14 RX ORDER — SODIUM CHLORIDE, SODIUM GLUCONATE, SODIUM ACETATE, POTASSIUM CHLORIDE, MAGNESIUM CHLORIDE, SODIUM PHOSPHATE, DIBASIC, AND POTASSIUM PHOSPHATE .53; .5; .37; .037; .03; .012; .00082 G/100ML; G/100ML; G/100ML; G/100ML; G/100ML; G/100ML; G/100ML
150 INJECTION, SOLUTION INTRAVENOUS CONTINUOUS
Status: DISPENSED | OUTPATIENT
Start: 2024-12-14 | End: 2024-12-14

## 2024-12-14 RX ORDER — LANOLIN ALCOHOL/MO/W.PET/CERES
100 CREAM (GRAM) TOPICAL DAILY
Status: DISCONTINUED | OUTPATIENT
Start: 2024-12-14 | End: 2024-12-15 | Stop reason: HOSPADM

## 2024-12-14 RX ADMIN — MAGNESIUM SULFATE HEPTAHYDRATE 2 G: 40 INJECTION, SOLUTION INTRAVENOUS at 05:07

## 2024-12-14 RX ADMIN — CHLORDIAZEPOXIDE HYDROCHLORIDE 50 MG: 25 CAPSULE ORAL at 03:32

## 2024-12-14 RX ADMIN — PANTOPRAZOLE SODIUM 40 MG: 40 INJECTION, POWDER, FOR SOLUTION INTRAVENOUS at 08:00

## 2024-12-14 RX ADMIN — HYDROMORPHONE HYDROCHLORIDE 1 MG: 1 INJECTION, SOLUTION INTRAMUSCULAR; INTRAVENOUS; SUBCUTANEOUS at 12:38

## 2024-12-14 RX ADMIN — MULTIPLE VITAMINS W/ MINERALS TAB 1 TABLET: TAB ORAL at 08:01

## 2024-12-14 RX ADMIN — HYDROMORPHONE HYDROCHLORIDE 1 MG: 1 INJECTION, SOLUTION INTRAMUSCULAR; INTRAVENOUS; SUBCUTANEOUS at 08:14

## 2024-12-14 RX ADMIN — Medication 100 MG: at 08:01

## 2024-12-14 RX ADMIN — HYDROMORPHONE HYDROCHLORIDE 1 MG: 1 INJECTION, SOLUTION INTRAMUSCULAR; INTRAVENOUS; SUBCUTANEOUS at 16:43

## 2024-12-14 RX ADMIN — CHLORDIAZEPOXIDE HYDROCHLORIDE 50 MG: 25 CAPSULE ORAL at 08:00

## 2024-12-14 RX ADMIN — HYDROMORPHONE HYDROCHLORIDE 1 MG: 1 INJECTION, SOLUTION INTRAMUSCULAR; INTRAVENOUS; SUBCUTANEOUS at 01:38

## 2024-12-14 RX ADMIN — FOLIC ACID 1 MG: 1 TABLET ORAL at 08:01

## 2024-12-14 RX ADMIN — DEXTROSE 20 ML/HR: 5 SOLUTION INTRAVENOUS at 21:48

## 2024-12-14 RX ADMIN — NICOTINE 1 PATCH: 21 PATCH, EXTENDED RELEASE TRANSDERMAL at 03:32

## 2024-12-14 RX ADMIN — CHLORDIAZEPOXIDE HYDROCHLORIDE 50 MG: 25 CAPSULE ORAL at 20:00

## 2024-12-14 RX ADMIN — SODIUM CHLORIDE, SODIUM GLUCONATE, SODIUM ACETATE, POTASSIUM CHLORIDE, MAGNESIUM CHLORIDE, SODIUM PHOSPHATE, DIBASIC, AND POTASSIUM PHOSPHATE 150 ML/HR: .53; .5; .37; .037; .03; .012; .00082 INJECTION, SOLUTION INTRAVENOUS at 19:33

## 2024-12-14 RX ADMIN — HYDROMORPHONE HYDROCHLORIDE 0.5 MG: 1 INJECTION, SOLUTION INTRAMUSCULAR; INTRAVENOUS; SUBCUTANEOUS at 03:30

## 2024-12-14 RX ADMIN — PANTOPRAZOLE SODIUM 40 MG: 40 INJECTION, POWDER, FOR SOLUTION INTRAVENOUS at 00:59

## 2024-12-14 RX ADMIN — CHLORDIAZEPOXIDE HYDROCHLORIDE 50 MG: 25 CAPSULE ORAL at 14:44

## 2024-12-14 RX ADMIN — PANTOPRAZOLE SODIUM 40 MG: 40 INJECTION, POWDER, FOR SOLUTION INTRAVENOUS at 20:00

## 2024-12-14 RX ADMIN — HYDROMORPHONE HYDROCHLORIDE 1 MG: 1 INJECTION, SOLUTION INTRAMUSCULAR; INTRAVENOUS; SUBCUTANEOUS at 23:29

## 2024-12-14 RX ADMIN — SODIUM CHLORIDE, SODIUM GLUCONATE, SODIUM ACETATE, POTASSIUM CHLORIDE, MAGNESIUM CHLORIDE, SODIUM PHOSPHATE, DIBASIC, AND POTASSIUM PHOSPHATE 150 ML/HR: .53; .5; .37; .037; .03; .012; .00082 INJECTION, SOLUTION INTRAVENOUS at 03:33

## 2024-12-14 RX ADMIN — MAGNESIUM SULFATE HEPTAHYDRATE 2 G: 40 INJECTION, SOLUTION INTRAVENOUS at 06:52

## 2024-12-14 RX ADMIN — ONDANSETRON 4 MG: 2 INJECTION, SOLUTION INTRAMUSCULAR; INTRAVENOUS at 10:23

## 2024-12-14 RX ADMIN — SODIUM CHLORIDE, SODIUM GLUCONATE, SODIUM ACETATE, POTASSIUM CHLORIDE, MAGNESIUM CHLORIDE, SODIUM PHOSPHATE, DIBASIC, AND POTASSIUM PHOSPHATE 150 ML/HR: .53; .5; .37; .037; .03; .012; .00082 INJECTION, SOLUTION INTRAVENOUS at 12:31

## 2024-12-14 NOTE — H&P
"H&P - Hospitalist   Name: Kaykay Holland 34 y.o. female I MRN: [id number]  Unit/Bed#: -01 I Date of Admission: 12/13/2024   Date of Service: 12/14/2024 I Hospital Day: 0     Assessment & Plan  Necrotizing pancreatitis  Presenting with nausea and L sided pain that onset at 1500 on 12/13  Hx of recurrent alcoholic pancreatitis   CT A/P: \"Persistent peripancreatic fluid compatible with pancreatitis. New large well-defined cystic collection/pseudocyst effacing and displacing the proximal stomach anteriorly measuring 9.8 x 9.4 x 12.2 cm with intermediate density within the dependent portion and within some branching components inferiorly compatible with proteinaceous/blood products. More defined/organized peripancreatic collections when compared with prior exam including a 3.0 x 2.1 cm collection within the tail of the pancreas previously 1.4 x 1.3 cm.\"  GI consulted  Surgery consulted  Strict NPO  IVF  Pain control   Pancreatic pseudocyst  CT A/P: \"New large well-defined cystic collection/pseudocyst effacing and displacing the proximal stomach anteriorly measuring 9.8 x 9.4 x 12.2 cm with intermediate density within the dependent portion and within some branching components inferiorly compatible with proteinaceous/blood products. \"  GI consulted   Surgery consulted   Alcohol abuse  Hx of EtOH abuse with 2-3 bottles of wine daily   Reports she hasn't drank since d/c from hospital on 11/29 except for glass of wine 12/13 evening, however alcohol level on admit 157 on admit  Has had EtOH withdrawal requiring phenobarb and precedex during prior admit   Will start librium 50mg q6h x1 day followed by 25mg q6h x2 days   CIWA protocol   Gastroesophageal reflux disease without esophagitis  Start protonix 40mg IV BID with hx of PUD      VTE Pharmacologic Prophylaxis: VTE Score: 0 Low Risk (Score 0-2) - Encourage Ambulation.  Code Status: Level 1 - Full Code   Discussion with family: Patient declined call to contact " "person.     Anticipated Length of Stay: Patient will be admitted on an inpatient basis with an anticipated length of stay of greater than 2 midnights secondary to necrotizing pancreatitis, pancreatic pseudocyst.    History of Present Illness   Chief Complaint: \"I had nausea and pain in my left side\"    Kaykay Holland is a 34 y.o. female with a PMH of Etoh abuse and necrotizing pancreatitis who presents with nausea and left-sided abdominal pain that onset at 1500 earlier today.  Denies vomiting.  No fevers or chills.  No chest pain or dyspnea.  Normal bowel movements.  Reports she had a glass of wine Thursday evening but otherwise has not been drinking any alcohol since discharge from the hospital.    Review of Systems   Constitutional:  Negative for chills and fever.   HENT:  Negative for congestion.    Respiratory:  Negative for cough and shortness of breath.    Cardiovascular:  Negative for chest pain and leg swelling.   Gastrointestinal:  Positive for abdominal pain and nausea. Negative for constipation, diarrhea and vomiting.   Genitourinary:  Negative for difficulty urinating, dysuria and hematuria.   Musculoskeletal:  Negative for gait problem.   Neurological:  Negative for weakness and numbness.   All other systems reviewed and are negative.      Historical Information   Past Medical History:   Diagnosis Date    Acute alcoholic pancreatitis 11/03/2023    Hypertension     SIRS (systemic inflammatory response syndrome) (HCC) 04/10/2024     Past Surgical History:   Procedure Laterality Date    WISDOM TOOTH EXTRACTION       Social History     Tobacco Use    Smoking status: Every Day     Current packs/day: 0.50     Types: Cigarettes     Passive exposure: Never    Smokeless tobacco: Never    Tobacco comments:     Per pt last drink was today 9/24/24 one glass of wine. Had previously stop on 4/4/24 for 1 month    Vaping Use    Vaping status: Never Used   Substance and Sexual Activity    Alcohol use: Yes     " "Alcohol/week: 21.0 standard drinks of alcohol     Types: 21 Glasses of wine per week     Comment: \"occasional\"    Drug use: Never    Sexual activity: Not on file     E-Cigarette/Vaping    E-Cigarette Use Never User      E-Cigarette/Vaping Substances    Nicotine No     THC No     CBD No     Flavoring No     Other No     Unknown No      Family history non-contributory  Social History:  Marital Status: Single   Occupation: works as [employer]    Patient Pre-hospital Living Situation: Home  Patient Pre-hospital Level of Mobility: walks  Patient Pre-hospital Diet Restrictions: none    Meds/Allergies   I have reviewed home medications with patient personally.  Prior to Admission medications    Medication Sig Start Date End Date Taking? Authorizing Provider   folic acid ( Folic Acid) 1 mg tablet Take 1 tablet (1 mg total) by mouth daily 12/1/24  Yes Elena Vásquez PA-C   ondansetron (ZOFRAN-ODT) 4 mg disintegrating tablet Take 1 tablet (4 mg total) by mouth every 6 (six) hours as needed for nausea or vomiting 12/1/24  Yes Elena Vásquez PA-C   pantoprazole (PROTONIX) 40 mg tablet Take 1 tablet (40 mg total) by mouth 2 (two) times a day  Patient not taking: Reported on 12/14/2024 9/26/24   Jordana Lewis PA-C     Allergies   Allergen Reactions    Other Edema     Bee stings (localized edema)       Objective :  Temp:  [97.3 °F (36.3 °C)-98.6 °F (37 °C)] 98.6 °F (37 °C)  HR:  [] 77  BP: (111-138)/(67-97) 129/79  Resp:  [18-20] 18  SpO2:  [93 %-100 %] 93 %  O2 Device: None (Room air)    Physical Exam  Vitals and nursing note reviewed.   Constitutional:       General: She is not in acute distress.     Appearance: Normal appearance. She is not ill-appearing.      Comments: Conversational   HENT:      Head: Normocephalic.      Nose: Nose normal.      Mouth/Throat:      Mouth: Mucous membranes are moist.   Eyes:      Conjunctiva/sclera: Conjunctivae normal.   Cardiovascular:      Rate and " "Rhythm: Normal rate and regular rhythm.      Pulses: Normal pulses.      Heart sounds: No murmur heard.  Pulmonary:      Effort: Pulmonary effort is normal. No respiratory distress.      Breath sounds: Normal breath sounds. No wheezing, rhonchi or rales.   Abdominal:      General: Abdomen is flat. There is no distension.      Palpations: Abdomen is soft.      Tenderness: There is abdominal tenderness (LUQ).   Musculoskeletal:         General: Normal range of motion.      Cervical back: Normal range of motion.      Right lower leg: No edema.      Left lower leg: No edema.   Skin:     General: Skin is warm and dry.      Coloration: Skin is not pale.   Neurological:      General: No focal deficit present.      Mental Status: She is alert and oriented to person, place, and time.   Psychiatric:         Mood and Affect: Mood normal.         Thought Content: Thought content normal.          Lines/Drains:            Lab Results: I have reviewed the following results:  Results from last 7 days   Lab Units 12/14/24 0338 12/13/24  1825   WBC Thousand/uL 10.45* 14.38*   HEMOGLOBIN g/dL 11.0* 12.8   HEMATOCRIT % 34.2* 38.8   PLATELETS Thousands/uL 296 369   SEGS PCT %  --  65   LYMPHO PCT %  --  23   MONO PCT %  --  5   EOS PCT %  --  5     Results from last 7 days   Lab Units 12/14/24 0338 12/13/24  1825   SODIUM mmol/L 136 139   POTASSIUM mmol/L 4.0 3.8   CHLORIDE mmol/L 103 103   CO2 mmol/L 25 24   BUN mg/dL 5 5   CREATININE mg/dL 0.39* 0.40*   ANION GAP mmol/L 8 12   CALCIUM mg/dL 7.7* 8.7   ALBUMIN g/dL  --  3.8   TOTAL BILIRUBIN mg/dL  --  0.45   ALK PHOS U/L  --  72   ALT U/L  --  13   AST U/L  --  25   GLUCOSE RANDOM mg/dL 70 91             No results found for: \"HGBA1C\"  Results from last 7 days   Lab Units 12/13/24  2312 12/13/24 2027 12/13/24  1825   LACTIC ACID mmol/L 1.4 2.2*  --    PROCALCITONIN ng/ml  --   --  <0.05       Imaging Results Review: I reviewed radiology reports from this admission including: CT " abdomen/pelvis.  Other Study Results Review: No additional pertinent studies reviewed.    Administrative Statements       ** Please Note: This note has been constructed using a voice recognition system. **

## 2024-12-14 NOTE — PLAN OF CARE

## 2024-12-14 NOTE — ASSESSMENT & PLAN NOTE
Pt is a 33 y/o female with pmh for multiple episodes of pancreatitis secondary to alcohol abuse who presents with nausea and abd pain that began yesterday. Pt drinks 2-3 bottles of wine per day.     CT: 1.  Persistent peripancreatic fluid compatible with pancreatitis.  2.  New large well-defined cystic collection/pseudocyst effacing and displacing the proximal stomach anteriorly measuring 9.8 x 9.4 x 12.2 cm with intermediate density within the dependent portion and within some branching components inferiorly   compatible with proteinaceous/blood products.  3.  More defined/organized peripancreatic collections when compared with prior exam including a 3.0 x 2.1 cm collection within the tail of the pancreas previously 1.4 x 1.3 cm.  4.  Mild thickening of the distal stomach which may be due to gastritis.  5.  Diverticulosis without evidence of diverticulitis. Thickening of the splenic flecture which is likely reactive to the adjacent pancreatitis.    ETOH 157  Wbc 10.45  Mg 1.4  Lipase 165    Exam: tender LUQ abd with guarding. +BS    Plan:  Consider possible cyst gastrostomy with GI   NPO/sips  ETOH cessation  Trend lipase

## 2024-12-14 NOTE — CONSULTS
Consultation - GI   Kaykay Holland 34 y.o. female MRN: [id number]  Unit/Bed#: -01 Encounter: [id number]      Assessment & Plan   34-year-old female with PMH including alcohol abuse and recurrent pancreatitis.  Recent [facility] admission 11/24 through 11/29 with acute necrotizing pancreatitis  Patient admits to continued EtOH use 12/12 and 12/13  Presented to ED 12/13 with acute onset of nausea, vomiting and severe left upper quadrant abdominal pain.  CT abdomen/pelvis showed new well-defined cystic collection displacing proximal stomach, more organized peripancreatic collection at pancreatic tail    1.  Acute necrotizing pancreatitis  Continued severe epigastric and left upper quadrant pain.  Receiving IV morphine with some relief  Afebrile, WBCs initially elevated at 14, decreased to 10 this a.m.  -Continue supportive care for now with IV fluids, pain management, antinausea medication  -Consider nutrition consult for TPN  -Will need eventual evaluation with advanced endoscopy in [place] for cyst gastrostomy/necrosectomy  -Monitor labs    Inpatient consult to gastroenterology  Consult performed by: Stepan Clayton DO  Consult ordered by: Florina Schafer PA-C          Physician Requesting Consult: Tiffanie Soto DO  Reason for Consult / Principal Problem: Severe necrotizing pancreatitis    HPI: Kaykay Holland is a 34 y.o. year old female with history of EtOH abuse and multiple episodes of acute pancreatitis. Recent [facility] admission 11/24 through 11/29/2024 with recurrent pancreatitis with acute necrotic collection noted on CT abdomen/pelvis.  Close outpatient GI follow-up was recommended and patient reports that her abdominal pain gradually resolved after discharge    She admits to having 2 drinks on 12/12 and 12/13.  Presented to ED 12/13/2024 with acute onset nausea and severe left upper quadrant abdominal pain radiating laterally  CT abdomen/pelvis in ED  "showed persistent pancreatitis with new large well-defined cystic collection displacing the proximal stomach anteriorly measuring 9.8 x 9.4 x 12.2 cm.  More defined/organized peripancreatic collection within the tail of the pancreas, mild thickening of the distal stomach  Labs reviewed-LFTs within normal limits  WBC initially 14.38, 10.45 this a.m., hemoglobin decreased 11.0  Lipase 165  Ethanol level 157 on admission    Patient continues to report severe left upper quadrant abdominal pain with associated nausea, no vomiting  Reports brown stool yesterday 12/13 prior to admission, denies recent diarrhea      Review of Systems   Constitutional:  Positive for appetite change and fatigue.   HENT:  Positive for trouble swallowing.    Respiratory: Negative.     Cardiovascular: Negative.    Gastrointestinal:  Positive for abdominal pain and nausea.   Genitourinary: Negative.    Musculoskeletal:  Positive for back pain.   Skin: Negative.    Neurological:  Positive for weakness.   Hematological: Negative.    Psychiatric/Behavioral: Negative.           Historical Information   Past Medical History:   Diagnosis Date    Acute alcoholic pancreatitis 11/03/2023    Hypertension     SIRS (systemic inflammatory response syndrome) (HCC) 04/10/2024     Past Surgical History:   Procedure Laterality Date    WISDOM TOOTH EXTRACTION       Social History   Social History     Substance and Sexual Activity   Alcohol Use Yes    Alcohol/week: 21.0 standard drinks of alcohol    Types: 21 Glasses of wine per week    Comment: \"occasional\"     Social History     Substance and Sexual Activity   Drug Use Never     Social History     Tobacco Use   Smoking Status Every Day    Current packs/day: 0.50    Types: Cigarettes    Passive exposure: Never   Smokeless Tobacco Never   Tobacco Comments    Per pt last drink was today 9/24/24 one glass of wine. Had previously stop on 4/4/24 for 1 month      History reviewed. No pertinent family " history.    Meds/Allergies     Current Facility-Administered Medications:     chlordiazePOXIDE (LIBRIUM) capsule 50 mg, Q6H **FOLLOWED BY** [START ON 12/15/2024] chlordiazePOXIDE (LIBRIUM) capsule 25 mg, Q6H    folic acid (FOLVITE) tablet 1 mg, Daily    HYDROmorphone (DILAUDID) injection 0.5 mg, Q4H PRN **OR** HYDROmorphone (DILAUDID) injection 1 mg, Q4H PRN    HYDROmorphone (DILAUDID) injection 0.5 mg, Q4H PRN    multi-electrolyte (PLASMALYTE-A/ISOLYTE-S PH 7.4) IV solution, Continuous, Last Rate: 150 mL/hr (12/14/24 0333)    multivitamin-minerals (CENTRUM) tablet 1 tablet, Daily    nicotine (NICODERM CQ) 21 mg/24 hr TD 24 hr patch 1 patch, Daily    ondansetron (ZOFRAN) injection 4 mg, Q4H PRN    pantoprazole (PROTONIX) injection 40 mg, Q12H RODRICK    senna (SENOKOT) tablet 8.6 mg, HS PRN    thiamine tablet 100 mg, Daily    Medications Prior to Admission:     folic acid ( Folic Acid) 1 mg tablet    ondansetron (ZOFRAN-ODT) 4 mg disintegrating tablet    pantoprazole (PROTONIX) 40 mg tablet    Allergies   Allergen Reactions    Other Edema     Bee stings (localized edema)           Physical Exam   Constitutional: Appears uncomfortable  HENT: WNL  Head: Normocephalic and atraumatic.   Eyes: Anicteric  Neck: Normal range of motion. Neck supple.   Cardiovascular: Normal rate and regular rhythm.    Pulmonary/Chest: Effort normal and breath sounds normal.   Abdominal: Soft, very tender with palpation at left upper quadrant and epigastric region.  Bowel sounds present  Musculoskeletal: Normal range of motion.   Extremities:  No edema  Neurological: alert and oriented to person, place, and time.  No focal deficits  Skin: Skin is warm and dry.   Psychiatric: normal mood and affect.       Lab Results:   Admission on 12/13/2024   Component Date Value    WBC 12/13/2024 14.38 (H)     RBC 12/13/2024 3.66 (L)     Hemoglobin 12/13/2024 12.8     Hematocrit 12/13/2024 38.8     MCV 12/13/2024 106 (H)     MCH 12/13/2024 35.0 (H)     MCHC  12/13/2024 33.0     RDW 12/13/2024 11.5 (L)     MPV 12/13/2024 10.6     Platelets 12/13/2024 369     nRBC 12/13/2024 0     Segmented % 12/13/2024 65     Immature Grans % 12/13/2024 1     Lymphocytes % 12/13/2024 23     Monocytes % 12/13/2024 5     Eosinophils Relative 12/13/2024 5     Basophils Relative 12/13/2024 1     Absolute Neutrophils 12/13/2024 9.55 (H)     Absolute Immature Grans 12/13/2024 0.09     Absolute Lymphocytes 12/13/2024 3.24     Absolute Monocytes 12/13/2024 0.75     Eosinophils Absolute 12/13/2024 0.65 (H)     Basophils Absolute 12/13/2024 0.10     Sodium 12/13/2024 139     Potassium 12/13/2024 3.8     Chloride 12/13/2024 103     CO2 12/13/2024 24     ANION GAP 12/13/2024 12     BUN 12/13/2024 5     Creatinine 12/13/2024 0.40 (L)     Glucose 12/13/2024 91     Calcium 12/13/2024 8.7     AST 12/13/2024 25     ALT 12/13/2024 13     Alkaline Phosphatase 12/13/2024 72     Total Protein 12/13/2024 7.3     Albumin 12/13/2024 3.8     Total Bilirubin 12/13/2024 0.45     eGFR 12/13/2024 137     Lipase 12/13/2024 165 (H)     LACTIC ACID 12/13/2024 2.2 (H)     Procalcitonin 12/13/2024 <0.05     CRP 12/13/2024 17.9 (H)     Ethanol Lvl 12/13/2024 157 (H)     LACTIC ACID 12/13/2024 1.4     Sodium 12/14/2024 136     Potassium 12/14/2024 4.0     Chloride 12/14/2024 103     CO2 12/14/2024 25     ANION GAP 12/14/2024 8     BUN 12/14/2024 5     Creatinine 12/14/2024 0.39 (L)     Glucose 12/14/2024 70     Calcium 12/14/2024 7.7 (L)     eGFR 12/14/2024 138     WBC 12/14/2024 10.45 (H)     RBC 12/14/2024 3.18 (L)     Hemoglobin 12/14/2024 11.0 (L)     Hematocrit 12/14/2024 34.2 (L)     MCV 12/14/2024 108 (H)     MCH 12/14/2024 34.6 (H)     MCHC 12/14/2024 32.2     RDW 12/14/2024 11.5 (L)     Platelets 12/14/2024 296     MPV 12/14/2024 11.1     Magnesium 12/14/2024 1.4 (L)     Phosphorus 12/14/2024 4.0      Imaging Studies:     EKG, Pathology, and Other Studies:   Counseling / Coordination of Care  Total floor / unit  time spent today 45 minutes.

## 2024-12-14 NOTE — PLAN OF CARE

## 2024-12-14 NOTE — CONSULTS
Consultation - Surgery-General   Name: Kaykay Holland 34 y.o. female I MRN: [id number]  Unit/Bed#: -01 I Date of Admission: 12/13/2024   Date of Service: 12/14/2024 I Hospital Day: 0   Inpatient consult to Acute Care Surgery  Consult performed by: Kari Azar PA-C  Consult ordered by: Florina Schafer PA-C        Physician Requesting Evaluation: Tiffanie Soto DO   Reason for Evaluation / Principal Problem: pancreatitis, pancreatic pseudocyst    Assessment & Plan  Necrotizing pancreatitis  Pt is a 35 y/o female with pmh for multiple episodes of pancreatitis secondary to alcohol abuse who presents with nausea and abd pain that began yesterday. Pt drinks 2-3 bottles of wine per day.     CT: 1.  Persistent peripancreatic fluid compatible with pancreatitis.  2.  New large well-defined cystic collection/pseudocyst effacing and displacing the proximal stomach anteriorly measuring 9.8 x 9.4 x 12.2 cm with intermediate density within the dependent portion and within some branching components inferiorly   compatible with proteinaceous/blood products.  3.  More defined/organized peripancreatic collections when compared with prior exam including a 3.0 x 2.1 cm collection within the tail of the pancreas previously 1.4 x 1.3 cm.  4.  Mild thickening of the distal stomach which may be due to gastritis.  5.  Diverticulosis without evidence of diverticulitis. Thickening of the splenic flecture which is likely reactive to the adjacent pancreatitis.    ETOH 157  Wbc 10.45  Mg 1.4  Lipase 165    Exam: tender LUQ abd with guarding. +BS    Plan:  Consider possible cyst gastrostomy with GI   NPO/sips  ETOH cessation  Trend lipase    Alcohol abuse  CIWA  Librium    Gastroesophageal reflux disease without esophagitis  PPI  Pancreatic pseudocyst  Plan as above          History of Present Illness   Kaykay Holland is a 34 y.o. female pmh for multiple episodes of pancreatitis secondary to alcohol abuse who presents with  nausea and abd pain that began yesterday. Pt drinks 2-3 bottles of wine per day. C/o n/v and LUQ abd pain. No fevers/chills. No change in bowels.     Review of Systems   Constitutional:  Negative for chills and fever.   HENT:  Negative for ear pain and sore throat.    Eyes:  Negative for pain and visual disturbance.   Respiratory:  Negative for cough and shortness of breath.    Cardiovascular:  Negative for chest pain and palpitations.   Gastrointestinal:  Positive for abdominal pain and nausea. Negative for vomiting.   Genitourinary:  Negative for dysuria and hematuria.   Musculoskeletal:  Negative for arthralgias and back pain.   Skin:  Negative for color change and rash.   Neurological:  Negative for seizures and syncope.   All other systems reviewed and are negative.    I have reviewed the patient's PMH, PSH, Social History, Family History, Meds, and Allergies    Objective :  Temp:  [97.3 °F (36.3 °C)-98.6 °F (37 °C)] 98.6 °F (37 °C)  HR:  [] 77  BP: (111-138)/(67-97) 129/79  Resp:  [18-20] 18  SpO2:  [93 %-100 %] 93 %  O2 Device: None (Room air)      Physical Exam  Vitals and nursing note reviewed.   Constitutional:       General: She is not in acute distress.     Appearance: She is well-developed.   HENT:      Head: Normocephalic and atraumatic.   Eyes:      Conjunctiva/sclera: Conjunctivae normal.   Cardiovascular:      Rate and Rhythm: Normal rate and regular rhythm.      Heart sounds: No murmur heard.  Pulmonary:      Effort: Pulmonary effort is normal. No respiratory distress.      Breath sounds: Normal breath sounds.   Abdominal:      General: Bowel sounds are normal.      Palpations: Abdomen is soft.      Tenderness: There is abdominal tenderness. There is guarding.   Musculoskeletal:         General: No swelling.      Cervical back: Neck supple.   Skin:     General: Skin is warm and dry.      Capillary Refill: Capillary refill takes less than 2 seconds.   Neurological:      General: No focal  deficit present.      Mental Status: She is alert.   Psychiatric:         Mood and Affect: Mood normal.         Lab Results: I have reviewed the following results:  Recent Labs     12/13/24  1825 12/13/24 2027 12/13/24  2312 12/14/24  0338   WBC 14.38*  --   --  10.45*   HGB 12.8  --   --  11.0*   HCT 38.8  --   --  34.2*     --   --  296   SODIUM 139  --   --  136   K 3.8  --   --  4.0     --   --  103   CO2 24  --   --  25   BUN 5  --   --  5   CREATININE 0.40*  --   --  0.39*   GLUC 91  --   --  70   MG  --   --   --  1.4*   PHOS  --   --   --  4.0   AST 25  --   --   --    ALT 13  --   --   --    ALB 3.8  --   --   --    TBILI 0.45  --   --   --    ALKPHOS 72  --   --   --    LACTICACID  --    < > 1.4  --     < > = values in this interval not displayed.       Imaging Results Review: I reviewed radiology reports from this admission including: CT abdomen/pelvis.  Other Study Results Review: No additional pertinent studies reviewed.    VTE Pharmacologic Prophylaxis: Sequential compression device (Venodyne)   VTE Mechanical Prophylaxis: sequential compression device

## 2024-12-14 NOTE — ASSESSMENT & PLAN NOTE
"Presenting with nausea and L sided pain that onset at 1500 on 12/13  Hx of recurrent alcoholic pancreatitis   CT A/P: \"Persistent peripancreatic fluid compatible with pancreatitis. New large well-defined cystic collection/pseudocyst effacing and displacing the proximal stomach anteriorly measuring 9.8 x 9.4 x 12.2 cm with intermediate density within the dependent portion and within some branching components inferiorly compatible with proteinaceous/blood products. More defined/organized peripancreatic collections when compared with prior exam including a 3.0 x 2.1 cm collection within the tail of the pancreas previously 1.4 x 1.3 cm.\"  GI consulted  Surgery consulted  Strict NPO  IVF  Pain control   "

## 2024-12-14 NOTE — ASSESSMENT & PLAN NOTE
"CT A/P: \"New large well-defined cystic collection/pseudocyst effacing and displacing the proximal stomach anteriorly measuring 9.8 x 9.4 x 12.2 cm with intermediate density within the dependent portion and within some branching components inferiorly compatible with proteinaceous/blood products. \"  GI consulted   Surgery consulted   "

## 2024-12-14 NOTE — ED PROVIDER NOTES
"Time reflects when diagnosis was documented in both MDM as applicable and the Disposition within this note       Time User Action Codes Description Comment    12/14/2024 12:29 AM Shadi Gipson [K85.90] Pancreatitis     12/14/2024 12:29 AM Shadi Gipson [K86.3] Pseudocyst of pancreas     12/14/2024 12:29 AM Shadi Gipson [K29.70] Gastritis           ED Disposition       ED Disposition   Admit    Condition   Stable    Date/Time   Sat Dec 14, 2024 12:37 AM    Comment   Case was discussed with christin and the patient's admission status was agreed to be Admission Status: observation status to the service of Dr. Maynard .               Assessment & Plan       Medical Decision Making  Abdominal pain differential includes diverticulitis recurrent pancreatitis kidney stone workup in progress lab work and CAT scan    Amount and/or Complexity of Data Reviewed  Labs: ordered.  Radiology: ordered.    Risk  Prescription drug management.  Decision regarding hospitalization.        ED Course as of 12/14/24 0048   Sat Dec 14, 2024   0030 Repeat lactic acid is 1.4       Medications   pantoprazole (PROTONIX) injection 40 mg (has no administration in time range)   sodium chloride 0.9 % bolus 1,000 mL (0 mL Intravenous Stopped 12/13/24 2211)   ondansetron (ZOFRAN) injection 4 mg (4 mg Intravenous Given 12/13/24 2029)   morphine injection 4 mg (4 mg Intravenous Given 12/13/24 2031)   iohexol (OMNIPAQUE) 350 MG/ML injection (MULTI-DOSE) 100 mL (100 mL Intravenous Given 12/13/24 2123)   morphine injection 4 mg (4 mg Intravenous Given 12/13/24 2344)       ED Risk Strat Scores                                              History of Present Illness       Chief Complaint   Patient presents with    Abdominal Pain     Pt states \"I have stabbing pain, nausea, dizziness and stomach pain\" Denies fevers.        Past Medical History:   Diagnosis Date    Acute alcoholic pancreatitis 11/03/2023    Hypertension     SIRS (systemic inflammatory " "response syndrome) ([facility]) 04/10/2024      Past Surgical History:   Procedure Laterality Date    WISDOM TOOTH EXTRACTION        History reviewed. No pertinent family history.   Social History     Tobacco Use    Smoking status: Every Day     Current packs/day: 0.50     Types: Cigarettes     Passive exposure: Never    Smokeless tobacco: Never    Tobacco comments:     Per pt last drink was today 9/24/24 one glass of wine. Had previously stop on 4/4/24 for 1 month    Vaping Use    Vaping status: Never Used   Substance Use Topics    Alcohol use: Yes     Alcohol/week: 21.0 standard drinks of alcohol     Types: 21 Glasses of wine per week     Comment: \"occasional\"    Drug use: Never      E-Cigarette/Vaping    E-Cigarette Use Never User       E-Cigarette/Vaping Substances    Nicotine No     THC No     CBD No     Flavoring No     Other No     Unknown No       I have reviewed and agree with the history as documented.     This is a 34-year-old female with a history of recurrent pancreatitis who presents with epigastric and left upper quadrant pain that started earlier today has nausea vomiting and diarrhea.  No prior abdominal surgeries she was admitted here last month for necrotizing pancreatitis she does state that she had some alcohol last evening      History provided by:  Patient  Medical Problem  Location:  Epigastric and left upper quadrant  Quality:  Sharp stabbing pain  Severity:  Severe  Onset quality:  Gradual  Duration:  6 hours  Timing:  Constant  Progression:  Worsening  Chronicity:  Recurrent  Context:  Epigastric pain with a history of pancreatitis  Worsened by:  Alcohol last evening  Associated symptoms: abdominal pain, diarrhea, nausea and vomiting        Review of Systems   Gastrointestinal:  Positive for abdominal pain, diarrhea, nausea and vomiting.   All other systems reviewed and are negative.          Objective       ED Triage Vitals   Temperature Pulse Blood Pressure Respirations SpO2 Patient Position - " Orthostatic VS   12/13/24 1819 12/13/24 1819 12/13/24 1819 12/13/24 1819 12/13/24 1819 12/13/24 2100   (!) 97.3 °F (36.3 °C) (!) 113 138/97 20 100 % Sitting      Temp Source Heart Rate Source BP Location FiO2 (%) Pain Score    12/13/24 1819 12/13/24 1819 12/13/24 2100 -- 12/13/24 1819    Temporal Monitor Left arm  7      Vitals      Date and Time Temp Pulse SpO2 Resp BP Pain Score FACES Pain Rating User   12/14/24 0030 -- 78 96 % 20 111/67 -- -- KK   12/14/24 0000 -- 81 96 % 20 119/72 -- -- KK   12/13/24 2344 -- -- -- -- -- 9 -- CK   12/13/24 2300 -- 86 97 % 20 119/83 -- -- KK   12/13/24 2230 -- 83 98 % 20 127/83 -- -- KK   12/13/24 2200 -- -- 98 % 20 126/78 -- -- KK   12/13/24 2100 -- 83 97 % 20 121/75 -- --    12/13/24 2031 -- -- -- -- -- 10 - Worst Possible Pain -- RN   12/13/24 1819 97.3 °F (36.3 °C) 113 100 % 20 138/97 7 --             Physical Exam  Vitals and nursing note reviewed.   Constitutional:       Appearance: She is ill-appearing. She is not toxic-appearing or diaphoretic.   HENT:      Head: Normocephalic and atraumatic.      Right Ear: Tympanic membrane, ear canal and external ear normal.      Left Ear: Tympanic membrane, ear canal and external ear normal.   Eyes:      Extraocular Movements: Extraocular movements intact.      Pupils: Pupils are equal, round, and reactive to light.   Cardiovascular:      Rate and Rhythm: Regular rhythm. Tachycardia present.      Pulses: Normal pulses.      Heart sounds: No murmur heard.     No friction rub. No gallop.   Pulmonary:      Effort: Pulmonary effort is normal. No respiratory distress.      Breath sounds: No stridor. No wheezing, rhonchi or rales.   Abdominal:      General: There is no distension.      Palpations: Abdomen is soft.      Tenderness: There is abdominal tenderness.      Comments: Epigastric and left upper quadrant tenderness   Musculoskeletal:         General: No swelling, tenderness, deformity or signs of injury. Normal range of motion.       Cervical back: Normal range of motion and neck supple. No rigidity.      Right lower leg: No edema.      Left lower leg: No edema.   Skin:     General: Skin is warm and dry.      Findings: No erythema or rash.   Neurological:      General: No focal deficit present.      Mental Status: She is alert and oriented to person, place, and time.      Cranial Nerves: No cranial nerve deficit.      Motor: No weakness.   Psychiatric:         Mood and Affect: Mood normal.         Behavior: Behavior normal.         Thought Content: Thought content normal.         Results Reviewed       Procedure Component Value Units Date/Time    Procalcitonin [[id number]]  (Normal) Collected: 12/13/24 1825    Lab Status: Final result Specimen: Blood from Arm, Left Updated: 12/13/24 2341     Procalcitonin <0.05 ng/ml     C-reactive protein [[id number]]  (Abnormal) Collected: 12/13/24 1825    Lab Status: Final result Specimen: Blood from Arm, Left Updated: 12/13/24 2341     CRP 17.9 mg/L     Lactic acid 2 Hours [[id number]]  (Normal) Collected: 12/13/24 2312    Lab Status: Final result Specimen: Blood from Arm, Right Updated: 12/13/24 2330     LACTIC ACID 1.4 mmol/L     Narrative:      Result may be elevated if tourniquet was used during collection.    Lactic acid, plasma (w/reflex if result > 2.0) [[id number]]  (Abnormal) Collected: 12/13/24 2027    Lab Status: Final result Specimen: Blood from Arm, Left Updated: 12/13/24 2057     LACTIC ACID 2.2 mmol/L     Narrative:      Result may be elevated if tourniquet was used during collection.    Ethanol [[id number]]  (Abnormal) Collected: 12/13/24 2027    Lab Status: Final result Specimen: Blood from Arm, Left Updated: 12/13/24 2057     Ethanol Lvl 157 mg/dL     Blood culture #2 [[id number]] Collected: 12/13/24 2027    Lab Status: In process Specimen: Blood from Arm, Left Updated: 12/13/24 2031    Blood culture #1 [[id number]] Collected: 12/13/24 2027    Lab Status: In process Specimen: Blood from Arm,  Right Updated: 12/13/24 2031    Lipase [[id number]]  (Abnormal) Collected: 12/13/24 1825    Lab Status: Final result Specimen: Blood from Arm, Left Updated: 12/13/24 1854     Lipase 165 u/L     Comprehensive metabolic panel [[id number]]  (Abnormal) Collected: 12/13/24 1825    Lab Status: Final result Specimen: Blood from Arm, Left Updated: 12/13/24 1854     Sodium 139 mmol/L      Potassium 3.8 mmol/L      Chloride 103 mmol/L      CO2 24 mmol/L      ANION GAP 12 mmol/L      BUN 5 mg/dL      Creatinine 0.40 mg/dL      Glucose 91 mg/dL      Calcium 8.7 mg/dL      AST 25 U/L      ALT 13 U/L      Alkaline Phosphatase 72 U/L      Total Protein 7.3 g/dL      Albumin 3.8 g/dL      Total Bilirubin 0.45 mg/dL      eGFR 137 ml/min/1.73sq m     Narrative:      [employer] guidelines for Chronic Kidney Disease (CKD):     Stage 1 with normal or high GFR (GFR > 90 mL/min/1.73 square meters)    Stage 2 Mild CKD (GFR = 60-89 mL/min/1.73 square meters)    Stage 3A Moderate CKD (GFR = 45-59 mL/min/1.73 square meters)    Stage 3B Moderate CKD (GFR = 30-44 mL/min/1.73 square meters)    Stage 4 Severe CKD (GFR = 15-29 mL/min/1.73 square meters)    Stage 5 End Stage CKD (GFR <15 mL/min/1.73 square meters)  Note: GFR calculation is accurate only with a steady state creatinine    CBC and differential [[id number]]  (Abnormal) Collected: 12/13/24 1825    Lab Status: Final result Specimen: Blood from Arm, Left Updated: 12/13/24 1831     WBC 14.38 Thousand/uL      RBC 3.66 Million/uL      Hemoglobin 12.8 g/dL      Hematocrit 38.8 %       fL      MCH 35.0 pg      MCHC 33.0 g/dL      RDW 11.5 %      MPV 10.6 fL      Platelets 369 Thousands/uL      nRBC 0 /100 WBCs      Segmented % 65 %      Immature Grans % 1 %      Lymphocytes % 23 %      Monocytes % 5 %      Eosinophils Relative 5 %      Basophils Relative 1 %      Absolute Neutrophils 9.55 Thousands/µL      Absolute Immature Grans 0.09 Thousand/uL      Absolute  Lymphocytes 3.24 Thousands/µL      Absolute Monocytes 0.75 Thousand/µL      Eosinophils Absolute 0.65 Thousand/µL      Basophils Absolute 0.10 Thousands/µL             CT abdomen pelvis with contrast   Final Interpretation by Arcenio Riley MD (12/14 0019)      1.  Persistent peripancreatic fluid compatible with pancreatitis.   2.  New large well-defined cystic collection/pseudocyst effacing and displacing the proximal stomach anteriorly measuring 9.8 x 9.4 x 12.2 cm with intermediate density within the dependent portion and within some branching components inferiorly    compatible with proteinaceous/blood products.   3.  More defined/organized peripancreatic collections when compared with prior exam including a 3.0 x 2.1 cm collection within the tail of the pancreas previously 1.4 x 1.3 cm.   4.  Mild thickening of the distal stomach which may be due to gastritis.   5.  Diverticulosis without evidence of diverticulitis. Thickening of the splenic flecture which is likely reactive to the adjacent pancreatitis.   6.  Other findings as above.      Workstation performed: [id number]             Procedures    ED Medication and Procedure Management   Prior to Admission Medications   Prescriptions Last Dose Informant Patient Reported? Taking?   folic acid (KP Folic Acid) 1 mg tablet   No No   Sig: Take 1 tablet (1 mg total) by mouth daily   ondansetron (ZOFRAN-ODT) 4 mg disintegrating tablet   No No   Sig: Take 1 tablet (4 mg total) by mouth every 6 (six) hours as needed for nausea or vomiting   pantoprazole (PROTONIX) 40 mg tablet   No No   Sig: Take 1 tablet (40 mg total) by mouth 2 (two) times a day      Facility-Administered Medications: None     Patient's Medications   Discharge Prescriptions    No medications on file     No discharge procedures on file.  ED SEPSIS DOCUMENTATION   Time reflects when diagnosis was documented in both MDM as applicable and the Disposition within this note       Time User Action Codes  Description Comment    12/14/2024 12:29 AM Shadi Gipson [K85.90] Pancreatitis     12/14/2024 12:29 AM Shadi Gipson [K86.3] Pseudocyst of pancreas     12/14/2024 12:29 AM Shadi Gipson [K29.70] Gastritis            Initial Sepsis Screening       Row Name 12/13/24 2912                Is the patient's history suggestive of a new or worsening infection? No  Presentation most consistent with acute pancreatitis not infection  -CHARLIE                  User Key  (r) = Recorded By, (t) = Taken By, (c) = Cosigned By      Initials Name Provider Type    CHARLIE Gipson DO Physician                       Shadi Gipson DO  12/14/24 0048

## 2024-12-14 NOTE — ASSESSMENT & PLAN NOTE
Hx of EtOH abuse with 2-3 bottles of wine daily   Reports she hasn't drank since d/c from hospital on 11/29 except for glass of wine 12/13 evening, however alcohol level on admit 157 on admit  Has had EtOH withdrawal requiring phenobarb and precedex during prior admit   Will start librium 50mg q6h x1 day followed by 25mg q6h x2 days   CIWA protocol

## 2024-12-15 ENCOUNTER — HOSPITAL ENCOUNTER (INPATIENT)
Facility: HOSPITAL | Age: 34
LOS: 9 days | Discharge: HOME | DRG: 438 | End: 2024-12-24
Attending: INTERNAL MEDICINE | Admitting: INTERNAL MEDICINE
Payer: COMMERCIAL

## 2024-12-15 VITALS
OXYGEN SATURATION: 94 % | WEIGHT: 121.4 LBS | DIASTOLIC BLOOD PRESSURE: 83 MMHG | SYSTOLIC BLOOD PRESSURE: 128 MMHG | BODY MASS INDEX: 20.23 KG/M2 | TEMPERATURE: 97.9 F | HEIGHT: 65 IN | HEART RATE: 89 BPM | RESPIRATION RATE: 18 BRPM

## 2024-12-15 DIAGNOSIS — K92.0 HEMATEMESIS: ICD-10-CM

## 2024-12-15 DIAGNOSIS — K85.90 ACUTE PANCREATITIS: ICD-10-CM

## 2024-12-15 DIAGNOSIS — K85.91 NECROTIZING PANCREATITIS: ICD-10-CM

## 2024-12-15 DIAGNOSIS — K85.20 ALCOHOL-INDUCED ACUTE PANCREATITIS, UNSPECIFIED COMPLICATION STATUS: ICD-10-CM

## 2024-12-15 DIAGNOSIS — Z72.0 TOBACCO ABUSE: ICD-10-CM

## 2024-12-15 DIAGNOSIS — F41.9 ANXIETY: ICD-10-CM

## 2024-12-15 DIAGNOSIS — F10.90 ALCOHOL USE DISORDER: Primary | ICD-10-CM

## 2024-12-15 DIAGNOSIS — R10.31 RIGHT LOWER QUADRANT PAIN: ICD-10-CM

## 2024-12-15 DIAGNOSIS — N83.201 CYST OF RIGHT OVARY: ICD-10-CM

## 2024-12-15 DIAGNOSIS — F10.939 ALCOHOL WITHDRAWAL SYNDROME WITH COMPLICATION (HCC): ICD-10-CM

## 2024-12-15 DIAGNOSIS — K86.3 PANCREATIC PSEUDOCYST: ICD-10-CM

## 2024-12-15 DIAGNOSIS — N83.201 RIGHT OVARIAN CYST: ICD-10-CM

## 2024-12-15 LAB
ANION GAP SERPL CALCULATED.3IONS-SCNC: 7 MMOL/L (ref 4–13)
BASOPHILS # BLD AUTO: 0.05 THOUSANDS/ÂΜL (ref 0–0.1)
BASOPHILS NFR BLD AUTO: 1 % (ref 0–1)
BUN SERPL-MCNC: 4 MG/DL (ref 5–25)
CALCIUM SERPL-MCNC: 7 MG/DL (ref 8.4–10.2)
CHLORIDE SERPL-SCNC: 98 MMOL/L (ref 96–108)
CO2 SERPL-SCNC: 28 MMOL/L (ref 21–32)
CREAT SERPL-MCNC: 0.39 MG/DL (ref 0.6–1.3)
EOSINOPHIL # BLD AUTO: 0.38 THOUSAND/ÂΜL (ref 0–0.61)
EOSINOPHIL NFR BLD AUTO: 5 % (ref 0–6)
ERYTHROCYTE [DISTWIDTH] IN BLOOD BY AUTOMATED COUNT: 11.5 % (ref 11.6–15.1)
GFR SERPL CREATININE-BSD FRML MDRD: 138 ML/MIN/1.73SQ M
GLUCOSE SERPL-MCNC: 101 MG/DL (ref 65–140)
GLUCOSE SERPL-MCNC: 112 MG/DL (ref 65–140)
GLUCOSE SERPL-MCNC: 130 MG/DL (ref 65–140)
GLUCOSE SERPL-MCNC: 76 MG/DL (ref 65–140)
GLUCOSE SERPL-MCNC: 79 MG/DL (ref 65–140)
HCT VFR BLD AUTO: 33.1 % (ref 34.8–46.1)
HGB BLD-MCNC: 10.5 G/DL (ref 11.5–15.4)
IMM GRANULOCYTES # BLD AUTO: 0.05 THOUSAND/UL (ref 0–0.2)
IMM GRANULOCYTES NFR BLD AUTO: 1 % (ref 0–2)
LYMPHOCYTES # BLD AUTO: 1.5 THOUSANDS/ÂΜL (ref 0.6–4.47)
LYMPHOCYTES NFR BLD AUTO: 19 % (ref 14–44)
MCH RBC QN AUTO: 34.1 PG (ref 26.8–34.3)
MCHC RBC AUTO-ENTMCNC: 31.7 G/DL (ref 31.4–37.4)
MCV RBC AUTO: 108 FL (ref 82–98)
MONOCYTES # BLD AUTO: 0.54 THOUSAND/ÂΜL (ref 0.17–1.22)
MONOCYTES NFR BLD AUTO: 7 % (ref 4–12)
NEUTROPHILS # BLD AUTO: 5.31 THOUSANDS/ÂΜL (ref 1.85–7.62)
NEUTS SEG NFR BLD AUTO: 67 % (ref 43–75)
NRBC BLD AUTO-RTO: 0 /100 WBCS
PLATELET # BLD AUTO: 224 THOUSANDS/UL (ref 149–390)
PMV BLD AUTO: 10.9 FL (ref 8.9–12.7)
POTASSIUM SERPL-SCNC: 3.9 MMOL/L (ref 3.5–5.3)
RBC # BLD AUTO: 3.08 MILLION/UL (ref 3.81–5.12)
SODIUM SERPL-SCNC: 133 MMOL/L (ref 135–147)
WBC # BLD AUTO: 7.83 THOUSAND/UL (ref 4.31–10.16)

## 2024-12-15 PROCEDURE — 82948 REAGENT STRIP/BLOOD GLUCOSE: CPT

## 2024-12-15 PROCEDURE — 99223 1ST HOSP IP/OBS HIGH 75: CPT | Performed by: INTERNAL MEDICINE

## 2024-12-15 PROCEDURE — 80048 BASIC METABOLIC PNL TOTAL CA: CPT | Performed by: STUDENT IN AN ORGANIZED HEALTH CARE EDUCATION/TRAINING PROGRAM

## 2024-12-15 PROCEDURE — 99233 SBSQ HOSP IP/OBS HIGH 50: CPT | Performed by: PHYSICIAN ASSISTANT

## 2024-12-15 PROCEDURE — 99232 SBSQ HOSP IP/OBS MODERATE 35: CPT | Performed by: INTERNAL MEDICINE

## 2024-12-15 PROCEDURE — 85025 COMPLETE CBC W/AUTO DIFF WBC: CPT | Performed by: STUDENT IN AN ORGANIZED HEALTH CARE EDUCATION/TRAINING PROGRAM

## 2024-12-15 PROCEDURE — 99239 HOSP IP/OBS DSCHRG MGMT >30: CPT | Performed by: PHYSICIAN ASSISTANT

## 2024-12-15 RX ORDER — ONDANSETRON 2 MG/ML
4 INJECTION INTRAMUSCULAR; INTRAVENOUS EVERY 4 HOURS PRN
Status: DISCONTINUED | OUTPATIENT
Start: 2024-12-15 | End: 2024-12-24 | Stop reason: HOSPADM

## 2024-12-15 RX ORDER — HYDROMORPHONE HCL/PF 1 MG/ML
0.5 SYRINGE (ML) INJECTION EVERY 4 HOURS PRN
Refills: 0 | Status: DISCONTINUED | OUTPATIENT
Start: 2024-12-15 | End: 2024-12-16

## 2024-12-15 RX ORDER — ONDANSETRON 2 MG/ML
4 INJECTION INTRAMUSCULAR; INTRAVENOUS EVERY 4 HOURS PRN
Status: CANCELLED | OUTPATIENT
Start: 2024-12-15

## 2024-12-15 RX ORDER — SODIUM CHLORIDE, SODIUM GLUCONATE, SODIUM ACETATE, POTASSIUM CHLORIDE, MAGNESIUM CHLORIDE, SODIUM PHOSPHATE, DIBASIC, AND POTASSIUM PHOSPHATE .53; .5; .37; .037; .03; .012; .00082 G/100ML; G/100ML; G/100ML; G/100ML; G/100ML; G/100ML; G/100ML
100 INJECTION, SOLUTION INTRAVENOUS CONTINUOUS
Status: DISCONTINUED | OUTPATIENT
Start: 2024-12-15 | End: 2024-12-15 | Stop reason: HOSPADM

## 2024-12-15 RX ORDER — HYDROMORPHONE HCL/PF 1 MG/ML
1 SYRINGE (ML) INJECTION EVERY 4 HOURS PRN
Refills: 0 | Status: CANCELLED | OUTPATIENT
Start: 2024-12-15

## 2024-12-15 RX ORDER — LORAZEPAM 0.5 MG/1
0.5 TABLET ORAL ONCE AS NEEDED
Status: COMPLETED | OUTPATIENT
Start: 2024-12-15 | End: 2024-12-15

## 2024-12-15 RX ORDER — SODIUM CHLORIDE, SODIUM GLUCONATE, SODIUM ACETATE, POTASSIUM CHLORIDE, MAGNESIUM CHLORIDE, SODIUM PHOSPHATE, DIBASIC, AND POTASSIUM PHOSPHATE .53; .5; .37; .037; .03; .012; .00082 G/100ML; G/100ML; G/100ML; G/100ML; G/100ML; G/100ML; G/100ML
100 INJECTION, SOLUTION INTRAVENOUS CONTINUOUS
Status: DISCONTINUED | OUTPATIENT
Start: 2024-12-15 | End: 2024-12-16

## 2024-12-15 RX ORDER — HYDROMORPHONE HCL/PF 1 MG/ML
0.5 SYRINGE (ML) INJECTION EVERY 4 HOURS PRN
Refills: 0 | Status: CANCELLED | OUTPATIENT
Start: 2024-12-15

## 2024-12-15 RX ORDER — LORAZEPAM 1 MG/1
2 TABLET ORAL ONCE
Status: DISCONTINUED | OUTPATIENT
Start: 2024-12-15 | End: 2024-12-20

## 2024-12-15 RX ORDER — CHLORDIAZEPOXIDE HYDROCHLORIDE 25 MG/1
25 CAPSULE, GELATIN COATED ORAL EVERY 6 HOURS
Status: CANCELLED | OUTPATIENT
Start: 2024-12-15 | End: 2024-12-17

## 2024-12-15 RX ORDER — LANOLIN ALCOHOL/MO/W.PET/CERES
100 CREAM (GRAM) TOPICAL DAILY
Status: CANCELLED | OUTPATIENT
Start: 2024-12-16

## 2024-12-15 RX ORDER — LANOLIN ALCOHOL/MO/W.PET/CERES
100 CREAM (GRAM) TOPICAL DAILY
Status: DISCONTINUED | OUTPATIENT
Start: 2024-12-16 | End: 2024-12-24 | Stop reason: HOSPADM

## 2024-12-15 RX ORDER — NICOTINE 21 MG/24HR
1 PATCH, TRANSDERMAL 24 HOURS TRANSDERMAL DAILY
Status: DISCONTINUED | OUTPATIENT
Start: 2024-12-16 | End: 2024-12-24 | Stop reason: HOSPADM

## 2024-12-15 RX ORDER — SODIUM CHLORIDE, SODIUM GLUCONATE, SODIUM ACETATE, POTASSIUM CHLORIDE, MAGNESIUM CHLORIDE, SODIUM PHOSPHATE, DIBASIC, AND POTASSIUM PHOSPHATE .53; .5; .37; .037; .03; .012; .00082 G/100ML; G/100ML; G/100ML; G/100ML; G/100ML; G/100ML; G/100ML
100 INJECTION, SOLUTION INTRAVENOUS CONTINUOUS
Status: CANCELLED | OUTPATIENT
Start: 2024-12-15 | End: 2024-12-15

## 2024-12-15 RX ORDER — PANTOPRAZOLE SODIUM 40 MG/10ML
40 INJECTION, POWDER, LYOPHILIZED, FOR SOLUTION INTRAVENOUS EVERY 12 HOURS SCHEDULED
Status: CANCELLED | OUTPATIENT
Start: 2024-12-15

## 2024-12-15 RX ORDER — HYDROMORPHONE HCL/PF 1 MG/ML
1 SYRINGE (ML) INJECTION EVERY 4 HOURS PRN
Refills: 0 | Status: DISCONTINUED | OUTPATIENT
Start: 2024-12-15 | End: 2024-12-16

## 2024-12-15 RX ORDER — SENNOSIDES 8.6 MG
1 TABLET ORAL
Status: CANCELLED | OUTPATIENT
Start: 2024-12-15

## 2024-12-15 RX ORDER — ENOXAPARIN SODIUM 100 MG/ML
40 INJECTION SUBCUTANEOUS
Status: DISCONTINUED | OUTPATIENT
Start: 2024-12-16 | End: 2024-12-24 | Stop reason: HOSPADM

## 2024-12-15 RX ORDER — FOLIC ACID 1 MG/1
1 TABLET ORAL DAILY
Status: DISCONTINUED | OUTPATIENT
Start: 2024-12-16 | End: 2024-12-24 | Stop reason: HOSPADM

## 2024-12-15 RX ORDER — PANTOPRAZOLE SODIUM 40 MG/10ML
40 INJECTION, POWDER, LYOPHILIZED, FOR SOLUTION INTRAVENOUS EVERY 12 HOURS SCHEDULED
Status: DISCONTINUED | OUTPATIENT
Start: 2024-12-15 | End: 2024-12-24 | Stop reason: HOSPADM

## 2024-12-15 RX ORDER — FOLIC ACID 1 MG/1
1 TABLET ORAL DAILY
Status: CANCELLED | OUTPATIENT
Start: 2024-12-16

## 2024-12-15 RX ORDER — NICOTINE 21 MG/24HR
1 PATCH, TRANSDERMAL 24 HOURS TRANSDERMAL DAILY
Status: CANCELLED | OUTPATIENT
Start: 2024-12-16

## 2024-12-15 RX ORDER — LORAZEPAM 0.5 MG/1
0.5 TABLET ORAL ONCE AS NEEDED
Status: CANCELLED | OUTPATIENT
Start: 2024-12-15

## 2024-12-15 RX ORDER — SENNOSIDES 8.6 MG
1 TABLET ORAL
Status: DISCONTINUED | OUTPATIENT
Start: 2024-12-15 | End: 2024-12-21

## 2024-12-15 RX ADMIN — FOLIC ACID 1 MG: 1 TABLET ORAL at 09:16

## 2024-12-15 RX ADMIN — HYDROMORPHONE HYDROCHLORIDE 0.5 MG: 1 INJECTION, SOLUTION INTRAMUSCULAR; INTRAVENOUS; SUBCUTANEOUS at 11:21

## 2024-12-15 RX ADMIN — NICOTINE 1 PATCH: 21 PATCH, EXTENDED RELEASE TRANSDERMAL at 09:15

## 2024-12-15 RX ADMIN — HYDROMORPHONE HYDROCHLORIDE 0.5 MG: 1 INJECTION, SOLUTION INTRAMUSCULAR; INTRAVENOUS; SUBCUTANEOUS at 17:07

## 2024-12-15 RX ADMIN — HYDROMORPHONE HYDROCHLORIDE 1 MG: 1 INJECTION, SOLUTION INTRAMUSCULAR; INTRAVENOUS; SUBCUTANEOUS at 22:27

## 2024-12-15 RX ADMIN — HYDROMORPHONE HYDROCHLORIDE 1 MG: 1 INJECTION, SOLUTION INTRAMUSCULAR; INTRAVENOUS; SUBCUTANEOUS at 13:55

## 2024-12-15 RX ADMIN — LORAZEPAM 0.5 MG: 0.5 TABLET ORAL at 13:27

## 2024-12-15 RX ADMIN — Medication 100 MG: at 09:16

## 2024-12-15 RX ADMIN — HYDROMORPHONE HYDROCHLORIDE 1 MG: 1 INJECTION, SOLUTION INTRAMUSCULAR; INTRAVENOUS; SUBCUTANEOUS at 08:33

## 2024-12-15 RX ADMIN — ONDANSETRON 4 MG: 2 INJECTION INTRAMUSCULAR; INTRAVENOUS at 22:27

## 2024-12-15 RX ADMIN — MULTIPLE VITAMINS W/ MINERALS TAB 1 TABLET: TAB ORAL at 09:16

## 2024-12-15 RX ADMIN — SODIUM CHLORIDE, SODIUM GLUCONATE, SODIUM ACETATE, POTASSIUM CHLORIDE, MAGNESIUM CHLORIDE, SODIUM PHOSPHATE, DIBASIC, AND POTASSIUM PHOSPHATE 100 ML/HR: .53; .5; .37; .037; .03; .012; .00082 INJECTION, SOLUTION INTRAVENOUS at 11:21

## 2024-12-15 RX ADMIN — CHLORDIAZEPOXIDE HYDROCHLORIDE 25 MG: 5 CAPSULE ORAL at 20:20

## 2024-12-15 RX ADMIN — CHLORDIAZEPOXIDE HYDROCHLORIDE 25 MG: 25 CAPSULE ORAL at 09:16

## 2024-12-15 RX ADMIN — PANTOPRAZOLE SODIUM 40 MG: 40 INJECTION, POWDER, FOR SOLUTION INTRAVENOUS at 20:20

## 2024-12-15 RX ADMIN — ONDANSETRON 4 MG: 2 INJECTION, SOLUTION INTRAMUSCULAR; INTRAVENOUS at 02:37

## 2024-12-15 RX ADMIN — PANTOPRAZOLE SODIUM 40 MG: 40 INJECTION, POWDER, FOR SOLUTION INTRAVENOUS at 09:16

## 2024-12-15 RX ADMIN — SODIUM CHLORIDE, SODIUM GLUCONATE, SODIUM ACETATE, POTASSIUM CHLORIDE, MAGNESIUM CHLORIDE, SODIUM PHOSPHATE, DIBASIC, AND POTASSIUM PHOSPHATE 100 ML/HR: .53; .5; .37; .037; .03; .012; .00082 INJECTION, SOLUTION INTRAVENOUS at 17:07

## 2024-12-15 RX ADMIN — HYDROMORPHONE HYDROCHLORIDE 1 MG: 1 INJECTION, SOLUTION INTRAMUSCULAR; INTRAVENOUS; SUBCUTANEOUS at 04:37

## 2024-12-15 RX ADMIN — CHLORDIAZEPOXIDE HYDROCHLORIDE 25 MG: 25 CAPSULE ORAL at 02:20

## 2024-12-15 NOTE — ASSESSMENT & PLAN NOTE
Patient reports he drinks 3 glasses of wine for the past few years.  However, since her last hospital discharge on 11/29 she has not had any alcohol.  Has history of withdrawal seizure in 2023 for which she was admitted.  On Librium 25 mg, CIWA protocol  Patient refused CRS consult.

## 2024-12-15 NOTE — ASSESSMENT & PLAN NOTE
Reported history of GERD on pantoprazole 40 mg twice daily at home.  Continue Protonix 40 mg IV every 12 hours.  Switch when appropriate.

## 2024-12-15 NOTE — LETTER
[facility] [phone] [id number]  Dept: [phone]    December 24, 2024     Patient: Kaykay Holland   YOB: 1990   Date of Visit: 12/15/2024       To Whom it May Concern:    Kaykay Holland is under my professional care. She was seen in the hospital from 12/13/24 to 12/24/24. She may return to work on 1/3/25 without limitations.    If you have any questions or concerns, please don't hesitate to call.  [phone]       Sincerely,     Roxie Curry, DO

## 2024-12-15 NOTE — ASSESSMENT & PLAN NOTE
"Presenting with nausea and L sided pain that onset at 1500 on 12/13  Hx of recurrent alcoholic pancreatitis   CT A/P: \"Persistent peripancreatic fluid compatible with pancreatitis. New large well-defined cystic collection/pseudocyst effacing and displacing the proximal stomach anteriorly measuring 9.8 x 9.4 x 12.2 cm with intermediate density within the dependent portion and within some branching components inferiorly compatible with proteinaceous/blood products. More defined/organized peripancreatic collections when compared with prior exam including a 3.0 x 2.1 cm collection within the tail of the pancreas previously 1.4 x 1.3 cm.\"  GI consulted: advising transfer to B for cyst gastrectomy and necrosectomy with advanced GI  Surgery following as well  Advanced to clears today  IVF  Pain control   Nutrition following, if diet not advanced in near future would need TPN  "

## 2024-12-15 NOTE — DISCHARGE SUMMARY
"Discharge Summary - Hospitalist   Name: Kaykay Holland 34 y.o. female I MRN: [id number]  Unit/Bed#: -01 I Date of Admission: 12/13/2024   Date of Service: 12/15/2024 I Hospital Day: 1     Assessment & Plan  Necrotizing pancreatitis  Presenting with nausea and L sided pain that onset at 1500 on 12/13  Hx of recurrent alcoholic pancreatitis   CT A/P: \"Persistent peripancreatic fluid compatible with pancreatitis. New large well-defined cystic collection/pseudocyst effacing and displacing the proximal stomach anteriorly measuring 9.8 x 9.4 x 12.2 cm with intermediate density within the dependent portion and within some branching components inferiorly compatible with proteinaceous/blood products. More defined/organized peripancreatic collections when compared with prior exam including a 3.0 x 2.1 cm collection within the tail of the pancreas previously 1.4 x 1.3 cm.\"  GI consulted: advising transfer to hospitals for cyst gastrectomy and necrosectomy with advanced GI  Surgery following as well  Advanced to clears today  IVF  Pain control   Nutrition following, if diet not advanced in near future would need TPN  Pancreatic pseudocyst  CT A/P: \"New large well-defined cystic collection/pseudocyst effacing and displacing the proximal stomach anteriorly measuring 9.8 x 9.4 x 12.2 cm with intermediate density within the dependent portion and within some branching components inferiorly compatible with proteinaceous/blood products. \"  GI consulted, for transfer to hospitals for cyst gastrectomy  Alcohol abuse  Hx of EtOH abuse with 2-3 bottles of wine daily   Reports she hasn't drank since d/c from hospital on 11/29 except for glass of wine 12/13 evening, however alcohol level on admit 157 on admit  Has had EtOH withdrawal requiring phenobarb and precedex during prior admit   Started on librium 50mg q6h x1 day, followed by 25mg q6h x2 days   CIWA protocol   Gastroesophageal reflux disease without esophagitis  Cont protonix 40mg IV " BID with hx of PUD     Medical Problems       Resolved Problems  Date Reviewed: 12/15/2024   None       Discharging Physician / Practitioner: Faith Huggins PA-C  PCP: PATRICIA Moran  Admission Date:   Admission Orders (From admission, onward)       Ordered        12/14/24 0048  INPATIENT ADMISSION  Once                          Discharge/Transfer Date: 12/15/24    Disposition:   Transfer to: [facility]  Reason for Transfer: cyst gastrectomy and necrosectomy with advanced GI team  Accepting Provider at Receiving [place]: [facility]    Consultations During Hospital Stay:  GI  Surgery  Nutrition    Procedures Performed:   none    Significant Findings / Test Results:   WBC 14.38 on admission  Lipase 165  Lactic acid 2.2  Ethanol 157  Mag 1.4  CT abdomen pelvis with contrast  Status: Final result     PACS Images     Show images for CT abdomen pelvis with contrast  Study Result    Narrative & Impression   CT ABDOMEN AND PELVIS WITH IV CONTRAST     INDICATION: Abdominal pain with history of pancreatitis.     COMPARISON: CT of the abdomen pelvis on November 23, 2024.     TECHNIQUE: CT examination of the abdomen and pelvis was performed. Multiplanar 2D reformatted images were created from the source data.     This examination, like all CT scans performed in the [facility], was performed utilizing techniques to minimize radiation dose exposure, including the use of iterative reconstruction and automated exposure control. Radiation dose length   product (DLP) for this visit: 469.02 mGy-cm     IV Contrast: 100 mL of iohexol (OMNIPAQUE)  Enteric Contrast: Not administered.     FINDINGS:     ABDOMEN     LOWER CHEST: No clinically significant abnormality in the visualized lower chest.     LIVER/BILIARY TREE: Unremarkable.     GALLBLADDER: No calcified gallstones. No pericholecystic inflammatory change.     SPLEEN: Unremarkable.     PANCREAS: Persistent peripancreatic fluid compatible with pancreatitis. There are more  defined/organized peripancreatic collections along the tail of the pancreas now measuring 3.0 x 2.1 cm, previously 1.4 x 1.3 cm and additional small hypodense regions   are seen measuring up to 1 cm for example in the pancreatic head (series 2, image 164 and within the body and tail of the pancreas (series 2, image 59). There is a large 9.8 x 9.4 x 12.2 cm well-defined cystic collection displacing the proximal stomach   anteriorly (series 2, image 43). There is intermediate density within the dependent portion of the cyst which demonstrates some branching components inferiorly (series 601, image 39) which may be due to proteinaceous/blood products.     ADRENAL GLANDS: Unremarkable.     KIDNEYS/URETERS: No hydronephrosis or urinary tract calculi. Subcentimeter hypoattenuating renal lesion(s), too small to characterize but statistically likely benign, which do not warrant follow-up (Radiology June 2019).     STOMACH AND BOWEL: The proximal stomach is effaced due to adjacent mass effect. The distal stomach demonstrate mild wall thickening compatible with gastritis. No bowel obstruction. Colonic diverticulosis without evidence of diverticulitis. There is   thickening of the splenic flexure, likely reactive to adjacent pancreatitis.     APPENDIX: Normal.     ABDOMINOPELVIC CAVITY: No significant ascites. No pneumoperitoneum. No lymphadenopathy.     VESSELS: Redemonstrated splenic vein occlusion and multiple left upper quadrant and upper abdominal venous collaterals/varices. The portal and superior mesenteric vein remain patent.     PELVIS     REPRODUCTIVE ORGANS: 2.3 cm right ovarian dominant follicle. 1.4 cm left ovarian dominant follicle.     URINARY BLADDER: Unremarkable.     ABDOMINAL WALL/INGUINAL REGIONS: Unremarkable.     BONES: No acute fracture or suspicious osseous lesion.     IMPRESSION:     1.  Persistent peripancreatic fluid compatible with pancreatitis.  2.  New large well-defined cystic  collection/pseudocyst effacing and displacing the proximal stomach anteriorly measuring 9.8 x 9.4 x 12.2 cm with intermediate density within the dependent portion and within some branching components inferiorly   compatible with proteinaceous/blood products.  3.  More defined/organized peripancreatic collections when compared with prior exam including a 3.0 x 2.1 cm collection within the tail of the pancreas previously 1.4 x 1.3 cm.  4.  Mild thickening of the distal stomach which may be due to gastritis.  5.  Diverticulosis without evidence of diverticulitis. Thickening of the splenic flecture which is likely reactive to the adjacent pancreatitis.  6.  Other findings as above.     Workstation performed: [id number]          Incidental Findings:   none     Test Results Pending at Discharge (will require follow up):   none    Complications:  none    Reason for Admission: necrotizing pancreatitis    Hospital Course:   Kaykay Holland is a 34 y.o. female patient with PMHx of recurrent alcoholic pancreatitis, alcohol abuse, GERD, PUD, who originally presented to the hospital on 12/13/2024 due to nausea and left-sided abdominal pain.  Patient's lipase was elevated and imaging revealed necrotizing pancreatitis with a new large well-defined cystic collection as noted above on CT.  She met no SIRS criteria.  She was admitted, made strictly n.p.o. and started on aggressive IV fluids.  GI and surgery were consulted.  She was also started on CIWA/scheduled Librium.  GI ultimately recommended transfer to [place] for cyst gastrectomy and necrosectomy. Nutrition consulted and recommending TPN if diet is not advanced; pt currently tolerating clears. She was stable for transfer to \[facility]\"" on 12/15/24.    Please see above list of diagnoses and related plan for additional information.     Condition at Discharge: fair    Discharge Day Visit / Exam:   Subjective: Patient is reporting left-sided abdominal pain that is persistent and  "severe.  She reports intermittent nausea but denies vomiting.  She is tolerating sips of clears thus far.  She is mad that she has been n.p.o. for so many days.  She denies fever or chills.  Vitals: Blood Pressure: 128/83 (12/15/24 1311)  Pulse: 89 (12/15/24 1311)  Temperature: 97.9 °F (36.6 °C) (12/15/24 0917)  Temp Source: Temporal (12/14/24 0300)  Respirations: 18 (12/14/24 0300)  Height: 5' 5\" (165.1 cm) (12/14/24 0138)  Weight - Scale: 55.1 kg (121 lb 6.4 oz) (12/15/24 0547)  SpO2: 94 % (12/15/24 1311)  Physical Exam  Vitals and nursing note reviewed.   Constitutional:       General: She is not in acute distress.     Appearance: Normal appearance. She is normal weight. She is not ill-appearing or toxic-appearing.   HENT:      Head: Normocephalic and atraumatic.      Right Ear: External ear normal.      Left Ear: External ear normal.      Nose: Nose normal.      Mouth/Throat:      Mouth: Mucous membranes are moist.      Pharynx: Oropharynx is clear.   Eyes:      Conjunctiva/sclera: Conjunctivae normal.   Cardiovascular:      Rate and Rhythm: Normal rate and regular rhythm.      Pulses: Normal pulses.      Heart sounds: Normal heart sounds. No murmur heard.     No friction rub. No gallop.   Pulmonary:      Effort: Pulmonary effort is normal. No respiratory distress.      Breath sounds: Normal breath sounds. No stridor. No wheezing, rhonchi or rales.   Abdominal:      General: Abdomen is flat. Bowel sounds are normal. There is no distension.      Palpations: Abdomen is soft. There is no mass.      Tenderness: There is abdominal tenderness (left sided abd ttp worst over LLQ). There is no right CVA tenderness, guarding or rebound.      Hernia: No hernia is present.   Musculoskeletal:      Cervical back: Normal range of motion.      Right lower leg: No edema.      Left lower leg: No edema.   Skin:     General: Skin is warm and dry.   Neurological:      Mental Status: She is alert. Mental status is at baseline. "         Discussion with Family: Updated  (mother) at bedside.     Administrative Statements   Discharge Statement:  I have spent a total time of 45 minutes in caring for this patient on the day of the visit/encounter. >30 minutes of time was spent on: Diagnostic results, Patient and family education, Risk factor reductions, Impressions, Counseling / Coordination of care, Documenting in the medical record, Reviewing / ordering tests, medicine, procedures  , and Communicating with other healthcare professionals .    **Please Note: This note may have been constructed using a voice recognition system.**

## 2024-12-15 NOTE — H&P
INTERNAL MEDICINE RESIDENCY ADMISSION H&P     Name: Kaykay Holland   Age & Sex: 34 y.o. female   MRN: [id number]  Unit/Bed#: [facility] 812-01   Encounter: [id number]  Primary Care Provider: PATRICIA Moran    Code Status: Level 1 - Full Code  Admission Status: INPATIENT   Disposition: Patient requires Med/Surg    Admit to team: SOD Team C     ASSESSMENT/PLAN     Principal Problem:    Acute alcoholic pancreatitis  Active Problems:    Alcohol withdrawal seizure (HCC)    Essential hypertension    Tobacco abuse    Gastroesophageal reflux disease without esophagitis      * Acute alcoholic pancreatitis  Assessment & Plan  [facility] of [facility] and left-sided abdominal pain radiating to back. Has history of recurrent alcoholic pancreatitis.  Recent hospitalization (11/23 - 11/29) during which time was managed conservatively.  CT with findings of recurrent pericarditis and large pancreatic pseudocyst.  General surgery consulted at [facility].  Recommended conservative management.  GI consulted at [facility]. Recommended transfer to [facility] for cyst gastrostomy and necrosectomy with advance GI team.    Plan:  Patient currently tolerating clear liquid diet.  N.p.o. at midnight on 12/16 for GI for possible intervention.  GI consulted.  Appreciate recommendation.  Pain medications.  IV hydration, nausea medication.    Gastroesophageal reflux disease without esophagitis  Assessment & Plan  Reported history of GERD on pantoprazole 40 mg twice daily at home.  Continue Protonix 40 mg IV every 12 hours.  Switch when appropriate.    Tobacco abuse  Assessment & Plan  Smokes a pack a day for past 5 to 6 years.  On nicotine patch.  Smoking cessation education.    Essential hypertension  Assessment & Plan  Previously on lisinopril which has been discontinued for about 6 months or so.  Blood pressure well-controlled.  Monitor daily vitals    Alcohol withdrawal seizure (HCC)  Assessment &  Plan  Patient reports he drinks 3 glasses of wine for the past few years.  However, since her last hospital discharge on 11/29 she has not had any alcohol.  Has history of withdrawal seizure in 2023 for which she was admitted.  On Librium  CIWA protocol  Patient refused CRS consult.        VTE Pharmacologic Prophylaxis: Enoxaparin (Lovenox)  VTE Mechanical Prophylaxis: sequential compression device    CHIEF COMPLAINT   No chief complaint on file.     HISTORY OF PRESENT ILLNESS     34-year-old female with a past medical history of alcohol use disorder with withdrawal seizure, recurrent alcoholic pancreatitis, hypertension, smoker who initially presented to [facility] with complaints of nausea and left-sided abdominal pain.  Of note, patient was recently hospitalized (11/23 - 11/29) for pancreatitis and acute necrotic collection on CT for which she was treated with supportive care. Patient reports since discharge she was tolerating regular diet with minimal discomfort. However, since last Wednesday her abdominal pain worsened which led to the ED visit.  CT abdomen showed recurrent pancreatitis and evidence of large pancreatic pseudocyst.  General surgery was consulted who recommended conservative management. GI was consulted who recommended transfer to [facility] for cyst gastrostomy and necrosectomy with advanced GI team. She is being admitted to [facility] for further management of pancreatitis with large pancreatic pseudocyst. She is confirmed Level 1 code status.     Patient seen and examined at bedside.  She reports she tolerated clear liquid diet this morning with no significant abdominal pain. However, she mentions she continues to have abdominal pain, 5-7/10 which is managed on current pain regimen. Denies any fever, chills, chest pain, SOB, palpitation, n/v, lightheadedness/dizziness. She reports since her last hospital discharge on 11/29 she has not had any alcohol. She smokes a pack a day for 5-6 years. She drinks  3 glasses of wine for past few years. Denies any other drug use.     REVIEW OF SYSTEMS     Review of Systems   Constitutional:  Negative for chills, fatigue and fever.   Respiratory:  Negative for cough and shortness of breath.    Cardiovascular:  Negative for chest pain and palpitations.   Gastrointestinal:  Positive for abdominal pain. Negative for diarrhea, nausea and vomiting.   All other systems reviewed and are negative.    OBJECTIVE     Vitals:    12/15/24 1525   Pulse: 81   Temp: 98.1 °F (36.7 °C)   SpO2: 96%      Temperature:   Temp (24hrs), Av.7 °F (36.5 °C), Min:97 °F (36.1 °C), Max:98.1 °F (36.7 °C)    Temperature: 98.1 °F (36.7 °C)  Intake & Output:  I/O       None          Weights:        There is no height or weight on file to calculate BMI.  Weight (last 2 days)       None          Physical Exam  Vitals and nursing note reviewed.   Constitutional:       General: She is not in acute distress.     Appearance: Normal appearance. She is normal weight.   Cardiovascular:      Rate and Rhythm: Normal rate and regular rhythm.      Pulses: Normal pulses.      Heart sounds: Normal heart sounds.   Pulmonary:      Effort: Pulmonary effort is normal.      Breath sounds: Normal breath sounds.   Abdominal:      General: Abdomen is flat. Bowel sounds are normal.      Palpations: Abdomen is soft.      Tenderness: There is abdominal tenderness.   Neurological:      Mental Status: She is alert and oriented to person, place, and time. Mental status is at baseline.       PAST MEDICAL HISTORY     Past Medical History:   Diagnosis Date    Acute alcoholic pancreatitis 2023    Hypertension     SIRS (systemic inflammatory response syndrome) (HCC) 04/10/2024     PAST SURGICAL HISTORY     Past Surgical History:   Procedure Laterality Date    WISDOM TOOTH EXTRACTION       SOCIAL & FAMILY HISTORY     Social History     Substance and Sexual Activity   Alcohol Use Yes    Alcohol/week: 21.0 standard drinks of alcohol     "Types: 21 Glasses of wine per week    Comment: \"occasional\"       Social History     Substance and Sexual Activity   Drug Use Never     Social History     Tobacco Use   Smoking Status Every Day    Current packs/day: 0.50    Types: Cigarettes    Passive exposure: Never   Smokeless Tobacco Never   Tobacco Comments    Per pt last drink was today 9/24/24 one glass of wine. Had previously stop on 4/4/24 for 1 month      No family history on file.  LABORATORY DATA     Labs: I have personally reviewed pertinent reports.    Results from last 7 days   Lab Units 12/15/24  0236 12/14/24  1020 12/14/24  0338 12/13/24  1825   WBC Thousand/uL 7.83 8.13 10.45* 14.38*   HEMOGLOBIN g/dL 10.5* 10.8* 11.0* 12.8   HEMATOCRIT % 33.1* 34.0* 34.2* 38.8   PLATELETS Thousands/uL 224 240 296 369   SEGS PCT % 67  --   --  65   MONO PCT % 7  --   --  5   EOS PCT % 5  --   --  5      Results from last 7 days   Lab Units 12/15/24  0236 12/14/24  1020 12/14/24  0338 12/13/24  1825   POTASSIUM mmol/L 3.9 4.2 4.0 3.8   CHLORIDE mmol/L 98 101 103 103   CO2 mmol/L 28 24 25 24   BUN mg/dL 4* 6 5 5   CREATININE mg/dL 0.39* 0.44* 0.39* 0.40*   CALCIUM mg/dL 7.0* 7.5* 7.7* 8.7   ALK PHOS U/L  --   --   --  72   ALT U/L  --   --   --  13   AST U/L  --   --   --  25     Results from last 7 days   Lab Units 12/14/24  1020 12/14/24  0338   MAGNESIUM mg/dL 2.5 1.4*     Results from last 7 days   Lab Units 12/14/24  0338   PHOSPHORUS mg/dL 4.0          Results from last 7 days   Lab Units 12/13/24  2312   LACTIC ACID mmol/L 1.4         Micro:  Lab Results   Component Value Date    BLOODCX No Growth at 24 hrs. 12/13/2024    BLOODCX No Growth at 24 hrs. 12/13/2024    BLOODCX No Growth After 5 Days. 04/09/2024    URINECX >100,000 cfu/ml 11/23/2024     IMAGING & DIAGNOSTIC TESTS     Imaging: I have personally reviewed pertinent reports.    No results found.  EKG, Pathology, and Other Studies: I have personally reviewed pertinent reports.     ALLERGIES "     Allergies   Allergen Reactions    Other Edema     Bee stings (localized edema)     MEDICATIONS PRIOR TO ARRIVAL     Prior to Admission medications    Medication Sig Start Date End Date Taking? Authorizing Provider   folic acid ( Folic Acid) 1 mg tablet Take 1 tablet (1 mg total) by mouth daily 12/1/24  Yes Elena Vásquez PA-C   ondansetron (ZOFRAN-ODT) 4 mg disintegrating tablet Take 1 tablet (4 mg total) by mouth every 6 (six) hours as needed for nausea or vomiting 12/1/24  Yes Elena Vásquez PA-C   pantoprazole (PROTONIX) 40 mg tablet Take 1 tablet (40 mg total) by mouth 2 (two) times a day 9/26/24  Yes Jordana Lewis PA-C     MEDICATIONS ADMINISTERED IN LAST 24 HOURS     Medication Administration - last 24 hours from 12/14/2024 1724 to 12/15/2024 1724         Date/Time Order Dose Route Action Action by     12/15/2024 1713 EST HYDROmorphone (DILAUDID) injection 0.5 mg -- Intravenous See Alternative Trenton Sue RN     12/15/2024 1713 EST HYDROmorphone (DILAUDID) injection 1 mg 1 mg Intravenous Not Given Trenton Sue RN     12/15/2024 1707 EST HYDROmorphone (DILAUDID) injection 0.5 mg 0.5 mg Intravenous Given Trenton Sue RN     12/15/2024 1707 EST multi-electrolyte (PLASMALYTE-A/ISOLYTE-S PH 7.4) IV solution 100 mL/hr Intravenous New Bag Trenton Sue RN          CURRENT MEDICATIONS     Current Facility-Administered Medications   Medication Dose Route Frequency Provider Last Rate    chlordiazePOXIDE  25 mg Oral Q6H CAMPOS Marley-C      [START ON 12/16/2024] enoxaparin  40 mg Subcutaneous Q24H [facility] Farhad Evans DO      [START ON 12/16/2024] folic acid  1 mg Oral Daily Faith Huggins, PA-C      HYDROmorphone  0.5 mg Intravenous Q4H PRN Faith Hellerldin, PA-C      Or    HYDROmorphone  1 mg Intravenous Q4H PRN Faith Hellerldin, PA-C      HYDROmorphone  0.5 mg Intravenous Q4H PRN Faith Hellerldin, PA-C      multi-electrolyte  100 mL/hr Intravenous Continuous Ang Tyrone Evans,   "mL/hr (12/15/24 1707)    [START ON 12/16/2024] multivitamin-minerals  1 tablet Oral Daily Faith Hellerldin PA-C      [START ON 12/16/2024] nicotine  1 patch Transdermal Daily Faith Guldin, PA-C      ondansetron  4 mg Intravenous Q4H PRN Faith Guldin, PA-C      pantoprazole  40 mg Intravenous Q12H RODRICK Faith Guldin, PA-C      senna  1 tablet Oral HS PRN Faith Arronldin, PA-C      [START ON 12/16/2024] thiamine  100 mg Oral Daily Faith Guldin, PA-C       multi-electrolyte, 100 mL/hr, Last Rate: 100 mL/hr (12/15/24 1707)      HYDROmorphone, 0.5 mg, Q4H PRN   Or  HYDROmorphone, 1 mg, Q4H PRN  HYDROmorphone, 0.5 mg, Q4H PRN  ondansetron, 4 mg, Q4H PRN  senna, 1 tablet, HS PRN        Admission Time  I spent 45 minutes admitting the patient.  This involved direct patient contact where I performed a full history and physical, reviewing previous records, and reviewing laboratory and other diagnostic studies.    Portions of the record may have been created with voice recognition software.  Occasional wrong word or \"sound a like\" substitutions may have occurred due to the inherent limitations of voice recognition software.  Read the chart carefully and recognize, using context, where substitutions have occurred.    ==  Farhad Evans DO  [facility]  Internal Medicine Residency PGY-3    "

## 2024-12-15 NOTE — ASSESSMENT & PLAN NOTE
"CT A/P: \"New large well-defined cystic collection/pseudocyst effacing and displacing the proximal stomach anteriorly measuring 9.8 x 9.4 x 12.2 cm with intermediate density within the dependent portion and within some branching components inferiorly compatible with proteinaceous/blood products. \"  GI consulted, for transfer to [facility] for cyst gastrectomy  "

## 2024-12-15 NOTE — PLAN OF CARE

## 2024-12-15 NOTE — PROGRESS NOTES
"Progress note - [facility]   Kaykay oHlland 34 y.o. 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  Pancreatitis with fluid collection: CT images personally reviewed and interpreted by me.  She does have worsening and maturing fluid collection that is compressing the stomach.  There are no clinical signs of necrosis or infected necrosis at this time.  Continue IV fluids.  Needs transfer to [facility] for cyst gastrectomy and necrosectomy (advanced GI team aware). Can advance diet to clear liquids.     Chief Complaint   Patient presents with    Abdominal Pain     Pt states \"I have stabbing pain, nausea, dizziness and stomach pain\" Denies fevers.        SUBJECTIVE  \"I feel the same.\"    Temp:  [97 °F (36.1 °C)-97.9 °F (36.6 °C)] 97.9 °F (36.6 °C)  HR:  [64-68] 65  BP: ()/(63-80) 108/73  SpO2:  [94 %-100 %] 98 %     PHYSICAL EXAM  General Appearance: NAD, cooperative, alert  Eyes: Anicteric  GI:  Soft, tender, non-distended; normal bowel sounds; no masses, no organomegaly   Rectal: Deferred  Musculoskeletal: No edema.  Skin:  No jaundice    LABS & STUDIES  Lab Results   Component Value Date    CALCIUM 7.0 (L) 12/15/2024    K 3.9 12/15/2024    CO2 28 12/15/2024    CL 98 12/15/2024    BUN 4 (L) 12/15/2024    CREATININE 0.39 (L) 12/15/2024    CREATININE 0.44 (L) 12/14/2024    CREATININE 0.39 (L) 12/14/2024     Lab Results   Component Value Date    WBC 7.83 12/15/2024    WBC 8.13 12/14/2024    WBC 10.45 (H) 12/14/2024    HGB 10.5 (L) 12/15/2024    HGB 10.8 (L) 12/14/2024    HGB 11.0 (L) 12/14/2024     (H) 12/15/2024     12/15/2024    " Reassurance given that exam was normal at today's visit.    Immunizations: Up-to-date. Tdap (tetanus and pertussis) vaccine updated at today's visit.   We will plan obtaining labs next year when he turns 26.    Discussed health maintenance, including regular aerobic exercise,heart healthy diet, and periodic exams.   " 12/14/2024     12/14/2024     Lab Results   Component Value Date    ALT 13 12/13/2024    ALT 20 11/26/2024    ALT 22 11/25/2024    AST 25 12/13/2024    AST 33 11/26/2024    AST 30 11/25/2024    ALKPHOS 72 12/13/2024    ALKPHOS 61 11/26/2024    ALKPHOS 55 11/25/2024    TBILI 0.45 12/13/2024    TBILI 0.82 11/26/2024    TBILI 1.19 (H) 11/25/2024     No results found for: \"AMYLASE\"  Lab Results   Component Value Date    LIPASE 77 12/14/2024     Lab Results   Component Value Date    IRON 44 (L) 04/10/2024    TIBC 275 04/10/2024    FERRITIN 2,560 (H) 04/10/2024     Lab Results   Component Value Date    INR 1.04 09/26/2024    INR 1.2 11/04/2023    INR 1.1 07/30/2019       CT abdomen pelvis with contrast  Result Date: 12/14/2024  Narrative: CT ABDOMEN AND PELVIS WITH IV CONTRAST INDICATION: Abdominal pain with history of pancreatitis. COMPARISON: CT of the abdomen pelvis on November 23, 2024. TECHNIQUE: CT examination of the abdomen and pelvis was performed. Multiplanar 2D reformatted images were created from the source data. This examination, like all CT scans performed in the [facility], was performed utilizing techniques to minimize radiation dose exposure, including the use of iterative reconstruction and automated exposure control. Radiation dose length product (DLP) for this visit: 469.02 mGy-cm IV Contrast: 100 mL of iohexol (OMNIPAQUE) Enteric Contrast: Not administered. FINDINGS: ABDOMEN LOWER CHEST: No clinically significant abnormality in the visualized lower chest. LIVER/BILIARY TREE: Unremarkable. GALLBLADDER: No calcified gallstones. No pericholecystic inflammatory change. SPLEEN: Unremarkable. PANCREAS: Persistent peripancreatic fluid compatible with pancreatitis. There are more defined/organized peripancreatic collections along the tail of the pancreas now measuring 3.0 x 2.1 cm, previously 1.4 x 1.3 cm and additional small hypodense regions are seen measuring up to 1 cm for " example in the pancreatic head (series 2, image 164 and within the body and tail of the pancreas (series 2, image 59). There is a large 9.8 x 9.4 x 12.2 cm well-defined cystic collection displacing the proximal stomach anteriorly (series 2, image 43). There is intermediate density within the dependent portion of the cyst which demonstrates some branching components inferiorly (series 601, image 39) which may be due to proteinaceous/blood products. ADRENAL GLANDS: Unremarkable. KIDNEYS/URETERS: No hydronephrosis or urinary tract calculi. Subcentimeter hypoattenuating renal lesion(s), too small to characterize but statistically likely benign, which do not warrant follow-up (Radiology June 2019). STOMACH AND BOWEL: The proximal stomach is effaced due to adjacent mass effect. The distal stomach demonstrate mild wall thickening compatible with gastritis. No bowel obstruction. Colonic diverticulosis without evidence of diverticulitis. There is thickening of the splenic flexure, likely reactive to adjacent pancreatitis. APPENDIX: Normal. ABDOMINOPELVIC CAVITY: No significant ascites. No pneumoperitoneum. No lymphadenopathy. VESSELS: Redemonstrated splenic vein occlusion and multiple left upper quadrant and upper abdominal venous collaterals/varices. The portal and superior mesenteric vein remain patent. PELVIS REPRODUCTIVE ORGANS: 2.3 cm right ovarian dominant follicle. 1.4 cm left ovarian dominant follicle. URINARY BLADDER: Unremarkable. ABDOMINAL WALL/INGUINAL REGIONS: Unremarkable. BONES: No acute fracture or suspicious osseous lesion.     Impression: 1.  Persistent peripancreatic fluid compatible with pancreatitis. 2.  New large well-defined cystic collection/pseudocyst effacing and displacing the proximal stomach anteriorly measuring 9.8 x 9.4 x 12.2 cm with intermediate density within the dependent portion and within some branching components inferiorly compatible with proteinaceous/blood products. 3.  More  defined/organized peripancreatic collections when compared with prior exam including a 3.0 x 2.1 cm collection within the tail of the pancreas previously 1.4 x 1.3 cm. 4.  Mild thickening of the distal stomach which may be due to gastritis. 5.  Diverticulosis without evidence of diverticulitis. Thickening of the splenic flecture which is likely reactive to the adjacent pancreatitis. 6.  Other findings as above. Workstation performed: [id number]     CT Abdomen pelvis with contrast  Result Date: 11/23/2024  Narrative: CT ABDOMEN AND PELVIS WITH IV CONTRAST INDICATION: pancreatitis. . COMPARISON: CT of the abdomen and pelvis 11/7/2024. TECHNIQUE: CT examination of the abdomen and pelvis was performed. Multiplanar 2D reformatted images were created from the source data. This examination, like all CT scans performed in the [facility], was performed utilizing techniques to minimize radiation dose exposure, including the use of iterative reconstruction and automated exposure control. Radiation dose length product (DLP) for this visit: 462.05 mGy-cm IV Contrast: 100 mL of iohexol (OMNIPAQUE) Enteric Contrast: Not administered. FINDINGS: ABDOMEN LOWER CHEST: No clinically significant abnormality in the visualized lower chest. LIVER/BILIARY TREE: Hepatic steatosis. No suspicious mass. Normal hepatic contours. No biliary dilation. GALLBLADDER: No calcified gallstones. No pericholecystic inflammatory change. SPLEEN: Unremarkable. PANCREAS: There is evidence of acute pancreatitis, which will be described based on the revised Rebekah Classification of Acute Pancreatitis: - TIME OF ONSET: less than or equal to 4 weeks - NECROSIS: There is both pancreatic parenchyma necrosis and peripancreatic necrosis, therefore this is necrotizing pancreatitis. Decreased enhancement in the pancreatic head and neck and to a lesser extent the pancreatic tail compared to 11/7/2024. Peripancreatic fluid collections measuring greater  than fluid density in the pancreatic head and tail are similar in size to prior study however have increased in density. - LOCAL COMPLICATIONS: There is acute necrotic collection (ANC.) Ill-defined heterogeneous collection adjacent to the duodenum and pancreatic head. - INFECTION: There is no abnormal gas in or around the pancreas to suggest infection. ADRENAL GLANDS: Unremarkable. KIDNEYS/URETERS: Tiny left renal cyst. No hydronephrosis. STOMACH AND BOWEL: No disproportionate dilation of the small or large bowel. APPENDIX: No findings to suggest appendicitis. ABDOMINOPELVIC CAVITY: Increased now small volume abdominopelvic ascites. No pneumoperitoneum. No lymphadenopathy. VESSELS: Unremarkable for patient's age. PELVIS REPRODUCTIVE ORGANS: Unremarkable for patient's age. URINARY BLADDER: Unremarkable. ABDOMINAL WALL/INGUINAL REGIONS: Unremarkable. BONES: No acute fracture or suspicious osseous lesion. Chronic right L5 pars defect.     Impression: Ongoing acute pancreatitis, now with findings of pancreatic necrosis and acute necrotic collection as described. Hepatic steatosis. The study was marked in EPIC for immediate notification. Workstation performed: [id number]

## 2024-12-15 NOTE — PROGRESS NOTES
Nutrition    Low Fat diet if to advance oral diet.  If TPN planned, initiate standard TPN for day 1 and day 2.  Check TPN lab panel.  Nutrition to follow up with goal TPN pending lab results.

## 2024-12-15 NOTE — UTILIZATION REVIEW
"Initial Clinical Review    Admission: Date/Time/Statement:   Admission Orders (From admission, onward)       Ordered        12/14/24 0048  INPATIENT ADMISSION  Once                          Orders Placed This Encounter   Procedures    INPATIENT ADMISSION     Standing Status:   Standing     Number of Occurrences:   1     Level of Care:   Med Surg [16]     Estimated length of stay:   More than 2 Midnights     Certification:   I certify that inpatient services are medically necessary for this patient for a duration of greater than two midnights. See H&P and MD Progress Notes for additional information about the patient's course of treatment.     ED Arrival Information       Expected   -    Arrival   12/13/2024 18:06    Acuity   Urgent              Means of arrival   Walk-In    Escorted by   [facility]    Service   Hospitalist    Admission type   Emergency              Arrival complaint   nausea, abd pain             Chief Complaint   Patient presents with    Abdominal Pain     Pt states \"I have stabbing pain, nausea, dizziness and stomach pain\" Denies fevers.        Initial Presentation: 34 y.o. female who presented self from home to [facility] ED. Admitted as Inpatient for evaluation and treatment of necrotizing pancreatitis. PMHx: pancreatitis, HTN. Presented w/ nausea, L-sided abdominal pain that started on 12/13 around 1500. Notes 1 glass of wine on Thanksgiving, but otherwise has not been consuming alcohol. Serum ETOH: 157. Pt later endorses 2-3 bottles of wine daily. EXAM: LUQ tenderness. Labs: WBC 14.38, Lipase 165, Lactic acid 2.2. Imaging: CT showed pancreatitis w/ new large collection consistent w/ peripancreatic abscess. PLAN: IVF, NPO, analgesics, start Librium taper, IV PPI BID. General Surgery, GI consulted.    Anticipated Length of Stay/Certification Statement: Patient will be admitted on an inpatient basis with an anticipated length of stay of greater than 2 midnights secondary to necrotizing " pancreatitis, pancreatic pseudocyst.     General Surgery: Pt w/ necrotizing pancreatitis. Tender LUQ w/ guarding. Plan: possible cyst gastrostomy w/ GI, NPO, sips with meds, encourage ETOH cessation, trend lipase.     GI: Pt w/ recurrent acute necrotizing pancreatitis. IVF, analgesics, antiemetics, will need evaluation w/ advanced endoscopy for cyst gastrostomy/necrosectomy.       Date: 12/15/24   Day 2: Reports continued pain, but some improvement from presentation. Exam: shallow breath sounds, upper abdomen tender w/ guarding. Plan: continue conservative management w/ IVF and analgesics, NPO, bowel rest, anti-emetics, Trend labs, replete electrolytes as needed.     ED Treatment-Medication Administration from 12/13/2024 1806 to 12/14/2024 0120         Date/Time Order Dose Route Action     12/13/2024 2029 sodium chloride 0.9 % bolus 1,000 mL 1,000 mL Intravenous New Bag     12/13/2024 2029 ondansetron (ZOFRAN) injection 4 mg 4 mg Intravenous Given     12/13/2024 2031 morphine injection 4 mg 4 mg Intravenous Given     12/13/2024 2123 iohexol (OMNIPAQUE) 350 MG/ML injection (MULTI-DOSE) 100 mL 100 mL Intravenous Given     12/13/2024 2344 morphine injection 4 mg 4 mg Intravenous Given     12/14/2024 0059 pantoprazole (PROTONIX) injection 40 mg 40 mg Intravenous Given            Scheduled Medications:  chlordiazePOXIDE, 25 mg, Oral, Q6H  folic acid, 1 mg, Oral, Daily  multivitamin-minerals, 1 tablet, Oral, Daily  nicotine, 1 patch, Transdermal, Daily  pantoprazole, 40 mg, Intravenous, Q12H RODRICK  thiamine, 100 mg, Oral, Daily    Continuous IV Infusions:  multi-electrolyte, 100 mL/hr, Intravenous, Continuous    PRN Meds:  HYDROmorphone, 0.5 mg, Intravenous, Q4H PRN; 12/14 x1  HYDROmorphone, 1 mg, Intravenous, Q4H PRN; 12/14 x5, 12/15 x2  HYDROmorphone, 0.5 mg, Intravenous, Q4H PRN  magnesium sulfate, 2 g, Intravenous; 12/14 x2  ondansetron, 4 mg, Intravenous, Q4H PRN; 12/14 x1, 12/15 x1  senna, 1 tablet, Oral, HS  PRN    chlordiazePOXIDE (LIBRIUM) capsule 50 mg  Dose: 50 mg  Freq: Every 6 hours Route: PO  Start: 12/14/24 0300 End: 12/14/24 2000  dextrose 5 % infusion  Rate: 20 mL/hr Dose: 20 mL/hr  Freq: Continuous Route: IV  Last Dose: 20 mL/hr (12/14/24 2148)  Start: 12/14/24 2015 End: 12/15/24 0947  multi-electrolyte (PLASMALYTE-A/ISOLYTE-S PH 7.4) IV solution  Rate: 125 mL/hr Dose: 125 mL/hr  Freq: Continuous Route: IV  Indications of Use: IV Resuscitation  Last Dose: Stopped (12/15/24 0437)  Start: 12/14/24 1545 End: 12/15/24 0423  multi-electrolyte (PLASMALYTE-A/ISOLYTE-S PH 7.4) IV solution  Rate: 150 mL/hr Dose: 150 mL/hr  Freq: Continuous Route: IV  Last Dose: Stopped (12/14/24 1624)  Start: 12/14/24 0230 End: 12/14/24 1532      ED Triage Vitals [12/13/24 1819]   Temperature Pulse Respirations Blood Pressure SpO2 Pain Score   (!) 97.3 °F (36.3 °C) (!) 113 20 138/97 100 % 7     Weight (last 2 days)       Date/Time Weight    12/15/24 0547 55.1 (121.4)    12/14/24 0531 55.1 (121.4)    12/14/24 0330 55.1 (121.4)    12/14/24 0228 55.1 (121.4)    12/14/24 0138 55.7 (122.8)    12/14/24 0100 55.7 (122.8)            Vital Signs (last 3 days)       Date/Time Temp Pulse Resp BP MAP (mmHg) SpO2 O2 Device Patient Position - Orthostatic VS CIWA-Ar Total Pain    12/15/24 09:17:07 97.9 °F (36.6 °C) 65 -- 108/73 85 98 % -- -- 0 --    12/15/24 0833 -- -- -- -- -- -- -- -- -- 9    12/15/24 0437 -- -- -- -- -- -- -- -- -- 8    12/15/24 0400 -- 68 -- 128/80 -- -- -- -- 0 --    12/15/24 0000 -- 68 -- 128/80 -- -- -- -- 0 --    12/14/24 2329 -- -- -- -- -- -- -- -- -- 8 12/14/24 2100 -- -- -- -- -- -- -- -- -- 5    12/14/24 20:09:16 97 °F (36.1 °C) 68 -- 128/80 96 100 % -- -- 0 --    12/14/24 1643 -- -- -- -- -- -- -- -- -- 9    12/14/24 16:03:53 97.5 °F (36.4 °C) 67 -- 110/73 85 94 % -- -- 3 --    12/14/24 11:12:21 97.7 °F (36.5 °C) 64 -- 98/63 75 97 % -- -- 6 --    12/14/24 1033 -- -- -- -- -- -- -- -- -- 8 12/14/24 0814 -- -- --  -- -- -- -- -- -- 8    12/14/24 0652 -- 77 -- 129/79 -- -- -- -- 0 --    12/14/24 0330 -- -- -- -- -- -- -- -- -- 8    12/14/24 0300 98.6 °F (37 °C) 77 18 129/79 -- -- -- Lying 0 --    12/14/24 0230 -- -- -- -- -- 93 % None (Room air) -- -- --    12/14/24 0138 98.6 °F (37 °C) 77 18 129/79 -- -- -- Lying -- 9    12/14/24 01:23:53 98.6 °F (37 °C) 77 -- 129/79 96 94 % -- -- -- --    12/14/24 0030 -- 78 20 111/67 85 96 % None (Room air) Sitting -- --    12/14/24 0000 -- 81 20 119/72 90 96 % None (Room air) Sitting -- --    12/13/24 2344 -- -- -- -- -- -- -- -- -- 9    12/13/24 2300 -- 86 20 119/83 97 97 % None (Room air) Sitting -- --    12/13/24 2230 -- 83 20 127/83 101 98 % None (Room air) Sitting -- --    12/13/24 2210 -- -- -- -- -- -- None (Room air) -- -- --    12/13/24 2200 -- -- 20 126/78 98 98 % None (Room air) Sitting -- --    12/13/24 2100 -- 83 20 121/75 93 97 % None (Room air) Sitting -- --    12/13/24 2031 -- -- -- -- -- -- -- -- -- 10 - Worst Possible Pain    12/13/24 1819 97.3 °F (36.3 °C) 113 20 138/97 109 100 % None (Room air) -- -- 7           CIWA-Ar Score       Row Name 12/15/24 09:17:07 12/15/24 0400 12/15/24 0000       CIWA-Ar    BP -- 128/80 128/80    Pulse -- 68 68    Nausea and Vomiting 0 0 0    Tactile Disturbances 0 0 0    Tremor 0 0 0    Auditory Disturbances 0 0 0    Paroxysmal Sweats 0 0 0    Visual Disturbances 0 0 0    Anxiety 0 0 0    Headache, Fullness in Head 0 0 0    Agitation 0 0 0    Orientation and Clouding of Sensorium 0 0 0    CIWA-Ar Total 0 0 0      Row Name 12/14/24 20:09:16 12/14/24 16:03:53 12/14/24 11:12:21       CIWA-Ar    Nausea and Vomiting 0 0 1    Tactile Disturbances 0 0 0    Tremor 0 3 4    Auditory Disturbances 0 0 0    Paroxysmal Sweats 0 0 0    Visual Disturbances 0 0 0    Anxiety 0 0 1    Headache, Fullness in Head 0 0 0    Agitation 0 0 0    Orientation and Clouding of Sensorium 0 0 0    CIWA-Ar Total 0 3 6      Row Name 12/14/24 0652             CIWA-Ar    BP  129/79      Pulse 77      Nausea and Vomiting 0      Tactile Disturbances 0      Tremor 0      Auditory Disturbances 0      Paroxysmal Sweats 0      Visual Disturbances 0      Anxiety 0      Headache, Fullness in Head 0      Agitation 0      Orientation and Clouding of Sensorium 0      CIWA-Ar Total 0                      Pertinent Labs/Diagnostic Test Results:   Radiology:  CT abdomen pelvis with contrast   Final Interpretation by Arcenio Riley MD (12/14 0019)      1.  Persistent peripancreatic fluid compatible with pancreatitis.   2.  New large well-defined cystic collection/pseudocyst effacing and displacing the proximal stomach anteriorly measuring 9.8 x 9.4 x 12.2 cm with intermediate density within the dependent portion and within some branching components inferiorly    compatible with proteinaceous/blood products.   3.  More defined/organized peripancreatic collections when compared with prior exam including a 3.0 x 2.1 cm collection within the tail of the pancreas previously 1.4 x 1.3 cm.   4.  Mild thickening of the distal stomach which may be due to gastritis.   5.  Diverticulosis without evidence of diverticulitis. Thickening of the splenic flecture which is likely reactive to the adjacent pancreatitis.   6.  Other findings as above.      Workstation performed: [id number]                   Results from last 7 days   Lab Units 12/15/24  0236 12/14/24  1020 12/14/24 0338 12/13/24  1825   WBC Thousand/uL 7.83 8.13 10.45* 14.38*   HEMOGLOBIN g/dL 10.5* 10.8* 11.0* 12.8   HEMATOCRIT % 33.1* 34.0* 34.2* 38.8   PLATELETS Thousands/uL 224 240 296 369   TOTAL NEUT ABS Thousands/µL 5.31  --   --  9.55*         Results from last 7 days   Lab Units 12/15/24  0236 12/14/24  1020 12/14/24  0338 12/13/24  1825   SODIUM mmol/L 133* 135 136 139   POTASSIUM mmol/L 3.9 4.2 4.0 3.8   CHLORIDE mmol/L 98 101 103 103   CO2 mmol/L 28 24 25 24   ANION GAP mmol/L 7 10 8 12   BUN mg/dL 4* 6 5 5   CREATININE mg/dL 0.39* 0.44* 0.39*  0.40*   EGFR ml/min/1.73sq m 138 133 138 137   CALCIUM mg/dL 7.0* 7.5* 7.7* 8.7   MAGNESIUM mg/dL  --  2.5 1.4*  --    PHOSPHORUS mg/dL  --   --  4.0  --      Results from last 7 days   Lab Units 12/13/24  1825   AST U/L 25   ALT U/L 13   ALK PHOS U/L 72   TOTAL PROTEIN g/dL 7.3   ALBUMIN g/dL 3.8   TOTAL BILIRUBIN mg/dL 0.45     Results from last 7 days   Lab Units 12/15/24  0628 12/15/24  0223 12/14/24 2005 12/14/24  1054   POC GLUCOSE mg/dl 79 76 87 63*     Results from last 7 days   Lab Units 12/15/24  0236 12/14/24  1020 12/14/24  0338 12/13/24  1825   GLUCOSE RANDOM mg/dL 101 61* 70 91     Results from last 7 days   Lab Units 12/13/24  1825   PROCALCITONIN ng/ml <0.05     Results from last 7 days   Lab Units 12/13/24  2312 12/13/24 2027   LACTIC ACID mmol/L 1.4 2.2*     Results from last 7 days   Lab Units 12/14/24  1020 12/13/24  1825   LIPASE u/L 77 165*     Results from last 7 days   Lab Units 12/13/24  1825   CRP mg/L 17.9*     Results from last 7 days   Lab Units 12/13/24 2027   ETHANOL LVL mg/dL 157*     Results from last 7 days   Lab Units 12/13/24 2027   BLOOD CULTURE  No Growth at 24 hrs.  No Growth at 24 hrs.       Past Medical History:   Diagnosis Date    Acute alcoholic pancreatitis 11/03/2023    Hypertension     SIRS (systemic inflammatory response syndrome) (HCC) 04/10/2024     Present on Admission:   Necrotizing pancreatitis   Alcohol abuse   Gastroesophageal reflux disease without esophagitis      Admitting Diagnosis: Gastritis [K29.70]  Pancreatitis [K85.90]  Abdominal pain [R10.9]  Pseudocyst of pancreas [K86.3]  Age/Sex: 34 y.o. female    Network Utilization Review Department  ATTENTION: Please call with any questions or concerns to [phone] and carefully listen to the prompts so that you are directed to the right person. All voicemails are confidential.   For Discharge needs, contact Care Management DC Support Team at [phone] opt. [phone]  Send all requests for admission clinical  reviews, approved or denied determinations and any other requests to dedicated fax number below belonging to the campus where the patient is receiving treatment. List of dedicated fax numbers for the Facilities:  FACILITY NAME UR FAX NUMBER   ADMISSION DENIALS (Administrative/Medical Necessity) [phone]   DISCHARGE SUPPORT TEAM (NETWORK) [phone]   PARENT CHILD HEALTH (Maternity/NICU/Pediatrics) [phone]   [facility] [phone]   General acute hospital [phone]   [facility] [phone]   [facility] [phone]   [facility] [phone]   [facility] [phone]   [facility] [phone]   [facility] [phone]   [facility] [phone]   [facility] [phone]   [facility] [phone]   [facility] [phone]

## 2024-12-15 NOTE — ASSESSMENT & PLAN NOTE
Hx of EtOH abuse with 2-3 bottles of wine daily   Reports she hasn't drank since d/c from hospital on 11/29 except for glass of wine 12/13 evening, however alcohol level on admit 157 on admit  Has had EtOH withdrawal requiring phenobarb and precedex during prior admit   Started on librium 50mg q6h x1 day, followed by 25mg q6h x2 days   CIWA protocol

## 2024-12-15 NOTE — ASSESSMENT & PLAN NOTE
Previously on lisinopril which has been discontinued for about 6 months or so.  Blood pressure well-controlled.  Monitor daily vitals

## 2024-12-15 NOTE — PROGRESS NOTES
Progress Note - Surgery-General   Name: Kaykay Holland 34 y.o. female I MRN: [id number]  Unit/Bed#: -01 I Date of Admission: 12/13/2024   Date of Service: 12/15/2024 I Hospital Day: 1    Assessment & Plan  Necrotizing pancreatitis  Pt is a 33 y/o female with pmh for multiple episodes of pancreatitis secondary to alcohol abuse who presents with nausea and abd pain that began yesterday. Pt drinks 2-3 bottles of wine per day.     CT: 1.  Persistent peripancreatic fluid compatible with pancreatitis.  2.  New large well-defined cystic collection/pseudocyst effacing and displacing the proximal stomach anteriorly measuring 9.8 x 9.4 x 12.2 cm with intermediate density within the dependent portion and within some branching components inferiorly   compatible with proteinaceous/blood products.  3.  More defined/organized peripancreatic collections when compared with prior exam including a 3.0 x 2.1 cm collection within the tail of the pancreas previously 1.4 x 1.3 cm.  4.  Mild thickening of the distal stomach which may be due to gastritis.  5.  Diverticulosis without evidence of diverticulitis. Thickening of the splenic flecture which is likely reactive to the adjacent pancreatitis.    ETOH 157 on admit  Wbc  7.83 (10.45)  HGB 10.5 (11.0)  Na 133  Lipase  77 (165) 12/14    Exam: tender upper abdomen, mild guarding,few BS, no peritoneal signs    Plan:  Acute pancreatitis due to alcohol abuse with history of recurrent pancreatitis and evidence of large pancreatic pseudocyst  continue conservative management and supportive care  continue IVF hydration and bowel rest  plan for cyst gastrostomy with advanced GI  ETOH cessation  Trend labs, VS  Pain control and anti-emetics PRN    Alcohol abuse  CIWA  Librium    Gastroesophageal reflux disease without esophagitis  PPI  Pancreatic pseudocyst  Plan as above        Please contact the SecureChat role for the Surgery-General service with any questions/concerns.    Subjective    Patient still c/o pain but some improvement, no nausea or vomiting, no fevers or chills    Objective :  Temp:  [97 °F (36.1 °C)-97.9 °F (36.6 °C)] 97.9 °F (36.6 °C)  HR:  [64-68] 65  BP: ()/(63-80) 108/73  SpO2:  [94 %-100 %] 98 %    I/O         12/13 0701 12/14 0700 12/14 0701  12/15 0700 12/15 0701 12/16 0700    P.O. 0 0     I.V. (mL/kg)  1200 (21.8)     IV Piggyback 1000      Total Intake(mL/kg) 1000 (18.1) 1200 (21.8)     Urine (mL/kg/hr) 300 350 (0.3)     Total Output 300 350     Net +700 +850                    Physical Exam  Physical Exam:   General appearance: alert and oriented, in no acute distress  Head: Normocephalic, without obvious abnormality, atraumatic, sclerae anicteric, mucous membranes moist  Neck: no JVD and supple, symmetrical, trachea midline  Lungs: clear to auscultation, no wheezes or rales, shallow BS  Heart:   Regular rate and rhythm, S1-S2 normal, no murmur  Abdomen:   Flat, soft, tender over upper abdomen with mild guarding, few BS, no peritoneal signs  Extremities:   No edema, redness or tenderness in the calves or thighs  Skin: Warm, dry  Nursing notes and vital signs reviewed      Lab Results: I have reviewed the following results:  Recent Labs     12/13/24  1825 12/13/24  1825 12/13/24 2027 12/13/24  2312 12/14/24  0338 12/14/24  1020 12/15/24  0236   WBC 14.38*  --   --   --  10.45* 8.13 7.83   HGB 12.8  --   --   --  11.0* 10.8* 10.5*   HCT 38.8  --   --   --  34.2* 34.0* 33.1*     --   --   --  296 240 224   SODIUM 139  --   --   --  136 135 133*   K 3.8  --   --   --  4.0 4.2 3.9     --   --   --  103 101 98   CO2 24  --   --   --  25 24 28   BUN 5  --   --   --  5 6 4*   CREATININE 0.40*  --   --   --  0.39* 0.44* 0.39*   GLUC 91  --   --   --  70 61* 101   MG  --    < >  --   --  1.4* 2.5  --    PHOS  --   --   --   --  4.0  --   --    AST 25  --   --   --   --   --   --    ALT 13  --   --   --   --   --   --    ALB 3.8  --   --   --   --   --   --     TBILI 0.45  --   --   --   --   --   --    ALKPHOS 72  --   --   --   --   --   --    LACTICACID  --   --    < > 1.4  --   --   --     < > = values in this interval not displayed.       Imaging Results Review: I reviewed radiology reports from this admission including: CT abdomen/pelvis.  Other Study Results Review: No additional pertinent studies reviewed.    VTE Pharmacologic Prophylaxis: Sequential compression device (Venodyne)   VTE Mechanical Prophylaxis: sequential compression device    Nikia Romero

## 2024-12-15 NOTE — NURSING NOTE
Pt. Transferred to [facility] for procedure. Primary RN gave report to BEKA Navarrete from [facility]. Patient had personal items including a knife and lighter returned from security. Items handed to transport team and primary RN notified BEKA Navarrete of the contents.

## 2024-12-15 NOTE — ASSESSMENT & PLAN NOTE
Patient initially presented to [facility] with complaints of nausea and left-sided abdominal pain radiating to back. Has history of recurrent alcoholic pancreatitis.  Recent hospitalization (11/23 - 11/29) during which time was managed conservatively.  CT with findings of recurrent pericarditis and large pancreatic pseudocyst.  General surgery consulted at [facility].  Recommended conservative management.  GI consulted at [facility]. Recommended transfer to [facility] for cyst gastrostomy and necrosectomy with advance GI team.    Plan:  Patient currently tolerating clear liquid diet.  N.p.o. at midnight on 12/17 for GI for possible intervention.  GI consulted.  Appreciate recommendation.  Pain regimen increased to dilaudid 0.5 mg Q3H PRN mod pain, dilaudid 1.0 mg Q3H PRN for severe pain, dilaudid 0.5 mg Q3H PRN breakthrough  Bowel regimen in place  Patient placed back on CIWA to monitor her off of Librium overnight  IV hydration  ml/hr, nausea medication as needed.

## 2024-12-15 NOTE — PLAN OF CARE
Problem: PAIN - ADULT  Goal: Verbalizes/displays adequate comfort level or baseline comfort level  Description: Interventions:  - Encourage patient to monitor pain and request assistance  - Assess pain using appropriate pain scale  - Administer analgesics based on type and severity of pain and evaluate response  - Implement non-pharmacological measures as appropriate and evaluate response  - Consider cultural and social influences on pain and pain management  - Notify physician/advanced practitioner if interventions unsuccessful or patient reports new pain  Outcome: Progressing     Problem: INFECTION - ADULT  Goal: Absence or prevention of progression during hospitalization  Description: INTERVENTIONS:  - Assess and monitor for signs and symptoms of infection  - Monitor lab/diagnostic results  - Monitor all insertion sites, i.e. indwelling lines, tubes, and drains  - Monitor endotracheal if appropriate and nasal secretions for changes in amount and color  - [place] appropriate cooling/warming therapies per order  - Administer medications as ordered  - Instruct and encourage patient and family to use good hand hygiene technique  - Identify and instruct in appropriate isolation precautions for identified infection/condition  Outcome: Progressing  Goal: Absence of fever/infection during neutropenic period  Description: INTERVENTIONS:  - Monitor WBC    Outcome: Progressing     Goal: Maintain or return to baseline ADL function  Description: INTERVENTIONS:  -  Assess patient's ability to carry out ADLs; assess patient's baseline for ADL function and identify physical deficits which impact ability to perform ADLs (bathing, care of mouth/teeth, toileting, grooming, dressing, etc.)  - Assess/evaluate cause of self-care deficits   - Assess range of motion  - Assess patient's mobility; develop plan if impaired  - Assess patient's need for assistive devices and provide as appropriate  - Encourage maximum independence but  intervene and supervise when necessary  - Involve family in performance of ADLs  - Assess for home care needs following discharge   - Consider OT consult to assist with ADL evaluation and planning for discharge  - Provide patient education as appropriate  Outcome: Progressing  Goal: Maintains/Returns to pre admission functional level  Description: INTERVENTIONS:  - Perform AM-PAC 6 Click Basic Mobility/ Daily Activity assessment daily.  - Set and communicate daily mobility goal to care team and patient/family/caregiver.   - Collaborate with rehabilitation services on mobility goals if consulted  - Perform Range of Motion 3 times a day.  - Reposition patient every 3 hours.  - Dangle patient 3 times a day  - Stand patient 3 times a day  - Ambulate patient 3 times a day  - Out of bed to chair 3 times a day   - Out of bed for meals 3 times a day  - Out of bed for toileting  - Record patient progress and toleration of activity level   Outcome: Progressing     Problem: DISCHARGE PLANNING  Goal: Discharge to home or other facility with appropriate resources  Description: INTERVENTIONS:  - Identify barriers to discharge w/patient and caregiver  - Arrange for needed discharge resources and transportation as appropriate  - Identify discharge learning needs (meds, wound care, etc.)  - Arrange for interpretive services to assist at discharge as needed  - Refer to Case Management Department for coordinating discharge planning if the patient needs post-hospital services based on physician/advanced practitioner order or complex needs related to functional status, cognitive ability, or social support system  Outcome: Progressing     Problem: Knowledge Deficit  Goal: Patient/family/caregiver demonstrates understanding of disease process, treatment plan, medications, and discharge instructions  Description: Complete learning assessment and assess knowledge base.  Interventions:  - Provide teaching at level of understanding  - Provide  teaching via preferred learning methods  Outcome: Progressing     Problem: Nutrition/Hydration-ADULT  Goal: Nutrient/Hydration intake appropriate for improving, restoring or maintaining nutritional needs  Description: Monitor and assess patient's nutrition/hydration status for malnutrition. Collaborate with interdisciplinary team and initiate plan and interventions as ordered.  Monitor patient's weight and dietary intake as ordered or per policy. Utilize nutrition screening tool and intervene as necessary. Determine patient's food preferences and provide high-protein, high-caloric foods as appropriate.     INTERVENTIONS:  - Monitor oral intake, urinary output, labs, and treatment plans  - Assess nutrition and hydration status and recommend course of action  - Evaluate amount of meals eaten  - Assist patient with eating if necessary   - Allow adequate time for meals  - Recommend/ encourage appropriate diets, oral nutritional supplements, and vitamin/mineral supplements  - Order, calculate, and assess calorie counts as needed  - Recommend, monitor, and adjust tube feedings and TPN/PPN based on assessed needs  - Assess need for intravenous fluids  - Provide specific nutrition/hydration education as appropriate  - Include patient/family/caregiver in decisions related to nutrition  Outcome: Progressing

## 2024-12-16 LAB
ANION GAP SERPL CALCULATED.3IONS-SCNC: 7 MMOL/L (ref 4–13)
BUN SERPL-MCNC: 2 MG/DL (ref 5–25)
CALCIUM SERPL-MCNC: 8.8 MG/DL (ref 8.4–10.2)
CHLORIDE SERPL-SCNC: 100 MMOL/L (ref 96–108)
CO2 SERPL-SCNC: 30 MMOL/L (ref 21–32)
CREAT SERPL-MCNC: 0.47 MG/DL (ref 0.6–1.3)
ERYTHROCYTE [DISTWIDTH] IN BLOOD BY AUTOMATED COUNT: 11.5 % (ref 11.6–15.1)
GFR SERPL CREATININE-BSD FRML MDRD: 130 ML/MIN/1.73SQ M
GLUCOSE SERPL-MCNC: 119 MG/DL (ref 65–140)
GLUCOSE SERPL-MCNC: 73 MG/DL (ref 65–140)
GLUCOSE SERPL-MCNC: 76 MG/DL (ref 65–140)
GLUCOSE SERPL-MCNC: 83 MG/DL (ref 65–140)
GLUCOSE SERPL-MCNC: 89 MG/DL (ref 65–140)
HCT VFR BLD AUTO: 37.5 % (ref 34.8–46.1)
HGB BLD-MCNC: 12.2 G/DL (ref 11.5–15.4)
MCH RBC QN AUTO: 34.5 PG (ref 26.8–34.3)
MCHC RBC AUTO-ENTMCNC: 32.5 G/DL (ref 31.4–37.4)
MCV RBC AUTO: 106 FL (ref 82–98)
PLATELET # BLD AUTO: 224 THOUSANDS/UL (ref 149–390)
PMV BLD AUTO: 11.5 FL (ref 8.9–12.7)
POTASSIUM SERPL-SCNC: 4.6 MMOL/L (ref 3.5–5.3)
RBC # BLD AUTO: 3.54 MILLION/UL (ref 3.81–5.12)
SODIUM SERPL-SCNC: 137 MMOL/L (ref 135–147)
WBC # BLD AUTO: 7.39 THOUSAND/UL (ref 4.31–10.16)

## 2024-12-16 PROCEDURE — 99232 SBSQ HOSP IP/OBS MODERATE 35: CPT | Performed by: INTERNAL MEDICINE

## 2024-12-16 PROCEDURE — 99254 IP/OBS CNSLTJ NEW/EST MOD 60: CPT | Performed by: SURGERY

## 2024-12-16 PROCEDURE — 80048 BASIC METABOLIC PNL TOTAL CA: CPT | Performed by: PHYSICIAN ASSISTANT

## 2024-12-16 PROCEDURE — 82948 REAGENT STRIP/BLOOD GLUCOSE: CPT

## 2024-12-16 PROCEDURE — 85027 COMPLETE CBC AUTOMATED: CPT | Performed by: PHYSICIAN ASSISTANT

## 2024-12-16 PROCEDURE — 99233 SBSQ HOSP IP/OBS HIGH 50: CPT | Performed by: INTERNAL MEDICINE

## 2024-12-16 RX ORDER — POLYETHYLENE GLYCOL 3350 17 G/17G
17 POWDER, FOR SOLUTION ORAL DAILY
Status: DISCONTINUED | OUTPATIENT
Start: 2024-12-16 | End: 2024-12-18

## 2024-12-16 RX ORDER — HYDROMORPHONE HCL/PF 1 MG/ML
1 SYRINGE (ML) INJECTION
Status: DISCONTINUED | OUTPATIENT
Start: 2024-12-16 | End: 2024-12-18

## 2024-12-16 RX ORDER — SODIUM CHLORIDE, SODIUM LACTATE, POTASSIUM CHLORIDE, CALCIUM CHLORIDE 600; 310; 30; 20 MG/100ML; MG/100ML; MG/100ML; MG/100ML
100 INJECTION, SOLUTION INTRAVENOUS CONTINUOUS
Status: DISCONTINUED | OUTPATIENT
Start: 2024-12-16 | End: 2024-12-22

## 2024-12-16 RX ORDER — HYDROMORPHONE HCL/PF 1 MG/ML
0.5 SYRINGE (ML) INJECTION
Status: DISCONTINUED | OUTPATIENT
Start: 2024-12-16 | End: 2024-12-18

## 2024-12-16 RX ORDER — SODIUM CHLORIDE, SODIUM LACTATE, POTASSIUM CHLORIDE, CALCIUM CHLORIDE 600; 310; 30; 20 MG/100ML; MG/100ML; MG/100ML; MG/100ML
125 INJECTION, SOLUTION INTRAVENOUS CONTINUOUS
Status: CANCELLED | OUTPATIENT
Start: 2024-12-16

## 2024-12-16 RX ORDER — SENNOSIDES 8.6 MG
1 TABLET ORAL
Status: DISCONTINUED | OUTPATIENT
Start: 2024-12-16 | End: 2024-12-18

## 2024-12-16 RX ADMIN — PANTOPRAZOLE SODIUM 40 MG: 40 INJECTION, POWDER, FOR SOLUTION INTRAVENOUS at 08:22

## 2024-12-16 RX ADMIN — HYDROMORPHONE HYDROCHLORIDE 0.5 MG: 1 INJECTION, SOLUTION INTRAMUSCULAR; INTRAVENOUS; SUBCUTANEOUS at 00:09

## 2024-12-16 RX ADMIN — CHLORDIAZEPOXIDE HYDROCHLORIDE 25 MG: 5 CAPSULE ORAL at 08:31

## 2024-12-16 RX ADMIN — HYDROMORPHONE HYDROCHLORIDE 1 MG: 1 INJECTION, SOLUTION INTRAMUSCULAR; INTRAVENOUS; SUBCUTANEOUS at 21:00

## 2024-12-16 RX ADMIN — HYDROMORPHONE HYDROCHLORIDE 1 MG: 1 INJECTION, SOLUTION INTRAMUSCULAR; INTRAVENOUS; SUBCUTANEOUS at 06:03

## 2024-12-16 RX ADMIN — CHLORDIAZEPOXIDE HYDROCHLORIDE 25 MG: 5 CAPSULE ORAL at 14:55

## 2024-12-16 RX ADMIN — Medication 1 TABLET: at 08:22

## 2024-12-16 RX ADMIN — HYDROMORPHONE HYDROCHLORIDE 0.5 MG: 1 INJECTION, SOLUTION INTRAMUSCULAR; INTRAVENOUS; SUBCUTANEOUS at 18:39

## 2024-12-16 RX ADMIN — HYDROMORPHONE HYDROCHLORIDE 1 MG: 1 INJECTION, SOLUTION INTRAMUSCULAR; INTRAVENOUS; SUBCUTANEOUS at 10:30

## 2024-12-16 RX ADMIN — SODIUM CHLORIDE, SODIUM LACTATE, POTASSIUM CHLORIDE, AND CALCIUM CHLORIDE 100 ML/HR: .6; .31; .03; .02 INJECTION, SOLUTION INTRAVENOUS at 13:05

## 2024-12-16 RX ADMIN — HYDROMORPHONE HYDROCHLORIDE 0.5 MG: 1 INJECTION, SOLUTION INTRAMUSCULAR; INTRAVENOUS; SUBCUTANEOUS at 08:21

## 2024-12-16 RX ADMIN — HYDROMORPHONE HYDROCHLORIDE 1 MG: 1 INJECTION, SOLUTION INTRAMUSCULAR; INTRAVENOUS; SUBCUTANEOUS at 17:03

## 2024-12-16 RX ADMIN — FOLIC ACID 1 MG: 1 TABLET ORAL at 08:22

## 2024-12-16 RX ADMIN — CHLORDIAZEPOXIDE HYDROCHLORIDE 25 MG: 5 CAPSULE ORAL at 03:02

## 2024-12-16 RX ADMIN — HYDROMORPHONE HYDROCHLORIDE 1 MG: 1 INJECTION, SOLUTION INTRAMUSCULAR; INTRAVENOUS; SUBCUTANEOUS at 13:01

## 2024-12-16 RX ADMIN — POLYETHYLENE GLYCOL 3350 17 G: 17 POWDER, FOR SOLUTION ORAL at 13:05

## 2024-12-16 RX ADMIN — CHLORDIAZEPOXIDE HYDROCHLORIDE 25 MG: 5 CAPSULE ORAL at 22:52

## 2024-12-16 RX ADMIN — ENOXAPARIN SODIUM 40 MG: 40 INJECTION SUBCUTANEOUS at 08:20

## 2024-12-16 RX ADMIN — PANTOPRAZOLE SODIUM 40 MG: 40 INJECTION, POWDER, FOR SOLUTION INTRAVENOUS at 21:00

## 2024-12-16 RX ADMIN — THIAMINE HCL TAB 100 MG 100 MG: 100 TAB at 08:22

## 2024-12-16 RX ADMIN — SENNOSIDES 8.6 MG: 8.6 TABLET, FILM COATED ORAL at 21:01

## 2024-12-16 RX ADMIN — SODIUM CHLORIDE, SODIUM GLUCONATE, SODIUM ACETATE, POTASSIUM CHLORIDE, MAGNESIUM CHLORIDE, SODIUM PHOSPHATE, DIBASIC, AND POTASSIUM PHOSPHATE 100 ML/HR: .53; .5; .37; .037; .03; .012; .00082 INJECTION, SOLUTION INTRAVENOUS at 03:02

## 2024-12-16 RX ADMIN — NICOTINE 1 PATCH: 21 PATCH, EXTENDED RELEASE TRANSDERMAL at 08:22

## 2024-12-16 NOTE — PLAN OF CARE
Problem: PAIN - ADULT  Goal: Verbalizes/displays adequate comfort level or baseline comfort level  Description: Interventions:  - Encourage patient to monitor pain and request assistance  - Assess pain using appropriate pain scale  - Administer analgesics based on type and severity of pain and evaluate response  - Implement non-pharmacological measures as appropriate and evaluate response  - Consider cultural and social influences on pain and pain management  - Notify physician/advanced practitioner if interventions unsuccessful or patient reports new pain  Outcome: Progressing     Problem: INFECTION - ADULT  Goal: Absence or prevention of progression during hospitalization  Description: INTERVENTIONS:  - Assess and monitor for signs and symptoms of infection  - Monitor lab/diagnostic results  - Monitor all insertion sites, i.e. indwelling lines, tubes, and drains  - Monitor endotracheal if appropriate and nasal secretions for changes in amount and color  - [place] appropriate cooling/warming therapies per order  - Administer medications as ordered  - Instruct and encourage patient and family to use good hand hygiene technique  - Identify and instruct in appropriate isolation precautions for identified infection/condition  Outcome: Progressing

## 2024-12-16 NOTE — CERTIFIED RECOVERY SPECIALIST
"   Certified  Note    Patient name: Kaykay Holland  Location: [facility]12/[facility]-01  [place]: [facility]  Attending:  Clay Ferguson* MRN [id number]  : 1990  Age: 34 y.o.    Sex: female Date 2024         Substance Use History:     Social History     Substance and Sexual Activity   Alcohol Use Yes    Alcohol/week: 21.0 standard drinks of alcohol    Types: 21 Glasses of wine per week    Comment: \"occasional\"        Social History     Substance and Sexual Activity   Drug Use Never      Time spent with Patient 10     CRS engaged with patient to check in and offer support is desired.  CRS made introduction and  explained CRS services provided at hospital    Patient reports she is doing ok and nothing needed at this time, not receptive to indepth conversation regarding AUD, patient appeared to be v ba \"guarded\"     Patient stated that she has been in recovery before and has no desire to go intop treatment at this time. Patient did appear to be open to OP resources at this time.     Patient reports she has been drinking every day, lives by self and is currently employed      CRS will continue to follow.     Resources and contact information given                '          Peggy Lamar       "

## 2024-12-16 NOTE — ASSESSMENT & PLAN NOTE
34-year-old female with a history of active alcohol use disorder, recurrent alcoholic pancreatitis (most recently November 2023) presents as a transfer from a  for consideration of necrosectomy and cyst gastrostomy with advanced endoscopy.  She was recently admitted from 11/23 to 11/29 for necrotizing pancreatitis.  CT scan at that time showed evidence of pancreatic necrosis and acute pancreatitis.  She was treated conservatively with improvement of her symptoms.  She represented again to [facility] on 12/13 with complaints of worsening epigastric abdominal pain, nausea, vomiting.  CT abdomen and pelvis now showing multiple peripancreatic fluid collection, a new large well-defined cystic collection with mass effect on proximal stomach measuring 9 x 9 x 12 cm.  Last alcohol use was 12/13.  Consumes 2-3 bottles of wine daily.  Of note, she is worried that she may lose her job as she is unable to return back to work.  Liver enzymes within normal limits and lipase has normalized.    Plan  - Will plan for EUS with cystgastrostomy placement  tomorrow 12/17  - Okay for clear liquid diet if tolerated at this time.  N.p.o. after midnight  - Alcohol withdrawal management per primary team  - Okay to continue with IV fluids  - Antiemetics and analgesics per primary team  - Given necrotizing pancreatitis, will consult general surgery team for comanagement  -Alcohol cessation counseling

## 2024-12-16 NOTE — CASE MANAGEMENT
Case Management Assessment & Discharge Planning Note    Patient name Kaykay Holland  Location [facility] 812/[facility] 812-01 MRN [id number]  : 1990 Date 2024       Current Admission Date: 12/15/2024  Current Admission Diagnosis:Acute alcoholic pancreatitis   Patient Active Problem List    Diagnosis Date Noted Date Diagnosed    Pancreatic pseudocyst 2024     Gastroesophageal reflux disease without esophagitis 2024     Electrolyte abnormality 2024     Alcohol use disorder 2024     Hematemesis 2024     Necrotizing pancreatitis 2024     Pancytopenia (HCC) 04/10/2024     Alcohol abuse 2024     Provoked seizures (HCC) 2024     Alcohol withdrawal seizure (HCC) 2024     Transaminitis 2024     Prolonged QT interval 2024     Essential hypertension 2024     Tobacco abuse 2024     Acute alcoholic pancreatitis 2023     Hepatic steatosis 2023     Peptic ulcer disease 2021     Alcohol withdrawal (HCC) 2020     Anxiety 2019     Depression 2019       LOS (days): 1  Geometric Mean LOS (GMLOS) (days):   Days to GMLOS:     OBJECTIVE:  PATIENT READMITTED TO HOSPITAL  Risk of Unplanned Readmission Score: 21.64     Current admission status: Inpatient    Preferred Pharmacy:   [facility]/pharmacy #[id number] - [place] - [address]  [address]  [facility] [address]  Phone: [phone] Fax: [phone]    Primary Care Provider: PATRICIA Moran    Primary Insurance: AETNA  Secondary Insurance:     ASSESSMENT:  Active Health Care Proxies       [facility] Representative - Mother   Primary Phone: [phone] (Mobile)                 Readmission Root Cause  30 Day Readmission: Yes  During your hospital stay, did someone (provider, nurse, ) explain your care to you in a way you could understand?: Yes  Did you feel medically stable to leave the hospital?:  Yes  Were you able to pay for your medication at the pharmacy?: Yes  Did you have reliable transportation to take you to your appointments?: Yes  During previous admission, was a post-acute recommendation made?: No  Patient was readmitted due to: acute alcoholic pancreatitis  Action Plan: TBD    Patient Information  Admitted from:: Facility (tx from [facility])  Mental Status: Alert  During Assessment patient was accompanied by: Not accompanied during assessment  Assessment information provided by:: Patient  Primary Caregiver: Self  Support Systems: Self, Family members, Friend  County [place] Residence: [facility]  What city do you live in?: [place]  Home entry access options. Select all that apply.: Stairs  Number of steps to enter home.: 4  Type of Current Residence: 2 story home  Upon entering residence, is there a bedroom on the main floor (no further steps)?: No  A bedroom is located on the following floor levels of residence (select all that apply):: 2nd Floor  Upon entering residence, is there a bathroom on the main floor (no further steps)?: No  Indicate which floors of current residence have a bathroom (select all the apply):: 2nd Floor  Number of steps to 2nd floor from main floor: One Flight  Living Arrangements: Lives Alone  Is patient a ?: No    Activities of Daily Living Prior to Admission  Functional Status: Independent  Completes ADLs independently?: Yes  Ambulates independently?: Yes  Does patient use assisted devices?: No  Does patient currently own DME?: No  Does patient have a history of Outpatient Therapy (PT/OT)?: No  Does the patient have a history of Short-Term Rehab?: No  Does patient have a history of HHC?: No  Does patient currently have HHC?: No    Patient Information Continued  Income Source: Unemployed  Does patient have prescription coverage?: Yes  Does patient receive dialysis treatments?: No  Does patient have a history of substance abuse?: Yes  Historical substance use preference:  Alcohol/ETOH  History of Withdrawal Symptoms: Seizures  Is patient currently in treatment for substance abuse?: No. Patient declined treatment information.  Does patient have a history of Mental Health Diagnosis?: Yes  Is patient receiving treatment for mental health?: Yes  Has patient received inpatient treatment related to mental health in the last 2 years?: No    Means of Transportation  Means of Transport to Appts:: Drives Self    DISCHARGE DETAILS:    Additional Comments: Pt transferred to [facility] from [facility] for acute alcoholic pancreatitis.  Per chart review, pt drinks 2-3 bottles of wine per day.  Pt lives alone, IPTA, does not use DME.  CM following

## 2024-12-16 NOTE — ASSESSMENT & PLAN NOTE
Large psuedocyst displacing stomach ([id number] cm(  - Follow GI, possible cystogastrostomy  - Surgery available/following, depending on GI intervention  - Continue care and pancreatic resuscitation per primary

## 2024-12-16 NOTE — ASSESSMENT & PLAN NOTE
History of alcohol use disorder and previous withdrawal seizure.  On CIWA protocol  Thiamine and folate

## 2024-12-16 NOTE — PLAN OF CARE
Problem: PAIN - ADULT  Goal: Verbalizes/displays adequate comfort level or baseline comfort level  Description: Interventions:  - Encourage patient to monitor pain and request assistance  - Assess pain using appropriate pain scale  - Administer analgesics based on type and severity of pain and evaluate response  - Implement non-pharmacological measures as appropriate and evaluate response  - Consider cultural and social influences on pain and pain management  - Notify physician/advanced practitioner if interventions unsuccessful or patient reports new pain  12/16/2024 0111 by Trenton Sue RN  Outcome: Progressing  12/16/2024 0057 by Trenton Sue RN  Outcome: Progressing     Problem: INFECTION - ADULT  Goal: Absence or prevention of progression during hospitalization  Description: INTERVENTIONS:  - Assess and monitor for signs and symptoms of infection  - Monitor lab/diagnostic results  - Monitor all insertion sites, i.e. indwelling lines, tubes, and drains  - Monitor endotracheal if appropriate and nasal secretions for changes in amount and color  - [place] appropriate cooling/warming therapies per order  - Administer medications as ordered  - Instruct and encourage patient and family to use good hand hygiene technique  - Identify and instruct in appropriate isolation precautions for identified infection/condition  12/16/2024 0111 by Trenton Sue RN  Outcome: Progressing  12/16/2024 0057 by Trenton Sue RN  Outcome: Progressing

## 2024-12-16 NOTE — PROGRESS NOTES
Progress Note - Internal Medicine   Name: Kaykay Holland 34 y.o. female I MRN: [id number]  Unit/Bed#: [facility] 812-01 I Date of Admission: 12/15/2024   Date of Service: 12/16/2024 I Hospital Day: 1    Assessment & Plan  Acute alcoholic pancreatitis  Patient initially presented to [facility] with complaints of nausea and left-sided abdominal pain radiating to back. Has history of recurrent alcoholic pancreatitis.  Recent hospitalization (11/23 - 11/29) during which time was managed conservatively.  CT with findings of recurrent pericarditis and large pancreatic pseudocyst.  General surgery consulted at [facility].  Recommended conservative management.  GI consulted at [facility]. Recommended transfer to [facility] for cyst gastrostomy and necrosectomy with advance GI team.    Plan:  Patient currently tolerating clear liquid diet.  N.p.o. at midnight on 12/17 for GI for possible intervention.  GI consulted.  Appreciate recommendation.  Pain regimen increased to dilaudid 0.5 mg Q3H PRN mod pain, dilaudid 1.0 mg Q3H PRN for severe pain, dilaudid 0.5 mg Q3H PRN breakthrough  Bowel regimen in place  Patient placed back on CIWA to monitor her off of Librium overnight  IV hydration  ml/hr, nausea medication as needed.  Alcohol withdrawal seizure (HCC)  Patient reports he drinks 3 glasses of wine for the past few years.  However, since her last hospital discharge on 11/29 she has not had any alcohol.  Has history of withdrawal seizure in 2023 for which she was admitted.  On Librium 25 mg, CIWA protocol  Patient refused CRS consult.  Essential hypertension  Previously on lisinopril which has been discontinued for about 6 months or so.  Blood pressure well-controlled.  Monitor daily vitals  Tobacco abuse  Smokes a pack a day for past 5 to 6 years.  On nicotine patch.  Smoking cessation education.  Gastroesophageal reflux disease without esophagitis  Reported history of GERD on pantoprazole 40 mg twice daily at home.  Continue Protonix  40 mg IV every 12 hours.  Switch when appropriate.    Subjective   Patient seen and examined by me.  No acute events overnight.  Patient CIWA protocol was discontinued overnight.  Patient was having increased levels of pain this morning.  Patient reports that out of 10 pain.  Patient was kept open overnight and was notified that she was unable to get on the schedule for cyst gastrectomy.  Patient was placed back on clear liquid diet as well as notified that we will be increasing her pain regimen.  Patient was informed that she can ask her nurse for pain medications when needed.  Patient states that her main concern is the level of pain that she is having.  Patient denies fever, chills, headache, nausea, shortness of breath,    Objective :  Temp:  [97.7 °F (36.5 °C)-99.1 °F (37.3 °C)] 98.2 °F (36.8 °C)  HR:  [62-82] 62  BP: (105-121)/(66-79) 118/79  Resp:  [12-16] 16  SpO2:  [96 %-97 %] 96 %  Orthostatic Blood Pressures      Flowsheet Row Most Recent Value   Blood Pressure 118/79 filed at 12/16/2024 1600   Patient Position - Orthostatic VS Lying filed at 12/16/2024 0208          First Weight: Weight - Scale: 55.1 kg (121 lb 6.4 oz) (12/15/24 1600)  Vitals:    12/15/24 1600 12/16/24 0308   Weight: 55.1 kg (121 lb 6.4 oz) 57.7 kg (127 lb 3.3 oz)     Physical Exam  Constitutional:       General: She is not in acute distress.     Appearance: Normal appearance. She is ill-appearing. She is not diaphoretic.   HENT:      Head: Normocephalic and atraumatic.      Mouth/Throat:      Mouth: Mucous membranes are moist.   Cardiovascular:      Rate and Rhythm: Normal rate and regular rhythm.      Pulses: Normal pulses.      Heart sounds: Normal heart sounds.   Pulmonary:      Effort: Pulmonary effort is normal.      Breath sounds: Normal breath sounds.   Abdominal:      General: Abdomen is flat. Bowel sounds are normal.      Palpations: Abdomen is soft.      Tenderness: There is abdominal tenderness. There is guarding.       "Comments: Tenderness to palpation of epigastric region and LUQ.   Musculoskeletal:      Right lower leg: No edema.      Left lower leg: No edema.   Skin:     General: Skin is warm and dry.      Capillary Refill: Capillary refill takes less than 2 seconds.   Neurological:      Mental Status: She is alert and oriented to person, place, and time. Mental status is at baseline.   Psychiatric:         Mood and Affect: Mood normal.           Lab Results: I have reviewed the following results:  Results from last 7 days   Lab Units 12/16/24  0552 12/15/24  0236 12/14/24  1020   WBC Thousand/uL 7.39 7.83 8.13   HEMOGLOBIN g/dL 12.2 10.5* 10.8*   HEMATOCRIT % 37.5 33.1* 34.0*   PLATELETS Thousands/uL 224 224 240     Results from last 7 days   Lab Units 12/16/24  0552 12/15/24  0236 12/14/24  1020   POTASSIUM mmol/L 4.6 3.9 4.2   CHLORIDE mmol/L 100 98 101   CO2 mmol/L 30 28 24   BUN mg/dL 2* 4* 6   CREATININE mg/dL 0.47* 0.39* 0.44*   CALCIUM mg/dL 8.8 7.0* 7.5*         No results found for: \"HGBA1C\"  Lab Results   Component Value Date    TROPONINI 0.01 02/20/2021             VTE Pharmacologic Prophylaxis: VTE covered by:  enoxaparin, Subcutaneous, 40 mg at 12/16/24 0820      VTE Mechanical Prophylaxis: sequential compression device      "

## 2024-12-16 NOTE — CONSULTS
Consultation - Surgery-General   Name: Kaykay Holland 34 y.o. female I MRN: [id number]  Unit/Bed#: [facility] 812-01 I Date of Admission: 12/15/2024   Date of Service: 12/16/2024 I Hospital Day: 1   Inpatient consult to Acute Care Surgery  Consult performed by: Tyrone Rogers MD  Consult ordered by: Rocio Wang MD        Physician Requesting Evaluation: Clay Ferguson*   Reason for Evaluation / Principal Problem: pancreatic pseudocyst    Assessment & Plan  Acute alcoholic pancreatitis  Large psuedocyst displacing stomach (80u0h79 cm(  - Follow GI, possible cystogastrostomy  - Surgery available/following, depending on GI intervention  - Continue care and pancreatic resuscitation per primary  Alcohol withdrawal seizure (HCC)    Essential hypertension    Tobacco abuse    Gastroesophageal reflux disease without esophagitis        History of Present Illness   34F with hx of recurrent alcohol pancreatitis, GERD, PUD, found to have necrotizing pancreatitis with large well-defined cystic collection ([id number] cm) displacing the proximal stomach. She adiditioally has defined peripancreatic collection along the tail (3x2 from 1x1 cm). She was transferred from [facility] for advanced GI cystogastrostomy. Currently, she is tolerating clears. She said she has not had bowel function (gas or BM) since Thursday. She has been walking. WBC is 7 from 14 on admission 3 days ago. No bandemia on differential yesterday (12/15).    Review of Systems   Constitutional:  Negative for chills and fever.   HENT:  Negative for ear pain and sore throat.    Eyes:  Negative for pain and visual disturbance.   Respiratory:  Negative for cough and shortness of breath.    Cardiovascular:  Negative for chest pain and palpitations.   Gastrointestinal:  Positive for abdominal pain. Negative for nausea and vomiting.   Genitourinary:  Negative for dysuria and hematuria.   Musculoskeletal:  Negative for arthralgias and back pain.   Skin:  Negative  for color change and rash.   Neurological:  Negative for seizures and syncope.   All other systems reviewed and are negative.      Objective :  Temp:  [97.7 °F (36.5 °C)-99.1 °F (37.3 °C)] 98.2 °F (36.8 °C)  HR:  [62-82] 62  BP: (105-121)/(66-79) 118/79  Resp:  [12-16] 16  SpO2:  [96 %-97 %] 96 %      Physical Exam  Constitutional:       Appearance: Normal appearance.   HENT:      Head: Normocephalic and atraumatic.      Mouth/Throat:      Mouth: Mucous membranes are moist.   Eyes:      Extraocular Movements: Extraocular movements intact.   Cardiovascular:      Rate and Rhythm: Normal rate and regular rhythm.   Pulmonary:      Effort: Pulmonary effort is normal.   Abdominal:      General: Abdomen is flat. There is distension.      Palpations: Abdomen is soft.      Tenderness: There is abdominal tenderness. There is guarding. There is no rebound.      Comments: No acute abdomen. Bloating/firmness on left side   Musculoskeletal:         General: Normal range of motion.      Cervical back: Normal range of motion and neck supple.   Skin:     General: Skin is warm and dry.   Neurological:      General: No focal deficit present.      Mental Status: She is alert and oriented to person, place, and time.         Lab Results: I have reviewed the following results:  Recent Labs     12/13/24  1825 12/13/24  1825 12/13/24 2027 12/13/24  2312 12/14/24  0338 12/14/24  1020 12/15/24  0236 12/16/24  0552   WBC 14.38*  --   --   --  10.45* 8.13   < > 7.39   HGB 12.8  --   --   --  11.0* 10.8*   < > 12.2   HCT 38.8  --   --   --  34.2* 34.0*   < > 37.5     --   --   --  296 240   < > 224   SODIUM 139  --   --   --  136 135   < > 137   K 3.8  --   --   --  4.0 4.2   < > 4.6     --   --   --  103 101   < > 100   CO2 24  --   --   --  25 24   < > 30   BUN 5  --   --   --  5 6   < > 2*   CREATININE 0.40*  --   --   --  0.39* 0.44*   < > 0.47*   GLUC 91  --   --   --  70 61*   < > 89   MG  --    < >  --   --  1.4* 2.5  --    --    PHOS  --   --   --   --  4.0  --   --   --    AST 25  --   --   --   --   --   --   --    ALT 13  --   --   --   --   --   --   --    ALB 3.8  --   --   --   --   --   --   --    TBILI 0.45  --   --   --   --   --   --   --    ALKPHOS 72  --   --   --   --   --   --   --    LACTICACID  --   --    < > 1.4  --   --   --   --     < > = values in this interval not displayed.

## 2024-12-16 NOTE — PROGRESS NOTES
Progress Note - Gastroenterology   Name: Kaykay Holland 34 y.o. female I MRN: [id number]  Unit/Bed#: [facility] 812-01 I Date of Admission: 12/15/2024   Date of Service: 12/16/2024 I Hospital Day: 1    Assessment & Plan  Acute alcoholic pancreatitis  34-year-old female with a history of active alcohol use disorder, recurrent alcoholic pancreatitis (most recently November 2023) presents as a transfer from a  for consideration of necrosectomy and cyst gastrostomy with advanced endoscopy.  She was recently admitted from 11/23 to 11/29 for necrotizing pancreatitis.  CT scan at that time showed evidence of pancreatic necrosis and acute pancreatitis.  She was treated conservatively with improvement of her symptoms.  She represented again to [facility] on 12/13 with complaints of worsening epigastric abdominal pain, nausea, vomiting.  CT abdomen and pelvis now showing multiple peripancreatic fluid collection, a new large well-defined cystic collection with mass effect on proximal stomach measuring 9 x 9 x 12 cm.  Last alcohol use was 12/13.  Consumes 2-3 bottles of wine daily.  Of note, she is worried that she may lose her job as she is unable to return back to work.  Liver enzymes within normal limits and lipase has normalized.    Plan  - Will plan for EUS with cystgastrostomy placement  tomorrow 12/17  - Okay for clear liquid diet if tolerated at this time.  N.p.o. after midnight  - Alcohol withdrawal management per primary team  - Okay to continue with IV fluids  - Antiemetics and analgesics per primary team  - Given necrotizing pancreatitis, will consult general surgery team for comanagement  -Alcohol cessation counseling        Alcohol withdrawal seizure (HCC)  History of alcohol use disorder and previous withdrawal seizure.  On CIWA protocol  Thiamine and folate  Tobacco abuse    Gastroesophageal reflux disease without esophagitis  Continue PPI twice daily        Subjective   No acute events overnight.    Objective  :  Temp:  [97.7 °F (36.5 °C)-99.1 °F (37.3 °C)] 98.2 °F (36.8 °C)  HR:  [62-82] 62  BP: (105-121)/(66-79) 118/79  Resp:  [12-16] 16  SpO2:  [96 %-97 %] 96 %    Physical Exam  Vitals and nursing note reviewed.   Constitutional:       General: She is not in acute distress.     Appearance: She is well-developed.   HENT:      Head: Normocephalic and atraumatic.   Eyes:      Conjunctiva/sclera: Conjunctivae normal.   Cardiovascular:      Rate and Rhythm: Normal rate and regular rhythm.      Heart sounds: No murmur heard.  Pulmonary:      Effort: Pulmonary effort is normal. No respiratory distress.      Breath sounds: Normal breath sounds.   Abdominal:      Palpations: Abdomen is soft.      Tenderness: There is abdominal tenderness.      Comments: Epigastric and left upper quadrant tenderness   Musculoskeletal:         General: No swelling.      Cervical back: Neck supple.   Skin:     General: Skin is warm and dry.      Capillary Refill: Capillary refill takes less than 2 seconds.   Neurological:      Mental Status: She is alert.   Psychiatric:         Mood and Affect: Mood normal.           Lab Results: I have reviewed the following results:CBC/BMP:   .     12/16/24  0552   WBC 7.39   HGB 12.2   HCT 37.5      SODIUM 137   K 4.6      CO2 30   BUN 2*   CREATININE 0.47*   GLUC 89    , LFTs: No new results in last 24 hours.     Imaging Results Review: I reviewed radiology reports from this admission including: CT abdomen/pelvis.  Other Study Results Review: No additional pertinent studies reviewed.    Rocio Wang MD  Gastroenterology Fellow, PGY- 4  Available on [employer] Secure Chat  12/16/2024 5:27 PM

## 2024-12-16 NOTE — UTILIZATION REVIEW
Initial Clinical Review    Admission: Date/Time/Statement:   Admission Orders (From admission, onward)       Ordered        12/15/24 1527  INPATIENT ADMISSION  Once                          Orders Placed This Encounter   Procedures    INPATIENT ADMISSION     Standing Status:   Standing     Number of Occurrences:   1     Level of Care:   Med Surg [16]     Estimated length of stay:   More than 2 Midnights     Certification:   I certify that inpatient services are medically necessary for this patient for a duration of greater than two midnights. See H&P and MD Progress Notes for additional information about the patient's course of treatment.     Initial Presentation: 34 y.o. female with PMHx includes alcohol use disorder with withdrawal seizure, recurrent alcoholic pancreatitis, hypertension, smoker, who presented initially to [facility] then transferred to [facility], admitted Inpatient status dt Acute alcoholic pancreatitis.  Presented due to nausea and left-sided abdominal pain.  Recent admission for pancreatitis and acute necrotic collection on CT for which she was treated with supportive care. Since last Wednesday her abdominal pain worsened which led to the ED visit.  CT abdomen showed recurrent pancreatitis and evidence of large pancreatic pseudocyst.  General surgery was consulted who recommended conservative management. GI was consulted who recommended transfer to [facility] for cyst gastrostomy and necrosectomy with advanced GI team.     Plan:  Admit to med surg :  GI consult, NPO after MN, IVF, pain meds, IV PPI, nicotine patch. Monitor VS and labs. Start CIWA with folic acid, MVT and thiamine.     Date: 12/15   Day 2: Awaiting GI consult and Day 2 info.     Scheduled Medications:  chlordiazePOXIDE, 25 mg, Oral, Q6H  enoxaparin, 40 mg, Subcutaneous, Q24H RODRICK  folic acid, 1 mg, Oral, Daily  LORazepam, 2 mg, Oral, Once  multivitamin-minerals, 1 tablet, Oral, Daily  nicotine, 1  patch, Transdermal, Daily  pantoprazole, 40 mg, Intravenous, Q12H RODRICK  thiamine, 100 mg, Oral, Daily      Continuous IV Infusions:  multi-electrolyte, 100 mL/hr, Intravenous, Continuous      PRN Meds:  HYDROmorphone, 0.5 mg, Intravenous, Q3H PRN  HYDROmorphone, 0.5 mg, Intravenous, Q3H PRN   Or  HYDROmorphone, 1 mg, Intravenous, Q3H PRN x8  ondansetron, 4 mg, Intravenous, Q4H PRN x3  senna, 1 tablet, Oral, HS PRN      ED Triage Vitals   Temperature Pulse Respirations Blood Pressure SpO2 Pain Score   12/15/24 1525 12/15/24 1525 12/15/24 1901 12/15/24 1901 12/15/24 1525 12/15/24 1530   98.1 °F (36.7 °C) 81 16 121/77 96 % 8     Weight (last 2 days)       Date/Time Weight    12/16/24 0308 57.7 (127.21)    12/15/24 1600 55.1 (121.4)            Vital Signs (last 3 days)       Date/Time Temp Pulse Resp BP MAP (mmHg) SpO2 O2 Device Patient Position - Orthostatic VS CIWA-Ar Total Pain    12/16/24 1030 -- -- -- -- -- -- -- -- -- 8    12/16/24 08:22:33 97.7 °F (36.5 °C) -- 12 117/75 89 -- -- -- -- --    12/16/24 0821 -- -- -- -- -- -- -- -- -- 8    12/16/24 0603 -- -- -- -- -- -- -- -- -- 8    12/16/24 0601 -- -- -- -- -- -- -- -- -- 8    12/16/24 0403 -- -- -- -- -- -- -- -- -- No Pain    12/16/24 02:08:38 98.4 °F (36.9 °C) 62 16 105/66 79 96 % -- Lying -- --    12/16/24 0009 -- -- -- -- -- -- -- -- -- 6    12/15/24 23:44:44 98.3 °F (36.8 °C) 71 16 118/71 87 96 % -- Lying -- 6    12/15/24 2227 -- -- -- -- -- -- -- -- -- 9    12/15/24 2200 -- -- -- -- -- -- -- -- 14 --    12/15/24 19:01:23 98.5 °F (36.9 °C) 82 16 121/77 92 97 % -- -- -- --    12/15/24 1758 -- -- -- -- -- -- -- -- 5 --    12/15/24 1707 -- -- -- -- -- -- -- -- -- 9    12/15/24 1541 -- -- -- -- -- -- -- -- 9 --    12/15/24 1530 -- -- -- -- -- -- None (Room air) -- -- 8    12/15/24 15:25:54 98.1 °F (36.7 °C) 81 -- -- -- 96 % -- -- -- --           CIWA-Ar Score       Row Name 12/15/24 2200 12/15/24 1758 12/15/24 1541       CIWA-Ar    Nausea and Vomiting 4 0 1     Tactile Disturbances 1 0 1    Tremor 4 4 4    Auditory Disturbances 0 0 0    Paroxysmal Sweats 0 0 0    Visual Disturbances 0 0 0    Anxiety 4 1 1    Headache, Fullness in Head 0 0 1    Agitation 1 0 1    Orientation and Clouding of Sensorium 0 0 0    CIWA-Ar Total 14 5 9                    Pertinent Labs/Diagnostic Test Results:   Radiology:  No orders to display     Cardiology:  No orders to display     GI:  No orders to display           Results from last 7 days   Lab Units 12/16/24  0552 12/15/24  0236 12/14/24  1020 12/14/24  0338 12/13/24  1825   WBC Thousand/uL 7.39 7.83 8.13 10.45* 14.38*   HEMOGLOBIN g/dL 12.2 10.5* 10.8* 11.0* 12.8   HEMATOCRIT % 37.5 33.1* 34.0* 34.2* 38.8   PLATELETS Thousands/uL 224 224 240 296 369   TOTAL NEUT ABS Thousands/µL  --  5.31  --   --  9.55*         Results from last 7 days   Lab Units 12/16/24  0552 12/15/24  0236 12/14/24  1020 12/14/24  0338 12/13/24  1825   SODIUM mmol/L 137 133* 135 136 139   POTASSIUM mmol/L 4.6 3.9 4.2 4.0 3.8   CHLORIDE mmol/L 100 98 101 103 103   CO2 mmol/L 30 28 24 25 24   ANION GAP mmol/L 7 7 10 8 12   BUN mg/dL 2* 4* 6 5 5   CREATININE mg/dL 0.47* 0.39* 0.44* 0.39* 0.40*   EGFR ml/min/1.73sq m 130 138 133 138 137   CALCIUM mg/dL 8.8 7.0* 7.5* 7.7* 8.7   MAGNESIUM mg/dL  --   --  2.5 1.4*  --    PHOSPHORUS mg/dL  --   --   --  4.0  --      Results from last 7 days   Lab Units 12/13/24  1825   AST U/L 25   ALT U/L 13   ALK PHOS U/L 72   TOTAL PROTEIN g/dL 7.3   ALBUMIN g/dL 3.8   TOTAL BILIRUBIN mg/dL 0.45     Results from last 7 days   Lab Units 12/16/24  0607 12/16/24  0009 12/15/24  1748 12/15/24  1157 12/15/24  0628 12/15/24  0223 12/14/24  2005 12/14/24  1054   POC GLUCOSE mg/dl 76 83 130 112 79 76 87 63*     Results from last 7 days   Lab Units 12/16/24  0552 12/15/24  0236 12/14/24  1020 12/14/24  0338 12/13/24  1825   GLUCOSE RANDOM mg/dL 89 101 61* 70 91       Results from last 7 days   Lab Units 12/13/24  1825   PROCALCITONIN ng/ml <0.05      Results from last 7 days   Lab Units 12/13/24  2312 12/13/24 2027   LACTIC ACID mmol/L 1.4 2.2*       Results from last 7 days   Lab Units 12/14/24  1020 12/13/24  1825   LIPASE u/L 77 165*     Results from last 7 days   Lab Units 12/13/24  1825   CRP mg/L 17.9*       Results from last 7 days   Lab Units 12/13/24 2027   ETHANOL LVL mg/dL 157*       Results from last 7 days   Lab Units 12/13/24 2027   BLOOD CULTURE  No Growth at 48 hrs.  No Growth at 48 hrs.     Past Medical History:   Diagnosis Date    Acute alcoholic pancreatitis 11/03/2023    Hypertension     SIRS (systemic inflammatory response syndrome) (HCC) 04/10/2024     Present on Admission:   Alcohol withdrawal seizure (HCC)   Essential hypertension   Acute alcoholic pancreatitis   Tobacco abuse   Gastroesophageal reflux disease without esophagitis      Admitting Diagnosis: Necrotizing pancreatitis [K85.91]  Age/Sex: 34 y.o. female    Network Utilization Review Department  ATTENTION: Please call with any questions or concerns to [phone] and carefully listen to the prompts so that you are directed to the right person. All voicemails are confidential.   For Discharge needs, contact Care Management DC Support Team at [phone] opt. [phone]  Send all requests for admission clinical reviews, approved or denied determinations and any other requests to dedicated fax number below belonging to the campus where the patient is receiving treatment. List of dedicated fax numbers for the Facilities:  FACILITY NAME UR FAX NUMBER   ADMISSION DENIALS (Administrative/Medical Necessity) [phone]   DISCHARGE SUPPORT TEAM (NETWORK) [phone]   PARENT CHILD HEALTH (Maternity/NICU/Pediatrics) [phone]   [facility] [phone]   [facility] [phone]   [facility] [phone]   [facility] [phone]   [facility] [phone]    [facility] [phone]   [facility] [phone]   [facility] [phone]   [facility] [phone]   Critical access hospital [phone]   [facility] [phone]   [facility] [phone]

## 2024-12-17 ENCOUNTER — PREP FOR PROCEDURE (OUTPATIENT)
Dept: GASTROENTEROLOGY | Facility: CLINIC | Age: 34
End: 2024-12-17

## 2024-12-17 ENCOUNTER — APPOINTMENT (INPATIENT)
Dept: GASTROENTEROLOGY | Facility: HOSPITAL | Age: 34
DRG: 438 | End: 2024-12-17
Payer: COMMERCIAL

## 2024-12-17 ENCOUNTER — ANESTHESIA EVENT (INPATIENT)
Dept: GASTROENTEROLOGY | Facility: HOSPITAL | Age: 34
DRG: 438 | End: 2024-12-17
Payer: COMMERCIAL

## 2024-12-17 ENCOUNTER — ANESTHESIA (INPATIENT)
Dept: GASTROENTEROLOGY | Facility: HOSPITAL | Age: 34
DRG: 438 | End: 2024-12-17
Payer: COMMERCIAL

## 2024-12-17 DIAGNOSIS — K86.3 PANCREATIC PSEUDOCYST: Primary | ICD-10-CM

## 2024-12-17 DIAGNOSIS — K85.91 NECROTIZING PANCREATITIS: ICD-10-CM

## 2024-12-17 LAB
ANION GAP SERPL CALCULATED.3IONS-SCNC: 8 MMOL/L (ref 4–13)
BUN SERPL-MCNC: 3 MG/DL (ref 5–25)
CALCIUM SERPL-MCNC: 8.3 MG/DL (ref 8.4–10.2)
CHLORIDE SERPL-SCNC: 100 MMOL/L (ref 96–108)
CO2 SERPL-SCNC: 28 MMOL/L (ref 21–32)
CREAT SERPL-MCNC: 0.38 MG/DL (ref 0.6–1.3)
ERYTHROCYTE [DISTWIDTH] IN BLOOD BY AUTOMATED COUNT: 11.7 % (ref 11.6–15.1)
GFR SERPL CREATININE-BSD FRML MDRD: 139 ML/MIN/1.73SQ M
GLUCOSE SERPL-MCNC: 146 MG/DL (ref 65–140)
GLUCOSE SERPL-MCNC: 70 MG/DL (ref 65–140)
GLUCOSE SERPL-MCNC: 84 MG/DL (ref 65–140)
GLUCOSE SERPL-MCNC: 86 MG/DL (ref 65–140)
GLUCOSE SERPL-MCNC: 93 MG/DL (ref 65–140)
HCT VFR BLD AUTO: 32.5 % (ref 34.8–46.1)
HGB BLD-MCNC: 10.2 G/DL (ref 11.5–15.4)
MCH RBC QN AUTO: 34.1 PG (ref 26.8–34.3)
MCHC RBC AUTO-ENTMCNC: 31.4 G/DL (ref 31.4–37.4)
MCV RBC AUTO: 109 FL (ref 82–98)
PLATELET # BLD AUTO: 188 THOUSANDS/UL (ref 149–390)
PMV BLD AUTO: 11.9 FL (ref 8.9–12.7)
POTASSIUM SERPL-SCNC: 4 MMOL/L (ref 3.5–5.3)
RBC # BLD AUTO: 2.99 MILLION/UL (ref 3.81–5.12)
SODIUM SERPL-SCNC: 136 MMOL/L (ref 135–147)
WBC # BLD AUTO: 5.75 THOUSAND/UL (ref 4.31–10.16)

## 2024-12-17 PROCEDURE — C1889 IMPLANT/INSERT DEVICE, NOC: HCPCS | Performed by: STUDENT IN AN ORGANIZED HEALTH CARE EDUCATION/TRAINING PROGRAM

## 2024-12-17 PROCEDURE — 0F7D8DZ DILATION OF PANCREATIC DUCT WITH INTRALUMINAL DEVICE, VIA NATURAL OR ARTIFICIAL OPENING ENDOSCOPIC: ICD-10-PCS | Performed by: INTERNAL MEDICINE

## 2024-12-17 PROCEDURE — 82948 REAGENT STRIP/BLOOD GLUCOSE: CPT

## 2024-12-17 PROCEDURE — 85027 COMPLETE CBC AUTOMATED: CPT | Performed by: PHYSICIAN ASSISTANT

## 2024-12-17 PROCEDURE — NC001 PR NO CHARGE: Performed by: SURGERY

## 2024-12-17 PROCEDURE — 80048 BASIC METABOLIC PNL TOTAL CA: CPT | Performed by: PHYSICIAN ASSISTANT

## 2024-12-17 PROCEDURE — 99232 SBSQ HOSP IP/OBS MODERATE 35: CPT | Performed by: INTERNAL MEDICINE

## 2024-12-17 PROCEDURE — 0F9G8ZZ DRAINAGE OF PANCREAS, VIA NATURAL OR ARTIFICIAL OPENING ENDOSCOPIC: ICD-10-PCS | Performed by: INTERNAL MEDICINE

## 2024-12-17 PROCEDURE — 43240 EGD W/TRANSMURAL DRAIN CYST: CPT | Performed by: INTERNAL MEDICINE

## 2024-12-17 PROCEDURE — C1726 CATH, BAL DIL, NON-VASCULAR: HCPCS | Performed by: STUDENT IN AN ORGANIZED HEALTH CARE EDUCATION/TRAINING PROGRAM

## 2024-12-17 PROCEDURE — C1874 STENT, COATED/COV W/DEL SYS: HCPCS | Performed by: STUDENT IN AN ORGANIZED HEALTH CARE EDUCATION/TRAINING PROGRAM

## 2024-12-17 PROCEDURE — C2617 STENT, NON-COR, TEM W/O DEL: HCPCS | Performed by: STUDENT IN AN ORGANIZED HEALTH CARE EDUCATION/TRAINING PROGRAM

## 2024-12-17 PROCEDURE — 43237 ENDOSCOPIC US EXAM ESOPH: CPT | Performed by: INTERNAL MEDICINE

## 2024-12-17 RX ORDER — DIPHENHYDRAMINE HYDROCHLORIDE 50 MG/ML
INJECTION INTRAMUSCULAR; INTRAVENOUS AS NEEDED
Status: DISCONTINUED | OUTPATIENT
Start: 2024-12-17 | End: 2024-12-17

## 2024-12-17 RX ORDER — PROPOFOL 10 MG/ML
INJECTION, EMULSION INTRAVENOUS AS NEEDED
Status: DISCONTINUED | OUTPATIENT
Start: 2024-12-17 | End: 2024-12-17

## 2024-12-17 RX ORDER — SODIUM CHLORIDE, SODIUM LACTATE, POTASSIUM CHLORIDE, CALCIUM CHLORIDE 600; 310; 30; 20 MG/100ML; MG/100ML; MG/100ML; MG/100ML
125 INJECTION, SOLUTION INTRAVENOUS CONTINUOUS
OUTPATIENT
Start: 2024-12-17

## 2024-12-17 RX ORDER — SUCCINYLCHOLINE/SOD CL,ISO/PF 100 MG/5ML
SYRINGE (ML) INTRAVENOUS AS NEEDED
Status: DISCONTINUED | OUTPATIENT
Start: 2024-12-17 | End: 2024-12-17

## 2024-12-17 RX ORDER — LIDOCAINE HYDROCHLORIDE 10 MG/ML
INJECTION, SOLUTION EPIDURAL; INFILTRATION; INTRACAUDAL; PERINEURAL AS NEEDED
Status: DISCONTINUED | OUTPATIENT
Start: 2024-12-17 | End: 2024-12-17

## 2024-12-17 RX ORDER — NICOTINE 21 MG/24HR
1 PATCH, TRANSDERMAL 24 HOURS TRANSDERMAL DAILY
Status: CANCELLED | OUTPATIENT
Start: 2024-12-17

## 2024-12-17 RX ORDER — CIPROFLOXACIN 2 MG/ML
400 INJECTION, SOLUTION INTRAVENOUS ONCE
Status: COMPLETED | OUTPATIENT
Start: 2024-12-17 | End: 2024-12-17

## 2024-12-17 RX ORDER — DEXAMETHASONE SODIUM PHOSPHATE 10 MG/ML
INJECTION, SOLUTION INTRAMUSCULAR; INTRAVENOUS AS NEEDED
Status: DISCONTINUED | OUTPATIENT
Start: 2024-12-17 | End: 2024-12-17

## 2024-12-17 RX ORDER — PHENYLEPHRINE HCL IN 0.9% NACL 1 MG/10 ML
SYRINGE (ML) INTRAVENOUS AS NEEDED
Status: DISCONTINUED | OUTPATIENT
Start: 2024-12-17 | End: 2024-12-17

## 2024-12-17 RX ORDER — FENTANYL CITRATE 50 UG/ML
INJECTION, SOLUTION INTRAMUSCULAR; INTRAVENOUS AS NEEDED
Status: DISCONTINUED | OUTPATIENT
Start: 2024-12-17 | End: 2024-12-17

## 2024-12-17 RX ORDER — SODIUM CHLORIDE 9 MG/ML
INJECTION, SOLUTION INTRAVENOUS CONTINUOUS PRN
Status: DISCONTINUED | OUTPATIENT
Start: 2024-12-17 | End: 2024-12-17

## 2024-12-17 RX ADMIN — THIAMINE HCL TAB 100 MG 100 MG: 100 TAB at 08:16

## 2024-12-17 RX ADMIN — NICOTINE 1 PATCH: 21 PATCH, EXTENDED RELEASE TRANSDERMAL at 08:15

## 2024-12-17 RX ADMIN — Medication 100 MCG: at 15:05

## 2024-12-17 RX ADMIN — DEXAMETHASONE SODIUM PHOSPHATE 10 MG: 10 INJECTION INTRAMUSCULAR; INTRAVENOUS at 14:50

## 2024-12-17 RX ADMIN — HYDROMORPHONE HYDROCHLORIDE 1 MG: 1 INJECTION, SOLUTION INTRAMUSCULAR; INTRAVENOUS; SUBCUTANEOUS at 00:41

## 2024-12-17 RX ADMIN — FOLIC ACID 1 MG: 1 TABLET ORAL at 08:16

## 2024-12-17 RX ADMIN — CIPROFLOXACIN: 2 INJECTION, SOLUTION INTRAVENOUS at 15:00

## 2024-12-17 RX ADMIN — Medication 100 MCG: at 15:32

## 2024-12-17 RX ADMIN — SODIUM CHLORIDE, SODIUM LACTATE, POTASSIUM CHLORIDE, AND CALCIUM CHLORIDE 100 ML/HR: .6; .31; .03; .02 INJECTION, SOLUTION INTRAVENOUS at 00:45

## 2024-12-17 RX ADMIN — SENNOSIDES 8.6 MG: 8.6 TABLET, FILM COATED ORAL at 21:56

## 2024-12-17 RX ADMIN — HYDROMORPHONE HYDROCHLORIDE 0.5 MG: 1 INJECTION, SOLUTION INTRAMUSCULAR; INTRAVENOUS; SUBCUTANEOUS at 22:57

## 2024-12-17 RX ADMIN — PROPOFOL 150 MG: 10 INJECTION, EMULSION INTRAVENOUS at 14:50

## 2024-12-17 RX ADMIN — Medication 100 MCG: at 15:21

## 2024-12-17 RX ADMIN — ONDANSETRON 4 MG: 2 INJECTION INTRAMUSCULAR; INTRAVENOUS at 15:45

## 2024-12-17 RX ADMIN — Medication 80 MG: at 14:50

## 2024-12-17 RX ADMIN — Medication 100 MCG: at 15:13

## 2024-12-17 RX ADMIN — HYDROMORPHONE HYDROCHLORIDE 1 MG: 1 INJECTION, SOLUTION INTRAMUSCULAR; INTRAVENOUS; SUBCUTANEOUS at 09:59

## 2024-12-17 RX ADMIN — HYDROMORPHONE HYDROCHLORIDE 1 MG: 1 INJECTION, SOLUTION INTRAMUSCULAR; INTRAVENOUS; SUBCUTANEOUS at 04:09

## 2024-12-17 RX ADMIN — PANTOPRAZOLE SODIUM 40 MG: 40 INJECTION, POWDER, FOR SOLUTION INTRAVENOUS at 08:16

## 2024-12-17 RX ADMIN — HYDROMORPHONE HYDROCHLORIDE 1 MG: 1 INJECTION, SOLUTION INTRAMUSCULAR; INTRAVENOUS; SUBCUTANEOUS at 16:58

## 2024-12-17 RX ADMIN — ENOXAPARIN SODIUM 40 MG: 40 INJECTION SUBCUTANEOUS at 08:16

## 2024-12-17 RX ADMIN — PANTOPRAZOLE SODIUM 40 MG: 40 INJECTION, POWDER, FOR SOLUTION INTRAVENOUS at 21:56

## 2024-12-17 RX ADMIN — HYDROMORPHONE HYDROCHLORIDE 0.5 MG: 1 INJECTION, SOLUTION INTRAMUSCULAR; INTRAVENOUS; SUBCUTANEOUS at 06:47

## 2024-12-17 RX ADMIN — SODIUM CHLORIDE, SODIUM LACTATE, POTASSIUM CHLORIDE, AND CALCIUM CHLORIDE 100 ML/HR: .6; .31; .03; .02 INJECTION, SOLUTION INTRAVENOUS at 10:08

## 2024-12-17 RX ADMIN — FENTANYL CITRATE 50 MCG: 50 INJECTION INTRAMUSCULAR; INTRAVENOUS at 14:50

## 2024-12-17 RX ADMIN — SODIUM CHLORIDE: 9 INJECTION, SOLUTION INTRAVENOUS at 14:45

## 2024-12-17 RX ADMIN — Medication 1 TABLET: at 08:16

## 2024-12-17 RX ADMIN — HYDROMORPHONE HYDROCHLORIDE 0.5 MG: 1 INJECTION, SOLUTION INTRAMUSCULAR; INTRAVENOUS; SUBCUTANEOUS at 12:46

## 2024-12-17 RX ADMIN — CHLORDIAZEPOXIDE HYDROCHLORIDE 25 MG: 5 CAPSULE ORAL at 04:09

## 2024-12-17 RX ADMIN — DIPHENHYDRAMINE HYDROCHLORIDE 25 MG: 50 INJECTION, SOLUTION INTRAMUSCULAR; INTRAVENOUS at 15:46

## 2024-12-17 RX ADMIN — HYDROMORPHONE HYDROCHLORIDE 1 MG: 1 INJECTION, SOLUTION INTRAMUSCULAR; INTRAVENOUS; SUBCUTANEOUS at 21:56

## 2024-12-17 RX ADMIN — LIDOCAINE HYDROCHLORIDE 50 MG: 10 INJECTION, SOLUTION EPIDURAL; INFILTRATION; INTRACAUDAL; PERINEURAL at 14:50

## 2024-12-17 RX ADMIN — ONDANSETRON 4 MG: 2 INJECTION INTRAMUSCULAR; INTRAVENOUS at 15:04

## 2024-12-17 RX ADMIN — Medication 100 MCG: at 14:57

## 2024-12-17 NOTE — UTILIZATION REVIEW
Continued Stay Review    Date:     FOR   12/16                          Current Patient Class: Inpatient  Current Level of Care:  med surg    HPI:34 y.o. female initially admitted on   12/15     Assessment/Plan:   12/16   GI consult  Plan   cystgastrostomy   12/17     Can have  cl liq diet.   Continue  IVF.  Continue pain control and antiemetics.  Need  CIWA scores.       Surgery consult  Wait  GI intervention. Tolerating  cl liq  diet.  No bowel function for several days.    Monitor labs.   Remains on  IVF.  On librium/CIWA scores.  Has  increased pain,  10/10.       Medications:   Scheduled Medications:  enoxaparin, 40 mg, Subcutaneous, Q24H RODRICK  folic acid, 1 mg, Oral, Daily  LORazepam, 2 mg, Oral, Once  multivitamin-minerals, 1 tablet, Oral, Daily  nicotine, 1 patch, Transdermal, Daily  pantoprazole, 40 mg, Intravenous, Q12H RODRICK  polyethylene glycol, 17 g, Oral, Daily  senna, 1 tablet, Oral, HS  thiamine, 100 mg, Oral, Daily      Continuous IV Infusions:  lactated ringers, 100 mL/hr, Intravenous, Continuous      PRN Meds:  HYDROmorphone, 0.5 mg, Intravenous, Q3H PRN  ( x2  12/16 and X 1  12/17  thus far)    HYDROmorphone, 0.5 mg, Intravenous, Q3H PRN   Or  HYDROmorphone, 1 mg, Intravenous, Q3H PRN  ( x4  12/16  and  X 3 12/17 thus far)   ondansetron, 4 mg, Intravenous, Q4H PRN  senna, 1 tablet, Oral, HS PRN      Discharge Plan:   TBD    Vital Signs (last 3 days)       Date/Time Temp Pulse Resp BP MAP (mmHg) SpO2 O2 Device Patient Position - Orthostatic VS CIWA-Ar Total Pain    12/17/24 07:17:05 98.3 °F (36.8 °C) 72 18 111/71 84 97 % -- -- -- --    12/17/24 0647 -- -- -- -- -- -- -- -- -- 10 - Worst Possible Pain    12/17/24 0409 -- -- -- -- -- -- -- -- -- 9    12/17/24 03:07:14 98.2 °F (36.8 °C) 69 -- 113/73 86 94 % -- -- 3 --    12/17/24 0041 -- -- -- -- -- -- -- -- -- 10 - Worst Possible Pain    12/16/24 2300 -- -- -- -- -- -- -- -- 2 --    12/16/24 22:40:11 98.3 °F (36.8 °C) 68 16 111/73 86 96 % -- -- --  --    12/16/24 2100 -- -- -- -- -- 94 % None (Room air) -- -- 10 - Worst Possible Pain    12/16/24 19:09:56 98.8 °F (37.1 °C) 78 12 96/61 73 95 % -- -- 2 --    12/16/24 1839 -- -- -- -- -- -- -- -- -- 9    12/16/24 1703 -- -- -- -- -- -- -- -- -- 8    12/16/24 16:00:20 98.2 °F (36.8 °C) -- 16 118/79 92 -- -- -- -- --    12/16/24 1301 -- -- -- -- -- -- -- -- -- 7    12/16/24 11:48:02 99.1 °F (37.3 °C) -- 16 117/79 92 -- -- -- -- --    12/16/24 1030 -- -- -- -- -- -- -- -- -- 8 12/16/24 0900 -- -- -- -- -- -- None (Room air) -- -- 8 12/16/24 08:22:33 97.7 °F (36.5 °C) -- 12 117/75 89 -- -- -- -- --    12/16/24 0821 -- -- -- -- -- -- -- -- -- 8 12/16/24 0603 -- -- -- -- -- -- -- -- -- 8 12/16/24 0601 -- -- -- -- -- -- -- -- -- 8 12/16/24 0403 -- -- -- -- -- -- -- -- -- No Pain    12/16/24 02:08:38 98.4 °F (36.9 °C) 62 16 105/66 79 96 % -- Lying -- --    12/16/24 0009 -- -- -- -- -- -- -- -- -- 6    12/15/24 23:44:44 98.3 °F (36.8 °C) 71 16 118/71 87 96 % -- Lying -- 6    12/15/24 2227 -- -- -- -- -- -- -- -- -- 9    12/15/24 2200 -- -- -- -- -- -- -- -- 14 --    12/15/24 19:01:23 98.5 °F (36.9 °C) 82 16 121/77 92 97 % -- -- -- --    12/15/24 1758 -- -- -- -- -- -- -- -- 5 --    12/15/24 1707 -- -- -- -- -- -- -- -- -- 9    12/15/24 1541 -- -- -- -- -- -- -- -- 9 --    12/15/24 1530 -- -- -- -- -- -- None (Room air) -- -- 8    12/15/24 15:25:54 98.1 °F (36.7 °C) 81 -- -- -- 96 % -- -- -- --          Weight (last 2 days)       Date/Time Weight    12/17/24 0600 61.3 (135.14)    12/16/24 0308 57.7 (127.21)    12/15/24 1600 55.1 (121.4)           CIWA-Ar Score       Row Name 12/17/24 03:07:14 12/16/24 2300 12/16/24 19:09:56       CIWA-Ar    Nausea and Vomiting 0 0 0    Tactile Disturbances 0 0 0    Tremor 3 2 2    Auditory Disturbances 0 0 0    Paroxysmal Sweats 0 0 0    Visual Disturbances 0 0 0    Anxiety 0 0 0    Headache, Fullness in Head 0 0 0    Agitation 0 0 0    Orientation and Clouding of Sensorium 0  0 0    CIWA-Ar Total 3 2 2      Row Name 12/15/24 2200 12/15/24 1758 12/15/24 1541       CIWA-Ar    Nausea and Vomiting 4 0 1    Tactile Disturbances 1 0 1    Tremor 4 4 4    Auditory Disturbances 0 0 0    Paroxysmal Sweats 0 0 0    Visual Disturbances 0 0 0    Anxiety 4 1 1    Headache, Fullness in Head 0 0 1    Agitation 1 0 1    Orientation and Clouding of Sensorium 0 0 0    CIWA-Ar Total 14 5 9                  Pertinent Labs/Diagnostic Results:   Radiology:        Results from last 7 days   Lab Units 12/17/24  0613 12/16/24  0552 12/15/24  0236 12/14/24  1020 12/14/24  0338 12/13/24  1825   WBC Thousand/uL 5.75 7.39 7.83 8.13 10.45* 14.38*   HEMOGLOBIN g/dL 10.2* 12.2 10.5* 10.8* 11.0* 12.8   HEMATOCRIT % 32.5* 37.5 33.1* 34.0* 34.2* 38.8   PLATELETS Thousands/uL 188 224 224 240 296 369   TOTAL NEUT ABS Thousands/µL  --   --  5.31  --   --  9.55*         Results from last 7 days   Lab Units 12/17/24  0613 12/16/24  0552 12/15/24  0236 12/14/24  1020 12/14/24  0338   SODIUM mmol/L 136 137 133* 135 136   POTASSIUM mmol/L 4.0 4.6 3.9 4.2 4.0   CHLORIDE mmol/L 100 100 98 101 103   CO2 mmol/L 28 30 28 24 25   ANION GAP mmol/L 8 7 7 10 8   BUN mg/dL 3* 2* 4* 6 5   CREATININE mg/dL 0.38* 0.47* 0.39* 0.44* 0.39*   EGFR ml/min/1.73sq m 139 130 138 133 138   CALCIUM mg/dL 8.3* 8.8 7.0* 7.5* 7.7*   MAGNESIUM mg/dL  --   --   --  2.5 1.4*   PHOSPHORUS mg/dL  --   --   --   --  4.0     Results from last 7 days   Lab Units 12/13/24  1825   AST U/L 25   ALT U/L 13   ALK PHOS U/L 72   TOTAL PROTEIN g/dL 7.3   ALBUMIN g/dL 3.8   TOTAL BILIRUBIN mg/dL 0.45     Results from last 7 days   Lab Units 12/17/24  0608 12/17/24  0002 12/16/24  1807 12/16/24  1146 12/16/24  0607 12/16/24  0009 12/15/24  1748 12/15/24  1157 12/15/24  0628 12/15/24  0223 12/14/24  2005 12/14/24  1054   POC GLUCOSE mg/dl 70 86 119 73 76 83 130 112 79 76 87 63*     Results from last 7 days   Lab Units 12/17/24  0613 12/16/24  0552 12/15/24  0236  12/14/24  1020 12/14/24  0338 12/13/24  1825   GLUCOSE RANDOM mg/dL 84 89 101 61* 70 91                   Results from last 7 days   Lab Units 12/13/24  1825   PROCALCITONIN ng/ml <0.05     Results from last 7 days   Lab Units 12/13/24  2312 12/13/24 2027   LACTIC ACID mmol/L 1.4 2.2*               Results from last 7 days   Lab Units 12/14/24  1020 12/13/24  1825   LIPASE u/L 77 165*     Results from last 7 days   Lab Units 12/13/24  1825   CRP mg/L 17.9*           Results from last 7 days   Lab Units 12/13/24  2027   ETHANOL LVL mg/dL 157*                 Results from last 7 days   Lab Units 12/13/24 2027   BLOOD CULTURE  No Growth at 72 hrs.  No Growth at 72 hrs.                   Network Utilization Review Department  ATTENTION: Please call with any questions or concerns to [phone] and carefully listen to the prompts so that you are directed to the right person. All voicemails are confidential.   For Discharge needs, contact Care Management DC Support Team at [phone] opt. 2  Send all requests for admission clinical reviews, approved or denied determinations and any other requests to dedicated fax number below belonging to the campus where the patient is receiving treatment. List of dedicated fax numbers for the Facilities:  FACILITY NAME UR FAX NUMBER   ADMISSION DENIALS (Administrative/Medical Necessity) [phone]   DISCHARGE SUPPORT TEAM (NETWORK) [phone]   PARENT CHILD HEALTH (Maternity/NICU/Pediatrics) [phone]   [facility] [phone]   [facility] [phone]   [facility] [phone]   [facility] [phone]   [facility] [phone]   [facility] [phone]   [facility] [phone]   [facility] [phone]   [facility]  [phone]   [facility] [phone]   [facility] [phone]   [facility] [phone]       Render Post-Care Instructions In Note?: yes

## 2024-12-17 NOTE — PLAN OF CARE
Problem: PAIN - ADULT  Goal: Verbalizes/displays adequate comfort level or baseline comfort level  Description: Interventions:  - Encourage patient to monitor pain and request assistance  - Assess pain using appropriate pain scale  - Administer analgesics based on type and severity of pain and evaluate response  - Implement non-pharmacological measures as appropriate and evaluate response  - Consider cultural and social influences on pain and pain management  - Notify physician/advanced practitioner if interventions unsuccessful or patient reports new pain  Outcome: Progressing     Problem: INFECTION - ADULT  Goal: Absence or prevention of progression during hospitalization  Description: INTERVENTIONS:  - Assess and monitor for signs and symptoms of infection  - Monitor lab/diagnostic results  - Monitor all insertion sites, i.e. indwelling lines, tubes, and drains  - Monitor endotracheal if appropriate and nasal secretions for changes in amount and color  - [place] appropriate cooling/warming therapies per order  - Administer medications as ordered  - Instruct and encourage patient and family to use good hand hygiene technique  - Identify and instruct in appropriate isolation precautions for identified infection/condition  Outcome: Progressing

## 2024-12-17 NOTE — PROGRESS NOTES
Progress Note - Internal Medicine   Name: Kaykay Holland 34 y.o. female I MRN: [id number]  Unit/Bed#: [facility] 812-01 I Date of Admission: 12/15/2024   Date of Service: 12/17/2024 I Hospital Day: 2     Assessment & Plan  Acute alcoholic pancreatitis  Patient initially presented to [facility] with complaints of nausea and left-sided abdominal pain radiating to back. Has history of recurrent alcoholic pancreatitis.  Recent hospitalization (11/23 - 11/29) during which time was managed conservatively.  CT with findings of recurrent pericarditis and large pancreatic pseudocyst.  General surgery consulted at [facility].  Recommended conservative management.  GI consulted at [facility]. Recommended transfer to [facility] for cyst gastrostomy and necrosectomy with advance GI team.    Plan:  Patient currently tolerating clear liquid diet.  N.p.o. at midnight on 12/17 fGI intervention  GI consulted.  Appreciate recommendation.  Pain regimen increased to dilaudid 0.5 mg Q3H PRN mod pain, dilaudid 1.0 mg Q3H PRN for severe pain, dilaudid 0.5 mg Q3H PRN breakthrough  Bowel regimen in place  Patient placed back on CIWA to monitor her off of Librium  IV hydration  ml/hr, nausea medication as needed.  Alcohol withdrawal seizure (HCC)  Patient reports he drinks 3 glasses of wine for the past few years.  However, since her last hospital discharge on 11/29 she has not had any alcohol.  Has history of withdrawal seizure in 2023 for which she was admitted.  Previously on Librium 25 mg, CIWA protocol in place. Continue to monitor if patient requires Ativan  Patient refused CRS consult.  Essential hypertension  Previously on lisinopril which has been discontinued for about 6 months or so.  Blood pressure well-controlled.  Monitor daily vitals  Tobacco abuse  Smokes a pack a day for past 5 to 6 years.  On nicotine patch.  Smoking cessation education.  Gastroesophageal reflux disease without esophagitis  Reported history of GERD on pantoprazole 40  mg twice daily at home.  Continue Protonix 40 mg IV every 12 hours.  Switch when appropriate.    24 Hour Events : No acute events overnight  Subjective : Patient was resting in bed this morning on examination.  Patient was aware that she is going for procedure with GI today.  Unfortunately, the patient is extremely anxious about losing her job.  Patient was informed that we will do all that we can to provide documentation that she has been hospitalized.  Patient was encouraged to follow-up with an outpatient PCP and establish care after her hospitalization.  Patient will be provided resources in order to do so.  Overall patient is tolerating pain better today with increased pain regimen on board.  Patient reports continuous epigastric and left upper quadrant pain.  Patient has no other concerns at this time.    Objective :  Temp:  [97.3 °F (36.3 °C)-98.8 °F (37.1 °C)] 97.3 °F (36.3 °C)  HR:  [68-79] 79  BP: ()/(61-89) 121/75  Resp:  [12-18] 12  SpO2:  [94 %-97 %] 96 %  O2 Device: None (Room air)    Physical Exam  Constitutional:       General: She is not in acute distress.     Appearance: Normal appearance. She is ill-appearing. She is not diaphoretic.   HENT:      Head: Normocephalic and atraumatic.      Mouth/Throat:      Mouth: Mucous membranes are moist.   Cardiovascular:      Rate and Rhythm: Normal rate and regular rhythm.      Pulses: Normal pulses.      Heart sounds: Normal heart sounds.   Pulmonary:      Effort: Pulmonary effort is normal.      Breath sounds: Normal breath sounds.   Abdominal:      General: Abdomen is flat.      Tenderness: There is abdominal tenderness. There is guarding.      Comments: Significant tenderness to palpation of the epigastric and left upper quadrant.  Exam unchanged from yesterday.   Musculoskeletal:      Right lower leg: No edema.      Left lower leg: No edema.   Skin:     General: Skin is warm and dry.      Capillary Refill: Capillary refill takes less than 2 seconds.    Neurological:      Mental Status: She is alert and oriented to person, place, and time. Mental status is at baseline.         Lab Results: I have reviewed the following results:CBC/BMP:   .     12/17/24  0613   WBC 5.75   HGB 10.2*   HCT 32.5*      SODIUM 136   K 4.0      CO2 28   BUN 3*   CREATININE 0.38*   GLUC 84        Imaging Results Review: No pertinent imaging studies reviewed.  Other Study Results Review: No additional pertinent studies reviewed.    VTE Pharmacologic Prophylaxis: VTE covered by:  enoxaparin, Subcutaneous, 40 mg at 12/17/24 0816      VTE Mechanical Prophylaxis: sequential compression device

## 2024-12-17 NOTE — CASE MANAGEMENT
"   Case Management Progress Note    Patient name Kaykay Holland  Location [facility] 812/[facility] 812-01 MRN [id number]  : 1990 Date 2024       LOS (days): 2  Geometric Mean LOS (GMLOS) (days):   Days to GMLOS:        OBJECTIVE:        Current admission status: Inpatient  Preferred Pharmacy:   [id number] [place] - [address]  [address]  [place] [address]  Phone: [phone] Fax: [phone]    Primary Care Provider: PATRICIA Moran    Primary Insurance: [employer]ET[employer]  Secondary Insurance:     PROGRESS NOTE:    Pt requested to speak to this CM regarding her job status.  CM met with pt who was visibly anxious about \"losing her job while hospitalized\".  Pt explained to this CM that she received a call from her job ([employer]) stating that she will lose her job if she doesn't return to work.  Pt explained that she does not qualify for FMLA due to only being employed there for <6 months.  CM informed pt that the primary team can provide her with a note explaining her hospitalization that she can then provide to her employer.  CM provided pt with contact information so that she can provide it to her employer in the interim.  "

## 2024-12-17 NOTE — ANESTHESIA POSTPROCEDURE EVALUATION
Post-Op Assessment Note    CV Status:  Stable  Pain Score: 0    Pain management: adequate       Mental Status:  Alert and awake   Hydration Status:  Euvolemic   PONV Controlled:  Controlled   Airway Patency:  Patent     Post Op Vitals Reviewed: Yes    No anethesia notable event occurred.    Staff: CRNA           Last Filed PACU Vitals:  Vitals Value Taken Time   Temp 96.5 °F (35.8 °C) 12/17/24 1550   Pulse 87 12/17/24 1550   /84 12/17/24 1550   Resp 16 12/17/24 1550   SpO2 96 % 12/17/24 1550       Modified Kaylee:  Activity: 2 (12/17/2024  1:36 PM)  Respiration: 2 (12/17/2024  1:36 PM)  Circulation: 2 (12/17/2024  1:36 PM)  Consciousness: 2 (12/17/2024  1:36 PM)  Oxygen Saturation: 2 (12/17/2024  1:36 PM)  Modified Kaylee Score: 10 (12/17/2024  1:36 PM)

## 2024-12-17 NOTE — PERIOPERATIVE NURSING NOTE
Requested to use bathroom to void. Pt lethargic post anesthesia, offered bedpan. Unable to void on bedpan.

## 2024-12-17 NOTE — ASSESSMENT & PLAN NOTE
Patient reports he drinks 3 glasses of wine for the past few years.  However, since her last hospital discharge on 11/29 she has not had any alcohol.  Has history of withdrawal seizure in 2023 for which she was admitted.  Previously on Librium 25 mg, CIWA protocol in place. Continue to monitor if patient requires Ativan  Patient refused CRS consult.

## 2024-12-17 NOTE — PROGRESS NOTES
Progress Note - Surgery-General   Name: Kaykay Holland 34 y.o. female I MRN: [id number]  Unit/Bed#: [facility] 812-01 I Date of Admission: 12/15/2024   Date of Service: 12/16/2024 I Hospital Day: 1    Assessment & Plan  Acute alcoholic pancreatitis  Large psuedocyst displacing stomach (28d9h24 cm(  - Follow GI, possible cystogastrostomy  - Surgery available/following, depending on GI intervention  - Continue care and pancreatic resuscitation per primary  Alcohol withdrawal seizure (HCC)    Essential hypertension    Tobacco abuse    Gastroesophageal reflux disease without esophagitis          Subjective   Continues to feel abdominal pain.  Has nausea without vomiting.  Passing gas.  Walking.    Objective :  Temp:  [97.7 °F (36.5 °C)-99.1 °F (37.3 °C)] 98.8 °F (37.1 °C)  HR:  [62-78] 78  BP: ()/(61-79) 96/61  Resp:  [12-16] 12  SpO2:  [95 %-96 %] 95 %  O2 Device: None (Room air)    I/O         12/15 0701  12/16 0700 12/16 0701  12/17 0700    I.V. (mL/kg) 1266.7 (22)     Total Intake(mL/kg) 1266.7 (22)     Urine (mL/kg/hr) 1625 300 (0.4)    Total Output 1625 300    Net -358.3 -300                  Physical Exam  Constitutional:       Appearance: Normal appearance.   HENT:      Head: Normocephalic and atraumatic.      Mouth/Throat:      Mouth: Mucous membranes are moist.   Eyes:      Extraocular Movements: Extraocular movements intact.   Cardiovascular:      Rate and Rhythm: Normal rate and regular rhythm.   Pulmonary:      Effort: Pulmonary effort is normal.   Abdominal:      General: Abdomen is flat. There is distension.      Palpations: Abdomen is soft.      Tenderness: There is abdominal tenderness. There is no guarding or rebound.   Musculoskeletal:         General: Normal range of motion.      Cervical back: Normal range of motion and neck supple.   Skin:     General: Skin is warm and dry.   Neurological:      General: No focal deficit present.      Mental Status: She is alert and oriented to person, place, and  time.

## 2024-12-17 NOTE — ASSESSMENT & PLAN NOTE
Large psuedocyst displacing stomach (50l4w04 cm(  - Follow GI, possible cystogastrostomy  - Surgery available/following, depending on GI intervention  - Continue care and pancreatic resuscitation per primary

## 2024-12-17 NOTE — ANESTHESIA PREPROCEDURE EVALUATION
Procedure:  ENDOSCOPIC ULTRASOUND (UPPER)    Relevant Problems   CARDIO   (+) Essential hypertension      GI/HEPATIC   (+) Acute alcoholic pancreatitis   (+) Gastroesophageal reflux disease without esophagitis   (+) Hematemesis   (+) Hepatic steatosis   (+) Necrotizing pancreatitis   (+) Pancreatic pseudocyst   (+) Peptic ulcer disease      HEMATOLOGY   (+) Pancytopenia (HCC)      NEURO/PSYCH   (+) Alcohol withdrawal seizure (HCC)   (+) Anxiety   (+) Depression   (+) Provoked seizures (HCC)        Physical Exam    Airway    Mallampati score: III  TM Distance: >3 FB  Neck ROM: full     Dental   No notable dental hx     Cardiovascular  Cardiovascular exam normal    Pulmonary  Pulmonary exam normal     Other Findings  post-pubertal.      Anesthesia Plan  ASA Score- 3     Anesthesia Type- general with ASA Monitors.         Additional Monitors:     Airway Plan: ETT.           Plan Factors-Exercise tolerance (METS): >4 METS.    Chart reviewed. EKG reviewed.  Existing labs reviewed. Patient summary reviewed.    Patient is not a current smoker.              Induction- intravenous and rapid sequence induction.    Postoperative Plan-     Perioperative Resuscitation Plan - Level 1 - Full Code.       Informed Consent- Anesthetic plan and risks discussed with patient.  I personally reviewed this patient with the CRNA. Discussed and agreed on the Anesthesia Plan with the CRNA..

## 2024-12-18 ENCOUNTER — TELEPHONE (OUTPATIENT)
Dept: GASTROENTEROLOGY | Facility: CLINIC | Age: 34
End: 2024-12-18

## 2024-12-18 PROBLEM — K59.00 CONSTIPATION: Status: ACTIVE | Noted: 2024-12-18

## 2024-12-18 LAB
ANION GAP SERPL CALCULATED.3IONS-SCNC: 3 MMOL/L (ref 4–13)
BUN SERPL-MCNC: 3 MG/DL (ref 5–25)
CALCIUM SERPL-MCNC: 8.8 MG/DL (ref 8.4–10.2)
CHLORIDE SERPL-SCNC: 100 MMOL/L (ref 96–108)
CO2 SERPL-SCNC: 32 MMOL/L (ref 21–32)
CREAT SERPL-MCNC: 0.39 MG/DL (ref 0.6–1.3)
ERYTHROCYTE [DISTWIDTH] IN BLOOD BY AUTOMATED COUNT: 11.5 % (ref 11.6–15.1)
GFR SERPL CREATININE-BSD FRML MDRD: 138 ML/MIN/1.73SQ M
GLUCOSE SERPL-MCNC: 101 MG/DL (ref 65–140)
GLUCOSE SERPL-MCNC: 127 MG/DL (ref 65–140)
GLUCOSE SERPL-MCNC: 61 MG/DL (ref 65–140)
GLUCOSE SERPL-MCNC: 63 MG/DL (ref 65–140)
GLUCOSE SERPL-MCNC: 99 MG/DL (ref 65–140)
HCT VFR BLD AUTO: 34.2 % (ref 34.8–46.1)
HGB BLD-MCNC: 11.1 G/DL (ref 11.5–15.4)
MCH RBC QN AUTO: 34.2 PG (ref 26.8–34.3)
MCHC RBC AUTO-ENTMCNC: 32.5 G/DL (ref 31.4–37.4)
MCV RBC AUTO: 105 FL (ref 82–98)
PLATELET # BLD AUTO: 193 THOUSANDS/UL (ref 149–390)
PMV BLD AUTO: 12 FL (ref 8.9–12.7)
POTASSIUM SERPL-SCNC: 4 MMOL/L (ref 3.5–5.3)
RBC # BLD AUTO: 3.25 MILLION/UL (ref 3.81–5.12)
SODIUM SERPL-SCNC: 135 MMOL/L (ref 135–147)
WBC # BLD AUTO: 11.64 THOUSAND/UL (ref 4.31–10.16)

## 2024-12-18 PROCEDURE — 85027 COMPLETE CBC AUTOMATED: CPT | Performed by: PHYSICIAN ASSISTANT

## 2024-12-18 PROCEDURE — 99232 SBSQ HOSP IP/OBS MODERATE 35: CPT | Performed by: INTERNAL MEDICINE

## 2024-12-18 PROCEDURE — NC001 PR NO CHARGE: Performed by: SURGERY

## 2024-12-18 PROCEDURE — 82948 REAGENT STRIP/BLOOD GLUCOSE: CPT

## 2024-12-18 PROCEDURE — 80048 BASIC METABOLIC PNL TOTAL CA: CPT | Performed by: PHYSICIAN ASSISTANT

## 2024-12-18 RX ORDER — SENNOSIDES 8.6 MG
2 TABLET ORAL
Status: DISCONTINUED | OUTPATIENT
Start: 2024-12-18 | End: 2024-12-18

## 2024-12-18 RX ORDER — BISACODYL 5 MG/1
5 TABLET, DELAYED RELEASE ORAL 2 TIMES DAILY
Status: COMPLETED | OUTPATIENT
Start: 2024-12-18 | End: 2024-12-18

## 2024-12-18 RX ORDER — POLYETHYLENE GLYCOL 3350 17 G/17G
17 POWDER, FOR SOLUTION ORAL 2 TIMES DAILY
Status: DISCONTINUED | OUTPATIENT
Start: 2024-12-18 | End: 2024-12-24 | Stop reason: HOSPADM

## 2024-12-18 RX ORDER — HYDROMORPHONE HCL IN WATER/PF 6 MG/30 ML
0.2 PATIENT CONTROLLED ANALGESIA SYRINGE INTRAVENOUS
Status: DISCONTINUED | OUTPATIENT
Start: 2024-12-18 | End: 2024-12-19

## 2024-12-18 RX ORDER — OXYCODONE HYDROCHLORIDE 10 MG/1
10 TABLET ORAL
Refills: 0 | Status: DISCONTINUED | OUTPATIENT
Start: 2024-12-18 | End: 2024-12-19

## 2024-12-18 RX ORDER — OXYCODONE HYDROCHLORIDE 5 MG/1
5 TABLET ORAL
Refills: 0 | Status: DISCONTINUED | OUTPATIENT
Start: 2024-12-18 | End: 2024-12-19

## 2024-12-18 RX ADMIN — HYDROMORPHONE HYDROCHLORIDE 0.2 MG: 0.2 INJECTION, SOLUTION INTRAMUSCULAR; INTRAVENOUS; SUBCUTANEOUS at 20:11

## 2024-12-18 RX ADMIN — BISACODYL 5 MG: 5 TABLET, COATED ORAL at 08:45

## 2024-12-18 RX ADMIN — BISACODYL 5 MG: 5 TABLET, COATED ORAL at 20:16

## 2024-12-18 RX ADMIN — PANTOPRAZOLE SODIUM 40 MG: 40 INJECTION, POWDER, FOR SOLUTION INTRAVENOUS at 08:35

## 2024-12-18 RX ADMIN — POLYETHYLENE GLYCOL 3350 17 G: 17 POWDER, FOR SOLUTION ORAL at 18:01

## 2024-12-18 RX ADMIN — OXYCODONE HYDROCHLORIDE 10 MG: 10 TABLET ORAL at 18:01

## 2024-12-18 RX ADMIN — Medication 1 TABLET: at 08:35

## 2024-12-18 RX ADMIN — HYDROMORPHONE HYDROCHLORIDE 1 MG: 1 INJECTION, SOLUTION INTRAMUSCULAR; INTRAVENOUS; SUBCUTANEOUS at 05:52

## 2024-12-18 RX ADMIN — NICOTINE 1 PATCH: 21 PATCH, EXTENDED RELEASE TRANSDERMAL at 08:35

## 2024-12-18 RX ADMIN — PANTOPRAZOLE SODIUM 40 MG: 40 INJECTION, POWDER, FOR SOLUTION INTRAVENOUS at 20:13

## 2024-12-18 RX ADMIN — THIAMINE HCL TAB 100 MG 100 MG: 100 TAB at 08:35

## 2024-12-18 RX ADMIN — ONDANSETRON 4 MG: 2 INJECTION INTRAMUSCULAR; INTRAVENOUS at 14:09

## 2024-12-18 RX ADMIN — POLYETHYLENE GLYCOL 3350 17 G: 17 POWDER, FOR SOLUTION ORAL at 08:42

## 2024-12-18 RX ADMIN — SODIUM CHLORIDE, SODIUM LACTATE, POTASSIUM CHLORIDE, AND CALCIUM CHLORIDE 100 ML/HR: .6; .31; .03; .02 INJECTION, SOLUTION INTRAVENOUS at 14:06

## 2024-12-18 RX ADMIN — HYDROMORPHONE HYDROCHLORIDE 1 MG: 1 INJECTION, SOLUTION INTRAMUSCULAR; INTRAVENOUS; SUBCUTANEOUS at 02:38

## 2024-12-18 RX ADMIN — HYDROMORPHONE HYDROCHLORIDE 1 MG: 1 INJECTION, SOLUTION INTRAMUSCULAR; INTRAVENOUS; SUBCUTANEOUS at 10:00

## 2024-12-18 RX ADMIN — OXYCODONE HYDROCHLORIDE 10 MG: 10 TABLET ORAL at 14:06

## 2024-12-18 RX ADMIN — ENOXAPARIN SODIUM 40 MG: 40 INJECTION SUBCUTANEOUS at 08:35

## 2024-12-18 RX ADMIN — FOLIC ACID 1 MG: 1 TABLET ORAL at 08:35

## 2024-12-18 RX ADMIN — SODIUM CHLORIDE, SODIUM LACTATE, POTASSIUM CHLORIDE, AND CALCIUM CHLORIDE 100 ML/HR: .6; .31; .03; .02 INJECTION, SOLUTION INTRAVENOUS at 04:06

## 2024-12-18 RX ADMIN — ONDANSETRON 4 MG: 2 INJECTION INTRAMUSCULAR; INTRAVENOUS at 18:09

## 2024-12-18 NOTE — PROGRESS NOTES
Progress Note - Internal Medicine   Name: Kaykay Holland 34 y.o. female I MRN: [id number]  Unit/Bed#: [facility] 812-01 I Date of Admission: 12/15/2024   Date of Service: 12/18/2024 I Hospital Day: 3     Assessment & Plan  Acute alcoholic pancreatitis  Patient initially presented to [facility] with complaints of nausea and left-sided abdominal pain radiating to back. Has history of recurrent alcoholic pancreatitis.  Recent hospitalization (11/23 - 11/29) during which time was managed conservatively.  CT with findings of recurrent pericarditis and large pancreatic pseudocyst.  General surgery consulted at [facility].  Recommended conservative management.  GI consulted at [facility]. Recommended transfer to [facility] for cyst gastrostomy and necrosectomy with advance GI team.    Plan:  Patient currently tolerating clear liquid diet and was advanced to GI low res  GI consulted.  Appreciate recommendation.  Pain regimen downgraded to oxycodone 5 mg PO Q3H PRN mod pain, oxycodone 10 mg PO Q3H PRN for severe pain, dilaudid 0.2 mg Q3H PRN breakthrough  Bowel regimen in place  Patient scoring 0 on CIWA  IV hydration  ml/hr, nausea medication as needed.  Alcohol withdrawal seizure (HCC)  Patient reports he drinks 3 glasses of wine for the past few years.  However, since her last hospital discharge on 11/29 she has not had any alcohol.  Has history of withdrawal seizure in 2023 for which she was admitted.  Previously on Librium 25 mg, CIWA protocol in place. Continue to monitor if patient requires Ativan  Patient refused CRS consult.  Essential hypertension  Previously on lisinopril which has been discontinued for about 6 months or so.  Blood pressure well-controlled.  Monitor daily vitals  Tobacco abuse  Smokes a pack a day for past 5 to 6 years.  On nicotine patch.  Smoking cessation education.  Gastroesophageal reflux disease without esophagitis  Reported history of GERD on pantoprazole 40 mg twice daily at home.  Continue Protonix  40 mg IV every 12 hours.  Switch when appropriate.  Constipation  Patient usually had two bowel movements daily. Patient hasn't had bowel movement in greater than five days.     Plan:   Miralax 17 g BID  Dulcolax 5 mg BID     24 Hour Events : no acute events overnight   Subjective : Patient continues to endorse 8/10 pain. Patient reports improvement in status after her procedure yesterday. Patient was able to tolerate clear liquid diet and was comfortable advancing diet. Patient reports that she continues having abdominal pain, despite mild improvement. Patient was in agreement with decreasing pain regimen to see how she does. Patient reports constipation and was encouraged to walk around the room. Pain is the patient's main concern at this time.     Objective :  Temp:  [96.5 °F (35.8 °C)-98.9 °F (37.2 °C)] 98.9 °F (37.2 °C)  HR:  [68-90] 68  BP: (107-158)/(70-98) 118/78  Resp:  [12-18] 18  SpO2:  [93 %-99 %] 96 %  O2 Device: None (Room air)    Physical Exam  Constitutional:       General: She is not in acute distress.     Appearance: Normal appearance. She is ill-appearing. She is not diaphoretic.   HENT:      Head: Normocephalic and atraumatic.      Mouth/Throat:      Mouth: Mucous membranes are moist.   Cardiovascular:      Rate and Rhythm: Normal rate and regular rhythm.      Pulses: Normal pulses.      Heart sounds: Normal heart sounds.   Pulmonary:      Effort: Pulmonary effort is normal.      Breath sounds: Normal breath sounds.   Abdominal:      General: Abdomen is flat.      Palpations: Abdomen is soft.      Tenderness: There is abdominal tenderness.      Comments: LUQ tenderness to palpation as well as epigastric tenderness which has improved since yesterday   Musculoskeletal:      Right lower leg: No edema.      Left lower leg: No edema.   Skin:     General: Skin is warm and dry.   Neurological:      Mental Status: She is alert and oriented to person, place, and time. Mental status is at baseline.    Psychiatric:         Mood and Affect: Mood normal.         Lab Results: I have reviewed the following results:CBC/BMP:   .     12/18/24  0531   WBC 11.64*   HGB 11.1*   HCT 34.2*      SODIUM 135   K 4.0      CO2 32   BUN 3*   CREATININE 0.39*   GLUC 127        Imaging Results Review: No pertinent imaging studies reviewed.  Other Study Results Review: No additional pertinent studies reviewed.    VTE Pharmacologic Prophylaxis: VTE covered by:  enoxaparin, Subcutaneous, 40 mg at 12/18/24 0835     VTE Mechanical Prophylaxis: sequential compression device

## 2024-12-18 NOTE — UTILIZATION REVIEW
NOTIFICATION OF ADMISSION DISCHARGE   This is a Notification of Discharge from [facility]. Please be advised that this patient has been discharge from our facility. Below you will find the admission and discharge date and time including the patient’s disposition.   UTILIZATION REVIEW CONTACT:  Sana Mckeon  Utilization   Network Utilization Review Department  Phone: [phone] x carefully listen to the prompts. All voicemails are confidential.  Email: [email or web address]     ADMISSION INFORMATION  PRESENTATION DATE: 12/13/2024  7:56 PM  OBERVATION ADMISSION DATE: N/A  INPATIENT ADMISSION DATE: 12/14/24 12:48 AM   DISCHARGE DATE: 12/15/2024  3:07 PM   DISPOSITION:[facility] Utilization Review Department  ATTENTION: Please call with any questions or concerns to [phone] and carefully listen to the prompts so that you are directed to the right person. All voicemails are confidential.   For Discharge needs, contact Care Management DC Support Team at [phone] opt. [phone]  Send all requests for admission clinical reviews, approved or denied determinations and any other requests to dedicated fax number below belonging to the campus where the patient is receiving treatment. List of dedicated fax numbers for the Facilities:  FACILITY NAME UR FAX NUMBER   ADMISSION DENIALS (Administrative/Medical Necessity) [phone]   DISCHARGE SUPPORT TEAM ([facility]) [phone]   PARENT CHILD HEALTH (Maternity/NICU/Pediatrics) [phone]   [facility] [phone]   [facility] [phone]   [facility] [phone]   [facility] [phone]   [facility] [phone]   [facility] [phone]   [facility] [phone]   [facility]  [place] [phone]   [facility] [phone]   [facility] [phone]   [facility] [phone]   [facility] [phone]

## 2024-12-18 NOTE — ASSESSMENT & PLAN NOTE
Suspect opioid-induced constipation  - Start MiraLAX/Dulcolax twice daily  - If no improvement can consider enemas, suppositories and even peripherally acting mu opioid receptor antagonist like Relistor

## 2024-12-18 NOTE — ASSESSMENT & PLAN NOTE
Patient usually had two bowel movements daily. Patient hasn't had bowel movement in greater than five days.     Plan:   Miralax 17 g BID  Dulcolax 5 mg BID

## 2024-12-18 NOTE — ASSESSMENT & PLAN NOTE
34-year-old female with a history of active alcohol use disorder, recurrent alcoholic pancreatitis (most recently November 2023) presents as a transfer from a  for consideration of necrosectomy and cyst gastrostomy with advanced endoscopy.  She was recently admitted from 11/23 to 11/29 for necrotizing pancreatitis.  CT scan at that time showed evidence of pancreatic necrosis and acute pancreatitis.  She was treated conservatively with improvement of her symptoms.  She represented again to [facility] on 12/13 with complaints of worsening epigastric abdominal pain, nausea, vomiting.  CT abdomen and pelvis now showing multiple peripancreatic fluid collection, a new large well-defined cystic collection with mass effect on proximal stomach measuring 9 x 9 x 12 cm.  Last alcohol use was 12/13.  Consumes 2-3 bottles of wine daily.   Liver enzymes within normal limits and lipase has normalized.    S/p EUS with cystogastrostomy stent placement 12/17.  The findings of mass effect on gastric body from the pancreatic cyst, 11 x 10 cm smooth, homogenous unilocular cyst with internal debris in the peripancreatic area.  Therapeutic drainage performed using a 10 mm x 15 mm LAMS followed by balloon dilation with removal of sanguinous fluid.  7 [place] by 3 cm straight plastic stent was placed through the lumen of prior stents.  Aspirate was notably blood-tinged with some clots inside the cyst cavity however, no findings of active bleeding.    Plan  - Tolerated clear liquid diet.  Okay to advance to  low residue diet which should be continued outpatient  - Okay for discharge from a GI perspective  - Plan for repeat CT abdomen and pelvis in 3 weeks prior to next endoscopy  - Will arrange for repeat EGD in 3-4 weeks for necrosectomy  - Alcohol withdrawal management per primary team.  Continue thiamine and folate  - Antiemetics and analgesics per primary team  -Continued alcohol cessation counseling.  was going to  see her this morning  -Will arrange for outpatient follow-up with advanced endoscopy team    GI will sign off at this time.  Please do not hesitate to reach out if further issues or questions arise

## 2024-12-18 NOTE — PLAN OF CARE
Problem: PAIN - ADULT  Goal: Verbalizes/displays adequate comfort level or baseline comfort level  Description: Interventions:  - Encourage patient to monitor pain and request assistance  - Assess pain using appropriate pain scale  - Administer analgesics based on type and severity of pain and evaluate response  - Implement non-pharmacological measures as appropriate and evaluate response  - Consider cultural and social influences on pain and pain management  - Notify physician/advanced practitioner if interventions unsuccessful or patient reports new pain  Outcome: Progressing     Problem: INFECTION - ADULT  Goal: Absence or prevention of progression during hospitalization  Description: INTERVENTIONS:  - Assess and monitor for signs and symptoms of infection  - Monitor lab/diagnostic results  - Monitor all insertion sites, i.e. indwelling lines, tubes, and drains  - Monitor endotracheal if appropriate and nasal secretions for changes in amount and color  - [place] appropriate cooling/warming therapies per order  - Administer medications as ordered  - Instruct and encourage patient and family to use good hand hygiene technique  - Identify and instruct in appropriate isolation precautions for identified infection/condition  Outcome: Progressing

## 2024-12-18 NOTE — PROGRESS NOTES
Progress Note - Surgery-General   Name: Kaykay Holland 34 y.o. female I MRN: [id number]  Unit/Bed#: [facility] 812-01 I Date of Admission: 12/15/2024   Date of Service: 12/18/2024 I Hospital Day: 3    Assessment & Plan  Acute alcoholic pancreatitis  Large psuedocyst displacing stomach ([id number] cm)  12/17 cystgastrostomy     AVSS on room air    UOP recorded as 300 cc    WBC 11.6 from 5.7  Hgb 11.1 from 10.2  Creatinine 0.39 from 0.38    - Tolerating CLD, OK to adv diet as tolerated  - No surgical intervention  - Analgesia and antiemetics as needed  - Please reach out to general surgery if there are any questions and or concerns  - Rest of care and management per primary         Subjective   Patient seen and examined.  PIA.  Patient states that her pain is improving, she still endorses some mild epigastric pain but improved from yesterday.  Denies N/V.  Passing flatus but denies bowel movements.    Objective :  Temp:  [96.5 °F (35.8 °C)-98.5 °F (36.9 °C)] 98.5 °F (36.9 °C)  HR:  [70-90] 76  BP: (107-158)/(70-98) 119/77  Resp:  [12-18] 16  SpO2:  [93 %-99 %] 94 %  O2 Device: None (Room air)    I/O         12/15 0701  12/16 0700 12/16 0701  12/17 0700    I.V. (mL/kg) 1266.7 (22)     Total Intake(mL/kg) 1266.7 (22)     Urine (mL/kg/hr) 1625 300 (0.4)    Total Output 1625 300    Net -358.3 -300                  Physical Exam  Constitutional:       Appearance: Normal appearance.   HENT:      Head: Normocephalic and atraumatic.      Mouth/Throat:      Mouth: Mucous membranes are moist.   Eyes:      Extraocular Movements: Extraocular movements intact.   Cardiovascular:      Rate and Rhythm: Normal rate and regular rhythm.   Pulmonary:      Effort: Pulmonary effort is normal.   Abdominal:      General: Abdomen is flat. There is distension.      Palpations: Abdomen is soft.      Tenderness: There is abdominal tenderness. There is no guarding or rebound.   Musculoskeletal:         General: Normal range of motion.      Cervical  back: Normal range of motion and neck supple.   Skin:     General: Skin is warm and dry.   Neurological:      General: No focal deficit present.      Mental Status: She is alert and oriented to person, place, and time.

## 2024-12-18 NOTE — PLAN OF CARE
Problem: PAIN - ADULT  Goal: Verbalizes/displays adequate comfort level or baseline comfort level  Description: Interventions:  - Encourage patient to monitor pain and request assistance  - Assess pain using appropriate pain scale  - Administer analgesics based on type and severity of pain and evaluate response  - Implement non-pharmacological measures as appropriate and evaluate response  - Consider cultural and social influences on pain and pain management  - Notify physician/advanced practitioner if interventions unsuccessful or patient reports new pain  Outcome: Progressing     Problem: INFECTION - ADULT  Goal: Absence or prevention of progression during hospitalization  Description: INTERVENTIONS:  - Assess and monitor for signs and symptoms of infection  - Monitor lab/diagnostic results  - Monitor all insertion sites, i.e. indwelling lines, tubes, and drains  - Monitor endotracheal if appropriate and nasal secretions for changes in amount and color  - [place] appropriate cooling/warming therapies per order  - Administer medications as ordered  - Instruct and encourage patient and family to use good hand hygiene technique  - Identify and instruct in appropriate isolation precautions for identified infection/condition  Outcome: Progressing  Goal: Absence of fever/infection during neutropenic period  Description: INTERVENTIONS:  - Monitor WBC    Outcome: Progressing     Problem: SAFETY ADULT  Goal: Patient will remain free of falls  Description: INTERVENTIONS:  - Educate patient/family on patient safety including physical limitations  - Instruct patient to call for assistance with activity   - Consult OT/PT to assist with strengthening/mobility   - Keep Call bell within reach  - Keep bed low and locked with side rails adjusted as appropriate  - Keep care items and personal belongings within reach  - Initiate and maintain comfort rounds  - Make Fall Risk Sign visible to staff  - Offer Toileting every 2 Hours,  in advance of need    Problem: DISCHARGE PLANNING  Goal: Discharge to home or other facility with appropriate resources  Description: INTERVENTIONS:  - Identify barriers to discharge w/patient and caregiver  - Arrange for needed discharge resources and transportation as appropriate  - Identify discharge learning needs (meds, wound care, etc.)  - Arrange for interpretive services to assist at discharge as needed  - Refer to Case Management Department for coordinating discharge planning if the patient needs post-hospital services based on physician/advanced practitioner order or complex needs related to functional status, cognitive ability, or social support system  Outcome: Progressing     Problem: Knowledge Deficit  Goal: Patient/family/caregiver demonstrates understanding of disease process, treatment plan, medications, and discharge instructions  Description: Complete learning assessment and assess knowledge base.  Interventions:  - Provide teaching at level of understanding  - Provide teaching via preferred learning methods  Outcome: Progressing     - Apply yellow socks and bracelet for high fall risk patients  - Consider moving patient to room near nurses station  Outcome: Progressing  Goal: Maintain or return to baseline ADL function  Description: INTERVENTIONS:  -  Assess patient's ability to carry out ADLs; assess patient's baseline for ADL function and identify physical deficits which impact ability to perform ADLs (bathing, care of mouth/teeth, toileting, grooming, dressing, etc.)  - Assess/evaluate cause of self-care deficits   - Assess range of motion  - Assess patient's mobility; develop plan if impaired  - Assess patient's need for assistive devices and provide as appropriate  - Encourage maximum independence but intervene and supervise when necessary  - Involve family in performance of ADLs  - Assess for home care needs following discharge   - Consider OT consult to assist with ADL evaluation and  planning for discharge  - Provide patient education as appropriate  Outcome: Progressing  Goal: Maintains/Returns to pre admission functional level  Description: INTERVENTIONS:  - Perform AM-PAC 6 Click Basic Mobility/ Daily Activity assessment daily.  - Set and communicate daily mobility goal to care team and patient/family/caregiver.   - Collaborate with rehabilitation services on mobility goals if consulted  - Record patient progress and toleration of activity level   Outcome: Progressing

## 2024-12-18 NOTE — PROGRESS NOTES
Progress Note - Gastroenterology   Name: Kaykay Holland 34 y.o. female I MRN: [id number]  Unit/Bed#: [facility] 812-01 I Date of Admission: 12/15/2024   Date of Service: 12/18/2024 I Hospital Day: 3    Assessment & Plan  Acute alcoholic pancreatitis  34-year-old female with a history of active alcohol use disorder, recurrent alcoholic pancreatitis (most recently November 2023) presents as a transfer from a  for consideration of necrosectomy and cyst gastrostomy with advanced endoscopy.  She was recently admitted from 11/23 to 11/29 for necrotizing pancreatitis.  CT scan at that time showed evidence of pancreatic necrosis and acute pancreatitis.  She was treated conservatively with improvement of her symptoms.  She represented again to [facility] on 12/13 with complaints of worsening epigastric abdominal pain, nausea, vomiting.  CT abdomen and pelvis now showing multiple peripancreatic fluid collection, a new large well-defined cystic collection with mass effect on proximal stomach measuring 9 x 9 x 12 cm.  Last alcohol use was 12/13.  Consumes 2-3 bottles of wine daily.   Liver enzymes within normal limits and lipase has normalized.    S/p EUS with cystogastrostomy stent placement 12/17.  The findings of mass effect on gastric body from the pancreatic cyst, 11 x 10 cm smooth, homogenous unilocular cyst with internal debris in the peripancreatic area.  Therapeutic drainage performed using a 10 mm x 15 mm LAMS followed by balloon dilation with removal of sanguinous fluid.  7 [place] by 3 cm straight plastic stent was placed through the lumen of prior stents.  Aspirate was notably blood-tinged with some clots inside the cyst cavity however, no findings of active bleeding.    Plan  - Tolerated clear liquid diet.  Okay to advance to  low residue diet which should be continued outpatient  - Okay for discharge from a GI perspective  - Plan for repeat CT abdomen and pelvis in 3 weeks prior to next endoscopy  - Will arrange  for repeat EGD in 3-4 weeks for necrosectomy  - Alcohol withdrawal management per primary team.  Continue thiamine and folate  - Antiemetics and analgesics per primary team  -Continued alcohol cessation counseling.  was going to see her this morning  -Will arrange for outpatient follow-up with advanced endoscopy team    GI will sign off at this time.  Please do not hesitate to reach out if further issues or questions arise      Alcohol withdrawal seizure (HCC)  History of alcohol use disorder and previous withdrawal seizure.  On [facility] protocol  Thiamine and folate  Tobacco abuse    Gastroesophageal reflux disease without esophagitis  Continue PPI twice daily  Constipation  Suspect opioid-induced constipation  - Start MiraLAX/Dulcolax twice daily  - If no improvement can consider enemas, suppositories and even peripherally acting mu opioid receptor antagonist like Relistor        Subjective   Reports improved pain this morning down to 7/10.  Denies nausea, vomiting.  She does note that she is constipated and has not had a bowel movement since admission.    Objective :  Temp:  [97.3 °F (36.3 °C)-98.9 °F (37.2 °C)] 98.9 °F (37.2 °C)  HR:  [68-90] 68  BP: (107-158)/(70-98) 118/78  Resp:  [12-18] 18  SpO2:  [93 %-99 %] 96 %  O2 Device: None (Room air)    Physical Exam  Vitals and nursing note reviewed.   Constitutional:       General: She is not in acute distress.     Appearance: She is well-developed.   HENT:      Head: Normocephalic and atraumatic.   Eyes:      Conjunctiva/sclera: Conjunctivae normal.   Cardiovascular:      Rate and Rhythm: Normal rate and regular rhythm.      Heart sounds: No murmur heard.  Pulmonary:      Effort: Pulmonary effort is normal. No respiratory distress.      Breath sounds: Normal breath sounds.   Abdominal:      Palpations: Abdomen is soft.      Tenderness: There is abdominal tenderness.      Comments: Mild epigastric and left upper quadrant tenderness to deep palpation    Musculoskeletal:         General: No swelling.      Cervical back: Neck supple.   Skin:     General: Skin is warm and dry.      Capillary Refill: Capillary refill takes less than 2 seconds.   Neurological:      Mental Status: She is alert.   Psychiatric:         Mood and Affect: Mood normal.           Lab Results: I have reviewed the following results:CBC/BMP:   .     12/18/24  0531   WBC 11.64*   HGB 11.1*   HCT 34.2*      SODIUM 135   K 4.0      CO2 32   BUN 3*   CREATININE 0.39*   GLUC 127    , LFTs: No new results in last 24 hours.     Imaging Results Review: No pertinent imaging studies reviewed.  Other Study Results Review: No additional pertinent studies reviewed.    Rocio Wang MD  Gastroenterology Fellow, PGY- 4  Available on EPIC Secure Chat  12/18/2024 3:54 PM

## 2024-12-18 NOTE — CERTIFIED RECOVERY SPECIALIST
"   Certified  Note    Patient name: Kaykay Holland  Location: [facility] 812/[facility] 812-01  [place]: [facility]  Attending:  Clay Ferguson* MRN [id number]  : 1990  Age: 34 y.o.    Sex: female Date 2024         Substance Use History:     Social History     Substance and Sexual Activity   Alcohol Use Yes    Alcohol/week: 21.0 standard drinks of alcohol    Types: 21 Glasses of wine per week    Comment: \"occasional\"        Social History     Substance and Sexual Activity   Drug Use Never     CRS attempted to follow up with patient - Patient declined all resources and didn't wish to talk.     No additional CRS interaction required     Admission Information  Substances Used at This Admission:: Alcohol  Readmission in Last 30 Days?: Yes  Encounter Type:: Patient Face-to-Face    Recovery Support Plan  Declined All Services?: No  Medication Assisted Treatment:: No  Agreeable to Warm Handoff?: No  Is Patient Accepting [facility] Treatment Services?: Yes  Was Narcan Provided at Discharge?: No  Plan Discussed With Treatment Team:: Yes    Referral to Recovery Supports:  Recovery Center:: Yes  Community Based CRS:: Yes  Case Management:: Yes  Direct Access to [facility] Treatment?: No  Resource Guide Given?: Yes  Follow Up With Patient:: Yes  Family / Other Support:: No  Referral for Community Physical Health:: No  Referral for Community Mental Health:: NoMeghan Lamar       "

## 2024-12-18 NOTE — ASSESSMENT & PLAN NOTE
Patient initially presented to [facility] with complaints of nausea and left-sided abdominal pain radiating to back. Has history of recurrent alcoholic pancreatitis.  Recent hospitalization (11/23 - 11/29) during which time was managed conservatively.  CT with findings of recurrent pericarditis and large pancreatic pseudocyst.  General surgery consulted at [facility].  Recommended conservative management.  GI consulted at [facility]. Recommended transfer to [facility] for cyst gastrostomy and necrosectomy with advance GI team.    Plan:  Patient currently tolerating clear liquid diet and was advanced to GI low res  GI consulted.  Appreciate recommendation.  Pain regimen downgraded to oxycodone 5 mg PO Q3H PRN mod pain, oxycodone 10 mg PO Q3H PRN for severe pain, dilaudid 0.2 mg Q3H PRN breakthrough  Bowel regimen in place  Patient scoring 0 on CIWA  IV hydration  ml/hr, nausea medication as needed.

## 2024-12-18 NOTE — TELEPHONE ENCOUNTER
----- Message from Osman TADEO sent at 12/18/2024  7:58 AM EST -----    ----- Message -----  From: Sandra Berger MA  Sent: 12/18/2024   7:40 AM EST  To: #    Not an advanced procedure. Anyone can schedule. Thanks  ----- Message -----  From: Lali Mcpherson PA-C  Sent: 12/17/2024   7:32 PM EST  To: [facility]    Ready to schedule thank you!  ----- Message -----  From: Rafael Mahan MD  Sent: 12/17/2024   4:04 PM EST  To: [facility]    Lawanda this is rafael GI fellow, this pt underwent AXIOS stent placement for necrotizing pancreatitis. Can we get her set up for EGD with Dr Jaime in 3 weeks please?    Thanks,    Rafael

## 2024-12-18 NOTE — ASSESSMENT & PLAN NOTE
Large psuedocyst displacing stomach ([id number] cm)  12/17 cystgastrostomy     AVSS on room air    UOP recorded as 300 cc    WBC 11.6 from 5.7  Hgb 11.1 from 10.2  Creatinine 0.39 from 0.38    - Tolerating CLD, OK to adv diet as tolerated  - No surgical intervention  - Analgesia and antiemetics as needed  - Please reach out to general surgery if there are any questions and or concerns  - Rest of care and management per primary

## 2024-12-18 NOTE — UTILIZATION REVIEW
Continued Stay Review    Date: 12/18/24                          Current Patient Class: Inpatient  Current Level of Care: med surg    HPI:34 y.o. female initially admitted on 12/15/24   12/17 cystgastrostomy     Assessment/Plan:  Pain improving, mild epigastric pain continues. Passing flatus and denies BM. Abdominal tenderness noted on exam. Tolerating clear liquid diet and adv as tolerated. No surgical intervention at this time. Continue analgesia and antiemetics prn, IVF.     Medications:   Scheduled Medications:  bisacodyl, 5 mg, Oral, BID  enoxaparin, 40 mg, Subcutaneous, Q24H RODRICK  folic acid, 1 mg, Oral, Daily  LORazepam, 2 mg, Oral, Once  multivitamin-minerals, 1 tablet, Oral, Daily  nicotine, 1 patch, Transdermal, Daily  pantoprazole, 40 mg, Intravenous, Q12H RODRICK  polyethylene glycol, 17 g, Oral, BID  thiamine, 100 mg, Oral, Daily      Continuous IV Infusions:  lactated ringers, 100 mL/hr, Intravenous, Continuous      PRN Meds:  HYDROmorphone, 0.2 mg, Intravenous, Q3H PRN  ondansetron, 4 mg, Intravenous, Q4H PRN  oxyCODONE, 10 mg, Oral, Q3H PRN  oxyCODONE, 5 mg, Oral, Q3H PRN  senna, 1 tablet, Oral, HS PRN      Discharge Plan: tbd    Vital Signs (last 3 days)       Date/Time Temp Pulse Resp BP MAP (mmHg) SpO2 O2 Device Patient Position - Orthostatic VS CIWA-Ar Total Pain    12/18/24 14:52:13 98.9 °F (37.2 °C) 68 18 118/78 91 96 % -- -- -- --    12/18/24 1406 -- -- -- -- -- -- -- -- -- 8    12/18/24 11:50:16 98.3 °F (36.8 °C) 77 16 124/82 96 97 % -- -- 0 --    12/18/24 1000 -- -- -- -- -- 98 % None (Room air) -- -- 10 - Worst Possible Pain    12/18/24 07:33:55 97.3 °F (36.3 °C) 69 16 114/74 87 99 % -- -- -- --    12/18/24 0552 -- -- -- -- -- -- -- -- -- 8    12/18/24 0300 -- -- -- -- -- -- -- -- 0 --    12/18/24 0238 -- -- -- -- -- -- -- -- -- 9    12/18/24 02:28:24 98.5 °F (36.9 °C) 76 16 119/77 91 94 % -- -- -- --    12/17/24 2300 -- -- -- -- -- -- -- -- 0 --    12/17/24 2257 -- -- -- -- -- -- -- -- -- 9     12/17/24 22:46:02 98 °F (36.7 °C) 81 18 107/70 82 96 % -- -- -- --    12/17/24 2156 -- -- -- -- -- -- None (Room air) -- -- 8    12/17/24 1900 -- -- -- -- -- -- -- -- 0 --    12/17/24 18:55:24 98.1 °F (36.7 °C) 70 16 119/74 89 94 % -- -- -- --    12/17/24 1658 -- -- -- -- -- -- -- -- -- 7    12/17/24 16:54:09 97.3 °F (36.3 °C) 76 12 158/98 118 93 % -- -- -- --    12/17/24 1614 -- 79 16 151/72 -- 97 % None (Room air) -- -- --    12/17/24 1600 -- 85 16 144/86 -- 99 % None (Room air) -- -- --    12/17/24 1555 -- 90 16 145/84 -- 98 % -- -- -- --    12/17/24 1550 96.5 °F (35.8 °C) 87 16 148/84 -- 96 % None (Room air) -- -- --    12/17/24 1334 97.3 °F (36.3 °C) 79 12 121/75 -- 96 % None (Room air) -- 0 9    12/17/24 1246 -- -- -- -- -- -- -- -- -- 7 12/17/24 11:15:31 97.7 °F (36.5 °C) 77 12 125/89 101 94 % -- -- -- --    12/17/24 1000 -- -- -- -- -- -- -- -- 2 --    12/17/24 0959 -- -- -- -- -- -- -- -- -- 9    12/17/24 0900 -- -- -- -- -- -- None (Room air) -- -- 9 12/17/24 07:17:05 98.3 °F (36.8 °C) 72 18 111/71 84 97 % -- -- 3 --    12/17/24 0647 -- -- -- -- -- -- -- -- -- 10 - Worst Possible Pain    12/17/24 0409 -- -- -- -- -- -- -- -- -- 9    12/17/24 03:07:14 98.2 °F (36.8 °C) 69 -- 113/73 86 94 % -- -- 3 --    12/17/24 0041 -- -- -- -- -- -- -- -- -- 10 - Worst Possible Pain    12/16/24 2300 -- -- -- -- -- -- -- -- 2 --    12/16/24 22:40:11 98.3 °F (36.8 °C) 68 16 111/73 86 96 % -- -- -- --    12/16/24 2100 -- -- -- -- -- 94 % None (Room air) -- -- 10 - Worst Possible Pain    12/16/24 19:09:56 98.8 °F (37.1 °C) 78 12 96/61 73 95 % -- -- 2 --    12/16/24 1839 -- -- -- -- -- -- -- -- -- 9 12/16/24 1703 -- -- -- -- -- -- -- -- -- 8    12/16/24 16:00:20 98.2 °F (36.8 °C) -- 16 118/79 92 -- -- -- -- --    12/16/24 1301 -- -- -- -- -- -- -- -- -- 7    12/16/24 11:48:02 99.1 °F (37.3 °C) -- 16 117/79 92 -- -- -- -- --    12/16/24 1030 -- -- -- -- -- -- -- -- -- 8    12/16/24 0900 -- -- -- -- -- -- None (Room  air) -- -- 8    12/16/24 08:22:33 97.7 °F (36.5 °C) -- 12 117/75 89 -- -- -- -- --    12/16/24 0821 -- -- -- -- -- -- -- -- -- 8    12/16/24 0603 -- -- -- -- -- -- -- -- -- 8 12/16/24 0601 -- -- -- -- -- -- -- -- -- 8 12/16/24 0403 -- -- -- -- -- -- -- -- -- No Pain    12/16/24 02:08:38 98.4 °F (36.9 °C) 62 16 105/66 79 96 % -- Lying -- --    12/16/24 0009 -- -- -- -- -- -- -- -- -- 6    12/15/24 23:44:44 98.3 °F (36.8 °C) 71 16 118/71 87 96 % -- Lying -- 6    12/15/24 2227 -- -- -- -- -- -- -- -- -- 9    12/15/24 2200 -- -- -- -- -- -- -- -- 14 --    12/15/24 19:01:23 98.5 °F (36.9 °C) 82 16 121/77 92 97 % -- -- -- --    12/15/24 1758 -- -- -- -- -- -- -- -- 5 --    12/15/24 1707 -- -- -- -- -- -- -- -- -- 9    12/15/24 1541 -- -- -- -- -- -- -- -- 9 --    12/15/24 1530 -- -- -- -- -- -- None (Room air) -- -- 8    12/15/24 15:25:54 98.1 °F (36.7 °C) 81 -- -- -- 96 % -- -- -- --          Weight (last 2 days)       Date/Time Weight    12/17/24 0600 61.3 (135.14)    12/16/24 0308 57.7 (127.21)           CIWA-Ar Score       Row Name 12/18/24 11:50:16 12/18/24 0300 12/17/24 2300       CIWA-Ar    Nausea and Vomiting 0 0 0    Tactile Disturbances 0 0 0    Tremor 0 0 0    Auditory Disturbances 0 0 0    Paroxysmal Sweats 0 0 0    Visual Disturbances 0 0 0    Anxiety 0 0 0    Headache, Fullness in Head 0 0 0    Agitation 0 0 0    Orientation and Clouding of Sensorium 0 0 0    CIWA-Ar Total 0 0 0      Row Name 12/17/24 1900 12/17/24 1334 12/17/24 1000       CIWA-Ar    Nausea and Vomiting 0 0 0    Tactile Disturbances 0 0 0    Tremor 0 0 0    Auditory Disturbances 0 0 0    Paroxysmal Sweats 0 0 0    Visual Disturbances 0 0 0    Anxiety 0 0 1    Headache, Fullness in Head 0 0 0    Agitation 0 0 1    Orientation and Clouding of Sensorium 0 0 0    CIWA-Ar Total 0 0 2      Row Name 12/17/24 07:17:05 12/17/24 03:07:14 12/16/24 2300       CIWA-Ar    Nausea and Vomiting 0 0 0    Tactile Disturbances 0 0 0    Tremor 1 3 2     Auditory Disturbances 0 0 0    Paroxysmal Sweats 0 0 0    Visual Disturbances 0 0 0    Anxiety 1 0 0    Headache, Fullness in Head 0 0 0    Agitation 1 0 0    Orientation and Clouding of Sensorium 0 0 0    CIWA-Ar Total 3 3 2      Row Name 12/16/24 19:09:56 12/15/24 2200 12/15/24 1758       CIWA-Ar    Nausea and Vomiting 0 4 0    Tactile Disturbances 0 1 0    Tremor 2 4 4    Auditory Disturbances 0 0 0    Paroxysmal Sweats 0 0 0    Visual Disturbances 0 0 0    Anxiety 0 4 1    Headache, Fullness in Head 0 0 0    Agitation 0 1 0    Orientation and Clouding of Sensorium 0 0 0    CIWA-Ar Total 2 14 5      Row Name 12/15/24 1541             CIWA-Ar    Nausea and Vomiting 1      Tactile Disturbances 1      Tremor 4      Auditory Disturbances 0      Paroxysmal Sweats 0      Visual Disturbances 0      Anxiety 1      Headache, Fullness in Head 1      Agitation 1      Orientation and Clouding of Sensorium 0      CIWA-Ar Total 9                    Pertinent Labs/Diagnostic Results:   Radiology:  CT abdomen pelvis w contrast    (Results Pending)     Cardiology:  No orders to display     GI:  Endoscopic ultrasonography, GI (Upper) Linear with Axios stent placement   Final Result by Venancio Jaime MD (12/17 1600)   External mass effect in gastric body from pancreatic cyst.    One round, homogeneous and unilocular cyst measuring 11 cm x 10 cm with    debris present, well-defined and smooth margins in the peripancreatic    area. Therapeutic drainage performed using lumen apposing metal stent    measuring 10 mm x 15 mm. Balloon dilation performed after stent placement    with removal of sanguinous fluid. 7 Fr x 3 cm straight plastic stent was    placed through the lumen of the prior stent.  The aspirate was    blood-tinged however no active bleeding was seen.  Clots were seen inside    the cyst cavity as well.         RECOMMENDATION:   Return pt to the floor    Clear liquid today   Start low residue diet tomorrow and continue this  outpatient   Avoid alcohol    CT scan 3 weeks prior to the next endoscopy.   Plan for repeat EGD in 3-4 weeks for necrosectomy                         Venancio Jaime MD               Results from last 7 days   Lab Units 12/18/24  0531 12/17/24  0613 12/16/24  0552 12/15/24  0236 12/14/24  1020 12/14/24  0338 12/13/24  1825   WBC Thousand/uL 11.64* 5.75 7.39 7.83 8.13   < > 14.38*   HEMOGLOBIN g/dL 11.1* 10.2* 12.2 10.5* 10.8*   < > 12.8   HEMATOCRIT % 34.2* 32.5* 37.5 33.1* 34.0*   < > 38.8   PLATELETS Thousands/uL 193 188 224 224 240   < > 369   TOTAL NEUT ABS Thousands/µL  --   --   --  5.31  --   --  9.55*    < > = values in this interval not displayed.         Results from last 7 days   Lab Units 12/18/24  0531 12/17/24  0613 12/16/24  0552 12/15/24  0236 12/14/24  1020 12/14/24  0338   SODIUM mmol/L 135 136 137 133* 135 136   POTASSIUM mmol/L 4.0 4.0 4.6 3.9 4.2 4.0   CHLORIDE mmol/L 100 100 100 98 101 103   CO2 mmol/L 32 28 30 28 24 25   ANION GAP mmol/L 3* 8 7 7 10 8   BUN mg/dL 3* 3* 2* 4* 6 5   CREATININE mg/dL 0.39* 0.38* 0.47* 0.39* 0.44* 0.39*   EGFR ml/min/1.73sq m 138 139 130 138 133 138   CALCIUM mg/dL 8.8 8.3* 8.8 7.0* 7.5* 7.7*   MAGNESIUM mg/dL  --   --   --   --  2.5 1.4*   PHOSPHORUS mg/dL  --   --   --   --   --  4.0     Results from last 7 days   Lab Units 12/13/24  1825   AST U/L 25   ALT U/L 13   ALK PHOS U/L 72   TOTAL PROTEIN g/dL 7.3   ALBUMIN g/dL 3.8   TOTAL BILIRUBIN mg/dL 0.45     Results from last 7 days   Lab Units 12/18/24  1259 12/18/24  1220 12/18/24  1148 12/17/24  2339 12/17/24  1740 12/17/24  0608 12/17/24  0002 12/16/24  1807 12/16/24  1146 12/16/24  0607 12/16/24  0009 12/15/24  1748   POC GLUCOSE mg/dl 101 63* 61* 146* 93 70 86 119 73 76 83 130     Results from last 7 days   Lab Units 12/18/24  0531 12/17/24  0613 12/16/24  0552 12/15/24  0236 12/14/24  1020 12/14/24  0338 12/13/24  1825   GLUCOSE RANDOM mg/dL 127 84 89 101 61* 70 91       Results from last 7 days   Lab Units  12/13/24  1825   PROCALCITONIN ng/ml <0.05     Results from last 7 days   Lab Units 12/13/24  2312 12/13/24 2027   LACTIC ACID mmol/L 1.4 2.2*     Results from last 7 days   Lab Units 12/14/24  1020 12/13/24  1825   LIPASE u/L 77 165*     Results from last 7 days   Lab Units 12/13/24  1825   CRP mg/L 17.9*     Results from last 7 days   Lab Units 12/13/24  2027   ETHANOL LVL mg/dL 157*     Results from last 7 days   Lab Units 12/13/24 2027   BLOOD CULTURE  No Growth After 4 Days.  No Growth After 4 Days.     Network Utilization Review Department  ATTENTION: Please call with any questions or concerns to [phone] and carefully listen to the prompts so that you are directed to the right person. All voicemails are confidential.   For Discharge needs, contact Care Management DC Support Team at [phone] opt. [phone]  Send all requests for admission clinical reviews, approved or denied determinations and any other requests to dedicated fax number below belonging to the campus where the patient is receiving treatment. List of dedicated fax numbers for the Facilities:  FACILITY NAME UR FAX NUMBER   ADMISSION DENIALS (Administrative/Medical Necessity) [phone]   DISCHARGE SUPPORT TEAM (NETWORK) [phone]   PARENT CHILD HEALTH (Maternity/NICU/Pediatrics) [phone]   [facility] [phone]   [facility] [phone]   [facility] [phone]   [facility] [phone]   [facility] [phone]   [facility] [phone]   [facility] [phone]   [facility] [phone]   [facility] [phone]   [facility] [phone]   [facility] [phone]   [facility] [phone]

## 2024-12-19 ENCOUNTER — APPOINTMENT (INPATIENT)
Dept: RADIOLOGY | Facility: HOSPITAL | Age: 34
DRG: 438 | End: 2024-12-19
Payer: COMMERCIAL

## 2024-12-19 PROBLEM — N83.209 OVARIAN CYST: Status: ACTIVE | Noted: 2024-12-19

## 2024-12-19 PROBLEM — D69.6 THROMBOCYTOPENIA (HCC): Status: ACTIVE | Noted: 2024-12-19

## 2024-12-19 LAB
ANION GAP SERPL CALCULATED.3IONS-SCNC: 9 MMOL/L (ref 4–13)
BACTERIA BLD CULT: NORMAL
BACTERIA BLD CULT: NORMAL
BUN SERPL-MCNC: 2 MG/DL (ref 5–25)
CALCIUM SERPL-MCNC: 8.3 MG/DL (ref 8.4–10.2)
CHLORIDE SERPL-SCNC: 99 MMOL/L (ref 96–108)
CO2 SERPL-SCNC: 30 MMOL/L (ref 21–32)
CREAT SERPL-MCNC: 0.34 MG/DL (ref 0.6–1.3)
ERYTHROCYTE [DISTWIDTH] IN BLOOD BY AUTOMATED COUNT: 11.4 % (ref 11.6–15.1)
GFR SERPL CREATININE-BSD FRML MDRD: 144 ML/MIN/1.73SQ M
GLUCOSE SERPL-MCNC: 75 MG/DL (ref 65–140)
GLUCOSE SERPL-MCNC: 79 MG/DL (ref 65–140)
GLUCOSE SERPL-MCNC: 87 MG/DL (ref 65–140)
GLUCOSE SERPL-MCNC: 94 MG/DL (ref 65–140)
HCT VFR BLD AUTO: 34.1 % (ref 34.8–46.1)
HGB BLD-MCNC: 11.5 G/DL (ref 11.5–15.4)
MCH RBC QN AUTO: 35.1 PG (ref 26.8–34.3)
MCHC RBC AUTO-ENTMCNC: 33.7 G/DL (ref 31.4–37.4)
MCV RBC AUTO: 104 FL (ref 82–98)
PLATELET # BLD AUTO: 142 THOUSANDS/UL (ref 149–390)
PMV BLD AUTO: 11.9 FL (ref 8.9–12.7)
POTASSIUM SERPL-SCNC: 3.7 MMOL/L (ref 3.5–5.3)
RBC # BLD AUTO: 3.28 MILLION/UL (ref 3.81–5.12)
SODIUM SERPL-SCNC: 138 MMOL/L (ref 135–147)
WBC # BLD AUTO: 8.91 THOUSAND/UL (ref 4.31–10.16)

## 2024-12-19 PROCEDURE — 80048 BASIC METABOLIC PNL TOTAL CA: CPT | Performed by: PHYSICIAN ASSISTANT

## 2024-12-19 PROCEDURE — 74177 CT ABD & PELVIS W/CONTRAST: CPT

## 2024-12-19 PROCEDURE — 74022 RADEX COMPL AQT ABD SERIES: CPT

## 2024-12-19 PROCEDURE — 85027 COMPLETE CBC AUTOMATED: CPT | Performed by: PHYSICIAN ASSISTANT

## 2024-12-19 PROCEDURE — 82948 REAGENT STRIP/BLOOD GLUCOSE: CPT

## 2024-12-19 PROCEDURE — 99232 SBSQ HOSP IP/OBS MODERATE 35: CPT | Performed by: INTERNAL MEDICINE

## 2024-12-19 RX ORDER — MAGNESIUM CARB/ALUMINUM HYDROX 105-160MG
296 TABLET,CHEWABLE ORAL ONCE
Status: COMPLETED | OUTPATIENT
Start: 2024-12-19 | End: 2024-12-19

## 2024-12-19 RX ORDER — LACTULOSE 10 G/15ML
20 SOLUTION ORAL 2 TIMES DAILY PRN
Status: DISCONTINUED | OUTPATIENT
Start: 2024-12-19 | End: 2024-12-19

## 2024-12-19 RX ORDER — OXYCODONE HYDROCHLORIDE 5 MG/1
5 TABLET ORAL EVERY 4 HOURS PRN
Refills: 0 | Status: DISCONTINUED | OUTPATIENT
Start: 2024-12-19 | End: 2024-12-24 | Stop reason: HOSPADM

## 2024-12-19 RX ORDER — OXYCODONE HYDROCHLORIDE 10 MG/1
10 TABLET ORAL EVERY 4 HOURS PRN
Refills: 0 | Status: DISCONTINUED | OUTPATIENT
Start: 2024-12-19 | End: 2024-12-24

## 2024-12-19 RX ADMIN — OXYCODONE HYDROCHLORIDE 10 MG: 10 TABLET ORAL at 01:07

## 2024-12-19 RX ADMIN — MAGNESIUM CITRATE 296 ML: 1.75 LIQUID ORAL at 16:02

## 2024-12-19 RX ADMIN — PANTOPRAZOLE SODIUM 40 MG: 40 INJECTION, POWDER, FOR SOLUTION INTRAVENOUS at 20:48

## 2024-12-19 RX ADMIN — OXYCODONE HYDROCHLORIDE 10 MG: 10 TABLET ORAL at 18:58

## 2024-12-19 RX ADMIN — ONDANSETRON 4 MG: 2 INJECTION INTRAMUSCULAR; INTRAVENOUS at 14:31

## 2024-12-19 RX ADMIN — OXYCODONE HYDROCHLORIDE 10 MG: 10 TABLET ORAL at 15:01

## 2024-12-19 RX ADMIN — ENOXAPARIN SODIUM 40 MG: 40 INJECTION SUBCUTANEOUS at 07:48

## 2024-12-19 RX ADMIN — ONDANSETRON 4 MG: 2 INJECTION INTRAMUSCULAR; INTRAVENOUS at 07:46

## 2024-12-19 RX ADMIN — SODIUM CHLORIDE, SODIUM LACTATE, POTASSIUM CHLORIDE, AND CALCIUM CHLORIDE 100 ML/HR: .6; .31; .03; .02 INJECTION, SOLUTION INTRAVENOUS at 00:38

## 2024-12-19 RX ADMIN — OXYCODONE HYDROCHLORIDE 10 MG: 10 TABLET ORAL at 10:51

## 2024-12-19 RX ADMIN — FOLIC ACID 1 MG: 1 TABLET ORAL at 07:46

## 2024-12-19 RX ADMIN — Medication 1 TABLET: at 07:47

## 2024-12-19 RX ADMIN — PANTOPRAZOLE SODIUM 40 MG: 40 INJECTION, POWDER, FOR SOLUTION INTRAVENOUS at 07:49

## 2024-12-19 RX ADMIN — ONDANSETRON 4 MG: 2 INJECTION INTRAMUSCULAR; INTRAVENOUS at 18:35

## 2024-12-19 RX ADMIN — METHYLNALTREXONE BROMIDE 8 MG: 8 INJECTION, SOLUTION SUBCUTANEOUS at 20:48

## 2024-12-19 RX ADMIN — SODIUM CHLORIDE, SODIUM LACTATE, POTASSIUM CHLORIDE, AND CALCIUM CHLORIDE 100 ML/HR: .6; .31; .03; .02 INJECTION, SOLUTION INTRAVENOUS at 14:31

## 2024-12-19 RX ADMIN — POLYETHYLENE GLYCOL 3350 17 G: 17 POWDER, FOR SOLUTION ORAL at 07:47

## 2024-12-19 RX ADMIN — THIAMINE HCL TAB 100 MG 100 MG: 100 TAB at 07:46

## 2024-12-19 RX ADMIN — NICOTINE 1 PATCH: 21 PATCH, EXTENDED RELEASE TRANSDERMAL at 07:48

## 2024-12-19 RX ADMIN — OXYCODONE HYDROCHLORIDE 10 MG: 10 TABLET ORAL at 07:46

## 2024-12-19 RX ADMIN — IOHEXOL 50 ML: 350 INJECTION, SOLUTION INTRAVENOUS at 14:07

## 2024-12-19 RX ADMIN — HYDROMORPHONE HYDROCHLORIDE 0.2 MG: 0.2 INJECTION, SOLUTION INTRAMUSCULAR; INTRAVENOUS; SUBCUTANEOUS at 02:41

## 2024-12-19 NOTE — ASSESSMENT & PLAN NOTE
Patients platelets have been downtrending since admission. On admission 12/13/24 Plts were 369. Today patients platelets were 142. Patient had no signs of bleeding, no ecchymosis on exam. Will continue to monitor CBC and physical exam.

## 2024-12-19 NOTE — ASSESSMENT & PLAN NOTE
Patient initially presented to [facility] with complaints of nausea and left-sided abdominal pain radiating to back. Has history of recurrent alcoholic pancreatitis.  Recent hospitalization (11/23 - 11/29) during which time was managed conservatively.  CT with findings of recurrent pericarditis and large pancreatic pseudocyst.  General surgery consulted at [facility].  Recommended conservative management.  GI consulted at [facility]. Recommended transfer to [facility] for cyst gastrostomy and necrosectomy with advance GI team.    Plan:  Patient currently tolerating GI low res diet   GI consulted.  Appreciate recommendation.   Plan for repeat CT in 2-3 weeks then repeat EGD for stent removal vs. Necrosectomy in 4 weeks  Pain regimen downgraded to oxycodone 5 mg PO Q4H PRN mod pain, oxycodone 10 mg PO Q4H PRN for severe/breakthrough pain  Bowel regimen in place  IV hydration  ml/hr, nausea medication as needed.

## 2024-12-19 NOTE — ASSESSMENT & PLAN NOTE
Suspect opioid-induced constipation. Stool throughout colon on CT albeit not severe  - Continue Miralax and Dulcolax BID  -Consider enemas, suppositories and even peripherally acting mu opioid receptor antagonist like Relistor

## 2024-12-19 NOTE — PROGRESS NOTES
Progress Note - Internal Medicine   Name: Kaykay Holland 34 y.o. female I MRN: [id number]  Unit/Bed#: [facility] 812-01 I Date of Admission: 12/15/2024   Date of Service: 12/19/2024 I Hospital Day: 4     Assessment & Plan  Acute alcoholic pancreatitis  Patient initially presented to [facility] with complaints of nausea and left-sided abdominal pain radiating to back. Has history of recurrent alcoholic pancreatitis.  Recent hospitalization (11/23 - 11/29) during which time was managed conservatively.  CT with findings of recurrent pericarditis and large pancreatic pseudocyst.  General surgery consulted at [facility].  Recommended conservative management.  GI consulted at [facility]. Recommended transfer to [facility] for cyst gastrostomy and necrosectomy with advance GI team.    Plan:  Patient currently tolerating GI low res diet   GI consulted.  Appreciate recommendation.   Plan for repeat CT in 2-3 weeks then repeat EGD for stent removal vs. Necrosectomy in 4 weeks  Pain regimen downgraded to oxycodone 5 mg PO Q4H PRN mod pain, oxycodone 10 mg PO Q4H PRN for severe/breakthrough pain  Bowel regimen in place  IV hydration  ml/hr, nausea medication as needed.  Constipation  Patient usually had two bowel movements daily. Patient hasn't had bowel movement in greater than seven days.     Plan:   Miralax 17 g BID  Dulcolax 5 mg BID   Senna HS PRN  Alcohol withdrawal seizure (HCC)  Patient reports he drinks 3 glasses of wine for the past few years.  However, since her last hospital discharge on 11/29 she has not had any alcohol.  Has history of withdrawal seizure in 2023 for which she was admitted.  Previously on Librium 25 mg, CIWA protocol in place. Continue to monitor if patient requires Ativan  Patient refused CRS consult.  Essential hypertension  Previously on lisinopril which has been discontinued for about 6 months or so.  Blood pressure well-controlled.  Monitor daily vitals  Tobacco abuse  Smokes a pack a day for past 5 to  6 years.  On nicotine patch.  Smoking cessation education.  Gastroesophageal reflux disease without esophagitis  Reported history of GERD on pantoprazole 40 mg twice daily at home.  Continue Protonix 40 mg IV every 12 hours.  Switch when appropriate.  Thrombocytopenia (HCC)  Patients platelets have been downtrending since admission. On admission 12/13/24 Plts were 369. Today patients platelets were 142. Patient had no signs of bleeding, no ecchymosis on exam. Will continue to monitor CBC and physical exam.    24 Hour Events : no acute events overnight   Subjective : Patient expresses that she continues to have pain in her epigastric and abdominal area.  Patient still has not had a bowel movement in over a week.  Patient has been taking all of the bowel regimen without success.  Patient was informed that we will give her a stronger bowel regimen today.  Patient expresses that she would like to have her pain in better control and have a bowel movement prior to discharge.  Patient reports that she is less anxious as she let her job know that she was hospitalized and they were understanding.    Objective :  Temp:  [97.6 °F (36.4 °C)-98.9 °F (37.2 °C)] 98.2 °F (36.8 °C)  HR:  [68-90] 73  BP: (118-134)/(78-91) 131/89  Resp:  [18] 18  SpO2:  [93 %-98 %] 95 %  O2 Device: None (Room air)    Physical Exam  Constitutional:       General: She is not in acute distress.     Appearance: Normal appearance. She is ill-appearing. She is not diaphoretic.   HENT:      Head: Normocephalic and atraumatic.      Mouth/Throat:      Mouth: Mucous membranes are moist.   Cardiovascular:      Rate and Rhythm: Normal rate and regular rhythm.      Pulses: Normal pulses.      Heart sounds: Normal heart sounds.   Pulmonary:      Effort: Pulmonary effort is normal.      Breath sounds: Normal breath sounds.   Abdominal:      General: Abdomen is flat.      Palpations: Abdomen is soft.      Tenderness: There is abdominal tenderness.      Comments:  Tenderness to light palpation of the epigastric, and LUQ.   Musculoskeletal:      Right lower leg: No edema.      Left lower leg: No edema.   Skin:     General: Skin is warm and dry.   Neurological:      Mental Status: She is alert and oriented to person, place, and time. Mental status is at baseline.         Lab Results: I have reviewed the following results:CBC/BMP:   .     12/19/24  0632   WBC 8.91   HGB 11.5   HCT 34.1*   *   SODIUM 138   K 3.7   CL 99   CO2 30   BUN 2*   CREATININE 0.34*   GLUC 87        Imaging Results Review: No pertinent imaging studies reviewed.  Other Study Results Review: No additional pertinent studies reviewed.    VTE Pharmacologic Prophylaxis: VTE covered by:  enoxaparin, Subcutaneous, 40 mg at 12/19/24 5280     VTE Mechanical Prophylaxis: sequential compression device

## 2024-12-19 NOTE — ASSESSMENT & PLAN NOTE
History of alcohol use disorder and previous withdrawal seizure.  No sign of active withdrawal  Thiamine and folate

## 2024-12-19 NOTE — ASSESSMENT & PLAN NOTE
34-year-old female with a history of active alcohol use disorder, recurrent alcoholic pancreatitis (most recently November 2023) presents as a transfer from a  for consideration of necrosectomy and cyst gastrostomy with advanced endoscopy.  She was recently admitted from 11/23 to 11/29 for necrotizing pancreatitis.  CT scan at that time showed evidence of pancreatic necrosis and acute pancreatitis.  She was treated conservatively with improvement of her symptoms.  She represented again to [facility] on 12/13 with complaints of worsening epigastric abdominal pain, nausea, vomiting.  CT abdomen and pelvis now showing multiple peripancreatic fluid collection, a new large well-defined cystic collection with mass effect on proximal stomach measuring 9 x 9 x 12 cm.  Last alcohol use was 12/13.  Consumes 2-3 bottles of wine daily.   Liver enzymes within normal limits and lipase has normalized.    S/p EUS with cystogastrostomy stent placement 12/17.  The findings of mass effect on gastric body from the pancreatic cyst, 11 x 10 cm smooth, homogenous unilocular cyst with internal debris in the peripancreatic area.  Therapeutic drainage performed using a 10 mm x 15 mm LAMS followed by balloon dilation with removal of sanguinous fluid.  7 Jamaican by 3 cm straight plastic stent was placed through the lumen of prior stents.  Aspirate was notably blood-tinged with some clots inside the cyst cavity however, no findings of active bleeding.    Plan  -  Given persistent abdominal pain and reported lack of flatus, repeat CT AP obtained today with appropriate location of cystogastrostomy stent and known pancreatic fluid collections. No evidence of acute obstructive process, severe stool burden or new GI pathology since procedure. There is finding of right ovarian cyst with hemorrhagic components??? Could be related to lower quadrant pain?  - Continue low fat,  low residue diet which should be continued outpatient  - Okay for discharge  from a GI perspective once pain regimen weaned  - Plan for repeat CT abdomen and pelvis in 3 weeks prior to next endoscopy  - Will arrange for repeat EGD in 3-4 weeks for necrosectomy  - Alcohol withdrawal management per primary team.  Continue thiamine and folate  - Antiemetics and analgesics per primary team  -Continued alcohol cessation counseling.  following  -Will arrange for outpatient follow-up with advanced endoscopy team    GI will sign off at this time.  Please do not hesitate to reach out if further issues or questions arise

## 2024-12-19 NOTE — ASSESSMENT & PLAN NOTE
Patient usually had two bowel movements daily. Patient hasn't had bowel movement in greater than seven days.     Plan:   Miralax 17 g BID  Dulcolax 5 mg BID   Senna HS PRN

## 2024-12-19 NOTE — CERTIFIED RECOVERY SPECIALIST
"   Certified  Note    Patient name: Kaykay Holland  Location: [facility] 812/[facility] 812-01  [place]: [facility]  Attending:  Clay Ferguson* MRN [id number]  : 1990  Age: 34 y.o.    Sex: female Date 2024         Substance Use History:     Social History     Substance and Sexual Activity   Alcohol Use Yes    Alcohol/week: 21.0 standard drinks of alcohol    Types: 21 Glasses of wine per week    Comment: \"occasional\"        Social History     Substance and Sexual Activity   Drug Use Never     Patient declines all CRS services at this time, no additional follow up needed     Admission Information  Substances Used at This Admission:: Alcohol  Readmission in Last 30 Days?: Yes  Encounter Type:: Patient Face-to-Face    Recovery Support Plan  Declined All Services?: No  Medication Assisted Treatment:: No  Agreeable to Warm Handoff?: No  Is Patient Accepting [facility] Treatment Services?: Yes  Was Narcan Provided at Discharge?: No  Plan Discussed With Treatment Team:: Yes    Referral to Recovery Supports:  Recovery Center:: Yes  Community Based CRS:: Yes  Case Management:: Yes  Direct Access to [facility] Treatment?: No  Resource Guide Given?: Yes  Follow Up With Patient:: Yes  Family / Other Support:: No  Referral for Community Physical Health:: No  Referral for Community Mental Health:: NoMeghan Lamar"

## 2024-12-19 NOTE — ANESTHESIA POSTPROCEDURE EVALUATION
Post-Op Assessment Note    Last Filed PACU Vitals:  Vitals Value Taken Time   Temp 96.5 °F (35.8 °C) 12/17/24 1550   Pulse 79 12/17/24 1614   /72 12/17/24 1614   Resp 16 12/17/24 1614   SpO2 97 % 12/17/24 1614       Modified Kaylee:  Activity: 2 (12/17/2024  4:16 PM)  Respiration: 2 (12/17/2024  4:16 PM)  Circulation: 2 (12/17/2024  4:16 PM)  Consciousness: 1 (12/17/2024  4:16 PM)  Oxygen Saturation: 2 (12/17/2024  4:16 PM)  Modified Kaylee Score: 9 (12/17/2024  4:16 PM)

## 2024-12-19 NOTE — PROGRESS NOTES
Progress Note - Gastroenterology   Name: Kaykay Holland 34 y.o. female I MRN: [id number]  Unit/Bed#: [facility] 812-01 I Date of Admission: 12/15/2024   Date of Service: 12/19/2024 I Hospital Day: 4    Assessment & Plan  Acute alcoholic pancreatitis  34-year-old female with a history of active alcohol use disorder, recurrent alcoholic pancreatitis (most recently November 2023) presents as a transfer from a  for consideration of necrosectomy and cyst gastrostomy with advanced endoscopy.  She was recently admitted from 11/23 to 11/29 for necrotizing pancreatitis.  CT scan at that time showed evidence of pancreatic necrosis and acute pancreatitis.  She was treated conservatively with improvement of her symptoms.  She represented again to [facility] on 12/13 with complaints of worsening epigastric abdominal pain, nausea, vomiting.  CT abdomen and pelvis now showing multiple peripancreatic fluid collection, a new large well-defined cystic collection with mass effect on proximal stomach measuring 9 x 9 x 12 cm.  Last alcohol use was 12/13.  Consumes 2-3 bottles of wine daily.   Liver enzymes within normal limits and lipase has normalized.    S/p EUS with cystogastrostomy stent placement 12/17.  The findings of mass effect on gastric body from the pancreatic cyst, 11 x 10 cm smooth, homogenous unilocular cyst with internal debris in the peripancreatic area.  Therapeutic drainage performed using a 10 mm x 15 mm LAMS followed by balloon dilation with removal of sanguinous fluid.  7 Yoruba by 3 cm straight plastic stent was placed through the lumen of prior stents.  Aspirate was notably blood-tinged with some clots inside the cyst cavity however, no findings of active bleeding.    Plan  -  Given persistent abdominal pain and reported lack of flatus, repeat CT AP obtained today with appropriate location of cystogastrostomy stent and known pancreatic fluid collections. No evidence of acute obstructive process, severe stool  burden or new GI pathology since procedure. There is finding of right ovarian cyst with hemorrhagic components??? Could be related to lower quadrant pain?  - Continue low fat,  low residue diet which should be continued outpatient  - Okay for discharge from a GI perspective once pain regimen weaned  - Plan for repeat CT abdomen and pelvis in 3 weeks prior to next endoscopy  - Will arrange for repeat EGD in 3-4 weeks for necrosectomy  - Alcohol withdrawal management per primary team.  Continue thiamine and folate  - Antiemetics and analgesics per primary team  -Continued alcohol cessation counseling.  following  -Will arrange for outpatient follow-up with advanced endoscopy team    GI will sign off at this time.  Please do not hesitate to reach out if further issues or questions arise      Alcohol withdrawal seizure (HCC)  History of alcohol use disorder and previous withdrawal seizure.  No sign of active withdrawal  Thiamine and folate  Tobacco abuse    Gastroesophageal reflux disease without esophagitis  Continue PPI twice daily  Constipation  Suspect opioid-induced constipation. Stool throughout colon on CT albeit not severe  - Continue Miralax and Dulcolax BID  -Consider enemas, suppositories and even peripherally acting mu opioid receptor antagonist like Relistor  Thrombocytopenia (HCC)        Subjective   Still reporting persistent LUQ and mid lower abdominal pain. The lower abdominal pain is new since procedure. Still no BM. No nausea or vomiting and she is tolerating liquids    Objective :  Temp:  [97.6 °F (36.4 °C)-100.7 °F (38.2 °C)] 100.2 °F (37.9 °C)  HR:  [73-90] 90  BP: (114-134)/(76-91) 114/76  Resp:  [18] 18  SpO2:  [93 %-98 %] 93 %  O2 Device: None (Room air)    Physical Exam  Vitals and nursing note reviewed.   Constitutional:       General: She is not in acute distress.     Appearance: She is well-developed.   HENT:      Head: Normocephalic and atraumatic.   Eyes:       Conjunctiva/sclera: Conjunctivae normal.   Cardiovascular:      Rate and Rhythm: Normal rate and regular rhythm.      Heart sounds: No murmur heard.  Pulmonary:      Effort: Pulmonary effort is normal. No respiratory distress.      Breath sounds: Normal breath sounds.   Abdominal:      Palpations: Abdomen is soft.      Tenderness: There is no abdominal tenderness.      Comments: Mild Left upper quadrant  and mid lower abdomen tenderness to deep palpation   Musculoskeletal:         General: No swelling.      Cervical back: Neck supple.   Skin:     General: Skin is warm and dry.      Capillary Refill: Capillary refill takes less than 2 seconds.   Neurological:      Mental Status: She is alert.   Psychiatric:         Mood and Affect: Mood normal.           Lab Results: I have reviewed the following results:CBC/BMP:   .     12/19/24  0632   WBC 8.91   HGB 11.5   HCT 34.1*   *   SODIUM 138   K 3.7   CL 99   CO2 30   BUN 2*   CREATININE 0.34*   GLUC 87        Imaging Results Review: I reviewed radiology reports from this admission including: CT abdomen/pelvis.  Other Study Results Review: No additional pertinent studies reviewed.    Rocio Wang MD  Gastroenterology Fellow, PGY- 4  Available on EPIC Secure Chat  12/19/2024 5:18 PM

## 2024-12-19 NOTE — PLAN OF CARE
Problem: PAIN - ADULT  Goal: Verbalizes/displays adequate comfort level or baseline comfort level  Description: Interventions:  - Encourage patient to monitor pain and request assistance  - Assess pain using appropriate pain scale  - Administer analgesics based on type and severity of pain and evaluate response  - Implement non-pharmacological measures as appropriate and evaluate response  - Consider cultural and social influences on pain and pain management  - Notify physician/advanced practitioner if interventions unsuccessful or patient reports new pain  Outcome: Progressing     Problem: INFECTION - ADULT  Goal: Absence or prevention of progression during hospitalization  Description: INTERVENTIONS:  - Assess and monitor for signs and symptoms of infection  - Monitor lab/diagnostic results  - Monitor all insertion sites, i.e. indwelling lines, tubes, and drains  - Monitor endotracheal if appropriate and nasal secretions for changes in amount and color  - Nicholson appropriate cooling/warming therapies per order  - Administer medications as ordered  - Instruct and encourage patient and family to use good hand hygiene technique  - Identify and instruct in appropriate isolation precautions for identified infection/condition  Outcome: Progressing  Goal: Absence of fever/infection during neutropenic period  Description: INTERVENTIONS:  - Monitor WBC    Outcome: Progressing     Problem: SAFETY ADULT  Goal: Patient will remain free of falls  Description: INTERVENTIONS:  - Educate patient/family on patient safety including physical limitations  - Instruct patient to call for assistance with activity   - Consult OT/PT to assist with strengthening/mobility   - Keep Call bell within reach  - Keep bed low and locked with side rails adjusted as appropriate  - Keep care items and personal belongings within reach  - Initiate and maintain comfort rounds  - Make Fall Risk Sign visible to staff  - Offer Toileting every 2 Hours,  in advance of need  - Initiate/Maintain bed alarm  - Obtain necessary fall risk management equipment: bed alarm  - Apply yellow socks and bracelet for high fall risk patients  - Consider moving patient to room near nurses station  Outcome: Progressing  Goal: Maintain or return to baseline ADL function  Description: INTERVENTIONS:  -  Assess patient's ability to carry out ADLs; assess patient's baseline for ADL function and identify physical deficits which impact ability to perform ADLs (bathing, care of mouth/teeth, toileting, grooming, dressing, etc.)  - Assess/evaluate cause of self-care deficits   - Assess range of motion  - Assess patient's mobility; develop plan if impaired  - Assess patient's need for assistive devices and provide as appropriate  - Encourage maximum independence but intervene and supervise when necessary  - Involve family in performance of ADLs  - Assess for home care needs following discharge   - Consider OT consult to assist with ADL evaluation and planning for discharge  - Provide patient education as appropriate  Outcome: Progressing  Goal: Maintains/Returns to pre admission functional level  Description: INTERVENTIONS:  - Perform AM-PAC 6 Click Basic Mobility/ Daily Activity assessment daily.  - Set and communicate daily mobility goal to care team and patient/family/caregiver.   - Collaborate with rehabilitation services on mobility goals if consulted  - Perform Range of Motion 3 times a day.  - Reposition patient every 2 hours.  - Dangle patient 3 times a day  - Stand patient 3 times a day  - Ambulate patient 3 times a day  - Out of bed to chair 3 times a day   - Out of bed for meals 3 times a day  - Out of bed for toileting  - Record patient progress and toleration of activity level   Outcome: Progressing     Problem: DISCHARGE PLANNING  Goal: Discharge to home or other facility with appropriate resources  Description: INTERVENTIONS:  - Identify barriers to discharge w/patient and  caregiver  - Arrange for needed discharge resources and transportation as appropriate  - Identify discharge learning needs (meds, wound care, etc.)  - Arrange for interpretive services to assist at discharge as needed  - Refer to Case Management Department for coordinating discharge planning if the patient needs post-hospital services based on physician/advanced practitioner order or complex needs related to functional status, cognitive ability, or social support system  Outcome: Progressing     Problem: Knowledge Deficit  Goal: Patient/family/caregiver demonstrates understanding of disease process, treatment plan, medications, and discharge instructions  Description: Complete learning assessment and assess knowledge base.  Interventions:  - Provide teaching at level of understanding  - Provide teaching via preferred learning methods  Outcome: Progressing

## 2024-12-20 ENCOUNTER — APPOINTMENT (INPATIENT)
Dept: RADIOLOGY | Facility: HOSPITAL | Age: 34
DRG: 438 | End: 2024-12-20
Payer: COMMERCIAL

## 2024-12-20 LAB
ANION GAP SERPL CALCULATED.3IONS-SCNC: 7 MMOL/L (ref 4–13)
B-HCG SERPL-ACNC: <0.6 MIU/ML (ref 0–5)
BUN SERPL-MCNC: 4 MG/DL (ref 5–25)
CALCIUM SERPL-MCNC: 8.5 MG/DL (ref 8.4–10.2)
CHLORIDE SERPL-SCNC: 99 MMOL/L (ref 96–108)
CO2 SERPL-SCNC: 29 MMOL/L (ref 21–32)
CREAT SERPL-MCNC: 0.46 MG/DL (ref 0.6–1.3)
ERYTHROCYTE [DISTWIDTH] IN BLOOD BY AUTOMATED COUNT: 11.5 % (ref 11.6–15.1)
GFR SERPL CREATININE-BSD FRML MDRD: 131 ML/MIN/1.73SQ M
GLUCOSE SERPL-MCNC: 122 MG/DL (ref 65–140)
GLUCOSE SERPL-MCNC: 123 MG/DL (ref 65–140)
GLUCOSE SERPL-MCNC: 89 MG/DL (ref 65–140)
GLUCOSE SERPL-MCNC: 92 MG/DL (ref 65–140)
GLUCOSE SERPL-MCNC: 98 MG/DL (ref 65–140)
HCT VFR BLD AUTO: 34.5 % (ref 34.8–46.1)
HGB BLD-MCNC: 11.5 G/DL (ref 11.5–15.4)
MCH RBC QN AUTO: 34.4 PG (ref 26.8–34.3)
MCHC RBC AUTO-ENTMCNC: 33.3 G/DL (ref 31.4–37.4)
MCV RBC AUTO: 103 FL (ref 82–98)
PLATELET # BLD AUTO: 162 THOUSANDS/UL (ref 149–390)
PMV BLD AUTO: 12 FL (ref 8.9–12.7)
POTASSIUM SERPL-SCNC: 3.7 MMOL/L (ref 3.5–5.3)
RBC # BLD AUTO: 3.34 MILLION/UL (ref 3.81–5.12)
SODIUM SERPL-SCNC: 135 MMOL/L (ref 135–147)
WBC # BLD AUTO: 8.94 THOUSAND/UL (ref 4.31–10.16)

## 2024-12-20 PROCEDURE — 99232 SBSQ HOSP IP/OBS MODERATE 35: CPT | Performed by: INTERNAL MEDICINE

## 2024-12-20 PROCEDURE — 80048 BASIC METABOLIC PNL TOTAL CA: CPT

## 2024-12-20 PROCEDURE — 99254 IP/OBS CNSLTJ NEW/EST MOD 60: CPT | Performed by: OBSTETRICS & GYNECOLOGY

## 2024-12-20 PROCEDURE — 84702 CHORIONIC GONADOTROPIN TEST: CPT

## 2024-12-20 PROCEDURE — 76830 TRANSVAGINAL US NON-OB: CPT

## 2024-12-20 PROCEDURE — 82948 REAGENT STRIP/BLOOD GLUCOSE: CPT

## 2024-12-20 PROCEDURE — 76856 US EXAM PELVIC COMPLETE: CPT

## 2024-12-20 PROCEDURE — 85027 COMPLETE CBC AUTOMATED: CPT

## 2024-12-20 RX ORDER — DULOXETIN HYDROCHLORIDE 30 MG/1
30 CAPSULE, DELAYED RELEASE ORAL DAILY
Status: DISCONTINUED | OUTPATIENT
Start: 2024-12-20 | End: 2024-12-24 | Stop reason: HOSPADM

## 2024-12-20 RX ORDER — LORAZEPAM 2 MG/ML
0.5 INJECTION INTRAMUSCULAR 2 TIMES DAILY PRN
Status: DISCONTINUED | OUTPATIENT
Start: 2024-12-20 | End: 2024-12-24 | Stop reason: HOSPADM

## 2024-12-20 RX ADMIN — OXYCODONE HYDROCHLORIDE 10 MG: 10 TABLET ORAL at 00:09

## 2024-12-20 RX ADMIN — OXYCODONE HYDROCHLORIDE 10 MG: 10 TABLET ORAL at 09:23

## 2024-12-20 RX ADMIN — OXYCODONE HYDROCHLORIDE 10 MG: 10 TABLET ORAL at 23:53

## 2024-12-20 RX ADMIN — SODIUM CHLORIDE, SODIUM LACTATE, POTASSIUM CHLORIDE, AND CALCIUM CHLORIDE 100 ML/HR: .6; .31; .03; .02 INJECTION, SOLUTION INTRAVENOUS at 21:33

## 2024-12-20 RX ADMIN — POLYETHYLENE GLYCOL 3350 17 G: 17 POWDER, FOR SOLUTION ORAL at 09:25

## 2024-12-20 RX ADMIN — NICOTINE 1 PATCH: 21 PATCH, EXTENDED RELEASE TRANSDERMAL at 09:23

## 2024-12-20 RX ADMIN — LORAZEPAM 0.5 MG: 2 INJECTION INTRAMUSCULAR; INTRAVENOUS at 23:53

## 2024-12-20 RX ADMIN — PANTOPRAZOLE SODIUM 40 MG: 40 INJECTION, POWDER, FOR SOLUTION INTRAVENOUS at 21:26

## 2024-12-20 RX ADMIN — DULOXETINE HYDROCHLORIDE 30 MG: 30 CAPSULE, DELAYED RELEASE ORAL at 12:02

## 2024-12-20 RX ADMIN — OXYCODONE HYDROCHLORIDE 10 MG: 10 TABLET ORAL at 19:43

## 2024-12-20 RX ADMIN — THIAMINE HCL TAB 100 MG 100 MG: 100 TAB at 09:23

## 2024-12-20 RX ADMIN — PANTOPRAZOLE SODIUM 40 MG: 40 INJECTION, POWDER, FOR SOLUTION INTRAVENOUS at 09:23

## 2024-12-20 RX ADMIN — FOLIC ACID 1 MG: 1 TABLET ORAL at 09:23

## 2024-12-20 RX ADMIN — SODIUM CHLORIDE, SODIUM LACTATE, POTASSIUM CHLORIDE, AND CALCIUM CHLORIDE 100 ML/HR: .6; .31; .03; .02 INJECTION, SOLUTION INTRAVENOUS at 00:43

## 2024-12-20 RX ADMIN — LORAZEPAM 0.5 MG: 2 INJECTION INTRAMUSCULAR; INTRAVENOUS at 12:25

## 2024-12-20 RX ADMIN — ENOXAPARIN SODIUM 40 MG: 40 INJECTION SUBCUTANEOUS at 09:25

## 2024-12-20 RX ADMIN — Medication 1 TABLET: at 09:23

## 2024-12-20 RX ADMIN — ONDANSETRON 4 MG: 2 INJECTION INTRAMUSCULAR; INTRAVENOUS at 19:43

## 2024-12-20 RX ADMIN — OXYCODONE HYDROCHLORIDE 10 MG: 10 TABLET ORAL at 14:44

## 2024-12-20 NOTE — PROGRESS NOTES
Progress Note - Internal Medicine   Name: Kaykay Holland 34 y.o. female I MRN: [id number]  Unit/Bed#: [facility] 812-01 I Date of Admission: 12/15/2024   Date of Service: 12/20/2024 I Hospital Day: 5     Assessment & Plan  Acute alcoholic pancreatitis  Patient initially presented to [facility] with complaints of nausea and left-sided abdominal pain radiating to back. Has history of recurrent alcoholic pancreatitis.  Recent hospitalization (11/23 - 11/29) during which time was managed conservatively.  CT with findings of recurrent pericarditis and large pancreatic pseudocyst.  General surgery consulted at [facility].  Recommended conservative management.  GI consulted at [facility]. Recommended transfer to [facility] for cyst gastrostomy and necrosectomy with advance GI team.    Plan:  Patient currently tolerating GI low res diet   GI consulted.  Appreciate recommendation.   Plan for repeat CT in 3 weeks then repeat EGD for stent removal vs. Necrosectomy in 4 weeks  Pain regimen downgraded to oxycodone 5 mg PO Q4H PRN mod pain, oxycodone 10 mg PO Q4H PRN for severe/breakthrough pain  Bowel regimen in place  IV hydration  ml/hr, nausea medication as needed.  Constipation  Patient usually had two bowel movements daily. Patient hasn't had bowel movement in greater than seven days. Received Mag-Citrate (12/19)    Plan:   Miralax 17 g BID  Dulcolax 5 mg BID   Senna HS PRN  Alcohol withdrawal seizure (HCC)  Patient reports he drinks 3 glasses of wine for the past few years.  However, since her last hospital discharge on 11/29 she has not had any alcohol.  Has history of withdrawal seizure in 2023 for which she was admitted.  Previously on Librium 25 mg, CIWA protocol in place. Continue to monitor if patient requires Ativan  Patient refused CRS consult.  Essential hypertension  Previously on lisinopril which has been discontinued for about 6 months or so.  Blood pressure well-controlled.  Monitor daily vitals  Tobacco abuse  Smokes a  pack a day for past 5 to 6 years.  On nicotine patch.  Smoking cessation education.  Gastroesophageal reflux disease without esophagitis  Reported history of GERD on pantoprazole 40 mg twice daily at home.  Continue Protonix 40 mg IV every 12 hours.  Switch when appropriate.  Thrombocytopenia (HCC)  Patients platelets have been downtrending since admission. On admission 12/13/24 Plts were 369. Today patients platelets were 142. Patient had no signs of bleeding, no ecchymosis on exam. Will continue to monitor CBC and physical exam.  Ovarian cyst  CT: right ovarian cyst since 12/13/24, now measuring 4.9 cm by 4.2 cm and containing some hyperdense material suggestive of hemorrhagic components  Plan:  Consult placed to GYN    24 Hour Events : Patient had fever last night 100.7 oral followed by 100.2 recheck,   Subjective : Patient was eating breakfast when we entered the room.  Patient was seen and examined today. Patient reports that she continues to have 7/10 pain. Patient was informed of her CT results and she informed us that she had a cyst in the past on her ovaries when she was 20 which ruptured. Patient reports that she had two bowel movements. Patient is limited on movement due to pain. Patient continues to deny updates with her family.    Objective :  Temp:  [98.2 °F (36.8 °C)-100.7 °F (38.2 °C)] 98.2 °F (36.8 °C)  HR:  [] 72  BP: ()/(55-83) 104/61  Resp:  [17-20] 17  SpO2:  [93 %-95 %] 94 %  O2 Device: None (Room air)    Physical Exam  Constitutional:       General: She is not in acute distress.     Appearance: Normal appearance. She is ill-appearing. She is not diaphoretic.   HENT:      Head: Normocephalic and atraumatic.      Mouth/Throat:      Mouth: Mucous membranes are moist.   Cardiovascular:      Rate and Rhythm: Normal rate and regular rhythm.      Pulses: Normal pulses.      Heart sounds: Normal heart sounds.   Pulmonary:      Effort: Pulmonary effort is normal.      Breath sounds: Normal  breath sounds.   Abdominal:      General: Abdomen is flat.      Palpations: Abdomen is soft.      Tenderness: There is abdominal tenderness. There is guarding.      Comments: RLQ pain on palpation, tenderness to epigastric and LUQ   Musculoskeletal:      Right lower leg: No edema.      Left lower leg: No edema.   Skin:     General: Skin is warm and dry.   Neurological:      Mental Status: She is alert and oriented to person, place, and time. Mental status is at baseline.   Psychiatric:         Mood and Affect: Mood normal.         Lab Results: I have reviewed the following results:CBC/BMP:   .     12/20/24  0041   WBC 8.94   HGB 11.5   HCT 34.5*      SODIUM 135   K 3.7   CL 99   CO2 29   BUN 4*   CREATININE 0.46*   GLUC 122        Imaging Results Review: I reviewed radiology reports from this admission including: CT abdomen/pelvis.      VTE Pharmacologic Prophylaxis: VTE covered by:  enoxaparin, Subcutaneous, 40 mg at 12/19/24 1302      VTE Mechanical Prophylaxis: sequential compression device

## 2024-12-20 NOTE — DISCHARGE INSTR - OTHER ORDERS
"Peggy Lamar   Certified     [facility], [place] [facility]  [phone]        Cheryl Pena  Certified     [facility], [place] and [facility]  [phone]  It was so nice to meet you Kaykay! Please reach out if you need to chat, I mean it :) Enjoy your holiday w/beloved furbabes and friends <3 Wishing you peace     AA meeting guide   https://www.aa.org/find-aa    AA Phone apps:   Meeting Guide  Everything AA  In the Rooms    AA/24 hour hotline   [phone]    Holiday Blues     Acknowledge your feelings -- it is normal to sense sadness and grief (particularly after a loss) -- No one has to force themselves to be happy just because it is the holiday season!     Seek support -- family, friends, Quaker, community groups, volunteering -- no one has to be alone.   It may at first seem hard but you will be surprised -- others like company too!     When there are differing opinions, try to not personalize them     If other relatives are sniping at each other, leave the room, take a walk or a deep breath or listen to some pleasant music -- whatever will \"lighten\" your space.     Be understanding if others get upset or distressed     Consider having a holiday meal out if tension is likely at the dining room table -- people behave better and try to avoid a scene     Stick to a budget and know your limits     Plan your shopping and think about a gift with meaning (expensive gifts are not always the ones that mean the most)     Shop early and watch for sales     Don't overlook a special gift for yourself     Delegate! Let others share in the responsibility of planning activities     Plan ahead -- set aside specific days for shopping, baking, visiting and other activities     Learn to say \"No\" or \"I just canít do that right now, let's figure out another time or project.\"     Avoid " "being too ambitious -- a gingerbread mansion would be just as fun to build on Malissa’s Day!     Go easy on alcohol (excessive drinking will only increase your feelings of depression). Better yet have a hot chocolate!     Be realistic -- families and traditions change -- hold on to those you can and find new ways to celebrate Rethink resolutions -- be realistic and make sure they are valuable Forget about perfection     Stay flexible -- physically and emotionally     Seek professional help if needed     Rein in your expectations and take a moment to appreciate the simple things!     If despite your best efforts to remain upbeat this holiday season, you find yourself down for a sustained period of time, get help. Don't try to \"tough it out\" alone. There are treatment options available to you that could make a significant difference in your life.   "

## 2024-12-20 NOTE — ASSESSMENT & PLAN NOTE
-Likely hemorrhagic cyst based on CT scan, with increased pain due to interval growth of cyst  -Recommend obtaining B-HCG with patient's history of irregular menses  -Recommend transvaginal ultrasound to assess for sonographic evidence of torsion  -Abdominal exam with voluntary guarding however non peritonitic  -Pain management per primary team  -Recommend outpatient obgyn follow up to discuss irregular menses

## 2024-12-20 NOTE — ASSESSMENT & PLAN NOTE
CT: right ovarian cyst since 12/13/24, now measuring 4.9 cm by 4.2 cm and containing some hyperdense material suggestive of hemorrhagic components  Plan:  Consult placed to GYN

## 2024-12-20 NOTE — ASSESSMENT & PLAN NOTE
Patient usually had two bowel movements daily. Patient hasn't had bowel movement in greater than seven days. Received Mag-Citrate (12/19)    Plan:   Miralax 17 g BID  Dulcolax 5 mg BID   Senna HS PRN

## 2024-12-20 NOTE — CONSULTS
Consult - Gynecology  Kaykay Holland 34 y.o. female MRN: [id number]  Unit/Bed#: [facility] 812-01 Encounter: [id number]        Assessment/Plan      Ovarian cyst  Assessment & Plan  -Likely hemorrhagic cyst based on CT scan, with increased pain due to interval growth of cyst  -Recommend obtaining B-HCG with patient's history of irregular menses  -Recommend transvaginal ultrasound to assess for sonographic evidence of torsion  -Abdominal exam with voluntary guarding however non peritonitic  -Pain management per primary team  -Recommend outpatient obgyn follow up to discuss irregular menses       Discussed with Dr. Machuca    History of Present Illness:  HPI: Kaykay Holland is a 34 y.o.  female who is admitted with alcoholic pancreatitis. She underwent a cystgastrostomy on  for management, but has been struggling with pain since. She has undergone multiple CT scans this admission, and the most recent CT on  illustrated a 4.9x4.2 cm right ovarian cyst with one septation and layered material suggestive of hemorrhagic components. This cyst is enlarged from a 2.7x2.2 cm cyst noted on admission. Kaykay describes that she has been having RLQ pain for the past several weeks, however it has worsened in the past two days.    Regarding Kaykay's obgyn history, she reports that she has has a history of a ruptured ovarian cyst in the past which required an admission for pain control. She denies surgical intervention at that time. Kaykay has a history of very irregular menses; LMP ended approximately two weeks ago and she describes bleeding for three weeks. Prior to this time, she had not had a period since 2024. Kaykay notes history of irregular periods but describes this interval as being particularly long; she typically gets a period every 2-3 months. She is not on hormonal medications or birth control and has not been for many years. Regarding her obstetric history, she had one pregnancy in   "which resulted in SAB. She denies history of pelvic infection, STI, and abnormal paps. She reports that she last saw a gyn at [facility] about a year ago, however records of this visit were not able to be found in the chart. Kaykay has never had surgery on her uterus, fallopian tubes, or ovaries.      All other review of symptoms are negative.      Historical Information   Past Medical History:   Diagnosis Date    Acute alcoholic pancreatitis 2023    Hypertension     SIRS (systemic inflammatory response syndrome) (HCC) 04/10/2024     Past Surgical History:   Procedure Laterality Date    WISDOM TOOTH EXTRACTION       OB History    Para Term  AB Living   1    1    SAB IAB Ectopic Multiple Live Births   1          # Outcome Date GA Lbr Surendra/2nd Weight Sex Type Anes PTL Lv   1 2011             History reviewed. No pertinent family history.  Social History   Social History     Substance and Sexual Activity   Alcohol Use Yes    Alcohol/week: 21.0 standard drinks of alcohol    Types: 21 Glasses of wine per week    Comment: \"occasional\"     Social History     Substance and Sexual Activity   Drug Use Never     Social History     Tobacco Use   Smoking Status Every Day    Current packs/day: 0.50    Types: Cigarettes    Passive exposure: Never   Smokeless Tobacco Never   Tobacco Comments    Per pt last drink was today 24 one glass of wine. Had previously stop on 24 for 1 month      Allergies   Allergen Reactions    Other Edema     Bee stings (localized edema)       Objective:  Vitals: Blood pressure 104/61, pulse 72, temperature 98.2 °F (36.8 °C), resp. rate 17, height 5' 5\" (1.651 m), weight 54.3 kg (119 lb 12.8 oz), last menstrual period 2024, SpO2 94%. Body mass index is 19.94 kg/m².    Invasive Devices       Peripheral Intravenous Line  Duration             Peripheral IV 24 Right;Ventral (anterior) Forearm <1 day                    Physical Exam  Constitutional:       Appearance: She " is ill-appearing.   Eyes:      Extraocular Movements: Extraocular movements intact.   Cardiovascular:      Rate and Rhythm: Normal rate and regular rhythm.   Pulmonary:      Effort: Pulmonary effort is normal.      Breath sounds: Normal breath sounds.   Abdominal:      General: Abdomen is flat. There is no distension.      Palpations: Abdomen is soft.      Comments: Tenderness to palpation of epigastric region, LUQ, and RLQ, voluntary guarding present   Musculoskeletal:         General: No swelling or tenderness.   Skin:     General: Skin is warm and dry.   Neurological:      General: No focal deficit present.      Mental Status: She is alert and oriented to person, place, and time. Mental status is at baseline.   Psychiatric:         Mood and Affect: Mood normal.         Behavior: Behavior normal.         Lab Results:   Recent Results (from the past 24 hours)   Fingerstick Glucose (POCT)    Collection Time: 12/19/24 11:59 PM   Result Value Ref Range    POC Glucose 89 65 - 140 mg/dl   Basic metabolic panel    Collection Time: 12/20/24 12:41 AM   Result Value Ref Range    Sodium 135 135 - 147 mmol/L    Potassium 3.7 3.5 - 5.3 mmol/L    Chloride 99 96 - 108 mmol/L    CO2 29 21 - 32 mmol/L    ANION GAP 7 4 - 13 mmol/L    BUN 4 (L) 5 - 25 mg/dL    Creatinine 0.46 (L) 0.60 - 1.30 mg/dL    Glucose 122 65 - 140 mg/dL    Calcium 8.5 8.4 - 10.2 mg/dL    eGFR 131 ml/min/1.73sq m   CBC    Collection Time: 12/20/24 12:41 AM   Result Value Ref Range    WBC 8.94 4.31 - 10.16 Thousand/uL    RBC 3.34 (L) 3.81 - 5.12 Million/uL    Hemoglobin 11.5 11.5 - 15.4 g/dL    Hematocrit 34.5 (L) 34.8 - 46.1 %     (H) 82 - 98 fL    MCH 34.4 (H) 26.8 - 34.3 pg    MCHC 33.3 31.4 - 37.4 g/dL    RDW 11.5 (L) 11.6 - 15.1 %    Platelets 162 149 - 390 Thousands/uL    MPV 12.0 8.9 - 12.7 fL   Fingerstick Glucose (POCT)    Collection Time: 12/20/24  6:11 AM   Result Value Ref Range    POC Glucose 98 65 - 140 mg/dl   Fingerstick Glucose (POCT)     Collection Time: 12/20/24 12:17 PM   Result Value Ref Range    POC Glucose 92 65 - 140 mg/dl       Floresita Ricks MD  12/20/2024  12:27 PM

## 2024-12-20 NOTE — ASSESSMENT & PLAN NOTE
Patient initially presented to [facility] with complaints of nausea and left-sided abdominal pain radiating to back. Has history of recurrent alcoholic pancreatitis.  Recent hospitalization (11/23 - 11/29) during which time was managed conservatively.  CT with findings of recurrent pericarditis and large pancreatic pseudocyst.  General surgery consulted at [facility].  Recommended conservative management.  GI consulted at [facility]. Recommended transfer to [facility] for cyst gastrostomy and necrosectomy with advance GI team.    Plan:  Patient currently tolerating GI low res diet   GI consulted.  Appreciate recommendation.   Plan for repeat CT in 3 weeks then repeat EGD for stent removal vs. Necrosectomy in 4 weeks  Pain regimen downgraded to oxycodone 5 mg PO Q4H PRN mod pain, oxycodone 10 mg PO Q4H PRN for severe/breakthrough pain  Bowel regimen in place  IV hydration  ml/hr, nausea medication as needed.

## 2024-12-20 NOTE — CERTIFIED RECOVERY SPECIALIST
"   Certified  Note    Patient name: Kaykay Holland  Location: [facility]  [place]: [facility]  Attending:  Clay Ferguson* MRN [id number]  : 1990  Age: 34 y.o.    Sex: female Date 2024         Substance Use History:     Social History     Substance and Sexual Activity   Alcohol Use Yes    Alcohol/week: 21.0 standard drinks of alcohol    Types: 21 Glasses of wine per week    Comment: \"occasional\"        Social History     Substance and Sexual Activity   Drug Use Never     CRS near patient room and overheard her crying. CRS check in [facility] patient to offer support and contact info. Patient appeared to be appreciative     Admission Information  Substances Used at This Admission:: Alcohol  Readmission in Last 30 Days?: Yes  Encounter Type:: Patient Face-to-Face    Recovery Support Plan  Declined All Services?: No  Medication Assisted Treatment:: No  Agreeable to Warm Handoff?: No  Is Patient Accepting [facility] Treatment Services?: Yes  Was Narcan Provided at Discharge?: No  Plan Discussed With Treatment Team:: Yes    Referral to Recovery Supports:  Recovery Center:: Yes  Community Based CRS:: Yes  Case Management:: Yes  Direct Access to [facility] Treatment?: No  Resource Guide Given?: Yes  Follow Up With Patient:: Yes  Family / Other Support:: No  Referral for Community Physical Health:: No  Referral for Community Mental Health:: NoMeghan Lamar       "

## 2024-12-20 NOTE — PLAN OF CARE
Problem: PAIN - ADULT  Goal: Verbalizes/displays adequate comfort level or baseline comfort level  Description: Interventions:  - Encourage patient to monitor pain and request assistance  - Assess pain using appropriate pain scale  - Administer analgesics based on type and severity of pain and evaluate response  - Implement non-pharmacological measures as appropriate and evaluate response  - Consider cultural and social influences on pain and pain management  - Notify physician/advanced practitioner if interventions unsuccessful or patient reports new pain  Outcome: Progressing     Problem: INFECTION - ADULT  Goal: Absence or prevention of progression during hospitalization  Description: INTERVENTIONS:  - Assess and monitor for signs and symptoms of infection  - Monitor lab/diagnostic results  - Monitor all insertion sites, i.e. indwelling lines, tubes, and drains  - Monitor endotracheal if appropriate and nasal secretions for changes in amount and color  - [place] appropriate cooling/warming therapies per order  - Administer medications as ordered  - Instruct and encourage patient and family to use good hand hygiene technique  - Identify and instruct in appropriate isolation precautions for identified infection/condition  Outcome: Progressing  Goal: Absence of fever/infection during neutropenic period  Description: INTERVENTIONS:  - Monitor WBC    Outcome: Progressing     Problem: SAFETY ADULT  Goal: Patient will remain free of falls  Description: INTERVENTIONS:  - Educate patient/family on patient safety including physical limitations  - Instruct patient to call for assistance with activity   - Consult OT/PT to assist with strengthening/mobility   - Keep Call bell within reach  - Keep bed low and locked with side rails adjusted as appropriate  - Keep care items and personal belongings within reach  - Initiate and maintain comfort rounds  - Make Fall Risk Sign visible to staff    - Apply yellow socks and  bracelet for high fall risk patients  - Consider moving patient to room near nurses station  Outcome: Progressing  Goal: Maintain or return to baseline ADL function  Description: INTERVENTIONS:  -  Assess patient's ability to carry out ADLs; assess patient's baseline for ADL function and identify physical deficits which impact ability to perform ADLs (bathing, care of mouth/teeth, toileting, grooming, dressing, etc.)  - Assess/evaluate cause of self-care deficits   - Assess range of motion  - Assess patient's mobility; develop plan if impaired  - Assess patient's need for assistive devices and provide as appropriate  - Encourage maximum independence but intervene and supervise when necessary  - Involve family in performance of ADLs  - Assess for home care needs following discharge   - Consider OT consult to assist with ADL evaluation and planning for discharge  - Provide patient education as appropriate  Outcome: Progressing  Goal: Maintains/Returns to pre admission functional level  Description: INTERVENTIONS:  - Perform AM-PAC 6 Click Basic Mobility/ Daily Activity assessment daily.  - Set and communicate daily mobility goal to care team and patient/family/caregiver.   - Collaborate with rehabilitation services on mobility goals if consulted    - Out of bed for toileting  - Record patient progress and toleration of activity level   Outcome: Progressing     Problem: DISCHARGE PLANNING  Goal: Discharge to home or other facility with appropriate resources  Description: INTERVENTIONS:  - Identify barriers to discharge w/patient and caregiver  - Arrange for needed discharge resources and transportation as appropriate  - Identify discharge learning needs (meds, wound care, etc.)  - Arrange for interpretive services to assist at discharge as needed  - Refer to Case Management Department for coordinating discharge planning if the patient needs post-hospital services based on physician/advanced practitioner order or  complex needs related to functional status, cognitive ability, or social support system  Outcome: Progressing     Problem: Knowledge Deficit  Goal: Patient/family/caregiver demonstrates understanding of disease process, treatment plan, medications, and discharge instructions  Description: Complete learning assessment and assess knowledge base.  Interventions:  - Provide teaching at level of understanding  - Provide teaching via preferred learning methods  Outcome: Progressing     Problem: PAIN - ADULT  Goal: Verbalizes/displays adequate comfort level or baseline comfort level  Description: Interventions:  - Encourage patient to monitor pain and request assistance  - Assess pain using appropriate pain scale  - Administer analgesics based on type and severity of pain and evaluate response  - Implement non-pharmacological measures as appropriate and evaluate response  - Consider cultural and social influences on pain and pain management  - Notify physician/advanced practitioner if interventions unsuccessful or patient reports new pain  Outcome: Progressing     Problem: INFECTION - ADULT  Goal: Absence or prevention of progression during hospitalization  Description: INTERVENTIONS:  - Assess and monitor for signs and symptoms of infection  - Monitor lab/diagnostic results  - Monitor all insertion sites, i.e. indwelling lines, tubes, and drains  - Monitor endotracheal if appropriate and nasal secretions for changes in amount and color  - [place] appropriate cooling/warming therapies per order  - Administer medications as ordered  - Instruct and encourage patient and family to use good hand hygiene technique  - Identify and instruct in appropriate isolation precautions for identified infection/condition  Outcome: Progressing  Goal: Absence of fever/infection during neutropenic period  Description: INTERVENTIONS:  - Monitor WBC    Outcome: Progressing     Problem: SAFETY ADULT  Goal: Patient will remain free of  falls  Description: INTERVENTIONS:  - Educate patient/family on patient safety including physical limitations  - Instruct patient to call for assistance with activity   - Consult OT/PT to assist with strengthening/mobility   - Keep Call bell within reach  - Keep bed low and locked with side rails adjusted as appropriate  - Keep care items and personal belongings within reach  - Initiate and maintain comfort rounds  - Make Fall Risk Sign visible to staff    - Apply yellow socks and bracelet for high fall risk patients  - Consider moving patient to room near nurses station  Outcome: Progressing  Goal: Maintain or return to baseline ADL function  Description: INTERVENTIONS:  -  Assess patient's ability to carry out ADLs; assess patient's baseline for ADL function and identify physical deficits which impact ability to perform ADLs (bathing, care of mouth/teeth, toileting, grooming, dressing, etc.)  - Assess/evaluate cause of self-care deficits   - Assess range of motion  - Assess patient's mobility; develop plan if impaired  - Assess patient's need for assistive devices and provide as appropriate  - Encourage maximum independence but intervene and supervise when necessary  - Involve family in performance of ADLs  - Assess for home care needs following discharge   - Consider OT consult to assist with ADL evaluation and planning for discharge  - Provide patient education as appropriate  Outcome: Progressing  Goal: Maintains/Returns to pre admission functional level  Description: INTERVENTIONS:  - Perform AM-PAC 6 Click Basic Mobility/ Daily Activity assessment daily.  - Set and communicate daily mobility goal to care team and patient/family/caregiver.   - Collaborate with rehabilitation services on mobility goals if consulted  - Perform Range of Motion 3 times a day.  - Reposition patient every 2 hours.  - Dangle patient 3 times a day  - Stand patient 3 times a day  - Ambulate patient 3 times a day  - Out of bed to chair  3 times a day   - Out of bed for meals 3 times a day  - Out of bed for toileting  - Record patient progress and toleration of activity level   Outcome: Progressing     Problem: DISCHARGE PLANNING  Goal: Discharge to home or other facility with appropriate resources  Description: INTERVENTIONS:  - Identify barriers to discharge w/patient and caregiver  - Arrange for needed discharge resources and transportation as appropriate  - Identify discharge learning needs (meds, wound care, etc.)  - Arrange for interpretive services to assist at discharge as needed  - Refer to Case Management Department for coordinating discharge planning if the patient needs post-hospital services based on physician/advanced practitioner order or complex needs related to functional status, cognitive ability, or social support system  Outcome: Progressing     Problem: Knowledge Deficit  Goal: Patient/family/caregiver demonstrates understanding of disease process, treatment plan, medications, and discharge instructions  Description: Complete learning assessment and assess knowledge base.  Interventions:  - Provide teaching at level of understanding  - Provide teaching via preferred learning methods  Outcome: Progressing

## 2024-12-20 NOTE — ASSESSMENT & PLAN NOTE
Patient has a known history of anxiety that is not treated. Patient has had increase in her anxiety while in the hospital secondary to current events.  Has since been started on Cymbalta this admission for assistance with both mood and pain control.    Plan:   Continue duloxetine 30 mg daily, can be continued on discharge  Continue Ativan as needed  Encourage patient to follow up with PCP after discharge

## 2024-12-21 PROBLEM — K59.00 CONSTIPATION: Status: RESOLVED | Noted: 2024-12-18 | Resolved: 2024-12-21

## 2024-12-21 LAB
ANION GAP SERPL CALCULATED.3IONS-SCNC: 4 MMOL/L (ref 4–13)
BASOPHILS # BLD AUTO: 0.04 THOUSANDS/ÂΜL (ref 0–0.1)
BASOPHILS NFR BLD AUTO: 1 % (ref 0–1)
BUN SERPL-MCNC: 5 MG/DL (ref 5–25)
CALCIUM SERPL-MCNC: 8.1 MG/DL (ref 8.4–10.2)
CHLORIDE SERPL-SCNC: 102 MMOL/L (ref 96–108)
CO2 SERPL-SCNC: 28 MMOL/L (ref 21–32)
CREAT SERPL-MCNC: 0.4 MG/DL (ref 0.6–1.3)
EOSINOPHIL # BLD AUTO: 0.38 THOUSAND/ÂΜL (ref 0–0.61)
EOSINOPHIL NFR BLD AUTO: 5 % (ref 0–6)
ERYTHROCYTE [DISTWIDTH] IN BLOOD BY AUTOMATED COUNT: 11.4 % (ref 11.6–15.1)
GFR SERPL CREATININE-BSD FRML MDRD: 137 ML/MIN/1.73SQ M
GLUCOSE SERPL-MCNC: 101 MG/DL (ref 65–140)
GLUCOSE SERPL-MCNC: 105 MG/DL (ref 65–140)
GLUCOSE SERPL-MCNC: 129 MG/DL (ref 65–140)
GLUCOSE SERPL-MCNC: 62 MG/DL (ref 65–140)
GLUCOSE SERPL-MCNC: 74 MG/DL (ref 65–140)
GLUCOSE SERPL-MCNC: 83 MG/DL (ref 65–140)
HCT VFR BLD AUTO: 34.2 % (ref 34.8–46.1)
HGB BLD-MCNC: 11 G/DL (ref 11.5–15.4)
IMM GRANULOCYTES # BLD AUTO: 0.03 THOUSAND/UL (ref 0–0.2)
IMM GRANULOCYTES NFR BLD AUTO: 0 % (ref 0–2)
LYMPHOCYTES # BLD AUTO: 1.87 THOUSANDS/ÂΜL (ref 0.6–4.47)
LYMPHOCYTES NFR BLD AUTO: 24 % (ref 14–44)
MCH RBC QN AUTO: 34.1 PG (ref 26.8–34.3)
MCHC RBC AUTO-ENTMCNC: 32.2 G/DL (ref 31.4–37.4)
MCV RBC AUTO: 106 FL (ref 82–98)
MONOCYTES # BLD AUTO: 0.82 THOUSAND/ÂΜL (ref 0.17–1.22)
MONOCYTES NFR BLD AUTO: 11 % (ref 4–12)
NEUTROPHILS # BLD AUTO: 4.54 THOUSANDS/ÂΜL (ref 1.85–7.62)
NEUTS SEG NFR BLD AUTO: 59 % (ref 43–75)
NRBC BLD AUTO-RTO: 0 /100 WBCS
PLATELET # BLD AUTO: 144 THOUSANDS/UL (ref 149–390)
PMV BLD AUTO: 12.1 FL (ref 8.9–12.7)
POTASSIUM SERPL-SCNC: 4 MMOL/L (ref 3.5–5.3)
RBC # BLD AUTO: 3.23 MILLION/UL (ref 3.81–5.12)
SODIUM SERPL-SCNC: 134 MMOL/L (ref 135–147)
WBC # BLD AUTO: 7.68 THOUSAND/UL (ref 4.31–10.16)

## 2024-12-21 PROCEDURE — 82948 REAGENT STRIP/BLOOD GLUCOSE: CPT

## 2024-12-21 PROCEDURE — 99232 SBSQ HOSP IP/OBS MODERATE 35: CPT | Performed by: INTERNAL MEDICINE

## 2024-12-21 PROCEDURE — 85025 COMPLETE CBC W/AUTO DIFF WBC: CPT

## 2024-12-21 PROCEDURE — 80048 BASIC METABOLIC PNL TOTAL CA: CPT

## 2024-12-21 RX ORDER — ACETAMINOPHEN 325 MG/1
650 TABLET ORAL EVERY 6 HOURS PRN
Status: DISCONTINUED | OUTPATIENT
Start: 2024-12-21 | End: 2024-12-24 | Stop reason: HOSPADM

## 2024-12-21 RX ORDER — HYDROMORPHONE HCL/PF 1 MG/ML
0.5 SYRINGE (ML) INJECTION ONCE AS NEEDED
Status: COMPLETED | OUTPATIENT
Start: 2024-12-21 | End: 2024-12-21

## 2024-12-21 RX ORDER — SENNOSIDES 8.6 MG
1 TABLET ORAL 2 TIMES DAILY
Status: DISCONTINUED | OUTPATIENT
Start: 2024-12-21 | End: 2024-12-24 | Stop reason: HOSPADM

## 2024-12-21 RX ORDER — HYDROMORPHONE HCL/PF 1 MG/ML
0.5 SYRINGE (ML) INJECTION ONCE
Status: COMPLETED | OUTPATIENT
Start: 2024-12-21 | End: 2024-12-21

## 2024-12-21 RX ORDER — DEXTROSE MONOHYDRATE 25 G/50ML
INJECTION, SOLUTION INTRAVENOUS
Status: COMPLETED
Start: 2024-12-21 | End: 2024-12-21

## 2024-12-21 RX ADMIN — LORAZEPAM 0.5 MG: 2 INJECTION INTRAMUSCULAR; INTRAVENOUS at 19:48

## 2024-12-21 RX ADMIN — OXYCODONE HYDROCHLORIDE 10 MG: 10 TABLET ORAL at 16:54

## 2024-12-21 RX ADMIN — FOLIC ACID 1 MG: 1 TABLET ORAL at 08:36

## 2024-12-21 RX ADMIN — TRIMETHOBENZAMIDE HYDROCHLORIDE 200 MG: 100 INJECTION INTRAMUSCULAR at 18:34

## 2024-12-21 RX ADMIN — NICOTINE 1 PATCH: 21 PATCH, EXTENDED RELEASE TRANSDERMAL at 08:37

## 2024-12-21 RX ADMIN — DEXTROSE MONOHYDRATE 50 ML: 25 INJECTION, SOLUTION INTRAVENOUS at 20:33

## 2024-12-21 RX ADMIN — HYDROMORPHONE HYDROCHLORIDE 0.5 MG: 1 INJECTION, SOLUTION INTRAMUSCULAR; INTRAVENOUS; SUBCUTANEOUS at 20:33

## 2024-12-21 RX ADMIN — OXYCODONE HYDROCHLORIDE 10 MG: 10 TABLET ORAL at 08:37

## 2024-12-21 RX ADMIN — ONDANSETRON 4 MG: 2 INJECTION INTRAMUSCULAR; INTRAVENOUS at 20:34

## 2024-12-21 RX ADMIN — LORAZEPAM 0.5 MG: 2 INJECTION INTRAMUSCULAR; INTRAVENOUS at 12:25

## 2024-12-21 RX ADMIN — PANTOPRAZOLE SODIUM 40 MG: 40 INJECTION, POWDER, FOR SOLUTION INTRAVENOUS at 21:37

## 2024-12-21 RX ADMIN — POLYETHYLENE GLYCOL 3350 17 G: 17 POWDER, FOR SOLUTION ORAL at 08:36

## 2024-12-21 RX ADMIN — HYDROMORPHONE HYDROCHLORIDE 0.5 MG: 1 INJECTION, SOLUTION INTRAMUSCULAR; INTRAVENOUS; SUBCUTANEOUS at 18:35

## 2024-12-21 RX ADMIN — THIAMINE HCL TAB 100 MG 100 MG: 100 TAB at 08:36

## 2024-12-21 RX ADMIN — DULOXETINE HYDROCHLORIDE 30 MG: 30 CAPSULE, DELAYED RELEASE ORAL at 08:36

## 2024-12-21 RX ADMIN — ENOXAPARIN SODIUM 40 MG: 40 INJECTION SUBCUTANEOUS at 08:36

## 2024-12-21 RX ADMIN — PANTOPRAZOLE SODIUM 40 MG: 40 INJECTION, POWDER, FOR SOLUTION INTRAVENOUS at 08:36

## 2024-12-21 RX ADMIN — OXYCODONE HYDROCHLORIDE 10 MG: 10 TABLET ORAL at 22:17

## 2024-12-21 RX ADMIN — ONDANSETRON 4 MG: 2 INJECTION INTRAMUSCULAR; INTRAVENOUS at 16:54

## 2024-12-21 RX ADMIN — Medication 1 TABLET: at 08:36

## 2024-12-21 RX ADMIN — SODIUM CHLORIDE, SODIUM LACTATE, POTASSIUM CHLORIDE, AND CALCIUM CHLORIDE 100 ML/HR: .6; .31; .03; .02 INJECTION, SOLUTION INTRAVENOUS at 19:53

## 2024-12-21 RX ADMIN — OXYCODONE HYDROCHLORIDE 10 MG: 10 TABLET ORAL at 12:25

## 2024-12-21 RX ADMIN — ONDANSETRON 4 MG: 2 INJECTION INTRAMUSCULAR; INTRAVENOUS at 08:36

## 2024-12-21 RX ADMIN — SODIUM CHLORIDE, SODIUM LACTATE, POTASSIUM CHLORIDE, AND CALCIUM CHLORIDE 100 ML/HR: .6; .31; .03; .02 INJECTION, SOLUTION INTRAVENOUS at 08:36

## 2024-12-21 NOTE — PROGRESS NOTES
"When entering room to administer am meds, pt sleeping; when awakened, pt requesting something for pain, falling asleep mid sentence, rating pain an \"8\"; when fully awake, pt given prn oxycodone  "

## 2024-12-21 NOTE — ASSESSMENT & PLAN NOTE
Patient initially presented to [facility] with complaints of nausea and left-sided abdominal pain radiating to back. Has history of recurrent alcoholic pancreatitis.  Recent hospitalization (11/23 - 11/29) during which time was managed conservatively.  CT with findings of recurrent pericarditis and large pancreatic pseudocyst.  General surgery consulted at [facility].  Recommended conservative management.  GI consulted at [facility]. Recommended transfer to [facility] for cyst gastrostomy and necrosectomy with advance GI team; now status post cystogastrostomy  Main collection appears to be improving on follow-up imaging though numerous persistent collections remain present    Plan:  Patient currently tolerating GI low res diet   GI consulted.  Appreciate recommendation.   Plan for repeat CT in 3 weeks then repeat EGD for stent removal/Necrosectomy in 4 weeks  Pain regimen downgraded to oxycodone 5 mg PO Q4H PRN mod pain, oxycodone 10 mg PO Q4H PRN for severe/breakthrough pain; continue for now  Bowel regimen in place, monitor stool output  IV hydration  ml/hr, nausea medication as needed.

## 2024-12-21 NOTE — ASSESSMENT & PLAN NOTE
CT: right ovarian cyst since 12/13/24, now measuring 4.9 cm by 4.2 cm and containing some hyperdense material suggestive of hemorrhagic components.  Patient does have history of ovarian cyst in the past though does not routinely follow with gynecology.  Gynecology team recommending follow-up imaging in 6 to 12 weeks with establishing with regular gynecology on discharge    Plan:  -Outpatient follow-up as above

## 2024-12-21 NOTE — ASSESSMENT & PLAN NOTE
Patient reports he drinks 3 glasses of wine for the past few years.  However, since her last hospital discharge on 11/29 she has not had any alcohol.  Has history of withdrawal seizure in 2023 for which she was admitted.  Previously on Librium taper, now completed; patient also off CIWA protocol  CRS is provide patient with available resources    Plan  -CRS recommendations appreciated  -Encourage cessation and abstinence from alcohol

## 2024-12-21 NOTE — ASSESSMENT & PLAN NOTE
Previously on lisinopril which has been discontinued for about 6 months or so.  Blood pressure well-controlled.    Plan  -Monitor vital signs

## 2024-12-21 NOTE — PLAN OF CARE
Problem: PAIN - ADULT  Goal: Verbalizes/displays adequate comfort level or baseline comfort level  Description: Interventions:  - Encourage patient to monitor pain and request assistance  - Assess pain using appropriate pain scale  - Administer analgesics based on type and severity of pain and evaluate response  - Implement non-pharmacological measures as appropriate and evaluate response  - Consider cultural and social influences on pain and pain management  - Notify physician/advanced practitioner if interventions unsuccessful or patient reports new pain  Outcome: Progressing     Problem: INFECTION - ADULT  Goal: Absence or prevention of progression during hospitalization  Description: INTERVENTIONS:  - Assess and monitor for signs and symptoms of infection  - Monitor lab/diagnostic results  - Monitor all insertion sites, i.e. indwelling lines, tubes, and drains  - Monitor endotracheal if appropriate and nasal secretions for changes in amount and color  - [place] appropriate cooling/warming therapies per order  - Administer medications as ordered  - Instruct and encourage patient and family to use good hand hygiene technique  - Identify and instruct in appropriate isolation precautions for identified infection/condition  Outcome: Progressing     Problem: SAFETY ADULT  Goal: Patient will remain free of falls  Description: INTERVENTIONS:  - Educate patient/family on patient safety including physical limitations  - Instruct patient to call for assistance with activity   - Consult OT/PT to assist with strengthening/mobility   - Keep Call bell within reach  - Keep bed low and locked with side rails adjusted as appropriate  - Keep care items and personal belongings within reach  - Initiate and maintain comfort rounds  - Make Fall Risk Sign visible to staff  Problem: DISCHARGE PLANNING  Goal: Discharge to home or other facility with appropriate resources  Description: INTERVENTIONS:  - Identify barriers to discharge  w/patient and caregiver  - Arrange for needed discharge resources and transportation as appropriate  - Identify discharge learning needs (meds, wound care, etc.)  - Arrange for interpretive services to assist at discharge as needed  - Refer to Case Management Department for coordinating discharge planning if the patient needs post-hospital services based on physician/advanced practitioner order or complex needs related to functional status, cognitive ability, or social support system  Outcome: Progressing     Problem: Knowledge Deficit  Goal: Patient/family/caregiver demonstrates understanding of disease process, treatment plan, medications, and discharge instructions  Description: Complete learning assessment and assess knowledge base.  Interventions:  - Provide teaching at level of understanding  - Provide teaching via preferred learning methods  Outcome: Progressing     Problem: Nutrition/Hydration-ADULT  Goal: Nutrient/Hydration intake appropriate for improving, restoring or maintaining nutritional needs  Description: Monitor and assess patient's nutrition/hydration status for malnutrition. Collaborate with interdisciplinary team and initiate plan and interventions as ordered.  Monitor patient's weight and dietary intake as ordered or per policy. Utilize nutrition screening tool and intervene as necessary. Determine patient's food preferences and provide high-protein, high-caloric foods as appropriate.     INTERVENTIONS:  - Monitor oral intake, urinary output, labs, and treatment plans  - Assess nutrition and hydration status and recommend course of action  - Evaluate amount of meals eaten  - Assist patient with eating if necessary   - Allow adequate time for meals  - Recommend/ encourage appropriate diets, oral nutritional supplements, and vitamin/mineral supplements  - Order, calculate, and assess calorie counts as needed  - Recommend, monitor, and adjust tube feedings and TPN/PPN based on assessed needs  -  Assess need for intravenous fluids  - Provide specific nutrition/hydration education as appropriate  - Include patient/family/caregiver in decisions related to nutrition  Outcome: Progressing     Problem: GASTROINTESTINAL - ADULT  Goal: Minimal or absence of nausea and/or vomiting  Description: INTERVENTIONS:  - Administer IV fluids if ordered to ensure adequate hydration  - Maintain NPO status until nausea and vomiting are resolved  - Nasogastric tube if ordered  - Administer ordered antiemetic medications as needed  - Provide nonpharmacologic comfort measures as appropriate  - Advance diet as tolerated, if ordered  - Consider nutrition services referral to assist patient with adequate nutrition and appropriate food choices  Outcome: Progressing     Problem: GASTROINTESTINAL - ADULT  Goal: Maintains or returns to baseline bowel function  Description: INTERVENTIONS:  - Assess bowel function  - Encourage oral fluids to ensure adequate hydration  - Administer IV fluids if ordered to ensure adequate hydration  - Administer ordered medications as needed  - Encourage mobilization and activity  - Consider nutritional services referral to assist patient with adequate nutrition and appropriate food choices  Outcome: Progressing     Problem: GASTROINTESTINAL - ADULT  Goal: Maintains adequate nutritional intake  Description: INTERVENTIONS:  - Monitor percentage of each meal consumed  - Identify factors contributing to decreased intake, treat as appropriate  - Assist with meals as needed  - Monitor I&O, weight, and lab values if indicated  - Obtain nutrition services referral as needed  Outcome: Progressing     Problem: METABOLIC, FLUID AND ELECTROLYTES - ADULT  Goal: Electrolytes maintained within normal limits  Description: INTERVENTIONS:  - Monitor labs and assess patient for signs and symptoms of electrolyte imbalances  - Administer electrolyte replacement as ordered  - Monitor response to electrolyte replacements,  including repeat lab results as appropriate  - Instruct patient on fluid and nutrition as appropriate  Outcome: Progressing     Problem: METABOLIC, FLUID AND ELECTROLYTES - ADULT  Goal: Fluid balance maintained  Description: INTERVENTIONS:  - Monitor labs   - Monitor I/O and WT  - Instruct patient on fluid and nutrition as appropriate  - Assess for signs & symptoms of volume excess or deficit  Outcome: Progressing     - Apply yellow socks and bracelet for high fall risk patients  - Consider moving patient to room near nurses station  Outcome: Progressing

## 2024-12-21 NOTE — ASSESSMENT & PLAN NOTE
Smokes a pack a day for past 5 to 6 years.  On nicotine patch    Plan  Smoking cessation education

## 2024-12-21 NOTE — PROGRESS NOTES
"Pt crying/screaming in room with complaints of pain; requesting more for pain; pt recently given oxycodone prn as per orders and did have an episode of emesis; per the pt \"I threw up my oxy\"; per millie delgado who saw the emesis, he did not visualize a pill, just undigested food; pt yelling \"you need to give me something for pain\"; educated pt nursing is not allowed to order pain meds and that the physician has been notified; dr pascal aware; orders rec'd   "

## 2024-12-21 NOTE — PLAN OF CARE
Problem: PAIN - ADULT  Goal: Verbalizes/displays adequate comfort level or baseline comfort level  Description: Interventions:  - Encourage patient to monitor pain and request assistance  - Assess pain using appropriate pain scale  - Administer analgesics based on type and severity of pain and evaluate response  - Implement non-pharmacological measures as appropriate and evaluate response  - Consider cultural and social influences on pain and pain management  - Notify physician/advanced practitioner if interventions unsuccessful or patient reports new pain  Outcome: Progressing     Problem: INFECTION - ADULT  Goal: Absence or prevention of progression during hospitalization  Description: INTERVENTIONS:  - Assess and monitor for signs and symptoms of infection  - Monitor lab/diagnostic results  - Monitor all insertion sites, i.e. indwelling lines, tubes, and drains  - Monitor endotracheal if appropriate and nasal secretions for changes in amount and color  - [place] appropriate cooling/warming therapies per order  - Administer medications as ordered  - Instruct and encourage patient and family to use good hand hygiene technique  - Identify and instruct in appropriate isolation precautions for identified infection/condition  Outcome: Progressing  Goal: Absence of fever/infection during neutropenic period  Description: INTERVENTIONS:  - Monitor WBC    Outcome: Progressing     Problem: SAFETY ADULT  Goal: Patient will remain free of falls  Description: INTERVENTIONS:  - Educate patient/family on patient safety including physical limitations  - Instruct patient to call for assistance with activity   - Consult OT/PT to assist with strengthening/mobility   - Keep Call bell within reach  - Keep bed low and locked with side rails adjusted as appropriate  - Keep care items and personal belongings within reach  - Initiate and maintain comfort rounds  - Make Fall Risk Sign visible to staff  - Apply yellow socks and bracelet  for high fall risk patients  - Consider moving patient to room near nurses station  Outcome: Progressing  Goal: Maintain or return to baseline ADL function  Description: INTERVENTIONS:  -  Assess patient's ability to carry out ADLs; assess patient's baseline for ADL function and identify physical deficits which impact ability to perform ADLs (bathing, care of mouth/teeth, toileting, grooming, dressing, etc.)  - Assess/evaluate cause of self-care deficits   - Assess range of motion  - Assess patient's mobility; develop plan if impaired  - Assess patient's need for assistive devices and provide as appropriate  - Encourage maximum independence but intervene and supervise when necessary  - Involve family in performance of ADLs  - Assess for home care needs following discharge   - Consider OT consult to assist with ADL evaluation and planning for discharge  - Provide patient education as appropriate  Outcome: Progressing  Goal: Maintains/Returns to pre admission functional level  Description: INTERVENTIONS:  - Perform AM-PAC 6 Click Basic Mobility/ Daily Activity assessment daily.  - Set and communicate daily mobility goal to care team and patient/family/caregiver.   - Collaborate with rehabilitation services on mobility goals if consulted  - Out of bed for toileting  - Record patient progress and toleration of activity level   Outcome: Progressing     Problem: DISCHARGE PLANNING  Goal: Discharge to home or other facility with appropriate resources  Description: INTERVENTIONS:  - Identify barriers to discharge w/patient and caregiver  - Arrange for needed discharge resources and transportation as appropriate  - Identify discharge learning needs (meds, wound care, etc.)  - Arrange for interpretive services to assist at discharge as needed  - Refer to Case Management Department for coordinating discharge planning if the patient needs post-hospital services based on physician/advanced practitioner order or complex needs  related to functional status, cognitive ability, or social support system  Outcome: Progressing     Problem: Knowledge Deficit  Goal: Patient/family/caregiver demonstrates understanding of disease process, treatment plan, medications, and discharge instructions  Description: Complete learning assessment and assess knowledge base.  Interventions:  - Provide teaching at level of understanding  - Provide teaching via preferred learning methods  Outcome: Progressing     Problem: Nutrition/Hydration-ADULT  Goal: Nutrient/Hydration intake appropriate for improving, restoring or maintaining nutritional needs  Description: Monitor and assess patient's nutrition/hydration status for malnutrition. Collaborate with interdisciplinary team and initiate plan and interventions as ordered.  Monitor patient's weight and dietary intake as ordered or per policy. Utilize nutrition screening tool and intervene as necessary. Determine patient's food preferences and provide high-protein, high-caloric foods as appropriate.     INTERVENTIONS:  - Monitor oral intake, urinary output, labs, and treatment plans  - Assess nutrition and hydration status and recommend course of action  - Evaluate amount of meals eaten  - Assist patient with eating if necessary   - Allow adequate time for meals  - Recommend/ encourage appropriate diets, oral nutritional supplements, and vitamin/mineral supplements  - Order, calculate, and assess calorie counts as needed  - Recommend, monitor, and adjust tube feedings and TPN/PPN based on assessed needs  - Assess need for intravenous fluids  - Provide specific nutrition/hydration education as appropriate  - Include patient/family/caregiver in decisions related to nutrition  Outcome: Progressing

## 2024-12-21 NOTE — ASSESSMENT & PLAN NOTE
Patients platelets have been downtrending since admission. On admission 12/13/24 Plts were 369.  Platelets have since fallen into the mid 100s.  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  Presenting platelet level felt to be more representative of an acute phase reaction with current platelet level more consistent with patient's prior baseline.  No signs of bleeding or bruising.    Plan  -Continue to monitor

## 2024-12-21 NOTE — PROGRESS NOTES
INTERNAL MEDICINE RESIDENCY PROGRESS NOTE     Name: Kaykay Holland   Age & Sex: 34 y.o. female   MRN: [id number]  Unit/Bed#: [facility] 812-01   Encounter: [id number]  Team: SOD Team C     PATIENT INFORMATION     Name: Kaykay Holland   Age & Sex: 34 y.o. female   MRN: [id number]  Hospital Stay Days: 6    ASSESSMENT/PLAN     Principal Problem:    Acute alcoholic pancreatitis  Active Problems:    Alcohol withdrawal seizure (HCC)    Essential hypertension    Tobacco abuse    Anxiety    Gastroesophageal reflux disease without esophagitis    Thrombocytopenia (HCC)    Ovarian cyst      * Acute alcoholic pancreatitis  Assessment & Plan  Patient initially presented to [facility] with complaints of nausea and left-sided abdominal pain radiating to back. Has history of recurrent alcoholic pancreatitis.  Recent hospitalization (11/23 - 11/29) during which time was managed conservatively.  CT with findings of recurrent pericarditis and large pancreatic pseudocyst.  General surgery consulted at [facility].  Recommended conservative management.  GI consulted at [facility]. Recommended transfer to [facility] for cyst gastrostomy and necrosectomy with advance GI team; now status post cystogastrostomy  Main collection appears to be improving on follow-up imaging though numerous persistent collections remain present    Plan:  Patient currently tolerating GI low res diet   GI consulted.  Appreciate recommendation.   Plan for repeat CT in 3 weeks then repeat EGD for stent removal/Necrosectomy in 4 weeks  Pain regimen downgraded to oxycodone 5 mg PO Q4H PRN mod pain, oxycodone 10 mg PO Q4H PRN for severe/breakthrough pain; continue for now  Bowel regimen in place, monitor stool output  IV hydration  ml/hr, nausea medication as needed.    Alcohol withdrawal seizure (HCC)  Assessment & Plan  Patient reports he drinks 3 glasses of wine for the past few years.  However, since her last hospital discharge on 11/29 she has not had any  alcohol.  Has history of withdrawal seizure in 2023 for which she was admitted.  Previously on Librium taper, now completed; patient also off CIWA protocol  CRS is provide patient with available resources    Plan  -CRS recommendations appreciated  -Encourage cessation and abstinence from alcohol    Ovarian cyst  Assessment & Plan  CT: right ovarian cyst since 12/13/24, now measuring 4.9 cm by 4.2 cm and containing some hyperdense material suggestive of hemorrhagic components.  Patient does have history of ovarian cyst in the past though does not routinely follow with gynecology.  Gynecology team recommending follow-up imaging in 6 to 12 weeks with establishing with regular gynecology on discharge    Plan:  -Outpatient follow-up as above    Thrombocytopenia (HCC)  Assessment & Plan  Patients platelets have been downtrending since admission. On admission 12/13/24 Plts were 369.  Platelets have since fallen into the mid 100s.  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  Presenting platelet level felt to be more representative of an acute phase reaction with current platelet level more consistent with patient's prior baseline.  No signs of bleeding or bruising.    Plan  -Continue to monitor    Gastroesophageal reflux disease without esophagitis  Assessment & Plan  Reported history of GERD on pantoprazole 40 mg twice daily at home.    Plan  Continue Protonix 40 mg IV every 12 hours.  Switch when appropriate.    Anxiety  Assessment & Plan  Patient has a known history of anxiety that is not treated. Patient has had increase in her anxiety while in the hospital secondary to current events.  Has since been started on Cymbalta this admission for assistance with both mood and pain control.    Plan:   Continue duloxetine 30 mg daily, can be continued on discharge  Continue Ativan as needed  Encourage patient to  follow up with PCP after discharge    Tobacco abuse  Assessment & Plan  Smokes a pack a day for past 5 to 6 years.  On nicotine patch    Plan  Smoking cessation education    Essential hypertension  Assessment & Plan  Previously on lisinopril which has been discontinued for about 6 months or so.  Blood pressure well-controlled.    Plan  -Monitor vital signs    Constipation-resolved as of 2024  Assessment & Plan  Patient usually had two bowel movements daily. Patient hasn't had bowel movement in greater than seven days. Received Mag-Citrate ()    Plan:   Miralax 17 g BID  Dulcolax 5 mg BID   Senna HS PRN        Disposition: Inpatient pending further care    SUBJECTIVE     Patient seen and examined. No acute events overnight.  This morning, patient notes interval improvement in her symptomatology.  She does still have pain but this is more controlled on her current pain regimen.  She has been moving her bowels more regularly over the course of the last day.  She otherwise denies any new symptoms at this time.    OBJECTIVE     Vitals:    24 1543 24 2249 24 0600 24 0754   BP: 123/82 123/82  123/80   BP Location:       Pulse:  76  77   Resp: 16 16  17   Temp: 98.3 °F (36.8 °C) 98.2 °F (36.8 °C)  98 °F (36.7 °C)   TempSrc:    Oral   SpO2:  98%  98%   Weight:   53.9 kg (118 lb 13.3 oz)    Height:          Temperature:   Temp (24hrs), Av.2 °F (36.8 °C), Min:98 °F (36.7 °C), Max:98.3 °F (36.8 °C)    Temperature: 98 °F (36.7 °C)  Intake & Output:  I/O          0701   0700  0701   0700  07 0700    P.O.       I.V. (mL/kg) 2421.7 (39.5) 2443.3 (45.3)     Total Intake(mL/kg) 2421.7 (39.5) 2443.3 (45.3)     Urine (mL/kg/hr) 300 (0.2)      Total Output 300      Net +2121.7 +2443.3            Unmeasured Urine Occurrence 1 x 4 x     Unmeasured Stool Occurrence 2 x            Weights:   IBW (Ideal Body Weight): 57 kg    Body mass index is 19.77 kg/m².  Weight  (last 2 days)       Date/Time Weight    12/21/24 0600 53.9 (118.83)    12/20/24 0923 54.3 (119.8)          Physical Exam  Vitals and nursing note reviewed.   Constitutional:       General: She is not in acute distress.     Appearance: Normal appearance. She is not ill-appearing.   HENT:      Head: Normocephalic and atraumatic.      Right Ear: External ear normal.      Left Ear: External ear normal.      Nose: Nose normal.      Mouth/Throat:      Mouth: Mucous membranes are moist.      Pharynx: Oropharynx is clear.   Eyes:      Extraocular Movements: Extraocular movements intact.      Conjunctiva/sclera: Conjunctivae normal.      Pupils: Pupils are equal, round, and reactive to light.   Cardiovascular:      Rate and Rhythm: Normal rate and regular rhythm.      Heart sounds: Normal heart sounds. No murmur heard.  Pulmonary:      Breath sounds: Normal breath sounds.   Abdominal:      General: Abdomen is flat. Bowel sounds are normal. There is no distension.      Palpations: Abdomen is soft.      Tenderness: There is abdominal tenderness. There is guarding. There is no rebound.   Musculoskeletal:      Right lower leg: No edema.      Left lower leg: No edema.   Skin:     General: Skin is warm and dry.      Capillary Refill: Capillary refill takes less than 2 seconds.   Neurological:      Mental Status: She is alert. Mental status is at baseline.   Psychiatric:         Mood and Affect: Mood normal.         Behavior: Behavior normal.       LABORATORY DATA     Labs: I have personally reviewed pertinent reports.  Results from last 7 days   Lab Units 12/21/24  0528 12/20/24  0041 12/19/24  0632 12/16/24  0552 12/15/24  0236   WBC Thousand/uL 7.68 8.94 8.91   < > 7.83   HEMOGLOBIN g/dL 11.0* 11.5 11.5   < > 10.5*   HEMATOCRIT % 34.2* 34.5* 34.1*   < > 33.1*   PLATELETS Thousands/uL 144* 162 142*   < > 224   SEGS PCT % 59  --   --   --  67   MONO PCT % 11  --   --   --  7   EOS PCT % 5  --   --   --  5    < > = values in this  interval not displayed.      Results from last 7 days   Lab Units 12/21/24  0528 12/20/24  0041 12/19/24  0632   POTASSIUM mmol/L 4.0 3.7 3.7   CHLORIDE mmol/L 102 99 99   CO2 mmol/L 28 29 30   BUN mg/dL 5 4* 2*   CREATININE mg/dL 0.40* 0.46* 0.34*   CALCIUM mg/dL 8.1* 8.5 8.3*                              IMAGING & DIAGNOSTIC TESTING     Radiology Results: I have personally reviewed pertinent reports.  US pelvis complete w transvaginal  Result Date: 12/20/2024  Impression: 1.  4.7 cm right ovarian hemorrhagic cyst. Follow-up ultrasound is recommended in 6-12 weeks to ensure resolution. 2.  5.4 cm simple right ovarian cyst. If the cyst persists on the short interval follow-up, then subsequent annual ultrasound would be recommended. The study was marked in EPIC for immediate notification. Workstation performed: [id number]     CT abdomen pelvis w contrast  Result Date: 12/19/2024  Impression: 1) 9.1 x 6.5 x 2.9 cm fluid collection with well-defined wall and non-simple fluid contents along the greater curvature of the proximal stomach, decreased from 13.4 x 9.9 x 9.4 cm on 12/13/2024 following cystogastrostomy creation. 2) Two additional, smaller peripancreatic fluid collections also with well-defined walls, as detailed above, the larger of the 2 also mildly decreased in size, and the smaller overall unchanged. 3) Multiple additional hypodense lesions or fluid collections in the pancreatic parenchyma without significant interval change since 12/13/2024, with several of them present on the initial CT from September 2024. 4) Several foci of air in the largest fluid collection related to the stent. No foci of air in the pancreatic parenchyma, or in the peripancreatic space and peripancreatic fluid collections to suggest superimposed infection. 5) No new fluid collections are seen. 6) Interval enlargement of a right ovarian cyst since 12/13/2024, now measuring 4.9 x 4.2 cm, and containing some layering hyperdense material  suggestive of hemorrhagic components. 7) Small amount of simple density ascites, mostly in the pelvis dependently, increased from 12/13/2024. 8) Stool throughout most of the colon, suggesting constipation. No bowel obstruction or convincing inflammation. 9) Small bilateral pleural effusions and lower lobe atelectasis, new from 12/13/2024. 10) Additional findings as above. Workstation performed: [id number]     XR abdomen obstruction series  Result Date: 12/19/2024  Impression: 1.  Normal stool burden without evidence for obstruction or ileus or other acute abdominal findings. 2.  Axios pseudocyst drainage stent projects over the left upper quadrant. 3.  Subsegmental platelike atelectasis at the lung bases. Resident: Raj Brewer I, the attending radiologist, have reviewed the images and agree with the final report above. Workstation performed: [id number]     Endoscopic ultrasonography, GI (Upper) Linear with Axios stent placement  Result Date: 12/17/2024  Impression: External mass effect in gastric body from pancreatic cyst. One round, homogeneous and unilocular cyst measuring 11 cm x 10 cm with debris present, well-defined and smooth margins in the peripancreatic area. Therapeutic drainage performed using lumen apposing metal stent measuring 10 mm x 15 mm. Balloon dilation performed after stent placement with removal of sanguinous fluid. 7 Fr x 3 cm straight plastic stent was placed through the lumen of the prior stent.  The aspirate was blood-tinged however no active bleeding was seen.  Clots were seen inside the cyst cavity as well. RECOMMENDATION: Return pt to the floor Clear liquid today Start low residue diet tomorrow and continue this outpatient Avoid alcohol CT scan 3 weeks prior to the next endoscopy. Plan for repeat EGD in 3-4 weeks for necrosectomy    Venancio Jaime MD     Other Diagnostic Testing: I have personally reviewed pertinent reports.    ACTIVE MEDICATIONS       Current Facility-Administered  "Medications:     DULoxetine (CYMBALTA) delayed release capsule 30 mg, Daily    enoxaparin (LOVENOX) subcutaneous injection 40 mg, Q24H RODRICK    folic acid (FOLVITE) tablet 1 mg, Daily    lactated ringers infusion, Continuous, Last Rate: 100 mL/hr (12/21/24 0836)    LORazepam (ATIVAN) injection 0.5 mg, BID PRN    multivitamin-minerals (CENTRUM) tablet 1 tablet, Daily    nicotine (NICODERM CQ) 21 mg/24 hr TD 24 hr patch 1 patch, Daily    ondansetron (ZOFRAN) injection 4 mg, Q4H PRN    oxyCODONE (ROXICODONE) immediate release tablet 10 mg, Q4H PRN    oxyCODONE (ROXICODONE) IR tablet 5 mg, Q4H PRN    pantoprazole (PROTONIX) injection 40 mg, Q12H RODRICK    polyethylene glycol (MIRALAX) packet 17 g, BID    senna (SENOKOT) tablet 8.6 mg, HS PRN    thiamine tablet 100 mg, Daily    trimethobenzamide (TIGAN) IM injection 200 mg, Q6H PRN    VTE Pharmacologic Prophylaxis: Enoxaparin (Lovenox)  VTE Mechanical Prophylaxis: sequential compression device    Portions of the record may have been created with voice recognition software.  Occasional wrong word or \"sound a like\" substitutions may have occurred due to the inherent limitations of voice recognition software.  Read the chart carefully and recognize, using context, where substitutions have occurred.  ==  Neymar Stahl MD  [facility]  Internal Medicine Residency PGY-2      "

## 2024-12-21 NOTE — ASSESSMENT & PLAN NOTE
Reported history of GERD on pantoprazole 40 mg twice daily at home.    Plan  Continue Protonix 40 mg IV every 12 hours.  Switch when appropriate.

## 2024-12-22 ENCOUNTER — APPOINTMENT (INPATIENT)
Dept: RADIOLOGY | Facility: HOSPITAL | Age: 34
DRG: 438 | End: 2024-12-22
Payer: COMMERCIAL

## 2024-12-22 PROBLEM — D72.829 LEUKOCYTOSIS: Status: ACTIVE | Noted: 2024-12-22

## 2024-12-22 LAB
ALBUMIN SERPL BCG-MCNC: 2.5 G/DL (ref 3.5–5)
ALP SERPL-CCNC: 62 U/L (ref 34–104)
ALT SERPL W P-5'-P-CCNC: 5 U/L (ref 7–52)
ANION GAP SERPL CALCULATED.3IONS-SCNC: 9 MMOL/L (ref 4–13)
AST SERPL W P-5'-P-CCNC: 18 U/L (ref 13–39)
BASOPHILS # BLD AUTO: 0.02 THOUSANDS/ÂΜL (ref 0–0.1)
BASOPHILS NFR BLD AUTO: 0 % (ref 0–1)
BILIRUB SERPL-MCNC: 0.59 MG/DL (ref 0.2–1)
BUN SERPL-MCNC: 5 MG/DL (ref 5–25)
CALCIUM ALBUM COR SERPL-MCNC: 9.4 MG/DL (ref 8.3–10.1)
CALCIUM SERPL-MCNC: 8.2 MG/DL (ref 8.4–10.2)
CHLORIDE SERPL-SCNC: 96 MMOL/L (ref 96–108)
CO2 SERPL-SCNC: 25 MMOL/L (ref 21–32)
CREAT SERPL-MCNC: 0.36 MG/DL (ref 0.6–1.3)
EOSINOPHIL # BLD AUTO: 0.09 THOUSAND/ÂΜL (ref 0–0.61)
EOSINOPHIL NFR BLD AUTO: 1 % (ref 0–6)
ERYTHROCYTE [DISTWIDTH] IN BLOOD BY AUTOMATED COUNT: 11.5 % (ref 11.6–15.1)
GFR SERPL CREATININE-BSD FRML MDRD: 142 ML/MIN/1.73SQ M
GLUCOSE SERPL-MCNC: 104 MG/DL (ref 65–140)
GLUCOSE SERPL-MCNC: 109 MG/DL (ref 65–140)
GLUCOSE SERPL-MCNC: 72 MG/DL (ref 65–140)
GLUCOSE SERPL-MCNC: 85 MG/DL (ref 65–140)
HCT VFR BLD AUTO: 32.7 % (ref 34.8–46.1)
HGB BLD-MCNC: 11 G/DL (ref 11.5–15.4)
IMM GRANULOCYTES # BLD AUTO: 0.06 THOUSAND/UL (ref 0–0.2)
IMM GRANULOCYTES NFR BLD AUTO: 1 % (ref 0–2)
LACTATE SERPL-SCNC: 1.4 MMOL/L (ref 0.5–2)
LACTATE SERPL-SCNC: 3 MMOL/L (ref 0.5–2)
LYMPHOCYTES # BLD AUTO: 0.97 THOUSANDS/ÂΜL (ref 0.6–4.47)
LYMPHOCYTES NFR BLD AUTO: 8 % (ref 14–44)
MCH RBC QN AUTO: 34.3 PG (ref 26.8–34.3)
MCHC RBC AUTO-ENTMCNC: 33.6 G/DL (ref 31.4–37.4)
MCV RBC AUTO: 102 FL (ref 82–98)
MONOCYTES # BLD AUTO: 1.84 THOUSAND/ÂΜL (ref 0.17–1.22)
MONOCYTES NFR BLD AUTO: 15 % (ref 4–12)
NEUTROPHILS # BLD AUTO: 9.47 THOUSANDS/ÂΜL (ref 1.85–7.62)
NEUTS SEG NFR BLD AUTO: 75 % (ref 43–75)
NRBC BLD AUTO-RTO: 0 /100 WBCS
PLATELET # BLD AUTO: 166 THOUSANDS/UL (ref 149–390)
PMV BLD AUTO: 12.4 FL (ref 8.9–12.7)
POTASSIUM SERPL-SCNC: 4.1 MMOL/L (ref 3.5–5.3)
PROT SERPL-MCNC: 4.9 G/DL (ref 6.4–8.4)
RBC # BLD AUTO: 3.21 MILLION/UL (ref 3.81–5.12)
SODIUM SERPL-SCNC: 130 MMOL/L (ref 135–147)
WBC # BLD AUTO: 12.45 THOUSAND/UL (ref 4.31–10.16)

## 2024-12-22 PROCEDURE — 99232 SBSQ HOSP IP/OBS MODERATE 35: CPT | Performed by: INTERNAL MEDICINE

## 2024-12-22 PROCEDURE — 85025 COMPLETE CBC W/AUTO DIFF WBC: CPT

## 2024-12-22 PROCEDURE — 80053 COMPREHEN METABOLIC PANEL: CPT

## 2024-12-22 PROCEDURE — 87040 BLOOD CULTURE FOR BACTERIA: CPT

## 2024-12-22 PROCEDURE — 82948 REAGENT STRIP/BLOOD GLUCOSE: CPT

## 2024-12-22 PROCEDURE — 74177 CT ABD & PELVIS W/CONTRAST: CPT

## 2024-12-22 PROCEDURE — 83605 ASSAY OF LACTIC ACID: CPT

## 2024-12-22 RX ORDER — SODIUM CHLORIDE, SODIUM GLUCONATE, SODIUM ACETATE, POTASSIUM CHLORIDE, MAGNESIUM CHLORIDE, SODIUM PHOSPHATE, DIBASIC, AND POTASSIUM PHOSPHATE .53; .5; .37; .037; .03; .012; .00082 G/100ML; G/100ML; G/100ML; G/100ML; G/100ML; G/100ML; G/100ML
100 INJECTION, SOLUTION INTRAVENOUS CONTINUOUS
Status: DISCONTINUED | OUTPATIENT
Start: 2024-12-22 | End: 2024-12-24 | Stop reason: HOSPADM

## 2024-12-22 RX ORDER — METRONIDAZOLE 500 MG/100ML
500 INJECTION, SOLUTION INTRAVENOUS EVERY 12 HOURS
Status: DISCONTINUED | OUTPATIENT
Start: 2024-12-23 | End: 2024-12-24

## 2024-12-22 RX ORDER — METRONIDAZOLE 500 MG/100ML
500 INJECTION, SOLUTION INTRAVENOUS EVERY 8 HOURS
Status: DISCONTINUED | OUTPATIENT
Start: 2024-12-22 | End: 2024-12-22

## 2024-12-22 RX ADMIN — ONDANSETRON 4 MG: 2 INJECTION INTRAMUSCULAR; INTRAVENOUS at 08:37

## 2024-12-22 RX ADMIN — OXYCODONE HYDROCHLORIDE 10 MG: 10 TABLET ORAL at 03:20

## 2024-12-22 RX ADMIN — OXYCODONE HYDROCHLORIDE 10 MG: 10 TABLET ORAL at 20:32

## 2024-12-22 RX ADMIN — OXYCODONE HYDROCHLORIDE 10 MG: 10 TABLET ORAL at 13:05

## 2024-12-22 RX ADMIN — METRONIDAZOLE 500 MG: 500 INJECTION, SOLUTION INTRAVENOUS at 16:04

## 2024-12-22 RX ADMIN — ONDANSETRON 4 MG: 2 INJECTION INTRAMUSCULAR; INTRAVENOUS at 15:01

## 2024-12-22 RX ADMIN — ENOXAPARIN SODIUM 40 MG: 40 INJECTION SUBCUTANEOUS at 08:37

## 2024-12-22 RX ADMIN — THIAMINE HCL TAB 100 MG 100 MG: 100 TAB at 08:37

## 2024-12-22 RX ADMIN — TRIMETHOBENZAMIDE HYDROCHLORIDE 200 MG: 100 INJECTION INTRAMUSCULAR at 01:34

## 2024-12-22 RX ADMIN — SODIUM CHLORIDE, SODIUM GLUCONATE, SODIUM ACETATE, POTASSIUM CHLORIDE, MAGNESIUM CHLORIDE, SODIUM PHOSPHATE, DIBASIC, AND POTASSIUM PHOSPHATE 100 ML/HR: .53; .5; .37; .037; .03; .012; .00082 INJECTION, SOLUTION INTRAVENOUS at 15:55

## 2024-12-22 RX ADMIN — DULOXETINE HYDROCHLORIDE 30 MG: 30 CAPSULE, DELAYED RELEASE ORAL at 08:37

## 2024-12-22 RX ADMIN — OXYCODONE HYDROCHLORIDE 10 MG: 10 TABLET ORAL at 08:37

## 2024-12-22 RX ADMIN — LORAZEPAM 0.5 MG: 2 INJECTION INTRAMUSCULAR; INTRAVENOUS at 20:38

## 2024-12-22 RX ADMIN — PANTOPRAZOLE SODIUM 40 MG: 40 INJECTION, POWDER, FOR SOLUTION INTRAVENOUS at 20:38

## 2024-12-22 RX ADMIN — IOHEXOL 85 ML: 350 INJECTION, SOLUTION INTRAVENOUS at 08:05

## 2024-12-22 RX ADMIN — PANTOPRAZOLE SODIUM 40 MG: 40 INJECTION, POWDER, FOR SOLUTION INTRAVENOUS at 08:37

## 2024-12-22 RX ADMIN — LORAZEPAM 0.5 MG: 2 INJECTION INTRAMUSCULAR; INTRAVENOUS at 15:11

## 2024-12-22 RX ADMIN — FOLIC ACID 1 MG: 1 TABLET ORAL at 08:37

## 2024-12-22 RX ADMIN — NICOTINE 1 PATCH: 21 PATCH, EXTENDED RELEASE TRANSDERMAL at 08:37

## 2024-12-22 RX ADMIN — CEFTRIAXONE SODIUM 1000 MG: 10 INJECTION, POWDER, FOR SOLUTION INTRAVENOUS at 15:59

## 2024-12-22 RX ADMIN — Medication 1 TABLET: at 08:37

## 2024-12-22 NOTE — QUICK NOTE
Evaluated patient bedside after being notified by RN that patient was screaming/crying in room with uncontrolled abdominal pain. Patient was very tearful and stated that her pain was controlled earlier in the day but she ate lunch, took a nap, and when she woke up she developed severe diffuse abdominal pain with radiation to her back. 10/10 in nature. Patient with significant hesitation and voluntary guarding on exam but notably abdomen was soft when palpated in all quadrants albeit with tenderness. No back tenderness. Exam otherwise unremarkable. Patient had received 10mg oxycodone 1654 per chart but she reports she vomited approximately 2 minutes after taking it making absorption unclear. No IV prn ordered as patient had been tolerating PO pain regimen. Patient given IV dilaudid x1 0.5mg. Will assess for pain improvement and further prn IV pain medication to be ordered upon clinical course.     Marcia Gibbons DO  [facility]  Internal Medicine Residency, PGY-2   SOD Medicine

## 2024-12-22 NOTE — SEPSIS NOTE
"  Sepsis Note   Kaykay Holland 34 y.o. female MRN: [id number]  Unit/Bed#: [facility] 812-01 Encounter: [id number]    Reached out GI team regarding the CT findings below:    \"1. Persistent pancreatitis with peripancreatic fluid collections, some of which have increased in size. Decreased size of fluid collection along the greater curvature of the stomach status post cystogastrostomy stenting. No new fluid collections. No   evidence of superimposed infection.   2. Right ovarian cyst measuring up to 5.1 cm which is better evaluated on ultrasound pelvis 12/20/2024.   3. Moderate pelvic ascites.   4. 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  Lactate and blood cultures were drawn.  Lactate came back to be elevated at 3.0.  Elevated lactic acid could be due to infection or LR which has been running since 12/16. LR was discontinued. Spoke with patient in person  regarding the CT findings. Patient endorsed 6-7/10 pain. Denies fever. VSS, afebrile, /67, HR 95, O2 94% on RA. Patient has met SIRS with elevated WBC and HR 95, with elevated lactate, concerning for infection. Patient was started on Rocephin and Flagyl with isolyte 100 cc/hr for now. Please follow up with labs tomorrow a.m. and blood cultures.           Body mass index is 19.77 kg/m².  Wt Readings from Last 1 Encounters:   12/21/24 53.9 kg (118 lb 13.3 oz)        Ideal body weight: 57 kg (125 lb 10.6 oz)    "

## 2024-12-22 NOTE — PLAN OF CARE
Problem: PAIN - ADULT  Goal: Verbalizes/displays adequate comfort level or baseline comfort level  Description: Interventions:  - Encourage patient to monitor pain and request assistance  - Assess pain using appropriate pain scale  - Administer analgesics based on type and severity of pain and evaluate response  - Implement non-pharmacological measures as appropriate and evaluate response  - Consider cultural and social influences on pain and pain management  - Notify physician/advanced practitioner if interventions unsuccessful or patient reports new pain  Outcome: Progressing     Problem: INFECTION - ADULT  Goal: Absence or prevention of progression during hospitalization  Description: INTERVENTIONS:  - Assess and monitor for signs and symptoms of infection  - Monitor lab/diagnostic results  - Monitor all insertion sites, i.e. indwelling lines, tubes, and drains  - Monitor endotracheal if appropriate and nasal secretions for changes in amount and color  - Binger appropriate cooling/warming therapies per order  - Administer medications as ordered  - Instruct and encourage patient and family to use good hand hygiene technique  - Identify and instruct in appropriate isolation precautions for identified infection/condition  Outcome: Progressing     Problem: SAFETY ADULT  Goal: Patient will remain free of falls  Description: INTERVENTIONS:  - Educate patient/family on patient safety including physical limitations  - Instruct patient to call for assistance with activity   - Consult OT/PT to assist with strengthening/mobility   - Keep Call bell within reach  - Keep bed low and locked with side rails adjusted as appropriate  - Keep care items and personal belongings within reach  - Initiate and maintain comfort rounds  - Make Fall Risk Sign visible to staff  - Apply yellow socks and bracelet for high fall risk patients  - Consider moving patient to room near nurses station  Outcome: Progressing     Problem: DISCHARGE  PLANNING  Goal: Discharge to home or other facility with appropriate resources  Description: INTERVENTIONS:  - Identify barriers to discharge w/patient and caregiver  - Arrange for needed discharge resources and transportation as appropriate  - Identify discharge learning needs (meds, wound care, etc.)  - Arrange for interpretive services to assist at discharge as needed  - Refer to Case Management Department for coordinating discharge planning if the patient needs post-hospital services based on physician/advanced practitioner order or complex needs related to functional status, cognitive ability, or social support system  Outcome: Progressing     Problem: Knowledge Deficit  Goal: Patient/family/caregiver demonstrates understanding of disease process, treatment plan, medications, and discharge instructions  Description: Complete learning assessment and assess knowledge base.  Interventions:  - Provide teaching at level of understanding  - Provide teaching via preferred learning methods  Outcome: Progressing     Problem: Nutrition/Hydration-ADULT  Goal: Nutrient/Hydration intake appropriate for improving, restoring or maintaining nutritional needs  Description: Monitor and assess patient's nutrition/hydration status for malnutrition. Collaborate with interdisciplinary team and initiate plan and interventions as ordered.  Monitor patient's weight and dietary intake as ordered or per policy. Utilize nutrition screening tool and intervene as necessary. Determine patient's food preferences and provide high-protein, high-caloric foods as appropriate.     INTERVENTIONS:  - Monitor oral intake, urinary output, labs, and treatment plans  - Assess nutrition and hydration status and recommend course of action  - Evaluate amount of meals eaten  - Assist patient with eating if necessary   - Allow adequate time for meals  - Recommend/ encourage appropriate diets, oral nutritional supplements, and vitamin/mineral supplements  -  Order, calculate, and assess calorie counts as needed  - Recommend, monitor, and adjust tube feedings and TPN/PPN based on assessed needs  - Assess need for intravenous fluids  - Provide specific nutrition/hydration education as appropriate  - Include patient/family/caregiver in decisions related to nutrition  Outcome: Progressing     Problem: GASTROINTESTINAL - ADULT  Goal: Minimal or absence of nausea and/or vomiting  Description: INTERVENTIONS:  - Administer IV fluids if ordered to ensure adequate hydration  - Maintain NPO status until nausea and vomiting are resolved  - Nasogastric tube if ordered  - Administer ordered antiemetic medications as needed  - Provide nonpharmacologic comfort measures as appropriate  - Advance diet as tolerated, if ordered  - Consider nutrition services referral to assist patient with adequate nutrition and appropriate food choices  Outcome: Progressing  Goal: Maintains or returns to baseline bowel function  Description: INTERVENTIONS:  - Assess bowel function  - Encourage oral fluids to ensure adequate hydration  - Administer IV fluids if ordered to ensure adequate hydration  - Administer ordered medications as needed  - Encourage mobilization and activity  - Consider nutritional services referral to assist patient with adequate nutrition and appropriate food choices  Outcome: Progressing  Goal: Maintains adequate nutritional intake  Description: INTERVENTIONS:  - Monitor percentage of each meal consumed  - Identify factors contributing to decreased intake, treat as appropriate  - Assist with meals as needed  - Monitor I&O, weight, and lab values if indicated  - Obtain nutrition services referral as needed  Outcome: Progressing     Problem: METABOLIC, FLUID AND ELECTROLYTES - ADULT  Goal: Electrolytes maintained within normal limits  Description: INTERVENTIONS:  - Monitor labs and assess patient for signs and symptoms of electrolyte imbalances  - Administer electrolyte replacement as  ordered  - Monitor response to electrolyte replacements, including repeat lab results as appropriate  - Instruct patient on fluid and nutrition as appropriate  Outcome: Progressing  Goal: Fluid balance maintained  Description: INTERVENTIONS:  - Monitor labs   - Monitor I/O and WT  - Instruct patient on fluid and nutrition as appropriate  - Assess for signs & symptoms of volume excess or deficit  Outcome: Progressing

## 2024-12-22 NOTE — PROGRESS NOTES
Progress Note - Internal Medicine   Name: Kaykay Holland 34 y.o. female I MRN: [id number]  Unit/Bed#: [facility] 812-01 I Date of Admission: 12/15/2024   Date of Service: 12/22/2024 I Hospital Day: 7     Assessment & Plan  Acute alcoholic pancreatitis  Patient initially presented to [facility] with complaints of nausea and left-sided abdominal pain radiating to back. Has history of recurrent alcoholic pancreatitis.  Recent hospitalization (11/23 - 11/29) during which time was managed conservatively.  CT with findings of recurrent pericarditis and large pancreatic pseudocyst.  General surgery consulted at [facility].  Recommended conservative management.  GI consulted at [facility]. Recommended transfer to [facility] for cyst gastrostomy and necrosectomy with advance GI team; now status post cystogastrostomy  Main collection appears to be improving on follow-up imaging though numerous persistent collections remain present  Patient required 0.5 dilaudid x 2 overnight as well as enema    Plan:  Patient currently tolerating GI low res diet   GI consulted.  Appreciate recommendation.   Plan for repeat CT in 3 weeks then repeat EGD for stent removal/Necrosectomy in 4 weeks  Pain regimen downgraded to oxycodone 5 mg PO Q4H PRN mod pain, oxycodone 10 mg PO Q4H PRN for severe/breakthrough pain; continue for now  Bowel regimen in place, monitor stool output.   IV hydration  ml/hr, nausea medication as needed.  Increase in pain overnight on 12/21.  CT scan of the abdomen was ordered.  Pending results  Leukocytosis  Patient has new elevation in WBC 12/22/24 from 7.68 to 12.45. Patient has worsening tenderness to palpation. Patient reports increase in pain requiring IV dilaudid. Patient afebrile.    Plan:   CT abdomen, pending final read  Continue to monitor vital signs   Continue to monitor CBC  Alcohol withdrawal seizure (HCC)  Patient reports he drinks 3 glasses of wine for the past few years.  However, since her last hospital  discharge on 11/29 she has not had any alcohol.  Has history of withdrawal seizure in 2023 for which she was admitted.  Previously on Librium taper, now completed; patient also off CIWA protocol  CRS is provide patient with available resources    Plan  -CRS recommendations appreciated  -Encourage cessation and abstinence from alcohol  Essential hypertension  Previously on lisinopril which has been discontinued for about 6 months or so.  Blood pressure well-controlled.    Plan  -Monitor vital signs  Tobacco abuse  Smokes a pack a day for past 5 to 6 years.  On nicotine patch    Plan  Smoking cessation education  Gastroesophageal reflux disease without esophagitis  Reported history of GERD on pantoprazole 40 mg twice daily at home.  Patient remains nauseous 12/22    Plan  Continue Protonix 40 mg IV every 12 hours.  Switch when appropriate.  Thrombocytopenia (HCC)  Patients platelets have been downtrending since admission. On admission 12/13/24 Plts were 369.  Platelets have since fallen into the mid 100s.  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  Presenting platelet level felt to be more representative of an acute phase reaction with current platelet level more consistent with patient's prior baseline.  No signs of bleeding or bruising.    Plan  -Continue to monitor  Ovarian cyst  CT: right ovarian cyst since 12/13/24, now measuring 4.9 cm by 4.2 cm and containing some hyperdense material suggestive of hemorrhagic components.  Patient does have history of ovarian cyst in the past though does not routinely follow with gynecology.  Gynecology team recommending follow-up imaging in 6 to 12 weeks with establishing with regular gynecology on discharge    Plan:  -Outpatient follow-up as above  Anxiety  Patient has a known history of anxiety that is not treated. Patient has had increase in her anxiety while in  the hospital secondary to current events.  Has since been started on Cymbalta this admission for assistance with both mood and pain control.    Plan:   Continue duloxetine 30 mg daily, can be continued on discharge  Continue Ativan as needed  Encourage patient to follow up with PCP after discharge    24 Hour Events : Patient had increase in abdominal pain, which was reported worse than initial presentation. Patient had enema overnight for constipation as well as CT of abdomen ordered.  Subjective : Patient was found ill-appearing and curled up in a ball in bed.  Patient was informed that she was being taken down for CT.  Patient states that her pain is worse than when she presented to the hospital.  Patient required 2 IV Dilaudid doses of 0.5 overnight.  Patient states that she feels like she is getting worse.  Patient thought that she would be ready to be discharged today, but is aware that her increasing pain would not be a safe discharge.    Objective :  Temp:  [97.5 °F (36.4 °C)-98.7 °F (37.1 °C)] 98.7 °F (37.1 °C)  HR:  [74-85] 85  BP: (123-125)/(79-82) 125/79  Resp:  [14-17] 14  SpO2:  [97 %-98 %] 97 %  O2 Device: None (Room air)    Physical Exam  Constitutional:       General: She is not in acute distress.     Appearance: She is ill-appearing. She is not diaphoretic.   HENT:      Head: Normocephalic and atraumatic.      Mouth/Throat:      Mouth: Mucous membranes are moist.   Cardiovascular:      Rate and Rhythm: Normal rate and regular rhythm.      Pulses: Normal pulses.      Heart sounds: Normal heart sounds.   Pulmonary:      Effort: Pulmonary effort is normal.      Breath sounds: Normal breath sounds.   Abdominal:      General: Abdomen is flat. There is no distension.      Palpations: Abdomen is soft.      Tenderness: There is abdominal tenderness. There is no guarding.   Musculoskeletal:      Right lower leg: No edema.      Left lower leg: No edema.   Skin:     General: Skin is warm and dry.    Neurological:      Mental Status: She is alert and oriented to person, place, and time. Mental status is at baseline.   Psychiatric:         Mood and Affect: Mood normal.         Lab Results: I have reviewed the following results:CBC/BMP: No new results in last 24 hours.     Imaging Results Review: No pertinent imaging studies reviewed.  Other Study Results Review: No additional pertinent studies reviewed.    VTE Pharmacologic Prophylaxis: VTE covered by:  enoxaparin, Subcutaneous, 40 mg at 12/21/24 0836     VTE Mechanical Prophylaxis: sequential compression device

## 2024-12-22 NOTE — ASSESSMENT & PLAN NOTE
Patient initially presented to [facility] with complaints of nausea and left-sided abdominal pain radiating to back. Has history of recurrent alcoholic pancreatitis.  Recent hospitalization (11/23 - 11/29) during which time was managed conservatively.  CT with findings of recurrent pericarditis and large pancreatic pseudocyst.  General surgery consulted at [facility].  Recommended conservative management.  GI consulted at [facility]. Recommended transfer to \[facility]\"" for cyst gastrostomy and necrosectomy with advance GI team; now status post cystogastrostomy  Main collection appears to be improving on follow-up imaging though numerous persistent collections remain present  Patient required 0.5 dilaudid x 2 overnight as well as enema    Plan:  Patient currently tolerating GI low res diet   GI consulted.  Appreciate recommendation.   Plan for repeat CT in 3 weeks then repeat EGD for stent removal/Necrosectomy in 4 weeks  Pain regimen downgraded to oxycodone 5 mg PO Q4H PRN mod pain, oxycodone 10 mg PO Q4H PRN for severe/breakthrough pain; continue for now  Bowel regimen in place, monitor stool output.   IV hydration  ml/hr, nausea medication as needed.  Increase in pain overnight on 12/21.  CT scan of the abdomen was ordered.  Pending results

## 2024-12-22 NOTE — PLAN OF CARE
Problem: PAIN - ADULT  Goal: Verbalizes/displays adequate comfort level or baseline comfort level  Description: Interventions:  - Encourage patient to monitor pain and request assistance  - Assess pain using appropriate pain scale  - Administer analgesics based on type and severity of pain and evaluate response  - Implement non-pharmacological measures as appropriate and evaluate response  - Consider cultural and social influences on pain and pain management  - Notify physician/advanced practitioner if interventions unsuccessful or patient reports new pain  Outcome: Progressing     Problem: INFECTION - ADULT  Goal: Absence or prevention of progression during hospitalization  Description: INTERVENTIONS:  - Assess and monitor for signs and symptoms of infection  - Monitor lab/diagnostic results  - Monitor all insertion sites, i.e. indwelling lines, tubes, and drains  - Monitor endotracheal if appropriate and nasal secretions for changes in amount and color  - [place] appropriate cooling/warming therapies per order  - Administer medications as ordered  - Instruct and encourage patient and family to use good hand hygiene technique  - Identify and instruct in appropriate isolation precautions for identified infection/condition  Outcome: Progressing     Problem: SAFETY ADULT  Goal: Patient will remain free of falls  Description: INTERVENTIONS:  - Educate patient/family on patient safety including physical limitations  - Instruct patient to call for assistance with activity   - Consult OT/PT to assist with strengthening/mobility   - Keep Call bell within reach  - Keep bed low and locked with side rails adjusted as appropriate  - Keep care items and personal belongings within reach  - Initiate and maintain comfort rounds  - Make Fall Risk Sign visible to staff  - Apply yellow socks and bracelet for high fall risk patients  - Consider moving patient to room near nurses station  Outcome: Progressing     Problem: DISCHARGE  PLANNING  Goal: Discharge to home or other facility with appropriate resources  Description: INTERVENTIONS:  - Identify barriers to discharge w/patient and caregiver  - Arrange for needed discharge resources and transportation as appropriate  - Identify discharge learning needs (meds, wound care, etc.)  - Arrange for interpretive services to assist at discharge as needed  - Refer to Case Management Department for coordinating discharge planning if the patient needs post-hospital services based on physician/advanced practitioner order or complex needs related to functional status, cognitive ability, or social support system  Outcome: Progressing     Problem: Knowledge Deficit  Goal: Patient/family/caregiver demonstrates understanding of disease process, treatment plan, medications, and discharge instructions  Description: Complete learning assessment and assess knowledge base.  Interventions:  - Provide teaching at level of understanding  - Provide teaching via preferred learning methods  Outcome: Progressing     Problem: Nutrition/Hydration-ADULT  Goal: Nutrient/Hydration intake appropriate for improving, restoring or maintaining nutritional needs  Description: Monitor and assess patient's nutrition/hydration status for malnutrition. Collaborate with interdisciplinary team and initiate plan and interventions as ordered.  Monitor patient's weight and dietary intake as ordered or per policy. Utilize nutrition screening tool and intervene as necessary. Determine patient's food preferences and provide high-protein, high-caloric foods as appropriate.     INTERVENTIONS:  - Monitor oral intake, urinary output, labs, and treatment plans  - Assess nutrition and hydration status and recommend course of action  - Evaluate amount of meals eaten  - Assist patient with eating if necessary   - Allow adequate time for meals  - Recommend/ encourage appropriate diets, oral nutritional supplements, and vitamin/mineral supplements  -  Order, calculate, and assess calorie counts as needed  - Recommend, monitor, and adjust tube feedings and TPN/PPN based on assessed needs  - Assess need for intravenous fluids  - Provide specific nutrition/hydration education as appropriate  - Include patient/family/caregiver in decisions related to nutrition  Outcome: Progressing     Problem: GASTROINTESTINAL - ADULT  Goal: Minimal or absence of nausea and/or vomiting  Description: INTERVENTIONS:  - Administer IV fluids if ordered to ensure adequate hydration  - Maintain NPO status until nausea and vomiting are resolved  - Nasogastric tube if ordered  - Administer ordered antiemetic medications as needed  - Provide nonpharmacologic comfort measures as appropriate  - Advance diet as tolerated, if ordered  - Consider nutrition services referral to assist patient with adequate nutrition and appropriate food choices  Outcome: Progressing  Goal: Maintains or returns to baseline bowel function  Description: INTERVENTIONS:  - Assess bowel function  - Encourage oral fluids to ensure adequate hydration  - Administer IV fluids if ordered to ensure adequate hydration  - Administer ordered medications as needed  - Encourage mobilization and activity  - Consider nutritional services referral to assist patient with adequate nutrition and appropriate food choices  Outcome: Progressing  Goal: Maintains adequate nutritional intake  Description: INTERVENTIONS:  - Monitor percentage of each meal consumed  - Identify factors contributing to decreased intake, treat as appropriate  - Assist with meals as needed  - Monitor I&O, weight, and lab values if indicated  - Obtain nutrition services referral as needed  Outcome: Progressing     Problem: METABOLIC, FLUID AND ELECTROLYTES - ADULT  Goal: Electrolytes maintained within normal limits  Description: INTERVENTIONS:  - Monitor labs and assess patient for signs and symptoms of electrolyte imbalances  - Administer electrolyte replacement as  ordered  - Monitor response to electrolyte replacements, including repeat lab results as appropriate  - Instruct patient on fluid and nutrition as appropriate  Outcome: Progressing  Goal: Fluid balance maintained  Description: INTERVENTIONS:  - Monitor labs   - Monitor I/O and WT  - Instruct patient on fluid and nutrition as appropriate  - Assess for signs & symptoms of volume excess or deficit  Outcome: Progressing

## 2024-12-22 NOTE — ASSESSMENT & PLAN NOTE
Patient has new elevation in WBC 12/22/24 from 7.68 to 12.45. Patient has worsening tenderness to palpation. Patient reports increase in pain requiring IV dilaudid. Patient afebrile.    Plan:   CT abdomen, pending final read  Continue to monitor vital signs   Continue to monitor CBC

## 2024-12-23 LAB
ANION GAP SERPL CALCULATED.3IONS-SCNC: 7 MMOL/L (ref 4–13)
APTT PPP: 44 SECONDS (ref 23–34)
BASOPHILS # BLD AUTO: 0.03 THOUSANDS/ÂΜL (ref 0–0.1)
BASOPHILS NFR BLD AUTO: 0 % (ref 0–1)
BUN SERPL-MCNC: 6 MG/DL (ref 5–25)
CALCIUM SERPL-MCNC: 8 MG/DL (ref 8.4–10.2)
CHLORIDE SERPL-SCNC: 98 MMOL/L (ref 96–108)
CO2 SERPL-SCNC: 28 MMOL/L (ref 21–32)
CREAT SERPL-MCNC: 0.45 MG/DL (ref 0.6–1.3)
EOSINOPHIL # BLD AUTO: 0.18 THOUSAND/ÂΜL (ref 0–0.61)
EOSINOPHIL NFR BLD AUTO: 2 % (ref 0–6)
ERYTHROCYTE [DISTWIDTH] IN BLOOD BY AUTOMATED COUNT: 11.5 % (ref 11.6–15.1)
GFR SERPL CREATININE-BSD FRML MDRD: 132 ML/MIN/1.73SQ M
GLUCOSE SERPL-MCNC: 90 MG/DL (ref 65–140)
GLUCOSE SERPL-MCNC: 94 MG/DL (ref 65–140)
GLUCOSE SERPL-MCNC: 95 MG/DL (ref 65–140)
HCT VFR BLD AUTO: 32.2 % (ref 34.8–46.1)
HGB BLD-MCNC: 10.7 G/DL (ref 11.5–15.4)
IMM GRANULOCYTES # BLD AUTO: 0.02 THOUSAND/UL (ref 0–0.2)
IMM GRANULOCYTES NFR BLD AUTO: 0 % (ref 0–2)
INR PPP: 1.26 (ref 0.85–1.19)
LYMPHOCYTES # BLD AUTO: 1.27 THOUSANDS/ÂΜL (ref 0.6–4.47)
LYMPHOCYTES NFR BLD AUTO: 14 % (ref 14–44)
MCH RBC QN AUTO: 34 PG (ref 26.8–34.3)
MCHC RBC AUTO-ENTMCNC: 33.2 G/DL (ref 31.4–37.4)
MCV RBC AUTO: 102 FL (ref 82–98)
MONOCYTES # BLD AUTO: 1.32 THOUSAND/ÂΜL (ref 0.17–1.22)
MONOCYTES NFR BLD AUTO: 14 % (ref 4–12)
NEUTROPHILS # BLD AUTO: 6.46 THOUSANDS/ÂΜL (ref 1.85–7.62)
NEUTS SEG NFR BLD AUTO: 70 % (ref 43–75)
NRBC BLD AUTO-RTO: 0 /100 WBCS
PLATELET # BLD AUTO: 165 THOUSANDS/UL (ref 149–390)
PMV BLD AUTO: 12.2 FL (ref 8.9–12.7)
POTASSIUM SERPL-SCNC: 3.8 MMOL/L (ref 3.5–5.3)
PROTHROMBIN TIME: 16 SECONDS (ref 12.3–15)
RBC # BLD AUTO: 3.15 MILLION/UL (ref 3.81–5.12)
SODIUM SERPL-SCNC: 133 MMOL/L (ref 135–147)
WBC # BLD AUTO: 9.28 THOUSAND/UL (ref 4.31–10.16)

## 2024-12-23 PROCEDURE — 99232 SBSQ HOSP IP/OBS MODERATE 35: CPT | Performed by: INTERNAL MEDICINE

## 2024-12-23 PROCEDURE — 80048 BASIC METABOLIC PNL TOTAL CA: CPT

## 2024-12-23 PROCEDURE — 85610 PROTHROMBIN TIME: CPT

## 2024-12-23 PROCEDURE — 85730 THROMBOPLASTIN TIME PARTIAL: CPT

## 2024-12-23 PROCEDURE — 85025 COMPLETE CBC W/AUTO DIFF WBC: CPT

## 2024-12-23 PROCEDURE — 82948 REAGENT STRIP/BLOOD GLUCOSE: CPT

## 2024-12-23 RX ADMIN — Medication 1 TABLET: at 09:16

## 2024-12-23 RX ADMIN — THIAMINE HCL TAB 100 MG 100 MG: 100 TAB at 09:16

## 2024-12-23 RX ADMIN — ONDANSETRON 4 MG: 2 INJECTION INTRAMUSCULAR; INTRAVENOUS at 17:22

## 2024-12-23 RX ADMIN — SENNOSIDES 8.6 MG: 8.6 TABLET, FILM COATED ORAL at 17:22

## 2024-12-23 RX ADMIN — OXYCODONE HYDROCHLORIDE 10 MG: 10 TABLET ORAL at 03:45

## 2024-12-23 RX ADMIN — DULOXETINE HYDROCHLORIDE 30 MG: 30 CAPSULE, DELAYED RELEASE ORAL at 09:16

## 2024-12-23 RX ADMIN — PANTOPRAZOLE SODIUM 40 MG: 40 INJECTION, POWDER, FOR SOLUTION INTRAVENOUS at 21:55

## 2024-12-23 RX ADMIN — ACETAMINOPHEN 650 MG: 325 TABLET, FILM COATED ORAL at 11:10

## 2024-12-23 RX ADMIN — CEFTRIAXONE SODIUM 1000 MG: 10 INJECTION, POWDER, FOR SOLUTION INTRAVENOUS at 15:42

## 2024-12-23 RX ADMIN — OXYCODONE HYDROCHLORIDE 10 MG: 10 TABLET ORAL at 17:22

## 2024-12-23 RX ADMIN — FOLIC ACID 1 MG: 1 TABLET ORAL at 09:16

## 2024-12-23 RX ADMIN — ONDANSETRON 4 MG: 2 INJECTION INTRAMUSCULAR; INTRAVENOUS at 22:43

## 2024-12-23 RX ADMIN — NICOTINE 1 PATCH: 21 PATCH, EXTENDED RELEASE TRANSDERMAL at 09:16

## 2024-12-23 RX ADMIN — ENOXAPARIN SODIUM 40 MG: 40 INJECTION SUBCUTANEOUS at 09:16

## 2024-12-23 RX ADMIN — METRONIDAZOLE 500 MG: 500 INJECTION, SOLUTION INTRAVENOUS at 03:46

## 2024-12-23 RX ADMIN — SODIUM CHLORIDE, SODIUM GLUCONATE, SODIUM ACETATE, POTASSIUM CHLORIDE, MAGNESIUM CHLORIDE, SODIUM PHOSPHATE, DIBASIC, AND POTASSIUM PHOSPHATE 100 ML/HR: .53; .5; .37; .037; .03; .012; .00082 INJECTION, SOLUTION INTRAVENOUS at 21:58

## 2024-12-23 RX ADMIN — SENNOSIDES 8.6 MG: 8.6 TABLET, FILM COATED ORAL at 09:16

## 2024-12-23 RX ADMIN — LORAZEPAM 0.5 MG: 2 INJECTION INTRAMUSCULAR; INTRAVENOUS at 13:13

## 2024-12-23 RX ADMIN — PANTOPRAZOLE SODIUM 40 MG: 40 INJECTION, POWDER, FOR SOLUTION INTRAVENOUS at 09:16

## 2024-12-23 RX ADMIN — OXYCODONE HYDROCHLORIDE 5 MG: 5 TABLET ORAL at 09:16

## 2024-12-23 RX ADMIN — POLYETHYLENE GLYCOL 3350 17 G: 17 POWDER, FOR SOLUTION ORAL at 21:55

## 2024-12-23 RX ADMIN — SODIUM CHLORIDE, SODIUM GLUCONATE, SODIUM ACETATE, POTASSIUM CHLORIDE, MAGNESIUM CHLORIDE, SODIUM PHOSPHATE, DIBASIC, AND POTASSIUM PHOSPHATE 100 ML/HR: .53; .5; .37; .037; .03; .012; .00082 INJECTION, SOLUTION INTRAVENOUS at 03:07

## 2024-12-23 RX ADMIN — OXYCODONE HYDROCHLORIDE 5 MG: 5 TABLET ORAL at 22:43

## 2024-12-23 RX ADMIN — METRONIDAZOLE 500 MG: 500 INJECTION, SOLUTION INTRAVENOUS at 17:22

## 2024-12-23 RX ADMIN — METHYLNALTREXONE BROMIDE 8 MG: 8 INJECTION, SOLUTION SUBCUTANEOUS at 13:14

## 2024-12-23 NOTE — PROGRESS NOTES
Progress Note - Internal Medicine   Name: Kaykay Holland 34 y.o. female I MRN: [id number]  Unit/Bed#: [facility] 812-01 I Date of Admission: 12/15/2024   Date of Service: 12/23/2024 I Hospital Day: 8     Assessment & Plan  Acute alcoholic pancreatitis  Patient initially presented to [facility] with complaints of nausea and left-sided abdominal pain radiating to back. Has history of recurrent alcoholic pancreatitis.  Recent hospitalization (11/23 - 11/29) during which time was managed conservatively.  CT with findings of recurrent pericarditis and large pancreatic pseudocyst.  General surgery consulted at [facility].  Recommended conservative management.  GI consulted at [facility]. Recommended transfer to [facility] for cyst gastrostomy and necrosectomy with advance GI team; now status post cystogastrostomy  Main collection appears to be improving on follow-up imaging though numerous persistent collections remain present  Patient required 0.5 dilaudid x 2 overnight as well as enema 12/21  Patient had clinical improvement of pain 12/23, patient was kept NPO for possible intervention, however no intervention d/t clinical improvement    Plan:  Patient currently tolerating GI low res diet   GI consulted.  Appreciate recommendation.   Plan for repeat CT in 3 weeks then repeat EGD for stent removal/Necrosectomy in 4 weeks  Pain regimen downgraded to oxycodone 5 mg PO Q4H PRN mod pain, oxycodone 10 mg PO Q4H PRN for severe/breakthrough pain; continue for now  Bowel regimen in place, monitor stool output.   IV hydration isolyte 100 ml/hr, nausea medication as needed.  Increase in pain overnight on 12/21.  CT scan of the abdomen was ordered.   Leukocytosis  Patient has new elevation in WBC 12/22/24 from 7.68 to 12.45. Patient has worsening tenderness to palpation. Patient reports increase in pain requiring IV dilaudid. Patient afebrile.    Plan:   CT abdomen, pending final read  Continue to monitor vital signs   Continue to monitor CBC,  downtrending 12/22  Continue ceftriaxone and flagyl Day 2  Follow blood cultures  Alcohol withdrawal seizure (HCC)  Patient reports he drinks 3 glasses of wine for the past few years.  However, since her last hospital discharge on 11/29 she has not had any alcohol.  Has history of withdrawal seizure in 2023 for which she was admitted.  Previously on Librium taper, now completed; patient also off CIWA protocol  CRS is provide patient with available resources    Plan  -CRS recommendations appreciated  -Encourage cessation and abstinence from alcohol  Essential hypertension  Previously on lisinopril which has been discontinued for about 6 months or so.  Blood pressure well-controlled.    Plan  -Monitor vital signs  Tobacco abuse  Smokes a pack a day for past 5 to 6 years.  On nicotine patch    Plan  Smoking cessation education  Gastroesophageal reflux disease without esophagitis  Reported history of GERD on pantoprazole 40 mg twice daily at home.  Patient remains nauseous 12/22    Plan  Continue Protonix 40 mg IV every 12 hours.  Switch when appropriate.  Thrombocytopenia (HCC)  Patients platelets have been downtrending since admission. On admission 12/13/24 Plts were 369.  Platelets have since fallen into the mid 100s.  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  Presenting platelet level felt to be more representative of an acute phase reaction with current platelet level more consistent with patient's prior baseline.  No signs of bleeding or bruising.    Plan  -Continue to monitor  Ovarian cyst  CT: right ovarian cyst since 12/13/24, now measuring 4.9 cm by 4.2 cm and containing some hyperdense material suggestive of hemorrhagic components.  Patient does have history of ovarian cyst in the past though does not routinely follow with gynecology.  Gynecology team recommending follow-up imaging in 6 to 12  weeks with establishing with regular gynecology on discharge.  Patient started to have vaginal bleeding with dime to nickel sized clots, discussed with GYN and consistent with her irregular menstrual periods.    Plan:  - Outpatient follow-up as above  - Continue to monitor vital signs and hemoglobin   - Will order coagulation studies in light of vaginal clots  Anxiety  Patient has a known history of anxiety that is not treated. Patient has had increase in her anxiety while in the hospital secondary to current events.  Has since been started on Cymbalta this admission for assistance with both mood and pain control.    Plan:   Continue duloxetine 30 mg daily, can be continued on discharge  Continue Ativan as needed  Encourage patient to follow up with PCP after discharge    24 Hour Events : no acute events overnight, patient had increase in pain as well as increase in WBC. Ct shows increase in size of some of the peripancreatic fluid collections. Elevated lactic acid 3.0. Patient was started on ceftriaxone and flagyl, follow BC.     Subjective : Patient reports that she is feeling much better.  Patient was seen and examined at bedside.  Today was the first day that I saw the patient get out of bed and walk to the bathroom comfortably.  Patient was encouraged to lower the dose of the oxycodone 10.  Patient was encouraged to take the oxycodone 5 for today and see if she is able to tolerate the pain.  Patient's goal is to go home for [place].  Patient was informed that he wanted to have a safe discharge so that she does not end up back for the same problem.  Patient was encouraged again for alcohol cessation.  Patient reported that she has been having blood clots from her vagina since last night.  Patient states this has not happened to her before.  Patient reports that she has irregular periods and her last period was 2 weeks ago.  Patient did not have a period from April to November.  Patient reports dime to nickel  size clots.  Patient's vital signs are stable, hemoglobin stable.  Spoke with GYN who reports that it sounds consistent with irregular cycles.  If she worsens they will reevaluate.  Plan is to continue follow-up outpatient.  Patient was kept n.p.o. for possible intervention with GI, GI will not intervene.  Patient's diet was restarted and patient was encouraged to eat.    Objective :  Temp:  [97.1 °F (36.2 °C)-99.1 °F (37.3 °C)] 97.1 °F (36.2 °C)  HR:  [78-95] 78  BP: (106-109)/(63-69) 106/63  Resp:  [14-17] 16  SpO2:  [93 %-94 %] 93 %  O2 Device: None (Room air)    Physical Exam  Vitals reviewed.   Constitutional:       General: She is not in acute distress.     Appearance: Normal appearance. She is ill-appearing. She is not diaphoretic.   HENT:      Head: Normocephalic and atraumatic.      Mouth/Throat:      Mouth: Mucous membranes are moist.   Cardiovascular:      Rate and Rhythm: Normal rate and regular rhythm.      Pulses: Normal pulses.      Heart sounds: Normal heart sounds.   Pulmonary:      Effort: Pulmonary effort is normal.      Breath sounds: Normal breath sounds.   Abdominal:      General: Abdomen is flat. Bowel sounds are normal.      Palpations: Abdomen is soft.      Tenderness: There is abdominal tenderness.   Genitourinary:     Comments: Intermenstrual bleeding with dark red clots, quarter sized blood clot present.   Musculoskeletal:      Right lower leg: No edema.      Left lower leg: No edema.   Skin:     General: Skin is warm and dry.   Neurological:      Mental Status: She is alert and oriented to person, place, and time. Mental status is at baseline.         Lab Results: I have reviewed the following results:CBC/BMP:   .     12/23/24  0513   WBC 9.28   HGB 10.7*   HCT 32.2*      SODIUM 133*   K 3.8   CL 98   CO2 28   BUN 6   CREATININE 0.45*   GLUC 90    , Lactic Acid:   .     12/22/24  1739   LACTICACID 1.4        Imaging Results Review: No pertinent imaging studies reviewed.  Other  Study Results Review: No additional pertinent studies reviewed.    VTE Pharmacologic Prophylaxis: VTE covered by:  enoxaparin, Subcutaneous, 40 mg at 12/22/24 0837     VTE Mechanical Prophylaxis: sequential compression device

## 2024-12-23 NOTE — ASSESSMENT & PLAN NOTE
Reported history of GERD on pantoprazole 40 mg twice daily at home.  Patient remains nauseous 12/22    Plan  Continue Protonix 40 mg IV every 12 hours.  Switch when appropriate.

## 2024-12-23 NOTE — UTILIZATION REVIEW
Continued Stay Review    Date: 12/21/24                          Current Patient Class: Inpatient  Current Level of Care: med surg    HPI:34 y.o. female initially admitted on 12/15/24     Assessment/Plan:  Epigastric pain is much improved no nausea or vomiting patient is tolerating the diet continue with the present management. Anxiety much improved today she was very depressed for last few days but today for the first time she was smiling and was feeling better and talking about discharge planning. Continue low residue diet, repeat CT in 3 wks then repeat EGD for stent removal/Necrosectomy in 4 weeks. Continue pain regimen, bowel regimen and monitor stool OP, continue IVF, antiemetics prn, IV PPI. Continue to monitor VS and labs. OP FU with GYN for ovarian cyst.     Medications:   Scheduled Medications:  cefTRIAXone, 1,000 mg, Intravenous, Q24H  DULoxetine, 30 mg, Oral, Daily  enoxaparin, 40 mg, Subcutaneous, Q24H RODRICK  folic acid, 1 mg, Oral, Daily  methylnaltrexone, 8 mg, Subcutaneous, Once  metroNIDAZOLE, 500 mg, Intravenous, Q12H  multivitamin-minerals, 1 tablet, Oral, Daily  nicotine, 1 patch, Transdermal, Daily  pantoprazole, 40 mg, Intravenous, Q12H RODRICK  polyethylene glycol, 17 g, Oral, BID  senna, 1 tablet, Oral, BID  thiamine, 100 mg, Oral, Daily      Continuous IV Infusions:  multi-electrolyte, 100 mL/hr, Intravenous, Continuous      PRN Meds:  acetaminophen, 650 mg, Oral, Q6H PRN  LORazepam, 0.5 mg, Intravenous, BID PRN x6 since 12/20  ondansetron, 4 mg, Intravenous, Q4H PRN x11 since 12/18  oxyCODONE, 10 mg, Oral, Q4H PRN x16 since 12/19  oxyCODONE, 5 mg, Oral, Q4H PRN x1 today  trimethobenzamide, 200 mg, Intramuscular, Q6H PRN x2 since 12/21      Discharge Plan: tbd    Vital Signs (last 3 days)       Date/Time Temp Pulse Resp BP MAP (mmHg) SpO2 O2 Device Patient Position - Orthostatic VS Pain    12/23/24 0745 -- -- -- -- -- -- None (Room air) -- 6    12/23/24 07:15:32 97.9 °F (36.6 °C) 66 16 119/70 86  97 % -- -- --    12/23/24 0345 -- -- -- -- -- -- -- -- 7    12/22/24 22:39:54 97.1 °F (36.2 °C) 78 16 106/63 77 93 % -- -- --    12/22/24 2032 -- -- -- -- -- -- None (Room air) -- 8 12/22/24 14:55:33 97.1 °F (36.2 °C) 95 17 109/67 81 94 % -- -- --    12/22/24 1305 -- -- -- -- -- -- -- -- 8 12/22/24 0837 -- -- -- -- -- -- -- -- 9    12/22/24 07:47:38 99.1 °F (37.3 °C) -- 14 109/69 82 -- -- -- --    12/22/24 0320 -- -- -- -- -- -- -- -- 9    12/21/24 22:56:52 98.7 °F (37.1 °C) 85 14 125/79 94 97 % -- -- --    12/21/24 2217 -- -- -- -- -- -- -- -- 8 12/21/24 2033 -- -- -- -- -- -- -- -- 8 12/21/24 1930 -- -- -- -- -- -- None (Room air) -- 8 12/21/24 1835 -- -- -- -- -- -- -- -- 10 - Worst Possible Pain    12/21/24 1654 -- -- -- -- -- -- -- -- 9    12/21/24 15:02:47 97.5 °F (36.4 °C) 74 16 123/82 96 97 % -- -- --    12/21/24 1225 -- -- -- -- -- -- -- -- 10 - Worst Possible Pain    12/21/24 0836 -- -- -- -- -- -- -- -- 8    12/21/24 07:54:18 98 °F (36.7 °C) 77 17 123/80 94 98 % -- -- --    12/20/24 2353 -- -- -- -- -- -- -- -- 8    12/20/24 22:49:30 98.2 °F (36.8 °C) 76 16 123/82 96 98 % -- -- --    12/20/24 1943 -- -- -- -- -- -- None (Room air) -- 7    12/20/24 15:43:14 98.3 °F (36.8 °C) -- 16 123/82 96 -- -- -- --    12/20/24 1444 -- -- -- -- -- -- -- -- 9    12/20/24 1023 -- -- -- -- -- 92 % None (Room air) -- --    12/20/24 0923 -- -- -- -- -- -- -- -- 8    12/20/24 08:00:43 98.2 °F (36.8 °C) 72 17 104/61 75 94 % -- -- --    12/20/24 0422 -- -- -- -- -- -- -- -- No Pain    12/20/24 0056 -- -- -- -- -- 94 % None (Room air) -- --    12/20/24 0012 -- -- -- 102/68 -- -- -- Lying --    12/20/24 0009 -- -- -- -- -- -- -- -- 8    12/20/24 0007 -- -- -- -- -- -- -- -- 8          Weight (last 2 days)       Date/Time Weight    12/23/24 0600 54.9 (121)    12/21/24 0600 53.9 (118.83)            Pertinent Labs/Diagnostic Results:   Radiology:  CT abdomen pelvis w contrast   Final Interpretation by Jorge Allen  MD Birgit (12/22 1887)      1. Persistent pancreatitis with peripancreatic fluid collections, some of which have increased in size. Decreased size of fluid collection along the greater curvature of the stomach status post cystogastrostomy stenting. No new fluid collections. No    evidence of superimposed infection.      2. Right ovarian cyst measuring up to 5.1 cm which is better evaluated on ultrasound pelvis 12/20/2024.      3. Moderate pelvic ascites.      4. 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         Workstation performed: [id number]         US pelvis complete w transvaginal   Final Interpretation by Eduardo Booth MD (12/20 2273)      1.  4.7 cm right ovarian hemorrhagic cyst. Follow-up ultrasound is recommended in 6-12 weeks to ensure resolution.   2.  5.4 cm simple right ovarian cyst. If the cyst persists on the short interval follow-up, then subsequent annual ultrasound would be recommended.         The study was marked in EPIC for immediate notification.                        Workstation performed: [id number]         CT abdomen pelvis w contrast   Final Interpretation by Yesy Huggins MD (12/19 4948)      1) 9.1 x 6.5 x 2.9 cm fluid collection with well-defined wall and non-simple fluid contents along the greater curvature of the proximal stomach, decreased from 13.4 x 9.9 x 9.4 cm on 12/13/2024 following cystogastrostomy creation.      2) Two additional, smaller peripancreatic fluid collections also with well-defined walls, as detailed above, the larger of the 2 also mildly decreased in size, and the smaller overall unchanged.      3) Multiple additional hypodense lesions or fluid collections in the pancreatic parenchyma without significant interval change since 12/13/2024, with several of them present on the initial CT from September 2024.      4) Several foci of air in the largest fluid collection related to the stent. No foci of air in the  pancreatic parenchyma, or in the peripancreatic space and peripancreatic fluid collections to suggest superimposed infection.      5) No new fluid collections are seen.      6) Interval enlargement of a right ovarian cyst since 12/13/2024, now measuring 4.9 x 4.2 cm, and containing some layering hyperdense material suggestive of hemorrhagic components.      7) Small amount of simple density ascites, mostly in the pelvis dependently, increased from 12/13/2024.      8) Stool throughout most of the colon, suggesting constipation. No bowel obstruction or convincing inflammation.      9) Small bilateral pleural effusions and lower lobe atelectasis, new from 12/13/2024.      10) Additional findings as above.                  Workstation performed: [id number]         XR abdomen obstruction series   Final Interpretation by Tyrone Lenz MD (12/19 6796)      1.  Normal stool burden without evidence for obstruction or ileus or other acute abdominal findings.      2.  Axios pseudocyst drainage stent projects over the left upper quadrant.      3.  Subsegmental platelike atelectasis at the lung bases.         Resident: Raj Brewer I, the attending radiologist, have reviewed the images and agree with the final report above.      Workstation performed: [id number]         CT abdomen pelvis w contrast    (Results Pending)   US pelvis complete non OB    (Results Pending)     Cardiology:  No orders to display     GI:  Endoscopic ultrasonography, GI (Upper) Linear with Axios stent placement   Final Result by Venancio Jaime MD (12/17 1600)   External mass effect in gastric body from pancreatic cyst.    One round, homogeneous and unilocular cyst measuring 11 cm x 10 cm with    debris present, well-defined and smooth margins in the peripancreatic    area. Therapeutic drainage performed using lumen apposing metal stent    measuring 10 mm x 15 mm. Balloon dilation performed after stent placement    with removal of sanguinous  fluid. 7 Fr x 3 cm straight plastic stent was    placed through the lumen of the prior stent.  The aspirate was    blood-tinged however no active bleeding was seen.  Clots were seen inside    the cyst cavity as well.         RECOMMENDATION:   Return pt to the floor    Clear liquid today   Start low residue diet tomorrow and continue this outpatient   Avoid alcohol    CT scan 3 weeks prior to the next endoscopy.   Plan for repeat EGD in 3-4 weeks for necrosectomy                         Venancio Jaime MD               Results from last 7 days   Lab Units 12/23/24  0513 12/22/24  0946 12/21/24  0528 12/20/24  0041 12/19/24  0632   WBC Thousand/uL 9.28 12.45* 7.68 8.94 8.91   HEMOGLOBIN g/dL 10.7* 11.0* 11.0* 11.5 11.5   HEMATOCRIT % 32.2* 32.7* 34.2* 34.5* 34.1*   PLATELETS Thousands/uL 165 166 144* 162 142*   TOTAL NEUT ABS Thousands/µL 6.46 9.47* 4.54  --   --          Results from last 7 days   Lab Units 12/23/24  0513 12/22/24  0946 12/21/24  0528 12/20/24  0041 12/19/24  0632   SODIUM mmol/L 133* 130* 134* 135 138   POTASSIUM mmol/L 3.8 4.1 4.0 3.7 3.7   CHLORIDE mmol/L 98 96 102 99 99   CO2 mmol/L 28 25 28 29 30   ANION GAP mmol/L 7 9 4 7 9   BUN mg/dL 6 5 5 4* 2*   CREATININE mg/dL 0.45* 0.36* 0.40* 0.46* 0.34*   EGFR ml/min/1.73sq m 132 142 137 131 144   CALCIUM mg/dL 8.0* 8.2* 8.1* 8.5 8.3*     Results from last 7 days   Lab Units 12/22/24  0946   AST U/L 18   ALT U/L 5*   ALK PHOS U/L 62   TOTAL PROTEIN g/dL 4.9*   ALBUMIN g/dL 2.5*   TOTAL BILIRUBIN mg/dL 0.59     Results from last 7 days   Lab Units 12/23/24  0515 12/23/24  0010 12/22/24  1815 12/22/24  1102 12/22/24  0001 12/21/24  2114 12/21/24  1928 12/21/24  1211 12/21/24  0518 12/21/24  0002 12/20/24  1812 12/20/24  1217   POC GLUCOSE mg/dl 94 95 109 85 104 105 62* 129 74 101 123 92     Results from last 7 days   Lab Units 12/23/24  0513 12/22/24  0946 12/21/24  0528 12/20/24  0041 12/19/24  0632 12/18/24  0531 12/17/24  0613   GLUCOSE RANDOM mg/dL 90 72  83 122 87 127 84       Results from last 7 days   Lab Units 12/22/24  1739 12/22/24  1422   LACTIC ACID mmol/L 1.4 3.0*       Results from last 7 days   Lab Units 12/22/24  1421 12/22/24  1410   BLOOD CULTURE  Received in Microbiology Lab. Culture in Progress. Received in Microbiology Lab. Culture in Progress.     Network Utilization Review Department  ATTENTION: Please call with any questions or concerns to [phone] and carefully listen to the prompts so that you are directed to the right person. All voicemails are confidential.   For Discharge needs, contact Care Management DC Support Team at [phone] opt. [phone]  Send all requests for admission clinical reviews, approved or denied determinations and any other requests to dedicated fax number below belonging to the campus where the patient is receiving treatment. List of dedicated fax numbers for the Facilities:  FACILITY NAME UR FAX NUMBER   ADMISSION DENIALS (Administrative/Medical Necessity) [phone]   DISCHARGE SUPPORT TEAM (NETWORK) [phone]   PARENT CHILD HEALTH (Maternity/NICU/Pediatrics) [phone]   [facility] [phone]   [facility] [phone]   [facility] [phone]   [facility] [phone]   [facility] [phone]   [facility] [phone]   [facility] [phone]   [facility] [phone]   [facility] [phone]   [facility] [phone]   [facility] [phone]   [facility] [phone]

## 2024-12-23 NOTE — ASSESSMENT & PLAN NOTE
34-year-old female with a history of active alcohol use disorder, recurrent alcoholic pancreatitis (most recently November 2023) presents as a transfer from a  for consideration of necrosectomy and cyst gastrostomy with advanced endoscopy.  She was recently admitted from 11/23 to 11/29 for necrotizing pancreatitis.  CT scan at that time showed evidence of pancreatic necrosis and acute pancreatitis.  She was treated conservatively with improvement of her symptoms.  [facility] of worsening epigastric abdominal pain, nausea, vomiting.  CT abdomen and pelvis now showing multiple peripancreatic fluid collection, a new large well-defined cystic collection with mass effect on proximal stomach measuring 9 x 9 x 12 cm.  Last alcohol use was 12/13.  Consumes 2-3 bottles of wine daily.   Liver enzymes within normal limits and lipase has normalized.    S/p EUS with cystogastrostomy stent placement 12/17.  The findings of mass effect on gastric body from the pancreatic cyst, 11 x 10 cm smooth, homogenous unilocular cyst with internal debris in the peripancreatic area.  Therapeutic drainage performed using a 10 mm x 15 mm LAMS followed by balloon dilation with removal of sanguinous fluid.  7 [place] by 3 cm straight plastic stent was placed through the lumen of prior stents.  Aspirate was notably blood-tinged with some clots inside the cyst cavity however, no findings of active bleeding.    Plan  -  CT abdomen pelvis obtained over the weekend for worsening abdominal pain and leukocytosis.  It showed cyst gastrostomy stent in satisfactory position with decreased size of drainage fluid collection.  However, there has been an interval increase in the size of fluid collection in the tail of the pancreas and pancreatic head.  Suspect this is likely sequela of recent severe pancreatitis.  Reviewed images with advanced endoscopist recommended no endoscopic intervention at this time.   Will continue with conservative management  - Continue low fat,  low residue diet which should be continued outpatient  -Cautious administration of IV fluids to reduce the risk of fluid overload.  - Plan for repeat CT abdomen and pelvis in 3 weeks prior to next endoscopy  - Will arrange for repeat EGD in 3-4 weeks for necrosectomy  - Alcohol withdrawal management per primary team.  Continue thiamine and folate  - Antiemetics and analgesics per primary team  -Continued alcohol cessation counseling.  following  -Will arrange for outpatient follow-up with advanced endoscopy team

## 2024-12-23 NOTE — CERTIFIED RECOVERY SPECIALIST
CRS attempted to connect but patient was sleeping.    CRS will continue to follow and support, business card provided.

## 2024-12-23 NOTE — PLAN OF CARE
Problem: PAIN - ADULT  Goal: Verbalizes/displays adequate comfort level or baseline comfort level  Description: Interventions:  - Encourage patient to monitor pain and request assistance  - Assess pain using appropriate pain scale  - Administer analgesics based on type and severity of pain and evaluate response  - Implement non-pharmacological measures as appropriate and evaluate response  - Consider cultural and social influences on pain and pain management  - Notify physician/advanced practitioner if interventions unsuccessful or patient reports new pain  12/23/2024 1118 by Kylee Wilkins  Outcome: Progressing  12/23/2024 1114 by Kylee Wilkins  Outcome: Progressing     Problem: INFECTION - ADULT  Goal: Absence or prevention of progression during hospitalization  Description: INTERVENTIONS:  - Assess and monitor for signs and symptoms of infection  - Monitor lab/diagnostic results  - Monitor all insertion sites, i.e. indwelling lines, tubes, and drains  - Monitor endotracheal if appropriate and nasal secretions for changes in amount and color  - [place] appropriate cooling/warming therapies per order  - Administer medications as ordered  - Instruct and encourage patient and family to use good hand hygiene technique  - Identify and instruct in appropriate isolation precautions for identified infection/condition  12/23/2024 1118 by Kylee Wilkins  Outcome: Progressing  12/23/2024 1114 by Kylee Wilkins  Outcome: Progressing

## 2024-12-23 NOTE — ASSESSMENT & PLAN NOTE
Suspect opioid-induced constipation with findings of stool throughout colon on CT scan  - Continue Miralax and Dulcolax BID.  Give a dose of mag citrate today  - Consider addition of Relistor if no bowel movement  -If still requiring opiates for pain control on discharge, consider Amitiza 24 mcg twice daily

## 2024-12-23 NOTE — ASSESSMENT & PLAN NOTE
Patient has new elevation in WBC 12/22/24 from 7.68 to 12.45. Patient has worsening tenderness to palpation. Patient reports increase in pain requiring IV dilaudid. Patient afebrile.    Plan:   CT abdomen, pending final read  Continue to monitor vital signs   Continue to monitor CBC, downtrending 12/22  Continue ceftriaxone and flagyl Day 2  Follow blood cultures

## 2024-12-23 NOTE — ASSESSMENT & PLAN NOTE
CT: right ovarian cyst since 12/13/24, now measuring 4.9 cm by 4.2 cm and containing some hyperdense material suggestive of hemorrhagic components.  Patient does have history of ovarian cyst in the past though does not routinely follow with gynecology.  Gynecology team recommending follow-up imaging in 6 to 12 weeks with establishing with regular gynecology on discharge.  Patient started to have vaginal bleeding with dime to nickel sized clots, discussed with GYN and consistent with her irregular menstrual periods.    Plan:  - Outpatient follow-up as above  - Continue to monitor vital signs and hemoglobin   - Will order coagulation studies in light of vaginal clots

## 2024-12-23 NOTE — PROGRESS NOTES
Progress Note - Gastroenterology   Name: Kaykay Holland 34 y.o. female I MRN: [id number]  Unit/Bed#: [facility] 812-01 I Date of Admission: 12/15/2024   Date of Service: 12/23/2024 I Hospital Day: 8    Assessment & Plan  Acute alcoholic pancreatitis  34-year-old female with a history of active alcohol use disorder, recurrent alcoholic pancreatitis (most recently November 2023) presents as a transfer from a  for consideration of necrosectomy and cyst gastrostomy with advanced endoscopy.  She was recently admitted from 11/23 to 11/29 for necrotizing pancreatitis.  CT scan at that time showed evidence of pancreatic necrosis and acute pancreatitis.  She was treated conservatively with improvement of her symptoms.  She represented again to [facility] on 12/13 with complaints of worsening epigastric abdominal pain, nausea, vomiting.  CT abdomen and pelvis now showing multiple peripancreatic fluid collection, a new large well-defined cystic collection with mass effect on proximal stomach measuring 9 x 9 x 12 cm.  Last alcohol use was 12/13.  Consumes 2-3 bottles of wine daily.   Liver enzymes within normal limits and lipase has normalized.    S/p EUS with cystogastrostomy stent placement 12/17.  The findings of mass effect on gastric body from the pancreatic cyst, 11 x 10 cm smooth, homogenous unilocular cyst with internal debris in the peripancreatic area.  Therapeutic drainage performed using a 10 mm x 15 mm LAMS followed by balloon dilation with removal of sanguinous fluid.  7 [place] by 3 cm straight plastic stent was placed through the lumen of prior stents.  Aspirate was notably blood-tinged with some clots inside the cyst cavity however, no findings of active bleeding.    Plan  -  CT abdomen pelvis obtained over the weekend for worsening abdominal pain and leukocytosis.  It showed cyst gastrostomy stent in satisfactory position with decreased size of drainage fluid collection.  However, there has been an interval  increase in the size of fluid collection in the tail of the pancreas and pancreatic head.  Suspect this is likely sequela of recent severe pancreatitis.  Reviewed images with advanced endoscopist recommended no endoscopic intervention at this time.  Will continue with conservative management  - Continue low fat,  low residue diet which should be continued outpatient  -Cautious administration of IV fluids to reduce the risk of fluid overload.  - Plan for repeat CT abdomen and pelvis in 3 weeks prior to next endoscopy  - Will arrange for repeat EGD in 3-4 weeks for necrosectomy  - Alcohol withdrawal management per primary team.  Continue thiamine and folate  - Antiemetics and analgesics per primary team  -Continued alcohol cessation counseling.  following  -Will arrange for outpatient follow-up with advanced endoscopy team      Alcohol withdrawal seizure (HCC)  History of alcohol use disorder and previous withdrawal seizure.  No sign of active withdrawal  Thiamine and folate  Gastroesophageal reflux disease without esophagitis  Continue PPI twice daily  Leukocytosis    Constipation  Suspect opioid-induced constipation with findings of stool throughout colon on CT scan  - Continue Miralax and Dulcolax BID.  Give a dose of mag citrate today  - Consider addition of Relistor if no bowel movement  -If still requiring opiates for pain control on discharge, consider Amitiza 24 mcg twice daily        Subjective   She reported worsening abdominal pain over the weekend however states it is much improved this morning.  Denies any fever, chills, nausea or vomiting.    Objective :  Temp:  [97.1 °F (36.2 °C)-97.9 °F (36.6 °C)] 97.9 °F (36.6 °C)  HR:  [66-95] 66  BP: (106-119)/(63-70) 119/70  Resp:  [16-17] 16  SpO2:  [93 %-97 %] 97 %  O2 Device: None (Room air)    Physical Exam  Vitals and nursing note reviewed.   Constitutional:       General: She is not in acute distress.     Appearance: She is well-developed.    HENT:      Head: Normocephalic and atraumatic.   Eyes:      Conjunctiva/sclera: Conjunctivae normal.   Cardiovascular:      Rate and Rhythm: Normal rate and regular rhythm.      Heart sounds: No murmur heard.  Pulmonary:      Effort: Pulmonary effort is normal. No respiratory distress.      Breath sounds: Normal breath sounds.   Abdominal:      General: There is no distension.      Palpations: Abdomen is soft.      Tenderness: There is abdominal tenderness. There is no guarding or rebound.   Musculoskeletal:         General: No swelling.      Cervical back: Neck supple.   Skin:     General: Skin is warm and dry.      Capillary Refill: Capillary refill takes less than 2 seconds.   Neurological:      Mental Status: She is alert.   Psychiatric:         Mood and Affect: Mood normal.           Lab Results: I have reviewed the following results:CBC/BMP:   .     12/23/24  0513   WBC 9.28   HGB 10.7*   HCT 32.2*      SODIUM 133*   K 3.8   CL 98   CO2 28   BUN 6   CREATININE 0.45*   GLUC 90    , LFTs: No new results in last 24 hours.     Imaging Results Review: I reviewed radiology reports from this admission including: CT abdomen/pelvis.  Other Study Results Review: No additional pertinent studies reviewed.    Rocio Wang MD  Gastroenterology Fellow, PGY- 4  Available on EPIC Secure Chat  12/23/2024 2:22 PM

## 2024-12-23 NOTE — PLAN OF CARE
Problem: INFECTION - ADULT  Goal: Absence or prevention of progression during hospitalization  Description: INTERVENTIONS:  - Assess and monitor for signs and symptoms of infection  - Monitor lab/diagnostic results  - Monitor all insertion sites, i.e. indwelling lines, tubes, and drains  - Monitor endotracheal if appropriate and nasal secretions for changes in amount and color  - [place] appropriate cooling/warming therapies per order  - Administer medications as ordered  - Instruct and encourage patient and family to use good hand hygiene technique  - Identify and instruct in appropriate isolation precautions for identified infection/condition  Outcome: Progressing     Problem: PAIN - ADULT  Goal: Verbalizes/displays adequate comfort level or baseline comfort level  Description: Interventions:  - Encourage patient to monitor pain and request assistance  - Assess pain using appropriate pain scale  - Administer analgesics based on type and severity of pain and evaluate response  - Implement non-pharmacological measures as appropriate and evaluate response  - Consider cultural and social influences on pain and pain management  - Notify physician/advanced practitioner if interventions unsuccessful or patient reports new pain  Outcome: Progressing

## 2024-12-23 NOTE — ASSESSMENT & PLAN NOTE
Patient initially presented to [facility] with complaints of nausea and left-sided abdominal pain radiating to back. Has history of recurrent alcoholic pancreatitis.  Recent hospitalization (11/23 - 11/29) during which time was managed conservatively.  CT with findings of recurrent pericarditis and large pancreatic pseudocyst.  General surgery consulted at [facility].  Recommended conservative management.  GI consulted at [facility]. Recommended transfer to [facility] for cyst gastrostomy and necrosectomy with advance GI team; now status post cystogastrostomy  Main collection appears to be improving on follow-up imaging though numerous persistent collections remain present  Patient required 0.5 dilaudid x 2 overnight as well as enema 12/21  Patient had clinical improvement of pain 12/23, patient was kept NPO for possible intervention, however no intervention d/t clinical improvement    Plan:  Patient currently tolerating GI low res diet   GI consulted.  Appreciate recommendation.   Plan for repeat CT in 3 weeks then repeat EGD for stent removal/Necrosectomy in 4 weeks  Pain regimen downgraded to oxycodone 5 mg PO Q4H PRN mod pain, oxycodone 10 mg PO Q4H PRN for severe/breakthrough pain; continue for now  Bowel regimen in place, monitor stool output.   IV hydration isolyte 100 ml/hr, nausea medication as needed.  Increase in pain overnight on 12/21.  CT scan of the abdomen was ordered.

## 2024-12-24 VITALS
TEMPERATURE: 97.9 F | SYSTOLIC BLOOD PRESSURE: 110 MMHG | HEIGHT: 65 IN | RESPIRATION RATE: 16 BRPM | OXYGEN SATURATION: 97 % | HEART RATE: 63 BPM | WEIGHT: 121 LBS | BODY MASS INDEX: 20.16 KG/M2 | DIASTOLIC BLOOD PRESSURE: 64 MMHG

## 2024-12-24 PROBLEM — I82.890 SPLENIC VEIN THROMBOSIS: Status: ACTIVE | Noted: 2024-12-24

## 2024-12-24 LAB
ANION GAP SERPL CALCULATED.3IONS-SCNC: 6 MMOL/L (ref 4–13)
BASOPHILS # BLD AUTO: 0.04 THOUSANDS/ÂΜL (ref 0–0.1)
BASOPHILS NFR BLD AUTO: 0 % (ref 0–1)
BUN SERPL-MCNC: 4 MG/DL (ref 5–25)
CALCIUM SERPL-MCNC: 8.7 MG/DL (ref 8.4–10.2)
CHLORIDE SERPL-SCNC: 100 MMOL/L (ref 96–108)
CO2 SERPL-SCNC: 29 MMOL/L (ref 21–32)
CREAT SERPL-MCNC: 0.4 MG/DL (ref 0.6–1.3)
EOSINOPHIL # BLD AUTO: 0.21 THOUSAND/ÂΜL (ref 0–0.61)
EOSINOPHIL NFR BLD AUTO: 2 % (ref 0–6)
ERYTHROCYTE [DISTWIDTH] IN BLOOD BY AUTOMATED COUNT: 11.3 % (ref 11.6–15.1)
GFR SERPL CREATININE-BSD FRML MDRD: 137 ML/MIN/1.73SQ M
GLUCOSE SERPL-MCNC: 94 MG/DL (ref 65–140)
HCT VFR BLD AUTO: 36.4 % (ref 34.8–46.1)
HGB BLD-MCNC: 12 G/DL (ref 11.5–15.4)
IMM GRANULOCYTES # BLD AUTO: 0.03 THOUSAND/UL (ref 0–0.2)
IMM GRANULOCYTES NFR BLD AUTO: 0 % (ref 0–2)
LYMPHOCYTES # BLD AUTO: 1.65 THOUSANDS/ÂΜL (ref 0.6–4.47)
LYMPHOCYTES NFR BLD AUTO: 18 % (ref 14–44)
MCH RBC QN AUTO: 33.9 PG (ref 26.8–34.3)
MCHC RBC AUTO-ENTMCNC: 33 G/DL (ref 31.4–37.4)
MCV RBC AUTO: 103 FL (ref 82–98)
MONOCYTES # BLD AUTO: 0.88 THOUSAND/ÂΜL (ref 0.17–1.22)
MONOCYTES NFR BLD AUTO: 10 % (ref 4–12)
NEUTROPHILS # BLD AUTO: 6.15 THOUSANDS/ÂΜL (ref 1.85–7.62)
NEUTS SEG NFR BLD AUTO: 70 % (ref 43–75)
NRBC BLD AUTO-RTO: 0 /100 WBCS
PLATELET # BLD AUTO: 183 THOUSANDS/UL (ref 149–390)
PMV BLD AUTO: 12.3 FL (ref 8.9–12.7)
POTASSIUM SERPL-SCNC: 3.6 MMOL/L (ref 3.5–5.3)
RBC # BLD AUTO: 3.54 MILLION/UL (ref 3.81–5.12)
SODIUM SERPL-SCNC: 135 MMOL/L (ref 135–147)
WBC # BLD AUTO: 8.96 THOUSAND/UL (ref 4.31–10.16)

## 2024-12-24 PROCEDURE — 85025 COMPLETE CBC W/AUTO DIFF WBC: CPT

## 2024-12-24 PROCEDURE — 80048 BASIC METABOLIC PNL TOTAL CA: CPT

## 2024-12-24 PROCEDURE — 99239 HOSP IP/OBS DSCHRG MGMT >30: CPT | Performed by: INTERNAL MEDICINE

## 2024-12-24 PROCEDURE — 99232 SBSQ HOSP IP/OBS MODERATE 35: CPT | Performed by: INTERNAL MEDICINE

## 2024-12-24 RX ORDER — OXYCODONE HYDROCHLORIDE 5 MG/1
5 TABLET ORAL EVERY 4 HOURS PRN
Qty: 40 TABLET | Refills: 0 | Status: SHIPPED | OUTPATIENT
Start: 2024-12-24 | End: 2025-01-03

## 2024-12-24 RX ORDER — PANTOPRAZOLE SODIUM 40 MG/1
40 TABLET, DELAYED RELEASE ORAL 2 TIMES DAILY
Qty: 60 TABLET | Refills: 0 | Status: SHIPPED | OUTPATIENT
Start: 2024-12-24

## 2024-12-24 RX ORDER — NICOTINE 21 MG/24HR
1 PATCH, TRANSDERMAL 24 HOURS TRANSDERMAL DAILY
Qty: 28 PATCH | Refills: 0 | Status: SHIPPED | OUTPATIENT
Start: 2024-12-25

## 2024-12-24 RX ORDER — LANOLIN ALCOHOL/MO/W.PET/CERES
100 CREAM (GRAM) TOPICAL DAILY
Qty: 30 TABLET | Refills: 0 | Status: SHIPPED | OUTPATIENT
Start: 2024-12-25

## 2024-12-24 RX ORDER — DULOXETIN HYDROCHLORIDE 30 MG/1
30 CAPSULE, DELAYED RELEASE ORAL DAILY
Qty: 30 CAPSULE | Refills: 0 | Status: SHIPPED | OUTPATIENT
Start: 2024-12-25

## 2024-12-24 RX ORDER — METRONIDAZOLE 500 MG/1
500 TABLET ORAL EVERY 8 HOURS SCHEDULED
Status: DISCONTINUED | OUTPATIENT
Start: 2024-12-24 | End: 2024-12-24 | Stop reason: HOSPADM

## 2024-12-24 RX ORDER — ONDANSETRON 4 MG/1
4 TABLET, ORALLY DISINTEGRATING ORAL EVERY 6 HOURS PRN
Qty: 30 TABLET | Refills: 0 | Status: SHIPPED | OUTPATIENT
Start: 2024-12-24

## 2024-12-24 RX ADMIN — OXYCODONE HYDROCHLORIDE 10 MG: 10 TABLET ORAL at 03:42

## 2024-12-24 RX ADMIN — OXYCODONE HYDROCHLORIDE 5 MG: 5 TABLET ORAL at 14:36

## 2024-12-24 RX ADMIN — NICOTINE 1 PATCH: 21 PATCH, EXTENDED RELEASE TRANSDERMAL at 09:45

## 2024-12-24 RX ADMIN — DULOXETINE HYDROCHLORIDE 30 MG: 30 CAPSULE, DELAYED RELEASE ORAL at 09:43

## 2024-12-24 RX ADMIN — LORAZEPAM 0.5 MG: 2 INJECTION INTRAMUSCULAR; INTRAVENOUS at 11:08

## 2024-12-24 RX ADMIN — PANTOPRAZOLE SODIUM 40 MG: 40 INJECTION, POWDER, FOR SOLUTION INTRAVENOUS at 09:44

## 2024-12-24 RX ADMIN — THIAMINE HCL TAB 100 MG 100 MG: 100 TAB at 09:43

## 2024-12-24 RX ADMIN — SENNOSIDES 8.6 MG: 8.6 TABLET, FILM COATED ORAL at 09:43

## 2024-12-24 RX ADMIN — OXYCODONE HYDROCHLORIDE 5 MG: 5 TABLET ORAL at 09:43

## 2024-12-24 RX ADMIN — Medication 1 TABLET: at 09:43

## 2024-12-24 RX ADMIN — ENOXAPARIN SODIUM 40 MG: 40 INJECTION SUBCUTANEOUS at 09:44

## 2024-12-24 RX ADMIN — METRONIDAZOLE 500 MG: 500 TABLET ORAL at 11:08

## 2024-12-24 RX ADMIN — FOLIC ACID 1 MG: 1 TABLET ORAL at 10:10

## 2024-12-24 RX ADMIN — POLYETHYLENE GLYCOL 3350 17 G: 17 POWDER, FOR SOLUTION ORAL at 09:44

## 2024-12-24 RX ADMIN — ONDANSETRON 4 MG: 2 INJECTION INTRAMUSCULAR; INTRAVENOUS at 16:00

## 2024-12-24 RX ADMIN — METRONIDAZOLE 500 MG: 500 INJECTION, SOLUTION INTRAVENOUS at 03:42

## 2024-12-24 NOTE — CERTIFIED RECOVERY SPECIALIST
"Time spent: 47 minutes      CRS met with patient to explain services and offer support. CRS shared some personal story, patient was receptive. Patient was open to talking and became very emotional in sharing some key points of her drinking. Patient said she \"is not close to her parents, was raised by her grandparents, very close to grandmother who is aware\" of pt struggles with alcohol. Patient said she \"has not shared all of her trauma with anyone but one person until now\". CRS listened and offered support and encouragement for patient, suggested patient call her counselor to schedule appt, and patient also mentioned she will go back to AA. Patient was in treatment for AUD about 3 yrs ago and stayed sober for almost a year. We determined what worked for her was consistent connection to her counselor, her AA community, strong friendships, and her grandmother. This is what she wants to try again. Patient is looking forward to being home tonight with her furbabies, and has supportive (non-drinking) friends coming over for [place]. Patient will reach out as needed.    CRS encouraged patient to seek help if she continues to struggle with abstinence from alcohol.     CRS provided business card, resources (paper and list in Patient instructions as per discussion)        "

## 2024-12-24 NOTE — ASSESSMENT & PLAN NOTE
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 Moderate pelvic ascites.     Suspect imaging findings above or likely a sequale of pancreatitis. No evidence of luminal gastric varices during EGD/Cyst-gastrostomy placement    Plan  - Given paucity of data regarding decision for anticoagulation in these cases, and plan for necrosectomy in near future, would favor conservative approach at this time with hopes of resolution with improvement of her pancreatitis

## 2024-12-24 NOTE — ASSESSMENT & PLAN NOTE
History of alcohol use disorder and previous withdrawal seizure.  No sign of active withdrawal during this admission  Thiamine and folate

## 2024-12-24 NOTE — ASSESSMENT & PLAN NOTE
34-year-old female with a history of active alcohol use disorder, recurrent alcoholic pancreatitis (most recently November 2023) presents as a transfer from a  for consideration of necrosectomy and cyst gastrostomy with advanced endoscopy.  She was recently admitted from 11/23 to 11/29 for necrotizing pancreatitis.  CT scan at that time showed evidence of pancreatic necrosis and acute pancreatitis.  She was treated conservatively with improvement of her symptoms.  She represented again to [facility] on 12/13 with complaints of worsening epigastric abdominal pain, nausea, vomiting.  CT abdomen and pelvis now showing multiple peripancreatic fluid collection, a new large well-defined cystic collection with mass effect on proximal stomach measuring 9 x 9 x 12 cm.  Last alcohol use was 12/13.  Consumes 2-3 bottles of wine daily.   Liver enzymes within normal limits and lipase has normalized.    S/p EUS with cystogastrostomy stent placement 12/17.  The findings of mass effect on gastric body from the pancreatic cyst, 11 x 10 cm smooth, homogenous unilocular cyst with internal debris in the peripancreatic area.  Therapeutic drainage performed using a 10 mm x 15 mm LAMS followed by balloon dilation with removal of sanguinous fluid.  7 [place] by 3 cm straight plastic stent was placed through the lumen of prior stents.  Aspirate was notably blood-tinged with some clots inside the cyst cavity however, no findings of active bleeding.    CT abdomen pelvis obtained over the weekend for worsening abdominal pain and leukocytosis.  Cyst gastrostomy stent in satisfactory position with decreased size of drainage fluid collection.  However, there has been an interval increase in the size of fluid collection in the tail of the pancreas and pancreatic head.  There is also finding of splenic vein thrombosis with perigastric varices and ascites on imaging. Suspect this is likely sequela of recent severe pancreatitis. No evidence of  luminal gastric varices during EGD/Cyst-gastrostomy placement      Plan    Reviewed images with advanced endoscopist recommended no endoscopic intervention at this time.  Will continue with conservative management. On examination today, her pain is much improved  - Continue low fat,  low residue diet outpatient  - Plan for repeat CT abdomen and pelvis in 3 weeks prior to next endoscopy  - Will arrange for repeat EGD in 3-4 weeks for necrosectomy  - Continued alcohol cessation counseling.  following  - Will arrange for outpatient follow-up with advanced endoscopy team

## 2024-12-24 NOTE — ASSESSMENT & PLAN NOTE
Suspect opioid-induced constipation with findings of stool throughout colon on CT scan  - Continue Miralax and Dulcolax BID.    -If still requiring opiates for pain control on discharge, consider Amitiza 24 mcg twice daily

## 2024-12-24 NOTE — PROGRESS NOTES
Progress Note - Gastroenterology   Name: Kaykay Holland 34 y.o. female I MRN: [id number]  Unit/Bed#: [facility] 812-01 I Date of Admission: 12/15/2024   Date of Service: 12/24/2024 I Hospital Day: 9    Assessment & Plan  Acute alcoholic pancreatitis  34-year-old female with a history of active alcohol use disorder, recurrent alcoholic pancreatitis (most recently November 2023) presents as a transfer from a  for consideration of necrosectomy and cyst gastrostomy with advanced endoscopy.  She was recently admitted from 11/23 to 11/29 for necrotizing pancreatitis.  CT scan at that time showed evidence of pancreatic necrosis and acute pancreatitis.  She was treated conservatively with improvement of her symptoms.  She represented again to [facility] on 12/13 with complaints of worsening epigastric abdominal pain, nausea, vomiting.  CT abdomen and pelvis now showing multiple peripancreatic fluid collection, a new large well-defined cystic collection with mass effect on proximal stomach measuring 9 x 9 x 12 cm.  Last alcohol use was 12/13.  Consumes 2-3 bottles of wine daily.   Liver enzymes within normal limits and lipase has normalized.    S/p EUS with cystogastrostomy stent placement 12/17.  The findings of mass effect on gastric body from the pancreatic cyst, 11 x 10 cm smooth, homogenous unilocular cyst with internal debris in the peripancreatic area.  Therapeutic drainage performed using a 10 mm x 15 mm LAMS followed by balloon dilation with removal of sanguinous fluid.  7 [place] by 3 cm straight plastic stent was placed through the lumen of prior stents.  Aspirate was notably blood-tinged with some clots inside the cyst cavity however, no findings of active bleeding.    CT abdomen pelvis obtained over the weekend for worsening abdominal pain and leukocytosis.  Cyst gastrostomy stent in satisfactory position with decreased size of drainage fluid collection.  However, there has been an interval increase in the size  of fluid collection in the tail of the pancreas and pancreatic head.  There is also finding of splenic vein thrombosis with perigastric varices and ascites on imaging. Suspect this is likely sequela of recent severe pancreatitis. No evidence of luminal gastric varices during EGD/Cyst-gastrostomy placement      Plan    Reviewed images with advanced endoscopist recommended no endoscopic intervention at this time.  Will continue with conservative management. On examination today, her pain is much improved  - Continue low fat,  low residue diet outpatient  - Plan for repeat CT abdomen and pelvis in 3 weeks prior to next endoscopy  - Will arrange for repeat EGD in 3-4 weeks for necrosectomy  - Continued alcohol cessation counseling.  following  - Will arrange for outpatient follow-up with advanced endoscopy team      Alcohol withdrawal seizure (HCC)  History of alcohol use disorder and previous withdrawal seizure.  No sign of active withdrawal during this admission  Thiamine and folate  Gastroesophageal reflux disease without esophagitis  Continue PPI twice daily  Leukocytosis    Constipation  Suspect opioid-induced constipation with findings of stool throughout colon on CT scan  - Continue Miralax and Dulcolax BID.    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 Moderate pelvic ascites.     Suspect imaging findings above or likely a sequale of pancreatitis. No evidence of luminal gastric varices during EGD/Cyst-gastrostomy placement    Plan  - Given paucity of data regarding decision for anticoagulation in these cases, and plan for necrosectomy in near future, would favor conservative approach at this time with hopes of resolution with improvement of her pancreatitis          Subjective   Seen and examined  bedside. Reports improved abdominal pain. No fever, chills    Objective :  Temp:  [97.4 °F (36.3 °C)-98.4 °F (36.9 °C)] 97.9 °F (36.6 °C)  HR:  [63-74] 63  BP: (110-125)/(64-79) 110/64  Resp:  [8-17] 16  SpO2:  [96 %-98 %] 97 %  O2 Device: None (Room air)    Physical Exam  Vitals and nursing note reviewed.   Constitutional:       General: She is not in acute distress.     Appearance: She is well-developed.   HENT:      Head: Normocephalic and atraumatic.   Eyes:      Conjunctiva/sclera: Conjunctivae normal.   Cardiovascular:      Rate and Rhythm: Normal rate and regular rhythm.      Heart sounds: No murmur heard.  Pulmonary:      Effort: Pulmonary effort is normal. No respiratory distress.      Breath sounds: Normal breath sounds.   Abdominal:      Palpations: Abdomen is soft.      Tenderness: There is no abdominal tenderness.   Musculoskeletal:         General: No swelling.      Cervical back: Neck supple.   Skin:     General: Skin is warm and dry.      Capillary Refill: Capillary refill takes less than 2 seconds.   Neurological:      Mental Status: She is alert.   Psychiatric:         Mood and Affect: Mood normal.           Lab Results: I have reviewed the following results:CBC/BMP:   .     12/24/24  0959 12/24/24  1113   WBC 8.96  --    HGB 12.0  --    HCT 36.4  --      --    SODIUM  --  135   K  --  3.6   CL  --  100   CO2  --  29   BUN  --  4*   CREATININE  --  0.40*   GLUC  --  94        Imaging Results Review: No pertinent imaging studies reviewed.  Other Study Results Review: No additional pertinent studies reviewed.    Rocio Wang MD  Gastroenterology Fellow, PGY- 4  Available on EPIC Secure Chat  12/24/2024 2:05 PM

## 2024-12-24 NOTE — DISCHARGE INSTR - AVS FIRST PAGE
Please follow up with your PCP on  at 2:40 pm.     Please follow up with GI within 2-3 weeks, they should be reaching out, if you do not hear from them by the end of the week please call them. You will require an EGD with necrosectomy in 3 to 4 weeks. Plan for repeat CT abdomen and pelvis in 3 weeks prior to next endoscopy. The GI office should be calling to help coordinate these items for follow-up, but we have also included the office contact info in your discharge paperwork    Please see GYN within two weeks of your discharge; we have included the office contact information for the group that saw you while you are in the hospital, but please feel free to schedule a visit for a provider that is closer to you.  You will need follow-up ultrasound in approximately 6-12 weeks for reevaluation; this has been ordered, and your PCP/Gynecology can help you to schedule when you follow-up    You were prescribed a short ten day course oxycodone 5 mg every 4 hours as needed for up to ten days. This medication will help with your pancreatitis pain. Please continue taking stool softeners, such as miralax, as oxycodone will contribute to your constipation. We have also prescribed zofran to help you with nausea as needed.    Continue taking cymbalta 30 mg daily, this will help with depression and anxiety, as well as may improve pain. This will be followed up with your PCP.    We have prescribed nicotine patches to help with smoking cessation. We very strongly encourage you to pursue outpatient resources to stop using alcohol; these have been provided to you.    We have provided you with a work note. If you need more time off once this has , please discuss this with your PCP.

## 2024-12-24 NOTE — CASE MANAGEMENT
Case Management Discharge Planning Note    Patient name Kaykay Holland  Location [facility] 812/[facility] 812-01 MRN [id number]  : 1990 Date 2024       Current Admission Date: 12/15/2024  Current Admission Diagnosis:Acute alcoholic pancreatitis   Patient Active Problem List    Diagnosis Date Noted Date Diagnosed    Splenic vein thrombosis 2024     Leukocytosis 2024     Thrombocytopenia (HCC) 2024     Ovarian cyst 2024     Constipation 2024     Pancreatic pseudocyst 2024     Gastroesophageal reflux disease without esophagitis 2024     Electrolyte abnormality 2024     Alcohol use disorder 2024     Hematemesis 2024     Necrotizing pancreatitis 2024     Pancytopenia (HCC) 04/10/2024     Alcohol abuse 2024     Provoked seizures (HCC) 2024     Alcohol withdrawal seizure (HCC) 2024     Transaminitis 2024     Prolonged QT interval 2024     Essential hypertension 2024     Tobacco abuse 2024     Acute alcoholic pancreatitis 2023     Hepatic steatosis 2023     Peptic ulcer disease 2021     Alcohol withdrawal (HCC) 2020     Anxiety 2019     Depression 2019       LOS (days): 9  Geometric Mean LOS (GMLOS) (days):   Days to GMLOS:     OBJECTIVE:  Risk of Unplanned Readmission Score: 25.3         Current admission status: Inpatient   Preferred Pharmacy:   [facility]/pharm[facility] #[id number] - [place] - [address]  [address]  [place]   Phone: [phone] Fax: [phone]    Primary Care Provider: PATRICIA Moran    Primary Insurance: AET[employer]  Secondary Insurance:     DISCHARGE DETAILS:    Other Referral/Resources/Interventions Provided:  Referral Comments: CM notified by SOD C, pt cleared for discharge home today. Pt seen by CRS, OP ETOH resources provided. Pt requesting work note faxed to employer F: [phone]. Work note faxed as  requested and additional copy provided to pt at bedside. Pt reports no further CM needs at this time and will have transportation home after 3pm.    Treatment Team Recommendation: Home  Discharge Destination Plan:: Home  Transport at Discharge : Family

## 2024-12-24 NOTE — DISCHARGE SUMMARY
INTERNAL MEDICINE RESIDENCY DISCHARGE SUMMARY     Kaykay Holland   34 y.o. female  MRN: [id number]  Room/Bed: [facility]12/[facility]-01     [facility] 8   Encounter: [id number]    Acute alcoholic pancreatitis  Patient initially presented to [facility] with complaints of nausea and left-sided abdominal pain radiating to back. Has history of recurrent alcoholic pancreatitis.  Recent hospitalization (11/23 - 11/29) during which time was managed conservatively.  CT with findings of recurrent pericarditis and large pancreatic pseudocyst.  General surgery consulted at [facility].  Recommended conservative management.  GI consulted at [facility]. Recommended transfer to [facility] for cyst gastrostomy and necrosectomy with advance GI team; now status post cystogastrostomy  Main collection appears to be improving on follow-up imaging though numerous persistent collections remain present  Patient required 0.5 dilaudid x 2 overnight as well as enema 12/21  Patient had clinical improvement of pain 12/23, patient was kept NPO for possible intervention, however no intervention d/t clinical improvement.     Plan:  Patient currently tolerating GI low res diet, continue on discharge  GI consulted.  Appreciate recommendation.   Plan for repeat CT in 3 weeks then repeat EGD for stent removal/Necrosectomy in 4 weeks  Pain regimen downgraded to oxycodone 5 mg PO Q4H PRN and will be discharged for ten days  Patient educated on bowel regimen and given handouts, continue at home .    Leukocytosis  Patient has new elevation in WBC 12/22/24 from 7.68 to 12.45. Patient has worsening tenderness to palpation. Patient reports increase in pain requiring IV dilaudid. Patient afebrile.  Patient received two days of ceftriaxone and flagyl. Patient had WBC down trending, blood cultures with no growth at 24 hours.     Alcohol withdrawal seizure (HCC)  Patient reports he drinks 3 glasses of wine for the past few  years.  However, since her last hospital discharge on 11/29 she has not had any alcohol.  Has history of withdrawal seizure in 2023 for which she was admitted.  Previously on Librium taper, now completed; patient also off CIWA protocol  CRS is provide patient with available resources, patient was encouraged to reconnect with AA.     Plan  -CRS recommendations appreciated  -Encourage cessation and abstinence from alcohol    Essential hypertension  Previously on lisinopril which has been discontinued for about 6 months.  Blood pressure well-controlled    Tobacco abuse  Smokes a pack a day for past 5 to 6 years.  On nicotine patch     Plan  Smoking cessation education    Gastroesophageal reflux disease without esophagitis  Reported history of GERD on pantoprazole 40 mg twice daily at home.  Patient remains nauseous 12/22     Plan  Continue home pantoprazole    Thrombocytopenia (HCC)  Patients platelets have been downtrending since admission. On admission 12/13/24 Plts were 369.  Platelets have since fallen into the mid 100s.  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  Presenting platelet level felt to be more representative of an acute phase reaction with current platelet level more consistent with patient's prior baseline.  No signs of bleeding or bruising.     Plan  -Continue to monitor    Ovarian cyst  CT: right ovarian cyst since 12/13/24, now measuring 4.9 cm by 4.2 cm and containing some hyperdense material suggestive of hemorrhagic components.  Patient does have history of ovarian cyst in the past though does not routinely follow with gynecology.  Gynecology team recommending follow-up imaging in 6 to 12 weeks with establishing with regular gynecology on discharge.  Patient started to have vaginal bleeding with dime to nickel sized clots, discussed with GYN and consistent with her irregular  menstrual periods.     Plan:  - Outpatient follow-up as above  - Continue to monitor vital signs and hemoglobin   - Will order coagulation studies in light of vaginal clots    Anxiety  Patient has a known history of anxiety that is not treated. Patient has had increase in her anxiety while in the hospital secondary to current events.  Has since been started on Cymbalta this admission for assistance with both mood and pain control.     Plan:   Continue duloxetine 30 mg daily, can be continued on discharge  Continue Ativan as needed  Encourage patient to follow up with PCP after discharge      DETAILS OF HOSPITAL COURSE      Kaykay Holland 34-year-old female with past medical history notable for significant alcohol abuse complicated by withdrawal seizures in the past, recurrent alcoholic pancreatitis, hypertension, depression/anxiety, and tobacco abuse.  Was recently admitted at [facility] (end of November) with alcoholic pancreatitis and a large necrotic pseudocyst which was medically managed.  She then represented to [facility] with recurrent abdominal pain and nausea/vomiting.  Imaging suggestive of progression of large, necrotic appearing pancreatic pseudocyst.  After discussion between surgery and GI, patient ultimately transferred to [facility] for cyst gastrostomy which took place earlier this week.  Prior to transfer, she was started on a Librium taper that she has since completed; she has now out of the window for CIWA protocol and this has been discontinued.  Patient's pain regimen was weaned as well as helping patient have bowel movements. Cymbalta 30 mg daily started this admission to help with both mood and chronic pain.  On repeat imaging, patient incidentally found to have a right hemorrhagic ovarian cyst for which she was evaluated by gynecology and underwent TVUS; recommended for outpatient follow-up.  As of time of writing, imaging workup for this appears to be ongoing. She will also need repeat CT and  will need repeat EGD for likely necrosectomy.  Will also need to follow-up with OB/GYN on discharge.  Patient was discharged with ten days of oxycodone 5 mg PRN as well as zofran as needed. Patient was discharged on cymbalta 30 mg daily. Patient was encouraged daily to speak with CRS as well as reconnecting with her former AA group.     Patient was seen and examined at time of discharge. Patient continues to report LUQ pain, but improved and patient believes she can manage at home. Patient was explained all of the follow up appointments she will require. Patient was informed of precautions going home and when to return to the hospital. I called her PCP to make an appointment for her TCM on Thursday, December 26th at 2:40 pm. Patient reports continued bowel movements prior to discharge and was encouraged to continue taking OTC stool softeners. Work note was written for the patient, as well as return date of tens days which correlates with oxycodone prescription end date.     Vitals:    12/23/24 1642 12/23/24 2211 12/24/24 0237 12/24/24 0825   BP:  125/79  110/64   Pulse:  74  63   Resp: 14 17  16   Temp: 97.9 °F (36.6 °C) 98.4 °F (36.9 °C)  97.9 °F (36.6 °C)   TempSrc:       SpO2:  98% 96% 97%   Weight:       Height:        Physical Exam  Constitutional:       General: She is not in acute distress.     Appearance: Normal appearance. She is not ill-appearing or diaphoretic.   HENT:      Head: Normocephalic and atraumatic.      Mouth/Throat:      Mouth: Mucous membranes are moist.   Cardiovascular:      Rate and Rhythm: Normal rate and regular rhythm.      Pulses: Normal pulses.      Heart sounds: Normal heart sounds.   Pulmonary:      Effort: Pulmonary effort is normal.      Breath sounds: Normal breath sounds.   Abdominal:      General: Abdomen is flat.      Palpations: Abdomen is soft.      Tenderness: There is abdominal tenderness.      Comments: Tenderness to palpation LUQ with improvement from prior day    Musculoskeletal:      Right lower leg: No edema.      Left lower leg: No edema.   Skin:     General: Skin is warm and dry.      Capillary Refill: Capillary refill takes less than 2 seconds.   Neurological:      Mental Status: She is alert and oriented to person, place, and time. Mental status is at baseline.   Psychiatric:         Mood and Affect: Mood normal.          DISCHARGE INFORMATION     PCP at Discharge: PATRICIA Moran    Admitting Provider: Clay Ferguson MD  Admission Date: 12/15/2024    Discharge Provider: Clay Ferguson*  Discharge Date: 12/24/24    Discharge Disposition: [facility]  Discharge Condition: good  Discharge with Lines: no    Discharge Diet: low fat, low cholesterol diet  Activity Restrictions: none  Test Results Pending at Discharge: none    Discharge Diagnoses:  Principal Problem:    Acute alcoholic pancreatitis  Active Problems:    Alcohol withdrawal seizure (HCC)    Essential hypertension    Tobacco abuse    Anxiety    Gastroesophageal reflux disease without esophagitis    Constipation    Thrombocytopenia (HCC)    Ovarian cyst    Leukocytosis    Splenic vein thrombosis  Resolved Problems:    * No resolved hospital problems. *      Consulting Providers:      Diagnostic & Therapeutic Procedures Performed:  US pelvis complete w transvaginal  Result Date: 12/20/2024  Impression: 1.  4.7 cm right ovarian hemorrhagic cyst. Follow-up ultrasound is recommended in 6-12 weeks to ensure resolution. 2.  5.4 cm simple right ovarian cyst. If the cyst persists on the short interval follow-up, then subsequent annual ultrasound would be recommended. The study was marked in EPIC for immediate notification. Workstation performed: [id number]     CT abdomen pelvis w contrast  Result Date: 12/19/2024  Impression: 1) 9.1 x 6.5 x 2.9 cm fluid collection with well-defined wall and non-simple fluid contents along the greater curvature of the proximal stomach, decreased  from 13.4 x 9.9 x 9.4 cm on 12/13/2024 following cystogastrostomy creation. 2) Two additional, smaller peripancreatic fluid collections also with well-defined walls, as detailed above, the larger of the 2 also mildly decreased in size, and the smaller overall unchanged. 3) Multiple additional hypodense lesions or fluid collections in the pancreatic parenchyma without significant interval change since 12/13/2024, with several of them present on the initial CT from September 2024. 4) Several foci of air in the largest fluid collection related to the stent. No foci of air in the pancreatic parenchyma, or in the peripancreatic space and peripancreatic fluid collections to suggest superimposed infection. 5) No new fluid collections are seen. 6) Interval enlargement of a right ovarian cyst since 12/13/2024, now measuring 4.9 x 4.2 cm, and containing some layering hyperdense material suggestive of hemorrhagic components. 7) Small amount of simple density ascites, mostly in the pelvis dependently, increased from 12/13/2024. 8) Stool throughout most of the colon, suggesting constipation. No bowel obstruction or convincing inflammation. 9) Small bilateral pleural effusions and lower lobe atelectasis, new from 12/13/2024. 10) Additional findings as above. Workstation performed: [id number]     XR abdomen obstruction series  Result Date: 12/19/2024  Impression: 1.  Normal stool burden without evidence for obstruction or ileus or other acute abdominal findings. 2.  Axios pseudocyst drainage stent projects over the left upper quadrant. 3.  Subsegmental platelike atelectasis at the lung bases. Resident: Raj Brewer I, the attending radiologist, have reviewed the images and agree with the final report above. Workstation performed: [id number]     Endoscopic ultrasonography, GI (Upper) Linear with Axios stent placement  Result Date: 12/17/2024  Impression: External mass effect in gastric body from pancreatic cyst. One round,  homogeneous and unilocular cyst measuring 11 cm x 10 cm with debris present, well-defined and smooth margins in the peripancreatic area. Therapeutic drainage performed using lumen apposing metal stent measuring 10 mm x 15 mm. Balloon dilation performed after stent placement with removal of sanguinous fluid. 7 Fr x 3 cm straight plastic stent was placed through the lumen of the prior stent.  The aspirate was blood-tinged however no active bleeding was seen.  Clots were seen inside the cyst cavity as well. RECOMMENDATION: Return pt to the floor Clear liquid today Start low residue diet tomorrow and continue this outpatient Avoid alcohol CT scan 3 weeks prior to the next endoscopy. Plan for repeat EGD in 3-4 weeks for necrosectomy    Venancio Jaime MD       Code Status: Level 1 - Full Code  Advance Directive & Living Will: <no information>  Power of :    POLST:      Medications:  Current Discharge Medication List        Current Discharge Medication List        START taking these medications    Details   DULoxetine (CYMBALTA) 30 mg delayed release capsule Take 1 capsule (30 mg total) by mouth daily  Qty: 30 capsule, Refills: 0    Associated Diagnoses: Anxiety      nicotine (NICODERM CQ) 21 mg/24 hr TD 24 hr patch Place 1 patch on the skin over 24 hours daily  Qty: 28 patch, Refills: 0    Associated Diagnoses: Tobacco abuse      oxyCODONE (ROXICODONE) 5 immediate release tablet Take 1 tablet (5 mg total) by mouth every 4 (four) hours as needed for moderate pain for up to 10 days Max Daily Amount: 30 mg  Qty: 40 tablet, Refills: 0    Associated Diagnoses: Necrotizing pancreatitis; Pancreatic pseudocyst      thiamine 100 MG tablet Take 1 tablet (100 mg total) by mouth daily  Qty: 30 tablet, Refills: 0    Associated Diagnoses: Alcohol use disorder           Current Discharge Medication List        CONTINUE these medications which have NOT CHANGED    Details   folic acid ( Folic Acid) 1 mg tablet Take 1 tablet (1 mg  "total) by mouth daily  Qty: 30 tablet, Refills: 0    Associated Diagnoses: Alcohol abuse             Allergies:  Allergies   Allergen Reactions   • Other Edema     Bee stings (localized edema)       FOLLOW-UP     PCP Outpatient Follow-up:  Yes, scheduled for appointment on 12/26/24    Consulting Providers Follow-up:  Yes, plan for repeat CT in 3 weeks then repeat EGD for stent removal/Necrosectomy in 4 weeks    Active Issues Requiring Follow-up:   Yes, patient requires follow up with GI, PCP, and OBGYN    Discharge Statement:   I spent 1 hour minutes discharging the patient. This time was spent on the day of discharge. I had direct contact with the patient on the day of discharge. Additional documentation is required if more than 30 minutes were spent on discharge.    Portions of the record may have been created with voice recognition software.  Occasional wrong word or \"sound a like\" substitutions may have occurred due to the inherent limitations of voice recognition software.  Read the chart carefully and recognize, using context, where substitutions have occurred.    ==  Roxie Curry DO  [facility]  Internal Medicine Resident PGY-1    "

## 2024-12-24 NOTE — PLAN OF CARE
Problem: PAIN - ADULT  Goal: Verbalizes/displays adequate comfort level or baseline comfort level  Description: Interventions:  - Encourage patient to monitor pain and request assistance  - Assess pain using appropriate pain scale  - Administer analgesics based on type and severity of pain and evaluate response  - Implement non-pharmacological measures as appropriate and evaluate response  - Consider cultural and social influences on pain and pain management  - Notify physician/advanced practitioner if interventions unsuccessful or patient reports new pain  Outcome: Progressing     Problem: INFECTION - ADULT  Goal: Absence or prevention of progression during hospitalization  Description: INTERVENTIONS:  - Assess and monitor for signs and symptoms of infection  - Monitor lab/diagnostic results  - Monitor all insertion sites, i.e. indwelling lines, tubes, and drains  - Monitor endotracheal if appropriate and nasal secretions for changes in amount and color  - [place] appropriate cooling/warming therapies per order  - Administer medications as ordered  - Instruct and encourage patient and family to use good hand hygiene technique  - Identify and instruct in appropriate isolation precautions for identified infection/condition  Outcome: Progressing     Problem: SAFETY ADULT  Goal: Patient will remain free of falls  Description: INTERVENTIONS:  - Educate patient/family on patient safety including physical limitations  - Instruct patient to call for assistance with activity   - Consult OT/PT to assist with strengthening/mobility   - Keep Call bell within reach  - Keep bed low and locked with side rails adjusted as appropriate  - Keep care items and personal belongings within reach  - Initiate and maintain comfort rounds  - Apply yellow socks and bracelet for high fall risk patients  - Consider moving patient to room near nurses station  Outcome: Progressing     Problem: DISCHARGE PLANNING  Goal: Discharge to home or  other facility with appropriate resources  Description: INTERVENTIONS:  - Identify barriers to discharge w/patient and caregiver  - Arrange for needed discharge resources and transportation as appropriate  - Identify discharge learning needs (meds, wound care, etc.)  - Arrange for interpretive services to assist at discharge as needed  - Refer to Case Management Department for coordinating discharge planning if the patient needs post-hospital services based on physician/advanced practitioner order or complex needs related to functional status, cognitive ability, or social support system  Outcome: Progressing     Problem: Knowledge Deficit  Goal: Patient/family/caregiver demonstrates understanding of disease process, treatment plan, medications, and discharge instructions  Description: Complete learning assessment and assess knowledge base.  Interventions:  - Provide teaching at level of understanding  - Provide teaching via preferred learning methods  Outcome: Progressing     Problem: Nutrition/Hydration-ADULT  Goal: Nutrient/Hydration intake appropriate for improving, restoring or maintaining nutritional needs  Description: Monitor and assess patient's nutrition/hydration status for malnutrition. Collaborate with interdisciplinary team and initiate plan and interventions as ordered.  Monitor patient's weight and dietary intake as ordered or per policy. Utilize nutrition screening tool and intervene as necessary. Determine patient's food preferences and provide high-protein, high-caloric foods as appropriate.     INTERVENTIONS:  - Monitor oral intake, urinary output, labs, and treatment plans  - Assess nutrition and hydration status and recommend course of action  - Evaluate amount of meals eaten  - Assist patient with eating if necessary   - Allow adequate time for meals  - Recommend/ encourage appropriate diets, oral nutritional supplements, and vitamin/mineral supplements  - Order, calculate, and assess calorie  counts as needed  - Recommend, monitor, and adjust tube feedings and TPN/PPN based on assessed needs  - Assess need for intravenous fluids  - Provide specific nutrition/hydration education as appropriate  - Include patient/family/caregiver in decisions related to nutrition  Outcome: Progressing     Problem: GASTROINTESTINAL - ADULT  Goal: Minimal or absence of nausea and/or vomiting  Description: INTERVENTIONS:  - Administer IV fluids if ordered to ensure adequate hydration  - Maintain NPO status until nausea and vomiting are resolved  - Nasogastric tube if ordered  - Administer ordered antiemetic medications as needed  - Provide nonpharmacologic comfort measures as appropriate  - Advance diet as tolerated, if ordered  - Consider nutrition services referral to assist patient with adequate nutrition and appropriate food choices  Outcome: Progressing  Goal: Maintains or returns to baseline bowel function  Description: INTERVENTIONS:  - Assess bowel function  - Encourage oral fluids to ensure adequate hydration  - Administer IV fluids if ordered to ensure adequate hydration  - Administer ordered medications as needed  - Encourage mobilization and activity  - Consider nutritional services referral to assist patient with adequate nutrition and appropriate food choices  Outcome: Progressing  Goal: Maintains adequate nutritional intake  Description: INTERVENTIONS:  - Monitor percentage of each meal consumed  - Identify factors contributing to decreased intake, treat as appropriate  - Assist with meals as needed  - Monitor I&O, weight, and lab values if indicated  - Obtain nutrition services referral as needed  Outcome: Progressing     Problem: METABOLIC, FLUID AND ELECTROLYTES - ADULT  Goal: Electrolytes maintained within normal limits  Description: INTERVENTIONS:  - Monitor labs and assess patient for signs and symptoms of electrolyte imbalances  - Administer electrolyte replacement as ordered  - Monitor response to  electrolyte replacements, including repeat lab results as appropriate  - Instruct patient on fluid and nutrition as appropriate  Outcome: Progressing  Goal: Fluid balance maintained  Description: INTERVENTIONS:  - Monitor labs   - Monitor I/O and WT  - Instruct patient on fluid and nutrition as appropriate  - Assess for signs & symptoms of volume excess or deficit  Outcome: Progressing

## 2024-12-26 ENCOUNTER — TRANSITIONAL CARE MANAGEMENT (OUTPATIENT)
Dept: FAMILY MEDICINE CLINIC | Facility: HOSPITAL | Age: 34
End: 2024-12-26

## 2024-12-26 DIAGNOSIS — F10.90 ALCOHOL USE DISORDER: Primary | ICD-10-CM

## 2024-12-27 ENCOUNTER — PATIENT OUTREACH (OUTPATIENT)
Dept: CASE MANAGEMENT | Facility: OTHER | Age: 34
End: 2024-12-27

## 2024-12-27 LAB
BACTERIA BLD CULT: NORMAL
BACTERIA BLD CULT: NORMAL

## 2024-12-27 NOTE — PROGRESS NOTES
Received referral and IB from Christine Gimenez, Admin Coordinator requesting that [facility] outreach patient and assess for needs. Per chart review, patient was admitted to  [facility] from 12/5-12/24 due to Acute alcoholic pancreatitis. Patient was seen by CRS and provided OP ETOH resources.      Attempted outreaching patient, no answer and unable to leave message as mailbox was full. Will try again at another time.

## 2024-12-30 ENCOUNTER — PATIENT OUTREACH (OUTPATIENT)
Dept: CASE MANAGEMENT | Facility: OTHER | Age: 34
End: 2024-12-30

## 2024-12-30 NOTE — LETTER
[address] [facility] [address]  [phone]    Re: Unable to reach   12/30/2024       Dear Kaykay,    I am a [facility]  and wanted to be certain you had information to contact me should you desire assistance with or have questions about non-medical aspects of your care such as [but not limited to] medical insurance, housing, transportation, material needs, or emergency needs.  If I do not have an answer I will assist you in finding the appropriate agency or individual who can help.      Please feel free to contact me at Dept: [phone]. Thank You.    Sincerely,         Kanchan Tuttle

## 2024-12-30 NOTE — PROGRESS NOTES
2nd attempt outreaching patient to assess for needs. Per chart review, patient was admitted to  [facility] from 12/5-12/24 due to Acute alcoholic pancreatitis. Patient was seen by CRS and provided OP ETOH resources. No answer and unable to leave message as mailbox was full. UTR letter was sent.

## 2025-01-07 ENCOUNTER — APPOINTMENT (EMERGENCY)
Dept: RADIOLOGY | Facility: HOSPITAL | Age: 35
DRG: 392 | End: 2025-01-07
Payer: COMMERCIAL

## 2025-01-07 ENCOUNTER — HOSPITAL ENCOUNTER (INPATIENT)
Facility: HOSPITAL | Age: 35
LOS: 1 days | Discharge: HOME | DRG: 392 | End: 2025-01-10
Attending: EMERGENCY MEDICINE | Admitting: INTERNAL MEDICINE
Payer: COMMERCIAL

## 2025-01-07 DIAGNOSIS — K85.90 PANCREATITIS: ICD-10-CM

## 2025-01-07 DIAGNOSIS — K86.3 PANCREATIC PSEUDOCYST: ICD-10-CM

## 2025-01-07 DIAGNOSIS — R10.12 LUQ PAIN: Primary | ICD-10-CM

## 2025-01-07 DIAGNOSIS — K92.0 HEMATEMESIS: ICD-10-CM

## 2025-01-07 PROBLEM — R10.9 ABDOMINAL PAIN: Status: ACTIVE | Noted: 2025-01-07

## 2025-01-07 LAB
ALBUMIN SERPL BCG-MCNC: 3.7 G/DL (ref 3.5–5)
ALP SERPL-CCNC: 61 U/L (ref 34–104)
ALT SERPL W P-5'-P-CCNC: 8 U/L (ref 7–52)
ANION GAP SERPL CALCULATED.3IONS-SCNC: 12 MMOL/L (ref 4–13)
AST SERPL W P-5'-P-CCNC: 17 U/L (ref 13–39)
BACTERIA UR QL AUTO: ABNORMAL /HPF
BASOPHILS # BLD AUTO: 0.06 THOUSANDS/ΜL (ref 0–0.1)
BASOPHILS NFR BLD AUTO: 1 % (ref 0–1)
BILIRUB SERPL-MCNC: 0.73 MG/DL (ref 0.2–1)
BILIRUB UR QL STRIP: NEGATIVE
BUN SERPL-MCNC: 7 MG/DL (ref 5–25)
CALCIUM SERPL-MCNC: 9 MG/DL (ref 8.4–10.2)
CHLORIDE SERPL-SCNC: 102 MMOL/L (ref 96–108)
CLARITY UR: ABNORMAL
CO2 SERPL-SCNC: 24 MMOL/L (ref 21–32)
COLOR UR: YELLOW
CREAT SERPL-MCNC: 0.36 MG/DL (ref 0.6–1.3)
EOSINOPHIL # BLD AUTO: 0.17 THOUSAND/ΜL (ref 0–0.61)
EOSINOPHIL NFR BLD AUTO: 2 % (ref 0–6)
ERYTHROCYTE [DISTWIDTH] IN BLOOD BY AUTOMATED COUNT: 12.4 % (ref 11.6–15.1)
EXT PREGNANCY TEST URINE: NEGATIVE
EXT. CONTROL: NORMAL
GFR SERPL CREATININE-BSD FRML MDRD: 142 ML/MIN/1.73SQ M
GLUCOSE SERPL-MCNC: 83 MG/DL (ref 65–140)
GLUCOSE UR STRIP-MCNC: NEGATIVE MG/DL
HCT VFR BLD AUTO: 36.5 % (ref 34.8–46.1)
HGB BLD-MCNC: 12.1 G/DL (ref 11.5–15.4)
HGB UR QL STRIP.AUTO: NEGATIVE
IMM GRANULOCYTES # BLD AUTO: 0.01 THOUSAND/UL (ref 0–0.2)
IMM GRANULOCYTES NFR BLD AUTO: 0 % (ref 0–2)
KETONES UR STRIP-MCNC: ABNORMAL MG/DL
LEUKOCYTE ESTERASE UR QL STRIP: NEGATIVE
LIPASE SERPL-CCNC: 74 U/L (ref 11–82)
LYMPHOCYTES # BLD AUTO: 2.13 THOUSANDS/ΜL (ref 0.6–4.47)
LYMPHOCYTES NFR BLD AUTO: 27 % (ref 14–44)
MCH RBC QN AUTO: 33.2 PG (ref 26.8–34.3)
MCHC RBC AUTO-ENTMCNC: 33.2 G/DL (ref 31.4–37.4)
MCV RBC AUTO: 100 FL (ref 82–98)
MONOCYTES # BLD AUTO: 0.66 THOUSAND/ΜL (ref 0.17–1.22)
MONOCYTES NFR BLD AUTO: 8 % (ref 4–12)
MUCOUS THREADS UR QL AUTO: ABNORMAL
NEUTROPHILS # BLD AUTO: 5.02 THOUSANDS/ΜL (ref 1.85–7.62)
NEUTS SEG NFR BLD AUTO: 62 % (ref 43–75)
NITRITE UR QL STRIP: NEGATIVE
NON-SQ EPI CELLS URNS QL MICRO: ABNORMAL /HPF
NRBC BLD AUTO-RTO: 0 /100 WBCS
PH UR STRIP.AUTO: 5.5 [PH]
PLATELET # BLD AUTO: 269 THOUSANDS/UL (ref 149–390)
PMV BLD AUTO: 10.3 FL (ref 8.9–12.7)
POTASSIUM SERPL-SCNC: 3.9 MMOL/L (ref 3.5–5.3)
PROT SERPL-MCNC: 6.7 G/DL (ref 6.4–8.4)
PROT UR STRIP-MCNC: ABNORMAL MG/DL
RBC # BLD AUTO: 3.64 MILLION/UL (ref 3.81–5.12)
RBC #/AREA URNS AUTO: ABNORMAL /HPF
SODIUM SERPL-SCNC: 138 MMOL/L (ref 135–147)
SP GR UR STRIP.AUTO: 1.02 (ref 1–1.03)
UROBILINOGEN UR STRIP-ACNC: <2 MG/DL
WBC # BLD AUTO: 8.05 THOUSAND/UL (ref 4.31–10.16)
WBC #/AREA URNS AUTO: ABNORMAL /HPF

## 2025-01-07 PROCEDURE — 96376 TX/PRO/DX INJ SAME DRUG ADON: CPT

## 2025-01-07 PROCEDURE — 85025 COMPLETE CBC W/AUTO DIFF WBC: CPT | Performed by: EMERGENCY MEDICINE

## 2025-01-07 PROCEDURE — 36415 COLL VENOUS BLD VENIPUNCTURE: CPT

## 2025-01-07 PROCEDURE — 99223 1ST HOSP IP/OBS HIGH 75: CPT | Performed by: INTERNAL MEDICINE

## 2025-01-07 PROCEDURE — 81001 URINALYSIS AUTO W/SCOPE: CPT

## 2025-01-07 PROCEDURE — 99285 EMERGENCY DEPT VISIT HI MDM: CPT | Performed by: EMERGENCY MEDICINE

## 2025-01-07 PROCEDURE — 96365 THER/PROPH/DIAG IV INF INIT: CPT

## 2025-01-07 PROCEDURE — 81025 URINE PREGNANCY TEST: CPT

## 2025-01-07 PROCEDURE — 96375 TX/PRO/DX INJ NEW DRUG ADDON: CPT

## 2025-01-07 PROCEDURE — 80053 COMPREHEN METABOLIC PANEL: CPT | Performed by: EMERGENCY MEDICINE

## 2025-01-07 PROCEDURE — 99284 EMERGENCY DEPT VISIT MOD MDM: CPT

## 2025-01-07 PROCEDURE — 83690 ASSAY OF LIPASE: CPT | Performed by: EMERGENCY MEDICINE

## 2025-01-07 PROCEDURE — 74177 CT ABD & PELVIS W/CONTRAST: CPT

## 2025-01-07 RX ORDER — ENOXAPARIN SODIUM 100 MG/ML
40 INJECTION SUBCUTANEOUS
Status: DISCONTINUED | OUTPATIENT
Start: 2025-01-09 | End: 2025-01-07

## 2025-01-07 RX ORDER — DEXTROSE, SODIUM CHLORIDE, SODIUM LACTATE, POTASSIUM CHLORIDE, AND CALCIUM CHLORIDE 5; .6; .31; .03; .02 G/100ML; G/100ML; G/100ML; G/100ML; G/100ML
100 INJECTION, SOLUTION INTRAVENOUS CONTINUOUS
Status: DISCONTINUED | OUTPATIENT
Start: 2025-01-07 | End: 2025-01-09

## 2025-01-07 RX ORDER — HYDROMORPHONE HCL/PF 1 MG/ML
0.5 SYRINGE (ML) INJECTION ONCE
Status: COMPLETED | OUTPATIENT
Start: 2025-01-07 | End: 2025-01-07

## 2025-01-07 RX ORDER — AMOXICILLIN 250 MG
2 CAPSULE ORAL
Status: DISCONTINUED | OUTPATIENT
Start: 2025-01-07 | End: 2025-01-10 | Stop reason: HOSPADM

## 2025-01-07 RX ORDER — ONDANSETRON 4 MG/1
4 TABLET, ORALLY DISINTEGRATING ORAL ONCE
Status: COMPLETED | OUTPATIENT
Start: 2025-01-07 | End: 2025-01-07

## 2025-01-07 RX ORDER — OXYCODONE HYDROCHLORIDE 5 MG/1
5 TABLET ORAL EVERY 4 HOURS PRN
Refills: 0 | Status: DISCONTINUED | OUTPATIENT
Start: 2025-01-07 | End: 2025-01-08

## 2025-01-07 RX ORDER — LANOLIN ALCOHOL/MO/W.PET/CERES
100 CREAM (GRAM) TOPICAL DAILY
Status: DISCONTINUED | OUTPATIENT
Start: 2025-01-08 | End: 2025-01-10 | Stop reason: HOSPADM

## 2025-01-07 RX ORDER — LORAZEPAM 1 MG/1
2 TABLET ORAL ONCE
Status: COMPLETED | OUTPATIENT
Start: 2025-01-07 | End: 2025-01-07

## 2025-01-07 RX ORDER — PANTOPRAZOLE SODIUM 40 MG/1
40 TABLET, DELAYED RELEASE ORAL
Status: DISCONTINUED | OUTPATIENT
Start: 2025-01-08 | End: 2025-01-10 | Stop reason: HOSPADM

## 2025-01-07 RX ORDER — ONDANSETRON 2 MG/ML
4 INJECTION INTRAMUSCULAR; INTRAVENOUS EVERY 4 HOURS PRN
Status: DISCONTINUED | OUTPATIENT
Start: 2025-01-07 | End: 2025-01-10 | Stop reason: HOSPADM

## 2025-01-07 RX ORDER — DULOXETIN HYDROCHLORIDE 30 MG/1
30 CAPSULE, DELAYED RELEASE ORAL DAILY
Status: DISCONTINUED | OUTPATIENT
Start: 2025-01-08 | End: 2025-01-10 | Stop reason: HOSPADM

## 2025-01-07 RX ORDER — HYDROMORPHONE HCL/PF 1 MG/ML
0.5 SYRINGE (ML) INJECTION EVERY 4 HOURS PRN
Status: DISCONTINUED | OUTPATIENT
Start: 2025-01-07 | End: 2025-01-08

## 2025-01-07 RX ORDER — FOLIC ACID 1 MG/1
1 TABLET ORAL DAILY
Status: DISCONTINUED | OUTPATIENT
Start: 2025-01-08 | End: 2025-01-10 | Stop reason: HOSPADM

## 2025-01-07 RX ORDER — SODIUM CHLORIDE, SODIUM GLUCONATE, SODIUM ACETATE, POTASSIUM CHLORIDE, MAGNESIUM CHLORIDE, SODIUM PHOSPHATE, DIBASIC, AND POTASSIUM PHOSPHATE .53; .5; .37; .037; .03; .012; .00082 G/100ML; G/100ML; G/100ML; G/100ML; G/100ML; G/100ML; G/100ML
1000 INJECTION, SOLUTION INTRAVENOUS ONCE
Status: COMPLETED | OUTPATIENT
Start: 2025-01-07 | End: 2025-01-07

## 2025-01-07 RX ORDER — POLYETHYLENE GLYCOL 3350 17 G/17G
17 POWDER, FOR SOLUTION ORAL DAILY PRN
Status: DISCONTINUED | OUTPATIENT
Start: 2025-01-07 | End: 2025-01-10 | Stop reason: HOSPADM

## 2025-01-07 RX ORDER — ACETAMINOPHEN 325 MG/1
650 TABLET ORAL EVERY 6 HOURS PRN
Status: DISCONTINUED | OUTPATIENT
Start: 2025-01-07 | End: 2025-01-10 | Stop reason: HOSPADM

## 2025-01-07 RX ORDER — ENOXAPARIN SODIUM 100 MG/ML
40 INJECTION SUBCUTANEOUS
Status: DISCONTINUED | OUTPATIENT
Start: 2025-01-09 | End: 2025-01-10 | Stop reason: HOSPADM

## 2025-01-07 RX ORDER — NICOTINE 21 MG/24HR
1 PATCH, TRANSDERMAL 24 HOURS TRANSDERMAL DAILY
Status: DISCONTINUED | OUTPATIENT
Start: 2025-01-08 | End: 2025-01-10 | Stop reason: HOSPADM

## 2025-01-07 RX ADMIN — HYDROMORPHONE HYDROCHLORIDE 0.5 MG: 1 INJECTION, SOLUTION INTRAMUSCULAR; INTRAVENOUS; SUBCUTANEOUS at 15:42

## 2025-01-07 RX ADMIN — OXYCODONE HYDROCHLORIDE 5 MG: 5 TABLET ORAL at 18:18

## 2025-01-07 RX ADMIN — OXYCODONE HYDROCHLORIDE 5 MG: 5 TABLET ORAL at 22:17

## 2025-01-07 RX ADMIN — HYDROMORPHONE HYDROCHLORIDE 0.5 MG: 1 INJECTION, SOLUTION INTRAMUSCULAR; INTRAVENOUS; SUBCUTANEOUS at 14:03

## 2025-01-07 RX ADMIN — SODIUM CHLORIDE, SODIUM GLUCONATE, SODIUM ACETATE, POTASSIUM CHLORIDE, MAGNESIUM CHLORIDE, SODIUM PHOSPHATE, DIBASIC, AND POTASSIUM PHOSPHATE 1000 ML: .53; .5; .37; .037; .03; .012; .00082 INJECTION, SOLUTION INTRAVENOUS at 14:07

## 2025-01-07 RX ADMIN — ONDANSETRON 4 MG: 4 TABLET, ORALLY DISINTEGRATING ORAL at 13:58

## 2025-01-07 RX ADMIN — ACETAMINOPHEN 650 MG: 325 TABLET, FILM COATED ORAL at 21:37

## 2025-01-07 RX ADMIN — ONDANSETRON 4 MG: 2 INJECTION INTRAMUSCULAR; INTRAVENOUS at 19:42

## 2025-01-07 RX ADMIN — IOHEXOL 85 ML: 350 INJECTION, SOLUTION INTRAVENOUS at 14:40

## 2025-01-07 RX ADMIN — LORAZEPAM 2 MG: 1 TABLET ORAL at 19:42

## 2025-01-07 RX ADMIN — DEXTROSE, SODIUM CHLORIDE, SODIUM LACTATE, POTASSIUM CHLORIDE, AND CALCIUM CHLORIDE 100 ML/HR: 5; .6; .31; .03; .02 INJECTION, SOLUTION INTRAVENOUS at 21:13

## 2025-01-07 RX ADMIN — NICOTINE 7 MG: 7 PATCH, EXTENDED RELEASE TRANSDERMAL at 14:31

## 2025-01-07 NOTE — ASSESSMENT & PLAN NOTE
Kaykay Holland is a 34 y.o. female with a PMH of alcohol use disorder, GERD , splenic vein thrombosis and necrotizing pancreatitis who presents with worsening left-sided abdominal pain.  Recent hospitalization noted for necrotizing pancreatitis with mass effect s/p EUS with cystogastrostomy stent placement 12/17 with plan for repeat EGD with necrosectomy in 3 to 4 weeks .  Repeat CT scan of abdomen in ED showed decrease in size of peripancreatic fluid collections and resolution of 2 collections in the region of the head of the pancreas with Moderate amount of free fluid in the cul-de-sacr and resolution of right ovarian cyst.  She was given multiple doses of IV Dilaudid in ED without improvement for which ED requested admission for management of intractable abdominal pain  GI consulted  As needed analgesics and antiemetics.  IV fluids  Bowel regimen  One of her recent scans showed chronic occlusion of the splenic vein, GI input from recent hospitalization noted regarding holding AC and proceed with conservative management given plan for future necrosectomy and insufficient data regarding use of AC in this regard.

## 2025-01-07 NOTE — ED ATTENDING ATTESTATION
1/7/2025  I, Matilde Huston DO, saw and evaluated the patient. I have discussed the patient with the resident/non-physician practitioner and agree with the resident's/non-physician practitioner's findings, Plan of Care, and MDM as documented in the resident's/non-physician practitioner's note, except where noted. All available labs and Radiology studies were reviewed.  I was present for key portions of any procedure(s) performed by the resident/non-physician practitioner and I was immediately available to provide assistance.       At this point I agree with the current assessment done in the Emergency Department.  I have conducted an independent evaluation of this patient a history and physical is as follows:    4-year-old female presents with abdominal pain.  Patient has history of pancreatitis and stent placement.  Patient states this is the same pain she had prior to last episode of pancreatitis.  Pain is in the left upper quadrant.  Patient states after stent placement she was okay for about 2 weeks and then she started having off-and-on food but the pain is significantly worsened over the past couple days.  Has had nausea but no vomiting.  Food and liquid makes the pain worse.  No fevers.  Denies urinary complaints.  On exam-no acute distress, heart regular, no respiratory distress, abdomen soft with diffuse tenderness but significantly worse in the left upper quadrant and left lower quadrant.  Plan-abdominal pain, concerning for pancreatitis versus stent malfunction versus other intra-abdominal pathology.  Will do labs, CT, check urine, pain control    ED Course         Critical Care Time  Procedures

## 2025-01-07 NOTE — ED PROVIDER NOTES
Time reflects when diagnosis was documented in both MDM as applicable and the Disposition within this note       Time User Action Codes Description Comment    1/7/2025  4:24 PM Prince Young Add [R10.12] LUQ pain     1/7/2025  4:24 PM Prince Young [K85.90] Pancreatitis           ED Disposition       ED Disposition   Admit    Condition   Stable    Date/Time   Tue Jan 7, 2025  5:08 PM    Comment   Case was discussed with Dr. Bland and the patient's admission status was agreed to be Admission Status: observation status to the service of Dr. Bland .               Assessment & Plan       Medical Decision Making  Patient is a 34-year-old female presenting here today with left upper quadrant pain.  Differentials that I have for this patient include pancreatitis, and iatrogenic process, intra-abdominal infection, UTI.    For this patient, a CBC, CMP, lipase, Zofran, Dilaudid, CT abdomen pelvis with contrast, urinalysis, urine pregnancy test, and 1 L of fluids has been ordered.    Patient admitted to [facility] for intractable pain    Amount and/or Complexity of Data Reviewed  Labs: ordered. Decision-making details documented in ED Course.  Radiology: ordered. Decision-making details documented in ED Course.    Risk  OTC drugs.  Prescription drug management.  Decision regarding hospitalization.        ED Course as of 01/07/25 2317   Tue Jan 07, 2025   1420 POCT pregnancy, urine  Negative urine pregnancy   1420 Lipase  Lipase within normal limits   1420 CBC and differential(!)  Reassuring CBC   1420 UA w Reflex to Microscopic w Reflex to Culture(!)  Reassuring urinalysis   1421 Comprehensive metabolic panel(!)  Reassuring CMP   1426 Urine Microscopic(!)  No UTI   1612 CT abdomen pelvis with contrast  Reducing size of multiple peripancreatic fluid collections status post cyst gastrostomy.       Medications   DULoxetine (CYMBALTA) delayed release capsule 30 mg (has no administration in time range)   folic acid (FOLVITE)  tablet 1 mg (has no administration in time range)   pantoprazole (PROTONIX) EC tablet 40 mg (has no administration in time range)   thiamine tablet 100 mg (has no administration in time range)   nicotine (NICODERM CQ) 21 mg/24 hr TD 24 hr patch 1 patch (has no administration in time range)   oxyCODONE (ROXICODONE) split tablet 2.5 mg ( Oral See Alternative 1/7/25 1818)     Or   oxyCODONE (ROXICODONE) IR tablet 5 mg (5 mg Oral Given 1/7/25 1818)   HYDROmorphone (DILAUDID) injection 0.5 mg (has no administration in time range)   acetaminophen (TYLENOL) tablet 650 mg (650 mg Oral Given 1/7/25 2137)   ondansetron (ZOFRAN) injection 4 mg (4 mg Intravenous Given 1/7/25 1942)   dextrose 5 % in lactated Ringer's infusion (100 mL/hr Intravenous New Bag 1/7/25 2113)   senna-docusate sodium (SENOKOT S) 8.6-50 mg per tablet 2 tablet (has no administration in time range)   polyethylene glycol (MIRALAX) packet 17 g (has no administration in time range)   enoxaparin (LOVENOX) subcutaneous injection 40 mg (has no administration in time range)   HYDROmorphone (DILAUDID) injection 0.5 mg (0.5 mg Intravenous Given 1/7/25 1403)   ondansetron (ZOFRAN-ODT) dispersible tablet 4 mg (4 mg Oral Given 1/7/25 1358)   multi-electrolyte (ISOLYTE-S PH 7.4) bolus 1,000 mL (0 mL Intravenous Stopped 1/7/25 1507)   iohexol (OMNIPAQUE) 350 MG/ML injection (MULTI-DOSE) 85 mL (85 mL Intravenous Given 1/7/25 1440)   HYDROmorphone (DILAUDID) injection 0.5 mg (0.5 mg Intravenous Given 1/7/25 1542)   LORazepam (ATIVAN) tablet 2 mg (2 mg Oral Given 1/7/25 1942)       ED Risk Strat Scores               [facility] Score       Row Name 01/07/25 2030 01/07/25 1919          [facility]    Blood Pressure 140/84 --     Pulse 74 79     Nausea and Vomiting 0 2     Tactile Disturbances 0 0     Tremor 0 1     Auditory Disturbances 0 0     Paroxysmal Sweats 1 1     Visual Disturbances 1 1     Anxiety 0 2     Headache, Fullness in Head 0 2     Agitation 0 0     Orientation and  "Clouding of Sensorium 0 0     CIWA-Ar Total 2 9                                                 History of Present Illness       Chief Complaint   Patient presents with    Abdominal Pain     C/o LUQ pain, states seen a few weeks ago for it and was her pancreas where she had a stent placed.        Past Medical History:   Diagnosis Date    Acute alcoholic pancreatitis 11/03/2023    Hypertension     SIRS (systemic inflammatory response syndrome) (HCC) 04/10/2024      Past Surgical History:   Procedure Laterality Date    WISDOM TOOTH EXTRACTION        History reviewed. No pertinent family history.   Social History     Tobacco Use    Smoking status: Every Day     Current packs/day: 0.50     Types: Cigarettes     Passive exposure: Never    Smokeless tobacco: Never    Tobacco comments:     Per pt last drink was today 9/24/24 one glass of wine. Had previously stop on 4/4/24 for 1 month    Vaping Use    Vaping status: Never Used   Substance Use Topics    Alcohol use: Yes     Alcohol/week: 21.0 standard drinks of alcohol     Types: 21 Glasses of wine per week     Comment: \"occasional\"    Drug use: Never      E-Cigarette/Vaping    E-Cigarette Use Never User       E-Cigarette/Vaping Substances    Nicotine No     THC No     CBD No     Flavoring No     Other No     Unknown No       I have reviewed and agree with the history as documented.     Patient is a 34-year-old female with a past medical history of necrotizing pancreatitis, alcohol abuse, and a recent stent placement who presents today due to left upper quadrant pain.  Patient says that she was seen on December 16 for necrotizing pancreatitis and had a stent placed.  Patient says that she was fine for about 2 weeks before she started having left upper quadrant pain that was on and off.  Patient says that triggers included eating food, and lifting any heavy weights.  Patient has tried oxycodone at home and clear liquid diet which provided minor relief, however the pain has " come back.  Patient says that the pain has get been getting progressively worse and she is here because she wants to be evaluated.  Patient does complain of having some nausea without vomiting, and says that her left upper quadrant pain is a stabbing sensation that goes downwards.      Abdominal Pain  Associated symptoms: nausea    Associated symptoms: no chest pain, no chills, no cough, no dysuria, no fever, no hematuria, no shortness of breath, no sore throat and no vomiting        Review of Systems   Constitutional:  Negative for chills and fever.   HENT:  Negative for ear pain and sore throat.    Eyes:  Negative for pain and visual disturbance.   Respiratory:  Negative for cough and shortness of breath.    Cardiovascular:  Negative for chest pain and palpitations.   Gastrointestinal:  Positive for abdominal pain and nausea. Negative for vomiting.   Genitourinary:  Negative for dysuria and hematuria.   Musculoskeletal:  Negative for arthralgias and back pain.   Skin:  Negative for color change and rash.   Neurological:  Negative for seizures and syncope.   All other systems reviewed and are negative.          Objective       ED Triage Vitals [01/07/25 1201]   Temperature Pulse Blood Pressure Respirations SpO2 Patient Position - Orthostatic VS   98 °F (36.7 °C) 83 160/100 18 99 % --      Temp Source Heart Rate Source BP Location FiO2 (%) Pain Score    Oral -- -- -- 9      Vitals      Date and Time Temp Pulse SpO2 Resp BP Pain Score FACES Pain Rating User   01/07/25 2223 98.2 °F (36.8 °C) 64 91 % -- 114/81 -- -- DII   01/07/25 2217 -- -- -- -- -- 10 - Worst Possible Pain -- SO   01/07/25 2030 -- 74 -- -- 140/84 -- -- JLZ   01/07/25 1922 -- 79 97 % 16 140/85 -- -- JLZ   01/07/25 1919 -- 79 -- -- -- -- -- JLZ   01/07/25 1818 -- -- -- -- -- 9 -- MO   01/07/25 1542 -- -- -- -- -- 8 -- MO   01/07/25 1407 -- 81 99 % 16 141/89 -- -- ALN   01/07/25 1403 -- -- -- -- -- 9 -- KIRAN   01/07/25 1201 98 °F (36.7 °C) 83 99 % 18  160/100 9 -- CS            Physical Exam  Vitals and nursing note reviewed.   Constitutional:       General: She is not in acute distress.     Appearance: She is well-developed.   HENT:      Head: Normocephalic and atraumatic.   Eyes:      Conjunctiva/sclera: Conjunctivae normal.   Cardiovascular:      Rate and Rhythm: Normal rate and regular rhythm.      Heart sounds: No murmur heard.  Pulmonary:      Effort: Pulmonary effort is normal. No respiratory distress.      Breath sounds: Normal breath sounds.   Abdominal:      Palpations: Abdomen is soft.      Tenderness: There is no abdominal tenderness (LUQ, LLQ). There is right CVA tenderness and left CVA tenderness.   Musculoskeletal:         General: No swelling.      Cervical back: Neck supple.   Skin:     General: Skin is warm and dry.      Capillary Refill: Capillary refill takes less than 2 seconds.   Neurological:      Mental Status: She is alert.   Psychiatric:         Mood and Affect: Mood normal.         Results Reviewed       Procedure Component Value Units Date/Time    Urine Microscopic [[id number]]  (Abnormal) Collected: 01/07/25 1355    Lab Status: Final result Specimen: Urine, Clean Catch Updated: 01/07/25 1425     RBC, UA 1-2 /hpf      WBC, UA 1-2 /hpf      Epithelial Cells Occasional /hpf      Bacteria, UA None Seen /hpf      MUCUS THREADS Innumerable    UA w Reflex to Microscopic w Reflex to Culture [[id number]]  (Abnormal) Collected: 01/07/25 1355    Lab Status: Final result Specimen: Urine, Clean Catch Updated: 01/07/25 1419     Color, UA Yellow     Clarity, UA Turbid     Specific Gravity, UA 1.023     pH, UA 5.5     Leukocytes, UA Negative     Nitrite, UA Negative     Protein, UA Trace mg/dl      Glucose, UA Negative mg/dl      Ketones, UA Trace mg/dl      Urobilinogen, UA <2.0 mg/dl      Bilirubin, UA Negative     Occult Blood, UA Negative    POCT pregnancy, urine [[id number]]  (Normal) Collected: 01/07/25 1359    Lab Status: Final result Updated:  01/07/25 1359     EXT Preg Test, Ur Negative     Control Valid    Comprehensive metabolic panel [[id number]]  (Abnormal) Collected: 01/07/25 1206    Lab Status: Final result Specimen: Blood from Arm, Left Updated: 01/07/25 1238     Sodium 138 mmol/L      Potassium 3.9 mmol/L      Chloride 102 mmol/L      CO2 24 mmol/L      ANION GAP 12 mmol/L      BUN 7 mg/dL      Creatinine 0.36 mg/dL      Glucose 83 mg/dL      Calcium 9.0 mg/dL      AST 17 U/L      ALT 8 U/L      Alkaline Phosphatase 61 U/L      Total Protein 6.7 g/dL      Albumin 3.7 g/dL      Total Bilirubin 0.73 mg/dL      eGFR 142 ml/min/1.73sq m     Narrative:      [employer] guidelines for Chronic Kidney Disease (CKD):     Stage 1 with normal or high GFR (GFR > 90 mL/min/1.73 square meters)    Stage 2 Mild CKD (GFR = 60-89 mL/min/1.73 square meters)    Stage 3A Moderate CKD (GFR = 45-59 mL/min/1.73 square meters)    Stage 3B Moderate CKD (GFR = 30-44 mL/min/1.73 square meters)    Stage 4 Severe CKD (GFR = 15-29 mL/min/1.73 square meters)    Stage 5 End Stage CKD (GFR <15 mL/min/1.73 square meters)  Note: GFR calculation is accurate only with a steady state creatinine    Lipase [[id number]]  (Normal) Collected: 01/07/25 1206    Lab Status: Final result Specimen: Blood from Arm, Left Updated: 01/07/25 1238     Lipase 74 u/L     CBC and differential [[id number]]  (Abnormal) Collected: 01/07/25 1206    Lab Status: Final result Specimen: Blood from Arm, Left Updated: 01/07/25 1216     WBC 8.05 Thousand/uL      RBC 3.64 Million/uL      Hemoglobin 12.1 g/dL      Hematocrit 36.5 %       fL      MCH 33.2 pg      MCHC 33.2 g/dL      RDW 12.4 %      MPV 10.3 fL      Platelets 269 Thousands/uL      nRBC 0 /100 WBCs      Segmented % 62 %      Immature Grans % 0 %      Lymphocytes % 27 %      Monocytes % 8 %      Eosinophils Relative 2 %      Basophils Relative 1 %      Absolute Neutrophils 5.02 Thousands/µL      Absolute Immature Grans 0.01  Thousand/uL      Absolute Lymphocytes 2.13 Thousands/µL      Absolute Monocytes 0.66 Thousand/µL      Eosinophils Absolute 0.17 Thousand/µL      Basophils Absolute 0.06 Thousands/µL             CT abdomen pelvis with contrast   Final Interpretation by Dominga Shabazz MD (01/07 1602)      Decrease in size of peripancreatic fluid collections and resolution of 2 collections in the region of the head of the pancreas. No new or enlarging peripancreatic fluid collections status post cystogastrostomy.      Moderate amount of free fluid in the cul-de-sacr and resolution of right ovarian cyst.      Otherwise no new findings.      The study was marked in EPIC for immediate notification.         Workstation performed: [id number]             Procedures    ED Medication and Procedure Management   Prior to Admission Medications   Prescriptions Last Dose Informant Patient Reported? Taking?   DULoxetine (CYMBALTA) 30 mg delayed release capsule   No No   Sig: Take 1 capsule (30 mg total) by mouth daily   folic acid ( Folic Acid) 1 mg tablet   No No   Sig: Take 1 tablet (1 mg total) by mouth daily   nicotine (NICODERM CQ) 21 mg/24 hr TD 24 hr patch   No No   Sig: Place 1 patch on the skin over 24 hours daily   ondansetron (ZOFRAN-ODT) 4 mg disintegrating tablet   No No   Sig: Take 1 tablet (4 mg total) by mouth every 6 (six) hours as needed for nausea or vomiting   pantoprazole (PROTONIX) 40 mg tablet   No No   Sig: Take 1 tablet (40 mg total) by mouth 2 (two) times a day   thiamine 100 MG tablet   No No   Sig: Take 1 tablet (100 mg total) by mouth daily      Facility-Administered Medications: None     Current Discharge Medication List        CONTINUE these medications which have NOT CHANGED    Details   DULoxetine (CYMBALTA) 30 mg delayed release capsule Take 1 capsule (30 mg total) by mouth daily  Qty: 30 capsule, Refills: 0    Associated Diagnoses: Anxiety      folic acid ( Folic Acid) 1 mg tablet Take 1 tablet (1 mg total)  by mouth daily  Qty: 30 tablet, Refills: 0    Associated Diagnoses: Alcohol abuse      nicotine (NICODERM CQ) 21 mg/24 hr TD 24 hr patch Place 1 patch on the skin over 24 hours daily  Qty: 28 patch, Refills: 0    Associated Diagnoses: Tobacco abuse      ondansetron (ZOFRAN-ODT) 4 mg disintegrating tablet Take 1 tablet (4 mg total) by mouth every 6 (six) hours as needed for nausea or vomiting  Qty: 30 tablet, Refills: 0    Associated Diagnoses: Acute pancreatitis      pantoprazole (PROTONIX) 40 mg tablet Take 1 tablet (40 mg total) by mouth 2 (two) times a day  Qty: 60 tablet, Refills: 0    Associated Diagnoses: Hematemesis      thiamine 100 MG tablet Take 1 tablet (100 mg total) by mouth daily  Qty: 30 tablet, Refills: 0    Associated Diagnoses: Alcohol use disorder           No discharge procedures on file.  ED SEPSIS DOCUMENTATION   Time reflects when diagnosis was documented in both MDM as applicable and the Disposition within this note       Time User Action Codes Description Comment    1/7/2025  4:24 PM Prince Young Add [R10.12] LUQ pain     1/7/2025  4:24 PM Prince Young Add [K85.90] Pancreatitis                  Prince Young MD  01/07/25 1719

## 2025-01-07 NOTE — CERTIFIED RECOVERY SPECIALIST
Time spent: 17 minutes    CRS met with patient (seen prior) to offer support. Patient said she had more pain in her stomach and thought it was best to be seen, tests are being run to determine next steps.    CRS and patient discussed patient's recovery which patient said she has stayed sober since leaving hospital after last visit. Patient shared that she has been attending AA and has a sponsor, and really loves having the support of others who understand. Though patient said she is in a lot of pain, she seemed optimistic and happy, and expressed appreciation for the visit and support.     CRS to follow and support through this admission. (Resources not needed again)

## 2025-01-07 NOTE — ASSESSMENT & PLAN NOTE
Recently evaluated by gynecology and recommended for outpatient follow-up with repeat ultrasounds  CT scan in ED showed resolution of her ovarian cysts

## 2025-01-07 NOTE — H&P
H&P - Hospitalist   Name: Kaykay Holland 34 y.o. female I MRN: [id number]  Unit/Bed#: ED 10 I Date of Admission: 1/7/2025   Date of Service: 1/7/2025 I Hospital Day: 0     Assessment & Plan  Abdominal pain  Kaykay Holland is a 34 y.o. female with a PMH of alcohol use disorder, GERD , splenic vein thrombosis and necrotizing pancreatitis who presents with worsening left-sided abdominal pain.  Recent hospitalization noted for necrotizing pancreatitis with mass effect s/p EUS with cystogastrostomy stent placement 12/17 with plan for repeat EGD with necrosectomy in 3 to 4 weeks .  Repeat CT scan of abdomen in ED showed decrease in size of peripancreatic fluid collections and resolution of 2 collections in the region of the head of the pancreas with Moderate amount of free fluid in the cul-de-sacr and resolution of right ovarian cyst.  She was given multiple doses of IV Dilaudid in ED without improvement for which ED requested admission for management of intractable abdominal pain  GI consulted  As needed analgesics and antiemetics.  IV fluids  Bowel regimen  One of her recent scans showed chronic occlusion of the splenic vein, GI input from recent hospitalization noted regarding holding AC and proceed with conservative management given plan for future necrosectomy and insufficient data regarding use of AC in this regard.  Tobacco abuse  Nicotine patch ordered  Encourage cessation  Alcohol use disorder  CIWA protocol  Continue folic acid and thiamine  Gastroesophageal reflux disease without esophagitis  Continue PPI  Ovarian cyst  Recently evaluated by gynecology and recommended for outpatient follow-up with repeat ultrasounds  CT scan in ED showed resolution of her ovarian cysts      VTE Pharmacologic Prophylaxis:   Moderate Risk (Score 3-4) - Pharmacological DVT Prophylaxis Ordered: enoxaparin (Lovenox).  Code Status: FC      Anticipated Length of Stay: Patient will be admitted on an observation basis with an  anticipated length of stay of less than 2 midnights secondary to abdominal pain.    History of Present Illness   Chief Complaint: abdominal pain    Kaykay Holland is a 34 y.o. female with a PMH of alcohol use disorder, GERD , splenic vein thrombosis and necrotizing pancreatitis who presents with worsening left-sided abdominal pain.  Recent hospitalization noted for necrotizing pancreatitis with mass effect s/p EUS with cystogastrostomy stent placement 12/17 with plan for repeat EGD with necrosectomy in 3 to 4 weeks. One of her recent scans showed chronic occlusion of the splenic vein, GI input from recent hospitalization noted regarding holding AC and proceed with conservative management given plan for future necrosectomy and insufficient data regarding use of AC in this regard. Repeat CT scan of abdomen in ED showed decrease in size of peripancreatic fluid collections and resolution of 2 collections in the region of the head of the pancreas with Moderate amount of free fluid in the cul-de-sacr and resolution of right ovarian cyst.  She was given multiple doses of IV Dilaudid in ED without improvement for which ED requested admission for management of intractable abdominal pain    Review of Systems   Constitutional:  Negative for chills and fever.   HENT:  Negative for ear pain and sore throat.    Eyes:  Negative for pain and visual disturbance.   Respiratory:  Negative for cough and shortness of breath.    Cardiovascular:  Negative for chest pain and palpitations.   Gastrointestinal:  Positive for abdominal pain and nausea.   Genitourinary:  Negative for dysuria and hematuria.   Musculoskeletal:  Negative for arthralgias and back pain.   Skin:  Negative for color change and rash.   Neurological:  Negative for seizures and syncope.   All other systems reviewed and are negative.      Historical Information   Past Medical History:   Diagnosis Date    Acute alcoholic pancreatitis 11/03/2023    Hypertension     SIRS  "(systemic inflammatory response syndrome) ([facility]) 04/10/2024     Past Surgical History:   Procedure Laterality Date    WISDOM TOOTH EXTRACTION       Social History     Tobacco Use    Smoking status: Every Day     Current packs/day: 0.50     Types: Cigarettes     Passive exposure: Never    Smokeless tobacco: Never    Tobacco comments:     Per pt last drink was today 9/24/24 one glass of wine. Had previously stop on 4/4/24 for 1 month    Vaping Use    Vaping status: Never Used   Substance and Sexual Activity    Alcohol use: Yes     Alcohol/week: 21.0 standard drinks of alcohol     Types: 21 Glasses of wine per week     Comment: \"occasional\"    Drug use: Never    Sexual activity: Not on file     E-Cigarette/Vaping    E-Cigarette Use Never User      E-Cigarette/Vaping Substances    Nicotine No     THC No     CBD No     Flavoring No     Other No     Unknown No      Family history non-contributory  Social History:  Marital Status: Single       Meds/Allergies   I have reviewed home medications with patient personally.  Prior to Admission medications    Medication Sig Start Date End Date Taking? Authorizing Provider   DULoxetine (CYMBALTA) 30 mg delayed release capsule Take 1 capsule (30 mg total) by mouth daily 12/25/24   Roxie Curry DO   folic acid (KP Folic Acid) 1 mg tablet Take 1 tablet (1 mg total) by mouth daily 12/1/24   Elena Vásquez PA-C   nicotine (NICODERM CQ) 21 mg/24 hr TD 24 hr patch Place 1 patch on the skin over 24 hours daily 12/25/24   Roxie Curry DO   ondansetron (ZOFRAN-ODT) 4 mg disintegrating tablet Take 1 tablet (4 mg total) by mouth every 6 (six) hours as needed for nausea or vomiting 12/24/24   Roxie Curry DO   pantoprazole (PROTONIX) 40 mg tablet Take 1 tablet (40 mg total) by mouth 2 (two) times a day 12/24/24   Roxie Curry DO   thiamine 100 MG tablet Take 1 tablet (100 mg total) by mouth daily 12/25/24   Roxie Curry DO     Allergies " "  Allergen Reactions    Bee Pollen Anaphylaxis    Other Edema     Bee stings (localized edema)       Objective :  Temp:  [98 °F (36.7 °C)] 98 °F (36.7 °C)  HR:  [81-83] 81  BP: (141-160)/() 141/89  Resp:  [16-18] 16  SpO2:  [99 %] 99 %  O2 Device: None (Room air)    Physical Exam  Vitals and nursing note reviewed.   Constitutional:       General: She is not in acute distress.     Appearance: She is well-developed.   HENT:      Head: Normocephalic and atraumatic.   Eyes:      Conjunctiva/sclera: Conjunctivae normal.   Cardiovascular:      Rate and Rhythm: Normal rate and regular rhythm.      Heart sounds: No murmur heard.  Pulmonary:      Effort: Pulmonary effort is normal. No respiratory distress.      Breath sounds: Normal breath sounds.   Abdominal:      Palpations: Abdomen is soft.      Tenderness: There is abdominal tenderness.   Musculoskeletal:         General: No swelling.      Cervical back: Neck supple.   Skin:     General: Skin is warm and dry.      Capillary Refill: Capillary refill takes less than 2 seconds.   Neurological:      Mental Status: She is alert.   Psychiatric:         Mood and Affect: Mood normal.          Lines/Drains:            Lab Results: I have reviewed the following results:  Results from last 7 days   Lab Units 01/07/25  1206   WBC Thousand/uL 8.05   HEMOGLOBIN g/dL 12.1   HEMATOCRIT % 36.5   PLATELETS Thousands/uL 269   SEGS PCT % 62   LYMPHO PCT % 27   MONO PCT % 8   EOS PCT % 2     Results from last 7 days   Lab Units 01/07/25  1206   SODIUM mmol/L 138   POTASSIUM mmol/L 3.9   CHLORIDE mmol/L 102   CO2 mmol/L 24   BUN mg/dL 7   CREATININE mg/dL 0.36*   ANION GAP mmol/L 12   CALCIUM mg/dL 9.0   ALBUMIN g/dL 3.7   TOTAL BILIRUBIN mg/dL 0.73   ALK PHOS U/L 61   ALT U/L 8   AST U/L 17   GLUCOSE RANDOM mg/dL 83             No results found for: \"HGBA1C\"        Imaging Results Review: I reviewed radiology reports from this admission including: CT abdomen/pelvis.  Other Study " Results Review: No additional pertinent studies reviewed.    Administrative Statements   I have spent a total time of 38 minutes in caring for this patient on the day of the visit/encounter including Diagnostic results, Documenting in the medical record, Reviewing / ordering tests, medicine, procedures  , Obtaining or reviewing history  , and Communicating with other healthcare professionals .    ** Please Note: This note has been constructed using a voice recognition system. **

## 2025-01-07 NOTE — LETTER
[facility]  [address] [id number]  Dept: [phone]    January 10, 2025     Patient: Kaykay Holland   YOB: 1990   Date of Visit: 1/7/2025       To Whom it May Concern:    Kaykay Holland is under my professional care. She was admitted to the hospital from 1/7/2025 to 01/10/25. She may return to work on Friday 1/17/2025  without limitations.    Kaykay was admitted to the hospital and required EGD procedure and specialty consultation with a gastroenterologist. She required close monitoring and inpatient symptom management.     If you have any questions or concerns, please don't hesitate to call.         Sincerely,          PATRICIA Barrett  [facility]  [phone]

## 2025-01-08 LAB
ANION GAP SERPL CALCULATED.3IONS-SCNC: 8 MMOL/L (ref 4–13)
BUN SERPL-MCNC: 6 MG/DL (ref 5–25)
CALCIUM SERPL-MCNC: 8.2 MG/DL (ref 8.4–10.2)
CHLORIDE SERPL-SCNC: 100 MMOL/L (ref 96–108)
CO2 SERPL-SCNC: 28 MMOL/L (ref 21–32)
CREAT SERPL-MCNC: 0.38 MG/DL (ref 0.6–1.3)
ERYTHROCYTE [DISTWIDTH] IN BLOOD BY AUTOMATED COUNT: 12.4 % (ref 11.6–15.1)
GFR SERPL CREATININE-BSD FRML MDRD: 139 ML/MIN/1.73SQ M
GLUCOSE SERPL-MCNC: 95 MG/DL (ref 65–140)
HCT VFR BLD AUTO: 32.3 % (ref 34.8–46.1)
HGB BLD-MCNC: 10.5 G/DL (ref 11.5–15.4)
MCH RBC QN AUTO: 33.1 PG (ref 26.8–34.3)
MCHC RBC AUTO-ENTMCNC: 32.5 G/DL (ref 31.4–37.4)
MCV RBC AUTO: 102 FL (ref 82–98)
PLATELET # BLD AUTO: 195 THOUSANDS/UL (ref 149–390)
PMV BLD AUTO: 10.7 FL (ref 8.9–12.7)
POTASSIUM SERPL-SCNC: 3.8 MMOL/L (ref 3.5–5.3)
RBC # BLD AUTO: 3.17 MILLION/UL (ref 3.81–5.12)
SODIUM SERPL-SCNC: 136 MMOL/L (ref 135–147)
WBC # BLD AUTO: 5.66 THOUSAND/UL (ref 4.31–10.16)

## 2025-01-08 PROCEDURE — 99232 SBSQ HOSP IP/OBS MODERATE 35: CPT | Performed by: PHYSICIAN ASSISTANT

## 2025-01-08 PROCEDURE — 85027 COMPLETE CBC AUTOMATED: CPT | Performed by: INTERNAL MEDICINE

## 2025-01-08 PROCEDURE — 99254 IP/OBS CNSLTJ NEW/EST MOD 60: CPT | Performed by: INTERNAL MEDICINE

## 2025-01-08 PROCEDURE — 80048 BASIC METABOLIC PNL TOTAL CA: CPT | Performed by: INTERNAL MEDICINE

## 2025-01-08 RX ORDER — OXYCODONE HYDROCHLORIDE 5 MG/1
5 TABLET ORAL EVERY 4 HOURS PRN
Refills: 0 | Status: DISCONTINUED | OUTPATIENT
Start: 2025-01-08 | End: 2025-01-10 | Stop reason: HOSPADM

## 2025-01-08 RX ORDER — OXYCODONE HYDROCHLORIDE 10 MG/1
10 TABLET ORAL EVERY 4 HOURS PRN
Refills: 0 | Status: DISCONTINUED | OUTPATIENT
Start: 2025-01-08 | End: 2025-01-10 | Stop reason: HOSPADM

## 2025-01-08 RX ORDER — LORAZEPAM 0.5 MG/1
0.5 TABLET ORAL EVERY 8 HOURS PRN
Status: DISCONTINUED | OUTPATIENT
Start: 2025-01-08 | End: 2025-01-10 | Stop reason: HOSPADM

## 2025-01-08 RX ORDER — HYDROMORPHONE HCL/PF 1 MG/ML
1 SYRINGE (ML) INJECTION EVERY 4 HOURS PRN
Status: DISCONTINUED | OUTPATIENT
Start: 2025-01-08 | End: 2025-01-10 | Stop reason: HOSPADM

## 2025-01-08 RX ADMIN — PANTOPRAZOLE SODIUM 40 MG: 40 TABLET, DELAYED RELEASE ORAL at 06:14

## 2025-01-08 RX ADMIN — NICOTINE 1 PATCH: 21 PATCH, EXTENDED RELEASE TRANSDERMAL at 09:05

## 2025-01-08 RX ADMIN — OXYCODONE HYDROCHLORIDE 5 MG: 5 TABLET ORAL at 21:05

## 2025-01-08 RX ADMIN — ONDANSETRON 4 MG: 2 INJECTION INTRAMUSCULAR; INTRAVENOUS at 15:13

## 2025-01-08 RX ADMIN — OXYCODONE HYDROCHLORIDE 5 MG: 5 TABLET ORAL at 09:12

## 2025-01-08 RX ADMIN — THIAMINE HCL TAB 100 MG 100 MG: 100 TAB at 09:05

## 2025-01-08 RX ADMIN — OXYCODONE HYDROCHLORIDE 10 MG: 10 TABLET ORAL at 14:03

## 2025-01-08 RX ADMIN — HYDROMORPHONE HYDROCHLORIDE 1 MG: 1 INJECTION, SOLUTION INTRAMUSCULAR; INTRAVENOUS; SUBCUTANEOUS at 22:11

## 2025-01-08 RX ADMIN — HYDROMORPHONE HYDROCHLORIDE 0.5 MG: 1 INJECTION, SOLUTION INTRAMUSCULAR; INTRAVENOUS; SUBCUTANEOUS at 06:14

## 2025-01-08 RX ADMIN — FOLIC ACID 1 MG: 1 TABLET ORAL at 09:05

## 2025-01-08 RX ADMIN — HYDROMORPHONE HYDROCHLORIDE 1 MG: 1 INJECTION, SOLUTION INTRAMUSCULAR; INTRAVENOUS; SUBCUTANEOUS at 17:38

## 2025-01-08 RX ADMIN — SENNOSIDES AND DOCUSATE SODIUM 2 TABLET: 50; 8.6 TABLET ORAL at 21:05

## 2025-01-08 RX ADMIN — DEXTROSE, SODIUM CHLORIDE, SODIUM LACTATE, POTASSIUM CHLORIDE, AND CALCIUM CHLORIDE 100 ML/HR: 5; .6; .31; .03; .02 INJECTION, SOLUTION INTRAVENOUS at 19:24

## 2025-01-08 RX ADMIN — HYDROMORPHONE HYDROCHLORIDE 0.5 MG: 1 INJECTION, SOLUTION INTRAMUSCULAR; INTRAVENOUS; SUBCUTANEOUS at 11:15

## 2025-01-08 RX ADMIN — LORAZEPAM 0.5 MG: 0.5 TABLET ORAL at 16:03

## 2025-01-08 RX ADMIN — PANTOPRAZOLE SODIUM 40 MG: 40 TABLET, DELAYED RELEASE ORAL at 16:03

## 2025-01-08 RX ADMIN — DULOXETINE HYDROCHLORIDE 30 MG: 30 CAPSULE, DELAYED RELEASE ORAL at 09:06

## 2025-01-08 RX ADMIN — ONDANSETRON 4 MG: 2 INJECTION INTRAMUSCULAR; INTRAVENOUS at 06:14

## 2025-01-08 NOTE — ASSESSMENT & PLAN NOTE
Kaykay Holland is a 34 y.o. female with a PMH of alcohol use disorder, GERD , splenic vein thrombosis and necrotizing pancreatitis who presents with worsening left-sided abdominal pain.  Recent hospitalization noted for necrotizing pancreatitis with mass effect s/p EUS with cystogastrostomy stent placement 12/17 with plan for repeat EGD with necrosectomy in 3 to 4 weeks .  Repeat CT scan of abdomen in ED showed decrease in size of peripancreatic fluid collections and resolution of 2 collections in the region of the head of the pancreas with Moderate amount of free fluid in the cul-de-sacr and resolution of right ovarian cyst.  She was given multiple doses of IV Dilaudid in ED without improvement for which ED requested admission for management of intractable abdominal pain  GI consulted - EGD with necrosectomy versus stent removal and placement of a pigtail catheter tomorrow.   As needed analgesics and antiemetics.  IV fluids  Bowel regimen  One of her recent scans showed chronic occlusion of the splenic vein, GI input from recent hospitalization noted regarding holding AC and proceed with conservative management given plan for future necrosectomy and insufficient data regarding use of AC in this regard.

## 2025-01-08 NOTE — ASSESSMENT & PLAN NOTE
Pain located mostly in LUQ with some pain in epigastrium and periumbilical region.    Plan:  - Pain control per primary  - NPO at midnight  - EGD with necrosectomy vs removal of stent and placement of pigtail catheter tomorrow

## 2025-01-08 NOTE — CONSULTS
Consultation - Gastroenterology   Name: Kaykay Holland 34 y.o. female I MRN: [id number]  Unit/Bed#: CW2 212-02 I Date of Admission: 1/7/2025   Date of Service: 1/8/2025 I Hospital Day: 0   Inpatient consult to gastroenterology  Consult performed by: Jonny Bonner  Consult ordered by: Matilde Huston DO      Physician Requesting Evaluation: Evan Zayas DO   Reason for Evaluation / Principal Problem: Abdominal pain    Assessment & Plan  Abdominal pain  Pain located mostly in LUQ with some pain in epigastrium and periumbilical region.    Plan:  - Pain control per primary  - NPO at midnight  - EGD with necrosectomy vs removal of stent and placement of pigtail catheter tomorrow      History of Present Illness   HPI:  Kaykay Holland is a 34 y.o. female w PMHx of AUD (last drink about 1 month ago), GERD, PUD, HTN, splenic vein thrombosis, necrotizing pancreatitis, pancreatic pseudocyst, hepatic steatosis who presented to [facility] ED yesterday (01/07/2025) with worsening L-sided abd pain. Patient states that she did not have any abd pain for about 7-10 days following her EUS with cystogastrostomy stent placement on 12/17; however, she has been having pain for about 1 week that has progressively gotten worse. Of note, patient was admitted for necrotizing pancreatitis with mass effect. During this hospitalization, she underwent EUS with cystogastrostomy stent placement on 12/17 w plan to repeat EGD with necrosectomy.    In ED, patient had CT abd pelvis w decrease in size of peripancreatic fluid collections, resolution of 2 collection in head of pancreas, no new peripancreatic fluid collections s/p cystogastrostomy. In ED, labs largely insignificant. Patient started on fluids and admitted for further pain control and possible GI intervention.    Review of Systems   Constitutional:  Negative for activity change, appetite change, fever and unexpected weight change.   HENT:  Negative for congestion.    Eyes:  Negative for  discharge.   Respiratory:  Negative for chest tightness and shortness of breath.    Cardiovascular:  Negative for chest pain and palpitations.   Gastrointestinal:  Positive for abdominal pain. Negative for abdominal distention, blood in stool, constipation, diarrhea, nausea and vomiting.   Genitourinary:  Positive for flank pain (L-sided). Negative for hematuria and pelvic pain.   Skin:  Negative for color change, pallor and rash.   Neurological:  Negative for dizziness, weakness, light-headedness and headaches.   Psychiatric/Behavioral:  Negative for agitation, behavioral problems and confusion.      I have reviewed the patient's PMH, PSH, Social History, Family History, Meds, and Allergies    Objective :  Temp:  [97.8 °F (36.6 °C)-98.6 °F (37 °C)] 97.8 °F (36.6 °C)  HR:  [54-81] 54  BP: (110-141)/(70-89) 119/70  Resp:  [16-19] 19  SpO2:  [91 %-99 %] 97 %  O2 Device: None (Room air)    Physical Exam  Constitutional:       General: She is in acute distress.      Appearance: She is well-developed.   HENT:      Head: Normocephalic and atraumatic.      Mouth/Throat:      Mouth: Mucous membranes are moist.   Eyes:      General: No scleral icterus.  Cardiovascular:      Rate and Rhythm: Normal rate and regular rhythm.      Heart sounds: Normal heart sounds.   Pulmonary:      Effort: Pulmonary effort is normal.      Breath sounds: Normal breath sounds.   Abdominal:      General: Bowel sounds are normal.      Palpations: Abdomen is soft. There is no mass or pulsatile mass.      Tenderness: There is abdominal tenderness in the periumbilical area and left upper quadrant. Negative signs include Watson's sign and McBurney's sign.      Hernia: No hernia is present.   Skin:     General: Skin is warm and dry.      Capillary Refill: Capillary refill takes less than 2 seconds.   Neurological:      General: No focal deficit present.      Mental Status: She is alert and oriented to person, place, and time.   Psychiatric:         Mood  "and Affect: Mood normal.         Behavior: Behavior normal.         Lab Results: I have reviewed the following results:CBC/BMP:   .     01/08/25  0455   WBC 5.66   HGB 10.5*   HCT 32.3*      SODIUM 136   K 3.8      CO2 28   BUN 6   CREATININE 0.38*   GLUC 95    , LFTs:   No new results in last 24 hours.   , Lipase:   No results found for: \"LIPASE\"  , Urinalysis:   Lab Results   Component Value Date    COLORU Yellow 01/07/2025    CLARITYU Turbid 01/07/2025    SPECGRAV 1.023 01/07/2025    PHUR 5.5 01/07/2025    LEUKOCYTESUR Negative 01/07/2025    NITRITE Negative 01/07/2025    GLUCOSEU Negative 01/07/2025    KETONESU Trace (A) 01/07/2025    BILIRUBINUR Negative 01/07/2025    BLOODU Negative 01/07/2025       Imaging Results Review: I personally reviewed the following image studies in PACS and associated radiology reports: CT abdomen/pelvis. My interpretation of the radiology images/reports is: Decrease in size of peripancreatic fluid collections, resolution of fluid collections in head of pancreas, No new or enlarging peripancreatic fluid collections s/p cystogastrostomy. Moderate free fluid in cul-de-sac, resolution of R ovarian cyst.  Other Study Results Review: No additional pertinent studies reviewed.    Jonny Bonner, MS4  "

## 2025-01-08 NOTE — UTILIZATION REVIEW
Initial Clinical Review  OBSERVATION ADMISSION 1/7/2025 1709  CONVERTED TO  INPATIENT ADMISSION 1/9/2025 1104  AFTER 2 MN OBS MANAGEMENT DUE TO  ONGOING ABD PAIN REQUIRING MULTIPLE IV NARCS/ TESTING FOR MANAGEMENT    Admission: Date/Time/Statement:   Admission Orders (From admission, onward)       Ordered        01/09/25 1104  INPATIENT ADMISSION  Once            01/07/25 1709  Place in Observation  Once                          Orders Placed This Encounter   Procedures    INPATIENT ADMISSION     Standing Status:   Standing     Number of Occurrences:   1     Level of Care:   Med Surg [16]     Estimated length of stay:   More than 2 Midnights     Certification:   I certify that inpatient services are medically necessary for this patient for a duration of greater than two midnights. See H&P and MD Progress Notes for additional information about the patient's course of treatment.     ED Arrival Information       Expected   -    Arrival   1/7/2025 11:49    Acuity   Urgent              Means of arrival   Walk-In    Escorted by   Friend    Service   Hospitalist    Admission type   Emergency              Arrival complaint   severe abdominal pain             Chief Complaint   Patient presents with    Abdominal Pain     C/o LUQ pain, states seen a few weeks ago for it and was her pancreas where she had a stent placed.        Initial Presentation: 34 y.o. female to ED as walk in presents as Observation admission due to intractable ABD pain, alcohol use disorder    PMH alcohol use disorder, GERD , splenic vein thrombosis w necrotizing pancreatitis  presents with worsening left-sided abdominal pain.  Recent hospitalization for necrotizing pancreatitis with mass effect s/p EUS with cystogastrostomy stent placement 12/17 with plan for repeat EGD with necrosectomy in 3 to 4 weeks .    EXAM  CIWA = 9, Repeat CT scan of abdomen in ED showed decrease in size of peripancreatic fluid collections and resolution of 2 collections in the  region of the head of the pancreas with Moderate amount of free fluid in the cul-de-sacr and resolution of right ovarian cyst.  Lipase 74. She was given IVF, multiple doses of IV Dilaudid in ED without improvement   Consult GI for recs, IVF, bowel regimen, CIWA, folic acid/ thiamine; PPI  Anticipated Length of Stay/Certification Statement: Patient will be admitted on an observation basis with an anticipated length of stay of less than 2 midnights secondary to abdominal pain.  Date: 1/8/2025   Day 2:   Provider  C/O abdominal pain. Pain medication not offering much relief. Not tolerating diet secondary to pain.   states that she did not have any abd pain for about 7-10 days following  EUS with cystogastrostomy stent placement on 12/17; now having pain for about 1 week   Per GI for  EGD with necrosectomy versus stent removal and placement of a pigtail catheter tomorrow. Cont pain/ antiemetics, PPI, IVF,  bowel regimen  GI  Pain located mostly in LUQ with some pain in epigastrium and periumbilical region.  Continue symptomatic relief. N.p.o. past midnight.  Plan for EGD with necrosectomy versus stent removal and placement of a pigtail catheter tomorrow  DATE  1/9/2025 Change to Inpatient  Cont w ABD pain, anxiety for upcoming procedure  EGD with necrosectomy versus stent removal and placement of a pigtail catheter today.   PRN analgesics and antiemetics.  IV fluids, Bowel regimen  One of her recent scans showed chronic occlusion of the splenic vein, GI input from recent hospitalization noted regarding holding AC and proceed with conservative management given plan for future necrosectomy and insufficient data regarding use of AC. [facility]    ED Treatment-Medication Administration from 01/07/2025 1149 to 01/07/2025 2208         Date/Time Order Dose Route Action     01/07/2025 1403 HYDROmorphone (DILAUDID) injection 0.5 mg 0.5 mg Intravenous Given     01/07/2025 1358 ondansetron (ZOFRAN-ODT) dispersible tablet 4 mg 4 mg Oral  Given     01/07/2025 1407 multi-electrolyte (ISOLYTE-S PH 7.4) bolus 1,000 mL 1,000 mL Intravenous New Bag     01/07/2025 1431 nicotine (NICODERM CQ) 7 mg/24hr TD 24 hr patch 7 mg 7 mg Transdermal Medication Applied     01/07/2025 1440 iohexol (OMNIPAQUE) 350 MG/ML injection (MULTI-DOSE) 85 mL 85 mL Intravenous Given     01/07/2025 1542 HYDROmorphone (DILAUDID) injection 0.5 mg 0.5 mg Intravenous Given     01/07/2025 1818 oxyCODONE (ROXICODONE) split tablet 2.5 mg -- Oral See Alternative     01/07/2025 1818 oxyCODONE (ROXICODONE) IR tablet 5 mg 5 mg Oral Given     01/07/2025 2137 acetaminophen (TYLENOL) tablet 650 mg 650 mg Oral Given     01/07/2025 1942 ondansetron (ZOFRAN) injection 4 mg 4 mg Intravenous Given     01/07/2025 2113 dextrose 5 % in lactated Ringer's infusion 100 mL/hr Intravenous New Bag     01/07/2025 1942 LORazepam (ATIVAN) tablet 2 mg 2 mg Oral Given            Scheduled Medications:  DULoxetine, 30 mg, Oral, Daily  enoxaparin, 40 mg, Subcutaneous, Q24H RODRICK  folic acid, 1 mg, Oral, Daily  nicotine, 1 patch, Transdermal, Daily  pantoprazole, 40 mg, Oral, BID AC  senna-docusate sodium, 2 tablet, Oral, HS  thiamine, 100 mg, Oral, Daily      Continuous IV Infusions:  Medications 01/07 01/08 01/09   dextrose 5 % in lactated Ringer's infusion  Rate: 100 mL/hr Dose: 100 mL/hr  Freq: Continuous Route: IV  Indications of Use: IV Hydration  Last Dose: Stopped (01/09/25 1523)  Start: 01/07/25 1730 End: 01/09/25 1521 2113 1924      0522     1521-D/C'd  1523 [C             PRN Meds:  acetaminophen, 650 mg, Oral, Q6H PRN  HYDROmorphone, 1 mg, Intravenous, Q4H PRN  LORazepam, 0.5 mg, Oral, Q8H PRN  ondansetron, 4 mg, Intravenous, Q4H PRN  oxyCODONE, 5 mg, Oral, Q4H PRN   Or  oxyCODONE, 10 mg, Oral, Q4H PRN  polyethylene glycol, 17 g, Oral, Daily PRN    IV 1/8 x2 IV =  HYDROmorphone (DILAUDID) injection 0.5 mg  Dose: 0.5 mg  Freq: Every 4 hours PRN Route: IV  PRN Reason: breakthrough pain  PRN Comment: or  if patient qualifies for treatment of moderate or severe pain but cannot take oral medications  Start: 01/07/25 1723 End: 01/08/25 1159    IV 1/8 x2, 1/9 x3  ED Triage Vitals [01/07/25 1201]   Temperature Pulse Respirations Blood Pressure SpO2 Pain Score   98 °F (36.7 °C) 83 18 160/100 99 % 9     Weight (last 2 days)       None            Vital Signs (last 3 days)       Date/Time Temp Pulse Resp BP MAP (mmHg) SpO2 O2 Device CIWA-Ar Total Pain    01/09/25 16:04:55 98.8 °F (37.1 °C) 59 19 130/92 105 92 % -- -- --    01/09/25 1536 -- -- -- -- -- -- -- -- 7 01/09/25 15:22:44 97.3 °F (36.3 °C) 73 -- 152/98 116 97 % -- -- --    01/09/25 1505 -- 76 18 144/90 -- 97 % None (Room air) -- No Pain    01/09/25 1450 97.4 °F (36.3 °C) 91 18 135/91 -- 98 % None (Room air) -- No Pain    01/09/25 1406 97 °F (36.1 °C) 63 18 163/91 -- 98 % None (Room air) -- No Pain    01/09/25 1345 -- -- -- -- -- -- -- -- 8 01/09/25 11:22:33 97.6 °F (36.4 °C) 72 18 127/89 102 -- -- 7 --    01/09/25 0959 -- -- -- -- -- -- -- -- 8 01/09/25 09:11:16 -- 74 -- 132/86 101 99 % -- -- --    01/09/25 0911 -- -- -- -- -- -- -- 3 --    01/09/25 0905 -- -- -- -- -- -- -- -- 9    01/09/25 0900 -- -- -- -- -- -- -- -- 7 01/09/25 0417 -- -- -- -- -- -- -- -- 10 - Worst Possible Pain    01/09/25 0400 -- -- -- -- -- -- -- 2 --    01/09/25 03:43:20 98.4 °F (36.9 °C) -- 18 122/85 97 -- -- -- --    01/09/25 0302 -- -- -- -- -- -- -- -- 6    01/09/25 01:21:52 98.7 °F (37.1 °C) 77 -- 123/78 93 99 % -- -- --    01/09/25 0000 -- -- -- -- -- -- -- 2 --    01/08/25 2211 -- -- -- -- -- -- -- -- 10 - Worst Possible Pain    01/08/25 2105 -- -- -- -- -- -- -- -- 5    01/08/25 2000 -- -- -- -- -- -- -- 1 --    01/08/25 19:31:50 -- 79 -- 125/88 100 95 % -- -- --    01/08/25 1925 -- -- -- -- -- -- None (Room air) -- --    01/08/25 1738 -- -- -- -- -- -- -- -- 10 - Worst Possible Pain    01/08/25 1600 -- -- -- 140/86 -- -- -- 2 --    01/08/25 15:23:04 98.9 °F (37.2 °C) 78  -- 141/93 109 95 % -- -- --    01/08/25 1403 -- -- -- -- -- -- -- -- 7    01/08/25 1200 -- -- -- 141/93 -- -- -- 1 --    01/08/25 1115 -- -- -- -- -- -- -- -- 10 - Worst Possible Pain    01/08/25 0912 -- -- -- -- -- -- -- -- 7    01/08/25 0800 -- -- -- 119/70 -- -- -- 1 --    01/08/25 07:25:24 97.8 °F (36.6 °C) 54 19 119/70 86 97 % None (Room air) 1 --    01/08/25 0614 -- -- -- -- -- -- -- -- 10 - Worst Possible Pain    01/08/25 0300 -- -- -- 110/73 -- -- -- 0 --    01/08/25 02:41:18 98.6 °F (37 °C) 71 -- 110/73 85 96 % -- -- --    01/08/25 00:16:23 -- 71 -- 113/70 84 98 % -- 0 --    01/07/25 22:23:10 98.2 °F (36.8 °C) 64 -- 114/81 92 91 % -- -- --    01/07/25 2217 -- -- -- -- -- -- -- -- 10 - Worst Possible Pain    01/07/25 2030 -- 74 -- 140/84 -- -- -- 2 --    01/07/25 1922 -- 79 16 140/85 -- 97 % -- -- --    01/07/25 1919 -- 79 -- -- -- -- -- 9 --    01/07/25 1818 -- -- -- -- -- -- -- -- 9    01/07/25 1542 -- -- -- -- -- -- -- -- 8    01/07/25 1407 -- 81 16 141/89 -- 99 % None (Room air) -- --    01/07/25 1403 -- -- -- -- -- -- -- -- 9    01/07/25 1344 -- -- -- -- -- -- None (Room air) -- --    01/07/25 1201 98 °F (36.7 °C) 83 18 160/100 121 99 % None (Room air) -- 9           CIWA-Ar Score       Row Name 01/09/25 11:22:33 01/09/25 0911 01/09/25 0400       CIWA-Ar    Nausea and Vomiting 1 1 0    Tactile Disturbances 0 0 0    Tremor 3 1 0    Auditory Disturbances 0 0 0    Paroxysmal Sweats 0 0 0    Visual Disturbances 0 0 0    Anxiety 3 1 2    Headache, Fullness in Head 0 0 0    Agitation 0 0 0    Orientation and Clouding of Sensorium 0 0 0    CIWA-Ar Total 7 3 2      Row Name 01/09/25 0000 01/08/25 2000 01/08/25 1600       CIWA-Ar    BP -- -- 140/86    Nausea and Vomiting 0 0 1    Tactile Disturbances 0 0 0    Tremor 0 0 0    Auditory Disturbances 0 0 0    Paroxysmal Sweats 0 0 0    Visual Disturbances 0 0 0    Anxiety 2 1 1    Headache, Fullness in Head 0 0 0    Agitation 0 0 0    Orientation and Clouding of  Sensorium 0 0 0    CIWA-Ar Total 2 1 2      Row Name 01/08/25 1200 01/08/25 0800 01/08/25 07:25:24       CIWA-Ar    /93 119/70 --    Nausea and Vomiting 0 0 0    Tactile Disturbances 0 0 0    Tremor 0 0 0    Auditory Disturbances 0 0 0    Paroxysmal Sweats 0 0 0    Visual Disturbances 0 0 0    Anxiety 1 1 1    Headache, Fullness in Head 0 0 0    Agitation 0 0 0    Orientation and Clouding of Sensorium 0 0 0    CIWA-Ar Total 1 1 1      Row Name 01/08/25 0300 01/08/25 00:16:23 01/07/25 2030       CIWA-Ar    /73 -- 140/84    Pulse -- -- 74    Nausea and Vomiting 0 0 0    Tactile Disturbances 0 0 0    Tremor 0 0 0    Auditory Disturbances 0 0 0    Paroxysmal Sweats 0 0 1    Visual Disturbances 0 0 1    Anxiety 0 0 0    Headache, Fullness in Head 0 0 0    Agitation 0 0 0    Orientation and Clouding of Sensorium 0 0 0    CIWA-Ar Total 0 0 2      Row Name 01/07/25 1919             CIWA-Ar    Pulse 79      Nausea and Vomiting 2      Tactile Disturbances 0      Tremor 1      Auditory Disturbances 0      Paroxysmal Sweats 1      Visual Disturbances 1      Anxiety 2      Headache, Fullness in Head 2      Agitation 0      Orientation and Clouding of Sensorium 0      CIWA-Ar Total 9                      Pertinent Labs/Diagnostic Test Results:   Radiology:  CT abdomen pelvis with contrast   Final Interpretation by Dominga Shabazz MD (01/07 1602)      Decrease in size of peripancreatic fluid collections and resolution of 2 collections in the region of the head of the pancreas. No new or enlarging peripancreatic fluid collections status post cystogastrostomy.      Moderate amount of free fluid in the cul-de-sacr and resolution of right ovarian cyst.      Otherwise no new findings.      The study was marked in EPIC for immediate notification.         Workstation performed: [id number]         CT abdomen w contrast    (Results Pending)     Cardiology:  No orders to display     GI:  EGD Fluoro   Final Result by Venancio  "MD Addison (01/09 1500)   Healthy appearing granulation tissue without significant necrosis    visualized through the LAMS. Subsequently previously placed double    pig-tail stent and LAMS were successfully removed.       RECOMMENDATION:   Repeat CT abdo w iv contrast in 4 weeks   Resume diet today                     Venancio Jaime MD               Results from last 7 days   Lab Units 01/09/25  0440 01/08/25  0455 01/07/25  1206   WBC Thousand/uL 4.40 5.66 8.05   HEMOGLOBIN g/dL 10.3* 10.5* 12.1   HEMATOCRIT % 31.5* 32.3* 36.5   PLATELETS Thousands/uL 167 195 269   TOTAL NEUT ABS Thousands/µL  --   --  5.02         Results from last 7 days   Lab Units 01/09/25  0440 01/08/25  0455 01/07/25  1206   SODIUM mmol/L 136 136 138   POTASSIUM mmol/L 3.6 3.8 3.9   CHLORIDE mmol/L 100 100 102   CO2 mmol/L 28 28 24   ANION GAP mmol/L 8 8 12   BUN mg/dL 3* 6 7   CREATININE mg/dL 0.40* 0.38* 0.36*   EGFR ml/min/1.73sq m 137 139 142   CALCIUM mg/dL 8.3* 8.2* 9.0     Results from last 7 days   Lab Units 01/09/25  0440 01/07/25  1206   AST U/L 12* 17   ALT U/L 4* 8   ALK PHOS U/L 49 61   TOTAL PROTEIN g/dL 5.4* 6.7   ALBUMIN g/dL 2.9* 3.7   TOTAL BILIRUBIN mg/dL 0.62 0.73         Results from last 7 days   Lab Units 01/09/25  0440 01/08/25  0455 01/07/25  1206   GLUCOSE RANDOM mg/dL 95 95 83             No results found for: \"BETA-HYDROXYBUTYRATE\"                                                                       Results from last 7 days   Lab Units 01/07/25  1206   LIPASE u/L 74                 Results from last 7 days   Lab Units 01/07/25  1355   CLARITY UA  Turbid   COLOR UA  Yellow   SPEC GRAV UA  1.023   PH UA  5.5   GLUCOSE UA mg/dl Negative   KETONES UA mg/dl Trace*   BLOOD UA  Negative   PROTEIN UA mg/dl Trace*   NITRITE UA  Negative   BILIRUBIN UA  Negative   UROBILINOGEN UA (BE) mg/dl <2.0   LEUKOCYTES UA  Negative   WBC UA /hpf 1-2   RBC UA /hpf 1-2   BACTERIA UA /hpf None Seen   EPITHELIAL CELLS WET PREP /hpf Occasional "   MUCUS THREADS  Innumerable*                                                   Past Medical History:   Diagnosis Date    Acute alcoholic pancreatitis 11/03/2023    Hypertension     SIRS (systemic inflammatory response syndrome) (HCC) 04/10/2024     Present on Admission:   Tobacco abuse   Alcohol use disorder   Gastroesophageal reflux disease without esophagitis   Ovarian cyst      Admitting Diagnosis: Pancreatitis [K85.90]  Abdominal pain [R10.9]  LUQ pain [R10.12]  Age/Sex: 34 y.o. female    Network Utilization Review Department  ATTENTION: Please call with any questions or concerns to [phone] and carefully listen to the prompts so that you are directed to the right person. All voicemails are confidential.   For Discharge needs, contact Care Management DC Support Team at [phone] opt. 2  Send all requests for admission clinical reviews, approved or denied determinations and any other requests to dedicated fax number below belonging to the campus where the patient is receiving treatment. List of dedicated fax numbers for the Facilities:  FACILITY NAME UR FAX NUMBER   ADMISSION DENIALS (Administrative/Medical Necessity) [phone]   DISCHARGE SUPPORT TEAM (NETWORK) [phone]   PARENT CHILD HEALTH (Maternity/NICU/Pediatrics) [phone]   [facility] [phone]   [facility] [phone]   [facility] [phone]   [facility] [phone]   [facility] [phone]   [facility] [phone]   [facility] [phone]   [facility] [phone]   [facility] [phone]   [facility] [phone]   [facility] [phone]   [facility] [phone]

## 2025-01-08 NOTE — CERTIFIED RECOVERY SPECIALIST
"Time spent: 31 minutes    CRS met with patient to offer support. Patient shared that she is \"cranky because they told her last night she could not eat anything due to a surgery today and then was given breakfast so she thought something changed and she ate\". Patient's nurse entered room and said surgery was rescheduled to tomorrow, and she will have to fast at midnight tonight. Patient was frustrated because she is experiencing a lot of pain and also worried about her job. (Patient had an extended medical admission here in Dec24 and has missed work already.)     CRS expressed compassion and comfort for what occurred and encouraged patient to think about positive thoughts. Patient was encouraged to call her sponsor and other supports when she is ready and to talk about her concerns. Patient said she has a mix of emotions and was reminded this is normal, especially in early recovery and just learning how to cope with stressors.     CRS to follow and support, remains available.  "

## 2025-01-08 NOTE — PROGRESS NOTES
04/30/18 1955   Vital Signs   Temp 97.9 °F (36.6 °C)   Temp Source Axillary   Pulse (Heart Rate) (!) 116   Heart Rate Source Monitor   Resp Rate 20   O2 Sat (%) 100 %   Level of Consciousness Alert   BP (!) 168/99   MAP (Calculated) 122   MEWS Score 3     1930-Went to do hourly rounds and found pt on the floor. Pt refused to let me take her vital signs until she was back in bed. Pt stated that she did not get hurt or hit her head. 1955-Pt's MEWS is 3, and she is complaining of chest pain. Called a rapid response. 2010-Pt's  convinced pt to take the oral hydralazine that she had been refusing all day. 2015-Informed Dr. Katie Lester of pt's fall, pt's pain, and pt's anxiety. Per Dr. Katie Lester, order a one time 0.1 mg PO clonidine and 1 mg IV Ativan. Will continue to monitor pt. Progress Note - Hospitalist   Name: Kaykay Holland 34 y.o. female I MRN: [id number]  Unit/Bed#: CW2 212-02 I Date of Admission: 1/7/2025   Date of Service: 1/8/2025 I Hospital Day: 0    Assessment & Plan  Abdominal pain  Kaykay Holland is a 34 y.o. female with a PMH of alcohol use disorder, GERD , splenic vein thrombosis and necrotizing pancreatitis who presents with worsening left-sided abdominal pain.  Recent hospitalization noted for necrotizing pancreatitis with mass effect s/p EUS with cystogastrostomy stent placement 12/17 with plan for repeat EGD with necrosectomy in 3 to 4 weeks .  Repeat CT scan of abdomen in ED showed decrease in size of peripancreatic fluid collections and resolution of 2 collections in the region of the head of the pancreas with Moderate amount of free fluid in the cul-de-sacr and resolution of right ovarian cyst.  She was given multiple doses of IV Dilaudid in ED without improvement for which ED requested admission for management of intractable abdominal pain  GI consulted - EGD with necrosectomy versus stent removal and placement of a pigtail catheter tomorrow.   As needed analgesics and antiemetics.  IV fluids  Bowel regimen  One of her recent scans showed chronic occlusion of the splenic vein, GI input from recent hospitalization noted regarding holding AC and proceed with conservative management given plan for future necrosectomy and insufficient data regarding use of AC in this regard.  Tobacco abuse  Nicotine patch ordered  Encourage cessation  Alcohol use disorder  CIWA protocol  Continue folic acid and thiamine  Gastroesophageal reflux disease without esophagitis  Continue PPI  Ovarian cyst  Recently evaluated by gynecology and recommended for outpatient follow-up with repeat ultrasounds  CT scan in ED showed resolution of her ovarian cysts    VTE Pharmacologic Prophylaxis: VTE Score: 0 Low Risk (Score 0-2) - Encourage Ambulation.    Mobility:   Basic Mobility Inpatient Raw  Score: 20  JH-HLM Goal: 6: Walk 10 steps or more  JH-HLM Achieved: 6: Walk 10 steps or more  JH-HLM Goal achieved. Continue to encourage appropriate mobility.    Patient Centered Rounds: I performed bedside rounds with nursing staff today.   Discussions with Specialists or Other Care Team Provider: case management    Education and Discussions with Family / Patient: Patient declined call to .     Current Length of Stay: 0 day(s)  Current Patient Status: Observation   Certification Statement: The patient will continue to require additional inpatient hospital stay due to EGD 1/9  Discharge Plan: Anticipate discharge in 48-72 hrs to home.    Code Status: Prior    Subjective   C/O abdominal pain. Pain medication not offering much relief. Not tolerating diet secondary to pain.     Objective :  Temp:  [97.8 °F (36.6 °C)-98.6 °F (37 °C)] 97.8 °F (36.6 °C)  HR:  [54-81] 54  BP: (110-141)/(70-89) 119/70  Resp:  [16-19] 19  SpO2:  [91 %-99 %] 97 %  O2 Device: None (Room air)    There is no height or weight on file to calculate BMI.     Input and Output Summary (last 24 hours):   No intake or output data in the 24 hours ending 01/08/25 1340    Physical Exam  Vitals and nursing note reviewed.   Constitutional:       General: She is not in acute distress.     Appearance: She is well-developed.   HENT:      Head: Normocephalic and atraumatic.   Cardiovascular:      Rate and Rhythm: Normal rate and regular rhythm.      Heart sounds: No murmur heard.     No friction rub.   Pulmonary:      Effort: Pulmonary effort is normal. No respiratory distress.      Breath sounds: Normal breath sounds. No wheezing.   Abdominal:      General: Bowel sounds are normal. There is no distension.      Palpations: Abdomen is soft.      Tenderness: There is abdominal tenderness. There is no guarding or rebound.   Skin:     General: Skin is warm and dry.      Findings: No rash.   Neurological:      Mental Status: She is alert and oriented to  person, place, and time.      Cranial Nerves: No cranial nerve deficit.           Lines/Drains:              Lab Results: I have reviewed the following results:   Results from last 7 days   Lab Units 01/08/25  0455 01/07/25  1206   WBC Thousand/uL 5.66 8.05   HEMOGLOBIN g/dL 10.5* 12.1   HEMATOCRIT % 32.3* 36.5   PLATELETS Thousands/uL 195 269   SEGS PCT %  --  62   LYMPHO PCT %  --  27   MONO PCT %  --  8   EOS PCT %  --  2     Results from last 7 days   Lab Units 01/08/25  0455 01/07/25  1206   SODIUM mmol/L 136 138   POTASSIUM mmol/L 3.8 3.9   CHLORIDE mmol/L 100 102   CO2 mmol/L 28 24   BUN mg/dL 6 7   CREATININE mg/dL 0.38* 0.36*   ANION GAP mmol/L 8 12   CALCIUM mg/dL 8.2* 9.0   ALBUMIN g/dL  --  3.7   TOTAL BILIRUBIN mg/dL  --  0.73   ALK PHOS U/L  --  61   ALT U/L  --  8   AST U/L  --  17   GLUCOSE RANDOM mg/dL 95 83                       Recent Cultures (last 7 days):         Imaging Results Review: I reviewed radiology reports from this admission including: CT abdomen/pelvis.      Last 24 Hours Medication List:     Current Facility-Administered Medications:     acetaminophen (TYLENOL) tablet 650 mg, Q6H PRN    dextrose 5 % in lactated Ringer's infusion, Continuous, Last Rate: 100 mL/hr (01/07/25 2113)    DULoxetine (CYMBALTA) delayed release capsule 30 mg, Daily    [START ON 1/9/2025] enoxaparin (LOVENOX) subcutaneous injection 40 mg, Q24H RODRICK    folic acid (FOLVITE) tablet 1 mg, Daily    HYDROmorphone (DILAUDID) injection 1 mg, Q4H PRN    LORazepam (ATIVAN) tablet 0.5 mg, Q8H PRN    nicotine (NICODERM CQ) 21 mg/24 hr TD 24 hr patch 1 patch, Daily    ondansetron (ZOFRAN) injection 4 mg, Q4H PRN    oxyCODONE (ROXICODONE) IR tablet 5 mg, Q4H PRN **OR** oxyCODONE (ROXICODONE) immediate release tablet 10 mg, Q4H PRN    pantoprazole (PROTONIX) EC tablet 40 mg, BID AC    polyethylene glycol (MIRALAX) packet 17 g, Daily PRN    senna-docusate sodium (SENOKOT S) 8.6-50 mg per tablet 2 tablet, HS    thiamine tablet  100 mg, Daily    Administrative Statements   Today, Patient Was Seen By: Jamila Avendaño PA-C      **Please Note: This note may have been constructed using a voice recognition system.**

## 2025-01-08 NOTE — PLAN OF CARE
Problem: PAIN - ADULT  Goal: Verbalizes/displays adequate comfort level or baseline comfort level  Description: Interventions:  - Encourage patient to monitor pain and request assistance  - Assess pain using appropriate pain scale  - Administer analgesics based on type and severity of pain and evaluate response  - Implement non-pharmacological measures as appropriate and evaluate response  - Consider cultural and social influences on pain and pain management  - Notify physician/advanced practitioner if interventions unsuccessful or patient reports new pain  Outcome: Progressing     Problem: INFECTION - ADULT  Goal: Absence or prevention of progression during hospitalization  Description: INTERVENTIONS:  - Assess and monitor for signs and symptoms of infection  - Monitor lab/diagnostic results  - Monitor all insertion sites, i.e. indwelling lines, tubes, and drains  - Monitor endotracheal if appropriate and nasal secretions for changes in amount and color  - [place] appropriate cooling/warming therapies per order  - Administer medications as ordered  - Instruct and encourage patient and family to use good hand hygiene technique  - Identify and instruct in appropriate isolation precautions for identified infection/condition  Outcome: Progressing  Goal: Absence of fever/infection during neutropenic period  Description: INTERVENTIONS:  - Monitor WBC    Outcome: Progressing     Problem: SAFETY ADULT  Goal: Patient will remain free of falls  Description: INTERVENTIONS:  - Educate patient/family on patient safety including physical limitations  - Instruct patient to call for assistance with activity   - Consult OT/PT to assist with strengthening/mobility   - Keep Call bell within reach  - Keep bed low and locked with side rails adjusted as appropriate  - Keep care items and personal belongings within reach  - Initiate and maintain comfort rounds  - Make Fall Risk Sign visible to staff  - Offer Toileting every  Hours,  in advance of need  - Initiate/Maintain alarm  - Obtain necessary fall risk management equipment:   - Apply yellow socks and bracelet for high fall risk patients  - Consider moving patient to room near nurses station  Outcome: Progressing  Goal: Maintain or return to baseline ADL function  Description: INTERVENTIONS:  -  Assess patient's ability to carry out ADLs; assess patient's baseline for ADL function and identify physical deficits which impact ability to perform ADLs (bathing, care of mouth/teeth, toileting, grooming, dressing, etc.)  - Assess/evaluate cause of self-care deficits   - Assess range of motion  - Assess patient's mobility; develop plan if impaired  - Assess patient's need for assistive devices and provide as appropriate  - Encourage maximum independence but intervene and supervise when necessary  - Involve family in performance of ADLs  - Assess for home care needs following discharge   - Consider OT consult to assist with ADL evaluation and planning for discharge  - Provide patient education as appropriate  Outcome: Progressing  Goal: Maintains/Returns to pre admission functional level  Description: INTERVENTIONS:  - Perform AM-PAC 6 Click Basic Mobility/ Daily Activity assessment daily.  - Set and communicate daily mobility goal to care team and patient/family/caregiver.   - Collaborate with rehabilitation services on mobility goals if consulted  - Perform Range of Motion  times a day.  - Reposition patient every  hours.  - Dangle patient  times a day  - Stand patient  times a day  - Ambulate patient  times a day  - Out of bed to chair  times a day   - Out of bed for meals  times a day  - Out of bed for toileting  - Record patient progress and toleration of activity level   Outcome: Progressing     Problem: DISCHARGE PLANNING  Goal: Discharge to home or other facility with appropriate resources  Description: INTERVENTIONS:  - Identify barriers to discharge w/patient and caregiver  - Arrange for  needed discharge resources and transportation as appropriate  - Identify discharge learning needs (meds, wound care, etc.)  - Arrange for interpretive services to assist at discharge as needed  - Refer to Case Management Department for coordinating discharge planning if the patient needs post-hospital services based on physician/advanced practitioner order or complex needs related to functional status, cognitive ability, or social support system  Outcome: Progressing     Problem: Knowledge Deficit  Goal: Patient/family/caregiver demonstrates understanding of disease process, treatment plan, medications, and discharge instructions  Description: Complete learning assessment and assess knowledge base.  Interventions:  - Provide teaching at level of understanding  - Provide teaching via preferred learning methods  Outcome: Progressing

## 2025-01-08 NOTE — PLAN OF CARE
Problem: PAIN - ADULT  Goal: Verbalizes/displays adequate comfort level or baseline comfort level  Description: Interventions:  - Encourage patient to monitor pain and request assistance  - Assess pain using appropriate pain scale  - Administer analgesics based on type and severity of pain and evaluate response  - Implement non-pharmacological measures as appropriate and evaluate response  - Consider cultural and social influences on pain and pain management  - Notify physician/advanced practitioner if interventions unsuccessful or patient reports new pain  Outcome: Progressing     Problem: INFECTION - ADULT  Goal: Absence or prevention of progression during hospitalization  Description: INTERVENTIONS:  - Assess and monitor for signs and symptoms of infection  - Monitor lab/diagnostic results  - Monitor all insertion sites, i.e. indwelling lines, tubes, and drains  - Monitor endotracheal if appropriate and nasal secretions for changes in amount and color  - [place] appropriate cooling/warming therapies per order  - Administer medications as ordered  - Instruct and encourage patient and family to use good hand hygiene technique  - Identify and instruct in appropriate isolation precautions for identified infection/condition  Outcome: Progressing  Goal: Absence of fever/infection during neutropenic period  Description: INTERVENTIONS:  - Monitor WBC    Outcome: Progressing     Problem: SAFETY ADULT  Goal: Patient will remain free of falls  Description: INTERVENTIONS:  - Educate patient/family on patient safety including physical limitations  - Instruct patient to call for assistance with activity   - Consult OT/PT to assist with strengthening/mobility   - Keep Call bell within reach  - Keep bed low and locked with side rails adjusted as appropriate  - Keep care items and personal belongings within reach  - Initiate and maintain comfort rounds  - Make Fall Risk Sign visible to staff  - Offer Toileting every  Hours,  in advance of need  - Initiate/Maintain alarm  - Obtain necessary fall risk management equipment:   - Apply yellow socks and bracelet for high fall risk patients  - Consider moving patient to room near nurses station  Outcome: Progressing  Goal: Maintain or return to baseline ADL function  Description: INTERVENTIONS:  -  Assess patient's ability to carry out ADLs; assess patient's baseline for ADL function and identify physical deficits which impact ability to perform ADLs (bathing, care of mouth/teeth, toileting, grooming, dressing, etc.)  - Assess/evaluate cause of self-care deficits   - Assess range of motion  - Assess patient's mobility; develop plan if impaired  - Assess patient's need for assistive devices and provide as appropriate  - Encourage maximum independence but intervene and supervise when necessary  - Involve family in performance of ADLs  - Assess for home care needs following discharge   - Consider OT consult to assist with ADL evaluation and planning for discharge  - Provide patient education as appropriate  Outcome: Progressing  Goal: Maintains/Returns to pre admission functional level  Description: INTERVENTIONS:  - Perform AM-PAC 6 Click Basic Mobility/ Daily Activity assessment daily.  - Set and communicate daily mobility goal to care team and patient/family/caregiver.   - Collaborate with rehabilitation services on mobility goals if consulted  - Perform Range of Motion  times a day.  - Reposition patient every  hours.  - Dangle patient  times a day  - Stand patient  times a day  - Ambulate patient  times a day  - Out of bed to chair  times a day   - Out of bed for meals  times a day  - Out of bed for toileting  - Record patient progress and toleration of activity level   Outcome: Progressing     Problem: DISCHARGE PLANNING  Goal: Discharge to home or other facility with appropriate resources  Description: INTERVENTIONS:  - Identify barriers to discharge w/patient and caregiver  - Arrange for  needed discharge resources and transportation as appropriate  - Identify discharge learning needs (meds, wound care, etc.)  - Arrange for interpretive services to assist at discharge as needed  - Refer to Case Management Department for coordinating discharge planning if the patient needs post-hospital services based on physician/advanced practitioner order or complex needs related to functional status, cognitive ability, or social support system  Outcome: Progressing     Problem: Knowledge Deficit  Goal: Patient/family/caregiver demonstrates understanding of disease process, treatment plan, medications, and discharge instructions  Description: Complete learning assessment and assess knowledge base.  Interventions:  - Provide teaching at level of understanding  - Provide teaching via preferred learning methods  Outcome: Progressing

## 2025-01-08 NOTE — CASE MANAGEMENT
Case Management Assessment & Discharge Planning Note    Patient name Kaykay Holland  Location 2 212/CW2 212-02 MRN [id number]  : 1990 Date 2025       Current Admission Date: 2025  Current Admission Diagnosis:Abdominal pain   Patient Active Problem List    Diagnosis Date Noted Date Diagnosed    Abdominal pain 2025     Splenic vein thrombosis 2024     Leukocytosis 2024     Thrombocytopenia (HCC) 2024     Ovarian cyst 2024     Constipation 2024     Pancreatic pseudocyst 2024     Gastroesophageal reflux disease without esophagitis 2024     Electrolyte abnormality 2024     Alcohol use disorder 2024     Hematemesis 2024     Necrotizing pancreatitis 2024     Pancytopenia (HCC) 04/10/2024     Alcohol abuse 2024     Provoked seizures (HCC) 2024     Alcohol withdrawal seizure (HCC) 2024     Transaminitis 2024     Prolonged QT interval 2024     Essential hypertension 2024     Tobacco abuse 2024     Acute alcoholic pancreatitis 2023     Hepatic steatosis 2023     Peptic ulcer disease 2021     Alcohol withdrawal (HCC) 2020     Anxiety 2019     Depression 2019       LOS (days): 0  Geometric Mean LOS (GMLOS) (days):   Days to GMLOS:     OBJECTIVE:     Current admission status: Observation    Preferred Pharmacy:   [id number] - [place] - [address]  [address]  [place]   Phone: [phone] Fax: [phone]    Primary Care Provider: PATRICIA Moran    Primary Insurance: AETNA  Secondary Insurance:     ASSESSMENT:  Active Health Care Proxies       Chichi George Care Representative - Mother   Primary Phone: [phone] (Mobile)                 Advance Directives  Does patient have a Health Care POA?: No  Does patient have Advance Directives?: No  Primary Contact: Chichi George  (Mother)    Patient Information  Admitted from:: Home  Mental Status: Alert  During Assessment patient was accompanied by: Not accompanied during assessment  Assessment information provided by:: Patient  Primary Caregiver: Self  Support Systems: Self, Family members, Friend  Home entry access options. Select all that apply.: Stairs  Number of steps to enter home.: 5  Type of Current Residence: 2 story home  Upon entering residence, is there a bedroom on the main floor (no further steps)?: No  A bedroom is located on the following floor levels of residence (select all that apply):: 2nd Floor  Upon entering residence, is there a bathroom on the main floor (no further steps)?: No  Indicate which floors of current residence have a bathroom (select all the apply):: 2nd Floor  Living Arrangements: Lives Alone    Activities of Daily Living Prior to Admission  Functional Status: Independent  Completes ADLs independently?: Yes  Ambulates independently?: Yes  Does patient use assisted devices?: No  Does patient currently own DME?: No  Does patient have a history of Outpatient Therapy (PT/OT)?: No  Does the patient have a history of Short-Term Rehab?: No  Does patient have a history of HHC?: No  Does patient currently have HHC?: No    Patient Information Continued  Income Source: Employed  Does patient have prescription coverage?: Yes  Does patient have a history of substance abuse?: Yes  Historical substance use preference: Alcohol/ETOH  Is patient currently in treatment for substance abuse?: Yes (CRS following, Pt attends AA)  Does patient have a history of Mental Health Diagnosis?: Yes (Anxiety, depression)  Is patient receiving treatment for mental health?: Yes  Has patient received inpatient treatment related to mental health in the last 2 years?: No    Means of Transportation  Means of Transport to [facility]:: Drives Self          DISCHARGE DETAILS:    Discharge planning discussed with:: pt at bedside  [place] of Choice:  Yes      Other Referral/Resources/Interventions Provided:  Referral Comments: Initial assessment completed with pt at bedside, Pt resides alone in a two story home with 6 SARA. Pt reports to be Independent with all ADL's and ambulation. No use of DME or HHC/STR/OP therapy hx reported. Per chart review pt has ETOH hx, currently attends AA meetings, no further needs identified for SA treatment, CM to remain available as needed. CRS team also following if any additional needs/support would arise. After care needs pending at this time, CM team will continue to follow and remain available for discharge coordination.    Pt requesting AVS/Work note faxed to her Employer [employer] on discharge F: [phone]    Pt states her employer never received work note/AVS from previous admission and requested information re-faxed. CM to follow on same.

## 2025-01-09 ENCOUNTER — ANESTHESIA (INPATIENT)
Dept: GASTROENTEROLOGY | Facility: HOSPITAL | Age: 35
DRG: 392 | End: 2025-01-09
Payer: COMMERCIAL

## 2025-01-09 ENCOUNTER — ANESTHESIA EVENT (INPATIENT)
Dept: GASTROENTEROLOGY | Facility: HOSPITAL | Age: 35
DRG: 392 | End: 2025-01-09
Payer: COMMERCIAL

## 2025-01-09 ENCOUNTER — APPOINTMENT (INPATIENT)
Dept: GASTROENTEROLOGY | Facility: HOSPITAL | Age: 35
DRG: 392 | End: 2025-01-09
Payer: COMMERCIAL

## 2025-01-09 LAB
ALBUMIN SERPL BCG-MCNC: 2.9 G/DL (ref 3.5–5)
ALP SERPL-CCNC: 49 U/L (ref 34–104)
ALT SERPL W P-5'-P-CCNC: 4 U/L (ref 7–52)
ANION GAP SERPL CALCULATED.3IONS-SCNC: 8 MMOL/L (ref 4–13)
AST SERPL W P-5'-P-CCNC: 12 U/L (ref 13–39)
BILIRUB SERPL-MCNC: 0.62 MG/DL (ref 0.2–1)
BUN SERPL-MCNC: 3 MG/DL (ref 5–25)
CALCIUM ALBUM COR SERPL-MCNC: 9.2 MG/DL (ref 8.3–10.1)
CALCIUM SERPL-MCNC: 8.3 MG/DL (ref 8.4–10.2)
CHLORIDE SERPL-SCNC: 100 MMOL/L (ref 96–108)
CO2 SERPL-SCNC: 28 MMOL/L (ref 21–32)
CREAT SERPL-MCNC: 0.4 MG/DL (ref 0.6–1.3)
ERYTHROCYTE [DISTWIDTH] IN BLOOD BY AUTOMATED COUNT: 12.1 % (ref 11.6–15.1)
GFR SERPL CREATININE-BSD FRML MDRD: 137 ML/MIN/1.73SQ M
GLUCOSE SERPL-MCNC: 95 MG/DL (ref 65–140)
HCT VFR BLD AUTO: 31.5 % (ref 34.8–46.1)
HGB BLD-MCNC: 10.3 G/DL (ref 11.5–15.4)
MCH RBC QN AUTO: 33.1 PG (ref 26.8–34.3)
MCHC RBC AUTO-ENTMCNC: 32.7 G/DL (ref 31.4–37.4)
MCV RBC AUTO: 101 FL (ref 82–98)
PLATELET # BLD AUTO: 167 THOUSANDS/UL (ref 149–390)
PMV BLD AUTO: 11.3 FL (ref 8.9–12.7)
POTASSIUM SERPL-SCNC: 3.6 MMOL/L (ref 3.5–5.3)
PROT SERPL-MCNC: 5.4 G/DL (ref 6.4–8.4)
RBC # BLD AUTO: 3.11 MILLION/UL (ref 3.81–5.12)
SODIUM SERPL-SCNC: 136 MMOL/L (ref 135–147)
WBC # BLD AUTO: 4.4 THOUSAND/UL (ref 4.31–10.16)

## 2025-01-09 PROCEDURE — 43247 EGD REMOVE FOREIGN BODY: CPT | Performed by: INTERNAL MEDICINE

## 2025-01-09 PROCEDURE — 0DP680Z REMOVAL OF DRAINAGE DEVICE FROM STOMACH, VIA NATURAL OR ARTIFICIAL OPENING ENDOSCOPIC: ICD-10-PCS | Performed by: INTERNAL MEDICINE

## 2025-01-09 PROCEDURE — 80053 COMPREHEN METABOLIC PANEL: CPT | Performed by: PHYSICIAN ASSISTANT

## 2025-01-09 PROCEDURE — 85027 COMPLETE CBC AUTOMATED: CPT | Performed by: PHYSICIAN ASSISTANT

## 2025-01-09 PROCEDURE — 99232 SBSQ HOSP IP/OBS MODERATE 35: CPT | Performed by: PHYSICIAN ASSISTANT

## 2025-01-09 RX ORDER — PROPOFOL 10 MG/ML
INJECTION, EMULSION INTRAVENOUS AS NEEDED
Status: DISCONTINUED | OUTPATIENT
Start: 2025-01-09 | End: 2025-01-09

## 2025-01-09 RX ORDER — PROPOFOL 10 MG/ML
INJECTION, EMULSION INTRAVENOUS CONTINUOUS PRN
Status: DISCONTINUED | OUTPATIENT
Start: 2025-01-09 | End: 2025-01-09

## 2025-01-09 RX ORDER — LIDOCAINE HYDROCHLORIDE 20 MG/ML
INJECTION, SOLUTION EPIDURAL; INFILTRATION; INTRACAUDAL; PERINEURAL AS NEEDED
Status: DISCONTINUED | OUTPATIENT
Start: 2025-01-09 | End: 2025-01-09

## 2025-01-09 RX ORDER — SODIUM CHLORIDE 9 MG/ML
INJECTION, SOLUTION INTRAVENOUS CONTINUOUS PRN
Status: DISCONTINUED | OUTPATIENT
Start: 2025-01-09 | End: 2025-01-09

## 2025-01-09 RX ADMIN — SENNOSIDES AND DOCUSATE SODIUM 2 TABLET: 50; 8.6 TABLET ORAL at 21:20

## 2025-01-09 RX ADMIN — HYDROMORPHONE HYDROCHLORIDE 1 MG: 1 INJECTION, SOLUTION INTRAMUSCULAR; INTRAVENOUS; SUBCUTANEOUS at 04:17

## 2025-01-09 RX ADMIN — LIDOCAINE HYDROCHLORIDE 100 MG: 20 INJECTION, SOLUTION EPIDURAL; INFILTRATION; INTRACAUDAL; PERINEURAL at 14:33

## 2025-01-09 RX ADMIN — OXYCODONE HYDROCHLORIDE 5 MG: 5 TABLET ORAL at 03:02

## 2025-01-09 RX ADMIN — ONDANSETRON 4 MG: 2 INJECTION INTRAMUSCULAR; INTRAVENOUS at 19:16

## 2025-01-09 RX ADMIN — SODIUM CHLORIDE: 0.9 INJECTION, SOLUTION INTRAVENOUS at 14:31

## 2025-01-09 RX ADMIN — HYDROMORPHONE HYDROCHLORIDE 1 MG: 1 INJECTION, SOLUTION INTRAMUSCULAR; INTRAVENOUS; SUBCUTANEOUS at 09:59

## 2025-01-09 RX ADMIN — HYDROMORPHONE HYDROCHLORIDE 1 MG: 1 INJECTION, SOLUTION INTRAMUSCULAR; INTRAVENOUS; SUBCUTANEOUS at 21:20

## 2025-01-09 RX ADMIN — THIAMINE HCL TAB 100 MG 100 MG: 100 TAB at 09:05

## 2025-01-09 RX ADMIN — PANTOPRAZOLE SODIUM 40 MG: 40 TABLET, DELAYED RELEASE ORAL at 15:39

## 2025-01-09 RX ADMIN — LORAZEPAM 0.5 MG: 0.5 TABLET ORAL at 12:28

## 2025-01-09 RX ADMIN — DEXTROSE, SODIUM CHLORIDE, SODIUM LACTATE, POTASSIUM CHLORIDE, AND CALCIUM CHLORIDE 100 ML/HR: 5; .6; .31; .03; .02 INJECTION, SOLUTION INTRAVENOUS at 05:22

## 2025-01-09 RX ADMIN — OXYCODONE HYDROCHLORIDE 5 MG: 5 TABLET ORAL at 19:16

## 2025-01-09 RX ADMIN — OXYCODONE HYDROCHLORIDE 5 MG: 5 TABLET ORAL at 09:05

## 2025-01-09 RX ADMIN — OXYCODONE HYDROCHLORIDE 5 MG: 5 TABLET ORAL at 13:45

## 2025-01-09 RX ADMIN — PROPOFOL 100 MG: 10 INJECTION, EMULSION INTRAVENOUS at 14:33

## 2025-01-09 RX ADMIN — PROPOFOL 110 MCG/KG/MIN: 10 INJECTION, EMULSION INTRAVENOUS at 14:33

## 2025-01-09 RX ADMIN — ONDANSETRON 4 MG: 2 INJECTION INTRAMUSCULAR; INTRAVENOUS at 13:47

## 2025-01-09 RX ADMIN — FOLIC ACID 1 MG: 1 TABLET ORAL at 09:05

## 2025-01-09 RX ADMIN — PANTOPRAZOLE SODIUM 40 MG: 40 TABLET, DELAYED RELEASE ORAL at 06:20

## 2025-01-09 RX ADMIN — LORAZEPAM 0.5 MG: 0.5 TABLET ORAL at 01:17

## 2025-01-09 RX ADMIN — NICOTINE 1 PATCH: 21 PATCH, EXTENDED RELEASE TRANSDERMAL at 09:08

## 2025-01-09 RX ADMIN — ONDANSETRON 4 MG: 2 INJECTION INTRAMUSCULAR; INTRAVENOUS at 09:05

## 2025-01-09 RX ADMIN — HYDROMORPHONE HYDROCHLORIDE 1 MG: 1 INJECTION, SOLUTION INTRAMUSCULAR; INTRAVENOUS; SUBCUTANEOUS at 15:36

## 2025-01-09 RX ADMIN — DULOXETINE HYDROCHLORIDE 30 MG: 30 CAPSULE, DELAYED RELEASE ORAL at 09:05

## 2025-01-09 NOTE — ANESTHESIA POSTPROCEDURE EVALUATION
Post-Op Assessment Note    CV Status:  Stable  Pain Score: 0    Pain management: adequate       Mental Status:  Alert and awake   Hydration Status:  Euvolemic   PONV Controlled:  Controlled   Airway Patency:  Patent     Post Op Vitals Reviewed: Yes    No anethesia notable event occurred.    Staff: CRNA           Last Filed PACU Vitals:  Vitals Value Taken Time   Temp 97.4 °F (36.3 °C) 01/09/25 1450   Pulse 91 01/09/25 1450   /91 01/09/25 1450   Resp 18 01/09/25 1450   SpO2 98 % 01/09/25 1450       Modified Kaylee:     Vitals Value Taken Time   Activity 2 01/09/25 1505   Respiration 2 01/09/25 1505   Circulation 2 01/09/25 1505   Consciousness 2 01/09/25 1505   Oxygen Saturation 2 01/09/25 1505     Modified Kaylee Score: 10

## 2025-01-09 NOTE — PROGRESS NOTES
Progress Note - Hospitalist   Name: Kaykay Holland 34 y.o. female I MRN: [id number]  Unit/Bed#: CW2 212-02 I Date of Admission: 1/7/2025   Date of Service: 1/9/2025 I Hospital Day: 0    Assessment & Plan  Abdominal pain  Kaykay Holland is a 34 y.o. female with a PMH of alcohol use disorder, GERD , splenic vein thrombosis and necrotizing pancreatitis who presents with worsening left-sided abdominal pain.  Recent hospitalization noted for necrotizing pancreatitis with mass effect s/p EUS with cystogastrostomy stent placement 12/17 with plan for repeat EGD with necrosectomy in 3 to 4 weeks .  Repeat CT scan of abdomen in ED showed decrease in size of peripancreatic fluid collections and resolution of 2 collections in the region of the head of the pancreas with Moderate amount of free fluid in the cul-de-sacr and resolution of right ovarian cyst.  She was given multiple doses of IV Dilaudid in ED without improvement for which ED requested admission for management of intractable abdominal pain  GI consulted - EGD with necrosectomy versus stent removal and placement of a pigtail catheter today.   As needed analgesics and antiemetics.  IV fluids  Bowel regimen  One of her recent scans showed chronic occlusion of the splenic vein, GI input from recent hospitalization noted regarding holding AC and proceed with conservative management given plan for future necrosectomy and insufficient data regarding use of AC in this regard.  Once discharge date in known, patient will need a work note for hospital stay  Tobacco abuse  Nicotine patch ordered  Encourage cessation  Alcohol use disorder  CIWA protocol  Continue folic acid and thiamine  Gastroesophageal reflux disease without esophagitis  Continue PPI  Ovarian cyst  Recently evaluated by gynecology and recommended for outpatient follow-up with repeat ultrasounds  CT scan in ED showed resolution of her ovarian cysts    VTE Pharmacologic Prophylaxis: VTE Score: 0 Low Risk  (Score 0-2) - Encourage Ambulation.    Mobility:   Basic Mobility Inpatient Raw Score: 20  JH-HLM Goal: 6: Walk 10 steps or more  JH-HLM Achieved: 6: Walk 10 steps or more  JH-HLM Goal achieved. Continue to encourage appropriate mobility.    Patient Centered Rounds: I performed bedside rounds with nursing staff today.   Discussions with Specialists or Other Care Team Provider: case management    Education and Discussions with Family / Patient: Patient declined call to .     Current Length of Stay: 0 day(s)  Current Patient Status: Inpatient   Certification Statement: The patient will continue to require additional inpatient hospital stay due to EGD  Discharge Plan: Anticipate discharge in 24-48 hrs to home.    Code Status: Prior    Subjective   Still with abdominal pain. Anxious about upcoming procedure    Objective :  Temp:  [98.4 °F (36.9 °C)-98.9 °F (37.2 °C)] 98.4 °F (36.9 °C)  HR:  [74-79] 74  BP: (122-141)/(78-93) 132/86  Resp:  [18] 18  SpO2:  [95 %-99 %] 99 %  O2 Device: None (Room air)    There is no height or weight on file to calculate BMI.     Input and Output Summary (last 24 hours):     Intake/Output Summary (Last 24 hours) at 1/9/2025 1104  Last data filed at 1/9/2025 0522  Gross per 24 hour   Intake 1100 ml   Output --   Net 1100 ml       Physical Exam  Vitals and nursing note reviewed.   Constitutional:       General: She is not in acute distress.     Appearance: She is well-developed.   HENT:      Head: Normocephalic and atraumatic.   Cardiovascular:      Rate and Rhythm: Normal rate and regular rhythm.      Heart sounds: No murmur heard.     No friction rub.   Pulmonary:      Effort: Pulmonary effort is normal. No respiratory distress.      Breath sounds: Normal breath sounds. No wheezing.   Abdominal:      General: Bowel sounds are normal. There is no distension.      Palpations: Abdomen is soft.      Tenderness: There is abdominal tenderness. There is no guarding or rebound.    Skin:     General: Skin is warm and dry.      Findings: No rash.   Neurological:      Mental Status: She is alert and oriented to person, place, and time.      Cranial Nerves: No cranial nerve deficit.   Psychiatric:         Mood and Affect: Mood is anxious.           Lines/Drains:              Lab Results: I have reviewed the following results:   Results from last 7 days   Lab Units 01/09/25  0440 01/08/25  0455 01/07/25  1206   WBC Thousand/uL 4.40   < > 8.05   HEMOGLOBIN g/dL 10.3*   < > 12.1   HEMATOCRIT % 31.5*   < > 36.5   PLATELETS Thousands/uL 167   < > 269   SEGS PCT %  --   --  62   LYMPHO PCT %  --   --  27   MONO PCT %  --   --  8   EOS PCT %  --   --  2    < > = values in this interval not displayed.     Results from last 7 days   Lab Units 01/09/25  0440   SODIUM mmol/L 136   POTASSIUM mmol/L 3.6   CHLORIDE mmol/L 100   CO2 mmol/L 28   BUN mg/dL 3*   CREATININE mg/dL 0.40*   ANION GAP mmol/L 8   CALCIUM mg/dL 8.3*   ALBUMIN g/dL 2.9*   TOTAL BILIRUBIN mg/dL 0.62   ALK PHOS U/L 49   ALT U/L 4*   AST U/L 12*   GLUCOSE RANDOM mg/dL 95                       Recent Cultures (last 7 days):           Last 24 Hours Medication List:     Current Facility-Administered Medications:     acetaminophen (TYLENOL) tablet 650 mg, Q6H PRN    dextrose 5 % in lactated Ringer's infusion, Continuous, Last Rate: 100 mL/hr (01/09/25 0522)    DULoxetine (CYMBALTA) delayed release capsule 30 mg, Daily    enoxaparin (LOVENOX) subcutaneous injection 40 mg, Q24H RODRICK    folic acid (FOLVITE) tablet 1 mg, Daily    HYDROmorphone (DILAUDID) injection 1 mg, Q4H PRN    LORazepam (ATIVAN) tablet 0.5 mg, Q8H PRN    nicotine (NICODERM CQ) 21 mg/24 hr TD 24 hr patch 1 patch, Daily    ondansetron (ZOFRAN) injection 4 mg, Q4H PRN    oxyCODONE (ROXICODONE) IR tablet 5 mg, Q4H PRN **OR** oxyCODONE (ROXICODONE) immediate release tablet 10 mg, Q4H PRN    pantoprazole (PROTONIX) EC tablet 40 mg, BID AC    polyethylene glycol (MIRALAX) packet 17 g,  Daily PRN    senna-docusate sodium (SENOKOT S) 8.6-50 mg per tablet 2 tablet, HS    thiamine tablet 100 mg, Daily    Administrative Statements   Today, Patient Was Seen By: Jamila Avendaño PA-C      **Please Note: This note may have been constructed using a voice recognition system.**

## 2025-01-09 NOTE — ANESTHESIA POSTPROCEDURE EVALUATION
Post-Op Assessment Note    CV Status:  Stable  Pain Score: 0    Pain management: adequate       Mental Status:  Alert and awake   Hydration Status:  Euvolemic   PONV Controlled:  Controlled   Airway Patency:  Patent     Post Op Vitals Reviewed: Yes    No anethesia notable event occurred.    Staff: CRNA           Last Filed PACU Vitals:  Vitals Value Taken Time   Temp 97.4 °F (36.3 °C) 01/09/25 1450   Pulse 91 01/09/25 1450   /91 01/09/25 1450   Resp 18 01/09/25 1450   SpO2 98 % 01/09/25 1450       Modified Kaylee:     Vitals Value Taken Time   Activity 2 01/09/25 1450   Respiration 2 01/09/25 1450   Circulation 2 01/09/25 1450   Consciousness 1 01/09/25 1450   Oxygen Saturation 2 01/09/25 1450     Modified Kaylee Score: 9

## 2025-01-09 NOTE — ASSESSMENT & PLAN NOTE
Kaykay Holland is a 34 y.o. female with a PMH of alcohol use disorder, GERD , splenic vein thrombosis and necrotizing pancreatitis who presents with worsening left-sided abdominal pain.  Recent hospitalization noted for necrotizing pancreatitis with mass effect s/p EUS with cystogastrostomy stent placement 12/17 with plan for repeat EGD with necrosectomy in 3 to 4 weeks .  Repeat CT scan of abdomen in ED showed decrease in size of peripancreatic fluid collections and resolution of 2 collections in the region of the head of the pancreas with Moderate amount of free fluid in the cul-de-sacr and resolution of right ovarian cyst.  She was given multiple doses of IV Dilaudid in ED without improvement for which ED requested admission for management of intractable abdominal pain  GI consulted - EGD with necrosectomy versus stent removal and placement of a pigtail catheter today.   As needed analgesics and antiemetics.  IV fluids  Bowel regimen  One of her recent scans showed chronic occlusion of the splenic vein, GI input from recent hospitalization noted regarding holding AC and proceed with conservative management given plan for future necrosectomy and insufficient data regarding use of AC in this regard.  Once discharge date in known, patient will need a work note for hospital stay

## 2025-01-09 NOTE — ANESTHESIA PREPROCEDURE EVALUATION
Procedure:  EGD    Relevant Problems   ANESTHESIA (within normal limits)      CARDIO   (+) Essential hypertension   (+) Splenic vein thrombosis      ENDO (within normal limits)      GI/HEPATIC   (+) Acute alcoholic pancreatitis   (+) Gastroesophageal reflux disease without esophagitis   (+) Hematemesis   (+) Hepatic steatosis   (+) Necrotizing pancreatitis   (+) Pancreatic pseudocyst   (+) Peptic ulcer disease      /RENAL (within normal limits)      GYN (within normal limits)      HEMATOLOGY   (+) Pancytopenia (HCC)   (+) Thrombocytopenia (HCC)      MUSCULOSKELETAL (within normal limits)      NEURO/PSYCH   (+) Alcohol withdrawal seizure (HCC)   (+) Anxiety   (+) Depression   (+) Provoked seizures (HCC)      PULMONARY (within normal limits)        Physical Exam    Airway    Mallampati score: III  TM Distance: >3 FB  Neck ROM: full     Dental   No notable dental hx     Cardiovascular  Rhythm: regular, Rate: normal, Cardiovascular exam normal    Pulmonary  Pulmonary exam normal Breath sounds clear to auscultation    Other Findings  post-pubertal.      Anesthesia Plan  ASA Score- 3     Anesthesia Type- IV sedation with anesthesia with ASA Monitors.         Additional Monitors:     Airway Plan:     Comment: MAC sedation, GA w/ ETT backup.       Plan Factors-Exercise tolerance (METS): >4 METS.    Chart reviewed. EKG reviewed. Imaging results reviewed. Existing labs reviewed. Patient summary reviewed.    Patient is not a current smoker.  Patient instructed to abstain from smoking on day of procedure. Patient did not smoke on day of surgery.    Obstructive sleep apnea risk education given perioperatively.        Induction- intravenous.    Postoperative Plan-     Perioperative Resuscitation Plan - Level 1 - Full Code.       Informed Consent- Anesthetic plan and risks discussed with patient.  I personally reviewed this patient with the CRNA. Discussed and agreed on the Anesthesia Plan with the CRNA..

## 2025-01-09 NOTE — CERTIFIED RECOVERY SPECIALIST
"Time spent: 23 minutes      CRS followed up to support patient, who is currently awaiting surgery. CRS and patient discussed patient's pain, and she said it is being managed. Patient is anxious about getting the surgery said \"I hope they don't find anything else\"  CRS encouraged patient and offered more support. Patient was concerned about being able to go back to work, patient is open to suggested tools of recovery and appreciates having support while in hospital.      CRS provided AA literature and will continue to follow and support.  "

## 2025-01-10 VITALS
RESPIRATION RATE: 17 BRPM | HEART RATE: 83 BPM | OXYGEN SATURATION: 96 % | TEMPERATURE: 97.7 F | SYSTOLIC BLOOD PRESSURE: 102 MMHG | DIASTOLIC BLOOD PRESSURE: 73 MMHG

## 2025-01-10 LAB
ALBUMIN SERPL BCG-MCNC: 3.1 G/DL (ref 3.5–5)
ALP SERPL-CCNC: 51 U/L (ref 34–104)
ALT SERPL W P-5'-P-CCNC: 8 U/L (ref 7–52)
ANION GAP SERPL CALCULATED.3IONS-SCNC: 8 MMOL/L (ref 4–13)
AST SERPL W P-5'-P-CCNC: 17 U/L (ref 13–39)
BILIRUB SERPL-MCNC: 0.56 MG/DL (ref 0.2–1)
BUN SERPL-MCNC: 4 MG/DL (ref 5–25)
CALCIUM ALBUM COR SERPL-MCNC: 9.1 MG/DL (ref 8.3–10.1)
CALCIUM SERPL-MCNC: 8.4 MG/DL (ref 8.4–10.2)
CHLORIDE SERPL-SCNC: 98 MMOL/L (ref 96–108)
CO2 SERPL-SCNC: 31 MMOL/L (ref 21–32)
CREAT SERPL-MCNC: 0.39 MG/DL (ref 0.6–1.3)
ERYTHROCYTE [DISTWIDTH] IN BLOOD BY AUTOMATED COUNT: 12.2 % (ref 11.6–15.1)
GFR SERPL CREATININE-BSD FRML MDRD: 138 ML/MIN/1.73SQ M
GLUCOSE SERPL-MCNC: 85 MG/DL (ref 65–140)
GLUCOSE SERPL-MCNC: 97 MG/DL (ref 65–140)
HCT VFR BLD AUTO: 34.7 % (ref 34.8–46.1)
HGB BLD-MCNC: 11.5 G/DL (ref 11.5–15.4)
MCH RBC QN AUTO: 33.1 PG (ref 26.8–34.3)
MCHC RBC AUTO-ENTMCNC: 33.1 G/DL (ref 31.4–37.4)
MCV RBC AUTO: 100 FL (ref 82–98)
PLATELET # BLD AUTO: 148 THOUSANDS/UL (ref 149–390)
PMV BLD AUTO: 11.2 FL (ref 8.9–12.7)
POTASSIUM SERPL-SCNC: 3.9 MMOL/L (ref 3.5–5.3)
PROT SERPL-MCNC: 5.5 G/DL (ref 6.4–8.4)
RBC # BLD AUTO: 3.47 MILLION/UL (ref 3.81–5.12)
SODIUM SERPL-SCNC: 137 MMOL/L (ref 135–147)
WBC # BLD AUTO: 4.76 THOUSAND/UL (ref 4.31–10.16)

## 2025-01-10 PROCEDURE — 80053 COMPREHEN METABOLIC PANEL: CPT | Performed by: PHYSICIAN ASSISTANT

## 2025-01-10 PROCEDURE — 85027 COMPLETE CBC AUTOMATED: CPT | Performed by: PHYSICIAN ASSISTANT

## 2025-01-10 PROCEDURE — 82948 REAGENT STRIP/BLOOD GLUCOSE: CPT

## 2025-01-10 PROCEDURE — 99239 HOSP IP/OBS DSCHRG MGMT >30: CPT | Performed by: NURSE PRACTITIONER

## 2025-01-10 PROCEDURE — 99232 SBSQ HOSP IP/OBS MODERATE 35: CPT | Performed by: INTERNAL MEDICINE

## 2025-01-10 RX ORDER — HYDROMORPHONE HYDROCHLORIDE 2 MG/1
2 TABLET ORAL EVERY 6 HOURS PRN
Qty: 20 TABLET | Refills: 0 | Status: SHIPPED | OUTPATIENT
Start: 2025-01-10

## 2025-01-10 RX ORDER — PANTOPRAZOLE SODIUM 40 MG/1
40 TABLET, DELAYED RELEASE ORAL 2 TIMES DAILY
Qty: 60 TABLET | Refills: 0 | Status: SHIPPED | OUTPATIENT
Start: 2025-01-10

## 2025-01-10 RX ADMIN — THIAMINE HCL TAB 100 MG 100 MG: 100 TAB at 08:20

## 2025-01-10 RX ADMIN — HYDROMORPHONE HYDROCHLORIDE 1 MG: 1 INJECTION, SOLUTION INTRAMUSCULAR; INTRAVENOUS; SUBCUTANEOUS at 03:41

## 2025-01-10 RX ADMIN — PANTOPRAZOLE SODIUM 40 MG: 40 TABLET, DELAYED RELEASE ORAL at 06:01

## 2025-01-10 RX ADMIN — OXYCODONE HYDROCHLORIDE 5 MG: 5 TABLET ORAL at 11:28

## 2025-01-10 RX ADMIN — ONDANSETRON 4 MG: 2 INJECTION INTRAMUSCULAR; INTRAVENOUS at 06:49

## 2025-01-10 RX ADMIN — OXYCODONE HYDROCHLORIDE 5 MG: 5 TABLET ORAL at 01:37

## 2025-01-10 RX ADMIN — HYDROMORPHONE HYDROCHLORIDE 1 MG: 1 INJECTION, SOLUTION INTRAMUSCULAR; INTRAVENOUS; SUBCUTANEOUS at 07:38

## 2025-01-10 RX ADMIN — HYDROMORPHONE HYDROCHLORIDE 1 MG: 1 INJECTION, SOLUTION INTRAMUSCULAR; INTRAVENOUS; SUBCUTANEOUS at 12:23

## 2025-01-10 RX ADMIN — OXYCODONE HYDROCHLORIDE 10 MG: 10 TABLET ORAL at 06:49

## 2025-01-10 RX ADMIN — FOLIC ACID 1 MG: 1 TABLET ORAL at 08:19

## 2025-01-10 RX ADMIN — ENOXAPARIN SODIUM 40 MG: 40 INJECTION SUBCUTANEOUS at 08:19

## 2025-01-10 RX ADMIN — DULOXETINE HYDROCHLORIDE 30 MG: 30 CAPSULE, DELAYED RELEASE ORAL at 08:19

## 2025-01-10 RX ADMIN — NICOTINE 1 PATCH: 21 PATCH, EXTENDED RELEASE TRANSDERMAL at 08:20

## 2025-01-10 RX ADMIN — LORAZEPAM 0.5 MG: 0.5 TABLET ORAL at 08:19

## 2025-01-10 NOTE — PROGRESS NOTES
Progress Note - Gastroenterology   Name: Kaykay Holland 34 y.o. female I MRN: [id number]  Unit/Bed#: CW2 212-02 I Date of Admission: 1/7/2025   Date of Service: 1/10/2025 I Hospital Day: 1    Assessment & Plan  Abdominal pain  Patient admitted due to history of pancreatic pseudocyst, presented with left upper quadrant pain which was worsening over the past few weeks.  She underwent an EGD yesterday in which LAMS was removed as necrotic cavity appeared to be healing well with healthy appearing granulation tissue without any necrosis. Patient having mild pain postprocedure, able to tolerate a diet.    Plan:  - Pain control per primary  - Repeat CT abdomen with IV contrast in 4 weeks (orders placed)  - Okay to discharge from GI perspective  - GI will sign off, please reach out with any questions      Subjective   Patient was seen and examined.  Patient states she has been having some minor abdominal discomfort as well as numbness and tingling throughout her body.    Objective :  Temp:  [97 °F (36.1 °C)-98.8 °F (37.1 °C)] 97.8 °F (36.6 °C)  HR:  [59-95] 95  BP: (118-163)/(81-98) 120/82  Resp:  [17-19] 17  SpO2:  [92 %-98 %] 94 %  O2 Device: None (Room air)    Physical Exam  Pulmonary:      Effort: Pulmonary effort is normal.   Abdominal:      General: Abdomen is flat.      Palpations: Abdomen is soft.   Skin:     General: Skin is warm.   Neurological:      Mental Status: She is alert.   Psychiatric:         Mood and Affect: Mood normal.           Lab Results: I have reviewed the following results:

## 2025-01-10 NOTE — DISCHARGE SUMMARY
Discharge Summary - Hospitalist   Name: Kaykay Holland 34 y.o. female I MRN: [id number]  Unit/Bed#: CW2 212-02 I Date of Admission: 1/7/2025   Date of Service: 1/10/2025 I Hospital Day: 1     Assessment & Plan  Abdominal pain  Kaykay Holland is a 34 y.o. female with a PMH of alcohol use disorder, GERD , splenic vein thrombosis and necrotizing pancreatitis who presents with worsening left-sided abdominal pain.  Recent hospitalization noted for necrotizing pancreatitis with mass effect s/p EUS with cystogastrostomy stent placement 12/17 with plan for repeat EGD with necrosectomy in 3 to 4 weeks .  Repeat CT scan of abdomen in ED showed decrease in size of peripancreatic fluid collections and resolution of 2 collections in the region of the head of the pancreas with Moderate amount of free fluid in the cul-de-sacr and resolution of right ovarian cyst.  She was given multiple doses of IV Dilaudid in ED without improvement for which ED requested admission for management of intractable abdominal pain  GI evaluated, s/p EGD yesterday in which LAMS was removed as necrotic cavity appeared to be healing well with healthy appearing granulation tissue without any necrosis.   Stable for today from a GI standpoint.  Plan for repeat CT abdomen with IV contrast in 4 weeks, this has been ordered.  Patient instructed to schedule.  Given central scheduling phone number on discharge instructions.  Should additionally follow-up with GI.  Having some intermittent pain, will prescribe several tablets of p.o. Dilaudid on discharge, feels that oxycodone has been effective.  One of her recent scans showed chronic occlusion of the splenic vein, GI input from recent hospitalization noted regarding holding AC and proceed with conservative management given plan for future necrosectomy and insufficient data regarding use of AC in this regard.  Work note provided and faxed to preferred number by primary RN per patient request  Tobacco  abuse  Nicotine patch ordered  Encourage cessation  Alcohol use disorder  Advised patient to refrain from alcohol use  Continue folic acid and thiamine  Gastroesophageal reflux disease without esophagitis  Continue PPI twice daily, will refill on discharge  Ovarian cyst  Recently evaluated by gynecology and recommended for outpatient follow-up with repeat ultrasounds  CT scan in ED showed resolution of her ovarian cysts     Medical Problems       Resolved Problems  Date Reviewed: 12/17/2024   None       Discharging Physician / Practitioner: PATRICIA Barrett  PCP: PATRICIA Moran  Admission Date:   Admission Orders (From admission, onward)       Ordered        01/09/25 1104  INPATIENT ADMISSION  Once            01/07/25 1709  Place in Observation  Once                          Discharge Date: 01/10/25    Consultations During Hospital Stay:  GI  CRS    Procedures Performed:   EGD 1/9 Healthy appearing granulation tissue without significant necrosis visualized through the LAMS. Subsequently previously placed double pig-tail stent and LAMS were successfully removed    Significant Findings / Test Results:   CT A/P with contrast Decrease in size of peripancreatic fluid collections and resolution of 2 collections in the region of the head of the pancreas. No new or enlarging peripancreatic fluid collections status post cystogastrostomy. Moderate amount of free fluid in the cul-de-sacr and resolution of right ovarian cyst.     Incidental Findings:   none       Test Results Pending at Discharge (will require follow up):   none     Outpatient Tests Requested:  F/u with PCP  Repeat CT A/P with contrast in 4 weeks  Outpatient f/u with GI    Complications:  none    Reason for Admission: abdominal pain    Hospital Course:   Kaykay Holland is a 34 y.o. female patient who originally presented to the hospital on 1/7/2025 due to left upper quadrant pain worsening over the last several weeks.  Was seen by GI, underwent EGD  in which LAMS was removed as necrotic cavity appeared to be healing well with healthy appearing granulation tissue without any necrosis.  Patient is tolerating a soft diet.  Will continue to advance as tolerated.  Will prescribe short course of opioids on discharge to assist with intermittent abdominal cramping/pain.      Work note provided, faxed as per patient request.    Stable for discharge home today.  Plan for repeat CT abdomen with IV contrast in 4 weeks, orders have been placed.  Patient instructed to schedule.      Please see above list of diagnoses and related plan for additional information.     Condition at Discharge: stable    Discharge Day Visit / Exam:   Subjective: Continues to have intermittent abdominal pain/cramping.  Unrelieved by oral oxycodone, IV Dilaudid more so effective.  No nausea or vomiting.  Tolerating oral diet.  Vitals: Blood Pressure: 102/73 (01/10/25 1111)  Pulse: 83 (01/10/25 1111)  Temperature: 97.7 °F (36.5 °C) (01/10/25 1111)  Temp Source: Tympanic (01/09/25 1450)  Respirations: 17 (01/10/25 1111)  SpO2: 96 % (01/10/25 1111)  Physical Exam  Vitals and nursing note reviewed.   Constitutional:       General: She is not in acute distress.  Cardiovascular:      Rate and Rhythm: Normal rate.   Pulmonary:      Breath sounds: Normal breath sounds.   Abdominal:      General: Bowel sounds are normal. There is no distension.      Palpations: Abdomen is soft.   Musculoskeletal:         General: No swelling.   Skin:     General: Skin is warm.   Neurological:      Mental Status: She is alert and oriented to person, place, and time. Mental status is at baseline.   Psychiatric:         Mood and Affect: Mood normal.          Discussion with Family: Patient declined call to .     Discharge instructions/Information to patient and family:   See after visit summary for information provided to patient and family.      Provisions for Follow-Up Care:  See after visit summary for  information related to follow-up care and any pertinent home health orders.      Mobility at time of Discharge:   Basic Mobility Inpatient Raw Score: 24  JH-HLM Goal: 8: Walk 250 feet or more  JH-HLM Achieved: 7: Walk 25 feet or more  HLM Goal NOT achieved. Continue to encourage mobility in post discharge setting.     Disposition:   Home    Planned Readmission: no    Discharge Medications:  See after visit summary for reconciled discharge medications provided to patient and/or family.      Administrative Statements   Discharge Statement:  I have spent a total time of 40 minutes in caring for this patient on the day of the visit/encounter.    **Please Note: This note may have been constructed using a voice recognition system**

## 2025-01-10 NOTE — ASSESSMENT & PLAN NOTE
Patient admitted due to history of pancreatic pseudocyst, presented with left upper quadrant pain which was worsening over the past few weeks.  She underwent an EGD yesterday in which LAMS was removed as necrotic cavity appeared to be healing well with healthy appearing granulation tissue without any necrosis. Patient having mild pain postprocedure, able to tolerate a diet.    Plan:  - Pain control per primary  - Repeat CT abdomen with IV contrast in 4 weeks (orders placed)  - Okay to discharge from GI perspective  - GI will sign off, please reach out with any questions

## 2025-01-10 NOTE — PLAN OF CARE
Problem: PAIN - ADULT  Goal: Verbalizes/displays adequate comfort level or baseline comfort level  Description: Interventions:  - Encourage patient to monitor pain and request assistance  - Assess pain using appropriate pain scale  - Administer analgesics based on type and severity of pain and evaluate response  - Implement non-pharmacological measures as appropriate and evaluate response  - Consider cultural and social influences on pain and pain management  - Notify physician/advanced practitioner if interventions unsuccessful or patient reports new pain  1/10/2025 1213 by Rolando Page RN  Outcome: Adequate for Discharge  1/10/2025 1213 by Rolando Page RN  Outcome: Progressing     Problem: INFECTION - ADULT  Goal: Absence or prevention of progression during hospitalization  Description: INTERVENTIONS:  - Assess and monitor for signs and symptoms of infection  - Monitor lab/diagnostic results  - Monitor all insertion sites, i.e. indwelling lines, tubes, and drains  - Monitor endotracheal if appropriate and nasal secretions for changes in amount and color  - [place] appropriate cooling/warming therapies per order  - Administer medications as ordered  - Instruct and encourage patient and family to use good hand hygiene technique  - Identify and instruct in appropriate isolation precautions for identified infection/condition  1/10/2025 1213 by Rolando Page RN  Outcome: Adequate for Discharge  1/10/2025 1213 by Rolando Page RN  Outcome: Progressing  Goal: Absence of fever/infection during neutropenic period  Description: INTERVENTIONS:  - Monitor WBC    1/10/2025 1213 by Rolando Page RN  Outcome: Adequate for Discharge  1/10/2025 1213 by Rolando Page RN  Outcome: Progressing     Problem: SAFETY ADULT  Goal: Patient will remain free of falls  Description: INTERVENTIONS:  - Educate patient/family on patient safety including physical limitations  - Instruct patient to call for assistance with activity   - Consult  OT/PT to assist with strengthening/mobility   - Keep Call bell within reach  - Keep bed low and locked with side rails adjusted as appropriate  - Keep care items and personal belongings within reach  - Initiate and maintain comfort rounds  - Make Fall Risk Sign visible to staff  - Offer Toileting every 1 Hours, in advance of need  - Initiate/Maintain alarm  - Obtain necessary fall risk management equipment  - Apply yellow socks and bracelet for high fall risk patients  - Consider moving patient to room near nurses station  1/10/2025 1213 by Rolando Page RN  Outcome: Adequate for Discharge  1/10/2025 1213 by Rolando Page RN  Outcome: Progressing  Goal: Maintain or return to baseline ADL function  Description: INTERVENTIONS:  -  Assess patient's ability to carry out ADLs; assess patient's baseline for ADL function and identify physical deficits which impact ability to perform ADLs (bathing, care of mouth/teeth, toileting, grooming, dressing, etc.)  - Assess/evaluate cause of self-care deficits   - Assess range of motion  - Assess patient's mobility; develop plan if impaired  - Assess patient's need for assistive devices and provide as appropriate  - Encourage maximum independence but intervene and supervise when necessary  - Involve family in performance of ADLs  - Assess for home care needs following discharge   - Consider OT consult to assist with ADL evaluation and planning for discharge  - Provide patient education as appropriate  1/10/2025 1213 by Rolando Page RN  Outcome: Adequate for Discharge  1/10/2025 1213 by Rolando Page RN  Outcome: Progressing  Goal: Maintains/Returns to pre admission functional level  Description: INTERVENTIONS:  - Perform AM-PAC 6 Click Basic Mobility/ Daily Activity assessment daily.  - Set and communicate daily mobility goal to care team and patient/family/caregiver.   - Collaborate with rehabilitation services on mobility goals if consulted  - Perform Range of Motion 3 times a  day.  - Reposition patient every 2 hours.  - Dangle patient 3 times a day  - Stand patient 3 times a day  - Ambulate patient 3 times a day  - Out of bed to chair 3 times a day   - Out of bed for meals 3 times a day  - Out of bed for toileting  - Record patient progress and toleration of activity level   1/10/2025 1213 by Rolando Page RN  Outcome: Adequate for Discharge  1/10/2025 1213 by Rolando Page RN  Outcome: Progressing     Problem: DISCHARGE PLANNING  Goal: Discharge to home or other facility with appropriate resources  Description: INTERVENTIONS:  - Identify barriers to discharge w/patient and caregiver  - Arrange for needed discharge resources and transportation as appropriate  - Identify discharge learning needs (meds, wound care, etc.)  - Arrange for interpretive services to assist at discharge as needed  - Refer to Case Management Department for coordinating discharge planning if the patient needs post-hospital services based on physician/advanced practitioner order or complex needs related to functional status, cognitive ability, or social support system  1/10/2025 1213 by Rolando Page RN  Outcome: Adequate for Discharge  1/10/2025 1213 by Rolando Page RN  Outcome: Progressing     Problem: Knowledge Deficit  Goal: Patient/family/caregiver demonstrates understanding of disease process, treatment plan, medications, and discharge instructions  Description: Complete learning assessment and assess knowledge base.  Interventions:  - Provide teaching at level of understanding  - Provide teaching via preferred learning methods  1/10/2025 1213 by Rolando Page RN  Outcome: Adequate for Discharge  1/10/2025 1213 by Rolando Page RN  Outcome: Progressing

## 2025-01-10 NOTE — ASSESSMENT & PLAN NOTE
Kaykay Holland is a 34 y.o. female with a PMH of alcohol use disorder, GERD , splenic vein thrombosis and necrotizing pancreatitis who presents with worsening left-sided abdominal pain.  Recent hospitalization noted for necrotizing pancreatitis with mass effect s/p EUS with cystogastrostomy stent placement 12/17 with plan for repeat EGD with necrosectomy in 3 to 4 weeks .  Repeat CT scan of abdomen in ED showed decrease in size of peripancreatic fluid collections and resolution of 2 collections in the region of the head of the pancreas with Moderate amount of free fluid in the cul-de-sacr and resolution of right ovarian cyst.  She was given multiple doses of IV Dilaudid in ED without improvement for which ED requested admission for management of intractable abdominal pain  GI evaluated, s/p EGD yesterday in which LAMS was removed as necrotic cavity appeared to be healing well with healthy appearing granulation tissue without any necrosis.   Stable for today from a GI standpoint.  Plan for repeat CT abdomen with IV contrast in 4 weeks, this has been ordered.  Patient instructed to schedule.  Given central scheduling phone number on discharge instructions.  Should additionally follow-up with GI.  Having some intermittent pain, will prescribe several tablets of p.o. Dilaudid on discharge, feels that oxycodone has been effective.  One of her recent scans showed chronic occlusion of the splenic vein, GI input from recent hospitalization noted regarding holding AC and proceed with conservative management given plan for future necrosectomy and insufficient data regarding use of AC in this regard.  Work note provided and faxed to preferred number by primary RN per patient request

## 2025-01-10 NOTE — PROGRESS NOTES
Patient informed this RN and SLIM CHAYA GOMEZ that she would require us to fax her work note along with her AVS to [employer] as per her employer's leave policy. Tisha Wallis, concerned that sending the patient's AVS would be more information than required, with a risk for the patient's personal information, wrote a detailed work note with contact information in case more was required. After this, the patient called this RN back to the room, and she then asked that I fax both the detailed work note and a copy of her AVS. I again expressed concern that this would be a lot of personal information to be sending via fax to her employer's agency. She said she still wanted me to send. I confirmed everything on the AVS that would be sent, and she verbalized that she wanted me to send it. Faxed with a cover letter to [employer]: [phone]. A copy of the send receipt was provided to the patient, along with a hard copy of both documents.

## 2025-01-10 NOTE — PLAN OF CARE
Problem: PAIN - ADULT  Goal: Verbalizes/displays adequate comfort level or baseline comfort level  Description: Interventions:  - Encourage patient to monitor pain and request assistance  - Assess pain using appropriate pain scale  - Administer analgesics based on type and severity of pain and evaluate response  - Implement non-pharmacological measures as appropriate and evaluate response  - Consider cultural and social influences on pain and pain management  - Notify physician/advanced practitioner if interventions unsuccessful or patient reports new pain  Outcome: Progressing     Problem: INFECTION - ADULT  Goal: Absence or prevention of progression during hospitalization  Description: INTERVENTIONS:  - Assess and monitor for signs and symptoms of infection  - Monitor lab/diagnostic results  - Monitor all insertion sites, i.e. indwelling lines, tubes, and drains  - Monitor endotracheal if appropriate and nasal secretions for changes in amount and color  - [place] appropriate cooling/warming therapies per order  - Administer medications as ordered  - Instruct and encourage patient and family to use good hand hygiene technique  - Identify and instruct in appropriate isolation precautions for identified infection/condition  Outcome: Progressing  Goal: Absence of fever/infection during neutropenic period  Description: INTERVENTIONS:  - Monitor WBC    Outcome: Progressing     Problem: SAFETY ADULT  Goal: Patient will remain free of falls  Description: INTERVENTIONS:  - Educate patient/family on patient safety including physical limitations  - Instruct patient to call for assistance with activity   - Consult OT/PT to assist with strengthening/mobility   - Keep Call bell within reach  - Keep bed low and locked with side rails adjusted as appropriate  - Keep care items and personal belongings within reach  - Initiate and maintain comfort rounds  - Make Fall Risk Sign visible to staff  - Offer Toileting every 1 Hours,  in advance of need  - Initiate/Maintain alarm  - Obtain necessary fall risk management equipment  - Apply yellow socks and bracelet for high fall risk patients  - Consider moving patient to room near nurses station  Outcome: Progressing  Goal: Maintain or return to baseline ADL function  Description: INTERVENTIONS:  -  Assess patient's ability to carry out ADLs; assess patient's baseline for ADL function and identify physical deficits which impact ability to perform ADLs (bathing, care of mouth/teeth, toileting, grooming, dressing, etc.)  - Assess/evaluate cause of self-care deficits   - Assess range of motion  - Assess patient's mobility; develop plan if impaired  - Assess patient's need for assistive devices and provide as appropriate  - Encourage maximum independence but intervene and supervise when necessary  - Involve family in performance of ADLs  - Assess for home care needs following discharge   - Consider OT consult to assist with ADL evaluation and planning for discharge  - Provide patient education as appropriate  Outcome: Progressing  Goal: Maintains/Returns to pre admission functional level  Description: INTERVENTIONS:  - Perform AM-PAC 6 Click Basic Mobility/ Daily Activity assessment daily.  - Set and communicate daily mobility goal to care team and patient/family/caregiver.   - Collaborate with rehabilitation services on mobility goals if consulted  - Perform Range of Motion 3 times a day.  - Reposition patient every 2 hours.  - Dangle patient 3 times a day  - Stand patient 3 times a day  - Ambulate patient 3 times a day  - Out of bed to chair 3 times a day   - Out of bed for meals 3 times a day  - Out of bed for toileting  - Record patient progress and toleration of activity level   Outcome: Progressing     Problem: DISCHARGE PLANNING  Goal: Discharge to home or other facility with appropriate resources  Description: INTERVENTIONS:  - Identify barriers to discharge w/patient and caregiver  - Arrange  for needed discharge resources and transportation as appropriate  - Identify discharge learning needs (meds, wound care, etc.)  - Arrange for interpretive services to assist at discharge as needed  - Refer to Case Management Department for coordinating discharge planning if the patient needs post-hospital services based on physician/advanced practitioner order or complex needs related to functional status, cognitive ability, or social support system  Outcome: Progressing     Problem: Knowledge Deficit  Goal: Patient/family/caregiver demonstrates understanding of disease process, treatment plan, medications, and discharge instructions  Description: Complete learning assessment and assess knowledge base.  Interventions:  - Provide teaching at level of understanding  - Provide teaching via preferred learning methods  Outcome: Progressing

## 2025-01-13 ENCOUNTER — TRANSITIONAL CARE MANAGEMENT (OUTPATIENT)
Dept: FAMILY MEDICINE CLINIC | Facility: HOSPITAL | Age: 35
End: 2025-01-13

## 2025-01-14 NOTE — UTILIZATION REVIEW
Continued Stay Review    Date: 01/10                          Current Patient Class: Inpatient  Current Level of Care: MS    HPI:34 y.o. female initially admitted on 01/09     Assessment/Plan: Pt underwent EGD w/ GI yesterday w/c LAMS was removed as necrotic cavity appeared to be healing well with healthy appearing granulation tissue without any necrosis. Reports mild pain post procedure, able to tolerate diet.  Cont pain control. Repeat CT abdomen with IV contrast in 4 weeks.     Medications:   Scheduled Medications:  DULoxetine, 30 mg, Oral, Daily  enoxaparin, 40 mg, Subcutaneous, Q24H RODRICK  folic acid, 1 mg, Oral, Daily  nicotine, 1 patch, Transdermal, Daily  pantoprazole, 40 mg, Oral, BID AC  senna-docusate sodium, 2 tablet, Oral, HS  thiamine, 100 mg, Oral, Daily       Continuous IV Infusions:      PRN Meds:  acetaminophen, 650 mg, Oral, Q6H PRN  HYDROmorphone, 1 mg, Intravenous, Q4H PRN 01/10 x 3  LORazepam, 0.5 mg, Oral, Q8H PRN 01/10 x 1  ondansetron, 4 mg, Intravenous, Q4H PRN 01/10 x 1  oxyCODONE, 5 mg, Oral, Q4H PRN 01/10 x 2   Or  oxyCODONE, 10 mg, Oral, Q4H PRN 01/10 x 1  polyethylene glycol, 17 g, Oral, Daily PRN    Discharge Plan: DC    Vital Signs (last 3 days) before discharge       Date/Time Temp Pulse Resp BP MAP (mmHg) SpO2 O2 Device CIWA-Ar Total Pain    01/10/25 1223 -- -- -- -- -- -- -- -- 7    01/10/25 1128 -- -- -- -- -- -- -- -- 6    01/10/25 11:11:34 97.7 °F (36.5 °C) 83 17 102/73 83 96 % -- -- --    01/10/25 08:23:02 -- 95 -- 120/82 95 94 % -- -- --    01/10/25 0738 -- -- -- -- -- -- None (Room air) -- 10 - Worst Possible Pain    01/10/25 07:33:39 97.8 °F (36.6 °C) 75 17 118/81 93 98 % -- 7 --    01/10/25 0649 -- -- -- -- -- -- -- -- 7    01/10/25 0341 -- -- -- -- -- -- -- -- 7    01/10/25 0137 -- -- -- -- -- -- -- -- 6    01/09/25 2300 -- -- -- -- -- -- -- 1 --    01/09/25 21:58:38 98.4 °F (36.9 °C) 74 17 120/93 102 95 % -- -- --    01/09/25 2120 -- -- -- -- -- -- -- -- 7 01/09/25 1920  -- -- -- -- -- -- None (Room air) -- --    01/09/25 1916 -- -- -- -- -- -- -- -- 6    01/09/25 1900 -- -- -- -- -- -- -- 0 --    01/09/25 18:53:55 97.6 °F (36.4 °C) 92 17 139/93 108 95 % -- -- --    01/09/25 16:04:55 98.8 °F (37.1 °C) 59 19 130/92 105 92 % -- -- --    01/09/25 1536 -- -- -- -- -- -- -- -- 7    01/09/25 15:22:44 97.3 °F (36.3 °C) 73 -- 152/98 116 97 % -- -- --    01/09/25 1505 -- 76 18 144/90 -- 97 % None (Room air) -- No Pain    01/09/25 1450 97.4 °F (36.3 °C) 91 18 135/91 -- 98 % None (Room air) -- No Pain    01/09/25 1406 97 °F (36.1 °C) 63 18 163/91 -- 98 % None (Room air) -- No Pain    01/09/25 1345 -- -- -- -- -- -- -- -- 8    01/09/25 11:22:33 97.6 °F (36.4 °C) 72 18 127/89 102 -- -- 7 --    01/09/25 0959 -- -- -- -- -- -- -- -- 8 01/09/25 09:11:16 -- 74 -- 132/86 101 99 % -- -- --    01/09/25 0911 -- -- -- -- -- -- -- 3 --    01/09/25 0905 -- -- -- -- -- -- -- -- 9    01/09/25 0900 -- -- -- -- -- -- -- -- 7    01/09/25 0417 -- -- -- -- -- -- -- -- 10 - Worst Possible Pain    01/09/25 0400 -- -- -- -- -- -- -- 2 --    01/09/25 03:43:20 98.4 °F (36.9 °C) -- 18 122/85 97 -- -- -- --    01/09/25 0302 -- -- -- -- -- -- -- -- 6    01/09/25 01:21:52 98.7 °F (37.1 °C) 77 -- 123/78 93 99 % -- -- --    01/09/25 0000 -- -- -- -- -- -- -- 2 --    01/08/25 2211 -- -- -- -- -- -- -- -- 10 - Worst Possible Pain    01/08/25 2105 -- -- -- -- -- -- -- -- 5    01/08/25 2000 -- -- -- -- -- -- -- 1 --    01/08/25 19:31:50 -- 79 -- 125/88 100 95 % -- -- --    01/08/25 1925 -- -- -- -- -- -- None (Room air) -- --    01/08/25 1738 -- -- -- -- -- -- -- -- 10 - Worst Possible Pain    01/08/25 1600 -- -- -- 140/86 -- -- -- 2 --    01/08/25 15:23:04 98.9 °F (37.2 °C) 78 -- 141/93 109 95 % -- -- --    01/08/25 1403 -- -- -- -- -- -- -- -- 7    01/08/25 1200 -- -- -- 141/93 -- -- -- 1 --    01/08/25 1115 -- -- -- -- -- -- -- -- 10 - Worst Possible Pain    01/08/25 0912 -- -- -- -- -- -- -- -- 7    01/08/25 0800 -- --  -- 119/70 -- -- -- 1 --    01/08/25 07:25:24 97.8 °F (36.6 °C) 54 19 119/70 86 97 % None (Room air) 1 --    01/08/25 0614 -- -- -- -- -- -- -- -- 10 - Worst Possible Pain    01/08/25 0300 -- -- -- 110/73 -- -- -- 0 --    01/08/25 02:41:18 98.6 °F (37 °C) 71 -- 110/73 85 96 % -- -- --    01/08/25 00:16:23 -- 71 -- 113/70 84 98 % -- 0 --    01/07/25 22:23:10 98.2 °F (36.8 °C) 64 -- 114/81 92 91 % -- -- --    01/07/25 2217 -- -- -- -- -- -- -- -- 10 - Worst Possible Pain    01/07/25 2030 -- 74 -- 140/84 -- -- -- 2 --    01/07/25 1922 -- 79 16 140/85 -- 97 % -- -- --    01/07/25 1919 -- 79 -- -- -- -- -- 9 --    01/07/25 1818 -- -- -- -- -- -- -- -- 9    01/07/25 1542 -- -- -- -- -- -- -- -- 8    01/07/25 1407 -- 81 16 141/89 -- 99 % None (Room air) -- --    01/07/25 1403 -- -- -- -- -- -- -- -- 9    01/07/25 1344 -- -- -- -- -- -- None (Room air) -- --    01/07/25 1201 98 °F (36.7 °C) 83 18 160/100 121 99 % None (Room air) -- 9          Weight (last 2 days) before discharge       None            Pertinent Labs/Diagnostic Results:   Radiology:  CT abdomen pelvis with contrast   Final Interpretation by Dominga Shabazz MD (01/07 1602)      Decrease in size of peripancreatic fluid collections and resolution of 2 collections in the region of the head of the pancreas. No new or enlarging peripancreatic fluid collections status post cystogastrostomy.      Moderate amount of free fluid in the cul-de-sacr and resolution of right ovarian cyst.      Otherwise no new findings.      The study was marked in EPIC for immediate notification.         Workstation performed: [id number]         CT abdomen w contrast    (Results Pending)     Cardiology:  No orders to display     GI:  EGD Fluoro   Final Result by Venancio Jaime MD (01/09 1500)   Healthy appearing granulation tissue without significant necrosis    visualized through the LAMS. Subsequently previously placed double    pig-tail stent and LAMS were successfully removed.        RECOMMENDATION:   Repeat CT abdo w iv contrast in 4 weeks   Resume diet today                     Venancio Jaime MD               Results from last 7 days   Lab Units 01/10/25  0454 01/09/25 0440 01/08/25 0455 01/07/25  1206   WBC Thousand/uL 4.76 4.40 5.66 8.05   HEMOGLOBIN g/dL 11.5 10.3* 10.5* 12.1   HEMATOCRIT % 34.7* 31.5* 32.3* 36.5   PLATELETS Thousands/uL 148* 167 195 269   TOTAL NEUT ABS Thousands/µL  --   --   --  5.02         Results from last 7 days   Lab Units 01/10/25  0454 01/09/25 0440 01/08/25  0455 01/07/25  1206   SODIUM mmol/L 137 136 136 138   POTASSIUM mmol/L 3.9 3.6 3.8 3.9   CHLORIDE mmol/L 98 100 100 102   CO2 mmol/L 31 28 28 24   ANION GAP mmol/L 8 8 8 12   BUN mg/dL 4* 3* 6 7   CREATININE mg/dL 0.39* 0.40* 0.38* 0.36*   EGFR ml/min/1.73sq m 138 137 139 142   CALCIUM mg/dL 8.4 8.3* 8.2* 9.0     Results from last 7 days   Lab Units 01/10/25  0454 01/09/25 0440 01/07/25  1206   AST U/L 17 12* 17   ALT U/L 8 4* 8   ALK PHOS U/L 51 49 61   TOTAL PROTEIN g/dL 5.5* 5.4* 6.7   ALBUMIN g/dL 3.1* 2.9* 3.7   TOTAL BILIRUBIN mg/dL 0.56 0.62 0.73     Results from last 7 days   Lab Units 01/10/25  0838   POC GLUCOSE mg/dl 97     Results from last 7 days   Lab Units 01/10/25  0454 01/09/25  0440 01/08/25  0455 01/07/25  1206   GLUCOSE RANDOM mg/dL 85 95 95 83             Results from last 7 days   Lab Units 01/07/25  1206   LIPASE u/L 74                 Results from last 7 days   Lab Units 01/07/25  1355   CLARITY UA  Turbid   COLOR UA  Yellow   SPEC GRAV UA  1.023   PH UA  5.5   GLUCOSE UA mg/dl Negative   KETONES UA mg/dl Trace*   BLOOD UA  Negative   PROTEIN UA mg/dl Trace*   NITRITE UA  Negative   BILIRUBIN UA  Negative   UROBILINOGEN UA (BE) mg/dl <2.0   LEUKOCYTES UA  Negative   WBC UA /hpf 1-2   RBC UA /hpf 1-2   BACTERIA UA /hpf None Seen   EPITHELIAL CELLS WET PREP /hpf Occasional   MUCUS THREADS  Innumerable*           Network Utilization Review Department  ATTENTION: Please call with any  questions or concerns to [phone] and carefully listen to the prompts so that you are directed to the right person. All voicemails are confidential.   For Discharge needs, contact Care Management DC Support Team at [phone] opt. 2  Send all requests for admission clinical reviews, approved or denied determinations and any other requests to dedicated fax number below belonging to the [facility] where the patient is receiving treatment. List of dedicated fax numbers for the Facilities:  FACILITY NAME UR FAX NUMBER   ADMISSION DENIALS (Administrative/Medical Necessity) [phone]   DISCHARGE SUPPORT TEAM (NETWORK) [phone]   PARENT CHILD HEALTH (Maternity/NICU/Pediatrics) [phone]   [facility] [phone]   [facility] [phone]   [facility] [phone]   [facility] [phone]   [facility] [phone]   [facility] [phone]   [facility] [phone]   [facility] [phone]   [facility] [phone]   [facility] [phone]   [facility] [phone]   [facility] [phone]

## 2025-01-15 DIAGNOSIS — F41.9 ANXIETY: ICD-10-CM

## 2025-01-15 DIAGNOSIS — F10.90 ALCOHOL USE DISORDER: ICD-10-CM

## 2025-01-16 RX ORDER — LANOLIN ALCOHOL/MO/W.PET/CERES
100 CREAM (GRAM) TOPICAL DAILY
Qty: 90 TABLET | Refills: 1 | OUTPATIENT
Start: 2025-01-16

## 2025-01-16 RX ORDER — DULOXETIN HYDROCHLORIDE 30 MG/1
30 CAPSULE, DELAYED RELEASE ORAL DAILY
Qty: 90 CAPSULE | Refills: 1 | OUTPATIENT
Start: 2025-01-16

## 2025-01-25 NOTE — Clinical Note
Kaykay Holland was seen and treated in our emergency department on 11/7/2024.                Diagnosis:     Kaykay  is off the rest of the shift today, may return to work on return date.    She may return on this date: 11/08/2024         If you have any questions or concerns, please don't hesitate to call.      PATRICIA Dorado    ______________________________           _______________          _______________  Hospital Representative                              Date                                Time
Warm

## 2025-02-27 ENCOUNTER — HOSPITAL ENCOUNTER (INPATIENT)
Facility: HOSPITAL | Age: 35
LOS: 9 days | Discharge: HOME | DRG: 282 | End: 2025-03-08
Attending: EMERGENCY MEDICINE | Admitting: INTERNAL MEDICINE
Payer: COMMERCIAL

## 2025-02-27 ENCOUNTER — APPOINTMENT (EMERGENCY)
Dept: CT IMAGING | Facility: HOSPITAL | Age: 35
DRG: 282 | End: 2025-02-27
Payer: COMMERCIAL

## 2025-02-27 DIAGNOSIS — R10.9 ABDOMINAL PAIN: Primary | ICD-10-CM

## 2025-02-27 DIAGNOSIS — K86.2 CYST OF PANCREAS: ICD-10-CM

## 2025-02-27 DIAGNOSIS — F10.10 ALCOHOL ABUSE: ICD-10-CM

## 2025-02-27 DIAGNOSIS — R18.8 INTRAABDOMINAL FLUID COLLECTION: ICD-10-CM

## 2025-02-27 DIAGNOSIS — K85.91 NECROTIZING PANCREATITIS: ICD-10-CM

## 2025-02-27 DIAGNOSIS — K85.90 ACUTE PANCREATITIS: ICD-10-CM

## 2025-02-27 DIAGNOSIS — F10.90 ALCOHOL USE DISORDER: ICD-10-CM

## 2025-02-27 DIAGNOSIS — K85.90 PANCREATITIS: ICD-10-CM

## 2025-02-27 LAB
ALBUMIN SERPL BCG-MCNC: 4.2 G/DL (ref 3.5–5)
ALP SERPL-CCNC: 84 U/L (ref 34–104)
ALT SERPL W P-5'-P-CCNC: 25 U/L (ref 7–52)
ANION GAP SERPL CALCULATED.3IONS-SCNC: 12 MMOL/L (ref 4–13)
AST SERPL W P-5'-P-CCNC: 57 U/L (ref 13–39)
BASOPHILS # BLD AUTO: 0.05 THOUSANDS/ÂΜL (ref 0–0.1)
BASOPHILS NFR BLD AUTO: 1 % (ref 0–1)
BILIRUB SERPL-MCNC: 1.05 MG/DL (ref 0.2–1)
BILIRUB UR QL STRIP: NEGATIVE
BUN SERPL-MCNC: 6 MG/DL (ref 5–25)
CALCIUM SERPL-MCNC: 9.2 MG/DL (ref 8.4–10.2)
CHLORIDE SERPL-SCNC: 97 MMOL/L (ref 96–108)
CLARITY UR: CLEAR
CO2 SERPL-SCNC: 26 MMOL/L (ref 21–32)
COLOR UR: ABNORMAL
CREAT SERPL-MCNC: 0.42 MG/DL (ref 0.6–1.3)
EOSINOPHIL # BLD AUTO: 0.06 THOUSAND/ÂΜL (ref 0–0.61)
EOSINOPHIL NFR BLD AUTO: 1 % (ref 0–6)
ERYTHROCYTE [DISTWIDTH] IN BLOOD BY AUTOMATED COUNT: 14.7 % (ref 11.6–15.1)
ETHANOL SERPL-MCNC: 129 MG/DL
EXT PREGNANCY TEST URINE: NEGATIVE
EXT. CONTROL: NORMAL
GFR SERPL CREATININE-BSD FRML MDRD: 135 ML/MIN/1.73SQ M
GLUCOSE SERPL-MCNC: 90 MG/DL (ref 65–140)
GLUCOSE UR STRIP-MCNC: NEGATIVE MG/DL
HCT VFR BLD AUTO: 38.6 % (ref 34.8–46.1)
HGB BLD-MCNC: 12.9 G/DL (ref 11.5–15.4)
HGB UR QL STRIP.AUTO: NEGATIVE
IMM GRANULOCYTES # BLD AUTO: 0.05 THOUSAND/UL (ref 0–0.2)
IMM GRANULOCYTES NFR BLD AUTO: 1 % (ref 0–2)
KETONES UR STRIP-MCNC: NEGATIVE MG/DL
LACTATE SERPL-SCNC: 2.4 MMOL/L (ref 0.5–2)
LEUKOCYTE ESTERASE UR QL STRIP: NEGATIVE
LIPASE SERPL-CCNC: 341 U/L (ref 11–82)
LYMPHOCYTES # BLD AUTO: 2.11 THOUSANDS/ÂΜL (ref 0.6–4.47)
LYMPHOCYTES NFR BLD AUTO: 27 % (ref 14–44)
MCH RBC QN AUTO: 33.9 PG (ref 26.8–34.3)
MCHC RBC AUTO-ENTMCNC: 33.4 G/DL (ref 31.4–37.4)
MCV RBC AUTO: 102 FL (ref 82–98)
MONOCYTES # BLD AUTO: 0.84 THOUSAND/ÂΜL (ref 0.17–1.22)
MONOCYTES NFR BLD AUTO: 11 % (ref 4–12)
NEUTROPHILS # BLD AUTO: 4.76 THOUSANDS/ÂΜL (ref 1.85–7.62)
NEUTS SEG NFR BLD AUTO: 59 % (ref 43–75)
NITRITE UR QL STRIP: NEGATIVE
NRBC BLD AUTO-RTO: 0 /100 WBCS
PH UR STRIP.AUTO: 5.5 [PH]
PLATELET # BLD AUTO: 248 THOUSANDS/UL (ref 149–390)
PMV BLD AUTO: 10.5 FL (ref 8.9–12.7)
POTASSIUM SERPL-SCNC: 3.6 MMOL/L (ref 3.5–5.3)
PROCALCITONIN SERPL-MCNC: <0.05 NG/ML
PROT SERPL-MCNC: 7 G/DL (ref 6.4–8.4)
PROT UR STRIP-MCNC: NEGATIVE MG/DL
RBC # BLD AUTO: 3.8 MILLION/UL (ref 3.81–5.12)
SODIUM SERPL-SCNC: 135 MMOL/L (ref 135–147)
SP GR UR STRIP.AUTO: <1.005 (ref 1–1.03)
UROBILINOGEN UR STRIP-ACNC: <2 MG/DL
WBC # BLD AUTO: 7.87 THOUSAND/UL (ref 4.31–10.16)

## 2025-02-27 PROCEDURE — 87040 BLOOD CULTURE FOR BACTERIA: CPT

## 2025-02-27 PROCEDURE — 83605 ASSAY OF LACTIC ACID: CPT

## 2025-02-27 PROCEDURE — 99284 EMERGENCY DEPT VISIT MOD MDM: CPT

## 2025-02-27 PROCEDURE — 74177 CT ABD & PELVIS W/CONTRAST: CPT

## 2025-02-27 PROCEDURE — 80053 COMPREHEN METABOLIC PANEL: CPT | Performed by: EMERGENCY MEDICINE

## 2025-02-27 PROCEDURE — 99285 EMERGENCY DEPT VISIT HI MDM: CPT

## 2025-02-27 PROCEDURE — 82077 ASSAY SPEC XCP UR&BREATH IA: CPT

## 2025-02-27 PROCEDURE — 96374 THER/PROPH/DIAG INJ IV PUSH: CPT

## 2025-02-27 PROCEDURE — 96361 HYDRATE IV INFUSION ADD-ON: CPT

## 2025-02-27 PROCEDURE — 83690 ASSAY OF LIPASE: CPT | Performed by: EMERGENCY MEDICINE

## 2025-02-27 PROCEDURE — 81003 URINALYSIS AUTO W/O SCOPE: CPT | Performed by: EMERGENCY MEDICINE

## 2025-02-27 PROCEDURE — 36415 COLL VENOUS BLD VENIPUNCTURE: CPT

## 2025-02-27 PROCEDURE — 85025 COMPLETE CBC W/AUTO DIFF WBC: CPT | Performed by: EMERGENCY MEDICINE

## 2025-02-27 PROCEDURE — 84145 PROCALCITONIN (PCT): CPT

## 2025-02-27 PROCEDURE — 96375 TX/PRO/DX INJ NEW DRUG ADDON: CPT

## 2025-02-27 PROCEDURE — 81025 URINE PREGNANCY TEST: CPT | Performed by: EMERGENCY MEDICINE

## 2025-02-27 RX ORDER — ONDANSETRON 2 MG/ML
4 INJECTION INTRAMUSCULAR; INTRAVENOUS ONCE
Status: COMPLETED | OUTPATIENT
Start: 2025-02-27 | End: 2025-02-27

## 2025-02-27 RX ORDER — MORPHINE SULFATE 4 MG/ML
4 INJECTION, SOLUTION INTRAMUSCULAR; INTRAVENOUS ONCE
Status: COMPLETED | OUTPATIENT
Start: 2025-02-27 | End: 2025-02-27

## 2025-02-27 RX ORDER — SODIUM CHLORIDE, SODIUM GLUCONATE, SODIUM ACETATE, POTASSIUM CHLORIDE, MAGNESIUM CHLORIDE, SODIUM PHOSPHATE, DIBASIC, AND POTASSIUM PHOSPHATE .53; .5; .37; .037; .03; .012; .00082 G/100ML; G/100ML; G/100ML; G/100ML; G/100ML; G/100ML; G/100ML
125 INJECTION, SOLUTION INTRAVENOUS CONTINUOUS
Status: DISPENSED | OUTPATIENT
Start: 2025-02-27 | End: 2025-02-28

## 2025-02-27 RX ORDER — HYDROMORPHONE HCL IN WATER/PF 6 MG/30 ML
0.2 PATIENT CONTROLLED ANALGESIA SYRINGE INTRAVENOUS EVERY 4 HOURS PRN
Status: DISCONTINUED | OUTPATIENT
Start: 2025-02-27 | End: 2025-03-02

## 2025-02-27 RX ORDER — HYDROMORPHONE HCL/PF 1 MG/ML
0.5 SYRINGE (ML) INJECTION EVERY 4 HOURS PRN
Refills: 0 | Status: DISCONTINUED | OUTPATIENT
Start: 2025-02-27 | End: 2025-03-02

## 2025-02-27 RX ORDER — NICOTINE 21 MG/24HR
21 PATCH, TRANSDERMAL 24 HOURS TRANSDERMAL ONCE
Status: DISCONTINUED | OUTPATIENT
Start: 2025-02-27 | End: 2025-02-28

## 2025-02-27 RX ORDER — HYDROMORPHONE HCL/PF 1 MG/ML
0.5 SYRINGE (ML) INJECTION EVERY 4 HOURS PRN
Status: DISCONTINUED | OUTPATIENT
Start: 2025-02-27 | End: 2025-03-02

## 2025-02-27 RX ORDER — HYDROMORPHONE HCL/PF 1 MG/ML
0.2 SYRINGE (ML) INJECTION ONCE
Refills: 0 | Status: COMPLETED | OUTPATIENT
Start: 2025-02-27 | End: 2025-02-27

## 2025-02-27 RX ADMIN — NICOTINE 21 MG: 21 PATCH, EXTENDED RELEASE TRANSDERMAL at 22:47

## 2025-02-27 RX ADMIN — ONDANSETRON 4 MG: 2 INJECTION, SOLUTION INTRAMUSCULAR; INTRAVENOUS at 20:16

## 2025-02-27 RX ADMIN — IOHEXOL 80 ML: 350 INJECTION, SOLUTION INTRAVENOUS at 21:02

## 2025-02-27 RX ADMIN — SODIUM CHLORIDE 1000 ML: 0.9 INJECTION, SOLUTION INTRAVENOUS at 20:16

## 2025-02-27 RX ADMIN — HYDROMORPHONE HYDROCHLORIDE 0.2 MG: 1 INJECTION, SOLUTION INTRAMUSCULAR; INTRAVENOUS; SUBCUTANEOUS at 22:21

## 2025-02-27 RX ADMIN — MORPHINE SULFATE 4 MG: 4 INJECTION INTRAVENOUS at 20:17

## 2025-02-27 RX ADMIN — SODIUM CHLORIDE, SODIUM GLUCONATE, SODIUM ACETATE, POTASSIUM CHLORIDE, MAGNESIUM CHLORIDE, SODIUM PHOSPHATE, DIBASIC, AND POTASSIUM PHOSPHATE 125 ML/HR: .53; .5; .37; .037; .03; .012; .00082 INJECTION, SOLUTION INTRAVENOUS at 23:42

## 2025-02-27 RX ADMIN — PIPERACILLIN AND TAZOBACTAM 4.5 G: 4; .5 INJECTION, POWDER, FOR SOLUTION INTRAVENOUS at 22:21

## 2025-02-28 PROBLEM — E87.20 LACTIC ACIDOSIS: Status: ACTIVE | Noted: 2025-02-28

## 2025-02-28 LAB
ALBUMIN SERPL BCG-MCNC: 3.5 G/DL (ref 3.5–5)
ALP SERPL-CCNC: 65 U/L (ref 34–104)
ALT SERPL W P-5'-P-CCNC: 19 U/L (ref 7–52)
ANION GAP SERPL CALCULATED.3IONS-SCNC: 8 MMOL/L (ref 4–13)
APTT PPP: 30 SECONDS (ref 23–34)
AST SERPL W P-5'-P-CCNC: 42 U/L (ref 13–39)
ATRIAL RATE: 55 BPM
BILIRUB DIRECT SERPL-MCNC: 0.24 MG/DL (ref 0–0.2)
BILIRUB SERPL-MCNC: 1.05 MG/DL (ref 0.2–1)
BUN SERPL-MCNC: 10 MG/DL (ref 5–25)
CALCIUM SERPL-MCNC: 7.9 MG/DL (ref 8.4–10.2)
CHLORIDE SERPL-SCNC: 99 MMOL/L (ref 96–108)
CO2 SERPL-SCNC: 29 MMOL/L (ref 21–32)
CREAT SERPL-MCNC: 0.57 MG/DL (ref 0.6–1.3)
ERYTHROCYTE [DISTWIDTH] IN BLOOD BY AUTOMATED COUNT: 15.1 % (ref 11.6–15.1)
GFR SERPL CREATININE-BSD FRML MDRD: 122 ML/MIN/1.73SQ M
GLUCOSE SERPL-MCNC: 82 MG/DL (ref 65–140)
HCT VFR BLD AUTO: 32.8 % (ref 34.8–46.1)
HGB BLD-MCNC: 10.9 G/DL (ref 11.5–15.4)
INR PPP: 1.16 (ref 0.85–1.19)
LACTATE SERPL-SCNC: 1.2 MMOL/L (ref 0.5–2)
MAGNESIUM SERPL-MCNC: 1.4 MG/DL (ref 1.9–2.7)
MCH RBC QN AUTO: 34.4 PG (ref 26.8–34.3)
MCHC RBC AUTO-ENTMCNC: 33.2 G/DL (ref 31.4–37.4)
MCV RBC AUTO: 104 FL (ref 82–98)
P AXIS: 54 DEGREES
PHOSPHATE SERPL-MCNC: 4.5 MG/DL (ref 2.7–4.5)
PLATELET # BLD AUTO: 175 THOUSANDS/UL (ref 149–390)
PMV BLD AUTO: 10.7 FL (ref 8.9–12.7)
POTASSIUM SERPL-SCNC: 3.5 MMOL/L (ref 3.5–5.3)
PR INTERVAL: 118 MS
PROT SERPL-MCNC: 5.7 G/DL (ref 6.4–8.4)
PROTHROMBIN TIME: 15.3 SECONDS (ref 12.3–15)
QRS AXIS: 75 DEGREES
QRSD INTERVAL: 88 MS
QT INTERVAL: 478 MS
QTC INTERVAL: 457 MS
RBC # BLD AUTO: 3.17 MILLION/UL (ref 3.81–5.12)
SODIUM SERPL-SCNC: 136 MMOL/L (ref 135–147)
T WAVE AXIS: 48 DEGREES
VENTRICULAR RATE: 55 BPM
WBC # BLD AUTO: 7.54 THOUSAND/UL (ref 4.31–10.16)

## 2025-02-28 PROCEDURE — 80048 BASIC METABOLIC PNL TOTAL CA: CPT | Performed by: PHYSICIAN ASSISTANT

## 2025-02-28 PROCEDURE — 83735 ASSAY OF MAGNESIUM: CPT | Performed by: PHYSICIAN ASSISTANT

## 2025-02-28 PROCEDURE — 93010 ELECTROCARDIOGRAM REPORT: CPT | Performed by: INTERNAL MEDICINE

## 2025-02-28 PROCEDURE — 85027 COMPLETE CBC AUTOMATED: CPT | Performed by: PHYSICIAN ASSISTANT

## 2025-02-28 PROCEDURE — 36415 COLL VENOUS BLD VENIPUNCTURE: CPT

## 2025-02-28 PROCEDURE — 85610 PROTHROMBIN TIME: CPT | Performed by: PHYSICIAN ASSISTANT

## 2025-02-28 PROCEDURE — 93005 ELECTROCARDIOGRAM TRACING: CPT

## 2025-02-28 PROCEDURE — 84100 ASSAY OF PHOSPHORUS: CPT | Performed by: PHYSICIAN ASSISTANT

## 2025-02-28 PROCEDURE — 80076 HEPATIC FUNCTION PANEL: CPT | Performed by: PHYSICIAN ASSISTANT

## 2025-02-28 PROCEDURE — 85730 THROMBOPLASTIN TIME PARTIAL: CPT | Performed by: PHYSICIAN ASSISTANT

## 2025-02-28 PROCEDURE — 99223 1ST HOSP IP/OBS HIGH 75: CPT | Performed by: FAMILY MEDICINE

## 2025-02-28 PROCEDURE — 83605 ASSAY OF LACTIC ACID: CPT

## 2025-02-28 PROCEDURE — 99222 1ST HOSP IP/OBS MODERATE 55: CPT | Performed by: INTERNAL MEDICINE

## 2025-02-28 RX ORDER — ONDANSETRON 2 MG/ML
4 INJECTION INTRAMUSCULAR; INTRAVENOUS EVERY 6 HOURS PRN
Status: DISCONTINUED | OUTPATIENT
Start: 2025-02-28 | End: 2025-03-08 | Stop reason: HOSPADM

## 2025-02-28 RX ORDER — MAGNESIUM SULFATE HEPTAHYDRATE 40 MG/ML
2 INJECTION, SOLUTION INTRAVENOUS ONCE
Status: COMPLETED | OUTPATIENT
Start: 2025-02-28 | End: 2025-02-28

## 2025-02-28 RX ORDER — LORAZEPAM 1 MG/1
2 TABLET ORAL ONCE
Status: COMPLETED | OUTPATIENT
Start: 2025-02-28 | End: 2025-02-28

## 2025-02-28 RX ORDER — CHLORDIAZEPOXIDE HYDROCHLORIDE 25 MG/1
25 CAPSULE, GELATIN COATED ORAL EVERY 6 HOURS SCHEDULED
Status: COMPLETED | OUTPATIENT
Start: 2025-03-01 | End: 2025-03-01

## 2025-02-28 RX ORDER — CHLORDIAZEPOXIDE HYDROCHLORIDE 25 MG/1
50 CAPSULE, GELATIN COATED ORAL EVERY 6 HOURS SCHEDULED
Status: COMPLETED | OUTPATIENT
Start: 2025-02-28 | End: 2025-02-28

## 2025-02-28 RX ORDER — SENNOSIDES 8.6 MG
1 TABLET ORAL
Status: DISCONTINUED | OUTPATIENT
Start: 2025-02-28 | End: 2025-03-02

## 2025-02-28 RX ORDER — NICOTINE 21 MG/24HR
1 PATCH, TRANSDERMAL 24 HOURS TRANSDERMAL DAILY
Status: DISCONTINUED | OUTPATIENT
Start: 2025-02-28 | End: 2025-02-28

## 2025-02-28 RX ORDER — NICOTINE 21 MG/24HR
1 PATCH, TRANSDERMAL 24 HOURS TRANSDERMAL DAILY
Status: DISCONTINUED | OUTPATIENT
Start: 2025-02-28 | End: 2025-03-08 | Stop reason: HOSPADM

## 2025-02-28 RX ORDER — TRAZODONE HYDROCHLORIDE 50 MG/1
25 TABLET ORAL ONCE
Status: COMPLETED | OUTPATIENT
Start: 2025-02-28 | End: 2025-02-28

## 2025-02-28 RX ADMIN — NICOTINE 1 PATCH: 21 PATCH, EXTENDED RELEASE TRANSDERMAL at 21:10

## 2025-02-28 RX ADMIN — ONDANSETRON 4 MG: 2 INJECTION INTRAMUSCULAR; INTRAVENOUS at 13:00

## 2025-02-28 RX ADMIN — SODIUM CHLORIDE, SODIUM GLUCONATE, SODIUM ACETATE, POTASSIUM CHLORIDE, MAGNESIUM CHLORIDE, SODIUM PHOSPHATE, DIBASIC, AND POTASSIUM PHOSPHATE 125 ML/HR: .53; .5; .37; .037; .03; .012; .00082 INJECTION, SOLUTION INTRAVENOUS at 13:00

## 2025-02-28 RX ADMIN — PIPERACILLIN AND TAZOBACTAM 4.5 G: 4; .5 INJECTION, POWDER, FOR SOLUTION INTRAVENOUS at 08:02

## 2025-02-28 RX ADMIN — TRAZODONE HYDROCHLORIDE 25 MG: 50 TABLET ORAL at 23:11

## 2025-02-28 RX ADMIN — CHLORDIAZEPOXIDE HYDROCHLORIDE 50 MG: 25 CAPSULE ORAL at 23:06

## 2025-02-28 RX ADMIN — HYDROMORPHONE HYDROCHLORIDE 0.5 MG: 1 INJECTION, SOLUTION INTRAMUSCULAR; INTRAVENOUS; SUBCUTANEOUS at 11:12

## 2025-02-28 RX ADMIN — HYDROMORPHONE HYDROCHLORIDE 0.5 MG: 1 INJECTION, SOLUTION INTRAMUSCULAR; INTRAVENOUS; SUBCUTANEOUS at 20:18

## 2025-02-28 RX ADMIN — MAGNESIUM SULFATE HEPTAHYDRATE 2 G: 2 INJECTION, SOLUTION INTRAVENOUS at 11:13

## 2025-02-28 RX ADMIN — HYDROMORPHONE HYDROCHLORIDE 0.5 MG: 1 INJECTION, SOLUTION INTRAMUSCULAR; INTRAVENOUS; SUBCUTANEOUS at 17:51

## 2025-02-28 RX ADMIN — CHLORDIAZEPOXIDE HYDROCHLORIDE 50 MG: 25 CAPSULE ORAL at 17:06

## 2025-02-28 RX ADMIN — HYDROMORPHONE HYDROCHLORIDE 0.5 MG: 1 INJECTION, SOLUTION INTRAMUSCULAR; INTRAVENOUS; SUBCUTANEOUS at 01:10

## 2025-02-28 RX ADMIN — PIPERACILLIN AND TAZOBACTAM 4.5 G: 4; .5 INJECTION, POWDER, FOR SOLUTION INTRAVENOUS at 20:10

## 2025-02-28 RX ADMIN — ONDANSETRON 4 MG: 2 INJECTION INTRAMUSCULAR; INTRAVENOUS at 20:19

## 2025-02-28 RX ADMIN — LORAZEPAM 2 MG: 1 TABLET ORAL at 01:32

## 2025-02-28 RX ADMIN — CHLORDIAZEPOXIDE HYDROCHLORIDE 50 MG: 25 CAPSULE ORAL at 12:58

## 2025-02-28 RX ADMIN — HYDROMORPHONE HYDROCHLORIDE 0.5 MG: 1 INJECTION, SOLUTION INTRAMUSCULAR; INTRAVENOUS; SUBCUTANEOUS at 23:07

## 2025-02-28 RX ADMIN — HYDROMORPHONE HYDROCHLORIDE 0.5 MG: 1 INJECTION, SOLUTION INTRAMUSCULAR; INTRAVENOUS; SUBCUTANEOUS at 15:14

## 2025-02-28 RX ADMIN — CHLORDIAZEPOXIDE HYDROCHLORIDE 50 MG: 25 CAPSULE ORAL at 06:27

## 2025-02-28 RX ADMIN — PIPERACILLIN AND TAZOBACTAM 4.5 G: 4; .5 INJECTION, POWDER, FOR SOLUTION INTRAVENOUS at 13:59

## 2025-02-28 NOTE — ASSESSMENT & PLAN NOTE
See above.   Stress test revealed moderately sized perfusion abnormality of moderate severity present in the mid inferior and apical regions. Pt. now referred for [facility] for further evaluation.

## 2025-02-28 NOTE — PLAN OF CARE
Problem: Potential for Falls  Goal: Patient will remain free of falls  Description: INTERVENTIONS:  - Educate patient/family on patient safety including physical limitations  - Instruct patient to call for assistance with activity   - Consult OT/PT to assist with strengthening/mobility   - Keep Call bell within reach  - Keep bed low and locked with side rails adjusted as appropriate  - Keep care items and personal belongings within reach  - Initiate and maintain comfort rounds  - Make Fall Risk Sign visible to staff  - Offer Toileting every 2 Hours, in advance of need  - Initiate/Maintain alarm  - Obtain necessary fall risk management equipment:  - Apply yellow socks and bracelet for high fall risk patients  - Consider moving patient to room near nurses station  2/28/2025 1429 by Svetlana Adhikari, RN  Outcome: Progressing  2/28/2025 1428 by Svetlana Adhikari, RN  Outcome: Progressing

## 2025-02-28 NOTE — H&P
"H&P - Hospitalist   Name: Kaykay Holland 34 y.o. female I MRN: [id number]  Unit/Bed#: ED 12 I Date of Admission: 2/27/2025   Date of Service: 2/28/2025 I Hospital Day: 1     Assessment & Plan  Necrotizing pancreatitis  Presenting to the ED with left upper quadrant pain x 1 to 2 weeks with acute worsening in the last 1 to 2 days-endorses associated NVD  History of necrotizing pancreatitis with mass effect and s/p EUS with cystogastrostomy stent placement on 12/17 at \[facility]\""  Underwent stent removal 1/9 outpatient with GI as tissue appeared to be healing well  Readmitted at \[facility]\"" 1/7-1/10 where she was conservatively managed with IV fluids and pain control  CT A/P: \"Findings of acute pancreatitis with possible scattered mildly complicated pseudocysts. There is an abscess collection interposed between the stomach and the spleen estimated at 2.1 (AP) x 4.8 (TRV) x 4.7 (CC) cm. This is similar to perhaps slightly increased from 1/7/2025 examination, although comparison is somewhat impeded as there was a drain on prior exam.\"  Discussed with GI on admission-okay to stay at [facility] for now-plan to discussed with advanced GI team in the morning  N.p.o., IV fluids, antiemetics, and pain control  Will continue Zosyn due to presence of abscess and pseudocyst on CT scan  Pancreatic pseudocyst  S/p cystogastrostomy with stent placement 12/17 with stent removal 1/9 outpatient by GI  CT findings as above  GI consulted  Alcohol abuse  History of alcohol abuse  Reports that she continues to drink 1 bottle of wine daily  Drank 2 glasses of wine prior to arrival to the ED  History of DTs in the past requiring phenobarbital, however appears to have responded well to Librium taper during December admission  Will start on Librium taper  CIWA protocol  Lactic acidosis  Initial lactic acid 2.4-improved to 1.2 s/p IV fluids  Likely in the setting of acute pancreatitis  Tobacco abuse  Admits to smoking 1 pack/day  NRT while inpatient      VTE " "Pharmacologic Prophylaxis: VTE Score: 0 Low Risk (Score 0-2) - Encourage Ambulation.  Code Status: Level 1 - Full Code   Discussion with family: Patient declined call to .     Anticipated Length of Stay: Patient will be admitted on an inpatient basis with an anticipated length of stay of greater than 2 midnights secondary to necrotizing pancreatitis, pseudocyst, alcohol abuse.    History of Present Illness   Chief Complaint: \" Pain under my left rib cage\"    Kaykay Holland is a 34 y.o. female with a PMH of necrotizing pancreatitis with pseudocyst, alcohol abuse, and tobacco abuse who presents with recurrent LUQ abdominal pain x 1 to 2 weeks with acute worsening in the last 1 to 2 days.  Endorses associated nausea, vomiting, and diarrhea.  Intermittent mild chest pain but no dyspnea.  No fevers or chills.  No URI-like symptoms.  No urinary symptoms.  Reports that she continues to drink 1 bottle of wine daily she had 2 glasses of wine prior to arrival to the ED. denies melena, hematochezia, or hematemesis.    Review of Systems   Constitutional:  Negative for chills and fever.   HENT:  Negative for congestion.    Respiratory:  Negative for cough and shortness of breath.    Cardiovascular:  Positive for chest pain. Negative for leg swelling.   Gastrointestinal:  Positive for abdominal pain, diarrhea, nausea and vomiting.   Genitourinary:  Negative for difficulty urinating, dysuria and hematuria.   Musculoskeletal:  Negative for gait problem.   Neurological:  Negative for weakness and numbness.   All other systems reviewed and are negative.      Historical Information   Past Medical History:   Diagnosis Date    Acute alcoholic pancreatitis 11/03/2023    Hypertension     SIRS (systemic inflammatory response syndrome) (HCC) 04/10/2024     Past Surgical History:   Procedure Laterality Date    WISDOM TOOTH EXTRACTION       Social History     Tobacco Use    Smoking status: Every Day     Current packs/day: 0.50 "     Types: Cigarettes     Passive exposure: Never    Smokeless tobacco: Never    Tobacco comments:     Per pt last drink was today 9/24/24 one glass of wine. Had previously stop on 4/4/24 for 1 month    Vaping Use    Vaping status: Never Used   Substance and Sexual Activity    Alcohol use: Yes     Alcohol/week: 21.0 standard drinks of alcohol     Types: 21 Glasses of wine per week     Comment: last drank 2/27/25    Drug use: Never    Sexual activity: Not on file     E-Cigarette/Vaping    E-Cigarette Use Never User      E-Cigarette/Vaping Substances    Nicotine No     THC No     CBD No     Flavoring No     Other No     Unknown No      History reviewed. No pertinent family history.  Social History:  Marital Status: Single   Occupation: Currently unemployed but is to start new job on Monday  Patient Pre-hospital Living Situation: Home  Patient Pre-hospital Level of Mobility: walks  Patient Pre-hospital Diet Restrictions: None    Meds/Allergies   I have reviewed home medications with patient personally.  Prior to Admission medications    Medication Sig Start Date End Date Taking? Authorizing Provider   DULoxetine (CYMBALTA) 30 mg delayed release capsule Take 1 capsule (30 mg total) by mouth daily 12/25/24   Roxie Curry DO   folic acid (KP Folic Acid) 1 mg tablet Take 1 tablet (1 mg total) by mouth daily 12/1/24   Elena Vásquez PA-C   nicotine (NICODERM CQ) 21 mg/24 hr TD 24 hr patch Place 1 patch on the skin over 24 hours daily 12/25/24   Roxie Curry DO   ondansetron (ZOFRAN-ODT) 4 mg disintegrating tablet Take 1 tablet (4 mg total) by mouth every 6 (six) hours as needed for nausea or vomiting 12/24/24   Roxie Curry DO   pantoprazole (PROTONIX) 40 mg tablet Take 1 tablet (40 mg total) by mouth 2 (two) times a day 1/10/25   PATRICIA Barrett   thiamine 100 MG tablet Take 1 tablet (100 mg total) by mouth daily 12/25/24   Roxie Curry DO   HYDROmorphone (DILAUDID) 2 mg  tablet Take 1 tablet (2 mg total) by mouth every 6 (six) hours as needed for moderate pain or severe pain Max Daily Amount: 8 mg 1/10/25 2/28/25  Tisha PALACIO Kadi, PATRICIA     Allergies   Allergen Reactions    Bee Pollen Anaphylaxis    Other Edema     Bee stings (localized edema)       Objective :  Temp:  [97.5 °F (36.4 °C)-98.6 °F (37 °C)] 98.6 °F (37 °C)  HR:  [] 67  BP: (108-127)/(72-88) 112/72  Resp:  [18] 18  SpO2:  [92 %-99 %] 95 %  O2 Device: None (Room air)    Physical Exam  Vitals and nursing note reviewed.   Constitutional:       General: She is not in acute distress.     Appearance: Normal appearance. She is not ill-appearing.      Comments: Pleasant and conversational.   HENT:      Head: Normocephalic.      Nose: Nose normal.      Mouth/Throat:      Mouth: Mucous membranes are moist.   Eyes:      Conjunctiva/sclera: Conjunctivae normal.   Cardiovascular:      Rate and Rhythm: Normal rate and regular rhythm.      Pulses: Normal pulses.      Heart sounds: No murmur heard.  Pulmonary:      Effort: Pulmonary effort is normal. No respiratory distress.      Breath sounds: Normal breath sounds. No wheezing, rhonchi or rales.   Abdominal:      General: Abdomen is flat.      Palpations: Abdomen is soft.      Comments: Normoactive bowel sounds in all 4 quadrants.  No distention.  Moderate LUQ tenderness.  Mild LLQ tenderness.   Musculoskeletal:         General: Normal range of motion.      Cervical back: Normal range of motion.      Right lower leg: No edema.      Left lower leg: No edema.   Skin:     General: Skin is warm and dry.      Coloration: Skin is not pale.   Neurological:      General: No focal deficit present.      Mental Status: She is alert and oriented to person, place, and time.   Psychiatric:         Thought Content: Thought content normal.      Comments: Slightly anxious.  Intermittent tremor in right hand only.          Lines/Drains:            Lab Results: I have reviewed the following  "results:  Results from last 7 days   Lab Units 02/28/25  0507 02/27/25  1556   WBC Thousand/uL 7.54 7.87   HEMOGLOBIN g/dL 10.9* 12.9   HEMATOCRIT % 32.8* 38.6   PLATELETS Thousands/uL 175 248   SEGS PCT %  --  59   LYMPHO PCT %  --  27   MONO PCT %  --  11   EOS PCT %  --  1     Results from last 7 days   Lab Units 02/28/25  0507   SODIUM mmol/L 136   POTASSIUM mmol/L 3.5   CHLORIDE mmol/L 99   CO2 mmol/L 29   BUN mg/dL 10   CREATININE mg/dL 0.57*   ANION GAP mmol/L 8   CALCIUM mg/dL 7.9*   ALBUMIN g/dL 3.5   TOTAL BILIRUBIN mg/dL 1.05*   ALK PHOS U/L 65   ALT U/L 19   AST U/L 42*   GLUCOSE RANDOM mg/dL 82     Results from last 7 days   Lab Units 02/28/25  0507   INR  1.16         No results found for: \"HGBA1C\"  Results from last 7 days   Lab Units 02/28/25  0112 02/27/25  2216 02/27/25 2016   LACTIC ACID mmol/L 1.2 2.4*  --    PROCALCITONIN ng/ml  --   --  <0.05       Imaging Results Review: I reviewed radiology reports from this admission including: CT abdomen/pelvis.  Other Study Results Review: EKG was reviewed.  EKG was personally reviewed and my interpretation is: Bradycardia without acute ischemia.  Normal QTc...    Administrative Statements       ** Please Note: This note has been constructed using a voice recognition system. **    "

## 2025-02-28 NOTE — ED PROVIDER NOTES
Time reflects when diagnosis was documented in both MDM as applicable and the Disposition within this note       Time User Action Codes Description Comment    2/27/2025 10:05 PM CristoferarianaJaney Add [R10.9] Abdominal pain     2/27/2025 10:06 PM Janey Thomas Add [K85.90] Pancreatitis     2/27/2025 10:06 PM Janey Thomas Add [K86.2] Cyst of pancreas     2/27/2025 10:13 PM HectorweiJaney Add [R18.8] Intraabdominal fluid collection           ED Disposition       ED Disposition   Admit    Condition   Stable    Date/Time   Thu Feb 27, 2025 10:14 PM    Comment   Case was discussed with CAMPOS Heaton and the patient's admission status was agreed to be Admission Status: inpatient status to the service of Dr. Bowden .               Assessment & Plan       Medical Decision Making  Differential diagnosis included pancreatitis, AILEEN, electrolyte abnormality  Patient presenting with abdominal pain for the past 3 days.  Patient reporting that she has been having alcoholic drink for the past 5 to 7 days.   Abdominal pain: First nurse workup was completed in waiting room.  Patient noted to have an elevated lipase.  No gross electro abnormality.  Patient has no leukocytosis.  Patient has elevated heart rate, likely in acute pain response.  Patient has no fever.  Alcohol abuse: Patient reports that she has been drinking at least 1 to 2 glasses of wine a day.  Patient reports that she drank prior to arrival.  Patient reports that she did have a alcohol withdrawal seizures but that was years ago.  Patient reports that she gets DTs, and anxiety.   CT findings were discussed with radiology.  Discussed with Dr. Ordoñez of GI with recommendation for admission.  Confirm does not need to be transferred.  Additional recommendation for blood cultures, antibiotics.  Case was discussed with RIYA Heaton admitted.  Results to be followed by [facility].    Amount and/or Complexity of Data Reviewed  Labs: ordered.  Radiology: ordered.    Risk  OTC  drugs.  Prescription drug management.  Decision regarding hospitalization.             Medications   nicotine (NICODERM CQ) 21 mg/24 hr TD 24 hr patch 21 mg (21 mg Transdermal Medication Applied 2/27/25 2247)   HYDROmorphone HCl (DILAUDID) injection 0.2 mg (has no administration in time range)     Or   HYDROmorphone (DILAUDID) injection 0.5 mg (has no administration in time range)   HYDROmorphone (DILAUDID) injection 0.5 mg (has no administration in time range)   multi-electrolyte (PLASMALYTE-A/ISOLYTE-S PH 7.4) IV solution (has no administration in time range)   sodium chloride 0.9 % bolus 1,000 mL (0 mL Intravenous Stopped 2/27/25 2116)   morphine injection 4 mg (4 mg Intravenous Given 2/27/25 2017)   ondansetron (ZOFRAN) injection 4 mg (4 mg Intravenous Given 2/27/25 2016)   iohexol (OMNIPAQUE) 350 MG/ML injection (MULTI-DOSE) 80 mL (80 mL Intravenous Given 2/27/25 2102)   piperacillin-tazobactam (ZOSYN) IVPB 4.5 g (0 g Intravenous Stopped 2/27/25 2318)   HYDROmorphone (DILAUDID) injection 0.2 mg (0.2 mg Intravenous Given 2/27/25 2221)       ED Risk Strat Scores                            SBIRT 20yo+      Flowsheet Row Most Recent Value   Initial Alcohol Screen: US AUDIT-C     1. How often do you have a drink containing alcohol? 0 Filed at: 02/27/2025 2015   2. How many drinks containing alcohol do you have on a typical day you are drinking?  0 Filed at: 02/27/2025 2015   3a. Male UNDER 65: How often do you have five or more drinks on one occasion? 0 Filed at: 02/27/2025 2015   3b. FEMALE Any Age, or MALE 65+: How often do you have 4 or more drinks on one occassion? 0 Filed at: 02/27/2025 2015   Audit-C Score 0 Filed at: 02/27/2025 2015   DUANE: How many times in the past year have you...    Used an illegal drug or used a prescription medication for non-medical reasons? Never Filed at: 02/27/2025 2015                            History of Present Illness       Chief Complaint   Patient presents with    Abdominal  "Pain     X1 week, hx of pancreatitis, worse within last 3 days, vomiting x3 days       Past Medical History:   Diagnosis Date    Acute alcoholic pancreatitis 11/03/2023    Hypertension     SIRS (systemic inflammatory response syndrome) (HCC) 04/10/2024      Past Surgical History:   Procedure Laterality Date    WISDOM TOOTH EXTRACTION        History reviewed. No pertinent family history.   Social History     Tobacco Use    Smoking status: Every Day     Current packs/day: 0.50     Types: Cigarettes     Passive exposure: Never    Smokeless tobacco: Never    Tobacco comments:     Per pt last drink was today 9/24/24 one glass of wine. Had previously stop on 4/4/24 for 1 month    Vaping Use    Vaping status: Never Used   Substance Use Topics    Alcohol use: Yes     Alcohol/week: 21.0 standard drinks of alcohol     Types: 21 Glasses of wine per week     Comment: last drank 2/27/25    Drug use: Never      E-Cigarette/Vaping    E-Cigarette Use Never User       E-Cigarette/Vaping Substances    Nicotine No     THC No     CBD No     Flavoring No     Other No     Unknown No       I have reviewed and agree with the history as documented.     Patient is a 34-year-old female past medical history of pancreatitis, alcohol abuse arriving for evaluation of left upper quadrant pain.  Patient states that she feels that this is similar to other pancreatitis in the past.  Reports that she start drinking about 5 to 7 days ago.  States that she is only drinking \"a glass of wine a day.\"  She reports that she had at 1 time experience seizures but had not for about 4 years. Patient reports that she has had nausea, vomiting.  Patient reports a diarrhea as well. Denies blood in urine, stool, or emesis.         Review of Systems   Constitutional: Negative.    HENT: Negative.     Eyes: Negative.    Respiratory: Negative.     Cardiovascular: Negative.    Gastrointestinal:  Positive for abdominal pain, diarrhea, nausea and vomiting.   Endocrine: " Negative.    Genitourinary: Negative.    Musculoskeletal: Negative.    Skin: Negative.    Allergic/Immunologic: Negative.    Neurological: Negative.    Hematological: Negative.    Psychiatric/Behavioral: Negative.     All other systems reviewed and are negative.          Objective       ED Triage Vitals [02/27/25 1549]   Temperature Pulse Blood Pressure Respirations SpO2 Patient Position - Orthostatic VS   97.5 °F (36.4 °C) (!) 110 127/88 18 97 % Sitting      Temp Source Heart Rate Source BP Location FiO2 (%) Pain Score    Temporal Monitor Left arm -- 9      Vitals      Date and Time Temp Pulse SpO2 Resp BP Pain Score FACES Pain Rating User   02/27/25 2221 -- -- -- -- -- 9 -- AM   02/27/25 2122 98.6 °F (37 °C) 100 99 % 18 119/77 -- -- RD   02/27/25 2016 -- -- -- -- -- 9 -- RD   02/27/25 1549 97.5 °F (36.4 °C) 110 97 % 18 127/88 9 -- MS            Physical Exam  Vitals and nursing note reviewed.   Constitutional:       Appearance: She is well-developed and normal weight.   HENT:      Head: Normocephalic.      Mouth/Throat:      Mouth: Mucous membranes are moist.   Eyes:      Extraocular Movements: Extraocular movements intact.   Cardiovascular:      Rate and Rhythm: Normal rate and regular rhythm.      Heart sounds: Normal heart sounds.   Pulmonary:      Effort: Pulmonary effort is normal.      Breath sounds: Normal breath sounds.   Abdominal:      General: Abdomen is flat.      Palpations: Abdomen is soft.      Tenderness: There is abdominal tenderness in the epigastric area and left upper quadrant.   Skin:     Capillary Refill: Capillary refill takes less than 2 seconds.   Neurological:      General: No focal deficit present.      Mental Status: She is alert and oriented to person, place, and time.   Psychiatric:         Mood and Affect: Mood normal.         Behavior: Behavior normal.         Results Reviewed       Procedure Component Value Units Date/Time    Lactic acid, plasma (w/reflex if result > 2.0)  [[id number]]  (Abnormal) Collected: 02/27/25 2216    Lab Status: Final result Specimen: Blood from Arm, Left Updated: 02/27/25 2250     LACTIC ACID 2.4 mmol/L     Narrative:      Result may be elevated if tourniquet was used during collection.    Lactic acid 2 Hours [[id number]]     Lab Status: No result Specimen: Blood     Blood culture #2 [[id number]] Collected: 02/27/25 2216    Lab Status: In process Specimen: Blood from Hand, Left Updated: 02/27/25 2241    Procalcitonin [[id number]]  (Normal) Collected: 02/27/25 2016    Lab Status: Final result Specimen: Blood from Arm, Left Updated: 02/27/25 2240     Procalcitonin <0.05 ng/ml     Blood culture #1 [[id number]] Collected: 02/27/25 2216    Lab Status: In process Specimen: Blood from Arm, Left Updated: 02/27/25 2222    Ethanol [[id number]]  (Abnormal) Collected: 02/27/25 2016    Lab Status: Final result Specimen: Blood from Arm, Left Updated: 02/27/25 2152     Ethanol Lvl 129 mg/dL     Comprehensive metabolic panel [[id number]]  (Abnormal) Collected: 02/27/25 1556    Lab Status: Final result Specimen: Blood from Arm, Right Updated: 02/27/25 1620     Sodium 135 mmol/L      Potassium 3.6 mmol/L      Chloride 97 mmol/L      CO2 26 mmol/L      ANION GAP 12 mmol/L      BUN 6 mg/dL      Creatinine 0.42 mg/dL      Glucose 90 mg/dL      Calcium 9.2 mg/dL      AST 57 U/L      ALT 25 U/L      Alkaline Phosphatase 84 U/L      Total Protein 7.0 g/dL      Albumin 4.2 g/dL      Total Bilirubin 1.05 mg/dL      eGFR 135 ml/min/1.73sq m     Narrative:      National Kid[employer] Foundation guidelines for Chronic Kidney Disease (CKD):     Stage 1 with normal or high GFR (GFR > 90 mL/min/1.73 square meters)    Stage 2 Mild CKD (GFR = 60-89 mL/min/1.73 square meters)    Stage 3A Moderate CKD (GFR = 45-59 mL/min/1.73 square meters)    Stage 3B Moderate CKD (GFR = 30-44 mL/min/1.73 square meters)    Stage 4 Severe CKD (GFR = 15-29 mL/min/1.73 square meters)    Stage 5 End Stage CKD (GFR <15  mL/min/1.73 square meters)  Note: GFR calculation is accurate only with a steady state creatinine    Lipase [[id number]]  (Abnormal) Collected: 02/27/25 1556    Lab Status: Final result Specimen: Blood from Arm, Right Updated: 02/27/25 1620     Lipase 341 u/L     UA w Reflex to Microscopic w Reflex to Culture [[id number]]  (Abnormal) Collected: 02/27/25 1558    Lab Status: Final result Specimen: Urine, Clean Catch Updated: 02/27/25 1609     Color, UA Light Yellow     Clarity, UA Clear     Specific Gravity, UA <1.005     pH, UA 5.5     Leukocytes, UA Negative     Nitrite, UA Negative     Protein, UA Negative mg/dl      Glucose, UA Negative mg/dl      Ketones, UA Negative mg/dl      Urobilinogen, UA <2.0 mg/dl      Bilirubin, UA Negative     Occult Blood, UA Negative    CBC and differential [[id number]]  (Abnormal) Collected: 02/27/25 1556    Lab Status: Final result Specimen: Blood from Arm, Right Updated: 02/27/25 1606     WBC 7.87 Thousand/uL      RBC 3.80 Million/uL      Hemoglobin 12.9 g/dL      Hematocrit 38.6 %       fL      MCH 33.9 pg      MCHC 33.4 g/dL      RDW 14.7 %      MPV 10.5 fL      Platelets 248 Thousands/uL      nRBC 0 /100 WBCs      Segmented % 59 %      Immature Grans % 1 %      Lymphocytes % 27 %      Monocytes % 11 %      Eosinophils Relative 1 %      Basophils Relative 1 %      Absolute Neutrophils 4.76 Thousands/µL      Absolute Immature Grans 0.05 Thousand/uL      Absolute Lymphocytes 2.11 Thousands/µL      Absolute Monocytes 0.84 Thousand/µL      Eosinophils Absolute 0.06 Thousand/µL      Basophils Absolute 0.05 Thousands/µL     POCT pregnancy, urine [[id number]]  (Normal) Collected: 02/27/25 1604    Lab Status: Final result Specimen: Urine Updated: 02/27/25 1604     EXT Preg Test, Ur Negative     Control Valid            CT abdomen pelvis with contrast   Final Interpretation by Andrae Ngo DO (02/27 2139)      Findings of acute pancreatitis with possible scattered mildly  complicated pseudocysts.      There is an abscess collection interposed between the stomach and the spleen estimated at 2.1 (AP) x 4.8 (TRV) x 4.7 (CC) cm. This is similar to perhaps slightly increased from 1/7/2025 examination, although comparison is somewhat impeded as there was a    drain on prior exam.      There is moderate degree of fluid within the pelvis, slightly more prominent than on prior examination.      Other findings as detailed above.      I personally discussed this study with KWABENA MORENO on 2/27/2025 9:28 PM.               Workstation performed: [id number]             Procedures    ED Medication and Procedure Management   Prior to Admission Medications   Prescriptions Last Dose Informant Patient Reported? Taking?   DULoxetine (CYMBALTA) 30 mg delayed release capsule   No No   Sig: Take 1 capsule (30 mg total) by mouth daily   HYDROmorphone (DILAUDID) 2 mg tablet   No No   Sig: Take 1 tablet (2 mg total) by mouth every 6 (six) hours as needed for moderate pain or severe pain Max Daily Amount: 8 mg   folic acid (KP Folic Acid) 1 mg tablet   No No   Sig: Take 1 tablet (1 mg total) by mouth daily   nicotine (NICODERM CQ) 21 mg/24 hr TD 24 hr patch   No No   Sig: Place 1 patch on the skin over 24 hours daily   ondansetron (ZOFRAN-ODT) 4 mg disintegrating tablet   No No   Sig: Take 1 tablet (4 mg total) by mouth every 6 (six) hours as needed for nausea or vomiting   pantoprazole (PROTONIX) 40 mg tablet   No No   Sig: Take 1 tablet (40 mg total) by mouth 2 (two) times a day   thiamine 100 MG tablet   No No   Sig: Take 1 tablet (100 mg total) by mouth daily      Facility-Administered Medications: None     Patient's Medications   Discharge Prescriptions    No medications on file     No discharge procedures on file.  ED SEPSIS DOCUMENTATION   Time reflects when diagnosis was documented in both MDM as applicable and the Disposition within this note       Time User Action Codes Description Comment     2/27/2025 10:05 PM Janey Thomas [R10.9] Abdominal pain     2/27/2025 10:06 PM Janey Thomas [K85.90] Pancreatitis     2/27/2025 10:06 PM Janey Thomas [K86.2] Cyst of pancreas     2/27/2025 10:13 PM Janey Thomas [R18.8] Intraabdominal fluid collection                  PATRICIA Pereira  02/27/25 [id number]

## 2025-02-28 NOTE — CONSULTS
"Consultation - Gastroenterology   Name: Kaykay Holland 34 y.o. 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 CT shows possible scattered mildly complicated pseudocyst, abscess collection interposed between the stomach and spleen measuring approximately 2.1 x 4.8 x 4.7 cm, similar to prior imaging from January although comparison impeded due to presence of cyst gastrostomy. No leukocytosis or fever. Procalcitonin normal. Blood cultures pending.    Suspect \"abscess\" collection is necrotic collection containing gas related to previous cyst gastrostomy, less likely infected necrosis given normal WBC count and lack of fever.    -Continue NPO, IV fluids, antiemetics  -Pain management per primary service  -Continue IV antibiotics  -Follow-up blood cultures -if negative, can D/C antibiotics  -No need for transfer to [facility] at this time  Pancreatic pseudocyst  See above.  Tobacco abuse  Discussed critical need for tobacco cessation  Alcohol abuse  Counseled on abstinence from drinking    -Continue [facility] protocol  -Continue folic acid and thiamine  Lactic acidosis  Lactic acid 2.4 on admission, resolved with IV fluids.  I have discussed the above management plan in detail with the primary service.     History of Present Illness   HPI:  Kaykay Holland is a 34 y.o. 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    She has history of necrotizing pancreatitis with walled off necrosis. She was admitted to [place] and underwent cyst gastrostomy placement in December 2024. She had EGD in January 2025 which showed healthy appearing granulation tissue without significant necrosis, so the stent was removed.    After she was released from the hospital in January, she quit drinking for 2 to 3 weeks.  She then resumed drinking about 2 weeks ago.  She has been drinking 1 bottle of wine daily.  About 1 week ago, she began with progressive left upper quadrant pain identical to her previous pain with pancreatitis.  The pain radiates into her back at times.  She has associated nausea and vomiting particularly with solid food intake.  She was tolerating liquids at home.  No known fevers but she did have chills.  She is also been having some diarrhea over the past week.  No blood in the stool.      Review of Systems   Constitutional:  Negative for chills and fever.   HENT:  Negative for ear pain and sore throat.    Eyes:  Negative for pain and visual disturbance.   Respiratory:  Negative for cough and shortness of breath.    Cardiovascular:  Negative for chest pain and palpitations.   Gastrointestinal:  Positive for abdominal pain, nausea and vomiting.   Genitourinary:  Negative for dysuria and hematuria.   Musculoskeletal:  Negative for arthralgias and back pain.   Skin:  Negative for color change and rash.   Neurological:  Negative for seizures and syncope.   All other systems reviewed and are negative.    Medical History Review: I have reviewed the patient's PMH, PSH, Social History, Family History, Meds, and Allergies     Objective :  Temp:  [97.5 °F (36.4 °C)-98.6 °F (37 °C)] 98.6 °F (37 °C)  HR:  [] 75  BP: (108-127)/(72-88) 117/78  Resp:  [18-20] 20  SpO2:   [92 %-100 %] 100 %  O2 Device: None (Room air)    Physical Exam  Vitals and nursing note reviewed.   Constitutional:       General: She is not in acute distress.     Appearance: She is well-developed.   HENT:      Head: Normocephalic and atraumatic.   Eyes:      Conjunctiva/sclera: Conjunctivae normal.   Cardiovascular:      Rate and Rhythm: Normal rate and regular rhythm.      Heart sounds: No murmur heard.  Pulmonary:      Effort: Pulmonary effort is normal. No respiratory distress.      Breath sounds: Normal breath sounds.   Abdominal:      General: Abdomen is flat. Bowel sounds are normal.      Palpations: Abdomen is soft.      Tenderness: There is abdominal tenderness.      Comments: Marked LUQ tenderness with voluntary guarding   Musculoskeletal:         General: No swelling.      Cervical back: Neck supple.   Skin:     General: Skin is warm and dry.      Capillary Refill: Capillary refill takes less than 2 seconds.   Neurological:      Mental Status: She is alert.   Psychiatric:         Mood and Affect: Mood normal.           Lab Results: I have reviewed the following results:CBC/BMP:   .     02/28/25  0507   WBC 7.54   HGB 10.9*   HCT 32.8*      SODIUM 136   K 3.5   CL 99   CO2 29   BUN 10   CREATININE 0.57*   GLUC 82   MG 1.4*   PHOS 4.5    , Creatinine Clearance: Estimated Creatinine Clearance: 120.6 mL/min (A) (by C-G formula based on SCr of 0.57 mg/dL (L))., LFTs:   .     02/28/25  0507   AST 42*   ALT 19   ALB 3.5   TBILI 1.05*   ALKPHOS 65    , PTT/INR:  .     02/28/25  0507   PTT 30   INR 1.16    , Troponin,BNP:No new results in last 24 hours. , Lactic Acid:   .     02/28/25  0112   LACTICACID 1.2    , Procalcitonin:   Lab Results   Component Value Date    PROCALCITONI <0.05 02/27/2025       Imaging Results Review: I reviewed radiology reports from this admission including: CT abdomen/pelvis.  Other Study Results Review: Other studies reviewed include: EUS December 2024, EGD January 2025

## 2025-02-28 NOTE — ASSESSMENT & PLAN NOTE
"Presenting to the ED with left upper quadrant pain x 1 to 2 weeks with acute worsening in the last 1 to 2 days-endorses associated NVD  History of necrotizing pancreatitis with mass effect and s/p EUS with cystogastrostomy stent placement on 12/17 at [facility]  Underwent stent removal 1/9 outpatient with GI as tissue appeared to be healing well  Readmitted at [facility] 1/7-1/10 where she was conservatively managed with IV fluids and pain control  CT A/P: \"Findings of acute pancreatitis with possible scattered mildly complicated pseudocysts. There is an abscess collection interposed between the stomach and the spleen estimated at 2.1 (AP) x 4.8 (TRV) x 4.7 (CC) cm. This is similar to perhaps slightly increased from 1/7/2025 examination, although comparison is somewhat impeded as there was a drain on prior exam.\"  Discussed with GI on admission-okay to stay at [facility] for now-plan to discussed with advanced GI team in the morning  N.p.o., IV fluids, antiemetics, and pain control  Will continue Zosyn due to presence of abscess and pseudocyst on CT scan  "

## 2025-02-28 NOTE — ASSESSMENT & PLAN NOTE
History of alcohol abuse  Reports that she continues to drink 1 bottle of wine daily  Drank 2 glasses of wine prior to arrival to the ED  History of DTs in the past requiring phenobarbital, however appears to have responded well to Librium taper during December admission  Will start on Librium taper  [facility] protocol

## 2025-02-28 NOTE — UTILIZATION REVIEW
Initial Clinical Review    Admission: Date/Time/Statement:   Admission Orders (From admission, onward)       Ordered        02/27/25 2214  INPATIENT ADMISSION  Once                          Orders Placed This Encounter   Procedures    INPATIENT ADMISSION     Standing Status:   Standing     Number of Occurrences:   1     Level of Care:   Med Surg [16]     Estimated length of stay:   More than 2 Midnights     Certification:   I certify that inpatient services are medically necessary for this patient for a duration of greater than two midnights. See H&P and MD Progress Notes for additional information about the patient's course of treatment.     ED Arrival Information       Expected   -    Arrival   2/27/2025 15:33    Acuity   Urgent              Means of arrival   Walk-In    Escorted by   [facility]   Hospitalist    Admission type   Emergency              Arrival complaint   Abdominal pain             Chief Complaint   Patient presents with    Abdominal Pain     X1 week, hx of pancreatitis, worse within last 3 days, vomiting x3 days       Initial Presentation: 34 y.o. female presents to ed from home on 2-27-25 for evaluation and treatment of abdominal pain with vomiting for 3 days.  Last drink yesterday.  PMHX:  necrotizing pancreatitis from alcohol abuse with internal drain.    Clinical assessment significant for 9/10.   LA 2.4, Ethanol 129, Lipase 341.  Imaging shows acute pancreatitis.  There is an abscess collection between stomach and spleen.  There is a moderate degree of fluid within the pelvis.  Initially treated with iv zosyn, iv dilaudid x 1, iv mso4 x1, iv zofran x1,  iv .9% ns 1L bolus. Admit to inpatient med surg for necrotizing pancreatitis.  Plan includes:  NPO, iv fluids, iv antiemetics, iv antibiotics,GI consult, iv Mag.      Anticipated Length of Stay/Certification Statement: Patient will be admitted on an inpatient basis with an anticipated length of stay of greater than 2 midnights  secondary to necrotizing pancreatitis, pseudocyst, alcohol abuse.       Date: 2-28-25    Day 2: inpatient med surg   Abdominal pain persists 9/10.  Received iv dilaudid x 4 since arrival.  Consult GI:  alcohol use disorder and a previous history of necrotizing pancreatitis requiring internal drain who presents with acute recurrent pancreatitis. I do not believe she has an abscess that she is afebrile with a normal white count. Suspect CT findings are related to previous area of necrosis that was drained internally. Agree with iv fluids, clear liquid diet. Pain medication. Stress absolute alcohol abstinence.    Remains on iv zosyn.      ED Treatment-Medication Administration from 02/27/2025 1533 to 02/28/2025 1236         Date/Time Order Dose Route Action     02/27/2025 2016 sodium chloride 0.9 % bolus 1,000 mL 1,000 mL Intravenous New Bag     02/27/2025 2017 morphine injection 4 mg 4 mg Intravenous Given     02/27/2025 2016 ondansetron (ZOFRAN) injection 4 mg 4 mg Intravenous Given     02/27/2025 2221 piperacillin-tazobactam (ZOSYN) IVPB 4.5 g 4.5 g Intravenous New Bag     02/27/2025 2221 HYDROmorphone (DILAUDID) injection 0.2 mg 0.2 mg Intravenous Given     02/27/2025 2247 nicotine (NICODERM CQ) 21 mg/24 hr TD 24 hr patch 21 mg 21 mg Transdermal Medication Applied     02/28/2025 1105 nicotine (NICODERM CQ) 21 mg/24 hr TD 24 hr patch 21 mg 21 mg Transdermal Patch Removed     02/28/2025 0110 HYDROmorphone (DILAUDID) injection 0.5 mg 0.5 mg Intravenous Given     02/28/2025 1112 HYDROmorphone (DILAUDID) injection 0.5 mg 0.5 mg Intravenous Given     02/27/2025 2342 multi-electrolyte (PLASMALYTE-A/ISOLYTE-S PH 7.4) IV solution 125 mL/hr Intravenous New Bag     02/28/2025 0132 LORazepam (ATIVAN) tablet 2 mg 2 mg Oral Given     02/28/2025 0627 chlordiazePOXIDE (LIBRIUM) capsule 50 mg 50 mg Oral Given     02/28/2025 0802 piperacillin-tazobactam (ZOSYN) IVPB 4.5 g 4.5 g Intravenous New Bag     02/28/2025 1113 magnesium  sulfate 2 g/50 mL IVPB (premix) 2 g 2 g Intravenous New Bag       Scheduled Medications:    chlordiazePOXIDE, 50 mg, Oral, Q6H RODRICK   Followed by  [START ON 3/1/2025] chlordiazePOXIDE, 25 mg, Oral, Q6H RODRICK  nicotine, 1 patch, Transdermal, Daily  piperacillin-tazobactam, 4.5 g, Intravenous, Q8H      Continuous IV Infusions:  multi-electrolyte, 125 mL/hr, Intravenous, Continuous      PRN Meds:  HYDROmorphone, 0.2 mg, Intravenous, Q4H PRN   Or  HYDROmorphone, 0.5 mg, Intravenous, Q4H PRN  HYDROmorphone, 0.5 mg, Intravenous, Q4H PRN  ondansetron, 4 mg, Intravenous, Q6H PRN  senna, 1 tablet, Oral, HS PRN      ED Triage Vitals [02/27/25 1549]   Temperature Pulse Respirations Blood Pressure SpO2 Pain Score   97.5 °F (36.4 °C) (!) 110 18 127/88 97 % 9     Weight (last 2 days)       Date/Time Weight    02/27/25 1549 54.4 (120)            Vital Signs (last 3 days)       Date/Time Temp Pulse Resp BP MAP (mmHg) SpO2 O2 Device [place] Coma Scale Score CIWA-Ar Total Pain    02/28/25 12:53:30 -- 71 -- 113/71 85 96 % -- -- -- --    02/28/25 1250 -- -- -- -- -- -- -- -- -- 7    02/28/25 1210 -- 88 18 113/76 89 98 % None (Room air) -- -- --    02/28/25 1112 -- -- -- -- -- -- -- -- -- 9    02/28/25 0807 -- 75 20 117/78 -- 100 % None (Room air) -- -- --    02/28/25 0800 -- -- -- -- -- -- -- 15 -- --    02/28/25 0400 -- 67 -- 112/72 89 95 % -- -- -- --    02/28/25 0230 -- 68 18 108/73 83 92 % None (Room air) -- -- --    02/28/25 0134 -- -- -- -- -- -- -- -- -- 9 02/28/25 0114 -- 92 -- 118/76 -- -- -- -- 11 --    02/28/25 0110 -- -- -- -- -- -- -- -- -- 9    02/28/25 0109 -- 92 18 118/76 93 93 % -- -- -- --    02/27/25 2345 -- 83 18 116/75 -- 95 % None (Room air) -- -- --    02/27/25 2221 -- -- -- -- -- -- -- -- -- 9 02/27/25 2122 98.6 °F (37 °C) 100 18 119/77 94 99 % -- -- -- --    02/27/25 2016 -- -- -- -- -- -- -- -- -- 9 02/27/25 1549 97.5 °F (36.4 °C) 110 18 127/88 102 97 % None (Room air) -- -- 9        Marciano Nielson        Row Name 02/28/25 0114             CIWA-Ar    /76      Pulse 92      Nausea and Vomiting 4      Tactile Disturbances 1      Tremor 0      Auditory Disturbances 0      Paroxysmal Sweats 1      Visual Disturbances 0      Anxiety 2      Headache, Fullness in Head 3      Agitation 0      Orientation and Clouding of Sensorium 0      CIWA-Ar Total 11                   Pertinent Labs/Diagnostic Test Results:   Radiology:  CT abdomen pelvis with contrast   Final  (02/27 2139)      Findings of acute pancreatitis with possible scattered mildly complicated pseudocysts.      There is an abscess collection interposed between the stomach and the spleen estimated at 2.1 (AP) x 4.8 (TRV) x 4.7 (CC) cm. This is similar to perhaps slightly increased from 1/7/2025 examination, although comparison is somewhat impeded as there was a drain on prior exam.      There is moderate degree of fluid within the pelvis, slightly more prominent than on prior examination.        Cardiology:    GI:          Results from last 7 days   Lab Units 02/28/25  0507 02/27/25  1556   WBC Thousand/uL 7.54 7.87   HEMOGLOBIN g/dL 10.9* 12.9   HEMATOCRIT % 32.8* 38.6   PLATELETS Thousands/uL 175 248   TOTAL NEUT ABS Thousands/µL  --  4.76         Results from last 7 days   Lab Units 02/28/25  0507 02/27/25  1556   SODIUM mmol/L 136 135   POTASSIUM mmol/L 3.5 3.6   CHLORIDE mmol/L 99 97   CO2 mmol/L 29 26   ANION GAP mmol/L 8 12   BUN mg/dL 10 6   CREATININE mg/dL 0.57* 0.42*   EGFR ml/min/1.73sq m 122 135   CALCIUM mg/dL 7.9* 9.2   MAGNESIUM mg/dL 1.4*  --    PHOSPHORUS mg/dL 4.5  --      Results from last 7 days   Lab Units 02/28/25  0507 02/27/25  1556   AST U/L 42* 57*   ALT U/L 19 25   ALK PHOS U/L 65 84   TOTAL PROTEIN g/dL 5.7* 7.0   ALBUMIN g/dL 3.5 4.2   TOTAL BILIRUBIN mg/dL 1.05* 1.05*   BILIRUBIN DIRECT mg/dL 0.24*  --          Results from last 7 days   Lab Units 02/28/25  0507 02/27/25  1556   GLUCOSE RANDOM mg/dL 82 90       Results from last  7 days   Lab Units 02/28/25  0507   PROTIME seconds 15.3*   INR  1.16   PTT seconds 30     Results from last 7 days   Lab Units 02/27/25 2016   PROCALCITONIN ng/ml <0.05     Results from last 7 days   Lab Units 02/28/25  0112 02/27/25  2216   LACTIC ACID mmol/L 1.2 2.4*     Results from last 7 days   Lab Units 02/27/25  1556   LIPASE u/L 341*     Results from last 7 days   Lab Units 02/27/25  1558   CLARITY UA  Clear   COLOR UA  Light Yellow   SPEC GRAV UA  <1.005*   PH UA  5.5   GLUCOSE UA mg/dl Negative   KETONES UA mg/dl Negative   BLOOD UA  Negative   PROTEIN UA mg/dl Negative   NITRITE UA  Negative   BILIRUBIN UA  Negative   UROBILINOGEN UA (BE) mg/dl <2.0   LEUKOCYTES UA  Negative     Results from last 7 days   Lab Units 02/27/25 2016   ETHANOL LVL mg/dL 129*     Results from last 7 days   Lab 02/27/25  2216   BLOOD CULTURE Received in Microbiology Lab. Culture in Progress.  Received in Microbiology Lab. Culture in Progress.     Past Medical History:   Diagnosis Date    Acute alcoholic pancreatitis 11/03/2023    Hypertension     SIRS (systemic inflammatory response syndrome) (HCC) 04/10/2024     Present on Admission:   Necrotizing pancreatitis   Pancreatic pseudocyst   Alcohol abuse   Tobacco abuse      Admitting Diagnosis:     Cyst of pancreas [K86.2]  Pancreatitis [K85.90]  Abdominal pain [R10.9]  Necrotizing pancreatitis [K85.91]  Intraabdominal fluid collection [R18.8]    Age/Sex: 34 y.o. female    Network Utilization Review Department  ATTENTION: Please call with any questions or concerns to [phone] and carefully listen to the prompts so that you are directed to the right person. All voicemails are confidential.   For Discharge needs, contact Care Management DC Support Team at [phone] opt. 2  Send all requests for admission clinical reviews, approved or denied determinations and any other requests to dedicated fax number below belonging to the campus where the patient is receiving treatment.  List of dedicated fax numbers for the Facilities:  FACILITY NAME UR FAX NUMBER   ADMISSION DENIALS (Administrative/Medical Necessity) [phone]   DISCHARGE SUPPORT TEAM (NETWORK) [phone]   PARENT CHILD HEALTH (Maternity/NICU/Pediatrics) [phone]   [facility] [phone]   [facility] [phone]   [facility] [phone]   [facility] [phone]   [facility] [phone]   [facility] [phone]   [facility] [phone]   [facility] [phone]   [facility] [phone]   [facility] [phone]   [facility] [phone]   [facility] [phone]

## 2025-02-28 NOTE — ASSESSMENT & PLAN NOTE
Counseled on abstinence from drinking    -Continue [facility] protocol  -Continue folic acid and thiamine

## 2025-02-28 NOTE — ASSESSMENT & PLAN NOTE
Initial lactic acid 2.4-improved to 1.2 s/p IV fluids  Likely in the setting of acute pancreatitis

## 2025-02-28 NOTE — ASSESSMENT & PLAN NOTE
S/p cystogastrostomy with stent placement 12/17 with stent removal 1/9 outpatient by GI  CT findings as above  GI consulted

## 2025-02-28 NOTE — ASSESSMENT & PLAN NOTE
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 CT shows possible scattered mildly complicated pseudocyst, abscess collection interposed between the stomach and spleen measuring approximately 2.1 x 4.8 x 4.7 cm, similar to prior imaging from January although comparison impeded due to presence of cyst gastrostomy. No leukocytosis or fever. Procalcitonin normal. Blood cultures pending.    Suspect \"abscess\" collection is necrotic collection containing gas related to previous cyst gastrostomy, less likely infected necrosis given normal WBC count and lack of fever.    -Continue NPO, IV fluids, antiemetics  -Pain management per primary service  -Continue IV antibiotics  -Follow-up blood cultures -if negative, can D/C antibiotics  -No need for transfer to [facility] at this time  "

## 2025-03-01 PROBLEM — D64.9 ANEMIA: Status: ACTIVE | Noted: 2025-03-01

## 2025-03-01 LAB
ALBUMIN SERPL BCG-MCNC: 3.2 G/DL (ref 3.5–5)
ALP SERPL-CCNC: 52 U/L (ref 34–104)
ALT SERPL W P-5'-P-CCNC: 14 U/L (ref 7–52)
ANION GAP SERPL CALCULATED.3IONS-SCNC: 5 MMOL/L (ref 4–13)
AST SERPL W P-5'-P-CCNC: 23 U/L (ref 13–39)
BILIRUB SERPL-MCNC: 1.18 MG/DL (ref 0.2–1)
BUN SERPL-MCNC: 7 MG/DL (ref 5–25)
CALCIUM ALBUM COR SERPL-MCNC: 8.8 MG/DL (ref 8.3–10.1)
CALCIUM SERPL-MCNC: 8.2 MG/DL (ref 8.4–10.2)
CHLORIDE SERPL-SCNC: 100 MMOL/L (ref 96–108)
CO2 SERPL-SCNC: 32 MMOL/L (ref 21–32)
CREAT SERPL-MCNC: 0.59 MG/DL (ref 0.6–1.3)
ERYTHROCYTE [DISTWIDTH] IN BLOOD BY AUTOMATED COUNT: 15.1 % (ref 11.6–15.1)
GFR SERPL CREATININE-BSD FRML MDRD: 120 ML/MIN/1.73SQ M
GLUCOSE SERPL-MCNC: 92 MG/DL (ref 65–140)
HCT VFR BLD AUTO: 33.1 % (ref 34.8–46.1)
HGB BLD-MCNC: 10.7 G/DL (ref 11.5–15.4)
MAGNESIUM SERPL-MCNC: 1.6 MG/DL (ref 1.9–2.7)
MCH RBC QN AUTO: 34.3 PG (ref 26.8–34.3)
MCHC RBC AUTO-ENTMCNC: 32.3 G/DL (ref 31.4–37.4)
MCV RBC AUTO: 106 FL (ref 82–98)
PLATELET # BLD AUTO: 162 THOUSANDS/UL (ref 149–390)
PMV BLD AUTO: 10.8 FL (ref 8.9–12.7)
POTASSIUM SERPL-SCNC: 3.5 MMOL/L (ref 3.5–5.3)
PROT SERPL-MCNC: 5.3 G/DL (ref 6.4–8.4)
RBC # BLD AUTO: 3.12 MILLION/UL (ref 3.81–5.12)
SODIUM SERPL-SCNC: 137 MMOL/L (ref 135–147)
WBC # BLD AUTO: 4.67 THOUSAND/UL (ref 4.31–10.16)

## 2025-03-01 PROCEDURE — 85027 COMPLETE CBC AUTOMATED: CPT | Performed by: FAMILY MEDICINE

## 2025-03-01 PROCEDURE — 80053 COMPREHEN METABOLIC PANEL: CPT | Performed by: FAMILY MEDICINE

## 2025-03-01 PROCEDURE — 99232 SBSQ HOSP IP/OBS MODERATE 35: CPT

## 2025-03-01 PROCEDURE — 83735 ASSAY OF MAGNESIUM: CPT | Performed by: FAMILY MEDICINE

## 2025-03-01 PROCEDURE — 99232 SBSQ HOSP IP/OBS MODERATE 35: CPT | Performed by: INTERNAL MEDICINE

## 2025-03-01 RX ORDER — PANTOPRAZOLE SODIUM 40 MG/1
40 TABLET, DELAYED RELEASE ORAL 2 TIMES DAILY
Status: DISCONTINUED | OUTPATIENT
Start: 2025-03-01 | End: 2025-03-08 | Stop reason: HOSPADM

## 2025-03-01 RX ORDER — FOLIC ACID 1 MG/1
1 TABLET ORAL DAILY
Status: CANCELLED | OUTPATIENT
Start: 2025-03-02

## 2025-03-01 RX ORDER — DULOXETIN HYDROCHLORIDE 30 MG/1
30 CAPSULE, DELAYED RELEASE ORAL DAILY
Status: DISCONTINUED | OUTPATIENT
Start: 2025-03-01 | End: 2025-03-08 | Stop reason: HOSPADM

## 2025-03-01 RX ORDER — LANOLIN ALCOHOL/MO/W.PET/CERES
100 CREAM (GRAM) TOPICAL DAILY
Status: CANCELLED | OUTPATIENT
Start: 2025-03-02

## 2025-03-01 RX ORDER — MAGNESIUM SULFATE HEPTAHYDRATE 40 MG/ML
2 INJECTION, SOLUTION INTRAVENOUS ONCE
Status: COMPLETED | OUTPATIENT
Start: 2025-03-01 | End: 2025-03-01

## 2025-03-01 RX ORDER — LANOLIN ALCOHOL/MO/W.PET/CERES
100 CREAM (GRAM) TOPICAL DAILY
Status: DISCONTINUED | OUTPATIENT
Start: 2025-03-01 | End: 2025-03-08 | Stop reason: HOSPADM

## 2025-03-01 RX ORDER — FOLIC ACID 1 MG/1
1 TABLET ORAL DAILY
Status: DISCONTINUED | OUTPATIENT
Start: 2025-03-01 | End: 2025-03-08 | Stop reason: HOSPADM

## 2025-03-01 RX ADMIN — CHLORDIAZEPOXIDE HYDROCHLORIDE 25 MG: 25 CAPSULE ORAL at 05:15

## 2025-03-01 RX ADMIN — MAGNESIUM SULFATE HEPTAHYDRATE 2 G: 2 INJECTION, SOLUTION INTRAVENOUS at 15:11

## 2025-03-01 RX ADMIN — PANTOPRAZOLE SODIUM 40 MG: 40 TABLET, DELAYED RELEASE ORAL at 21:10

## 2025-03-01 RX ADMIN — HYDROMORPHONE HYDROCHLORIDE 0.5 MG: 1 INJECTION, SOLUTION INTRAMUSCULAR; INTRAVENOUS; SUBCUTANEOUS at 04:18

## 2025-03-01 RX ADMIN — DULOXETINE HYDROCHLORIDE 30 MG: 30 CAPSULE, DELAYED RELEASE ORAL at 15:10

## 2025-03-01 RX ADMIN — PIPERACILLIN AND TAZOBACTAM 4.5 G: 4; .5 INJECTION, POWDER, FOR SOLUTION INTRAVENOUS at 04:02

## 2025-03-01 RX ADMIN — ONDANSETRON 4 MG: 2 INJECTION INTRAMUSCULAR; INTRAVENOUS at 05:18

## 2025-03-01 RX ADMIN — HYDROMORPHONE HYDROCHLORIDE 0.5 MG: 1 INJECTION, SOLUTION INTRAMUSCULAR; INTRAVENOUS; SUBCUTANEOUS at 08:32

## 2025-03-01 RX ADMIN — PIPERACILLIN AND TAZOBACTAM 4.5 G: 4; .5 INJECTION, POWDER, FOR SOLUTION INTRAVENOUS at 21:07

## 2025-03-01 RX ADMIN — HYDROMORPHONE HYDROCHLORIDE 0.5 MG: 1 INJECTION, SOLUTION INTRAMUSCULAR; INTRAVENOUS; SUBCUTANEOUS at 11:28

## 2025-03-01 RX ADMIN — FOLIC ACID 1 MG: 1 TABLET ORAL at 15:11

## 2025-03-01 RX ADMIN — HYDROMORPHONE HYDROCHLORIDE 0.5 MG: 1 INJECTION, SOLUTION INTRAMUSCULAR; INTRAVENOUS; SUBCUTANEOUS at 15:11

## 2025-03-01 RX ADMIN — PANTOPRAZOLE SODIUM 40 MG: 40 TABLET, DELAYED RELEASE ORAL at 15:11

## 2025-03-01 RX ADMIN — CHLORDIAZEPOXIDE HYDROCHLORIDE 25 MG: 25 CAPSULE ORAL at 23:31

## 2025-03-01 RX ADMIN — CHLORDIAZEPOXIDE HYDROCHLORIDE 25 MG: 25 CAPSULE ORAL at 13:02

## 2025-03-01 RX ADMIN — CHLORDIAZEPOXIDE HYDROCHLORIDE 25 MG: 25 CAPSULE ORAL at 17:48

## 2025-03-01 RX ADMIN — PIPERACILLIN AND TAZOBACTAM 4.5 G: 4; .5 INJECTION, POWDER, FOR SOLUTION INTRAVENOUS at 13:02

## 2025-03-01 RX ADMIN — HYDROMORPHONE HYDROCHLORIDE 0.5 MG: 1 INJECTION, SOLUTION INTRAMUSCULAR; INTRAVENOUS; SUBCUTANEOUS at 21:10

## 2025-03-01 RX ADMIN — NICOTINE 1 PATCH: 21 PATCH, EXTENDED RELEASE TRANSDERMAL at 21:05

## 2025-03-01 RX ADMIN — Medication 100 MG: at 15:10

## 2025-03-01 NOTE — PROGRESS NOTES
"Progress Note - Gastroenterology   Name: Kaykay Holland 34 y.o. 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 CT shows possible scattered mildly complicated pseudocyst, abscess collection interposed between the stomach and spleen measuring approximately 2.1 x 4.8 x 4.7 cm, similar to prior imaging from January although comparison impeded due to presence of cyst gastrostomy. No leukocytosis or fever. Procalcitonin normal. Blood cultures pending.    Suspect \"abscess\" collection is necrotic collection containing gas related to previous cyst gastrostomy, less likely infected necrosis given normal WBC count and lack of fever.      -Continue clear liquid diet today but if he is doing well can advance to full liquid for dinner  -Continue IV pain medication but asked the patient to try to decrease the amount she is taking  -Continue IV fluids  -Can probably stop antibiotics if she remains afebrile blood cultures are negative  -Ambulate  -Stressed importance of alcohol abstinence  Pancreatic pseudocyst  See above.  Tobacco abuse  Discussed critical need for tobacco cessation  Alcohol abuse  Counseled on abstinence from drinking    -Continue CIWA protocol  -Continue folic acid and thiamine  Lactic acidosis  Lactic acid 2.4 on admission, resolved with IV fluids.        Subjective   Less pain but still using Dilaudid.  Tolerating clear liquid diet    Objective :  Temp:  [98.2 °F (36.8 °C)-98.5 °F (36.9 °C)] 98.5 °F (36.9 °C)  HR:  [63-88] 68  BP: (101-113)/(66-76) 101/67  Resp:  [18] 18  SpO2:  [96 %-99 %] 99 %  O2 Device: None (Room " air)    Physical Exam  General appearance: alert, appears stated age and cooperative  Eyes: PERLLA, EOMI, no icterus   Head: Normocephalic, without obvious abnormality, atraumatic  Lungs: clear to auscultation bilaterally  Heart: regular rate and rhythm, S1, S2 normal, no murmur, click, rub or gallop  Abdomen: soft, mild left upper quadrant tenderness without rebound or guarding bowel sounds normal; no masses,  no organomegaly  Extremities: extremities normal, atraumatic, no cyanosis or edema  Neurologic: Grossly normal      Lab Results: I have reviewed the following results:CBC/BMP:   .     03/01/25  0432   WBC 4.67   HGB 10.7*   HCT 33.1*      SODIUM 137   K 3.5      CO2 32   BUN 7   CREATININE 0.59*   GLUC 92   MG 1.6*    , LFTs:   .     03/01/25  0432   AST 23   ALT 14   ALB 3.2*   TBILI 1.18*   ALKPHOS 52

## 2025-03-01 NOTE — ASSESSMENT & PLAN NOTE
Counseled on abstinence from drinking    -Continue [facility] protocol  -Continue folic acid and thiamine

## 2025-03-01 NOTE — ASSESSMENT & PLAN NOTE
Chronic, Stable  Resume folic acid supplementation  Transfuse as needed for hemoglobin less than 7

## 2025-03-01 NOTE — ASSESSMENT & PLAN NOTE
"Presenting to the ED with left upper quadrant pain x 1 to 2 weeks with acute worsening in the last 1 to 2 days-endorses associated NVD  History of necrotizing pancreatitis with mass effect and s/p EUS with cystogastrostomy stent placement on 12/17 at [facility]  Underwent stent removal 1/9 outpatient with GI as tissue appeared to be healing well  Readmitted at [facility] 1/7-1/10 where she was conservatively managed with IV fluids and pain control  CT A/P: \"Findings of acute pancreatitis with possible scattered mildly complicated pseudocysts. There is an abscess collection interposed between the stomach and the spleen estimated at 2.1 (AP) x 4.8 (TRV) x 4.7 (CC) cm. This is similar to perhaps slightly increased from 1/7/2025 examination, although comparison is somewhat impeded as there was a drain on prior exam.\"  Discussed with GI on admission-okay to stay at [facility] for now-plan to discussed with advanced GI team in the morning  Suspect abscess is actually a necrotic collection, containing gas related to previous cyst gastrostomy, less likely infected necrosis given normal WBC and lack of fever  Continue IV pain medicine  Continue IV fluids  Can stop antibiotics if she remains afebrile and blood cultures are negative  Encourage ambulation  Alcohol abstinence  "

## 2025-03-01 NOTE — PROGRESS NOTES
"Progress Note - Hospitalist   Name: Kaykay Holland 34 y.o. female I MRN: [id number]  Unit/Bed#: -01 I Date of Admission: 2/27/2025   Date of Service: 3/1/2025 I Hospital Day: 2    Assessment & Plan  Necrotizing pancreatitis  Presenting to the ED with left upper quadrant pain x 1 to 2 weeks with acute worsening in the last 1 to 2 days-endorses associated NVD  History of necrotizing pancreatitis with mass effect and s/p EUS with cystogastrostomy stent placement on 12/17 at [facility]  Underwent stent removal 1/9 outpatient with GI as tissue appeared to be healing well  Readmitted at [facility] 1/7-1/10 where she was conservatively managed with IV fluids and pain control  CT A/P: \"Findings of acute pancreatitis with possible scattered mildly complicated pseudocysts. There is an abscess collection interposed between the stomach and the spleen estimated at 2.1 (AP) x 4.8 (TRV) x 4.7 (CC) cm. This is similar to perhaps slightly increased from 1/7/2025 examination, although comparison is somewhat impeded as there was a drain on prior exam.\"  Discussed with GI on admission-okay to stay at [facility] for now-plan to discussed with advanced GI team in the morning  Suspect abscess is actually a necrotic collection, containing gas related to previous cyst gastrostomy, less likely infected necrosis given normal WBC and lack of fever  Continue IV pain medicine  Continue IV fluids  Can stop antibiotics if she remains afebrile and blood cultures are negative  Encourage ambulation  Alcohol abstinence  Pancreatic pseudocyst  S/p cystogastrostomy with stent placement 12/17 with stent removal 1/9 outpatient by GI  CT findings as above  GI consulted  Alcohol abuse  History of alcohol abuse  Reports that she continues to drink 1 bottle of wine daily  Drank 2 glasses of wine prior to arrival to the ED  History of DTs in the past requiring phenobarbital, however appears to have responded well to Librium taper during December admission  Continue " Librium taper  WA protocol, last score 0  Lactic acidosis  Initial lactic acid 2.4-improved to 1.2 s/p IV fluids  Likely in the setting of acute pancreatitis  Tobacco abuse  Admits to smoking 1 pack/day  NRT while inpatient  Anemia  Chronic, Stable  Resume folic acid supplementation  Transfuse as needed for hemoglobin less than 7  Hypomagnesemia  2 g magnesium sulfate ordered  Recheck in a.m.    Lab Results   Component Value Date/Time    MG 1.6 (L) 03/01/2025 04:32 AM    MG 1.4 (L) 02/28/2025 05:07 AM        VTE Pharmacologic Prophylaxis: VTE Score: 0 Low Risk (Score 0-2) - Encourage Ambulation.    Mobility:   Basic Mobility Inpatient Raw Score: 24  JH-HLM Goal: 8: Walk 250 feet or more  JH-HLM Achieved: 8: Walk 250 feet ot more  JH-HLM Goal achieved. Continue to encourage appropriate mobility.    Patient Centered Rounds: I performed bedside rounds with nursing staff today.   Discussions with Specialists or Other Care Team Provider: TATYANA    Education and Discussions with Family / Patient: Patient declined call to .     Current Length of Stay: 2 day(s)  Current Patient Status: Inpatient   Certification Statement: The patient will continue to require additional inpatient hospital stay due to abdominal pain requiring IV medications  Discharge Plan: Anticipate discharge in 24-48 hrs to home.    Code Status: Level 1 - Full Code    Subjective   Patient reports she is hoping to go home tomorrow because she starts a new job on Monday.  She is tolerating clear liquid diet well.  She reports she feels she can take pills but oral pain medications upset her stomach.  She is hoping to use this to stop drinking.  All questions and concerns addressed.    Objective :  Temp:  [98.5 °F (36.9 °C)] 98.5 °F (36.9 °C)  HR:  [63-79] 68  BP: (101-108)/(66-67) 101/67  Resp:  [18] 18  SpO2:  [97 %-99 %] 99 %  O2 Device: None (Room air)    Body mass index is 19.97 kg/m².     Input and Output Summary (last 24 hours):      Intake/Output Summary (Last 24 hours) at 3/1/2025 1443  Last data filed at 3/1/2025 0800  Gross per 24 hour   Intake 740 ml   Output 0 ml   Net 740 ml       Physical Exam  Vitals and nursing note reviewed.   Constitutional:       General: She is not in acute distress.     Appearance: Normal appearance. She is ill-appearing.   HENT:      Head: Normocephalic.      Mouth/Throat:      Mouth: Mucous membranes are moist.      Pharynx: Oropharynx is clear.   Eyes:      Conjunctiva/sclera: Conjunctivae normal.   Cardiovascular:      Rate and Rhythm: Normal rate and regular rhythm.      Pulses: Normal pulses.      Heart sounds: Normal heart sounds. No murmur heard.  Pulmonary:      Effort: Pulmonary effort is normal. No respiratory distress.      Breath sounds: Normal breath sounds. No wheezing or rhonchi.   Abdominal:      General: Bowel sounds are normal. There is no distension.      Palpations: Abdomen is soft.      Tenderness: There is abdominal tenderness in the left upper quadrant.   Musculoskeletal:      Right lower leg: No edema.      Left lower leg: No edema.   Skin:     General: Skin is warm and dry.   Neurological:      General: No focal deficit present.      Mental Status: She is alert. Mental status is at baseline.   Psychiatric:         Mood and Affect: Mood normal.         Thought Content: Thought content normal.           Lines/Drains:              Lab Results: I have reviewed the following results:   Results from last 7 days   Lab Units 03/01/25  0432 02/28/25  0507 02/27/25  1556   WBC Thousand/uL 4.67   < > 7.87   HEMOGLOBIN g/dL 10.7*   < > 12.9   HEMATOCRIT % 33.1*   < > 38.6   PLATELETS Thousands/uL 162   < > 248   SEGS PCT %  --   --  59   LYMPHO PCT %  --   --  27   MONO PCT %  --   --  11   EOS PCT %  --   --  1    < > = values in this interval not displayed.     Results from last 7 days   Lab Units 03/01/25  0432   SODIUM mmol/L 137   POTASSIUM mmol/L 3.5   CHLORIDE mmol/L 100   CO2 mmol/L 32    BUN mg/dL 7   CREATININE mg/dL 0.59*   ANION GAP mmol/L 5   CALCIUM mg/dL 8.2*   ALBUMIN g/dL 3.2*   TOTAL BILIRUBIN mg/dL 1.18*   ALK PHOS U/L 52   ALT U/L 14   AST U/L 23   GLUCOSE RANDOM mg/dL 92     Results from last 7 days   Lab Units 02/28/25  0507   INR  1.16             Results from last 7 days   Lab Units 02/28/25  0112 02/27/25  2216 02/27/25 2016   LACTIC ACID mmol/L 1.2 2.4*  --    PROCALCITONIN ng/ml  --   --  <0.05       Recent Cultures (last 7 days):   Results from last 7 days   Lab Units 02/27/25 2216   BLOOD CULTURE  No Growth at 24 hrs.  No Growth at 24 hrs.       Imaging Results Review: No pertinent imaging studies reviewed.  Other Study Results Review: No additional pertinent studies reviewed.    Last 24 Hours Medication List:     Current Facility-Administered Medications:     [COMPLETED] chlordiazePOXIDE (LIBRIUM) capsule 50 mg, Q6H RODRICK **FOLLOWED BY** chlordiazePOXIDE (LIBRIUM) capsule 25 mg, Q6H RODRICK    HYDROmorphone HCl (DILAUDID) injection 0.2 mg, Q4H PRN **OR** HYDROmorphone (DILAUDID) injection 0.5 mg, Q4H PRN    HYDROmorphone (DILAUDID) injection 0.5 mg, Q4H PRN    magnesium sulfate 2 g/50 mL IVPB (premix) 2 g, Once    nicotine (NICODERM CQ) 21 mg/24 hr TD 24 hr patch 1 patch, Daily    ondansetron (ZOFRAN) injection 4 mg, Q6H PRN    [COMPLETED] piperacillin-tazobactam (ZOSYN) IVPB 4.5 g, Once **FOLLOWED BY** piperacillin-tazobactam (ZOSYN) IVPB (EXTENDED INFUSION) 4.5 g, Q8H, Last Rate: 4.5 g (02/28/25 2010)    senna (SENOKOT) tablet 8.6 mg, HS PRN    Administrative Statements   Today, Patient Was Seen By: PATRICIA Azevedo      **Please Note: This note may have been constructed using a voice recognition system.**

## 2025-03-01 NOTE — PLAN OF CARE
Problem: Potential for Falls  Goal: Patient will remain free of falls  Description: INTERVENTIONS:  - Educate patient/family on patient safety including physical limitations  - Instruct patient to call for assistance with activity   - Consult OT/PT to assist with strengthening/mobility   - Keep Call bell within reach  - Keep bed low and locked with side rails adjusted as appropriate  - Keep care items and personal belongings within reach  - Initiate and maintain comfort rounds  - Make Fall Risk Sign visible to staff  - Offer Toileting every 2 Hours, in advance of need  - Initiate/Maintain bed alarm  - Obtain necessary fall risk management equipment: non skid footwear, glasses  - Apply yellow socks and bracelet for high fall risk patients  - Consider moving patient to room near nurses station  Outcome: Progressing     Problem: PAIN - ADULT  Goal: Verbalizes/displays adequate comfort level or baseline comfort level  Description: Interventions:  - Encourage patient to monitor pain and request assistance  - Assess pain using appropriate pain scale  - Administer analgesics based on type and severity of pain and evaluate response  - Implement non-pharmacological measures as appropriate and evaluate response  - Consider cultural and social influences on pain and pain management  - Notify physician/advanced practitioner if interventions unsuccessful or patient reports new pain  Outcome: Progressing     Problem: DISCHARGE PLANNING  Goal: Discharge to home or other facility with appropriate resources  Description: INTERVENTIONS:  - Identify barriers to discharge w/patient and caregiver  - Arrange for needed discharge resources and transportation as appropriate  - Identify discharge learning needs (meds, wound care, etc.)  - Arrange for interpretive services to assist at discharge as needed  - Refer to Case Management Department for coordinating discharge planning if the patient needs post-hospital services based on  physician/advanced practitioner order or complex needs related to functional status, cognitive ability, or social support system  Outcome: Progressing     Problem: Knowledge Deficit  Goal: Patient/family/caregiver demonstrates understanding of disease process, treatment plan, medications, and discharge instructions  Description: Complete learning assessment and assess knowledge base.  Interventions:  - Provide teaching at level of understanding  - Provide teaching via preferred learning methods  Outcome: Progressing     Problem: GASTROINTESTINAL - ADULT  Goal: Minimal or absence of nausea and/or vomiting  Description: INTERVENTIONS:  - Administer IV fluids if ordered to ensure adequate hydration  - Maintain NPO status until nausea and vomiting are resolved  - Nasogastric tube if ordered  - Administer ordered antiemetic medications as needed  - Provide nonpharmacologic comfort measures as appropriate  - Advance diet as tolerated, if ordered  - Consider nutrition services referral to assist patient with adequate nutrition and appropriate food choices  Outcome: Progressing  Goal: Maintains or returns to baseline bowel function  Description: INTERVENTIONS:  - Assess bowel function  - Encourage oral fluids to ensure adequate hydration  - Administer IV fluids if ordered to ensure adequate hydration  - Administer ordered medications as needed  - Encourage mobilization and activity  - Consider nutritional services referral to assist patient with adequate nutrition and appropriate food choices  Outcome: Progressing  Goal: Maintains adequate nutritional intake  Description: INTERVENTIONS:  - Monitor percentage of each meal consumed  - Identify factors contributing to decreased intake, treat as appropriate  - Assist with meals as needed  - Monitor I&O, weight, and lab values if indicated  - Obtain nutrition services referral as needed  Outcome: Progressing  Goal: Oral mucous membranes remain intact  Description:  INTERVENTIONS  - Assess oral mucosa and hygiene practices  - Implement preventative oral hygiene regimen  - Implement oral medicated treatments as ordered  - Initiate Nutrition services referral as needed  Outcome: Progressing

## 2025-03-01 NOTE — ASSESSMENT & PLAN NOTE
History of alcohol abuse  Reports that she continues to drink 1 bottle of wine daily  Drank 2 glasses of wine prior to arrival to the ED  History of DTs in the past requiring phenobarbital, however appears to have responded well to Librium taper during December admission  Continue Librium taper  [facility] protocol, last score 0

## 2025-03-01 NOTE — ASSESSMENT & PLAN NOTE
2 g magnesium sulfate ordered  Recheck in a.m.    Lab Results   Component Value Date/Time    MG 1.6 (L) 03/01/2025 04:32 AM    MG 1.4 (L) 02/28/2025 05:07 AM

## 2025-03-01 NOTE — ASSESSMENT & PLAN NOTE
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 CT shows possible scattered mildly complicated pseudocyst, abscess collection interposed between the stomach and spleen measuring approximately 2.1 x 4.8 x 4.7 cm, similar to prior imaging from January although comparison impeded due to presence of cyst gastrostomy. No leukocytosis or fever. Procalcitonin normal. Blood cultures pending.    Suspect \"abscess\" collection is necrotic collection containing gas related to previous cyst gastrostomy, less likely infected necrosis given normal WBC count and lack of fever.      -Continue clear liquid diet today but if he is doing well can advance to full liquid for dinner  -Continue IV pain medication but asked the patient to try to decrease the amount she is taking  -Continue IV fluids  -Can probably stop antibiotics if she remains afebrile blood cultures are negative  -Ambulate  -Stressed importance of alcohol abstinence  "

## 2025-03-02 LAB
ANION GAP SERPL CALCULATED.3IONS-SCNC: 5 MMOL/L (ref 4–13)
BUN SERPL-MCNC: 5 MG/DL (ref 5–25)
CALCIUM SERPL-MCNC: 8 MG/DL (ref 8.4–10.2)
CHLORIDE SERPL-SCNC: 100 MMOL/L (ref 96–108)
CO2 SERPL-SCNC: 30 MMOL/L (ref 21–32)
CREAT SERPL-MCNC: 0.55 MG/DL (ref 0.6–1.3)
ERYTHROCYTE [DISTWIDTH] IN BLOOD BY AUTOMATED COUNT: 14.9 % (ref 11.6–15.1)
GFR SERPL CREATININE-BSD FRML MDRD: 123 ML/MIN/1.73SQ M
GLUCOSE SERPL-MCNC: 79 MG/DL (ref 65–140)
HCT VFR BLD AUTO: 32.2 % (ref 34.8–46.1)
HGB BLD-MCNC: 10.4 G/DL (ref 11.5–15.4)
MAGNESIUM SERPL-MCNC: 1.6 MG/DL (ref 1.9–2.7)
MCH RBC QN AUTO: 34.7 PG (ref 26.8–34.3)
MCHC RBC AUTO-ENTMCNC: 32.3 G/DL (ref 31.4–37.4)
MCV RBC AUTO: 107 FL (ref 82–98)
PLATELET # BLD AUTO: 164 THOUSANDS/UL (ref 149–390)
PMV BLD AUTO: 11.2 FL (ref 8.9–12.7)
POTASSIUM SERPL-SCNC: 4 MMOL/L (ref 3.5–5.3)
RBC # BLD AUTO: 3 MILLION/UL (ref 3.81–5.12)
SODIUM SERPL-SCNC: 135 MMOL/L (ref 135–147)
WBC # BLD AUTO: 5.01 THOUSAND/UL (ref 4.31–10.16)

## 2025-03-02 PROCEDURE — 80048 BASIC METABOLIC PNL TOTAL CA: CPT | Performed by: HOSPITALIST

## 2025-03-02 PROCEDURE — 99232 SBSQ HOSP IP/OBS MODERATE 35: CPT | Performed by: INTERNAL MEDICINE

## 2025-03-02 PROCEDURE — 83735 ASSAY OF MAGNESIUM: CPT | Performed by: HOSPITALIST

## 2025-03-02 PROCEDURE — 99232 SBSQ HOSP IP/OBS MODERATE 35: CPT

## 2025-03-02 PROCEDURE — 85027 COMPLETE CBC AUTOMATED: CPT | Performed by: HOSPITALIST

## 2025-03-02 RX ORDER — MAGNESIUM SULFATE HEPTAHYDRATE 40 MG/ML
4 INJECTION, SOLUTION INTRAVENOUS ONCE
Status: COMPLETED | OUTPATIENT
Start: 2025-03-02 | End: 2025-03-02

## 2025-03-02 RX ORDER — TRAZODONE HYDROCHLORIDE 50 MG/1
25 TABLET ORAL
Status: DISCONTINUED | OUTPATIENT
Start: 2025-03-02 | End: 2025-03-08 | Stop reason: HOSPADM

## 2025-03-02 RX ORDER — LORAZEPAM 1 MG/1
2 TABLET ORAL ONCE
Status: COMPLETED | OUTPATIENT
Start: 2025-03-02 | End: 2025-03-02

## 2025-03-02 RX ORDER — AMOXICILLIN 250 MG
1 CAPSULE ORAL
Status: DISCONTINUED | OUTPATIENT
Start: 2025-03-02 | End: 2025-03-08 | Stop reason: HOSPADM

## 2025-03-02 RX ORDER — OXYCODONE HYDROCHLORIDE 5 MG/1
5 TABLET ORAL EVERY 4 HOURS PRN
Refills: 0 | Status: DISCONTINUED | OUTPATIENT
Start: 2025-03-02 | End: 2025-03-08 | Stop reason: HOSPADM

## 2025-03-02 RX ORDER — POLYETHYLENE GLYCOL 3350 17 G/17G
17 POWDER, FOR SOLUTION ORAL DAILY
Status: DISCONTINUED | OUTPATIENT
Start: 2025-03-02 | End: 2025-03-04

## 2025-03-02 RX ORDER — SODIUM CHLORIDE, SODIUM GLUCONATE, SODIUM ACETATE, POTASSIUM CHLORIDE, MAGNESIUM CHLORIDE, SODIUM PHOSPHATE, DIBASIC, AND POTASSIUM PHOSPHATE .53; .5; .37; .037; .03; .012; .00082 G/100ML; G/100ML; G/100ML; G/100ML; G/100ML; G/100ML; G/100ML
75 INJECTION, SOLUTION INTRAVENOUS CONTINUOUS
Status: DISCONTINUED | OUTPATIENT
Start: 2025-03-02 | End: 2025-03-04

## 2025-03-02 RX ORDER — HYDROMORPHONE HCL IN WATER/PF 6 MG/30 ML
0.2 PATIENT CONTROLLED ANALGESIA SYRINGE INTRAVENOUS EVERY 2 HOUR PRN
Refills: 0 | Status: DISCONTINUED | OUTPATIENT
Start: 2025-03-02 | End: 2025-03-03

## 2025-03-02 RX ADMIN — PANTOPRAZOLE SODIUM 40 MG: 40 TABLET, DELAYED RELEASE ORAL at 10:01

## 2025-03-02 RX ADMIN — SODIUM CHLORIDE, SODIUM GLUCONATE, SODIUM ACETATE, POTASSIUM CHLORIDE, MAGNESIUM CHLORIDE, SODIUM PHOSPHATE, DIBASIC, AND POTASSIUM PHOSPHATE 75 ML/HR: .53; .5; .37; .037; .03; .012; .00082 INJECTION, SOLUTION INTRAVENOUS at 14:32

## 2025-03-02 RX ADMIN — HYDROMORPHONE HYDROCHLORIDE 0.2 MG: 0.2 INJECTION, SOLUTION INTRAMUSCULAR; INTRAVENOUS; SUBCUTANEOUS at 22:50

## 2025-03-02 RX ADMIN — HYDROMORPHONE HYDROCHLORIDE 0.5 MG: 1 INJECTION, SOLUTION INTRAMUSCULAR; INTRAVENOUS; SUBCUTANEOUS at 05:01

## 2025-03-02 RX ADMIN — OXYCODONE HYDROCHLORIDE 5 MG: 5 TABLET ORAL at 17:05

## 2025-03-02 RX ADMIN — ONDANSETRON 4 MG: 2 INJECTION INTRAMUSCULAR; INTRAVENOUS at 22:54

## 2025-03-02 RX ADMIN — ONDANSETRON 4 MG: 2 INJECTION INTRAMUSCULAR; INTRAVENOUS at 16:38

## 2025-03-02 RX ADMIN — LORAZEPAM 2 MG: 1 TABLET ORAL at 10:27

## 2025-03-02 RX ADMIN — PIPERACILLIN AND TAZOBACTAM 4.5 G: 4; .5 INJECTION, POWDER, FOR SOLUTION INTRAVENOUS at 04:45

## 2025-03-02 RX ADMIN — TRAZODONE HYDROCHLORIDE 25 MG: 50 TABLET ORAL at 01:26

## 2025-03-02 RX ADMIN — PANTOPRAZOLE SODIUM 40 MG: 40 TABLET, DELAYED RELEASE ORAL at 21:16

## 2025-03-02 RX ADMIN — Medication 1 TABLET: at 10:28

## 2025-03-02 RX ADMIN — MAGNESIUM SULFATE HEPTAHYDRATE 4 G: 40 INJECTION, SOLUTION INTRAVENOUS at 10:27

## 2025-03-02 RX ADMIN — HYDROMORPHONE HYDROCHLORIDE 0.5 MG: 1 INJECTION, SOLUTION INTRAMUSCULAR; INTRAVENOUS; SUBCUTANEOUS at 10:26

## 2025-03-02 RX ADMIN — HYDROMORPHONE HYDROCHLORIDE 0.5 MG: 1 INJECTION, SOLUTION INTRAMUSCULAR; INTRAVENOUS; SUBCUTANEOUS at 01:18

## 2025-03-02 RX ADMIN — FOLIC ACID 1 MG: 1 TABLET ORAL at 10:01

## 2025-03-02 RX ADMIN — DULOXETINE HYDROCHLORIDE 30 MG: 30 CAPSULE, DELAYED RELEASE ORAL at 10:01

## 2025-03-02 RX ADMIN — HYDROMORPHONE HYDROCHLORIDE 0.2 MG: 0.2 INJECTION, SOLUTION INTRAMUSCULAR; INTRAVENOUS; SUBCUTANEOUS at 18:14

## 2025-03-02 RX ADMIN — NICOTINE 1 PATCH: 21 PATCH, EXTENDED RELEASE TRANSDERMAL at 21:20

## 2025-03-02 RX ADMIN — LORAZEPAM 2 MG: 1 TABLET ORAL at 05:27

## 2025-03-02 RX ADMIN — Medication 100 MG: at 10:01

## 2025-03-02 RX ADMIN — OXYCODONE HYDROCHLORIDE 5 MG: 5 TABLET ORAL at 21:16

## 2025-03-02 NOTE — ASSESSMENT & PLAN NOTE
4 g magnesium sulfate ordered  Recheck in a.m.    Lab Results   Component Value Date/Time    MG 1.6 (L) 03/02/2025 01:31 AM    MG 1.6 (L) 03/01/2025 04:32 AM    MG 1.4 (L) 02/28/2025 05:07 AM

## 2025-03-02 NOTE — ASSESSMENT & PLAN NOTE
"Presenting to the ED with left upper quadrant pain x 1 to 2 weeks with acute worsening in the last 1 to 2 days-endorses associated NVD  History of necrotizing pancreatitis with mass effect and s/p EUS with cystogastrostomy stent placement on 12/17 at [facility]\""  Underwent stent removal 1/9 outpatient with GI as tissue appeared to be healing well  Readmitted at \[facility]\"" 1/7-1/10 where she was conservatively managed with IV fluids and pain control  CT A/P: \"Findings of acute pancreatitis with possible scattered mildly complicated pseudocysts. There is an abscess collection interposed between the stomach and the spleen estimated at 2.1 (AP) x 4.8 (TRV) x 4.7 (CC) cm. This is similar to perhaps slightly increased from 1/7/2025 examination, although comparison is somewhat impeded as there was a drain on prior exam.\"  Discussed with GI on admission-okay to stay at [facility] for now-plan to discussed with advanced GI team in the morning  Suspect abscess is actually a necrotic collection, containing gas related to previous cyst gastrostomy, less likely infected necrosis given normal WBC and lack of fever  Try to wean pain medication  Can stop antibiotics if she remains afebrile and blood cultures are negative  Encourage ambulation  Alcohol abstinence  3/2 blood cultures negative for 48 hours-abx stopped  "

## 2025-03-02 NOTE — ASSESSMENT & PLAN NOTE
History of alcohol abuse  Reports that she continues to drink 1 bottle of wine daily  Drank 2 glasses of wine prior to arrival to the ED  History of DTs in the past requiring phenobarbital, however appears to have responded well to Librium taper during December admission  Continue Librium taper  [facility] protocol, last score 10  Has received total of 4 mg PO ativan today

## 2025-03-02 NOTE — PROGRESS NOTES
"Progress Note - Gastroenterology   Name: Kaykay Holland 34 y.o. female I MRN: [id number]  Unit/Bed#: -01 I Date of Admission: 2/27/2025   Date of Service: 3/2/2025 I Hospital Day: 3    Assessment & Plan  Necrotizing pancreatitis  34-year-old female with alcohol and tobacco use disorder, alcohol induced necrotizing pancreatitis and walled off necrosis status post EUS guided cyst drainage December 2024 readmitted with abdominal pain, elevated lipase, findings of acute pancreatitis on imaging. CT shows possible scattered mildly complicated pseudocyst, abscess collection interposed between the stomach and spleen measuring approximately 2.1 x 4.8 x 4.7 cm, similar to prior imaging from January although comparison impeded due to presence of cyst gastrostomy. No leukocytosis or fever. Procalcitonin normal. Blood cultures pending.    Suspect \"abscess\" collection is necrotic collection containing gas related to previous cyst gastrostomy, less likely infected necrosis given normal WBC count and lack of fever.      -Advance diet  -Try to wean IV pain medication  -Continue IV fluids  -Ambulate  -Stressed importance of alcohol abstinence.  Also stressed the importance of someone clean all the alcohol out of her house before she is discharged  Pancreatic pseudocyst  See above.  Tobacco abuse  Discussed critical need for tobacco cessation  Alcohol abuse  Counseled on abstinence from drinking    -Continue CIWA protocol  -Continue folic acid and thiamine  Lactic acidosis  Lactic acid 2.4 on admission, resolved with IV fluids.  Hypomagnesemia    Anemia      I have discussed the above management plan in detail with the primary service.     Subjective   Tolerating full liquid diet.  Having less pain    Objective :  Temp:  [97.7 °F (36.5 °C)] 97.7 °F (36.5 °C)  HR:  [57-79] 57  BP: (104-121)/(67-77) 104/68  Resp:  [18] 18  SpO2:  [95 %-99 %] 95 %  O2 Device: None (Room air)    Physical Exam  General appearance: alert, appears " stated age and cooperative  Eyes: PERLLA, EOMI, no icterus   Head: Normocephalic, without obvious abnormality, atraumatic  Lungs: clear to auscultation bilaterally  Heart: regular rate and rhythm, S1, S2 normal, no murmur, click, rub or gallop  Abdomen: soft, moderate epigastric tenderness without rebound or guarding; bowel sounds normal; no masses,  no organomegaly  Extremities: extremities normal, atraumatic, no cyanosis or edema  Neurologic: Grossly normal      Lab Results: I have reviewed the following results:CBC/BMP:   .     03/02/25  0131   WBC 5.01   HGB 10.4*   HCT 32.2*      SODIUM 135   K 4.0      CO2 30   BUN 5   CREATININE 0.55*   GLUC 79   MG 1.6*    , LFTs: No new results in last 24 hours.

## 2025-03-02 NOTE — ASSESSMENT & PLAN NOTE
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 CT shows possible scattered mildly complicated pseudocyst, abscess collection interposed between the stomach and spleen measuring approximately 2.1 x 4.8 x 4.7 cm, similar to prior imaging from January although comparison impeded due to presence of cyst gastrostomy. No leukocytosis or fever. Procalcitonin normal. Blood cultures pending.    Suspect \"abscess\" collection is necrotic collection containing gas related to previous cyst gastrostomy, less likely infected necrosis given normal WBC count and lack of fever.      -Advance diet  -Try to wean IV pain medication  -Continue IV fluids  -Ambulate  -Stressed importance of alcohol abstinence.  Also stressed the importance of someone clean all the alcohol out of her house before she is discharged  "

## 2025-03-02 NOTE — PROGRESS NOTES
"Progress Note - Hospitalist   Name: Kaykay Holland 34 y.o. female I MRN: [id number]  Unit/Bed#: -01 I Date of Admission: 2/27/2025   Date of Service: 3/2/2025 I Hospital Day: 3    Assessment & Plan  Necrotizing pancreatitis  Presenting to the ED with left upper quadrant pain x 1 to 2 weeks with acute worsening in the last 1 to 2 days-endorses associated NVD  History of necrotizing pancreatitis with mass effect and s/p EUS with cystogastrostomy stent placement on 12/17 at [facility]  Underwent stent removal 1/9 outpatient with GI as tissue appeared to be healing well  Readmitted at [facility] 1/7-1/10 where she was conservatively managed with IV fluids and pain control  CT A/P: \"Findings of acute pancreatitis with possible scattered mildly complicated pseudocysts. There is an abscess collection interposed between the stomach and the spleen estimated at 2.1 (AP) x 4.8 (TRV) x 4.7 (CC) cm. This is similar to perhaps slightly increased from 1/7/2025 examination, although comparison is somewhat impeded as there was a drain on prior exam.\"  Discussed with GI on admission-okay to stay at [facility] for now-plan to discussed with advanced GI team in the morning  Suspect abscess is actually a necrotic collection, containing gas related to previous cyst gastrostomy, less likely infected necrosis given normal WBC and lack of fever  Try to wean pain medication  Can stop antibiotics if she remains afebrile and blood cultures are negative  Encourage ambulation  Alcohol abstinence  3/2 blood cultures negative for 48 hours-abx stopped  Pancreatic pseudocyst  S/p cystogastrostomy with stent placement 12/17 with stent removal 1/9 outpatient by GI  CT findings as above  GI consulted  Alcohol abuse  History of alcohol abuse  Reports that she continues to drink 1 bottle of wine daily  Drank 2 glasses of wine prior to arrival to the ED  History of DTs in the past requiring phenobarbital, however appears to have responded well to Librium taper " during December admission  Continue Librium taper  CIWA protocol, last score 10  Has received total of 4 mg PO ativan today  Lactic acidosis  Initial lactic acid 2.4-improved to 1.2 s/p IV fluids  Likely in the setting of acute pancreatitis  Tobacco abuse  Admits to smoking 1 pack/day  NRT while inpatient  Anemia  Chronic, Stable  Resume folic acid supplementation  Transfuse as needed for hemoglobin less than 7  Hypomagnesemia  4 g magnesium sulfate ordered  Recheck in a.m.    Lab Results   Component Value Date/Time    MG 1.6 (L) 03/02/2025 01:31 AM    MG 1.6 (L) 03/01/2025 04:32 AM    MG 1.4 (L) 02/28/2025 05:07 AM        VTE Pharmacologic Prophylaxis: VTE Score: 0 Low Risk (Score 0-2) - Encourage Ambulation.    Mobility:   Basic Mobility Inpatient Raw Score: 24  JH-HLM Goal: 8: Walk 250 feet or more  JH-HLM Achieved: 8: Walk 250 feet ot more  JH-HLM Goal achieved. Continue to encourage appropriate mobility.    Patient Centered Rounds: I evaluated the patient without nursing staff present due to RN unavailable, communicated via Epic secure chat    Discussions with Specialists or Other Care Team Provider: GI    Education and Discussions with Family / Patient: Patient declined call to .     Current Length of Stay: 3 day(s)  Current Patient Status: Inpatient   Certification Statement: The patient will continue to require additional inpatient hospital stay due to abdominal pain, monitoring off antibiotics, and alcohol withdrawal  Discharge Plan: Anticipate discharge in 24-48 hrs to home.    Code Status: Level 1 - Full Code    Subjective   Patient reports she feels tired today. She is tolerating full liquid diet well.  She is hoping to go home soon because she was supposed to start a new job tomorrow but is agreeable to stay in the hospital for continued care.  Discussed importance of getting rid of alcohol at residence at getting in touch with AA sponsor.  She is agreeable to using PO pain medications.   All questions and concerns addressed.    Objective :  Temp:  [97.7 °F (36.5 °C)] 97.7 °F (36.5 °C)  HR:  [57-79] 57  BP: (104-121)/(67-77) 104/68  Resp:  [18] 18  SpO2:  [95 %-99 %] 95 %  O2 Device: None (Room air)    Body mass index is 19.97 kg/m².     Input and Output Summary (last 24 hours):     Intake/Output Summary (Last 24 hours) at 3/2/2025 1235  Last data filed at 3/1/2025 2115  Gross per 24 hour   Intake 240 ml   Output --   Net 240 ml       Physical Exam  Vitals and nursing note reviewed.   Constitutional:       General: She is not in acute distress.     Appearance: Normal appearance. She is ill-appearing.   HENT:      Head: Normocephalic.      Mouth/Throat:      Mouth: Mucous membranes are moist.      Pharynx: Oropharynx is clear.   Eyes:      Conjunctiva/sclera: Conjunctivae normal.   Cardiovascular:      Rate and Rhythm: Normal rate and regular rhythm.      Pulses: Normal pulses.      Heart sounds: Normal heart sounds. No murmur heard.  Pulmonary:      Effort: Pulmonary effort is normal. No respiratory distress.      Breath sounds: Normal breath sounds. No wheezing or rhonchi.   Abdominal:      General: Bowel sounds are normal. There is no distension.      Palpations: Abdomen is soft.      Tenderness: There is abdominal tenderness in the left upper quadrant. There is guarding.   Musculoskeletal:      Right lower leg: No edema.      Left lower leg: No edema.   Skin:     General: Skin is warm and dry.   Neurological:      General: No focal deficit present.      Mental Status: She is alert. Mental status is at baseline.      Comments: Drowsy   Psychiatric:         Mood and Affect: Mood normal.         Thought Content: Thought content normal.           Lines/Drains:              Lab Results: I have reviewed the following results:   Results from last 7 days   Lab Units 03/02/25  0131 02/28/25  0507 02/27/25  1556   WBC Thousand/uL 5.01   < > 7.87   HEMOGLOBIN g/dL 10.4*   < > 12.9   HEMATOCRIT % 32.2*   < >  38.6   PLATELETS Thousands/uL 164   < > 248   SEGS PCT %  --   --  59   LYMPHO PCT %  --   --  27   MONO PCT %  --   --  11   EOS PCT %  --   --  1    < > = values in this interval not displayed.     Results from last 7 days   Lab Units 03/02/25  0131 03/01/25  0432   SODIUM mmol/L 135 137   POTASSIUM mmol/L 4.0 3.5   CHLORIDE mmol/L 100 100   CO2 mmol/L 30 32   BUN mg/dL 5 7   CREATININE mg/dL 0.55* 0.59*   ANION GAP mmol/L 5 5   CALCIUM mg/dL 8.0* 8.2*   ALBUMIN g/dL  --  3.2*   TOTAL BILIRUBIN mg/dL  --  1.18*   ALK PHOS U/L  --  52   ALT U/L  --  14   AST U/L  --  23   GLUCOSE RANDOM mg/dL 79 92     Results from last 7 days   Lab Units 02/28/25  0507   INR  1.16             Results from last 7 days   Lab Units 02/28/25  0112 02/27/25 2216 02/27/25 2016   LACTIC ACID mmol/L 1.2 2.4*  --    PROCALCITONIN ng/ml  --   --  <0.05       Recent Cultures (last 7 days):   Results from last 7 days   Lab Units 02/27/25 2216   BLOOD CULTURE  No Growth at 48 hrs.  No Growth at 48 hrs.       Imaging Results Review: No pertinent imaging studies reviewed.  Other Study Results Review: No additional pertinent studies reviewed.    Last 24 Hours Medication List:     Current Facility-Administered Medications:     DULoxetine (CYMBALTA) delayed release capsule 30 mg, Daily    folic acid (FOLVITE) tablet 1 mg, Daily    HYDROmorphone HCl (DILAUDID) injection 0.2 mg, Q4H PRN **OR** HYDROmorphone (DILAUDID) injection 0.5 mg, Q4H PRN    HYDROmorphone (DILAUDID) injection 0.5 mg, Q4H PRN    magnesium sulfate 4 g/100 mL IVPB (premix) 4 g, Once, Last Rate: 4 g (03/02/25 1027)    multivitamin-minerals (CENTRUM) tablet 1 tablet, Daily    nicotine (NICODERM CQ) 21 mg/24 hr TD 24 hr patch 1 patch, Daily    ondansetron (ZOFRAN) injection 4 mg, Q6H PRN    pantoprazole (PROTONIX) EC tablet 40 mg, BID    senna (SENOKOT) tablet 8.6 mg, HS PRN    thiamine tablet 100 mg, Daily    traZODone (DESYREL) tablet 25 mg, HS PRN    Administrative Statements    Today, Patient Was Seen By: PATRICIA Azevedo      **Please Note: This note may have been constructed using a voice recognition system.**

## 2025-03-02 NOTE — PLAN OF CARE
Problem: Potential for Falls  Goal: Patient will remain free of falls  Description: INTERVENTIONS:  - Educate patient/family on patient safety including physical limitations  - Instruct patient to call for assistance with activity   - Consult OT/PT to assist with strengthening/mobility   - Keep Call bell within reach  - Keep bed low and locked with side rails adjusted as appropriate  - Keep care items and personal belongings within reach  - Initiate and maintain comfort rounds  - Make Fall Risk Sign visible to staff  - Offer Toileting every 2 Hours, in advance of need  - Initiate/Maintain bed/chair alarm  - Obtain necessary fall risk management equipment: yellow bracelet,yellow socks, yellow fall risk flag displayed   - Apply yellow socks and bracelet for high fall risk patients  - Consider moving patient to room near nurses station  Outcome: Progressing     Problem: PAIN - ADULT  Goal: Verbalizes/displays adequate comfort level or baseline comfort level  Description: Interventions:  - Encourage patient to monitor pain and request assistance  - Assess pain using appropriate pain scale  - Administer analgesics based on type and severity of pain and evaluate response  - Implement non-pharmacological measures as appropriate and evaluate response  - Consider cultural and social influences on pain and pain management  - Notify physician/advanced practitioner if interventions unsuccessful or patient reports new pain  Outcome: Progressing     Problem: DISCHARGE PLANNING  Goal: Discharge to home or other facility with appropriate resources  Description: INTERVENTIONS:  - Identify barriers to discharge w/patient and caregiver  - Arrange for needed discharge resources and transportation as appropriate  - Identify discharge learning needs (meds, wound care, etc.)  - Arrange for interpretive services to assist at discharge as needed  - Refer to Case Management Department for coordinating discharge planning if the patient  needs post-hospital services based on physician/advanced practitioner order or complex needs related to functional status, cognitive ability, or social support system  Outcome: Progressing     Problem: Knowledge Deficit  Goal: Patient/family/caregiver demonstrates understanding of disease process, treatment plan, medications, and discharge instructions  Description: Complete learning assessment and assess knowledge base.  Interventions:  - Provide teaching at level of understanding  - Provide teaching via preferred learning methods  Outcome: Progressing     Problem: GASTROINTESTINAL - ADULT  Goal: Minimal or absence of nausea and/or vomiting  Description: INTERVENTIONS:  - Administer IV fluids if ordered to ensure adequate hydration  - Maintain NPO status until nausea and vomiting are resolved  - Nasogastric tube if ordered  - Administer ordered antiemetic medications as needed  - Provide nonpharmacologic comfort measures as appropriate  - Advance diet as tolerated, if ordered  - Consider nutrition services referral to assist patient with adequate nutrition and appropriate food choices  Outcome: Progressing  Goal: Maintains or returns to baseline bowel function  Description: INTERVENTIONS:  - Assess bowel function  - Encourage oral fluids to ensure adequate hydration  - Administer IV fluids if ordered to ensure adequate hydration  - Administer ordered medications as needed  - Encourage mobilization and activity  - Consider nutritional services referral to assist patient with adequate nutrition and appropriate food choices  Outcome: Progressing  Goal: Maintains adequate nutritional intake  Description: INTERVENTIONS:  - Monitor percentage of each meal consumed  - Identify factors contributing to decreased intake, treat as appropriate  - Assist with meals as needed  - Monitor I&O, weight, and lab values if indicated  - Obtain nutrition services referral as needed  Outcome: Progressing  Goal: Oral mucous membranes  remain intact  Description: INTERVENTIONS  - Assess oral mucosa and hygiene practices  - Implement preventative oral hygiene regimen  - Implement oral medicated treatments as ordered  - Initiate Nutrition services referral as needed  Outcome: Progressing

## 2025-03-02 NOTE — ASSESSMENT & PLAN NOTE
Counseled on abstinence from drinking    -Continue [facility] protocol  -Continue folic acid and thiamine

## 2025-03-03 LAB
ALBUMIN SERPL BCG-MCNC: 3.1 G/DL (ref 3.5–5)
ALP SERPL-CCNC: 53 U/L (ref 34–104)
ALT SERPL W P-5'-P-CCNC: 12 U/L (ref 7–52)
ANION GAP SERPL CALCULATED.3IONS-SCNC: 4 MMOL/L (ref 4–13)
AST SERPL W P-5'-P-CCNC: 19 U/L (ref 13–39)
BILIRUB SERPL-MCNC: 0.42 MG/DL (ref 0.2–1)
BUN SERPL-MCNC: 6 MG/DL (ref 5–25)
CALCIUM ALBUM COR SERPL-MCNC: 8.9 MG/DL (ref 8.3–10.1)
CALCIUM SERPL-MCNC: 8.2 MG/DL (ref 8.4–10.2)
CHLORIDE SERPL-SCNC: 98 MMOL/L (ref 96–108)
CO2 SERPL-SCNC: 32 MMOL/L (ref 21–32)
CREAT SERPL-MCNC: 0.49 MG/DL (ref 0.6–1.3)
ERYTHROCYTE [DISTWIDTH] IN BLOOD BY AUTOMATED COUNT: 14.5 % (ref 11.6–15.1)
GFR SERPL CREATININE-BSD FRML MDRD: 128 ML/MIN/1.73SQ M
GLUCOSE SERPL-MCNC: 103 MG/DL (ref 65–140)
HCT VFR BLD AUTO: 32.6 % (ref 34.8–46.1)
HGB BLD-MCNC: 10.5 G/DL (ref 11.5–15.4)
LIPASE SERPL-CCNC: 145 U/L (ref 11–82)
MAGNESIUM SERPL-MCNC: 1.6 MG/DL (ref 1.9–2.7)
MCH RBC QN AUTO: 34.4 PG (ref 26.8–34.3)
MCHC RBC AUTO-ENTMCNC: 32.2 G/DL (ref 31.4–37.4)
MCV RBC AUTO: 107 FL (ref 82–98)
PLATELET # BLD AUTO: 155 THOUSANDS/UL (ref 149–390)
PMV BLD AUTO: 11.3 FL (ref 8.9–12.7)
POTASSIUM SERPL-SCNC: 4.2 MMOL/L (ref 3.5–5.3)
PROT SERPL-MCNC: 5.3 G/DL (ref 6.4–8.4)
RBC # BLD AUTO: 3.05 MILLION/UL (ref 3.81–5.12)
SODIUM SERPL-SCNC: 134 MMOL/L (ref 135–147)
WBC # BLD AUTO: 5.33 THOUSAND/UL (ref 4.31–10.16)

## 2025-03-03 PROCEDURE — 80053 COMPREHEN METABOLIC PANEL: CPT | Performed by: HOSPITALIST

## 2025-03-03 PROCEDURE — 85027 COMPLETE CBC AUTOMATED: CPT | Performed by: HOSPITALIST

## 2025-03-03 PROCEDURE — 99232 SBSQ HOSP IP/OBS MODERATE 35: CPT

## 2025-03-03 PROCEDURE — 83735 ASSAY OF MAGNESIUM: CPT | Performed by: HOSPITALIST

## 2025-03-03 PROCEDURE — 83690 ASSAY OF LIPASE: CPT | Performed by: HOSPITALIST

## 2025-03-03 PROCEDURE — 99232 SBSQ HOSP IP/OBS MODERATE 35: CPT | Performed by: INTERNAL MEDICINE

## 2025-03-03 RX ORDER — KETOROLAC TROMETHAMINE 30 MG/ML
15 INJECTION, SOLUTION INTRAMUSCULAR; INTRAVENOUS ONCE
Status: COMPLETED | OUTPATIENT
Start: 2025-03-03 | End: 2025-03-03

## 2025-03-03 RX ORDER — HYDROMORPHONE HCL/PF 1 MG/ML
0.5 SYRINGE (ML) INJECTION EVERY 8 HOURS PRN
Status: DISCONTINUED | OUTPATIENT
Start: 2025-03-03 | End: 2025-03-05

## 2025-03-03 RX ORDER — LANOLIN ALCOHOL/MO/W.PET/CERES
400 CREAM (GRAM) TOPICAL 2 TIMES DAILY
Status: DISCONTINUED | OUTPATIENT
Start: 2025-03-04 | End: 2025-03-04

## 2025-03-03 RX ORDER — KETOROLAC TROMETHAMINE 30 MG/ML
15 INJECTION, SOLUTION INTRAMUSCULAR; INTRAVENOUS EVERY 6 HOURS PRN
Status: DISCONTINUED | OUTPATIENT
Start: 2025-03-03 | End: 2025-03-03

## 2025-03-03 RX ORDER — MAGNESIUM SULFATE HEPTAHYDRATE 40 MG/ML
2 INJECTION, SOLUTION INTRAVENOUS ONCE
Status: COMPLETED | OUTPATIENT
Start: 2025-03-03 | End: 2025-03-03

## 2025-03-03 RX ADMIN — HYDROMORPHONE HYDROCHLORIDE 0.5 MG: 1 INJECTION, SOLUTION INTRAMUSCULAR; INTRAVENOUS; SUBCUTANEOUS at 21:54

## 2025-03-03 RX ADMIN — TRIMETHOBENZAMIDE HYDROCHLORIDE 200 MG: 100 INJECTION INTRAMUSCULAR at 20:22

## 2025-03-03 RX ADMIN — PANTOPRAZOLE SODIUM 40 MG: 40 TABLET, DELAYED RELEASE ORAL at 22:22

## 2025-03-03 RX ADMIN — Medication 1 TABLET: at 08:23

## 2025-03-03 RX ADMIN — SODIUM CHLORIDE, SODIUM GLUCONATE, SODIUM ACETATE, POTASSIUM CHLORIDE, MAGNESIUM CHLORIDE, SODIUM PHOSPHATE, DIBASIC, AND POTASSIUM PHOSPHATE 75 ML/HR: .53; .5; .37; .037; .03; .012; .00082 INJECTION, SOLUTION INTRAVENOUS at 02:38

## 2025-03-03 RX ADMIN — TRAZODONE HYDROCHLORIDE 25 MG: 50 TABLET ORAL at 02:42

## 2025-03-03 RX ADMIN — OXYCODONE HYDROCHLORIDE 5 MG: 5 TABLET ORAL at 02:42

## 2025-03-03 RX ADMIN — SENNOSIDES AND DOCUSATE SODIUM 1 TABLET: 50; 8.6 TABLET ORAL at 22:22

## 2025-03-03 RX ADMIN — ONDANSETRON 4 MG: 2 INJECTION INTRAMUSCULAR; INTRAVENOUS at 12:35

## 2025-03-03 RX ADMIN — ONDANSETRON 4 MG: 2 INJECTION INTRAMUSCULAR; INTRAVENOUS at 17:56

## 2025-03-03 RX ADMIN — NICOTINE 1 PATCH: 21 PATCH, EXTENDED RELEASE TRANSDERMAL at 22:21

## 2025-03-03 RX ADMIN — HYDROMORPHONE HYDROCHLORIDE 0.2 MG: 0.2 INJECTION, SOLUTION INTRAMUSCULAR; INTRAVENOUS; SUBCUTANEOUS at 10:38

## 2025-03-03 RX ADMIN — MAGNESIUM SULFATE HEPTAHYDRATE 2 G: 40 INJECTION, SOLUTION INTRAVENOUS at 08:22

## 2025-03-03 RX ADMIN — OXYCODONE HYDROCHLORIDE 5 MG: 5 TABLET ORAL at 08:35

## 2025-03-03 RX ADMIN — KETOROLAC TROMETHAMINE 15 MG: 30 INJECTION, SOLUTION INTRAMUSCULAR; INTRAVENOUS at 16:23

## 2025-03-03 RX ADMIN — OXYCODONE HYDROCHLORIDE 5 MG: 5 TABLET ORAL at 14:03

## 2025-03-03 RX ADMIN — PANTOPRAZOLE SODIUM 40 MG: 40 TABLET, DELAYED RELEASE ORAL at 08:23

## 2025-03-03 RX ADMIN — SODIUM CHLORIDE, SODIUM GLUCONATE, SODIUM ACETATE, POTASSIUM CHLORIDE, MAGNESIUM CHLORIDE, SODIUM PHOSPHATE, DIBASIC, AND POTASSIUM PHOSPHATE 75 ML/HR: .53; .5; .37; .037; .03; .012; .00082 INJECTION, SOLUTION INTRAVENOUS at 17:06

## 2025-03-03 RX ADMIN — HYDROMORPHONE HYDROCHLORIDE 0.2 MG: 0.2 INJECTION, SOLUTION INTRAMUSCULAR; INTRAVENOUS; SUBCUTANEOUS at 03:32

## 2025-03-03 RX ADMIN — OXYCODONE HYDROCHLORIDE 5 MG: 5 TABLET ORAL at 19:23

## 2025-03-03 RX ADMIN — DULOXETINE HYDROCHLORIDE 30 MG: 30 CAPSULE, DELAYED RELEASE ORAL at 08:23

## 2025-03-03 RX ADMIN — Medication 100 MG: at 08:23

## 2025-03-03 RX ADMIN — FOLIC ACID 1 MG: 1 TABLET ORAL at 08:23

## 2025-03-03 NOTE — ASSESSMENT & PLAN NOTE
Counseled on abstinence from drinking    -Continue CIWA protocol  -Continue folic acid and thiamine    Encouraged patient to think about inpatient or outpatient alcohol assistance.  Expressed to patient that [employer] is available to her if she would like the support.

## 2025-03-03 NOTE — PROGRESS NOTES
"Progress Note - Gastroenterology   Name: Kaykay Holland 34 y.o. female I MRN: [id number]  Unit/Bed#: -01 I Date of Admission: 2/27/2025   Date of Service: 3/3/2025 I Hospital Day: 4    Assessment & Plan  Necrotizing pancreatitis  34-year-old female with alcohol and tobacco use disorder, alcohol induced necrotizing pancreatitis and walled off necrosis status post EUS guided cyst drainage December 2024 readmitted with abdominal pain, elevated lipase, findings of acute pancreatitis on imaging. CT shows possible scattered mildly complicated pseudocyst, abscess collection interposed between the stomach and spleen measuring approximately 2.1 x 4.8 x 4.7 cm, similar to prior imaging from January although comparison impeded due to presence of cyst gastrostomy. No leukocytosis or fever. Procalcitonin normal.    Suspect \"abscess\" collection is necrotic collection containing gas related to previous cyst gastrostomy, less likely infected necrosis given normal WBC count and lack of fever.    -Blood Cultures, no growth 72 hours.  -Tolerating advanced diet  -IV pain meds discontinued, oral pain meds as needed  -Continue IV fluids  -Encouraged out of bed and ambulation  -Stressed importance of alcohol abstinence.    Pancreatic pseudocyst  See above.  Tobacco abuse  Discussed critical need for tobacco cessation  Alcohol abuse  Counseled on abstinence from drinking    -Continue CIWA protocol  -Continue folic acid and thiamine    Encouraged patient to think about inpatient or outpatient alcohol assistance.  Expressed to patient that [employer] is available to her if she would like the support.  Lactic acidosis  Lactic acid 2.4 on admission, resolved with IV fluids.  Hypomagnesemia    Anemia          Subjective   Patient states she is tolerating advanced diet.  Continues to have left upper quadrant tenderness with palpation.  Denies nausea or vomiting.  Encouraged patient for ambulation and out of bed to chair.    Objective " :  Temp:  [97.1 °F (36.2 °C)-98.1 °F (36.7 °C)] 97.1 °F (36.2 °C)  HR:  [65-84] 77  BP: (112-144)/(75-90) 133/88  Resp:  [12-17] 16  SpO2:  [96 %-99 %] 98 %  O2 Device: None (Room air)    Physical Exam  Vitals and nursing note reviewed.   HENT:      Head: Normocephalic.      Nose: Nose normal.      Mouth/Throat:      Mouth: Mucous membranes are dry.   Eyes:      General: No scleral icterus.     Extraocular Movements: Extraocular movements intact.   Cardiovascular:      Rate and Rhythm: Normal rate and regular rhythm.      Heart sounds: Normal heart sounds.   Pulmonary:      Breath sounds: Normal breath sounds.   Abdominal:      General: Bowel sounds are normal. There is no distension.      Palpations: Abdomen is soft.      Tenderness: There is abdominal tenderness (Left upper quadrant tenderness with palpation).   Musculoskeletal:         General: No swelling. Normal range of motion.      Cervical back: Normal range of motion.   Skin:     General: Skin is warm and dry.      Coloration: Skin is not jaundiced.   Neurological:      Mental Status: She is alert and oriented to person, place, and time.   Psychiatric:         Mood and Affect: Mood normal.           Lab Results: I have reviewed the following results:

## 2025-03-03 NOTE — ASSESSMENT & PLAN NOTE
Replete w/ 2g mag today  W/ persistent hypomg will start mag oxide 400mg BID ordered for tomorrow      Lab Results   Component Value Date/Time    MG 1.6 (L) 03/03/2025 02:36 AM    MG 1.6 (L) 03/02/2025 01:31 AM    MG 1.6 (L) 03/01/2025 04:32 AM

## 2025-03-03 NOTE — PROGRESS NOTES
"Progress Note - Hospitalist   Name: Kaykay Holland 34 y.o. female I MRN: [id number]  Unit/Bed#: -01 I Date of Admission: 2/27/2025   Date of Service: 3/3/2025 I Hospital Day: 4    Assessment & Plan  Necrotizing pancreatitis  Presenting to the ED with left upper quadrant pain x 1 to 2 weeks with acute worsening in the last 1 to 2 days-endorses associated NVD  History of necrotizing pancreatitis with mass effect and s/p EUS with cystogastrostomy stent placement on 12/17 at [facility]  Underwent stent removal 1/9 outpatient with GI as tissue appeared to be healing well  Readmitted at [facility] 1/7-1/10 where she was conservatively managed with IV fluids and pain control  CT A/P: \"Findings of acute pancreatitis with possible scattered mildly complicated pseudocysts. There is an abscess collection interposed between the stomach and the spleen estimated at 2.1 (AP) x 4.8 (TRV) x 4.7 (CC) cm. This is similar to perhaps slightly increased from 1/7/2025 examination, although comparison is somewhat impeded as there was a drain on prior exam.\"  Discussed with GI on admission-okay to stay at [facility] for now-plan to discussed with advanced GI team in the morning  Suspect abscess is actually a necrotic collection, containing gas related to previous cyst gastrostomy, less likely infected necrosis given normal WBC and lack of fever  Try to wean pain medication  Can stop antibiotics if she remains afebrile and blood cultures are negative  Encourage ambulation  Alcohol abstinence  3/2 blood cultures negative for 48 hours-abx stopped  Pain control   D/c IV dilaudid today  Diet   Advance as tolerated   Pancreatic pseudocyst  S/p cystogastrostomy with stent placement 12/17 with stent removal 1/9 outpatient by GI  CT findings as above  GI following  Advance diet as tolerated  Alcohol abuse  History of alcohol abuse  Reports that she continues to drink 1 bottle of wine daily  Drank 2 glasses of wine prior to arrival to the ED  History of DTs in " the past requiring phenobarbital, however appears to have responded well to Librium taper during December admission  Completed librium taper  CIWA protocol, last score 5  Lactic acidosis  Initial lactic acid 2.4-improved to 1.2 s/p IV fluids  Likely in the setting of acute pancreatitis  Tobacco abuse  Admits to smoking 1 pack/day  NRT while inpatient  Anemia  Chronic, Stable  Resume folic acid supplementation  Transfuse as needed for hemoglobin less than 7  Hypomagnesemia  Replete w/ 2g mag today  W/ persistent hypomg will start mag oxide 400mg BID ordered for tomorrow      Lab Results   Component Value Date/Time    MG 1.6 (L) 03/03/2025 02:36 AM    MG 1.6 (L) 03/02/2025 01:31 AM    MG 1.6 (L) 03/01/2025 04:32 AM        VTE Pharmacologic Prophylaxis: VTE Score: 0 Low Risk (Score 0-2) - Encourage Ambulation.    Mobility:   Basic Mobility Inpatient Raw Score: 24  JH-HLM Goal: 8: Walk 250 feet or more  JH-HLM Achieved: 8: Walk 250 feet ot more  JH-HLM Goal achieved. Continue to encourage appropriate mobility.    Patient Centered Rounds: I performed bedside rounds with nursing staff today.   Discussions with Specialists or Other Care Team Provider: GI    Education and Discussions with Family / Patient: Patient declined call to .     Current Length of Stay: 4 day(s)  Current Patient Status: Inpatient   Certification Statement: The patient will continue to require additional inpatient hospital stay due to continued pain control, monitoring oral intake  Discharge Plan: Anticipate discharge tomorrow to home.    Code Status: Level 1 - Full Code    Subjective   Seen and examined.  No acute events overnight.  Patient states she is still having some pain but it is tolerable at this time.  She states she is tolerating a diet.  She is having bowel movements and urinating without difficulty. Patient states    Objective :  Temp:  [97.6 °F (36.4 °C)-97.9 °F (36.6 °C)] 97.9 °F (36.6 °C)  HR:  [57-84] 68  BP:  (104-144)/(68-90) 127/87  Resp:  [12-18] 12  SpO2:  [95 %-99 %] 97 %  O2 Device: None (Room air)    Body mass index is 19.97 kg/m².     Input and Output Summary (last 24 hours):     Intake/Output Summary (Last 24 hours) at 3/3/2025 0764  Last data filed at 3/3/2025 0501  Gross per 24 hour   Intake 1430 ml   Output --   Net 1430 ml       Physical Exam  Constitutional:       General: She is not in acute distress.     Appearance: She is not toxic-appearing.   HENT:      Head: Normocephalic.      Nose: Nose normal.      Mouth/Throat:      Mouth: Mucous membranes are moist.   Eyes:      Conjunctiva/sclera: Conjunctivae normal.   Cardiovascular:      Rate and Rhythm: Normal rate.      Heart sounds: Normal heart sounds.   Pulmonary:      Breath sounds: Normal breath sounds. No wheezing, rhonchi or rales.   Abdominal:      General: There is no distension.      Palpations: Abdomen is soft.      Tenderness: There is abdominal tenderness (LUQ/LLQ).   Musculoskeletal:      Cervical back: Normal range of motion.      Right lower leg: No edema.      Left lower leg: No edema.   Skin:     General: Skin is warm.   Neurological:      Mental Status: Mental status is at baseline.   Psychiatric:         Mood and Affect: Mood normal.       Lines/Drains:              Lab Results: I have reviewed the following results:   Results from last 7 days   Lab Units 03/03/25  0236 02/28/25  0507 02/27/25  1556   WBC Thousand/uL 5.33   < > 7.87   HEMOGLOBIN g/dL 10.5*   < > 12.9   HEMATOCRIT % 32.6*   < > 38.6   PLATELETS Thousands/uL 155   < > 248   SEGS PCT %  --   --  59   LYMPHO PCT %  --   --  27   MONO PCT %  --   --  11   EOS PCT %  --   --  1    < > = values in this interval not displayed.     Results from last 7 days   Lab Units 03/03/25  0236   SODIUM mmol/L 134*   POTASSIUM mmol/L 4.2   CHLORIDE mmol/L 98   CO2 mmol/L 32   BUN mg/dL 6   CREATININE mg/dL 0.49*   ANION GAP mmol/L 4   CALCIUM mg/dL 8.2*   ALBUMIN g/dL 3.1*   TOTAL BILIRUBIN  mg/dL 0.42   ALK PHOS U/L 53   ALT U/L 12   AST U/L 19   GLUCOSE RANDOM mg/dL 103     Results from last 7 days   Lab Units 02/28/25  0507   INR  1.16             Results from last 7 days   Lab Units 02/28/25  0112 02/27/25  2216 02/27/25 2016   LACTIC ACID mmol/L 1.2 2.4*  --    PROCALCITONIN ng/ml  --   --  <0.05       Recent Cultures (last 7 days):   Results from last 7 days   Lab Units 02/27/25 2216   BLOOD CULTURE  No Growth at 72 hrs.  No Growth at 72 hrs.       Imaging Results Review: I reviewed radiology reports from this admission including: CT abdomen/pelvis.  Other Study Results Review: No additional pertinent studies reviewed.    Last 24 Hours Medication List:     Current Facility-Administered Medications:     DULoxetine (CYMBALTA) delayed release capsule 30 mg, Daily    folic acid (FOLVITE) tablet 1 mg, Daily    HYDROmorphone HCl (DILAUDID) injection 0.2 mg, Q2H PRN    magnesium sulfate 2 g/50 mL IVPB (premix) 2 g, Once    multi-electrolyte (PLASMALYTE-A/ISOLYTE-S PH 7.4) IV solution, Continuous, Last Rate: 75 mL/hr (03/03/25 0238)    multivitamin-minerals (CENTRUM) tablet 1 tablet, Daily    naloxone (NARCAN) 0.04 mg/mL syringe 0.04 mg, Q1MIN PRN    nicotine (NICODERM CQ) 21 mg/24 hr TD 24 hr patch 1 patch, Daily    ondansetron (ZOFRAN) injection 4 mg, Q6H PRN    oxyCODONE (ROXICODONE) split tablet 2.5 mg, Q4H PRN **OR** oxyCODONE (ROXICODONE) IR tablet 5 mg, Q4H PRN    pantoprazole (PROTONIX) EC tablet 40 mg, BID    polyethylene glycol (MIRALAX) packet 17 g, Daily    senna-docusate sodium (SENOKOT S) 8.6-50 mg per tablet 1 tablet, HS    thiamine tablet 100 mg, Daily    traZODone (DESYREL) tablet 25 mg, HS PRN    Administrative Statements   Today, Patient Was Seen By: Daxa Omer PA-C    **Please Note: This note may have been constructed using a voice recognition system.**

## 2025-03-03 NOTE — ASSESSMENT & PLAN NOTE
History of alcohol abuse  Reports that she continues to drink 1 bottle of wine daily  Drank 2 glasses of wine prior to arrival to the ED  History of DTs in the past requiring phenobarbital, however appears to have responded well to Librium taper during December admission  Completed librium taper  CIWA protocol, last score 5

## 2025-03-03 NOTE — ASSESSMENT & PLAN NOTE
S/p cystogastrostomy with stent placement 12/17 with stent removal 1/9 outpatient by GI  CT findings as above  GI following  Advance diet as tolerated

## 2025-03-03 NOTE — CASE MANAGEMENT
Case Management Assessment & Discharge Planning Note    Patient name Kaykay Holland  Location /-01 MRN [id number]  : 1990 Date 3/3/2025       Current Admission Date: 2025  Current Admission Diagnosis:Necrotizing pancreatitis   Patient Active Problem List    Diagnosis Date Noted Date Diagnosed    Anemia 2025     Lactic acidosis 2025     Abdominal pain 2025     Splenic vein thrombosis 2024     Leukocytosis 2024     Thrombocytopenia (HCC) 2024     Ovarian cyst 2024     Constipation 2024     Pancreatic pseudocyst 2024     Gastroesophageal reflux disease without esophagitis 2024     Electrolyte abnormality 2024     Alcohol use disorder 2024     Hematemesis 2024     Necrotizing pancreatitis 2024     Hypomagnesemia 2024     Pancytopenia (HCC) 04/10/2024     Alcohol abuse 2024     Provoked seizures (HCC) 2024     Alcohol withdrawal seizure (HCC) 2024     Transaminitis 2024     Prolonged QT interval 2024     Essential hypertension 2024     Tobacco abuse 2024     Acute alcoholic pancreatitis 2023     Hepatic steatosis 2023     Peptic ulcer disease 2021     Alcohol withdrawal (HCC) 2020     Anxiety 2019     Depression 2019       LOS (days): 4  Geometric Mean LOS (GMLOS) (days): 3  Days to GMLOS:-0.5     OBJECTIVE:    Risk of Unplanned Readmission Score: 35.56         Current admission status: Inpatient       Preferred Pharmacy:   [facility]/pharmacy #[id number] - [place] - [address]  [address]  [place]   Phone: [phone] Fax: [phone]    Primary Care Provider: PATRICIA Moran    Primary Insurance:   Secondary Insurance:     ASSESSMENT:  Active Health Care Proxies       Doris Chichi [facility] Care Representative - Mother   Primary Phone: [phone] (Mobile)                            Readmission Root Cause  30 Day Readmission: No    Patient Information  Admitted from:: Home  Mental Status: Alert  During Assessment patient was accompanied by: Not accompanied during assessment  Assessment information provided by:: Patient  Primary Caregiver: Self  Support Systems: Self, Family members  What city do you live in?: [place]  Home entry access options. Select all that apply.: Stairs  Number of steps to enter home.: 7  Do the steps have railings?: Yes  Type of Current Residence: 2 story home  Upon entering residence, is there a bedroom on the main floor (no further steps)?: No  A bedroom is located on the following floor levels of residence (select all that apply):: 2nd Floor  Upon entering residence, is there a bathroom on the main floor (no further steps)?: Yes  Number of steps to 2nd floor from main floor: 4  Living Arrangements: Lives Alone  Is patient a ?: No    Activities of Daily Living Prior to Admission  Functional Status: Independent  Completes ADLs independently?: Yes  Ambulates independently?: Yes  Does patient use assisted devices?: No  Does patient currently own DME?: No  Does patient have a history of Outpatient Therapy (PT/OT)?: No  Does the patient have a history of Short-Term Rehab?: No  Does patient have a history of HHC?: No  Does patient currently have HHC?: No         Patient Information Continued  Income Source: Employed  Does patient have prescription coverage?: Yes  Does patient receive dialysis treatments?: No  Does patient have a history of substance abuse?: Yes  Historical substance use preference: Alcohol/ETOH  History of Withdrawal Symptoms: Denies past symptoms  Is patient currently in treatment for substance abuse?: No. Patient declined treatment information.  Does patient have a history of Mental Health Diagnosis?: No         Means of Transportation  Means of Transport to Appts:: Drives Self          DISCHARGE DETAILS:    Discharge planning discussed with::  Pt at bedside  [place] of Choice: Yes     CM contacted family/caregiver?: No- see comments (pt declined)  Were Treatment Team discharge recommendations reviewed with patient/caregiver?: Yes  Did patient/caregiver verbalize understanding of patient care needs?: Yes  Were patient/caregiver advised of the risks associated with not following Treatment Team discharge recommendations?: Yes         Additional Comments: Met with pt at bedside to review CM role and possible discharge planning needs. Pt stated that she lives alone in a 2SH with 7 steps to enter. Pt has been independent wtih all ADL care prior to admission. Pt has no hx with DME, HHC Or STR placement. Pt has no hx with MH inpt stays but is active with ETOH abuse. Pt drinks one bottle of wine per day. Pt reports no hx with rehab or inpt detox care. Offered [facility]ARES and other support services-- Pt declined any supports for her ETOH abuse at this time. Pt has no insurance. CM made PATHS referral. Made pt aware of CM availability for ongoing discharge planning needs. Pt's mother to provide transport to home.

## 2025-03-03 NOTE — ASSESSMENT & PLAN NOTE
Initial lactic acid 2.4-improved to 1.2 s/p IV fluids  Likely in the setting of acute pancreatitis   HPI: Pt seen and examined this afternoon in follow up for sx. Pt initially somewhat confused, thinking he was tied up, that his mother needed to be called and noting that he wanted to go home. Spent some time explaining that he was not tied up, and why he could not go home. He calmed after this. He endorsed 8/10 neck pain, wanting a dose of pain medication for it. As per RN, he just denied need, but she is willing to provide this. Pt still awaiting placement back at [facility].       PAIN: as above  DYSPNEA: denies      ROS:  confusion- yes  All other systems reviewed and negative      PHYSICAL EXAM:    Vital Signs Last 24 Hrs  T(C): 35.9 (06 May 2019 05:32), Max: 36.2 (05 May 2019 18:00)  T(F): 96.7 (06 May 2019 05:32), Max: 97.2 (05 May 2019 18:00)  HR: 92 (06 May 2019 12:00) (71 - 92)  BP: 100/63 (06 May 2019 12:00) (82/49 - 108/67)  BP(mean): 72 (06 May 2019 12:00) (57 - 77)  RR: 20 (06 May 2019 12:00) (10 - 20)  SpO2: 100% (06 May 2019 10:00) (98% - 100%)  Daily     Daily Weight in k.7 (06 May 2019 05:32)      PPSV2: 30  %    General: Middle aged male, trach, able to mouth words, NAD  HEENT: trach in place, dry mmm  Lungs: dec at bases bl  Cardiac: + s1 s2 rrr  GI: soft nt nd +bs, PEG and colostomy present with some stool  : condom catheter  Ext: moves shoulders, otherwise functionally quadriplegic  Neuro: limited by above    LABS:                        7.1    10.21 )-----------( 390      ( 06 May 2019 06:29 )             25.2     05-06    139  |  109<H>  |  20  ----------------------------<  154<H>  4.7   |  26  |  0.43<L>    Ca    7.3<L>      06 May 2019 06:29  Phos  2.5     05-06  Mg     2.0     05-06    TPro  5.0<L>  /  Alb  1.1<L>  /  TBili  0.3  /  DBili  <0.1  /  AST  37  /  ALT  21  /  AlkPhos  275<H>        Albumin: Albumin, Serum: 1.1 g/dL ( @ 06:29)      Allergies    No Known Allergies    Intolerances      MEDICATIONS  (STANDING):  ALPRAZolam 0.5 milliGRAM(s) Oral every 8 hours  artificial  tears Solution 1 Drop(s) Both EYES every 4 hours  ascorbic acid 500 milliGRAM(s) Oral daily  aspirin  chewable 81 milliGRAM(s) Oral daily  cefepime  Injectable. 1000 milliGRAM(s) IV Push every 12 hours  chlorhexidine 0.12% Liquid 5 milliLiter(s) Oral Mucosa two times a day  dextrose 5%. 1000 milliLiter(s) (50 mL/Hr) IV Continuous <Continuous>  dextrose 50% Injectable 12.5 Gram(s) IV Push once  dextrose 50% Injectable 25 Gram(s) IV Push once  dextrose 50% Injectable 25 Gram(s) IV Push once  enoxaparin Injectable 40 milliGRAM(s) SubCutaneous daily  fentaNYL   Patch  50 MICROgram(s)/Hr 1 Patch Transdermal every 72 hours  ferrous    sulfate Liquid 300 milliGRAM(s) Enteral Tube <User Schedule>  gabapentin 300 milliGRAM(s) Oral two times a day  insulin glargine Injectable (LANTUS) 6 Unit(s) SubCutaneous at bedtime  insulin lispro (HumaLOG) corrective regimen sliding scale   SubCutaneous every 6 hours  lactobacillus acidophilus 1 Tablet(s) Oral two times a day  lidocaine   Patch 2 Patch Transdermal daily  loratadine 10 milliGRAM(s) Oral daily  montelukast 10 milliGRAM(s) Oral at bedtime  multivitamin/minerals 1 Tablet(s) Oral daily  pantoprazole  Injectable 40 milliGRAM(s) IV Push daily  sertraline 50 milliGRAM(s) Oral daily  simethicone 80 milliGRAM(s) Chew every 6 hours  sodium chloride 0.9%. 1000 milliLiter(s) (100 mL/Hr) IV Continuous <Continuous>  tigecycline IVPB 50 milliGRAM(s) IV Intermittent every 12 hours  vancomycin    Solution 125 milliGRAM(s) Oral every 6 hours    MEDICATIONS  (PRN):  acetaminophen   Tablet .. 650 milliGRAM(s) Oral every 6 hours PRN Temp greater or equal to 38C (100.4F), Moderate Pain (4 - 6)  cyclobenzaprine 10 milliGRAM(s) Oral three times a day PRN Muscle Spasm  dextrose 40% Gel 15 Gram(s) Oral once PRN Blood Glucose LESS THAN 70 milliGRAM(s)/deciliter  glucagon  Injectable 1 milliGRAM(s) IntraMuscular once PRN Glucose LESS THAN 70 milligrams/deciliter  ondansetron Injectable 4 milliGRAM(s) IV Push every 6 hours PRN Nausea  oxyCODONE    5 mG/acetaminophen 325 mG 1 Tablet(s) Oral every 4 hours PRN Moderate Pain (4 - 6)  oxyCODONE    IR 10 milliGRAM(s) Oral every 4 hours PRN severe pain or dyspnea

## 2025-03-03 NOTE — ASSESSMENT & PLAN NOTE
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 CT shows possible scattered mildly complicated pseudocyst, abscess collection interposed between the stomach and spleen measuring approximately 2.1 x 4.8 x 4.7 cm, similar to prior imaging from January although comparison impeded due to presence of cyst gastrostomy. No leukocytosis or fever. Procalcitonin normal.    Suspect \"abscess\" collection is necrotic collection containing gas related to previous cyst gastrostomy, less likely infected necrosis given normal WBC count and lack of fever.    -Blood Cultures, no growth 72 hours.  -Tolerating advanced diet  -IV pain meds discontinued, oral pain meds as needed  -Continue IV fluids  -Encouraged out of bed and ambulation  -Stressed importance of alcohol abstinence.    "

## 2025-03-03 NOTE — ASSESSMENT & PLAN NOTE
"Presenting to the ED with left upper quadrant pain x 1 to 2 weeks with acute worsening in the last 1 to 2 days-endorses associated NVD  History of necrotizing pancreatitis with mass effect and s/p EUS with cystogastrostomy stent placement on 12/17 at [facility]  Underwent stent removal 1/9 outpatient with GI as tissue appeared to be healing well  Readmitted at [facility] 1/7-1/10 where she was conservatively managed with IV fluids and pain control  CT A/P: \"Findings of acute pancreatitis with possible scattered mildly complicated pseudocysts. There is an abscess collection interposed between the stomach and the spleen estimated at 2.1 (AP) x 4.8 (TRV) x 4.7 (CC) cm. This is similar to perhaps slightly increased from 1/7/2025 examination, although comparison is somewhat impeded as there was a drain on prior exam.\"  Discussed with GI on admission-okay to stay at [facility] for now-plan to discussed with advanced GI team in the morning  Suspect abscess is actually a necrotic collection, containing gas related to previous cyst gastrostomy, less likely infected necrosis given normal WBC and lack of fever  Try to wean pain medication  Can stop antibiotics if she remains afebrile and blood cultures are negative  Encourage ambulation  Alcohol abstinence  3/2 blood cultures negative for 48 hours-abx stopped  Pain control   D/c IV dilaudid today  Diet   Advance as tolerated   "

## 2025-03-04 LAB
ANION GAP SERPL CALCULATED.3IONS-SCNC: 4 MMOL/L (ref 4–13)
BUN SERPL-MCNC: 6 MG/DL (ref 5–25)
CALCIUM SERPL-MCNC: 8.2 MG/DL (ref 8.4–10.2)
CHLORIDE SERPL-SCNC: 99 MMOL/L (ref 96–108)
CO2 SERPL-SCNC: 34 MMOL/L (ref 21–32)
CREAT SERPL-MCNC: 0.47 MG/DL (ref 0.6–1.3)
ERYTHROCYTE [DISTWIDTH] IN BLOOD BY AUTOMATED COUNT: 14.3 % (ref 11.6–15.1)
GFR SERPL CREATININE-BSD FRML MDRD: 130 ML/MIN/1.73SQ M
GLUCOSE SERPL-MCNC: 94 MG/DL (ref 65–140)
HCT VFR BLD AUTO: 32.9 % (ref 34.8–46.1)
HGB BLD-MCNC: 10.7 G/DL (ref 11.5–15.4)
MAGNESIUM SERPL-MCNC: 1.4 MG/DL (ref 1.9–2.7)
MCH RBC QN AUTO: 34.3 PG (ref 26.8–34.3)
MCHC RBC AUTO-ENTMCNC: 32.5 G/DL (ref 31.4–37.4)
MCV RBC AUTO: 105 FL (ref 82–98)
PLATELET # BLD AUTO: 170 THOUSANDS/UL (ref 149–390)
PMV BLD AUTO: 11.2 FL (ref 8.9–12.7)
POTASSIUM SERPL-SCNC: 3.8 MMOL/L (ref 3.5–5.3)
RBC # BLD AUTO: 3.12 MILLION/UL (ref 3.81–5.12)
SODIUM SERPL-SCNC: 137 MMOL/L (ref 135–147)
WBC # BLD AUTO: 5.53 THOUSAND/UL (ref 4.31–10.16)

## 2025-03-04 PROCEDURE — 83735 ASSAY OF MAGNESIUM: CPT

## 2025-03-04 PROCEDURE — 85027 COMPLETE CBC AUTOMATED: CPT

## 2025-03-04 PROCEDURE — 80048 BASIC METABOLIC PNL TOTAL CA: CPT

## 2025-03-04 PROCEDURE — 99232 SBSQ HOSP IP/OBS MODERATE 35: CPT | Performed by: STUDENT IN AN ORGANIZED HEALTH CARE EDUCATION/TRAINING PROGRAM

## 2025-03-04 PROCEDURE — 99232 SBSQ HOSP IP/OBS MODERATE 35: CPT

## 2025-03-04 RX ORDER — LANOLIN ALCOHOL/MO/W.PET/CERES
400 CREAM (GRAM) TOPICAL 2 TIMES DAILY
Status: DISCONTINUED | OUTPATIENT
Start: 2025-03-05 | End: 2025-03-08 | Stop reason: HOSPADM

## 2025-03-04 RX ORDER — POLYETHYLENE GLYCOL 3350 17 G/17G
17 POWDER, FOR SOLUTION ORAL 2 TIMES DAILY
Status: DISCONTINUED | OUTPATIENT
Start: 2025-03-04 | End: 2025-03-05

## 2025-03-04 RX ORDER — MAGNESIUM SULFATE HEPTAHYDRATE 40 MG/ML
4 INJECTION, SOLUTION INTRAVENOUS ONCE
Status: COMPLETED | OUTPATIENT
Start: 2025-03-04 | End: 2025-03-04

## 2025-03-04 RX ADMIN — OXYCODONE HYDROCHLORIDE 5 MG: 5 TABLET ORAL at 20:09

## 2025-03-04 RX ADMIN — Medication 100 MG: at 09:33

## 2025-03-04 RX ADMIN — OXYCODONE HYDROCHLORIDE 5 MG: 5 TABLET ORAL at 14:10

## 2025-03-04 RX ADMIN — HYDROMORPHONE HYDROCHLORIDE 0.5 MG: 1 INJECTION, SOLUTION INTRAMUSCULAR; INTRAVENOUS; SUBCUTANEOUS at 23:47

## 2025-03-04 RX ADMIN — SENNOSIDES AND DOCUSATE SODIUM 1 TABLET: 50; 8.6 TABLET ORAL at 20:09

## 2025-03-04 RX ADMIN — PANTOPRAZOLE SODIUM 40 MG: 40 TABLET, DELAYED RELEASE ORAL at 20:09

## 2025-03-04 RX ADMIN — DULOXETINE HYDROCHLORIDE 30 MG: 30 CAPSULE, DELAYED RELEASE ORAL at 09:33

## 2025-03-04 RX ADMIN — TRAZODONE HYDROCHLORIDE 25 MG: 50 TABLET ORAL at 02:12

## 2025-03-04 RX ADMIN — FOLIC ACID 1 MG: 1 TABLET ORAL at 09:33

## 2025-03-04 RX ADMIN — PANTOPRAZOLE SODIUM 40 MG: 40 TABLET, DELAYED RELEASE ORAL at 09:33

## 2025-03-04 RX ADMIN — HYDROMORPHONE HYDROCHLORIDE 0.5 MG: 1 INJECTION, SOLUTION INTRAMUSCULAR; INTRAVENOUS; SUBCUTANEOUS at 06:14

## 2025-03-04 RX ADMIN — ONDANSETRON 4 MG: 2 INJECTION INTRAMUSCULAR; INTRAVENOUS at 10:11

## 2025-03-04 RX ADMIN — POLYETHYLENE GLYCOL 3350 17 G: 17 POWDER, FOR SOLUTION ORAL at 09:33

## 2025-03-04 RX ADMIN — HYDROMORPHONE HYDROCHLORIDE 0.5 MG: 1 INJECTION, SOLUTION INTRAMUSCULAR; INTRAVENOUS; SUBCUTANEOUS at 15:45

## 2025-03-04 RX ADMIN — SODIUM CHLORIDE, SODIUM GLUCONATE, SODIUM ACETATE, POTASSIUM CHLORIDE, MAGNESIUM CHLORIDE, SODIUM PHOSPHATE, DIBASIC, AND POTASSIUM PHOSPHATE 75 ML/HR: .53; .5; .37; .037; .03; .012; .00082 INJECTION, SOLUTION INTRAVENOUS at 06:09

## 2025-03-04 RX ADMIN — Medication 1 TABLET: at 09:33

## 2025-03-04 RX ADMIN — NICOTINE 1 PATCH: 21 PATCH, EXTENDED RELEASE TRANSDERMAL at 20:11

## 2025-03-04 RX ADMIN — TRAZODONE HYDROCHLORIDE 25 MG: 50 TABLET ORAL at 20:10

## 2025-03-04 RX ADMIN — MAGNESIUM SULFATE HEPTAHYDRATE 4 G: 40 INJECTION, SOLUTION INTRAVENOUS at 09:57

## 2025-03-04 RX ADMIN — POLYETHYLENE GLYCOL 3350 17 G: 17 POWDER, FOR SOLUTION ORAL at 20:09

## 2025-03-04 NOTE — ASSESSMENT & PLAN NOTE
"Presenting to the ED with left upper quadrant pain x 1 to 2 weeks with acute worsening in the last 1 to 2 days-endorses associated NVD  History of necrotizing pancreatitis with mass effect and s/p EUS with cystogastrostomy stent placement on 12/17 at [facility]  Underwent stent removal 1/9 outpatient with GI as tissue appeared to be healing well  Readmitted at [facility] 1/7-1/10 where she was conservatively managed with IV fluids and pain control  CT A/P: \"Findings of acute pancreatitis with possible scattered mildly complicated pseudocysts. There is an abscess collection interposed between the stomach and the spleen estimated at 2.1 (AP) x 4.8 (TRV) x 4.7 (CC) cm. This is similar to perhaps slightly increased from 1/7/2025 examination, although comparison is somewhat impeded as there was a drain on prior exam.\"  Discussed with GI on admission-okay to stay at [facility] for now-plan to discussed with advanced GI team in the morning  Suspect abscess is actually a necrotic collection, containing gas related to previous cyst gastrostomy, less likely infected necrosis given normal WBC and lack of fever  Try to wean pain medication  Can stop antibiotics if she remains afebrile and blood cultures are negative  Encourage ambulation  Alcohol abstinence  3/2 blood cultures negative for 48 hours-abx stopped  Pain control   Dilaudid discontinued 3/3 however with episode of pain and vomiting after dinner last night it was reordered q8 PRN breakthrough. Will continue for now with anticipation to d/c dilaudid when advancing diet   Diet   Diet was advanced last night however patient ate a cheeseburger and had pain and vomiting.  Dilaudid 0.5 mg reordered every 8 hours and diet switched to CLD.  Will continue clear liquids today for lunch.  If tolerating will advance diet further  "

## 2025-03-04 NOTE — ASSESSMENT & PLAN NOTE
History of alcohol abuse  Reports that she continues to drink 1 bottle of wine daily  Drank 2 glasses of wine prior to arrival to the ED  History of DTs in the past requiring phenobarbital, however appears to have responded well to Librium taper during December admission  Completed librium taper  Continue CIWA

## 2025-03-04 NOTE — ASSESSMENT & PLAN NOTE
Replete w/ 4g mag today  W/ persistent hypomg will start mag oxide 400mg BID ordered for tomorrow      Lab Results   Component Value Date/Time    MG 1.4 (L) 03/04/2025 06:04 AM    MG 1.6 (L) 03/03/2025 02:36 AM    MG 1.6 (L) 03/02/2025 01:31 AM

## 2025-03-04 NOTE — PROGRESS NOTES
Progress Note - Gastroenterology   Name: Kaykay Holland 34 y.o. female I MRN: [id number]  Unit/Bed#: -01 I Date of Admission: 2/27/2025   Date of Service: 3/4/2025 I Hospital Day: 5     Assessment & Plan  Necrotizing pancreatitis  34-year-old female with alcohol and tobacco use disorder, alcohol induced necrotizing pancreatitis and walled off necrosis status post EUS guided cyst drainage December 2024 readmitted with abdominal pain, elevated lipase, findings of acute pancreatitis on imaging. CT shows possible scattered mildly complicated pseudocyst, abscess collection interposed between the stomach and spleen measuring approximately 2.1 x 4.8 x 4.7 cm, similar to prior imaging from January although comparison impeded due to presence of cyst gastrostomy. No leukocytosis or fever. Procalcitonin normal.    Suspect collection is necrotic collection containing gas related to previous cyst gastrostomy, less likely infected necrosis given normal WBC count and lack of fever.    Continues to have pain, specifically with PO intake, but no fevers, chills, unstable vital signs or leukocytosis. No significant BM in a few days.    -Blood Cultures, no growth 72 hours.  -Advance diet as tolerated to low fat  -IV pain meds discontinued, oral pain meds as needed  -Continue IV fluids as needed  -Encouraged out of bed and ambulation  -Stressed importance of alcohol abstinence.    -Will contact advanced endoscopy team regarding role for surveillance imaging given collection from prior cyst gastrostomy site  -Increase MiraLAX to twice daily. If no BM by tomorrow, offer enema  Pancreatic pseudocyst  See above.  Tobacco abuse  Discussed critical need for tobacco cessation  Alcohol abuse  Counseled on abstinence from drinking    -Continue CIWA protocol  -Continue folic acid and thiamine    Encouraged patient to think about inpatient or outpatient alcohol assistance.  Expressed to patient that [facility] is available to her if she  would like the support.  Lactic acidosis  Lactic acid 2.4 on admission, resolved with IV fluids.  Hypomagnesemia    Anemia      24 Hour Events : Stable, some pain with eating  Subjective : LUQ pain, worse with eating, not having many bowel movements    Objective :  Temp:  [96.3 °F (35.7 °C)-97.2 °F (36.2 °C)] 96.3 °F (35.7 °C)  HR:  [66-72] 66  BP: (128-131)/(84-92) 128/84  Resp:  [16] 16  SpO2:  [95 %-98 %] 98 %  O2 Device: None (Room air)    Physical Exam    General Appearance: NAD, cooperative, alert  Eyes: Anicteric  GI:  LUQ tenderness, no rebound or guarding  Rectal: Deferred  Musculoskeletal: No edema.  Skin:     No jaundice    Lab Results: I have reviewed the following results:CBC/BMP:   .     03/04/25  0604   WBC 5.53   HGB 10.7*   HCT 32.9*      SODIUM 137   K 3.8   CL 99   CO2 34*   BUN 6   CREATININE 0.47*   GLUC 94   MG 1.4*    , Creatinine Clearance: Estimated Creatinine Clearance: 146.2 mL/min (A) (by C-G formula based on SCr of 0.47 mg/dL (L))., LFTs: No new results in last 24 hours.     Imaging Results Review: I reviewed radiology reports from this admission including: CT abdomen/pelvis.

## 2025-03-04 NOTE — ASSESSMENT & PLAN NOTE
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 CT shows possible scattered mildly complicated pseudocyst, abscess collection interposed between the stomach and spleen measuring approximately 2.1 x 4.8 x 4.7 cm, similar to prior imaging from January although comparison impeded due to presence of cyst gastrostomy. No leukocytosis or fever. Procalcitonin normal.    Suspect collection is necrotic collection containing gas related to previous cyst gastrostomy, less likely infected necrosis given normal WBC count and lack of fever.    Continues to have pain, specifically with PO intake, but no fevers, chills, unstable vital signs or leukocytosis. No significant BM in a few days.    -Blood Cultures, no growth 72 hours.  -Advance diet as tolerated to low fat  -IV pain meds discontinued, oral pain meds as needed  -Continue IV fluids as needed  -Encouraged out of bed and ambulation  -Stressed importance of alcohol abstinence.    -Will contact advanced endoscopy team regarding role for surveillance imaging given collection from prior cyst gastrostomy site  -Increase MiraLAX to twice daily. If no BM by tomorrow, offer enema

## 2025-03-04 NOTE — CASE MANAGEMENT
Case Management Discharge Planning Note    Patient name Kaykay Holland  Location /-01 MRN [id number]  : 1990 Date 3/4/2025       Current Admission Date: 2025  Current Admission Diagnosis:Necrotizing pancreatitis   Patient Active Problem List    Diagnosis Date Noted Date Diagnosed    Anemia 2025     Lactic acidosis 2025     Abdominal pain 2025     Splenic vein thrombosis 2024     Leukocytosis 2024     Thrombocytopenia (HCC) 2024     Ovarian cyst 2024     Constipation 2024     Pancreatic pseudocyst 2024     Gastroesophageal reflux disease without esophagitis 2024     Electrolyte abnormality 2024     Alcohol use disorder 2024     Hematemesis 2024     Necrotizing pancreatitis 2024     Hypomagnesemia 2024     Pancytopenia (HCC) 04/10/2024     Alcohol abuse 2024     Provoked seizures (HCC) 2024     Alcohol withdrawal seizure (HCC) 2024     Transaminitis 2024     Prolonged QT interval 2024     Essential hypertension 2024     Tobacco abuse 2024     Acute alcoholic pancreatitis 2023     Hepatic steatosis 2023     Peptic ulcer disease 2021     Alcohol withdrawal (HCC) 2020     Anxiety 2019     Depression 2019       LOS (days): 5  Geometric Mean LOS (GMLOS) (days): 3  Days to GMLOS:-1.7     OBJECTIVE:  Risk of Unplanned Readmission Score: 28.6         Current admission status: Inpatient   Preferred Pharmacy:   [facility]/pharmacy #[id number] - [place] - [address]  [address]  [place]   Phone: [phone] Fax: [phone]    Primary Care Provider: PATRICIA Moran    Primary Insurance:   Secondary Insurance:     DISCHARGE DETAILS:  As per Patient Care Rounds, patient not stable for dischrge, She vomited and was place on clear liquids and will be advanced. Plans are for her to  return home alone to her [address] home with 7 [address]ARA. PTA, patient was independent of ADL's.

## 2025-03-04 NOTE — PROGRESS NOTES
"Progress Note - Hospitalist   Name: Kaykay Holland 34 y.o. female I MRN: [id number]  Unit/Bed#: -01 I Date of Admission: 2/27/2025   Date of Service: 3/4/2025 I Hospital Day: 5    Assessment & Plan  Necrotizing pancreatitis  Presenting to the ED with left upper quadrant pain x 1 to 2 weeks with acute worsening in the last 1 to 2 days-endorses associated NVD  History of necrotizing pancreatitis with mass effect and s/p EUS with cystogastrostomy stent placement on 12/17 at [facility]  Underwent stent removal 1/9 outpatient with GI as tissue appeared to be healing well  Readmitted at [facility] 1/7-1/10 where she was conservatively managed with IV fluids and pain control  CT A/P: \"Findings of acute pancreatitis with possible scattered mildly complicated pseudocysts. There is an abscess collection interposed between the stomach and the spleen estimated at 2.1 (AP) x 4.8 (TRV) x 4.7 (CC) cm. This is similar to perhaps slightly increased from 1/7/2025 examination, although comparison is somewhat impeded as there was a drain on prior exam.\"  Discussed with GI on admission-okay to stay at [facility] for now-plan to discussed with advanced GI team in the morning  Suspect abscess is actually a necrotic collection, containing gas related to previous cyst gastrostomy, less likely infected necrosis given normal WBC and lack of fever  Try to wean pain medication  Can stop antibiotics if she remains afebrile and blood cultures are negative  Encourage ambulation  Alcohol abstinence  3/2 blood cultures negative for 48 hours-abx stopped  Pain control   Dilaudid discontinued 3/3 however with episode of pain and vomiting after dinner last night it was reordered q8 PRN breakthrough. Will continue for now with anticipation to d/c dilaudid when advancing diet   Diet   Diet was advanced last night however patient ate a cheeseburger and had pain and vomiting.  Dilaudid 0.5 mg reordered every 8 hours and diet switched to CLD.  Will continue clear " liquids today for lunch.  If tolerating will advance diet further  Pancreatic pseudocyst  S/p cystogastrostomy with stent placement 12/17 with stent removal 1/9 outpatient by GI  CT findings as above  GI following  Advance diet as tolerated  Alcohol abuse  History of alcohol abuse  Reports that she continues to drink 1 bottle of wine daily  Drank 2 glasses of wine prior to arrival to the ED  History of DTs in the past requiring phenobarbital, however appears to have responded well to Librium taper during December admission  Completed librium taper  Continue CIWA  Lactic acidosis  Initial lactic acid 2.4-improved to 1.2 s/p IV fluids  Likely in the setting of acute pancreatitis  Tobacco abuse  Admits to smoking 1 pack/day  NRT while inpatient  Anemia  Chronic, Stable  Resume folic acid supplementation  Transfuse as needed for hemoglobin less than 7  Hypomagnesemia  Replete w/ 4g mag today  W/ persistent hypomg will start mag oxide 400mg BID ordered for tomorrow      Lab Results   Component Value Date/Time    MG 1.4 (L) 03/04/2025 06:04 AM    MG 1.6 (L) 03/03/2025 02:36 AM    MG 1.6 (L) 03/02/2025 01:31 AM        VTE Pharmacologic Prophylaxis: VTE Score: 0 Low Risk (Score 0-2) - Encourage Ambulation.    Mobility:   Basic Mobility Inpatient Raw Score: 24  JH-HLM Goal: 8: Walk 250 feet or more  JH-HLM Achieved: 8: Walk 250 feet ot more  JH-HLM Goal achieved. Continue to encourage appropriate mobility.    Patient Centered Rounds: I performed bedside rounds with nursing staff today.   Discussions with Specialists or Other Care Team Provider: None     Education and Discussions with Family / Patient: Patient declined call to .     Current Length of Stay: 5 day(s)  Current Patient Status: Inpatient   Certification Statement: The patient will continue to require additional inpatient hospital stay due to continued pain control, inability to tolerate oral diet  Discharge Plan: Anticipate discharge tomorrow to  home.    Code Status: Level 1 - Full Code    Subjective   Seen and examined.  No acute events overnight.  Patient had dinner last night and ate a cheeseburger.  She ended up having abdominal pain and vomiting.     Objective :  Temp:  [96.3 °F (35.7 °C)-97.4 °F (36.3 °C)] 96.3 °F (35.7 °C)  HR:  [66-79] 66  BP: (119-133)/(75-92) 128/84  Resp:  [14-16] 16  SpO2:  [95 %-98 %] 98 %  O2 Device: None (Room air)    Body mass index is 19.97 kg/m².     Input and Output Summary (last 24 hours):     Intake/Output Summary (Last 24 hours) at 3/4/2025 0735  Last data filed at 3/3/2025 1400  Gross per 24 hour   Intake 530 ml   Output --   Net 530 ml       Physical Exam  Constitutional:       General: She is not in acute distress.     Appearance: She is not toxic-appearing.   HENT:      Head: Normocephalic.      Nose: Nose normal.      Mouth/Throat:      Mouth: Mucous membranes are moist.   Eyes:      Conjunctiva/sclera: Conjunctivae normal.   Cardiovascular:      Rate and Rhythm: Normal rate.      Heart sounds: Normal heart sounds.   Pulmonary:      Breath sounds: Normal breath sounds. No wheezing, rhonchi or rales.   Abdominal:      General: There is no distension.      Palpations: Abdomen is soft.      Tenderness: There is abdominal tenderness (subjective tenderness to palpation LUQ).   Musculoskeletal:      Right lower leg: No edema.      Left lower leg: No edema.   Skin:     General: Skin is warm.   Neurological:      Mental Status: Mental status is at baseline.   Psychiatric:         Mood and Affect: Mood normal.       Lines/Drains:              Lab Results: I have reviewed the following results:   Results from last 7 days   Lab Units 03/04/25  0604 02/28/25  0507 02/27/25  1556   WBC Thousand/uL 5.53   < > 7.87   HEMOGLOBIN g/dL 10.7*   < > 12.9   HEMATOCRIT % 32.9*   < > 38.6   PLATELETS Thousands/uL 170   < > 248   SEGS PCT %  --   --  59   LYMPHO PCT %  --   --  27   MONO PCT %  --   --  11   EOS PCT %  --   --  1    < >  = values in this interval not displayed.     Results from last 7 days   Lab Units 03/04/25  0604 03/03/25  0236   SODIUM mmol/L 137 134*   POTASSIUM mmol/L 3.8 4.2   CHLORIDE mmol/L 99 98   CO2 mmol/L 34* 32   BUN mg/dL 6 6   CREATININE mg/dL 0.47* 0.49*   ANION GAP mmol/L 4 4   CALCIUM mg/dL 8.2* 8.2*   ALBUMIN g/dL  --  3.1*   TOTAL BILIRUBIN mg/dL  --  0.42   ALK PHOS U/L  --  53   ALT U/L  --  12   AST U/L  --  19   GLUCOSE RANDOM mg/dL 94 103     Results from last 7 days   Lab Units 02/28/25  0507   INR  1.16             Results from last 7 days   Lab Units 02/28/25  0112 02/27/25 2216 02/27/25 2016   LACTIC ACID mmol/L 1.2 2.4*  --    PROCALCITONIN ng/ml  --   --  <0.05       Recent Cultures (last 7 days):   Results from last 7 days   Lab Units 02/27/25 2216   BLOOD CULTURE  No Growth After 4 Days.  No Growth After 4 Days.       Imaging Results Review: No pertinent imaging studies reviewed.  Other Study Results Review: No additional pertinent studies reviewed.    Last 24 Hours Medication List:     Current Facility-Administered Medications:     DULoxetine (CYMBALTA) delayed release capsule 30 mg, Daily    folic acid (FOLVITE) tablet 1 mg, Daily    HYDROmorphone (DILAUDID) injection 0.5 mg, Q8H PRN    [START ON 3/5/2025] magnesium Oxide (MAG-OX) tablet 400 mg, BID    magnesium sulfate 4 g/100 mL IVPB (premix) 4 g, Once    multi-electrolyte (PLASMALYTE-A/ISOLYTE-S PH 7.4) IV solution, Continuous, Last Rate: 75 mL/hr (03/04/25 0609)    multivitamin-minerals (CENTRUM) tablet 1 tablet, Daily    naloxone (NARCAN) 0.04 mg/mL syringe 0.04 mg, Q1MIN PRN    nicotine (NICODERM CQ) 21 mg/24 hr TD 24 hr patch 1 patch, Daily    ondansetron (ZOFRAN) injection 4 mg, Q6H PRN    oxyCODONE (ROXICODONE) split tablet 2.5 mg, Q4H PRN **OR** oxyCODONE (ROXICODONE) IR tablet 5 mg, Q4H PRN    pantoprazole (PROTONIX) EC tablet 40 mg, BID    polyethylene glycol (MIRALAX) packet 17 g, Daily    senna-docusate sodium (SENOKOT S) 8.6-50  mg per tablet 1 tablet, HS    thiamine tablet 100 mg, Daily    traZODone (DESYREL) tablet 25 mg, HS PRN    trimethobenzamide (TIGAN) IM injection 200 mg, Q8H PRN    Administrative Statements   Today, Patient Was Seen By: Daxa Omer PA-C    **Please Note: This note may have been constructed using a voice recognition system.**

## 2025-03-05 LAB
ALBUMIN SERPL BCG-MCNC: 3.2 G/DL (ref 3.5–5)
ALP SERPL-CCNC: 52 U/L (ref 34–104)
ALT SERPL W P-5'-P-CCNC: 13 U/L (ref 7–52)
ANION GAP SERPL CALCULATED.3IONS-SCNC: 6 MMOL/L (ref 4–13)
AST SERPL W P-5'-P-CCNC: 23 U/L (ref 13–39)
BACTERIA BLD CULT: NORMAL
BACTERIA BLD CULT: NORMAL
BILIRUB SERPL-MCNC: 0.33 MG/DL (ref 0.2–1)
BUN SERPL-MCNC: 6 MG/DL (ref 5–25)
CALCIUM ALBUM COR SERPL-MCNC: 9.1 MG/DL (ref 8.3–10.1)
CALCIUM SERPL-MCNC: 8.5 MG/DL (ref 8.4–10.2)
CHLORIDE SERPL-SCNC: 98 MMOL/L (ref 96–108)
CO2 SERPL-SCNC: 32 MMOL/L (ref 21–32)
CREAT SERPL-MCNC: 0.48 MG/DL (ref 0.6–1.3)
ERYTHROCYTE [DISTWIDTH] IN BLOOD BY AUTOMATED COUNT: 14.4 % (ref 11.6–15.1)
GFR SERPL CREATININE-BSD FRML MDRD: 129 ML/MIN/1.73SQ M
GLUCOSE SERPL-MCNC: 87 MG/DL (ref 65–140)
HCT VFR BLD AUTO: 34.9 % (ref 34.8–46.1)
HGB BLD-MCNC: 11.3 G/DL (ref 11.5–15.4)
LIPASE SERPL-CCNC: 82 U/L (ref 11–82)
MAGNESIUM SERPL-MCNC: 1.5 MG/DL (ref 1.9–2.7)
MCH RBC QN AUTO: 34.3 PG (ref 26.8–34.3)
MCHC RBC AUTO-ENTMCNC: 32.4 G/DL (ref 31.4–37.4)
MCV RBC AUTO: 106 FL (ref 82–98)
PLATELET # BLD AUTO: 210 THOUSANDS/UL (ref 149–390)
PMV BLD AUTO: 11.1 FL (ref 8.9–12.7)
POTASSIUM SERPL-SCNC: 4.1 MMOL/L (ref 3.5–5.3)
PROT SERPL-MCNC: 5.5 G/DL (ref 6.4–8.4)
RBC # BLD AUTO: 3.29 MILLION/UL (ref 3.81–5.12)
SODIUM SERPL-SCNC: 136 MMOL/L (ref 135–147)
WBC # BLD AUTO: 6.3 THOUSAND/UL (ref 4.31–10.16)

## 2025-03-05 PROCEDURE — 99232 SBSQ HOSP IP/OBS MODERATE 35: CPT | Performed by: INTERNAL MEDICINE

## 2025-03-05 PROCEDURE — 83735 ASSAY OF MAGNESIUM: CPT

## 2025-03-05 PROCEDURE — 85027 COMPLETE CBC AUTOMATED: CPT

## 2025-03-05 PROCEDURE — 99232 SBSQ HOSP IP/OBS MODERATE 35: CPT | Performed by: STUDENT IN AN ORGANIZED HEALTH CARE EDUCATION/TRAINING PROGRAM

## 2025-03-05 PROCEDURE — 83690 ASSAY OF LIPASE: CPT | Performed by: INTERNAL MEDICINE

## 2025-03-05 PROCEDURE — 80053 COMPREHEN METABOLIC PANEL: CPT

## 2025-03-05 RX ORDER — HYDROMORPHONE HCL/PF 1 MG/ML
0.5 SYRINGE (ML) INJECTION EVERY 4 HOURS PRN
Status: COMPLETED | OUTPATIENT
Start: 2025-03-05 | End: 2025-03-06

## 2025-03-05 RX ORDER — MAGNESIUM SULFATE HEPTAHYDRATE 40 MG/ML
4 INJECTION, SOLUTION INTRAVENOUS ONCE
Status: COMPLETED | OUTPATIENT
Start: 2025-03-05 | End: 2025-03-05

## 2025-03-05 RX ORDER — POLYETHYLENE GLYCOL 3350 17 G/17G
34 POWDER, FOR SOLUTION ORAL 2 TIMES DAILY
Status: DISCONTINUED | OUTPATIENT
Start: 2025-03-05 | End: 2025-03-07

## 2025-03-05 RX ORDER — HYDROMORPHONE HCL/PF 1 MG/ML
0.5 SYRINGE (ML) INJECTION
Status: DISCONTINUED | OUTPATIENT
Start: 2025-03-05 | End: 2025-03-05

## 2025-03-05 RX ADMIN — OXYCODONE HYDROCHLORIDE 5 MG: 5 TABLET ORAL at 05:08

## 2025-03-05 RX ADMIN — MAGNESIUM SULFATE HEPTAHYDRATE 4 G: 40 INJECTION, SOLUTION INTRAVENOUS at 13:58

## 2025-03-05 RX ADMIN — Medication 400 MG: at 08:42

## 2025-03-05 RX ADMIN — SENNOSIDES AND DOCUSATE SODIUM 1 TABLET: 50; 8.6 TABLET ORAL at 19:35

## 2025-03-05 RX ADMIN — PANTOPRAZOLE SODIUM 40 MG: 40 TABLET, DELAYED RELEASE ORAL at 19:35

## 2025-03-05 RX ADMIN — HYDROMORPHONE HYDROCHLORIDE 0.5 MG: 1 INJECTION, SOLUTION INTRAMUSCULAR; INTRAVENOUS; SUBCUTANEOUS at 18:24

## 2025-03-05 RX ADMIN — OXYCODONE HYDROCHLORIDE 5 MG: 5 TABLET ORAL at 21:49

## 2025-03-05 RX ADMIN — ONDANSETRON 4 MG: 2 INJECTION INTRAMUSCULAR; INTRAVENOUS at 12:55

## 2025-03-05 RX ADMIN — PANTOPRAZOLE SODIUM 40 MG: 40 TABLET, DELAYED RELEASE ORAL at 08:43

## 2025-03-05 RX ADMIN — FOLIC ACID 1 MG: 1 TABLET ORAL at 08:42

## 2025-03-05 RX ADMIN — Medication 100 MG: at 08:42

## 2025-03-05 RX ADMIN — OXYCODONE HYDROCHLORIDE 5 MG: 5 TABLET ORAL at 17:10

## 2025-03-05 RX ADMIN — TRAZODONE HYDROCHLORIDE 25 MG: 50 TABLET ORAL at 21:50

## 2025-03-05 RX ADMIN — HYDROMORPHONE HYDROCHLORIDE 0.5 MG: 1 INJECTION, SOLUTION INTRAMUSCULAR; INTRAVENOUS; SUBCUTANEOUS at 08:39

## 2025-03-05 RX ADMIN — NICOTINE 1 PATCH: 21 PATCH, EXTENDED RELEASE TRANSDERMAL at 19:38

## 2025-03-05 RX ADMIN — Medication 1 TABLET: at 08:43

## 2025-03-05 RX ADMIN — OXYCODONE HYDROCHLORIDE 5 MG: 5 TABLET ORAL at 11:37

## 2025-03-05 RX ADMIN — Medication 400 MG: at 17:09

## 2025-03-05 RX ADMIN — POLYETHYLENE GLYCOL 3350 17 G: 17 POWDER, FOR SOLUTION ORAL at 08:43

## 2025-03-05 RX ADMIN — TRIMETHOBENZAMIDE HYDROCHLORIDE 200 MG: 100 INJECTION INTRAMUSCULAR at 17:47

## 2025-03-05 RX ADMIN — POLYETHYLENE GLYCOL 3350 34 G: 17 POWDER, FOR SOLUTION ORAL at 19:35

## 2025-03-05 RX ADMIN — DULOXETINE HYDROCHLORIDE 30 MG: 30 CAPSULE, DELAYED RELEASE ORAL at 08:43

## 2025-03-05 NOTE — PLAN OF CARE
Problem: Potential for Falls  Goal: Patient will remain free of falls  Description: INTERVENTIONS:  - Educate patient/family on patient safety including physical limitations  - Instruct patient to call for assistance with activity   - Consult OT/PT to assist with strengthening/mobility   - Keep Call bell within reach  - Keep bed low and locked with side rails adjusted as appropriate  - Keep care items and personal belongings within reach  - Initiate and maintain comfort rounds  - Make Fall Risk Sign visible to staff  - Offer Toileting every 2 Hours, in advance of need  - Initiate/Maintain no alarm  - Obtain necessary fall risk management equipment: yellow socks   - Apply yellow socks and bracelet for high fall risk patients  - Consider moving patient to room near nurses station  Outcome: Progressing     Problem: PAIN - ADULT  Goal: Verbalizes/displays adequate comfort level or baseline comfort level  Description: Interventions:  - Encourage patient to monitor pain and request assistance  - Assess pain using appropriate pain scale  - Administer analgesics based on type and severity of pain and evaluate response  - Implement non-pharmacological measures as appropriate and evaluate response  - Consider cultural and social influences on pain and pain management  - Notify physician/advanced practitioner if interventions unsuccessful or patient reports new pain  Outcome: Progressing     Problem: DISCHARGE PLANNING  Goal: Discharge to home or other facility with appropriate resources  Description: INTERVENTIONS:  - Identify barriers to discharge w/patient and caregiver  - Arrange for needed discharge resources and transportation as appropriate  - Identify discharge learning needs (meds, wound care, etc.)  - Arrange for interpretive services to assist at discharge as needed  - Refer to Case Management Department for coordinating discharge planning if the patient needs post-hospital services based on physician/advanced  practitioner order or complex needs related to functional status, cognitive ability, or social support system  Outcome: Progressing     Problem: Knowledge Deficit  Goal: Patient/family/caregiver demonstrates understanding of disease process, treatment plan, medications, and discharge instructions  Description: Complete learning assessment and assess knowledge base.  Interventions:  - Provide teaching at level of understanding  - Provide teaching via preferred learning methods  Outcome: Progressing     Problem: GASTROINTESTINAL - ADULT  Goal: Minimal or absence of nausea and/or vomiting  Description: INTERVENTIONS:  - Administer IV fluids if ordered to ensure adequate hydration  - Maintain NPO status until nausea and vomiting are resolved  - Nasogastric tube if ordered  - Administer ordered antiemetic medications as needed  - Provide nonpharmacologic comfort measures as appropriate  - Advance diet as tolerated, if ordered  - Consider nutrition services referral to assist patient with adequate nutrition and appropriate food choices  Outcome: Progressing  Goal: Maintains or returns to baseline bowel function  Description: INTERVENTIONS:  - Assess bowel function  - Encourage oral fluids to ensure adequate hydration  - Administer IV fluids if ordered to ensure adequate hydration  - Administer ordered medications as needed  - Encourage mobilization and activity  - Consider nutritional services referral to assist patient with adequate nutrition and appropriate food choices  Outcome: Progressing  Goal: Maintains adequate nutritional intake  Description: INTERVENTIONS:  - Monitor percentage of each meal consumed  - Identify factors contributing to decreased intake, treat as appropriate  - Assist with meals as needed  - Monitor I&O, weight, and lab values if indicated  - Obtain nutrition services referral as needed  Outcome: Progressing  Goal: Oral mucous membranes remain intact  Description: INTERVENTIONS  - Assess oral  mucosa and hygiene practices  - Implement preventative oral hygiene regimen  - Implement oral medicated treatments as ordered  - Initiate Nutrition services referral as needed  Outcome: Progressing

## 2025-03-05 NOTE — ASSESSMENT & PLAN NOTE
"Presenting to the ED with left upper quadrant pain x 1 to 2 weeks with acute worsening in the last 1 to 2 days-endorses associated NVD  History of necrotizing pancreatitis with mass effect and s/p EUS with cystogastrostomy stent placement on 12/17 at [facility]  Underwent stent removal 1/9 outpatient with GI as tissue appeared to be healing well  Readmitted at [facility] 1/7-1/10 where she was conservatively managed with IV fluids and pain control  CT A/P: \"Findings of acute pancreatitis with possible scattered mildly complicated pseudocysts. There is an abscess collection interposed between the stomach and the spleen estimated at 2.1 (AP) x 4.8 (TRV) x 4.7 (CC) cm. This is similar to perhaps slightly increased from 1/7/2025 examination, although comparison is somewhat impeded as there was a drain on prior exam.\"  Serum lipase normalized  Appreciate gastroenterology input  Suspect abscess is actually a necrotic collection, containing gas related to previous cyst gastrostomy, less likely infected necrosis given normal WBC and lack of fever  Wean down analgesic regimen, as tolerated  Now off antibiotics  Encourage ambulation  Complete alcohol abstinence encouraged  Plan for discharge home tomorrow if continues to consistently tolerate oral diet - diet now advanced to surgical soft consistency  "

## 2025-03-05 NOTE — PROGRESS NOTES
"Progress Note - Hospitalist   Name: Kaykay Holland 34 y.o. female I MRN: [id number]  Unit/Bed#: -01 I Date of Admission: 2/27/2025   Date of Service: 3/5/2025 I Hospital Day: 6    Assessment & Plan  Necrotizing pancreatitis  Presenting to the ED with left upper quadrant pain x 1 to 2 weeks with acute worsening in the last 1 to 2 days-endorses associated NVD  History of necrotizing pancreatitis with mass effect and s/p EUS with cystogastrostomy stent placement on 12/17 at [facility]  Underwent stent removal 1/9 outpatient with GI as tissue appeared to be healing well  Readmitted at [facility] 1/7-1/10 where she was conservatively managed with IV fluids and pain control  CT A/P: \"Findings of acute pancreatitis with possible scattered mildly complicated pseudocysts. There is an abscess collection interposed between the stomach and the spleen estimated at 2.1 (AP) x 4.8 (TRV) x 4.7 (CC) cm. This is similar to perhaps slightly increased from 1/7/2025 examination, although comparison is somewhat impeded as there was a drain on prior exam.\"  Serum lipase normalized  Appreciate gastroenterology input  Suspect abscess is actually a necrotic collection, containing gas related to previous cyst gastrostomy, less likely infected necrosis given normal WBC and lack of fever  Wean down analgesic regimen, as tolerated  Now off antibiotics  Encourage ambulation  Complete alcohol abstinence encouraged  Plan for discharge home tomorrow if continues to consistently tolerate oral diet - diet now advanced to surgical soft consistency  Pancreatic pseudocyst  S/p cystogastrostomy with stent placement 12/17 with stent removal 1/9 outpatient by GI  CT findings as above  GI following  Advance diet as tolerated  Alcohol abuse  History of alcohol abuse  Reports that she continues to drink 1 bottle of wine daily  Drank 2 glasses of wine prior to arrival to the ED  History of DTs in the past requiring Phenobarbital, however appears to have responded " well to Librium taper during December admission  Completed Librium tapering course  Continue CIWA  Continue supplemental thiamine/folic acid and multivitamin  Lactic acidosis  Normal status post IV fluid hydration  Tobacco abuse  Admits to smoking 1 pack/day  Transdermal nicotine patch on board  Anemia  Chronic anemia stable  Continue folic acid supplementation  Hypomagnesemia  Monitor/replete serum magnesium and potassium, as necessary  GERD (gastroesophageal reflux disease)  Continue PPI  Depression  Continue Cymbalta      VTE Pharmacologic Prophylaxis: VTE Score: 0 Low Risk (Score 0-2) - Encourage Ambulation.    AM-PAC Basic Mobility:  Basic Mobility Inpatient Raw Score: 24  JH-HLM Goal: 8: Walk 250 feet or more  JH-HLM Achieved: 8: Walk 250 feet ot more  JH-HLM Goal achieved. Continue to encourage appropriate mobility.    Patient Centered Rounds:  I have performed bedside rounds and discussed plan of care with nursing today.  Discussions with Specialists or Other Care Team Provider:  see above assessments if applicable    Education and Discussions with Family / Patient:  Patient, along with father, at bedside today    Time Spent for Care:  35 minutes. More than 50% of total time spent on counseling and coordination of care, on one or more of the following: performing physical exam; counseling and coordination of care, obtaining or reviewing history, documenting in the medical record, reviewing/ordering tests/medications/procedures, and communicating with other healthcare professionals.    Current Length of Stay: 6 day(s)  Current Patient Status: Inpatient   Certification Statement:  Patient will continue to require additional hospital stay due to assessments as noted above.    Code Status: Level 1 - Full Code      Subjective     Encountered earlier today.  Pain is waxing/waning but generally improved.  Tolerating an oral diet thus far.      Objective     Vitals:   Temp (24hrs), Av.9 °F (36.1 °C), Min:96.8 °F  (36 °C), Max:97 °F (36.1 °C)    Temp:  [96.8 °F (36 °C)-97 °F (36.1 °C)] 97 °F (36.1 °C)  HR:  [69-89] 73  Resp:  [14-16] 16  BP: (109-130)/(70-85) 121/82  SpO2:  [95 %-98 %] 98 %  Body mass index is 19.97 kg/m².     Input and Output Summary (last 24 hours):       Intake/Output Summary (Last 24 hours) at 3/5/2025 1500  Last data filed at 3/5/2025 1210  Gross per 24 hour   Intake 960 ml   Output --   Net 960 ml       Physical Exam:     GENERAL Waxing/waning but improved distress from pain   HEAD   Normocephalic - atraumatic   EYES Nonicteric   MOUTH   Mucosa moist   NECK   Supple - full range of motion   CARDIAC Rate controlled   PULMONARY Respirations nonlabored at rest - fairly clear to auscultation   ABDOMEN Mild epigastric tenderness to palpation   MUSCULOSKELETAL   Motor strength/range of motion intact   NEUROLOGIC   Alert/oriented at baseline   PSYCHIATRIC   Mood/affect stable         Labs & Recent Cultures:  Results from last 7 days   Lab Units 03/05/25  0506 02/28/25  0507 02/27/25  1556   WBC Thousand/uL 6.30   < > 7.87   HEMOGLOBIN g/dL 11.3*   < > 12.9   HEMATOCRIT % 34.9   < > 38.6   PLATELETS Thousands/uL 210   < > 248   SEGS PCT %  --   --  59   LYMPHO PCT %  --   --  27   MONO PCT %  --   --  11   EOS PCT %  --   --  1    < > = values in this interval not displayed.     Results from last 7 days   Lab Units 03/05/25  0506   POTASSIUM mmol/L 4.1   CHLORIDE mmol/L 98   CO2 mmol/L 32   BUN mg/dL 6   CREATININE mg/dL 0.48*   CALCIUM mg/dL 8.5   ALK PHOS U/L 52   ALT U/L 13   AST U/L 23     Results from last 7 days   Lab Units 02/28/25  0507   INR  1.16             Results from last 7 days   Lab Units 02/28/25  0112 02/27/25 2216 02/27/25 2016   LACTIC ACID mmol/L 1.2 2.4*  --    PROCALCITONIN ng/ml  --   --  <0.05         Results from last 7 days   Lab Units 02/27/25  0285   BLOOD CULTURE  No Growth After 5 Days.  No Growth After 5 Days.         Lines/Drains/Telemetry:  Invasive Devices       Peripheral  Intravenous Line  Duration             Peripheral IV 02/27/25 Left;Ventral (anterior) Forearm 5 days                    Last 24 Hours Medication List:   Current Facility-Administered Medications   Medication Dose Route Frequency Provider Last Rate    DULoxetine  30 mg Oral Daily PATRICIA Azevedo      folic acid  1 mg Oral Daily PATRICIA Azevedo      magnesium Oxide  400 mg Oral BID Daxa Omer PA-C      magnesium sulfate  4 g Intravenous Once Elo Lai MD 4 g (03/05/25 5600)    multivitamin-minerals  1 tablet Oral Daily PATRICIA Azevedo      naloxone  0.04 mg Intravenous Q1MIN PRN PATRICIA Azevedo      nicotine  1 patch Transdermal Daily Florina Schafer PA-C      ondansetron  4 mg Intravenous Q6H PRN Florina Schafer PA-C      oxyCODONE  2.5 mg Oral Q4H PRN PATRICIA Azevedo      Or    oxyCODONE  5 mg Oral Q4H PRN PATRICIA Azevedo      pantoprazole  40 mg Oral BID PATRICIA Azevedo      polyethylene glycol  34 g Oral BID Asael Ordoñez DO      senna-docusate sodium  1 tablet Oral HS PATRICIA Azevedo      thiamine  100 mg Oral Daily PATRICIA Azevedo      traZODone  25 mg Oral HS PRN Stephanie Reid PA-C      trimethobenzamide  200 mg Intramuscular Q8H PRN PATRICIA Petty MD   Hospitalist - [facility]        ** Please Note:  Documentation is constructed using a voice recognition dictation system.  An occasional wrong word/phrase or “sound-a-like” substitution may have been picked up by dictation device due to the inherent limitations of voice recognition software.  Read the chart carefully and recognize, using reasonable context, where substitutions may have occurred.**

## 2025-03-05 NOTE — PROGRESS NOTES
Progress Note - Gastroenterology   Name: Kaykay Holland 34 y.o. female I MRN: [id number]  Unit/Bed#: -01 I Date of Admission: 2/27/2025   Date of Service: 3/5/2025 I Hospital Day: 6     Assessment & Plan  Necrotizing pancreatitis  34-year-old female with alcohol and tobacco use disorder, alcohol induced necrotizing pancreatitis and walled off necrosis status post EUS guided cyst drainage December 2024 readmitted with abdominal pain, elevated lipase, findings of acute pancreatitis on imaging. CT shows possible scattered mildly complicated pseudocyst, abscess collection interposed between the stomach and spleen measuring approximately 2.1 x 4.8 x 4.7 cm, similar to prior imaging from January although comparison impeded due to presence of cyst gastrostomy. No leukocytosis or fever. Procalcitonin normal.    Suspect collection is necrotic collection containing gas related to previous cyst gastrostomy, less likely infected necrosis given normal WBC count and lack of fever.    Pain improving, still present with PO intake. Passing gas, no BM in a few days. No distension or tympany on exam.    -Advance diet as tolerated  -Wean opiates aggressively  -MiraLAX increased to 34 g BID. If no response, consider enema. Abdominal exam without distension. Monitor.  -Message sent to advanced endocopy re: collection seen on imaging and will follow up recommendations regarding surveillance imaging as outpatient  -Complete ETOH cessation advised  -No GI contraindication to discharge when reliably tolerating PO  Pancreatic pseudocyst  See above.  Tobacco abuse  Discussed critical need for tobacco cessation  Alcohol abuse  Counseled on abstinence from drinking    -Continue CIWA protocol  -Continue folic acid and thiamine    Encouraged patient to think about inpatient or outpatient alcohol assistance.  Expressed to patient that [employer] is available to her if she would like the support.  Lactic acidosis  Lactic acid 2.4 on admission,  resolved with IV fluids.  Hypomagnesemia    Anemia      24 Hour Events : Gradual improvement, no acute events overnight  Subjective : Still with some LUQ pain with eating, passing gas, no BM in a few days. Abdomen soft, minimal tenderness, no distension    Objective :  Temp:  [96.8 °F (36 °C)-97 °F (36.1 °C)] 97 °F (36.1 °C)  HR:  [69-89] 73  BP: (109-130)/(70-85) 121/82  Resp:  [14-16] 16  SpO2:  [95 %-98 %] 98 %  O2 Device: None (Room air)    Physical Exam    General Appearance: NAD, cooperative, alert  Eyes: Anicteric  GI:  soft, minimal distension, mild tenderness to upper quadrants without peritoneal signs  Rectal: Deferred  Musculoskeletal: No edema.  Skin:     No jaundice    Lab Results: I have reviewed the following results:

## 2025-03-05 NOTE — ASSESSMENT & PLAN NOTE
History of alcohol abuse  Reports that she continues to drink 1 bottle of wine daily  Drank 2 glasses of wine prior to arrival to the ED  History of DTs in the past requiring Phenobarbital, however appears to have responded well to Librium taper during December admission  Completed Librium tapering course  Continue CIWA  Continue supplemental thiamine/folic acid and multivitamin

## 2025-03-05 NOTE — PLAN OF CARE
Problem: Potential for Falls  Goal: Patient will remain free of falls  Description: INTERVENTIONS:  - Educate patient/family on patient safety including physical limitations  - Instruct patient to call for assistance with activity   - Consult OT/PT to assist with strengthening/mobility   - Keep Call bell within reach  - Keep bed low and locked with side rails adjusted as appropriate  - Keep care items and personal belongings within reach  - Initiate and maintain comfort rounds  - Make Fall Risk Sign visible to staff  - Apply yellow socks and bracelet for high fall risk patients  - Consider moving patient to room near nurses station  Outcome: Progressing     Problem: PAIN - ADULT  Goal: Verbalizes/displays adequate comfort level or baseline comfort level  Description: Interventions:  - Encourage patient to monitor pain and request assistance  - Assess pain using appropriate pain scale  - Administer analgesics based on type and severity of pain and evaluate response  - Implement non-pharmacological measures as appropriate and evaluate response  - Consider cultural and social influences on pain and pain management  - Notify physician/advanced practitioner if interventions unsuccessful or patient reports new pain  Outcome: Progressing     Problem: DISCHARGE PLANNING  Goal: Discharge to home or other facility with appropriate resources  Description: INTERVENTIONS:  - Identify barriers to discharge w/patient and caregiver  - Arrange for needed discharge resources and transportation as appropriate  - Identify discharge learning needs (meds, wound care, etc.)  - Arrange for interpretive services to assist at discharge as needed  - Refer to Case Management Department for coordinating discharge planning if the patient needs post-hospital services based on physician/advanced practitioner order or complex needs related to functional status, cognitive ability, or social support system  Outcome: Progressing

## 2025-03-05 NOTE — ASSESSMENT & PLAN NOTE
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 CT shows possible scattered mildly complicated pseudocyst, abscess collection interposed between the stomach and spleen measuring approximately 2.1 x 4.8 x 4.7 cm, similar to prior imaging from January although comparison impeded due to presence of cyst gastrostomy. No leukocytosis or fever. Procalcitonin normal.    Suspect collection is necrotic collection containing gas related to previous cyst gastrostomy, less likely infected necrosis given normal WBC count and lack of fever.    Pain improving, still present with PO intake. Passing gas, no BM in a few days. No distension or tympany on exam.    -Advance diet as tolerated  -Wean opiates aggressively  -MiraLAX increased to 34 g BID. If no response, consider enema. Abdominal exam without distension. Monitor.  -Message sent to advanced endocopy re: collection seen on imaging and will follow up recommendations regarding surveillance imaging as outpatient  -Complete ETOH cessation advised  -No GI contraindication to discharge when reliably tolerating PO

## 2025-03-06 ENCOUNTER — APPOINTMENT (INPATIENT)
Dept: CT IMAGING | Facility: HOSPITAL | Age: 35
DRG: 282 | End: 2025-03-06
Payer: COMMERCIAL

## 2025-03-06 PROBLEM — E83.42 HYPOMAGNESEMIA: Status: RESOLVED | Noted: 2024-09-24 | Resolved: 2025-03-06

## 2025-03-06 PROBLEM — E87.20 LACTIC ACIDOSIS: Status: RESOLVED | Noted: 2025-02-28 | Resolved: 2025-03-06

## 2025-03-06 PROBLEM — K21.9 GERD (GASTROESOPHAGEAL REFLUX DISEASE): Status: RESOLVED | Noted: 2024-11-26 | Resolved: 2025-03-06

## 2025-03-06 PROBLEM — D64.9 ANEMIA: Status: RESOLVED | Noted: 2025-03-01 | Resolved: 2025-03-06

## 2025-03-06 LAB
ANION GAP SERPL CALCULATED.3IONS-SCNC: 7 MMOL/L (ref 4–13)
BASOPHILS # BLD AUTO: 0.07 THOUSANDS/ÂΜL (ref 0–0.1)
BASOPHILS NFR BLD AUTO: 1 % (ref 0–1)
BUN SERPL-MCNC: 5 MG/DL (ref 5–25)
CALCIUM SERPL-MCNC: 8.9 MG/DL (ref 8.4–10.2)
CHLORIDE SERPL-SCNC: 99 MMOL/L (ref 96–108)
CO2 SERPL-SCNC: 30 MMOL/L (ref 21–32)
CREAT SERPL-MCNC: 0.47 MG/DL (ref 0.6–1.3)
EOSINOPHIL # BLD AUTO: 0.31 THOUSAND/ÂΜL (ref 0–0.61)
EOSINOPHIL NFR BLD AUTO: 4 % (ref 0–6)
ERYTHROCYTE [DISTWIDTH] IN BLOOD BY AUTOMATED COUNT: 14.3 % (ref 11.6–15.1)
GFR SERPL CREATININE-BSD FRML MDRD: 130 ML/MIN/1.73SQ M
GLUCOSE SERPL-MCNC: 87 MG/DL (ref 65–140)
HCT VFR BLD AUTO: 37 % (ref 34.8–46.1)
HGB BLD-MCNC: 12.1 G/DL (ref 11.5–15.4)
IMM GRANULOCYTES # BLD AUTO: 0.02 THOUSAND/UL (ref 0–0.2)
IMM GRANULOCYTES NFR BLD AUTO: 0 % (ref 0–2)
LYMPHOCYTES # BLD AUTO: 2.1 THOUSANDS/ÂΜL (ref 0.6–4.47)
LYMPHOCYTES NFR BLD AUTO: 30 % (ref 14–44)
MAGNESIUM SERPL-MCNC: 1.5 MG/DL (ref 1.9–2.7)
MCH RBC QN AUTO: 34.7 PG (ref 26.8–34.3)
MCHC RBC AUTO-ENTMCNC: 32.7 G/DL (ref 31.4–37.4)
MCV RBC AUTO: 106 FL (ref 82–98)
MONOCYTES # BLD AUTO: 0.94 THOUSAND/ÂΜL (ref 0.17–1.22)
MONOCYTES NFR BLD AUTO: 13 % (ref 4–12)
NEUTROPHILS # BLD AUTO: 3.69 THOUSANDS/ÂΜL (ref 1.85–7.62)
NEUTS SEG NFR BLD AUTO: 52 % (ref 43–75)
NRBC BLD AUTO-RTO: 0 /100 WBCS
PHOSPHATE SERPL-MCNC: 5.6 MG/DL (ref 2.7–4.5)
PLATELET # BLD AUTO: 241 THOUSANDS/UL (ref 149–390)
PMV BLD AUTO: 11.3 FL (ref 8.9–12.7)
POTASSIUM SERPL-SCNC: 4.2 MMOL/L (ref 3.5–5.3)
RBC # BLD AUTO: 3.49 MILLION/UL (ref 3.81–5.12)
SODIUM SERPL-SCNC: 136 MMOL/L (ref 135–147)
WBC # BLD AUTO: 7.13 THOUSAND/UL (ref 4.31–10.16)

## 2025-03-06 PROCEDURE — 83735 ASSAY OF MAGNESIUM: CPT | Performed by: INTERNAL MEDICINE

## 2025-03-06 PROCEDURE — 85025 COMPLETE CBC W/AUTO DIFF WBC: CPT | Performed by: INTERNAL MEDICINE

## 2025-03-06 PROCEDURE — 80048 BASIC METABOLIC PNL TOTAL CA: CPT | Performed by: INTERNAL MEDICINE

## 2025-03-06 PROCEDURE — 99233 SBSQ HOSP IP/OBS HIGH 50: CPT | Performed by: INTERNAL MEDICINE

## 2025-03-06 PROCEDURE — 99232 SBSQ HOSP IP/OBS MODERATE 35: CPT | Performed by: STUDENT IN AN ORGANIZED HEALTH CARE EDUCATION/TRAINING PROGRAM

## 2025-03-06 PROCEDURE — 74177 CT ABD & PELVIS W/CONTRAST: CPT

## 2025-03-06 PROCEDURE — 84100 ASSAY OF PHOSPHORUS: CPT | Performed by: INTERNAL MEDICINE

## 2025-03-06 RX ORDER — MAGNESIUM HYDROXIDE/ALUMINUM HYDROXICE/SIMETHICONE 120; 1200; 1200 MG/30ML; MG/30ML; MG/30ML
30 SUSPENSION ORAL ONCE
Status: COMPLETED | OUTPATIENT
Start: 2025-03-06 | End: 2025-03-06

## 2025-03-06 RX ORDER — LORAZEPAM 0.5 MG/1
0.5 TABLET ORAL EVERY 8 HOURS PRN
Status: DISCONTINUED | OUTPATIENT
Start: 2025-03-06 | End: 2025-03-08 | Stop reason: HOSPADM

## 2025-03-06 RX ORDER — ENOXAPARIN SODIUM 100 MG/ML
40 INJECTION SUBCUTANEOUS
Status: DISCONTINUED | OUTPATIENT
Start: 2025-03-06 | End: 2025-03-08 | Stop reason: HOSPADM

## 2025-03-06 RX ORDER — HYDROMORPHONE HYDROCHLORIDE 2 MG/1
2 TABLET ORAL ONCE
Refills: 0 | Status: COMPLETED | OUTPATIENT
Start: 2025-03-06 | End: 2025-03-06

## 2025-03-06 RX ORDER — SODIUM CHLORIDE, SODIUM LACTATE, POTASSIUM CHLORIDE, CALCIUM CHLORIDE 600; 310; 30; 20 MG/100ML; MG/100ML; MG/100ML; MG/100ML
126 INJECTION, SOLUTION INTRAVENOUS CONTINUOUS
Status: DISPENSED | OUTPATIENT
Start: 2025-03-06 | End: 2025-03-07

## 2025-03-06 RX ADMIN — ONDANSETRON 4 MG: 2 INJECTION INTRAMUSCULAR; INTRAVENOUS at 21:35

## 2025-03-06 RX ADMIN — OXYCODONE HYDROCHLORIDE 5 MG: 5 TABLET ORAL at 15:55

## 2025-03-06 RX ADMIN — OXYCODONE HYDROCHLORIDE 5 MG: 5 TABLET ORAL at 08:24

## 2025-03-06 RX ADMIN — POLYETHYLENE GLYCOL 3350 34 G: 17 POWDER, FOR SOLUTION ORAL at 20:17

## 2025-03-06 RX ADMIN — Medication 1 TABLET: at 08:25

## 2025-03-06 RX ADMIN — OXYCODONE HYDROCHLORIDE 5 MG: 5 TABLET ORAL at 21:13

## 2025-03-06 RX ADMIN — FOLIC ACID 1 MG: 1 TABLET ORAL at 08:25

## 2025-03-06 RX ADMIN — Medication 400 MG: at 18:35

## 2025-03-06 RX ADMIN — LORAZEPAM 0.5 MG: 0.5 TABLET ORAL at 13:28

## 2025-03-06 RX ADMIN — DULOXETINE HYDROCHLORIDE 30 MG: 30 CAPSULE, DELAYED RELEASE ORAL at 08:25

## 2025-03-06 RX ADMIN — ENOXAPARIN SODIUM 40 MG: 40 INJECTION SUBCUTANEOUS at 11:07

## 2025-03-06 RX ADMIN — PANTOPRAZOLE SODIUM 40 MG: 40 TABLET, DELAYED RELEASE ORAL at 08:25

## 2025-03-06 RX ADMIN — SENNOSIDES AND DOCUSATE SODIUM 1 TABLET: 50; 8.6 TABLET ORAL at 20:21

## 2025-03-06 RX ADMIN — HYDROMORPHONE HYDROCHLORIDE 2 MG: 2 TABLET ORAL at 20:20

## 2025-03-06 RX ADMIN — ONDANSETRON 4 MG: 2 INJECTION INTRAMUSCULAR; INTRAVENOUS at 09:21

## 2025-03-06 RX ADMIN — Medication 400 MG: at 08:26

## 2025-03-06 RX ADMIN — HYDROMORPHONE HYDROCHLORIDE 0.5 MG: 1 INJECTION, SOLUTION INTRAMUSCULAR; INTRAVENOUS; SUBCUTANEOUS at 02:08

## 2025-03-06 RX ADMIN — METHYLNALTREXONE BROMIDE 8 MG: 8 INJECTION, SOLUTION SUBCUTANEOUS at 18:33

## 2025-03-06 RX ADMIN — NICOTINE 1 PATCH: 21 PATCH, EXTENDED RELEASE TRANSDERMAL at 20:17

## 2025-03-06 RX ADMIN — POLYETHYLENE GLYCOL 3350 34 G: 17 POWDER, FOR SOLUTION ORAL at 08:26

## 2025-03-06 RX ADMIN — IOHEXOL 100 ML: 350 INJECTION, SOLUTION INTRAVENOUS at 16:05

## 2025-03-06 RX ADMIN — HYDROMORPHONE HYDROCHLORIDE 0.5 MG: 1 INJECTION, SOLUTION INTRAMUSCULAR; INTRAVENOUS; SUBCUTANEOUS at 10:08

## 2025-03-06 RX ADMIN — SODIUM CHLORIDE, SODIUM LACTATE, POTASSIUM CHLORIDE, AND CALCIUM CHLORIDE 126 ML/HR: .6; .31; .03; .02 INJECTION, SOLUTION INTRAVENOUS at 10:29

## 2025-03-06 RX ADMIN — Medication 100 MG: at 08:25

## 2025-03-06 RX ADMIN — SODIUM CHLORIDE, SODIUM LACTATE, POTASSIUM CHLORIDE, AND CALCIUM CHLORIDE 126 ML/HR: .6; .31; .03; .02 INJECTION, SOLUTION INTRAVENOUS at 21:14

## 2025-03-06 RX ADMIN — PANTOPRAZOLE SODIUM 40 MG: 40 TABLET, DELAYED RELEASE ORAL at 20:22

## 2025-03-06 RX ADMIN — ALUMINUM HYDROXIDE, MAGNESIUM HYDROXIDE, AND DIMETHICONE 30 ML: 200; 20; 200 SUSPENSION ORAL at 18:33

## 2025-03-06 NOTE — PLAN OF CARE
Problem: Potential for Falls  Goal: Patient will remain free of falls  Description: INTERVENTIONS:  - Educate patient/family on patient safety including physical limitations  - Instruct patient to call for assistance with activity   - Consult OT/PT to assist with strengthening/mobility   - Keep Call bell within reach  - Keep bed low and locked with side rails adjusted as appropriate  - Keep care items and personal belongings within reach  - Initiate and maintain comfort rounds  - Make Fall Risk Sign visible to staff  - Offer Toileting every 2 Hours, in advance of need  - Initiate/Maintain bed alarm  - Obtain necessary fall risk management equipment:   - Apply yellow socks and bracelet for high fall risk patients  - Consider moving patient to room near nurses station  Outcome: Progressing     Problem: PAIN - ADULT  Goal: Verbalizes/displays adequate comfort level or baseline comfort level  Description: Interventions:  - Encourage patient to monitor pain and request assistance  - Assess pain using appropriate pain scale  - Administer analgesics based on type and severity of pain and evaluate response  - Implement non-pharmacological measures as appropriate and evaluate response  - Consider cultural and social influences on pain and pain management  - Notify physician/advanced practitioner if interventions unsuccessful or patient reports new pain  Outcome: Progressing     Problem: DISCHARGE PLANNING  Goal: Discharge to home or other facility with appropriate resources  Description: INTERVENTIONS:  - Identify barriers to discharge w/patient and caregiver  - Arrange for needed discharge resources and transportation as appropriate  - Identify discharge learning needs (meds, wound care, etc.)  - Arrange for interpretive services to assist at discharge as needed  - Refer to Case Management Department for coordinating discharge planning if the patient needs post-hospital services based on physician/advanced practitioner  order or complex needs related to functional status, cognitive ability, or social support system  Outcome: Progressing     Problem: Knowledge Deficit  Goal: Patient/family/caregiver demonstrates understanding of disease process, treatment plan, medications, and discharge instructions  Description: Complete learning assessment and assess knowledge base.  Interventions:  - Provide teaching at level of understanding  - Provide teaching via preferred learning methods  Outcome: Progressing

## 2025-03-06 NOTE — ASSESSMENT & PLAN NOTE
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 CT shows possible scattered mildly complicated pseudocyst, abscess collection interposed between the stomach and spleen measuring approximately 2.1 x 4.8 x 4.7 cm, similar to prior imaging from January although comparison impeded due to presence of cyst gastrostomy. No leukocytosis or fever. Procalcitonin normal.    Suspect collection is necrotic collection containing gas related to previous cyst gastrostomy, less likely infected necrosis given normal WBC count and lack of fever.    She is having some worsening left upper quadrant pain today.  She is passing gas and having bowel movements.  Vital stable, no fever.      -Due to worsening pain, repeat CT abdomen/pelvis today.  -Wean opiates aggressively  -Can de-escalate MiraLAX to 17 g twice daily once she is reliably having bowel movements  -Per message from advanced endoscopy team, \"abscess \"seen on imaging is from prior gastrostomy.  Recommend repeat CT in a few weeks when discharged for reassessment.  -Complete ETOH cessation advised  -No GI contraindication to discharge when reliably tolerating PO  "

## 2025-03-06 NOTE — ASSESSMENT & PLAN NOTE
S/p cystogastrostomy with stent placement 12/17 with stent removal 1/9 outpatient by GI  Continue outpatient follow-up with GI  Started on low-fat diet

## 2025-03-06 NOTE — ASSESSMENT & PLAN NOTE
History of alcohol abuse  Reports that she continues to drink 1 bottle of wine daily  Drank 2 glasses of wine prior to arrival to the ED  Patient is well out of the windows for Dts  Certified  consultation  Continue thiamine

## 2025-03-06 NOTE — ASSESSMENT & PLAN NOTE
"Presenting to the ED with left upper quadrant pain x 1 to 2 weeks with acute worsening in the last 1 to 2 days-endorses associated NVD  History of necrotizing pancreatitis with mass effect and s/p EUS with cystogastrostomy stent placement on 12/17 at [facility]  Underwent stent removal 1/9 outpatient with GI as tissue appeared to be healing well  Readmitted at [facility] 1/7-1/10 where she was conservatively managed with IV fluids and pain control  CT A/P: \"Findings of acute pancreatitis with possible scattered mildly complicated pseudocysts. There is an abscess collection interposed between the stomach and the spleen estimated at 2.1 (AP) x 4.8 (TRV) x 4.7 (CC) cm. This is similar to perhaps slightly increased from 1/7/2025 examination, although comparison is somewhat impeded as there was a drain on prior exam.\"  Serum lipase normalized  Still with persistent pain  Restarted IV fluid with lactated Ringer's at 126 L an hour  Did discuss that patient likely will have some degree of pain given her pancreatitis and complex anatomy  Unclear disposition  "

## 2025-03-06 NOTE — CASE MANAGEMENT
Case Management Discharge Planning Note    Patient name Kaykay Holland  Location /-01 MRN [id number]  : 1990 Date 3/6/2025       Current Admission Date: 2025  Current Admission Diagnosis:Necrotizing pancreatitis   Patient Active Problem List    Diagnosis Date Noted Date Diagnosed    Abdominal pain 2025     Splenic vein thrombosis 2024     Leukocytosis 2024     Thrombocytopenia (HCC) 2024     Ovarian cyst 2024     Constipation 2024     Pancreatic pseudocyst 2024     Electrolyte abnormality 2024     Alcohol use disorder 2024     Hematemesis 2024     Necrotizing pancreatitis 2024     Pancytopenia (HCC) 04/10/2024     Alcohol abuse 2024     Provoked seizures (HCC) 2024     Alcohol withdrawal seizure (HCC) 2024     Transaminitis 2024     Prolonged QT interval 2024     Essential hypertension 2024     Tobacco abuse 2024     Acute alcoholic pancreatitis 2023     Hepatic steatosis 2023     Peptic ulcer disease 2021     Alcohol withdrawal (HCC) 2020     Anxiety 2019     Depression 2019       LOS (days): 7  Geometric Mean LOS (GMLOS) (days): 3  Days to GMLOS:-3.7     OBJECTIVE:  Risk of Unplanned Readmission Score: 33.27         Current admission status: Inpatient   Preferred Pharmacy:   [facility] #[id number] - [place] - [address]  [address]  [place]   Phone: [phone] Fax: [phone]    Primary Care Provider: PATRICIA Moran    Primary Insurance:   Secondary Insurance:     DISCHARGE DETAILS:           As per patient care rounds, patient for discharge on Friday, receiving IVF.  Met with patient to remind her of a possible Friday discharge and she states her pain is not control on PO meds and she is received IM Dilaudid.She is for a repeat CT abd by GI. CM to follow.

## 2025-03-06 NOTE — PLAN OF CARE
Problem: Potential for Falls  Goal: Patient will remain free of falls  Description: INTERVENTIONS:  - Educate patient/family on patient safety including physical limitations  - Instruct patient to call for assistance with activity   - Consult OT/PT to assist with strengthening/mobility   - Keep Call bell within reach  - Keep bed low and locked with side rails adjusted as appropriate  - Keep care items and personal belongings within reach  - Initiate and maintain comfort rounds  - Make Fall Risk Sign visible to staff  - Offer Toileting every 2 Hours, in advance of need  - Initiate/Maintain bed alarm  - Obtain necessary fall risk management equipment:  - Apply yellow socks and bracelet for high fall risk patients  - Consider moving patient to room near nurses station  Outcome: Progressing     Problem: PAIN - ADULT  Goal: Verbalizes/displays adequate comfort level or baseline comfort level  Description: Interventions:  - Encourage patient to monitor pain and request assistance  - Assess pain using appropriate pain scale  - Administer analgesics based on type and severity of pain and evaluate response  - Implement non-pharmacological measures as appropriate and evaluate response  - Consider cultural and social influences on pain and pain management  - Notify physician/advanced practitioner if interventions unsuccessful or patient reports new pain  Outcome: Progressing     Problem: DISCHARGE PLANNING  Goal: Discharge to home or other facility with appropriate resources  Description: INTERVENTIONS:  - Identify barriers to discharge w/patient and caregiver  - Arrange for needed discharge resources and transportation as appropriate  - Identify discharge learning needs (meds, wound care, etc.)  - Arrange for interpretive services to assist at discharge as needed  - Refer to Case Management Department for coordinating discharge planning if the patient needs post-hospital services based on physician/advanced practitioner  order or complex needs related to functional status, cognitive ability, or social support system  Outcome: Progressing     Problem: Knowledge Deficit  Goal: Patient/family/caregiver demonstrates understanding of disease process, treatment plan, medications, and discharge instructions  Description: Complete learning assessment and assess knowledge base.  Interventions:  - Provide teaching at level of understanding  - Provide teaching via preferred learning methods  Outcome: Progressing     Problem: GASTROINTESTINAL - ADULT  Goal: Minimal or absence of nausea and/or vomiting  Description: INTERVENTIONS:  - Administer IV fluids if ordered to ensure adequate hydration  - Maintain NPO status until nausea and vomiting are resolved  - Nasogastric tube if ordered  - Administer ordered antiemetic medications as needed  - Provide nonpharmacologic comfort measures as appropriate  - Advance diet as tolerated, if ordered  - Consider nutrition services referral to assist patient with adequate nutrition and appropriate food choices  Outcome: Progressing  Goal: Maintains or returns to baseline bowel function  Description: INTERVENTIONS:  - Assess bowel function  - Encourage oral fluids to ensure adequate hydration  - Administer IV fluids if ordered to ensure adequate hydration  - Administer ordered medications as needed  - Encourage mobilization and activity  - Consider nutritional services referral to assist patient with adequate nutrition and appropriate food choices  Outcome: Progressing  Goal: Maintains adequate nutritional intake  Description: INTERVENTIONS:  - Monitor percentage of each meal consumed  - Identify factors contributing to decreased intake, treat as appropriate  - Assist with meals as needed  - Monitor I&O, weight, and lab values if indicated  - Obtain nutrition services referral as needed  Outcome: Progressing  Goal: Oral mucous membranes remain intact  Description: INTERVENTIONS  - Assess oral mucosa and  hygiene practices  - Implement preventative oral hygiene regimen  - Implement oral medicated treatments as ordered  - Initiate Nutrition services referral as needed  Outcome: Progressing

## 2025-03-06 NOTE — PROGRESS NOTES
"Progress Note - Hospitalist   Name: Kaykay Holland 34 y.o. female I MRN: [id number]  Unit/Bed#: -01 I Date of Admission: 2/27/2025   Date of Service: 3/6/2025 I Hospital Day: 7    Assessment & Plan  Necrotizing pancreatitis  Presenting to the ED with left upper quadrant pain x 1 to 2 weeks with acute worsening in the last 1 to 2 days-endorses associated NVD  History of necrotizing pancreatitis with mass effect and s/p EUS with cystogastrostomy stent placement on 12/17 at [facility]  Underwent stent removal 1/9 outpatient with GI as tissue appeared to be healing well  Readmitted at [facility] 1/7-1/10 where she was conservatively managed with IV fluids and pain control  CT A/P: \"Findings of acute pancreatitis with possible scattered mildly complicated pseudocysts. There is an abscess collection interposed between the stomach and the spleen estimated at 2.1 (AP) x 4.8 (TRV) x 4.7 (CC) cm. This is similar to perhaps slightly increased from 1/7/2025 examination, although comparison is somewhat impeded as there was a drain on prior exam.\"  Serum lipase normalized  Still with persistent pain  Restarted IV fluid with lactated Ringer's at 126 L an hour  Did discuss that patient likely will have some degree of pain given her pancreatitis and complex anatomy  Unclear disposition  Pancreatic pseudocyst  S/p cystogastrostomy with stent placement 12/17 with stent removal 1/9 outpatient by GI  Continue outpatient follow-up with GI  Started on low-fat diet  Alcohol abuse  History of alcohol abuse  Reports that she continues to drink 1 bottle of wine daily  Drank 2 glasses of wine prior to arrival to the ED  Patient is well out of the windows for Dts  Certified  consultation  Continue thiamine  Tobacco abuse  Continue nicotine patch, smoking cessation encouraged  Depression  Continue [facility] Course:     34-year-old female patient presenting with necrotizing pancreatitis, significant past medical history " of alcohol abuse disorder.  Still with persistent pain.    Assessment:      Principal Problem:    Necrotizing pancreatitis  Active Problems:    Tobacco abuse    Alcohol abuse    Depression    Pancreatic pseudocyst      Plan:    Restart lactated Ringer  Pain control       VTE Pharmacologic Prophylaxis:   Pharmacologic: Enoxaparin (Lovenox)  Mechanical VTE Prophylaxis in Place: Yes    AM-PAC Basic Mobility:  Basic Mobility Inpatient Raw Score: 24    JH-HLM Achieved: 7: Walk 25 feet or more  JH-HLM Goal: 8: Walk 250 feet or more    HLM Goal listed above. Continue with multidisciplinary rounding and encourage appropriate mobility to improve upon HLM goals.         Patient Centered Rounds: Case discussed and reviewed with nursing    Discussions with Specialists or Other Care Team Provider: Case management    Education and Discussions with Family / Patient: Patient updating family    Time Spent for Care: 80 minutes.  More than 50% of total time spent on counseling and coordination of care as described above.    Current Length of Stay: 7 day(s)    Current Patient Status: Inpatient   Certification Statement: The patient will continue to require additional inpatient hospital stay due to additional pain management    Discharge Plan / Estimated Discharge Date: To be determined but likely greater than 24 hours    Code Status: Level 1 - Full Code      Subjective:   Seen and examined, no acute complaints, her main concern is that she continues to have abdominal pain.  She still feels nauseous    A complete and comprehensive 14 point organ system review has been performed and all other systems are negative other than stated above.    Objective:     Vitals:   Temp (24hrs), Av.7 °F (36.5 °C), Min:97.5 °F (36.4 °C), Max:97.9 °F (36.6 °C)    Temp:  [97.5 °F (36.4 °C)-97.9 °F (36.6 °C)] 97.5 °F (36.4 °C)  HR:  [] 72  Resp:  [15-22] 22  BP: (112-145)/(74-97) 112/74  SpO2:  [93 %-97 %] 94 %  Body mass index is 19.97 kg/m².      Input and Output Summary (last 24 hours):       Intake/Output Summary (Last 24 hours) at 3/6/2025 1024  Last data filed at 3/6/2025 0501  Gross per 24 hour   Intake 600 ml   Output --   Net 600 ml       Physical Exam:     General: well appearing, no acute distress  HEENT: atraumatic, PERRLA, moist mucosa, normal pharynx, normal tonsils and adenoids, normal tongue, no fluid in sinuses  Neck: Trachea midline, no carotid bruit, no masses  Respiratory: normal chest wall expansion, CTA B, no r/r/w, no rubs  Cardiovascular: RRR, no m/r/g, Normal S1 and S2  Abdomen: Soft, non-tender, non-distended, normal bowel sounds in all quadrants, no hepatosplenomegaly, no tympany  Rectal: deferred  Musculoskeletal: normal ROM in upper and lower extremities  Integumentary: warm, dry, and pink, with no rash, purpura, or petechia  Heme/Lymph: no lymphadenopathy, no bruises  Neurological: Cranial Nerves II-XII grossly intact  Psychiatric: cooperative with normal mood, affect, and cognition      Additional Data:     Labs:    Results from last 7 days   Lab Units 03/06/25  0425   WBC Thousand/uL 7.13   HEMOGLOBIN g/dL 12.1   HEMATOCRIT % 37.0   PLATELETS Thousands/uL 241   SEGS PCT % 52   LYMPHO PCT % 30   MONO PCT % 13*   EOS PCT % 4     Results from last 7 days   Lab Units 03/06/25  0425 03/05/25  0506   POTASSIUM mmol/L 4.2 4.1   CHLORIDE mmol/L 99 98   CO2 mmol/L 30 32   BUN mg/dL 5 6   CREATININE mg/dL 0.47* 0.48*   CALCIUM mg/dL 8.9 8.5   ALK PHOS U/L  --  52   ALT U/L  --  13   AST U/L  --  23     Results from last 7 days   Lab Units 02/28/25  0507   INR  1.16       * I Have Reviewed All Lab Data Listed Above.  * Additional Pertinent Lab Tests Reviewed: All Labs For Current Hospital Admission Reviewed    Imaging:    Imaging Reports Reviewed Today Include: No new imaging  Imaging Personally Reviewed by Myself Includes: No new imaging    Recent Cultures (last 7 days):     Results from last 7 days   Lab Units 02/27/25  2216   BLOOD  CULTURE  No Growth After 5 Days.  No Growth After 5 Days.       Last 24 Hours Medication List:   Current Facility-Administered Medications   Medication Dose Route Frequency Provider Last Rate    DULoxetine  30 mg Oral Daily PATRICIA Azevedo      folic acid  1 mg Oral Daily PATRICIA Azevedo      lactated ringers  126 mL/hr Intravenous Continuous Lester Rudd DO      magnesium Oxide  400 mg Oral BID Daxa Omer PA-C      multivitamin-minerals  1 tablet Oral Daily PATRICIA Azevedo      naloxone  0.04 mg Intravenous Q1MIN PRN PATRICIA Azevedo      nicotine  1 patch Transdermal Daily Florina Schafer PA-C      ondansetron  4 mg Intravenous Q6H PRN Florina Schafer PA-C      oxyCODONE  2.5 mg Oral Q4H PRN PATRICIA Azevedo      Or    oxyCODONE  5 mg Oral Q4H PRN PATRICIA Azevedo      pantoprazole  40 mg Oral BID PATRICIA Azevedo      polyethylene glycol  34 g Oral BID Asael Ordoñez DO      senna-docusate sodium  1 tablet Oral HS PATRICIA Azevedo      thiamine  100 mg Oral Daily PATRICIA Azevedo      traZODone  25 mg Oral HS PRN Stephanie Reid PA-C      trimethobenzamide  200 mg Intramuscular Q8H PRN PATRICIA Petty         AM-PAC Basic Mobility:  Basic Mobility Inpatient Raw Score: 24    JH-HLM Achieved: 7: Walk 25 feet or more  JH-HLM Goal: 8: Walk 250 feet or more    HLM Goal listed above. Continue with multidisciplinary rounding and encourage appropriate mobility to improve upon HLM goals.     Today, Patient Was Seen By: Lester Rudd DO    ** Please Note: This note was completed in part utilizing [employer] One [employer] software dictation.  Grammatical errors, random word insertions, spelling mistakes, and incomplete sentences may be an occasional consequence of this system secondary to software limitations, ambient noise, and hardware issues.  If you have any questions or concerns about the content, text,  or information contained within the body of this dictation, please contact the provider for clarification. **

## 2025-03-06 NOTE — PROGRESS NOTES
"Progress Note - Gastroenterology   Name: Kaykay Holland 34 y.o. female I MRN: [id number]  Unit/Bed#: -01 I Date of Admission: 2/27/2025   Date of Service: 3/6/2025 I Hospital Day: 7     Assessment & Plan  Necrotizing pancreatitis  34-year-old female with alcohol and tobacco use disorder, alcohol induced necrotizing pancreatitis and walled off necrosis status post EUS guided cyst drainage December 2024 readmitted with abdominal pain, elevated lipase, findings of acute pancreatitis on imaging. CT shows possible scattered mildly complicated pseudocyst, abscess collection interposed between the stomach and spleen measuring approximately 2.1 x 4.8 x 4.7 cm, similar to prior imaging from January although comparison impeded due to presence of cyst gastrostomy. No leukocytosis or fever. Procalcitonin normal.    Suspect collection is necrotic collection containing gas related to previous cyst gastrostomy, less likely infected necrosis given normal WBC count and lack of fever.    She is having some worsening left upper quadrant pain today.  She is passing gas and having bowel movements.  Vital stable, no fever.      -Due to worsening pain, repeat CT abdomen/pelvis today.  -Wean opiates aggressively  -Can de-escalate MiraLAX to 17 g twice daily once she is reliably having bowel movements  -Per message from advanced endoscopy team, \"abscess \"seen on imaging is from prior gastrostomy.  Recommend repeat CT in a few weeks when discharged for reassessment.  -Complete ETOH cessation advised  -No GI contraindication to discharge when reliably tolerating PO  Pancreatic pseudocyst  See above.  Tobacco abuse  Discussed critical need for tobacco cessation  Alcohol abuse  Counseled on abstinence from drinking    -Continue CIWA protocol  -Continue folic acid and thiamine    Encouraged patient to think about inpatient or outpatient alcohol assistance.  Expressed to patient that [facility] is available to her if she would like the " support.  Depression      24 Hour Events : Worsening LUQ pain, no infectious symptoms  Subjective : As above    Objective :  Temp:  [97.5 °F (36.4 °C)-97.9 °F (36.6 °C)] 97.5 °F (36.4 °C)  HR:  [] 72  BP: (112-145)/(74-97) 112/74  Resp:  [15-22] 22  SpO2:  [93 %-97 %] 94 %  O2 Device: None (Room air)    Physical Exam    General Appearance: NAD, cooperative, alert; uncomfortable in appearance, worse than yesterday  Eyes: Anicteric  GI:  LUQ tenderness mildly worse than yesterday; no peritoneal signs  Rectal: Deferred  Musculoskeletal: No edema.  Skin:     No jaundice    Lab Results: I have reviewed the following results:

## 2025-03-07 LAB
ANION GAP SERPL CALCULATED.3IONS-SCNC: 5 MMOL/L (ref 4–13)
BASOPHILS # BLD AUTO: 0.05 THOUSANDS/ÂΜL (ref 0–0.1)
BASOPHILS NFR BLD AUTO: 1 % (ref 0–1)
BUN SERPL-MCNC: 6 MG/DL (ref 5–25)
CALCIUM SERPL-MCNC: 8.6 MG/DL (ref 8.4–10.2)
CHLORIDE SERPL-SCNC: 99 MMOL/L (ref 96–108)
CO2 SERPL-SCNC: 35 MMOL/L (ref 21–32)
CREAT SERPL-MCNC: 0.51 MG/DL (ref 0.6–1.3)
EOSINOPHIL # BLD AUTO: 0.24 THOUSAND/ÂΜL (ref 0–0.61)
EOSINOPHIL NFR BLD AUTO: 4 % (ref 0–6)
ERYTHROCYTE [DISTWIDTH] IN BLOOD BY AUTOMATED COUNT: 13.8 % (ref 11.6–15.1)
GFR SERPL CREATININE-BSD FRML MDRD: 126 ML/MIN/1.73SQ M
GLUCOSE SERPL-MCNC: 79 MG/DL (ref 65–140)
HCT VFR BLD AUTO: 37 % (ref 34.8–46.1)
HGB BLD-MCNC: 11.8 G/DL (ref 11.5–15.4)
IMM GRANULOCYTES # BLD AUTO: 0.04 THOUSAND/UL (ref 0–0.2)
IMM GRANULOCYTES NFR BLD AUTO: 1 % (ref 0–2)
LIPASE SERPL-CCNC: 65 U/L (ref 11–82)
LYMPHOCYTES # BLD AUTO: 2.12 THOUSANDS/ÂΜL (ref 0.6–4.47)
LYMPHOCYTES NFR BLD AUTO: 38 % (ref 14–44)
MAGNESIUM SERPL-MCNC: 1.4 MG/DL (ref 1.9–2.7)
MCH RBC QN AUTO: 33.9 PG (ref 26.8–34.3)
MCHC RBC AUTO-ENTMCNC: 31.9 G/DL (ref 31.4–37.4)
MCV RBC AUTO: 106 FL (ref 82–98)
MONOCYTES # BLD AUTO: 0.78 THOUSAND/ÂΜL (ref 0.17–1.22)
MONOCYTES NFR BLD AUTO: 14 % (ref 4–12)
NEUTROPHILS # BLD AUTO: 2.41 THOUSANDS/ÂΜL (ref 1.85–7.62)
NEUTS SEG NFR BLD AUTO: 42 % (ref 43–75)
NRBC BLD AUTO-RTO: 0 /100 WBCS
PLATELET # BLD AUTO: 243 THOUSANDS/UL (ref 149–390)
PMV BLD AUTO: 11.1 FL (ref 8.9–12.7)
POTASSIUM SERPL-SCNC: 4.3 MMOL/L (ref 3.5–5.3)
RBC # BLD AUTO: 3.48 MILLION/UL (ref 3.81–5.12)
SODIUM SERPL-SCNC: 139 MMOL/L (ref 135–147)
WBC # BLD AUTO: 5.64 THOUSAND/UL (ref 4.31–10.16)

## 2025-03-07 PROCEDURE — 85025 COMPLETE CBC W/AUTO DIFF WBC: CPT | Performed by: INTERNAL MEDICINE

## 2025-03-07 PROCEDURE — 99232 SBSQ HOSP IP/OBS MODERATE 35: CPT | Performed by: HOSPITALIST

## 2025-03-07 PROCEDURE — 83735 ASSAY OF MAGNESIUM: CPT | Performed by: INTERNAL MEDICINE

## 2025-03-07 PROCEDURE — 80048 BASIC METABOLIC PNL TOTAL CA: CPT | Performed by: INTERNAL MEDICINE

## 2025-03-07 PROCEDURE — 83690 ASSAY OF LIPASE: CPT | Performed by: INTERNAL MEDICINE

## 2025-03-07 PROCEDURE — 99232 SBSQ HOSP IP/OBS MODERATE 35: CPT | Performed by: INTERNAL MEDICINE

## 2025-03-07 RX ORDER — POLYETHYLENE GLYCOL 3350 17 G/17G
17 POWDER, FOR SOLUTION ORAL 2 TIMES DAILY
Status: DISCONTINUED | OUTPATIENT
Start: 2025-03-07 | End: 2025-03-08 | Stop reason: HOSPADM

## 2025-03-07 RX ORDER — HYDROMORPHONE HCL/PF 1 MG/ML
0.5 SYRINGE (ML) INJECTION
Status: COMPLETED | OUTPATIENT
Start: 2025-03-07 | End: 2025-03-08

## 2025-03-07 RX ORDER — MAGNESIUM CARB/ALUMINUM HYDROX 105-160MG
296 TABLET,CHEWABLE ORAL ONCE
Status: COMPLETED | OUTPATIENT
Start: 2025-03-07 | End: 2025-03-07

## 2025-03-07 RX ORDER — HYDROMORPHONE HCL/PF 1 MG/ML
0.5 SYRINGE (ML) INJECTION ONCE AS NEEDED
Status: COMPLETED | OUTPATIENT
Start: 2025-03-07 | End: 2025-03-07

## 2025-03-07 RX ADMIN — MAGNESIUM CITRATE 296 ML: 1.75 LIQUID ORAL at 15:14

## 2025-03-07 RX ADMIN — Medication 400 MG: at 16:40

## 2025-03-07 RX ADMIN — TRAZODONE HYDROCHLORIDE 25 MG: 50 TABLET ORAL at 23:37

## 2025-03-07 RX ADMIN — OXYCODONE HYDROCHLORIDE 5 MG: 5 TABLET ORAL at 16:40

## 2025-03-07 RX ADMIN — OXYCODONE HYDROCHLORIDE 5 MG: 5 TABLET ORAL at 23:37

## 2025-03-07 RX ADMIN — Medication 400 MG: at 07:49

## 2025-03-07 RX ADMIN — OXYCODONE HYDROCHLORIDE 5 MG: 5 TABLET ORAL at 11:37

## 2025-03-07 RX ADMIN — PANTOPRAZOLE SODIUM 40 MG: 40 TABLET, DELAYED RELEASE ORAL at 07:49

## 2025-03-07 RX ADMIN — TRAZODONE HYDROCHLORIDE 25 MG: 50 TABLET ORAL at 01:33

## 2025-03-07 RX ADMIN — FOLIC ACID 1 MG: 1 TABLET ORAL at 07:49

## 2025-03-07 RX ADMIN — ONDANSETRON 4 MG: 2 INJECTION INTRAMUSCULAR; INTRAVENOUS at 16:40

## 2025-03-07 RX ADMIN — NICOTINE 1 PATCH: 21 PATCH, EXTENDED RELEASE TRANSDERMAL at 20:09

## 2025-03-07 RX ADMIN — SODIUM CHLORIDE, SODIUM LACTATE, POTASSIUM CHLORIDE, AND CALCIUM CHLORIDE 126 ML/HR: .6; .31; .03; .02 INJECTION, SOLUTION INTRAVENOUS at 06:34

## 2025-03-07 RX ADMIN — HYDROMORPHONE HYDROCHLORIDE 0.5 MG: 1 INJECTION, SOLUTION INTRAMUSCULAR; INTRAVENOUS; SUBCUTANEOUS at 13:08

## 2025-03-07 RX ADMIN — OXYCODONE HYDROCHLORIDE 5 MG: 5 TABLET ORAL at 01:33

## 2025-03-07 RX ADMIN — HYDROMORPHONE HYDROCHLORIDE 0.5 MG: 1 INJECTION, SOLUTION INTRAMUSCULAR; INTRAVENOUS; SUBCUTANEOUS at 08:50

## 2025-03-07 RX ADMIN — HYDROMORPHONE HYDROCHLORIDE 0.5 MG: 1 INJECTION, SOLUTION INTRAMUSCULAR; INTRAVENOUS; SUBCUTANEOUS at 20:09

## 2025-03-07 RX ADMIN — OXYCODONE HYDROCHLORIDE 5 MG: 5 TABLET ORAL at 07:48

## 2025-03-07 RX ADMIN — Medication 100 MG: at 07:49

## 2025-03-07 RX ADMIN — MAGNESIUM CITRATE 296 ML: 1.75 LIQUID ORAL at 17:30

## 2025-03-07 RX ADMIN — DULOXETINE HYDROCHLORIDE 30 MG: 30 CAPSULE, DELAYED RELEASE ORAL at 07:49

## 2025-03-07 RX ADMIN — Medication 1 TABLET: at 07:49

## 2025-03-07 RX ADMIN — PANTOPRAZOLE SODIUM 40 MG: 40 TABLET, DELAYED RELEASE ORAL at 20:09

## 2025-03-07 NOTE — PROGRESS NOTES
"Progress Note - Hospitalist   Name: Kaykay Holland 34 y.o. female I MRN: [id number]  Unit/Bed#: -01 I Date of Admission: 2/27/2025   Date of Service: 3/7/2025 I Hospital Day: 8     Assessment & Plan  Necrotizing pancreatitis  Presenting to the ED with left upper quadrant pain x 1 to 2 weeks with acute worsening in the last 1 to 2 days-endorses associated NVD  History of necrotizing pancreatitis with mass effect and s/p EUS with cystogastrostomy stent placement on 12/17 at [facility]  Underwent stent removal 1/9 outpatient with GI as tissue appeared to be healing well  Readmitted at [facility] 1/7-1/10 where she was conservatively managed with IV fluids and pain control  CT A/P: \"Findings of acute pancreatitis with possible scattered mildly complicated pseudocysts. There is an abscess collection interposed between the stomach and the spleen estimated at 2.1 (AP) x 4.8 (TRV) x 4.7 (CC) cm. This is similar to perhaps slightly increased from 1/7/2025 examination, although comparison is somewhat impeded as there was a drain on prior exam.\"  Serum lipase normalized  Still with persistent pain  Restarted IV fluid with lactated Ringer's at 126 L an hour  Did discuss that patient likely will have some degree of pain given her pancreatitis and complex anatomy  Repeat CT scan 3/6 suggestive of constipation. Advised avoiding narcs and ambulating around as much as tolerating. I encouraged her to take miralax.   Pancreatic pseudocyst  S/p cystogastrostomy with stent placement 12/17 with stent removal 1/9 outpatient by GI  Continue outpatient follow-up with GI  Started on low-fat diet  Alcohol abuse  History of alcohol abuse  Reports that she continues to drink 1 bottle of wine daily  Drank 2 glasses of wine prior to arrival to the ED  Patient is well out of the windows for Dts  Certified  consultation  Continue thiamine  Tobacco abuse  Continue nicotine patch, smoking cessation encouraged  Depression  Continue " Cymbalta  GERD (gastroesophageal reflux disease) (Resolved: 3/6/2025)  Continue PPI  Lactic acidosis (Resolved: 3/6/2025)  Normal status post IV fluid hydration  Anemia (Resolved: 3/6/2025)  Chronic anemia stable  Continue folic acid supplementation   VTE  Prophylaxis:   Pharmacologic: in place  Mechanical VTE Prophylaxis in Place: Yes    Patient Centered Rounds: I have performed bedside rounds with nursing staff today.    Discussions with Specialists or Other Care Team Provider: case management    Education and Discussions with Family / Patient: yes    Mobility:   Basic Mobility Inpatient Raw Score: 24  JH-HLM Goal: 8: Walk 250 feet or more  JH-HLM Achieved: 7: Walk 25 feet or more      Current Length of Stay: 8 day(s)    Current Patient Status: Inpatient        Code Status: Level 1 - Full Code    Discharge Plan: Pt will require continued inpatient hospitalization.    Subjective:     Pt did have emesis today  Still with ongoing abd pain but improving  Overall appetite fair     Patient is seen and examined at bedside.  All other ROS are negative.    Objective:     Vitals:   Temp (24hrs), Av.8 °F (36 °C), Min:96.8 °F (36 °C), Max:96.8 °F (36 °C)    Temp:  [96.8 °F (36 °C)] 96.8 °F (36 °C)  HR:  [62-79] 66  Resp:  [16] 16  BP: (107-142)/(76-95) 107/76  SpO2:  [95 %-98 %] 95 %  Body mass index is 19.97 kg/m².     Input and Output Summary (last 24 hours):       Intake/Output Summary (Last 24 hours) at 3/7/2025 1256  Last data filed at 3/7/2025 0850  Gross per 24 hour   Intake 2785.1 ml   Output --   Net 2785.1 ml       Physical Exam:       GEN: No acute distress, comfortable  HEEENT: No JVD, PERRLA, no scleral icterus  RESP: Lungs clear to auscultation bilaterally  CV: RRR, +s1/s2   ABD: SOFT NON TENDER, POSITIVE BOWEL SOUNDS, NO DISTENTION  PSYCH: CALM  NEURO: Mentation baseline, NO FOCAL DEFICITS  SKIN: NO RASH  EXTREM: NO EDEMA    Additional Data:     Labs:    Results from last 7 days   Lab Units 25  6399    WBC Thousand/uL 5.64   HEMOGLOBIN g/dL 11.8   HEMATOCRIT % 37.0   PLATELETS Thousands/uL 243   SEGS PCT % 42*   LYMPHO PCT % 38   MONO PCT % 14*   EOS PCT % 4     Results from last 7 days   Lab Units 03/07/25  0437 03/06/25  0425 03/05/25  0506   SODIUM mmol/L 139   < > 136   POTASSIUM mmol/L 4.3   < > 4.1   CHLORIDE mmol/L 99   < > 98   CO2 mmol/L 35*   < > 32   BUN mg/dL 6   < > 6   CREATININE mg/dL 0.51*   < > 0.48*   ANION GAP mmol/L 5   < > 6   CALCIUM mg/dL 8.6   < > 8.5   ALBUMIN g/dL  --   --  3.2*   TOTAL BILIRUBIN mg/dL  --   --  0.33   ALK PHOS U/L  --   --  52   ALT U/L  --   --  13   AST U/L  --   --  23   GLUCOSE RANDOM mg/dL 79   < > 87    < > = values in this interval not displayed.                       Lines/Drains:  Invasive Devices       Peripheral Intravenous Line  Duration             Peripheral IV 03/06/25 Proximal;Right;Ventral (anterior) Forearm <1 day                    Telemetry:        * I Have Reviewed All Lab Data Listed Above.           Imaging:     Results for orders placed during the hospital encounter of 04/08/24    XR chest portable    Narrative  XR CHEST PORTABLE    INDICATION: fever. Seizure        COMPARISON: 04/08/2024    FINDINGS:    Clear lungs. No pneumothorax or pleural effusion.    Normal cardiomediastinal silhouette.    Bones are unremarkable for age.    Normal upper abdomen.    Impression  No acute cardiopulmonary disease.        Workstation performed: [id number]    No results found for this or any previous visit.      *I have reviewed all imaging reports listed above      Recent Cultures (last 7 days):           Last 24 Hours Medication List:   Current Facility-Administered Medications   Medication Dose Route Frequency Provider Last Rate    DULoxetine  30 mg Oral Daily PATRICIA Azevedo      enoxaparin  40 mg Subcutaneous Q24H RODRICK Rudd DO      folic acid  1 mg Oral Daily PATRICIA Azevedo      HYDROmorphone  0.5 mg Intravenous Q3H PRN  Kevan Tijerina MD      LORazepam  0.5 mg Oral Q8H PRN Lester Rudd DO      magnesium Oxide  400 mg Oral BID Daxa Omer PA-C      multivitamin-minerals  1 tablet Oral Daily PATRICIA Azevedo      naloxone  0.04 mg Intravenous Q1MIN PRN PATRICIA Azevedo      nicotine  1 patch Transdermal Daily Florina Schafer PA-C      ondansetron  4 mg Intravenous Q6H PRN Florina Schafer PA-C      oxyCODONE  2.5 mg Oral Q4H PRN PATRICIA Azevedo      Or    oxyCODONE  5 mg Oral Q4H PRN PATRICIA Azevedo      pantoprazole  40 mg Oral BID PATRICIA Azevedo      polyethylene glycol  17 g Oral BID PATRICIA Donato      senna-docusate sodium  1 tablet Oral HS PATRICIA Azevedo      thiamine  100 mg Oral Daily PATRICIA Azevedo      traZODone  25 mg Oral HS PRN Stephanie Reid PA-C      trimethobenzamide  200 mg Intramuscular Q8H PRN PATRICIA Petty          Today, Patient Was Seen By: Kevan Tijerina MD    ** Please Note: Dictation voice to text software may have been used in the creation of this document. **

## 2025-03-07 NOTE — ASSESSMENT & PLAN NOTE
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 CT shows possible scattered mildly complicated pseudocyst, abscess collection interposed between the stomach and spleen measuring approximately 2.1 x 4.8 x 4.7 cm, similar to prior imaging from January although comparison impeded due to presence of cyst gastrostomy. No leukocytosis or fever. Procalcitonin normal.    Suspect collection is necrotic collection containing gas related to previous cyst gastrostomy, less likely infected necrosis given normal WBC count and lack of fever.    CT A/P repeated 3/6;  Stable acute to subacute pancreatitis  Pancreatic cystic lesion versus fluid collection are slightly larger, measuring up to 1 cm.  Near complete interval resolution of presumed pneumocystis adjacent to the greater curvature the stomach and spleen.  Possible mild secondary gastritis  Findings compatible with constipation    She continues to have left upper quadrant tenderness.  Per patient's nurse she required one-time dose of IV Dilaudid for increased severe pain this morning.    She is passing gas and having bowel movements.  VSS/afebrile .      -Try to avoid IV opioids   -MiraLAX to 17 g twice daily   -Per message from advanced endoscopy team, \"abscess \"seen on imaging is from prior gastrostomy.  Recommend repeat CT in a few weeks when discharged for reassessment.  -Complete ETOH cessation advised    Discuss patient with Dr. Clayton on rounds.  Patient is stating she needs to be discharged by Sunday.  Discussed with patient if she is continuing to require IV opioid use for pain management cannot guarantee timeframe of discharge.  "

## 2025-03-07 NOTE — PROGRESS NOTES
"Progress Note - Gastroenterology   Name: Kaykay Holland 34 y.o. female I MRN: [id number]  Unit/Bed#: -01 I Date of Admission: 2/27/2025   Date of Service: 3/7/2025 I Hospital Day: 8    Assessment & Plan  Necrotizing pancreatitis  34-year-old female with alcohol and tobacco use disorder, alcohol induced necrotizing pancreatitis and walled off necrosis status post EUS guided cyst drainage December 2024 readmitted with abdominal pain, elevated lipase, findings of acute pancreatitis on imaging. CT shows possible scattered mildly complicated pseudocyst, abscess collection interposed between the stomach and spleen measuring approximately 2.1 x 4.8 x 4.7 cm, similar to prior imaging from January although comparison impeded due to presence of cyst gastrostomy. No leukocytosis or fever. Procalcitonin normal.    Suspect collection is necrotic collection containing gas related to previous cyst gastrostomy, less likely infected necrosis given normal WBC count and lack of fever.    CT A/P repeated 3/6;  Stable acute to subacute pancreatitis  Pancreatic cystic lesion versus fluid collection are slightly larger, measuring up to 1 cm.  Near complete interval resolution of presumed pneumocystis adjacent to the greater curvature the stomach and spleen.  Possible mild secondary gastritis  Findings compatible with constipation    She continues to have left upper quadrant tenderness.  Per patient's nurse she required one-time dose of IV Dilaudid for increased severe pain this morning.    She is passing gas and having bowel movements.  VSS/afebrile .      -Try to avoid IV opioids   -MiraLAX to 17 g twice daily   -Per message from advanced endoscopy team, \"abscess \"seen on imaging is from prior gastrostomy.  Recommend repeat CT in a few weeks when discharged for reassessment.  -Complete ETOH cessation advised    Discuss patient with Dr. Clayton on rounds.  Patient is stating she needs to be discharged by Sunday.  Discussed with " patient if she is continuing to require IV opioid use for pain management cannot guarantee timeframe of discharge.  Pancreatic pseudocyst  See above.  Tobacco abuse  Discussed critical need for tobacco cessation  Alcohol abuse  Counseled on abstinence from drinking    -Continue CIWA protocol  -Continue folic acid and thiamine    Encouraged patient to think about inpatient or outpatient alcohol assistance.  Expressed to patient that [facility] is available to her if she would like the support.  Depression          Subjective   Patient continues with increased left upper quadrant tenderness with palpation.  States she is tolerating her meals.  States she is having loose bowel movements.  Spoke with patient's nurse who stated patient required one-time dose of IV Dilaudid this morning for severe abdominal pain.  However, patient does states she has to be discharged by Sunday.    Objective :  Temp:  [96.8 °F (36 °C)] 96.8 °F (36 °C)  HR:  [62-79] 66  BP: (107-142)/(76-95) 107/76  Resp:  [16] 16  SpO2:  [95 %-98 %] 95 %  O2 Device: None (Room air)    Physical Exam  Vitals and nursing note reviewed.   HENT:      Head: Normocephalic.      Nose: Nose normal.      Mouth/Throat:      Mouth: Mucous membranes are dry.   Eyes:      General: No scleral icterus.     Extraocular Movements: Extraocular movements intact.   Cardiovascular:      Rate and Rhythm: Normal rate and regular rhythm.      Heart sounds: Normal heart sounds.   Pulmonary:      Breath sounds: Normal breath sounds.   Abdominal:      General: Bowel sounds are normal. There is no distension.      Palpations: Abdomen is soft.      Tenderness: There is abdominal tenderness (Increased left upper quadrant tenderness with palpation).   Musculoskeletal:         General: No swelling. Normal range of motion.      Cervical back: Normal range of motion.   Skin:     General: Skin is warm and dry.      Coloration: Skin is not jaundiced.   Neurological:      Mental Status: She is  alert and oriented to person, place, and time.   Psychiatric:         Mood and Affect: Mood normal.           Lab Results: I have reviewed the following results:

## 2025-03-07 NOTE — PLAN OF CARE
Problem: Potential for Falls  Goal: Patient will remain free of falls  Description: INTERVENTIONS:  - Educate patient/family on patient safety including physical limitations  - Instruct patient to call for assistance with activity   - Consult OT/PT to assist with strengthening/mobility   - Keep Call bell within reach  - Keep bed low and locked with side rails adjusted as appropriate  - Keep care items and personal belongings within reach  - Initiate and maintain comfort rounds  - Make Fall Risk Sign visible to staff  - Offer Toileting every 2 Hours, in advance of need  - Initiate/Maintain bed alarm  - Obtain necessary fall risk management equipment  - Apply yellow socks and bracelet for high fall risk patients  - Consider moving patient to room near nurses station  Outcome: Progressing     Problem: PAIN - ADULT  Goal: Verbalizes/displays adequate comfort level or baseline comfort level  Description: Interventions:  - Encourage patient to monitor pain and request assistance  - Assess pain using appropriate pain scale  - Administer analgesics based on type and severity of pain and evaluate response  - Implement non-pharmacological measures as appropriate and evaluate response  - Consider cultural and social influences on pain and pain management  - Notify physician/advanced practitioner if interventions unsuccessful or patient reports new pain  Outcome: Progressing     Problem: DISCHARGE PLANNING  Goal: Discharge to home or other facility with appropriate resources  Description: INTERVENTIONS:  - Identify barriers to discharge w/patient and caregiver  - Arrange for needed discharge resources and transportation as appropriate  - Identify discharge learning needs (meds, wound care, etc.)  - Arrange for interpretive services to assist at discharge as needed  - Refer to Case Management Department for coordinating discharge planning if the patient needs post-hospital services based on physician/advanced practitioner  order or complex needs related to functional status, cognitive ability, or social support system  Outcome: Progressing     Problem: Knowledge Deficit  Goal: Patient/family/caregiver demonstrates understanding of disease process, treatment plan, medications, and discharge instructions  Description: Complete learning assessment and assess knowledge base.  Interventions:  - Provide teaching at level of understanding  - Provide teaching via preferred learning methods  Outcome: Progressing     Problem: GASTROINTESTINAL - ADULT  Goal: Minimal or absence of nausea and/or vomiting  Description: INTERVENTIONS:  - Administer IV fluids if ordered to ensure adequate hydration  - Maintain NPO status until nausea and vomiting are resolved  - Nasogastric tube if ordered  - Administer ordered antiemetic medications as needed  - Provide nonpharmacologic comfort measures as appropriate  - Advance diet as tolerated, if ordered  - Consider nutrition services referral to assist patient with adequate nutrition and appropriate food choices  Outcome: Progressing  Goal: Maintains or returns to baseline bowel function  Description: INTERVENTIONS:  - Assess bowel function  - Encourage oral fluids to ensure adequate hydration  - Administer IV fluids if ordered to ensure adequate hydration  - Administer ordered medications as needed  - Encourage mobilization and activity  - Consider nutritional services referral to assist patient with adequate nutrition and appropriate food choices  Outcome: Progressing  Goal: Maintains adequate nutritional intake  Description: INTERVENTIONS:  - Monitor percentage of each meal consumed  - Identify factors contributing to decreased intake, treat as appropriate  - Assist with meals as needed  - Monitor I&O, weight, and lab values if indicated  - Obtain nutrition services referral as needed  Outcome: Progressing  Goal: Oral mucous membranes remain intact  Description: INTERVENTIONS  - Assess oral mucosa and  hygiene practices  - Implement preventative oral hygiene regimen  - Implement oral medicated treatments as ordered  - Initiate Nutrition services referral as needed  Outcome: Progressing

## 2025-03-07 NOTE — ASSESSMENT & PLAN NOTE
"Presenting to the ED with left upper quadrant pain x 1 to 2 weeks with acute worsening in the last 1 to 2 days-endorses associated NVD  History of necrotizing pancreatitis with mass effect and s/p EUS with cystogastrostomy stent placement on 12/17 at [facility]  Underwent stent removal 1/9 outpatient with GI as tissue appeared to be healing well  Readmitted at [facility] 1/7-1/10 where she was conservatively managed with IV fluids and pain control  CT A/P: \"Findings of acute pancreatitis with possible scattered mildly complicated pseudocysts. There is an abscess collection interposed between the stomach and the spleen estimated at 2.1 (AP) x 4.8 (TRV) x 4.7 (CC) cm. This is similar to perhaps slightly increased from 1/7/2025 examination, although comparison is somewhat impeded as there was a drain on prior exam.\"  Serum lipase normalized  Still with persistent pain  Restarted IV fluid with lactated Ringer's at 126 L an hour  Did discuss that patient likely will have some degree of pain given her pancreatitis and complex anatomy  Repeat CT scan 3/6 suggestive of constipation. Advised avoiding narcs and ambulating around as much as tolerating. I encouraged her to take miralax.   "

## 2025-03-07 NOTE — PLAN OF CARE
Problem: Potential for Falls  Goal: Patient will remain free of falls  Description: INTERVENTIONS:  - Educate patient/family on patient safety including physical limitations  - Instruct patient to call for assistance with activity   - Consult OT/PT to assist with strengthening/mobility   - Keep Call bell within reach  - Keep bed low and locked with side rails adjusted as appropriate  - Keep care items and personal belongings within reach  - Initiate and maintain comfort rounds  - Make Fall Risk Sign visible to staff  - Offer Toileting every 2 Hours, in advance of need  - Initiate/Maintain bed/chair alarm  - Obtain necessary fall risk management equipment: yellow bracelet and yellow socks   - Apply yellow socks and bracelet for high fall risk patients  - Consider moving patient to room near nurses station  Outcome: Progressing     Problem: PAIN - ADULT  Goal: Verbalizes/displays adequate comfort level or baseline comfort level  Description: Interventions:  - Encourage patient to monitor pain and request assistance  - Assess pain using appropriate pain scale  - Administer analgesics based on type and severity of pain and evaluate response  - Implement non-pharmacological measures as appropriate and evaluate response  - Consider cultural and social influences on pain and pain management  - Notify physician/advanced practitioner if interventions unsuccessful or patient reports new pain  Outcome: Progressing     Problem: DISCHARGE PLANNING  Goal: Discharge to home or other facility with appropriate resources  Description: INTERVENTIONS:  - Identify barriers to discharge w/patient and caregiver  - Arrange for needed discharge resources and transportation as appropriate  - Identify discharge learning needs (meds, wound care, etc.)  - Arrange for interpretive services to assist at discharge as needed  - Refer to Case Management Department for coordinating discharge planning if the patient needs post-hospital services  based on physician/advanced practitioner order or complex needs related to functional status, cognitive ability, or social support system  Outcome: Progressing     Problem: Knowledge Deficit  Goal: Patient/family/caregiver demonstrates understanding of disease process, treatment plan, medications, and discharge instructions  Description: Complete learning assessment and assess knowledge base.  Interventions:  - Provide teaching at level of understanding  - Provide teaching via preferred learning methods  Outcome: Progressing     Problem: GASTROINTESTINAL - ADULT  Goal: Minimal or absence of nausea and/or vomiting  Description: INTERVENTIONS:  - Administer IV fluids if ordered to ensure adequate hydration  - Maintain NPO status until nausea and vomiting are resolved  - Nasogastric tube if ordered  - Administer ordered antiemetic medications as needed  - Provide nonpharmacologic comfort measures as appropriate  - Advance diet as tolerated, if ordered  - Consider nutrition services referral to assist patient with adequate nutrition and appropriate food choices  Outcome: Progressing  Goal: Maintains or returns to baseline bowel function  Description: INTERVENTIONS:  - Assess bowel function  - Encourage oral fluids to ensure adequate hydration  - Administer IV fluids if ordered to ensure adequate hydration  - Administer ordered medications as needed  - Encourage mobilization and activity  - Consider nutritional services referral to assist patient with adequate nutrition and appropriate food choices  Outcome: Progressing  Goal: Maintains adequate nutritional intake  Description: INTERVENTIONS:  - Monitor percentage of each meal consumed  - Identify factors contributing to decreased intake, treat as appropriate  - Assist with meals as needed  - Monitor I&O, weight, and lab values if indicated  - Obtain nutrition services referral as needed  Outcome: Progressing  Goal: Oral mucous membranes remain intact  Description:  INTERVENTIONS  - Assess oral mucosa and hygiene practices  - Implement preventative oral hygiene regimen  - Implement oral medicated treatments as ordered  - Initiate Nutrition services referral as needed  Outcome: Progressing

## 2025-03-07 NOTE — ASSESSMENT & PLAN NOTE
Counseled on abstinence from drinking    -Continue CIWA protocol  -Continue folic acid and thiamine    Encouraged patient to think about inpatient or outpatient alcohol assistance.  Expressed to patient that [facility] is available to her if she would like the support.

## 2025-03-07 NOTE — CASE MANAGEMENT
Case Management Progress Note    Patient name Kaykay Holland  Location /-01 MRN [id number]  : 1990 Date 3/7/2025       LOS (days): 8  Geometric Mean LOS (GMLOS) (days): 3  Days to GMLOS:-4.6        OBJECTIVE:        Current admission status: Inpatient  Preferred Pharmacy:   [id number] - [place] - [address]  [address]  [place] [address]  Phone: [phone] Fax: [phone]    Primary Care Provider: PATRICIA Moran    Primary Insurance:   Secondary Insurance:     PROGRESS NOTE: Pt discussed in Care Rounds this AM. Pt is not cleared for DC at this time. Pt with episode of vomiting this AM. Dilaudid for breakthrough. Narcotics reduced . CT shows stool likely due to Narcotics per Attending. Plan for return home this weekend. No needs

## 2025-03-08 VITALS
WEIGHT: 120 LBS | RESPIRATION RATE: 16 BRPM | TEMPERATURE: 96.8 F | HEART RATE: 54 BPM | HEIGHT: 65 IN | BODY MASS INDEX: 19.99 KG/M2 | OXYGEN SATURATION: 94 % | DIASTOLIC BLOOD PRESSURE: 71 MMHG | SYSTOLIC BLOOD PRESSURE: 112 MMHG

## 2025-03-08 LAB
ANION GAP SERPL CALCULATED.3IONS-SCNC: 6 MMOL/L (ref 4–13)
BASOPHILS # BLD AUTO: 0.09 THOUSANDS/ÂΜL (ref 0–0.1)
BASOPHILS NFR BLD AUTO: 1 % (ref 0–1)
BUN SERPL-MCNC: 5 MG/DL (ref 5–25)
CALCIUM SERPL-MCNC: 8.9 MG/DL (ref 8.4–10.2)
CHLORIDE SERPL-SCNC: 99 MMOL/L (ref 96–108)
CO2 SERPL-SCNC: 30 MMOL/L (ref 21–32)
CREAT SERPL-MCNC: 0.5 MG/DL (ref 0.6–1.3)
EOSINOPHIL # BLD AUTO: 0.28 THOUSAND/ÂΜL (ref 0–0.61)
EOSINOPHIL NFR BLD AUTO: 5 % (ref 0–6)
ERYTHROCYTE [DISTWIDTH] IN BLOOD BY AUTOMATED COUNT: 13.9 % (ref 11.6–15.1)
GFR SERPL CREATININE-BSD FRML MDRD: 127 ML/MIN/1.73SQ M
GLUCOSE SERPL-MCNC: 70 MG/DL (ref 65–140)
HCT VFR BLD AUTO: 37.6 % (ref 34.8–46.1)
HGB BLD-MCNC: 12.1 G/DL (ref 11.5–15.4)
IMM GRANULOCYTES # BLD AUTO: 0.01 THOUSAND/UL (ref 0–0.2)
IMM GRANULOCYTES NFR BLD AUTO: 0 % (ref 0–2)
LYMPHOCYTES # BLD AUTO: 2.5 THOUSANDS/ÂΜL (ref 0.6–4.47)
LYMPHOCYTES NFR BLD AUTO: 40 % (ref 14–44)
MAGNESIUM SERPL-MCNC: 1.7 MG/DL (ref 1.9–2.7)
MCH RBC QN AUTO: 34.8 PG (ref 26.8–34.3)
MCHC RBC AUTO-ENTMCNC: 32.2 G/DL (ref 31.4–37.4)
MCV RBC AUTO: 108 FL (ref 82–98)
MONOCYTES # BLD AUTO: 0.85 THOUSAND/ÂΜL (ref 0.17–1.22)
MONOCYTES NFR BLD AUTO: 14 % (ref 4–12)
NEUTROPHILS # BLD AUTO: 2.56 THOUSANDS/ÂΜL (ref 1.85–7.62)
NEUTS SEG NFR BLD AUTO: 40 % (ref 43–75)
NRBC BLD AUTO-RTO: 0 /100 WBCS
PLATELET # BLD AUTO: 270 THOUSANDS/UL (ref 149–390)
PMV BLD AUTO: 11.5 FL (ref 8.9–12.7)
POTASSIUM SERPL-SCNC: 4.3 MMOL/L (ref 3.5–5.3)
RBC # BLD AUTO: 3.48 MILLION/UL (ref 3.81–5.12)
SODIUM SERPL-SCNC: 135 MMOL/L (ref 135–147)
WBC # BLD AUTO: 6.29 THOUSAND/UL (ref 4.31–10.16)

## 2025-03-08 PROCEDURE — 83735 ASSAY OF MAGNESIUM: CPT | Performed by: HOSPITALIST

## 2025-03-08 PROCEDURE — 99239 HOSP IP/OBS DSCHRG MGMT >30: CPT | Performed by: INTERNAL MEDICINE

## 2025-03-08 PROCEDURE — 85025 COMPLETE CBC W/AUTO DIFF WBC: CPT | Performed by: HOSPITALIST

## 2025-03-08 PROCEDURE — NC001 PR NO CHARGE: Performed by: PHYSICIAN ASSISTANT

## 2025-03-08 PROCEDURE — 80048 BASIC METABOLIC PNL TOTAL CA: CPT | Performed by: HOSPITALIST

## 2025-03-08 RX ORDER — OXYCODONE HYDROCHLORIDE 5 MG/1
5 TABLET ORAL EVERY 6 HOURS PRN
Qty: 6 TABLET | Refills: 0 | Status: SHIPPED | OUTPATIENT
Start: 2025-03-08 | End: 2025-03-14

## 2025-03-08 RX ORDER — POLYETHYLENE GLYCOL 3350 17 G/17G
17 POWDER, FOR SOLUTION ORAL DAILY
Qty: 510 G | Refills: 0 | Status: SHIPPED | OUTPATIENT
Start: 2025-03-08

## 2025-03-08 RX ORDER — LANOLIN ALCOHOL/MO/W.PET/CERES
400 CREAM (GRAM) TOPICAL 2 TIMES DAILY
Qty: 60 TABLET | Refills: 0 | Status: SHIPPED | OUTPATIENT
Start: 2025-03-08

## 2025-03-08 RX ORDER — MAGNESIUM SULFATE HEPTAHYDRATE 40 MG/ML
2 INJECTION, SOLUTION INTRAVENOUS ONCE
Status: COMPLETED | OUTPATIENT
Start: 2025-03-08 | End: 2025-03-08

## 2025-03-08 RX ORDER — ONDANSETRON 4 MG/1
4 TABLET, ORALLY DISINTEGRATING ORAL EVERY 6 HOURS PRN
Qty: 30 TABLET | Refills: 0 | Status: SHIPPED | OUTPATIENT
Start: 2025-03-08

## 2025-03-08 RX ORDER — AMOXICILLIN 250 MG
1 CAPSULE ORAL
Qty: 60 TABLET | Refills: 0 | Status: SHIPPED | OUTPATIENT
Start: 2025-03-08

## 2025-03-08 RX ADMIN — Medication 1 TABLET: at 08:21

## 2025-03-08 RX ADMIN — PANTOPRAZOLE SODIUM 40 MG: 40 TABLET, DELAYED RELEASE ORAL at 08:21

## 2025-03-08 RX ADMIN — DULOXETINE HYDROCHLORIDE 30 MG: 30 CAPSULE, DELAYED RELEASE ORAL at 08:22

## 2025-03-08 RX ADMIN — HYDROMORPHONE HYDROCHLORIDE 0.5 MG: 1 INJECTION, SOLUTION INTRAMUSCULAR; INTRAVENOUS; SUBCUTANEOUS at 00:32

## 2025-03-08 RX ADMIN — FOLIC ACID 1 MG: 1 TABLET ORAL at 08:22

## 2025-03-08 RX ADMIN — Medication 100 MG: at 08:21

## 2025-03-08 RX ADMIN — MAGNESIUM SULFATE HEPTAHYDRATE 2 G: 40 INJECTION, SOLUTION INTRAVENOUS at 08:22

## 2025-03-08 RX ADMIN — Medication 400 MG: at 08:21

## 2025-03-08 RX ADMIN — OXYCODONE HYDROCHLORIDE 5 MG: 5 TABLET ORAL at 08:21

## 2025-03-08 RX ADMIN — LORAZEPAM 0.5 MG: 0.5 TABLET ORAL at 12:52

## 2025-03-08 RX ADMIN — OXYCODONE HYDROCHLORIDE 5 MG: 5 TABLET ORAL at 04:07

## 2025-03-08 RX ADMIN — LORAZEPAM 0.5 MG: 0.5 TABLET ORAL at 02:05

## 2025-03-08 RX ADMIN — OXYCODONE HYDROCHLORIDE 5 MG: 5 TABLET ORAL at 12:32

## 2025-03-08 NOTE — PLAN OF CARE
Problem: Potential for Falls  Goal: Patient will remain free of falls  Description: INTERVENTIONS:  - Educate patient/family on patient safety including physical limitations  - Instruct patient to call for assistance with activity   - Consult OT/PT to assist with strengthening/mobility   - Keep Call bell within reach  - Keep bed low and locked with side rails adjusted as appropriate  - Keep care items and personal belongings within reach  - Initiate and maintain comfort rounds  - Make Fall Risk Sign visible to staff  - Offer Toileting every 2 Hours, in advance of need  - Initiate/Maintain alarm  - Obtain necessary fall risk management equipment  - Apply yellow socks and bracelet for high fall risk patients  - Consider moving patient to room near nurses station  Outcome: Progressing     Problem: PAIN - ADULT  Goal: Verbalizes/displays adequate comfort level or baseline comfort level  Description: Interventions:  - Encourage patient to monitor pain and request assistance  - Assess pain using appropriate pain scale  - Administer analgesics based on type and severity of pain and evaluate response  - Implement non-pharmacological measures as appropriate and evaluate response  - Consider cultural and social influences on pain and pain management  - Notify physician/advanced practitioner if interventions unsuccessful or patient reports new pain  Outcome: Progressing     Problem: DISCHARGE PLANNING  Goal: Discharge to home or other facility with appropriate resources  Description: INTERVENTIONS:  - Identify barriers to discharge w/patient and caregiver  - Arrange for needed discharge resources and transportation as appropriate  - Identify discharge learning needs (meds, wound care, etc.)  - Arrange for interpretive services to assist at discharge as needed  - Refer to Case Management Department for coordinating discharge planning if the patient needs post-hospital services based on physician/advanced practitioner order or  complex needs related to functional status, cognitive ability, or social support system  Outcome: Progressing     Problem: Knowledge Deficit  Goal: Patient/family/caregiver demonstrates understanding of disease process, treatment plan, medications, and discharge instructions  Description: Complete learning assessment and assess knowledge base.  Interventions:  - Provide teaching at level of understanding  - Provide teaching via preferred learning methods  Outcome: Progressing     Problem: GASTROINTESTINAL - ADULT  Goal: Minimal or absence of nausea and/or vomiting  Description: INTERVENTIONS:  - Administer IV fluids if ordered to ensure adequate hydration  - Maintain NPO status until nausea and vomiting are resolved  - Nasogastric tube if ordered  - Administer ordered antiemetic medications as needed  - Provide nonpharmacologic comfort measures as appropriate  - Advance diet as tolerated, if ordered  - Consider nutrition services referral to assist patient with adequate nutrition and appropriate food choices  Outcome: Progressing  Goal: Maintains or returns to baseline bowel function  Description: INTERVENTIONS:  - Assess bowel function  - Encourage oral fluids to ensure adequate hydration  - Administer IV fluids if ordered to ensure adequate hydration  - Administer ordered medications as needed  - Encourage mobilization and activity  - Consider nutritional services referral to assist patient with adequate nutrition and appropriate food choices  Outcome: Progressing  Goal: Maintains adequate nutritional intake  Description: INTERVENTIONS:  - Monitor percentage of each meal consumed  - Identify factors contributing to decreased intake, treat as appropriate  - Assist with meals as needed  - Monitor I&O, weight, and lab values if indicated  - Obtain nutrition services referral as needed  Outcome: Progressing  Goal: Oral mucous membranes remain intact  Description: INTERVENTIONS  - Assess oral mucosa and hygiene  practices  - Implement preventative oral hygiene regimen  - Implement oral medicated treatments as ordered  - Initiate Nutrition services referral as needed  Outcome: Progressing

## 2025-03-08 NOTE — DISCHARGE INSTR - AVS FIRST PAGE
Please see your PCP within 7-10 days of discharge to discuss recent hospitalization  Please follow-up with gastroenterology  Continue to take stool softeners  Please repeat CMP and magnesium levels within 1 week

## 2025-03-08 NOTE — PROCEDURES
Pt reports cutting her right middle finger 2 weeks ago while cooking. She sought treatment at a local urgent care. They placed 3 interrupted simple sutures. She is requesting suture removal before being discharged.     Pre-op dx: right middle finger laceration   Post op dx: same    3 simple interrupted sutures noted on the right middle finger.  These were removed without difficulty  Patient tolerated it well  Laceration is clean, dry and intact. Well healed.

## 2025-03-08 NOTE — DISCHARGE SUMMARY
"Discharge Summary - Hospitalist   Name: Kaykay Holland 34 y.o. female I MRN: [id number]  Unit/Bed#: -01 I Date of Admission: 2/27/2025   Date of Service: 3/8/2025 I Hospital Day: 9     Assessment & Plan  Necrotizing pancreatitis  Presenting to the ED with left upper quadrant pain x 1 to 2 weeks with acute worsening in the last 1 to 2 days-endorses associated NVD  History of necrotizing pancreatitis with mass effect and s/p EUS with cystogastrostomy stent placement on 12/17 at [facility]  Underwent stent removal 1/9 outpatient with GI as tissue appeared to be healing well  Readmitted at [facility] 1/7-1/10 where she was conservatively managed with IV fluids and pain control  CT A/P: \"Findings of acute pancreatitis with possible scattered mildly complicated pseudocysts. There is an abscess collection interposed between the stomach and the spleen estimated at 2.1 (AP) x 4.8 (TRV) x 4.7 (CC) cm. This is similar to perhaps slightly increased from 1/7/2025 examination, although comparison is somewhat impeded as there was a drain on prior exam.\"  Serum lipase normalized  Still with persistent pain  Restarted IV fluid with lactated Ringer's at 126 L an hour  Did discuss that patient likely will have some degree of pain given her pancreatitis and complex anatomy  Repeat CT scan 3/6 suggestive of constipation. Advised avoiding narcs and ambulating around as much as tolerating. I encouraged her to take miralax.   Pancreatic pseudocyst  S/p cystogastrostomy with stent placement 12/17 with stent removal 1/9 outpatient by GI  Continue outpatient follow-up with GI  Started on low-fat diet  Alcohol abuse  History of alcohol abuse  Reports that she continues to drink 1 bottle of wine daily  Drank 2 glasses of wine prior to arrival to the ED  Patient is well out of the windows for Dts  Certified  consultation  Continue thiamine  Tobacco abuse  Continue nicotine patch, smoking cessation encouraged  Depression  Continue " Cymbalta     Admission Date: 2/27/2025 1919  Discharge Date: 03/08/25  Admitting Diagnosis: Cyst of pancreas [K86.2]  Pancreatitis [K85.90]  Abdominal pain [R10.9]  Necrotizing pancreatitis [K85.91]  Intraabdominal fluid collection [R18.8]  Discharge Diagnosis:   Medical Problems       Resolved Problems  Date Reviewed: 12/17/2024          Resolved    Hypomagnesemia 3/6/2025     Resolved by  Lester Rudd DO    GERD (gastroesophageal reflux disease) 3/6/2025     Resolved by  Lester Rudd DO    Lactic acidosis 3/6/2025     Resolved by  Lester Rudd DO    Anemia 3/6/2025     Resolved by  Lester Rudd DO          HPI: as per, Florina Schafer PA-C , on 2/28Kaykay Holland is a 34 y.o. female with a PMH of necrotizing pancreatitis with pseudocyst, alcohol abuse, and tobacco abuse who presents with recurrent LUQ abdominal pain x 1 to 2 weeks with acute worsening in the last 1 to 2 days.  Endorses associated nausea, vomiting, and diarrhea.  Intermittent mild chest pain but no dyspnea.  No fevers or chills.  No URI-like symptoms.  No urinary symptoms.  Reports that she continues to drink 1 bottle of wine daily she had 2 glasses of wine prior to arrival to the ED. denies melena, hematochezia, or hematemesis.     Procedures Performed: No orders of the defined types were placed in this encounter.      Summary of Hospital Course: With alcohol abuse disorder presented with necrotizing pancreatitis.  She was seen and evaluated by GI, was given supportive care with improvement.  She was also noted to have constipation and started on bowel regimen with improvement.  At the day of discharge, she is awake and alert, she is tolerating diet she will have outpatient GI follow-up.  She will have repeat blood work, including CMP and magnesium within 1 week.  Significant Findings, Care, Treatment and Services Provided: See above    Complications: None    Condition at Discharge: stable       Discharge  instructions/Information to patient and family:   See After Visit Summary (AVS) for information provided to patient and family.      Provisions for Follow-Up Care:  See after visit summary for information related to follow-up care and any pertinent home health orders.      PCP: PATRICIA Moran    Disposition: Home    Planned Readmission: No     Discharge Medications:  See after visit summary for reconciled discharge medications provided to patient and family.      Discharge Statement:  I have spent a total time of 36 minutes in caring for this patient on the day of the visit/encounter. >30 minutes of time was spent on: Risk factor reductions, Impressions, Counseling / Coordination of care, Documenting in the medical record, Reviewing / ordering tests, medicine, procedures  , and Communicating with other healthcare professionals .

## 2025-03-08 NOTE — ASSESSMENT & PLAN NOTE
"Presenting to the ED with left upper quadrant pain x 1 to 2 weeks with acute worsening in the last 1 to 2 days-endorses associated NVD  History of necrotizing pancreatitis with mass effect and s/p EUS with cystogastrostomy stent placement on 12/17 at [facility]  Underwent stent removal 1/9 outpatient with GI as tissue appeared to be healing well  Readmitted at [facility] 1/7-1/10 where she was conservatively managed with IV fluids and pain control  CT A/P: \"Findings of acute pancreatitis with possible scattered mildly complicated pseudocysts. There is an abscess collection interposed between the stomach and the spleen estimated at 2.1 (AP) x 4.8 (TRV) x 4.7 (CC) cm. This is similar to perhaps slightly increased from 1/7/2025 examination, although comparison is somewhat impeded as there was a drain on prior exam.\"  Serum lipase normalized  Still with persistent pain  Restarted IV fluid with lactated Ringer's at 126 L an hour  Did discuss that patient likely will have some degree of pain given her pancreatitis and complex anatomy  Repeat CT scan 3/6 suggestive of constipation. Advised avoiding narcs and ambulating around as much as tolerating. I encouraged her to take miralax.   "

## 2025-03-10 ENCOUNTER — TRANSITIONAL CARE MANAGEMENT (OUTPATIENT)
Dept: FAMILY MEDICINE CLINIC | Facility: HOSPITAL | Age: 35
End: 2025-03-10

## 2025-03-10 ENCOUNTER — PATIENT OUTREACH (OUTPATIENT)
Dept: CASE MANAGEMENT | Facility: OTHER | Age: 35
End: 2025-03-10

## 2025-03-10 DIAGNOSIS — F10.10 ALCOHOL ABUSE: ICD-10-CM

## 2025-03-10 DIAGNOSIS — Z59.71 DOES NOT HAVE HEALTH INSURANCE: Primary | ICD-10-CM

## 2025-03-10 NOTE — PROGRESS NOTES
[facility] [employer] reviewed chart. Pt was identified via HRR report. Referral notes indicates pt has no insurance and hx of alcohol abuse.    B[facility] placed call to pt to introduce self and discuss needs. Pt did not answer. [facility] [employer] left a message and will try to reach pt again within one week if pt does not call back sooner.

## 2025-03-12 ENCOUNTER — PATIENT OUTREACH (OUTPATIENT)
Dept: CASE MANAGEMENT | Facility: OTHER | Age: 35
End: 2025-03-12

## 2025-03-12 NOTE — PROGRESS NOTES
[facility] made second phone outreach attempt to patient to f/u on referral. Patient did not answer and vm is full UTR letter sent. Referral closed at this time. [facility] will remain available for any needs.

## 2025-03-12 NOTE — LETTER
[address] [facility] [address]  [phone]    Re: Unable to Reach   3/12/2025       Dear Kaykay,    I am a [facility]  and wanted to be certain you had information to contact me should you desire assistance with or have questions about non-medical aspects of your care such as [but not limited to] medical insurance, housing, transportation, material needs, or emergency needs.  If I do not have an answer I will assist you in finding the appropriate agency or individual who can help.      Please feel free to contact me at [phone]. Thank You.    Sincerely,         Mago Schulz LCSW

## 2025-03-30 ENCOUNTER — APPOINTMENT (EMERGENCY)
Dept: CT IMAGING | Facility: HOSPITAL | Age: 35
DRG: 439 | End: 2025-03-30
Payer: COMMERCIAL

## 2025-03-30 ENCOUNTER — HOSPITAL ENCOUNTER (INPATIENT)
Facility: HOSPITAL | Age: 35
LOS: 4 days | Discharge: HOME | DRG: 439 | End: 2025-04-03
Attending: EMERGENCY MEDICINE | Admitting: HOSPITALIST
Payer: COMMERCIAL
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
## 2025-03-30 DIAGNOSIS — R10.12 LEFT UPPER QUADRANT ABDOMINAL PAIN: ICD-10-CM

## 2025-03-30 DIAGNOSIS — R11.0 NAUSEA: ICD-10-CM

## 2025-03-30 DIAGNOSIS — K85.90 ACUTE PANCREATITIS: Primary | ICD-10-CM

## 2025-03-30 DIAGNOSIS — E83.42 HYPOMAGNESEMIA: ICD-10-CM

## 2025-03-30 LAB
ALBUMIN SERPL BCG-MCNC: 3.8 G/DL (ref 3.5–5)
ALP SERPL-CCNC: 84 U/L (ref 34–104)
ALT SERPL W P-5'-P-CCNC: 16 U/L (ref 7–52)
ANION GAP SERPL CALCULATED.3IONS-SCNC: 8 MMOL/L (ref 4–13)
APTT PPP: 28 SECONDS (ref 23–34)
AST SERPL W P-5'-P-CCNC: 28 U/L (ref 13–39)
ATRIAL RATE: 107 BPM
BACTERIA UR QL AUTO: ABNORMAL /HPF
BASOPHILS # BLD AUTO: 0.11 THOUSANDS/ÂΜL (ref 0–0.1)
BASOPHILS NFR BLD AUTO: 1 % (ref 0–1)
BILIRUB SERPL-MCNC: 0.53 MG/DL (ref 0.2–1)
BILIRUB UR QL STRIP: NEGATIVE
BUN SERPL-MCNC: 7 MG/DL (ref 5–25)
CALCIUM SERPL-MCNC: 8.4 MG/DL (ref 8.4–10.2)
CARDIAC TROPONIN I PNL SERPL HS: <2 NG/L (ref ?–50)
CHLORIDE SERPL-SCNC: 103 MMOL/L (ref 96–108)
CHOLEST SERPL-MCNC: 99 MG/DL (ref ?–200)
CLARITY UR: CLEAR
CO2 SERPL-SCNC: 25 MMOL/L (ref 21–32)
COLOR UR: YELLOW
CREAT SERPL-MCNC: 0.43 MG/DL (ref 0.6–1.3)
EOSINOPHIL # BLD AUTO: 1.03 THOUSAND/ÂΜL (ref 0–0.61)
EOSINOPHIL NFR BLD AUTO: 10 % (ref 0–6)
ERYTHROCYTE [DISTWIDTH] IN BLOOD BY AUTOMATED COUNT: 13 % (ref 11.6–15.1)
ETHANOL SERPL-MCNC: 177 MG/DL
EXT PREGNANCY TEST URINE: NEGATIVE
EXT. CONTROL: NORMAL
GFR SERPL CREATININE-BSD FRML MDRD: 134 ML/MIN/1.73SQ M
GLUCOSE SERPL-MCNC: 121 MG/DL (ref 65–140)
GLUCOSE UR STRIP-MCNC: NEGATIVE MG/DL
HCT VFR BLD AUTO: 40.6 % (ref 34.8–46.1)
HDLC SERPL-MCNC: 48 MG/DL
HGB BLD-MCNC: 13.3 G/DL (ref 11.5–15.4)
HGB UR QL STRIP.AUTO: NEGATIVE
IMM GRANULOCYTES # BLD AUTO: 0.07 THOUSAND/UL (ref 0–0.2)
IMM GRANULOCYTES NFR BLD AUTO: 1 % (ref 0–2)
INR PPP: 1.2 (ref 0.85–1.19)
KETONES UR STRIP-MCNC: ABNORMAL MG/DL
LDLC SERPL CALC-MCNC: 32 MG/DL (ref 0–100)
LEUKOCYTE ESTERASE UR QL STRIP: NEGATIVE
LIPASE SERPL-CCNC: 285 U/L (ref 11–82)
LYMPHOCYTES # BLD AUTO: 2.29 THOUSANDS/ÂΜL (ref 0.6–4.47)
LYMPHOCYTES NFR BLD AUTO: 22 % (ref 14–44)
MAGNESIUM SERPL-MCNC: 1.3 MG/DL (ref 1.9–2.7)
MCH RBC QN AUTO: 33.6 PG (ref 26.8–34.3)
MCHC RBC AUTO-ENTMCNC: 32.8 G/DL (ref 31.4–37.4)
MCV RBC AUTO: 103 FL (ref 82–98)
MONOCYTES # BLD AUTO: 0.81 THOUSAND/ÂΜL (ref 0.17–1.22)
MONOCYTES NFR BLD AUTO: 8 % (ref 4–12)
MUCOUS THREADS UR QL AUTO: ABNORMAL
NEUTROPHILS # BLD AUTO: 6.33 THOUSANDS/ÂΜL (ref 1.85–7.62)
NEUTS SEG NFR BLD AUTO: 58 % (ref 43–75)
NITRITE UR QL STRIP: NEGATIVE
NON-SQ EPI CELLS URNS QL MICRO: ABNORMAL /HPF
NRBC BLD AUTO-RTO: 0 /100 WBCS
P AXIS: 70 DEGREES
PH UR STRIP.AUTO: 5.5 [PH]
PHOSPHATE SERPL-MCNC: 3.8 MG/DL (ref 2.7–4.5)
PLATELET # BLD AUTO: 217 THOUSANDS/UL (ref 149–390)
PMV BLD AUTO: 10.3 FL (ref 8.9–12.7)
POTASSIUM SERPL-SCNC: 3.6 MMOL/L (ref 3.5–5.3)
PR INTERVAL: 128 MS
PROT SERPL-MCNC: 6.5 G/DL (ref 6.4–8.4)
PROT UR STRIP-MCNC: ABNORMAL MG/DL
PROTHROMBIN TIME: 15.7 SECONDS (ref 12.3–15)
QRS AXIS: 74 DEGREES
QRSD INTERVAL: 74 MS
QT INTERVAL: 350 MS
QTC INTERVAL: 467 MS
RBC # BLD AUTO: 3.96 MILLION/UL (ref 3.81–5.12)
RBC #/AREA URNS AUTO: ABNORMAL /HPF
SODIUM SERPL-SCNC: 136 MMOL/L (ref 135–147)
SP GR UR STRIP.AUTO: >=1.03 (ref 1–1.03)
T WAVE AXIS: 35 DEGREES
TRIGL SERPL-MCNC: 97 MG/DL (ref ?–150)
UROBILINOGEN UR STRIP-ACNC: <2 MG/DL
VENTRICULAR RATE: 107 BPM
WBC # BLD AUTO: 10.64 THOUSAND/UL (ref 4.31–10.16)
WBC #/AREA URNS AUTO: ABNORMAL /HPF

## 2025-03-30 PROCEDURE — 99223 1ST HOSP IP/OBS HIGH 75: CPT | Performed by: PHYSICIAN ASSISTANT

## 2025-03-30 PROCEDURE — 81001 URINALYSIS AUTO W/SCOPE: CPT | Performed by: PHYSICIAN ASSISTANT

## 2025-03-30 PROCEDURE — 99284 EMERGENCY DEPT VISIT MOD MDM: CPT

## 2025-03-30 PROCEDURE — 96376 TX/PRO/DX INJ SAME DRUG ADON: CPT

## 2025-03-30 PROCEDURE — 83735 ASSAY OF MAGNESIUM: CPT | Performed by: PHYSICIAN ASSISTANT

## 2025-03-30 PROCEDURE — 99285 EMERGENCY DEPT VISIT HI MDM: CPT | Performed by: PHYSICIAN ASSISTANT

## 2025-03-30 PROCEDURE — 84484 ASSAY OF TROPONIN QUANT: CPT | Performed by: PHYSICIAN ASSISTANT

## 2025-03-30 PROCEDURE — 84100 ASSAY OF PHOSPHORUS: CPT | Performed by: PHYSICIAN ASSISTANT

## 2025-03-30 PROCEDURE — 85730 THROMBOPLASTIN TIME PARTIAL: CPT | Performed by: PHYSICIAN ASSISTANT

## 2025-03-30 PROCEDURE — 85025 COMPLETE CBC W/AUTO DIFF WBC: CPT | Performed by: PHYSICIAN ASSISTANT

## 2025-03-30 PROCEDURE — 36415 COLL VENOUS BLD VENIPUNCTURE: CPT | Performed by: PHYSICIAN ASSISTANT

## 2025-03-30 PROCEDURE — 80053 COMPREHEN METABOLIC PANEL: CPT | Performed by: PHYSICIAN ASSISTANT

## 2025-03-30 PROCEDURE — 74177 CT ABD & PELVIS W/CONTRAST: CPT

## 2025-03-30 PROCEDURE — 83690 ASSAY OF LIPASE: CPT | Performed by: PHYSICIAN ASSISTANT

## 2025-03-30 PROCEDURE — 96374 THER/PROPH/DIAG INJ IV PUSH: CPT

## 2025-03-30 PROCEDURE — 81025 URINE PREGNANCY TEST: CPT | Performed by: PHYSICIAN ASSISTANT

## 2025-03-30 PROCEDURE — 80061 LIPID PANEL: CPT | Performed by: PHYSICIAN ASSISTANT

## 2025-03-30 PROCEDURE — 82077 ASSAY SPEC XCP UR&BREATH IA: CPT | Performed by: PHYSICIAN ASSISTANT

## 2025-03-30 PROCEDURE — 85610 PROTHROMBIN TIME: CPT | Performed by: PHYSICIAN ASSISTANT

## 2025-03-30 PROCEDURE — 96375 TX/PRO/DX INJ NEW DRUG ADDON: CPT

## 2025-03-30 PROCEDURE — 96361 HYDRATE IV INFUSION ADD-ON: CPT

## 2025-03-30 PROCEDURE — 93010 ELECTROCARDIOGRAM REPORT: CPT | Performed by: INTERNAL MEDICINE

## 2025-03-30 PROCEDURE — 93005 ELECTROCARDIOGRAM TRACING: CPT

## 2025-03-30 RX ORDER — LORAZEPAM 2 MG/ML
2 INJECTION INTRAMUSCULAR ONCE
Status: DISCONTINUED | OUTPATIENT
Start: 2025-03-30 | End: 2025-03-30

## 2025-03-30 RX ORDER — CHLORDIAZEPOXIDE HYDROCHLORIDE 25 MG/1
25 CAPSULE, GELATIN COATED ORAL EVERY 6 HOURS SCHEDULED
Status: COMPLETED | OUTPATIENT
Start: 2025-03-31 | End: 2025-04-02

## 2025-03-30 RX ORDER — HYDROMORPHONE HCL IN WATER/PF 6 MG/30 ML
0.2 PATIENT CONTROLLED ANALGESIA SYRINGE INTRAVENOUS EVERY 6 HOURS PRN
Status: DISCONTINUED | OUTPATIENT
Start: 2025-03-30 | End: 2025-03-31

## 2025-03-30 RX ORDER — LORAZEPAM 2 MG/ML
0.5 INJECTION INTRAMUSCULAR ONCE
Status: COMPLETED | OUTPATIENT
Start: 2025-03-30 | End: 2025-03-30

## 2025-03-30 RX ORDER — KETOROLAC TROMETHAMINE 30 MG/ML
15 INJECTION, SOLUTION INTRAMUSCULAR; INTRAVENOUS ONCE
Status: COMPLETED | OUTPATIENT
Start: 2025-03-30 | End: 2025-03-30

## 2025-03-30 RX ORDER — HYDROMORPHONE HCL/PF 1 MG/ML
0.5 SYRINGE (ML) INJECTION ONCE
Refills: 0 | Status: COMPLETED | OUTPATIENT
Start: 2025-03-30 | End: 2025-03-30

## 2025-03-30 RX ORDER — ONDANSETRON 2 MG/ML
4 INJECTION INTRAMUSCULAR; INTRAVENOUS EVERY 6 HOURS PRN
Status: DISCONTINUED | OUTPATIENT
Start: 2025-03-30 | End: 2025-03-31

## 2025-03-30 RX ORDER — DIPHENHYDRAMINE HYDROCHLORIDE 50 MG/ML
25 INJECTION, SOLUTION INTRAMUSCULAR; INTRAVENOUS ONCE
Status: DISCONTINUED | OUTPATIENT
Start: 2025-03-30 | End: 2025-03-30

## 2025-03-30 RX ORDER — MAGNESIUM SULFATE HEPTAHYDRATE 40 MG/ML
2 INJECTION, SOLUTION INTRAVENOUS ONCE
Status: COMPLETED | OUTPATIENT
Start: 2025-03-30 | End: 2025-03-30

## 2025-03-30 RX ORDER — NICOTINE 21 MG/24HR
21 PATCH, TRANSDERMAL 24 HOURS TRANSDERMAL DAILY
Status: DISCONTINUED | OUTPATIENT
Start: 2025-03-30 | End: 2025-04-03 | Stop reason: HOSPADM

## 2025-03-30 RX ORDER — LANOLIN ALCOHOL/MO/W.PET/CERES
100 CREAM (GRAM) TOPICAL DAILY
Status: DISCONTINUED | OUTPATIENT
Start: 2025-03-31 | End: 2025-03-30

## 2025-03-30 RX ORDER — HYDROMORPHONE HCL/PF 1 MG/ML
0.5 SYRINGE (ML) INJECTION ONCE
Status: COMPLETED | OUTPATIENT
Start: 2025-03-30 | End: 2025-03-30

## 2025-03-30 RX ORDER — ONDANSETRON 2 MG/ML
4 INJECTION INTRAMUSCULAR; INTRAVENOUS ONCE
Status: COMPLETED | OUTPATIENT
Start: 2025-03-30 | End: 2025-03-30

## 2025-03-30 RX ORDER — SODIUM CHLORIDE, SODIUM LACTATE, POTASSIUM CHLORIDE, CALCIUM CHLORIDE 600; 310; 30; 20 MG/100ML; MG/100ML; MG/100ML; MG/100ML
75 INJECTION, SOLUTION INTRAVENOUS CONTINUOUS
Status: DISCONTINUED | OUTPATIENT
Start: 2025-03-30 | End: 2025-04-02

## 2025-03-30 RX ORDER — PANTOPRAZOLE SODIUM 40 MG/10ML
40 INJECTION, POWDER, LYOPHILIZED, FOR SOLUTION INTRAVENOUS EVERY 12 HOURS SCHEDULED
Status: DISCONTINUED | OUTPATIENT
Start: 2025-03-30 | End: 2025-04-03 | Stop reason: HOSPADM

## 2025-03-30 RX ORDER — PROCHLORPERAZINE MALEATE 5 MG/1
5 TABLET ORAL EVERY 6 HOURS PRN
Status: DISCONTINUED | OUTPATIENT
Start: 2025-03-30 | End: 2025-03-31

## 2025-03-30 RX ORDER — SODIUM CHLORIDE 9 MG/ML
125 INJECTION, SOLUTION INTRAVENOUS CONTINUOUS
Status: DISCONTINUED | OUTPATIENT
Start: 2025-03-30 | End: 2025-03-30

## 2025-03-30 RX ORDER — FAMOTIDINE 10 MG/ML
20 INJECTION, SOLUTION INTRAVENOUS ONCE
Status: COMPLETED | OUTPATIENT
Start: 2025-03-30 | End: 2025-03-30

## 2025-03-30 RX ORDER — FOLIC ACID 1 MG/1
1 TABLET ORAL DAILY
Status: DISCONTINUED | OUTPATIENT
Start: 2025-03-31 | End: 2025-03-30 | Stop reason: SDUPTHER

## 2025-03-30 RX ORDER — HYDROMORPHONE HCL/PF 1 MG/ML
0.5 SYRINGE (ML) INJECTION EVERY 4 HOURS PRN
Status: DISCONTINUED | OUTPATIENT
Start: 2025-03-30 | End: 2025-03-31

## 2025-03-30 RX ORDER — CHLORDIAZEPOXIDE HYDROCHLORIDE 25 MG/1
50 CAPSULE, GELATIN COATED ORAL EVERY 6 HOURS SCHEDULED
Status: COMPLETED | OUTPATIENT
Start: 2025-03-30 | End: 2025-03-31

## 2025-03-30 RX ADMIN — LORAZEPAM 0.5 MG: 2 INJECTION INTRAMUSCULAR; INTRAVENOUS at 16:11

## 2025-03-30 RX ADMIN — HYDROMORPHONE HYDROCHLORIDE 0.5 MG: 1 INJECTION, SOLUTION INTRAMUSCULAR; INTRAVENOUS; SUBCUTANEOUS at 13:17

## 2025-03-30 RX ADMIN — SODIUM CHLORIDE, SODIUM LACTATE, POTASSIUM CHLORIDE, AND CALCIUM CHLORIDE 200 ML/HR: .6; .31; .03; .02 INJECTION, SOLUTION INTRAVENOUS at 22:53

## 2025-03-30 RX ADMIN — SODIUM CHLORIDE, SODIUM LACTATE, POTASSIUM CHLORIDE, AND CALCIUM CHLORIDE 200 ML/HR: .6; .31; .03; .02 INJECTION, SOLUTION INTRAVENOUS at 16:15

## 2025-03-30 RX ADMIN — HYDROMORPHONE HYDROCHLORIDE 0.2 MG: 0.2 INJECTION, SOLUTION INTRAMUSCULAR; INTRAVENOUS; SUBCUTANEOUS at 20:08

## 2025-03-30 RX ADMIN — NICOTINE 21 MG: 21 PATCH, EXTENDED RELEASE TRANSDERMAL at 16:11

## 2025-03-30 RX ADMIN — HYDROMORPHONE HYDROCHLORIDE 0.5 MG: 1 INJECTION, SOLUTION INTRAMUSCULAR; INTRAVENOUS; SUBCUTANEOUS at 15:17

## 2025-03-30 RX ADMIN — SODIUM CHLORIDE 1000 ML: 0.9 INJECTION, SOLUTION INTRAVENOUS at 15:17

## 2025-03-30 RX ADMIN — IOHEXOL 100 ML: 350 INJECTION, SOLUTION INTRAVENOUS at 14:09

## 2025-03-30 RX ADMIN — HYDROMORPHONE HYDROCHLORIDE 0.5 MG: 1 INJECTION, SOLUTION INTRAMUSCULAR; INTRAVENOUS; SUBCUTANEOUS at 22:28

## 2025-03-30 RX ADMIN — THIAMINE HYDROCHLORIDE 100 MG: 100 INJECTION, SOLUTION INTRAMUSCULAR; INTRAVENOUS at 16:14

## 2025-03-30 RX ADMIN — MAGNESIUM SULFATE HEPTAHYDRATE 2 G: 40 INJECTION, SOLUTION INTRAVENOUS at 17:55

## 2025-03-30 RX ADMIN — PANTOPRAZOLE SODIUM 40 MG: 40 INJECTION, POWDER, FOR SOLUTION INTRAVENOUS at 20:11

## 2025-03-30 RX ADMIN — HYDROMORPHONE HYDROCHLORIDE 0.5 MG: 1 INJECTION, SOLUTION INTRAMUSCULAR; INTRAVENOUS; SUBCUTANEOUS at 17:57

## 2025-03-30 RX ADMIN — KETOROLAC TROMETHAMINE 15 MG: 30 INJECTION, SOLUTION INTRAMUSCULAR at 13:17

## 2025-03-30 RX ADMIN — MAGNESIUM SULFATE HEPTAHYDRATE 2 G: 40 INJECTION, SOLUTION INTRAVENOUS at 17:09

## 2025-03-30 RX ADMIN — CHLORDIAZEPOXIDE HYDROCHLORIDE 50 MG: 25 CAPSULE ORAL at 17:09

## 2025-03-30 RX ADMIN — FOLIC ACID 1 MG: 5 INJECTION, SOLUTION INTRAMUSCULAR; INTRAVENOUS; SUBCUTANEOUS at 20:07

## 2025-03-30 RX ADMIN — ONDANSETRON 4 MG: 2 INJECTION, SOLUTION INTRAMUSCULAR; INTRAVENOUS at 15:17

## 2025-03-30 RX ADMIN — FAMOTIDINE 20 MG: 10 INJECTION, SOLUTION INTRAVENOUS at 13:16

## 2025-03-30 RX ADMIN — CHLORDIAZEPOXIDE HYDROCHLORIDE 50 MG: 25 CAPSULE ORAL at 22:31

## 2025-03-30 RX ADMIN — PROCHLORPERAZINE MALEATE 5 MG: 5 TABLET ORAL at 22:52

## 2025-03-30 RX ADMIN — ONDANSETRON 4 MG: 2 INJECTION, SOLUTION INTRAMUSCULAR; INTRAVENOUS at 13:17

## 2025-03-30 RX ADMIN — ONDANSETRON 4 MG: 2 INJECTION INTRAMUSCULAR; INTRAVENOUS at 19:09

## 2025-03-30 RX ADMIN — SODIUM CHLORIDE 1000 ML: 0.9 INJECTION, SOLUTION INTRAVENOUS at 13:15

## 2025-03-30 NOTE — ASSESSMENT & PLAN NOTE
Patient with significant and currently active alcohol use  Reports last drink of alcohol yesterday - ethanol 177 on admission  Start librium - previously did well with 50 mg q 6 hr x 1 day followed by 25 mg q 6 hr taper  Monitor via CIWA protocol  Thiamine, folic acid, multivitamin  Monitor on telemetry due to history of alcohol withdrawal seizure/DTs

## 2025-03-30 NOTE — H&P
H&P - Hospitalist   Name: Kaykay Holland 34 y.o. female I MRN: [id number]  Unit/Bed#: -01 I Date of Admission: 3/30/2025   Date of Service: 3/30/2025 I Hospital Day: 0     Assessment & Plan  Acute alcoholic pancreatitis  Presented to the emergency department with epigastric/left upper quadrant pain  CT abdomen/pelvis: 1. Acute interstitial edematous pancreatitis. Redemonstrated multiple cystic lesions/collections, some of which are slightly larger than 3/6/2025, some smaller, and others similar.  2. Small volume ascites.  3. Similar left upper quadrant and mesenteric varices could be related to portal hypertension. Suggest correlation with liver function parameters and nonemergent ultrasound elastography.   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  Stent was removed in January  Continue aggressive IV hydration, n.p.o., pain control  Ultimately needs complete alcohol cessation unfortunately has declined alcohol rehab services previously  Consult GI  Alcohol use disorder  Patient with significant and currently active alcohol use  Reports last drink of alcohol yesterday - ethanol 177 on admission  Start librium - previously did well with 50 mg q 6 hr x 1 day followed by 25 mg q 6 hr taper  Monitor via CIWA protocol  Thiamine, folic acid, multivitamin  Monitor on telemetry due to history of alcohol withdrawal seizure/DTs  Tobacco abuse  Continue nicotine patch   cessation  Peptic ulcer disease  Continue IV Protonix  Hypomagnesemia  Magnesium 1.3 on admission  S/p repletion  Continue to trend      VTE Pharmacologic Prophylaxis: VTE Score: 0 Low Risk (Score 0-2) - Encourage Ambulation.  Code Status: Level 1 - Full Code   Discussion with family: Updated  (friend) at bedside.    Anticipated Length of Stay: Patient will be admitted on an inpatient basis with an  anticipated length of stay of greater than 2 midnights secondary to acute pancreatitis.    History of Present Illness   Chief Complaint: Abdominal pain    Kaykay Holland is a 34 y.o. female with a PMH of recurrent pancreatitis related to alcohol use disorder who presents with abdominal pain.    Patient presents to the emergency department with gradually worsening epigastric pain x 1 week.  Of note patient has had multiple hospitalizations for acute pancreatitis as well as required EUS with cystogastrostomy stent placement in December and stent removal in January.  Patient continues to drink alcohol, reports her last drink was yesterday.  Does have history of alcohol withdrawal seizures.  Currently denies chest pain/palpitations, shortness of breath, vomiting.  Intermittent nausea.  Did have nonbloody episodes of diarrhea before coming to the hospital.  Denies fever/chills.    Review of Systems   Constitutional:  Negative for chills, fatigue, fever and unexpected weight change.   HENT:  Negative for congestion, sore throat and trouble swallowing.    Eyes:  Negative for photophobia, pain and visual disturbance.   Respiratory:  Negative for cough, shortness of breath and wheezing.    Cardiovascular:  Negative for chest pain, palpitations and leg swelling.   Gastrointestinal:  Positive for abdominal pain, diarrhea and nausea. Negative for constipation and vomiting.   Endocrine: Negative for polyuria.   Genitourinary:  Negative for difficulty urinating, dysuria, flank pain, hematuria and urgency.   Musculoskeletal:  Negative for back pain, myalgias, neck pain and neck stiffness.   Skin:  Negative for pallor and rash.   Neurological:  Negative for dizziness, tremors, syncope, weakness, light-headedness, numbness and headaches.   Hematological:  Does not bruise/bleed easily.   Psychiatric/Behavioral:  Negative for agitation and confusion.        Historical Information   Past Medical History:   Diagnosis Date    Acute  alcoholic pancreatitis 11/03/2023    Hypertension     SIRS (systemic inflammatory response syndrome) (HCC) 04/10/2024     Past Surgical History:   Procedure Laterality Date    WISDOM TOOTH EXTRACTION       Social History     Tobacco Use    Smoking status: Every Day     Current packs/day: 0.50     Types: Cigarettes     Passive exposure: Never    Smokeless tobacco: Never    Tobacco comments:     Per pt last drink was today 9/24/24 one glass of wine. Had previously stop on 4/4/24 for 1 month    Vaping Use    Vaping status: Never Used   Substance and Sexual Activity    Alcohol use: Yes     Alcohol/week: 21.0 standard drinks of alcohol     Types: 21 Glasses of wine per week     Comment: last drank 3/30/25    Drug use: Never    Sexual activity: Not on file     E-Cigarette/Vaping    E-Cigarette Use Never User      E-Cigarette/Vaping Substances    Nicotine No     THC No     CBD No     Flavoring No     Other No     Unknown No      History reviewed. No pertinent family history.  Social History:  Marital Status: Single   Occupation:   Patient Pre-hospital Living Situation: Home  Patient Pre-hospital Level of Mobility: walks  Patient Pre-hospital Diet Restrictions:     Meds/Allergies   I have reviewed home medications using recent Epic encounter.  Prior to Admission medications    Medication Sig Start Date End Date Taking? Authorizing Provider   DULoxetine (CYMBALTA) 30 mg delayed release capsule Take 1 capsule (30 mg total) by mouth daily 12/25/24   Roxie Curry DO   folic acid (KP Folic Acid) 1 mg tablet Take 1 tablet (1 mg total) by mouth daily 12/1/24   Elena Vásquez PA-C   magnesium Oxide (MAG-OX) 400 mg TABS Take 1 tablet (400 mg total) by mouth 2 (two) times a day 3/8/25   Tiara Valencia MD   nicotine (NICODERM CQ) 21 mg/24 hr TD 24 hr patch Place 1 patch on the skin over 24 hours daily 12/25/24   Roxie Curry DO   ondansetron (ZOFRAN-ODT) 4 mg disintegrating tablet Take 1 tablet (4 mg total) by  mouth every 6 (six) hours as needed for nausea or vomiting 3/8/25   Tiara Valencia MD   pantoprazole (PROTONIX) 40 mg tablet Take 1 tablet (40 mg total) by mouth 2 (two) times a day 1/10/25   Tisha Wallis JDCARLOS   polyethylene glycol (MIRALAX) 17 g packet Take 17 g by mouth daily 3/8/25   Tiara Valencia MD   senna-docusate sodium (SENOKOT S) 8.6-50 mg per tablet Take 1 tablet by mouth daily at bedtime 3/8/25   Tiara Valencia MD   thiamine 100 MG tablet Take 1 tablet (100 mg total) by mouth daily 12/25/24   Roxie Curry,      Allergies   Allergen Reactions    Bee Pollen Anaphylaxis    Other Edema     Bee stings (localized edema)       Objective :  Temp:  [98 °F (36.7 °C)] 98 °F (36.7 °C)  HR:  [] 108  BP: (116-140)/(76-96) 116/77  Resp:  [16-20] 20  SpO2:  [92 %-98 %] 92 %  O2 Device: None (Room air)    Physical Exam  Vitals and nursing note reviewed.   Constitutional:       Appearance: Normal appearance.      Comments: No acute distress   HENT:      Head: Normocephalic.   Eyes:      General: No scleral icterus.     Extraocular Movements: Extraocular movements intact.      Conjunctiva/sclera: Conjunctivae normal.   Cardiovascular:      Rate and Rhythm: Regular rhythm. Tachycardia present.   Pulmonary:      Effort: Pulmonary effort is normal.      Breath sounds: Normal breath sounds. No wheezing, rhonchi or rales.   Abdominal:      General: Bowel sounds are normal.      Palpations: Abdomen is soft.      Tenderness: There is abdominal tenderness in the epigastric area. There is no guarding or rebound.   Musculoskeletal:         General: No swelling, tenderness or deformity.      Cervical back: Normal range of motion.      Comments: Able to move upper/lower ext bilaterally, no edema   Skin:     General: Skin is warm and dry.   Neurological:      Mental Status: She is alert. Mental status is at baseline.   Psychiatric:         Mood and Affect: Mood is anxious. Affect is tearful.       "    Lines/Drains:            Lab Results: I have reviewed the following results:  Results from last 7 days   Lab Units 03/30/25  1322   WBC Thousand/uL 10.64*   HEMOGLOBIN g/dL 13.3   HEMATOCRIT % 40.6   PLATELETS Thousands/uL 217   SEGS PCT % 58   LYMPHO PCT % 22   MONO PCT % 8   EOS PCT % 10*     Results from last 7 days   Lab Units 03/30/25  1322   SODIUM mmol/L 136   POTASSIUM mmol/L 3.6   CHLORIDE mmol/L 103   CO2 mmol/L 25   BUN mg/dL 7   CREATININE mg/dL 0.43*   ANION GAP mmol/L 8   CALCIUM mg/dL 8.4   ALBUMIN g/dL 3.8   TOTAL BILIRUBIN mg/dL 0.53   ALK PHOS U/L 84   ALT U/L 16   AST U/L 28   GLUCOSE RANDOM mg/dL 121     Results from last 7 days   Lab Units 03/30/25  1322   INR  1.20*         No results found for: \"HGBA1C\"        Imaging Results Review: I reviewed radiology reports from this admission including: CT abdomen/pelvis.  Other Study Results Review: EKG was reviewed.     Administrative Statements   I have spent a total time of 70 minutes in caring for this patient on the day of the visit/encounter including Diagnostic results, Instructions for management, Patient and family education, Importance of tx compliance, Risk factor reductions, Impressions, Counseling / Coordination of care, Documenting in the medical record, Reviewing/placing orders in the medical record (including tests, medications, and/or procedures), and Obtaining or reviewing history  .    ** Please Note: This note has been constructed using a voice recognition system. **    "

## 2025-03-30 NOTE — DISCHARGE INSTRUCTIONS
Reading Physician Reading Date Result Priority   Raheel Jiménez MD  [phone]     3/30/2025 STAT     Narrative & Impression  CT ABDOMEN AND PELVIS WITH IV CONTRAST     INDICATION: left sided abndominal pain; hx of pancreatitis; recently started drinking etoh. .     COMPARISON: CT abdomen pelvis 3/6/2025     TECHNIQUE: CT examination of the abdomen and pelvis was performed. Multiplanar 2D reformatted images were created from the source data.     This examination, like all CT scans performed in the [facility], was performed utilizing techniques to minimize radiation dose exposure, including the use of iterative reconstruction and automated exposure control. Radiation dose length   product (DLP) for this visit: 441.15 mGy-cm     IV Contrast: 100 mL of iohexol  Enteric Contrast: Not administered.     FINDINGS:     ABDOMEN     LOWER CHEST: No clinically significant abnormality in the visualized lower chest.     LIVER/BILIARY TREE: Unremarkable.     GALLBLADDER: No calcified gallstones. No pericholecystic inflammatory change.     SPLEEN: Unremarkable.     PANCREAS: There is evidence of acute pancreatitis, which will be described based on the revised Alicia Classification of Acute Pancreatitis:  - TIME OF ONSET: less than or equal to 4 weeks  - NECROSIS: There is peripancreatic edema but no evidence of pancreatic or peripancreatic necrosis, therefore this is interstitial edematous pancreatitis (IEP.) Edema and fluid extend into the lesser sac.  - LOCAL COMPLICATIONS: Multiple peripancreatic cystic lesions/collections. Some similar to 3/6/2025 for example in the head measuring 0.9 x 0.9 cm (series 2 image 55). Some slightly smaller than prior for example in the body measuring 0.8 x 0.7 cm   (series 2 image 50), previously 1.0 x 0.8 cm). Others enlarged for example in the tail measuring 1.2 x 1.1 cm (series 2 image 44), previously 0.8 x 0.7 cm.  - INFECTION: There is no abnormal gas in or around the  pancreas to suggest infection.        ADRENAL GLANDS: Unremarkable.     KIDNEYS/URETERS: No hydronephrosis or urinary tract calculi. Subcentimeter hypoattenuating renal lesion(s), too small to characterize but statistically likely benign, which do not warrant follow-up (Radiology June 2019).     STOMACH AND BOWEL: Unremarkable.     APPENDIX: No findings to suggest appendicitis.     ABDOMINOPELVIC CAVITY: Small volume ascites.     VESSELS: Similar left upper quadrant and mesenteric varices.     PELVIS     REPRODUCTIVE ORGANS: Unremarkable for patient's age.     URINARY BLADDER: Unremarkable.     ABDOMINAL WALL/INGUINAL REGIONS: Unremarkable.     BONES: No acute fracture or suspicious osseous lesion.     IMPRESSION:     1. Acute interstitial edematous pancreatitis. Redemonstrated multiple cystic lesions/collections, some of which are slightly larger than 3/6/2025, some smaller, and others similar.     2. Small volume ascites.     3. Similar left upper quadrant and mesenteric varices could be related to portal hypertension. Suggest correlation with liver function parameters and nonemergent ultrasound elastography.        The study was marked in EPIC for immediate notification.           Workstation performed: [id number]

## 2025-03-30 NOTE — ED PROVIDER NOTES
"Time reflects when diagnosis was documented in both MDM as applicable and the Disposition within this note       Time User Action Codes Description Comment    3/30/2025  3:07 PM Jefferson Jorge Roque [K85.90] Acute pancreatitis     3/30/2025  3:07 PM JeffersonJorge [R10.12] Left upper quadrant abdominal pain     3/30/2025  3:07 PM Jefferson Jorge Roque [R11.0] Nausea     3/30/2025  3:08 PM Jefferson Jorge Roque [Y90.6] Blood alcohol level of 120-199 mg/100 ml           ED Disposition       ED Disposition   Admit    Condition   Stable    Date/Time   Sun Mar 30, 2025  3:16 PM    Comment   Case was discussed with christin and the patient's admission status was agreed to be Admission Status: inpatient status to the service of Dr. Tijerina, internal medicine.               Assessment & Plan       Medical Decision Making  Patient is a 34-year-old female with a history of acute alcoholic pancreatitis, hypertension, no significant past surgical history that presents to the emergency room with dull aching persistent worsening left upper quadrant abdominal pain symptoms for approximately 1 week.    Ethanol 177  Lipase 285  Normal AST ALT  Slight leukocytosis of 10.64, no bandemia  Normal glucose, unremarkable lipid panel  CT abdomen pelvis with contrast impression-\"Acute interstitial edematous pancreatitis. Redemonstrated multiple cystic lesions/collections, some of which are slightly larger than 3/6/2025, some smaller, and others similar.\"  Patient made n.p.o., pain controlled at this time.  Infusion normal saline solution   ECG normal sinus rhythm, negative troponin, doubt ACS contributing component.  Discussion with internal medicine team and both agreed to place patient in inpatient status on the care of Dr. Tijerina, internal medicine.    Patient with verbal understanding of all clinical laboratory imaging findings, admission instructions and verbalized agreement with patient current treatment plan with teach back.    Amount and/or Complexity " of Data Reviewed  Labs: ordered. Decision-making details documented in ED Course.  Radiology: ordered and independent interpretation performed. Decision-making details documented in ED Course.  ECG/medicine tests: ordered and independent interpretation performed. Decision-making details documented in ED Course.    Risk  Prescription drug management.  Decision regarding hospitalization.             Medications   sodium chloride 0.9 % bolus 1,000 mL (1,000 mL Intravenous New Bag 3/30/25 1517)   thiamine (VITAMIN B1) 100 mg in sodium chloride 0.9 % 50 mL IVPB (has no administration in time range)   lactated ringers infusion (has no administration in time range)   HYDROmorphone (DILAUDID) injection 0.5 mg (0.5 mg Intravenous Given 3/30/25 1317)   ketorolac (TORADOL) injection 15 mg (15 mg Intravenous Given 3/30/25 1317)   sodium chloride 0.9 % bolus 1,000 mL (0 mL Intravenous Stopped 3/30/25 1415)   ondansetron (ZOFRAN) injection 4 mg (4 mg Intravenous Given 3/30/25 1317)   Famotidine (PF) (PEPCID) injection 20 mg (20 mg Intravenous Given 3/30/25 1316)   iohexol (OMNIPAQUE) 350 MG/ML injection (MULTI-DOSE) 100 mL (100 mL Intravenous Given 3/30/25 1409)   HYDROmorphone (DILAUDID) injection 0.5 mg (0.5 mg Intravenous Given 3/30/25 1517)   ondansetron (ZOFRAN) injection 4 mg (4 mg Intravenous Given 3/30/25 1517)       ED Risk Strat Scores                                                History of Present Illness       Chief Complaint   Patient presents with    Abdominal Pain     Started on Monday with pain, hx of pancreatitis and thinks its another flare; no meds for pain today     Patient is a 34-year-old female with a history of acute alcoholic pancreatitis, hypertension, no significant past surgical history that presents to the emergency room with dull aching persistent worsening left upper quadrant abdominal pain symptoms for approximately 1 week.  Patient has associated symptomatology of nausea beginning with the  "current ED presentation of left upper quadrant abdominal pain.  Patient states that she recently started a new job and started drinking a couple glasses of wine daily to cope.  Patient denies illicit drug use.  Patient states that she \"had a couple sips of wine before she came to the ER to be evaluated for current upper abdominal pain symptoms.  Patient denies palliative factors or provocative factors of pressure to upper abdomen.  Patient denies noneffective treatment.  Patient denies fevers, chills, vomiting, diarrhea, constipation and urinary symptoms.  Patient denies recent fall or recent trauma.  Patient denies sick contacts recent travel.  Patient denies chest pain and shortness of breath.      Past Medical History:   Diagnosis Date    Acute alcoholic pancreatitis 11/03/2023    Hypertension     SIRS (systemic inflammatory response syndrome) (HCC) 04/10/2024      Past Surgical History:   Procedure Laterality Date    WISDOM TOOTH EXTRACTION        History reviewed. No pertinent family history.   Social History     Tobacco Use    Smoking status: Every Day     Current packs/day: 0.50     Types: Cigarettes     Passive exposure: Never    Smokeless tobacco: Never    Tobacco comments:     Per pt last drink was today 9/24/24 one glass of wine. Had previously stop on 4/4/24 for 1 month    Vaping Use    Vaping status: Never Used   Substance Use Topics    Alcohol use: Yes     Alcohol/week: 21.0 standard drinks of alcohol     Types: 21 Glasses of wine per week     Comment: last drank 3/30/25    Drug use: Never      E-Cigarette/Vaping    E-Cigarette Use Never User       E-Cigarette/Vaping Substances    Nicotine No     THC No     CBD No     Flavoring No     Other No     Unknown No       I have reviewed and agree with the history as documented.       History provided by:  Patient   used: No    Abdominal Pain  Associated symptoms: nausea    Associated symptoms: no chest pain, no chills, no constipation, no " cough, no diarrhea, no dysuria, no fever, no hematuria, no shortness of breath, no sore throat and no vomiting        Review of Systems   Constitutional:  Negative for activity change, appetite change, chills and fever.   HENT:  Negative for congestion, ear pain, postnasal drip, rhinorrhea, sinus pressure, sinus pain, sore throat and tinnitus.    Eyes:  Negative for photophobia, pain and visual disturbance.   Respiratory:  Negative for cough, chest tightness and shortness of breath.    Cardiovascular:  Negative for chest pain and palpitations.   Gastrointestinal:  Positive for abdominal pain and nausea. Negative for constipation, diarrhea and vomiting.   Genitourinary:  Negative for difficulty urinating, dysuria, flank pain, frequency, hematuria and urgency.   Musculoskeletal:  Negative for arthralgias, back pain, gait problem, neck pain and neck stiffness.   Skin:  Negative for color change, pallor and rash.   Allergic/Immunologic: Negative for environmental allergies and food allergies.   Neurological:  Negative for dizziness, seizures, syncope, weakness, numbness and headaches.   Psychiatric/Behavioral:  Negative for confusion.    All other systems reviewed and are negative.          Objective       ED Triage Vitals [03/30/25 1226]   Temperature Pulse Blood Pressure Respirations SpO2 Patient Position - Orthostatic VS   98 °F (36.7 °C) (!) 125 140/96 16 98 % Sitting      Temp Source Heart Rate Source BP Location FiO2 (%) Pain Score    Temporal Monitor Left arm -- 8      Vitals      Date and Time Temp Pulse SpO2 Resp BP Pain Score FACES Pain Rating User   03/30/25 1517 -- -- -- -- -- 9 --    03/30/25 1410 -- 92 -- -- -- -- --    03/30/25 1315 -- -- -- -- -- 9 --    03/30/25 1226 98 °F (36.7 °C) 125 98 % 16 140/96 8 -- MS            Physical Exam  Vitals and nursing note reviewed.   Constitutional:       General: She is awake.      Appearance: Normal appearance. She is well-developed and normal weight. She is  not ill-appearing, toxic-appearing or diaphoretic.   HENT:      Head: Normocephalic and atraumatic.      Jaw: There is normal jaw occlusion.      Right Ear: Hearing, tympanic membrane and external ear normal. No decreased hearing noted. No drainage, swelling or tenderness. No mastoid tenderness.      Left Ear: Hearing, tympanic membrane and external ear normal. No decreased hearing noted. No drainage, swelling or tenderness. No mastoid tenderness.      Nose: Nose normal.      Mouth/Throat:      Lips: Pink.      Mouth: Mucous membranes are moist.      Pharynx: Oropharynx is clear. Uvula midline.   Eyes:      General: Lids are normal. Vision grossly intact. Gaze aligned appropriately.         Right eye: No discharge.         Left eye: No discharge.      Extraocular Movements: Extraocular movements intact.      Conjunctiva/sclera: Conjunctivae normal.      Pupils: Pupils are equal, round, and reactive to light.   Neck:      Vascular: No JVD.      Trachea: Trachea and phonation normal. No tracheal tenderness or tracheal deviation.   Cardiovascular:      Rate and Rhythm: Normal rate and regular rhythm.      Pulses: Normal pulses.           Radial pulses are 2+ on the right side and 2+ on the left side.        Posterior tibial pulses are 2+ on the right side and 2+ on the left side.      Heart sounds: Normal heart sounds.   Pulmonary:      Effort: Pulmonary effort is normal.      Breath sounds: Normal breath sounds and air entry. No stridor. No decreased breath sounds, wheezing, rhonchi or rales.   Abdominal:      General: Abdomen is flat. Bowel sounds are normal. There is no distension.      Palpations: Abdomen is soft. Abdomen is not rigid.      Tenderness: There is abdominal tenderness in the epigastric area and left upper quadrant. There is no right CVA tenderness, left CVA tenderness, guarding or rebound.   Musculoskeletal:         General: Normal range of motion.      Cervical back: Full passive range of motion  without pain, normal range of motion and neck supple. No rigidity. No spinous process tenderness or muscular tenderness. Normal range of motion.   Feet:      Right foot:      Toenail Condition: Right toenails are normal.      Left foot:      Toenail Condition: Left toenails are normal.   Lymphadenopathy:      Head:      Right side of head: No submental, submandibular, tonsillar, preauricular, posterior auricular or occipital adenopathy.      Left side of head: No submental, submandibular, tonsillar, preauricular, posterior auricular or occipital adenopathy.      Cervical: No cervical adenopathy.      Right cervical: No superficial, deep or posterior cervical adenopathy.     Left cervical: No superficial, deep or posterior cervical adenopathy.   Skin:     General: Skin is warm.      Capillary Refill: Capillary refill takes less than 2 seconds.      Findings: No rash.   Neurological:      General: No focal deficit present.      Mental Status: She is alert and oriented to person, place, and time. Mental status is at baseline.      GCS: GCS eye subscore is 4. GCS verbal subscore is 5. GCS motor subscore is 6.      Sensory: No sensory deficit.   Psychiatric:         Attention and Perception: Attention normal.         Mood and Affect: Mood normal.         Speech: Speech normal.         Behavior: Behavior normal. Behavior is cooperative.         Thought Content: Thought content normal.         Judgment: Judgment normal.         Results Reviewed       Procedure Component Value Units Date/Time    Magnesium [[id number]] Collected: 03/30/25 1322    Lab Status: In process Specimen: Blood from Arm, Right Updated: 03/30/25 1507    Phosphorus [[id number]] Collected: 03/30/25 1322    Lab Status: In process Specimen: Blood from Arm, Right Updated: 03/30/25 1507    Lipid Panel with Direct LDL reflex [[id number]]  (Abnormal) Collected: 03/30/25 1322    Lab Status: Final result Specimen: Blood from Arm, Right Updated: 03/30/25 1447      Cholesterol 99 mg/dL      Triglycerides 97 mg/dL      HDL, Direct 48 mg/dL      LDL Calculated 32 mg/dL     Urine Microscopic [[id number]]  (Abnormal) Collected: 03/30/25 1322    Lab Status: Final result Specimen: Urine, Clean Catch Updated: 03/30/25 1402     RBC, UA None Seen /hpf      WBC, UA None Seen /hpf      Epithelial Cells Moderate /hpf      Bacteria, UA Occasional /hpf      MUCUS THREADS Occasional    HS Troponin I 2hr [[id number]]     Lab Status: No result Specimen: Blood     HS Troponin 0hr (reflex protocol) [[id number]]  (Normal) Collected: 03/30/25 1322    Lab Status: Final result Specimen: Blood from Arm, Right Updated: 03/30/25 1358     hs TnI 0hr <2 ng/L     Protime-INR [[id number]]  (Abnormal) Collected: 03/30/25 1322    Lab Status: Final result Specimen: Blood from Arm, Right Updated: 03/30/25 1357     Protime 15.7 seconds      INR 1.20    Narrative:      INR Therapeutic Range    Indication                                             INR Range      Atrial Fibrillation                                               2.0-3.0  Hypercoagulable State                                    2.0.2.3  Left Ventricular Asist Device                            2.0-3.0  Mechanical Heart Valve                                  -    Aortic(with afib, MI, embolism, HF, LA enlargement,    and/or coagulopathy)                                     2.0-3.0 (2.5-3.5)     Mitral                                                             2.5-3.5  Prosthetic/Bioprosthetic Heart Valve               2.0-3.0  Venous thromboembolism (VTE: VT, PE        2.0-3.0    APTT [[id number]]  (Normal) Collected: 03/30/25 1322    Lab Status: Final result Specimen: Blood from Arm, Right Updated: 03/30/25 1357     PTT 28 seconds     UA w Reflex to Microscopic w Reflex to Culture [[id number]]  (Abnormal) Collected: 03/30/25 1322    Lab Status: Final result Specimen: Urine, Clean Catch Updated: 03/30/25 1357     Color, UA Yellow     Clarity, UA Clear      Specific Gravity, UA >=1.030     pH, UA 5.5     Leukocytes, UA Negative     Nitrite, UA Negative     Protein, UA 30 (1+) mg/dl      Glucose, UA Negative mg/dl      Ketones, UA Trace mg/dl      Urobilinogen, UA <2.0 mg/dl      Bilirubin, UA Negative     Occult Blood, UA Negative    Comprehensive metabolic panel [[id number]]  (Abnormal) Collected: 03/30/25 1322    Lab Status: Final result Specimen: Blood from Arm, Right Updated: 03/30/25 1353     Sodium 136 mmol/L      Potassium 3.6 mmol/L      Chloride 103 mmol/L      CO2 25 mmol/L      ANION GAP 8 mmol/L      BUN 7 mg/dL      Creatinine 0.43 mg/dL      Glucose 121 mg/dL      Calcium 8.4 mg/dL      AST 28 U/L      ALT 16 U/L      Alkaline Phosphatase 84 U/L      Total Protein 6.5 g/dL      Albumin 3.8 g/dL      Total Bilirubin 0.53 mg/dL      eGFR 134 ml/min/1.73sq m     Narrative:      National Kid[employer] guidelines for Chronic Kidney Disease (CKD):     Stage 1 with normal or high GFR (GFR > 90 mL/min/1.73 square meters)    Stage 2 Mild CKD (GFR = 60-89 mL/min/1.73 square meters)    Stage 3A Moderate CKD (GFR = 45-59 mL/min/1.73 square meters)    Stage 3B Moderate CKD (GFR = 30-44 mL/min/1.73 square meters)    Stage 4 Severe CKD (GFR = 15-29 mL/min/1.73 square meters)    Stage 5 End Stage CKD (GFR <15 mL/min/1.73 square meters)  Note: GFR calculation is accurate only with a steady state creatinine    Lipase [[id number]]  (Abnormal) Collected: 03/30/25 1322    Lab Status: Final result Specimen: Blood from Arm, Right Updated: 03/30/25 1353     Lipase 285 u/L     Ethanol [[id number]]  (Abnormal) Collected: 03/30/25 1322    Lab Status: Final result Specimen: Blood from Arm, Right Updated: 03/30/25 1353     Ethanol Lvl 177 mg/dL     CBC and differential [[id number]]  (Abnormal) Collected: 03/30/25 1322    Lab Status: Final result Specimen: Blood from Arm, Right Updated: 03/30/25 1334     WBC 10.64 Thousand/uL      RBC 3.96 Million/uL      Hemoglobin 13.3 g/dL       Hematocrit 40.6 %       fL      MCH 33.6 pg      MCHC 32.8 g/dL      RDW 13.0 %      MPV 10.3 fL      Platelets 217 Thousands/uL      nRBC 0 /100 WBCs      Segmented % 58 %      Immature Grans % 1 %      Lymphocytes % 22 %      Monocytes % 8 %      Eosinophils Relative 10 %      Basophils Relative 1 %      Absolute Neutrophils 6.33 Thousands/µL      Absolute Immature Grans 0.07 Thousand/uL      Absolute Lymphocytes 2.29 Thousands/µL      Absolute Monocytes 0.81 Thousand/µL      Eosinophils Absolute 1.03 Thousand/µL      Basophils Absolute 0.11 Thousands/µL     POCT pregnancy, urine [[id number]]  (Normal) Collected: 03/30/25 1312    Lab Status: Final result Updated: 03/30/25 1323     EXT Preg Test, Ur Negative     Control Valid            CT abdomen pelvis with contrast   ED Interpretation by Jorge Paulino PA-C (03/30 7056)   Reading Physician Reading Date Result Priority  Raheel Jiménez MD  [phone]    3/30/2025 STAT    Narrative & Impression  CT ABDOMEN AND PELVIS WITH IV CONTRAST     INDICATION: left sided abndominal pain; hx of pancreatitis; recently started drinking etoh. .     COMPARISON: CT abdomen pelvis 3/6/2025     TECHNIQUE: CT examination of the abdomen and pelvis was performed. Multiplanar 2D reformatted images were created from the source data.     This examination, like all CT scans performed in the [facility], was performed utilizing techniques to minimize radiation dose exposure, including the use of iterative reconstruction and automated exposure control. Radiation dose length   product (DLP) for this visit: 441.15 mGy-cm     IV Contrast: 100 mL of iohexol  Enteric Contrast: Not administered.     FINDINGS:     ABDOMEN     LOWER CHEST: No clinically significant abnormality in the visualized lower chest.     LIVER/BILIARY TREE: Unremarkable.     GALLBLADDER: No calcif   ied gallstones. No pericholecystic inflammatory change.     SPLEEN: Unremarkable.     PANCREAS:  There is evidence of acute pancreatitis, which will be described based on the revised Rebekah Classification of Acute Pancreatitis:  - TIME OF ONSET: less than or equal to 4 weeks  - NECROSIS: There is peripancreatic edema but no evidence of pancreatic or peripancreatic necrosis, therefore this is interstitial edematous pancreatitis (IEP.) Edema and fluid extend into the lesser sac.  - LOCAL COMPLICATIONS: Multiple peripancreatic cystic lesions/collections. Some similar to 3/6/2025 for example in the head measuring 0.9 x 0.9 cm (series 2 image 55). Some slightly smaller than prior for example in the body measuring 0.8 x 0.7 cm   (series 2 image 50), previously 1.0 x 0.8 cm). Others enlarged for example in the tail measuring 1.2 x 1.1 cm (series 2 image 44), previously 0.8 x 0.7 cm.  - INFECTION: There is no abnormal gas in or around the pancreas to suggest infection.        ADRENAL GLANDS:    Unremarkable.     KIDNEYS/URETERS: No hydronephrosis or urinary tract calculi. Subcentimeter hypoattenuating renal lesion(s), too small to characterize but statistically likely benign, which do not warrant follow-up (Radiology June 2019).     STOMACH AND BOWEL: Unremarkable.     APPENDIX: No findings to suggest appendicitis.     ABDOMINOPELVIC CAVITY: Small volume ascites.     VESSELS: Similar left upper quadrant and mesenteric varices.     PELVIS     REPRODUCTIVE ORGANS: Unremarkable for patient's age.     URINARY BLADDER: Unremarkable.     ABDOMINAL WALL/INGUINAL REGIONS: Unremarkable.     BONES: No acute fracture or suspicious osseous lesion.     IMPRESSION:     1. Acute interstitial edematous pancreatitis. Redemonstrated multiple cystic lesions/collections, some of which are slightly larger than 3/6/2025, some smaller, and others similar.     2. Small volume ascites.     3. Similar left upper quadrant and mesenteric varices could be related to portal hypertension. Suggest correlat   ion with liver function parameters and  nonemergent ultrasound elastography.        The study was marked in EPIC for immediate notification.           Workstation performed: [id number]        Final Interpretation by Raehel Jiménez MD (03/30 1455)      1. Acute interstitial edematous pancreatitis. Redemonstrated multiple cystic lesions/collections, some of which are slightly larger than 3/6/2025, some smaller, and others similar.      2. Small volume ascites.      3. Similar left upper quadrant and mesenteric varices could be related to portal hypertension. Suggest correlation with liver function parameters and nonemergent ultrasound elastography.         The study was marked in EPIC for immediate notification.            Workstation performed: [id number]             ECG 12 Lead Documentation Only    Date/Time: 3/30/2025 2:22 PM    Performed by: Jorge Paulino PA-C  Authorized by: Jorge Paulino PA-C    Indications / Diagnosis:  Upper abdominal pain  ECG reviewed by me, the ED Provider: yes    Patient location:  ED  Previous ECG:     Previous ECG:  Compared to current    Comparison ECG info:  Compared with ECG on February 20, 2025, no significant changes were noted.    Similarity:  No change    Comparison to cardiac monitor: No    Interpretation:     Interpretation: normal    Rate:     ECG rate:  107    ECG rate assessment: tachycardic    Rhythm:     Rhythm: sinus tachycardia    Ectopy:     Ectopy: none    QRS:     QRS axis:  Normal    QRS intervals:  Normal  Conduction:     Conduction: normal    ST segments:     ST segments:  Normal  T waves:     T waves: normal        ED Medication and Procedure Management   Prior to Admission Medications   Prescriptions Last Dose Informant Patient Reported? Taking?   DULoxetine (CYMBALTA) 30 mg delayed release capsule   No No   Sig: Take 1 capsule (30 mg total) by mouth daily   folic acid (KP Folic Acid) 1 mg tablet   No No   Sig: Take 1 tablet (1 mg total) by mouth daily   magnesium Oxide (MAG-OX) 400 mg TABS   No No    Sig: Take 1 tablet (400 mg total) by mouth 2 (two) times a day   nicotine (NICODERM CQ) 21 mg/24 hr TD 24 hr patch   No No   Sig: Place 1 patch on the skin over 24 hours daily   ondansetron (ZOFRAN-ODT) 4 mg disintegrating tablet   No No   Sig: Take 1 tablet (4 mg total) by mouth every 6 (six) hours as needed for nausea or vomiting   pantoprazole (PROTONIX) 40 mg tablet   No No   Sig: Take 1 tablet (40 mg total) by mouth 2 (two) times a day   polyethylene glycol (MIRALAX) 17 g packet   No No   Sig: Take 17 g by mouth daily   senna-docusate sodium (SENOKOT S) 8.6-50 mg per tablet   No No   Sig: Take 1 tablet by mouth daily at bedtime   thiamine 100 MG tablet   No No   Sig: Take 1 tablet (100 mg total) by mouth daily      Facility-Administered Medications: None     Patient's Medications   Discharge Prescriptions    No medications on file     No discharge procedures on file.  ED SEPSIS DOCUMENTATION   Time reflects when diagnosis was documented in both MDM as applicable and the Disposition within this note       Time User Action Codes Description Comment    3/30/2025  3:07 PM Jorge Paulino [K85.90] Acute pancreatitis     3/30/2025  3:07 PM Jorge Paulino [R10.12] Left upper quadrant abdominal pain     3/30/2025  3:07 PM Jorge Paulino [R11.0] Nausea     3/30/2025  3:08 PM Jorge Paulino [Y90.6] Blood alcohol level of 120-199 mg/100 ml                  Jorge Paulino PA-C  03/30/25 1525

## 2025-03-30 NOTE — ASSESSMENT & PLAN NOTE
Presented to the emergency department with epigastric/left upper quadrant pain  CT abdomen/pelvis: 1. Acute interstitial edematous pancreatitis. Redemonstrated multiple cystic lesions/collections, some of which are slightly larger than 3/6/2025, some smaller, and others similar.  2. Small volume ascites.  3. Similar left upper quadrant and mesenteric varices could be related to portal hypertension. Suggest correlation with liver function parameters and nonemergent ultrasound elastography.   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  Stent was removed in January  Continue aggressive IV hydration, n.p.o., pain control  Ultimately needs complete alcohol cessation unfortunately has declined alcohol rehab services previously  Consult GI

## 2025-03-30 NOTE — PLAN OF CARE
Problem: INFECTION - ADULT  Goal: Absence or prevention of progression during hospitalization  Description: INTERVENTIONS:- Assess and monitor for signs and symptoms of infection- Monitor lab/diagnostic results- Monitor all insertion sites, i.e. indwelling lines, tubes, and drains- Monitor endotracheal if appropriate and nasal secretions for changes in amount and color- Pratt appropriate cooling/warming therapies per order- Administer medications as ordered- Instruct and encourage patient and family to use good hand hygiene technique- Identify and instruct in appropriate isolation precautions for identified infection/condition  Outcome: Progressing

## 2025-03-31 PROBLEM — E44.0 MODERATE PROTEIN-CALORIE MALNUTRITION (HCC): Status: ACTIVE | Noted: 2025-03-31

## 2025-03-31 LAB
ALBUMIN SERPL BCG-MCNC: 3.2 G/DL (ref 3.5–5)
ALP SERPL-CCNC: 79 U/L (ref 34–104)
ALT SERPL W P-5'-P-CCNC: 12 U/L (ref 7–52)
ANION GAP SERPL CALCULATED.3IONS-SCNC: 5 MMOL/L (ref 4–13)
AST SERPL W P-5'-P-CCNC: 24 U/L (ref 13–39)
BASOPHILS # BLD AUTO: 0.08 THOUSANDS/ÂΜL (ref 0–0.1)
BASOPHILS NFR BLD AUTO: 1 % (ref 0–1)
BILIRUB SERPL-MCNC: 1.07 MG/DL (ref 0.2–1)
BUN SERPL-MCNC: 6 MG/DL (ref 5–25)
CALCIUM ALBUM COR SERPL-MCNC: 8.2 MG/DL (ref 8.3–10.1)
CALCIUM SERPL-MCNC: 7.6 MG/DL (ref 8.4–10.2)
CHLORIDE SERPL-SCNC: 104 MMOL/L (ref 96–108)
CO2 SERPL-SCNC: 23 MMOL/L (ref 21–32)
CREAT SERPL-MCNC: 0.48 MG/DL (ref 0.6–1.3)
EOSINOPHIL # BLD AUTO: 1.05 THOUSAND/ÂΜL (ref 0–0.61)
EOSINOPHIL NFR BLD AUTO: 17 % (ref 0–6)
ERYTHROCYTE [DISTWIDTH] IN BLOOD BY AUTOMATED COUNT: 13.3 % (ref 11.6–15.1)
GFR SERPL CREATININE-BSD FRML MDRD: 129 ML/MIN/1.73SQ M
GLUCOSE SERPL-MCNC: 78 MG/DL (ref 65–140)
HCT VFR BLD AUTO: 33.2 % (ref 34.8–46.1)
HGB BLD-MCNC: 10.7 G/DL (ref 11.5–15.4)
IMM GRANULOCYTES # BLD AUTO: 0.01 THOUSAND/UL (ref 0–0.2)
IMM GRANULOCYTES NFR BLD AUTO: 0 % (ref 0–2)
LIPASE SERPL-CCNC: 157 U/L (ref 11–82)
LYMPHOCYTES # BLD AUTO: 1.81 THOUSANDS/ÂΜL (ref 0.6–4.47)
LYMPHOCYTES NFR BLD AUTO: 30 % (ref 14–44)
MAGNESIUM SERPL-MCNC: 2 MG/DL (ref 1.9–2.7)
MCH RBC QN AUTO: 34.2 PG (ref 26.8–34.3)
MCHC RBC AUTO-ENTMCNC: 32.2 G/DL (ref 31.4–37.4)
MCV RBC AUTO: 106 FL (ref 82–98)
MONOCYTES # BLD AUTO: 0.62 THOUSAND/ÂΜL (ref 0.17–1.22)
MONOCYTES NFR BLD AUTO: 10 % (ref 4–12)
NEUTROPHILS # BLD AUTO: 2.56 THOUSANDS/ÂΜL (ref 1.85–7.62)
NEUTS SEG NFR BLD AUTO: 42 % (ref 43–75)
NRBC BLD AUTO-RTO: 0 /100 WBCS
PLATELET # BLD AUTO: 149 THOUSANDS/UL (ref 149–390)
PMV BLD AUTO: 11.1 FL (ref 8.9–12.7)
POTASSIUM SERPL-SCNC: 4 MMOL/L (ref 3.5–5.3)
PROT SERPL-MCNC: 5.5 G/DL (ref 6.4–8.4)
RBC # BLD AUTO: 3.13 MILLION/UL (ref 3.81–5.12)
SODIUM SERPL-SCNC: 132 MMOL/L (ref 135–147)
WBC # BLD AUTO: 6.13 THOUSAND/UL (ref 4.31–10.16)

## 2025-03-31 PROCEDURE — 83690 ASSAY OF LIPASE: CPT | Performed by: INTERNAL MEDICINE

## 2025-03-31 PROCEDURE — 83735 ASSAY OF MAGNESIUM: CPT | Performed by: INTERNAL MEDICINE

## 2025-03-31 PROCEDURE — 99222 1ST HOSP IP/OBS MODERATE 55: CPT | Performed by: INTERNAL MEDICINE

## 2025-03-31 PROCEDURE — 85025 COMPLETE CBC W/AUTO DIFF WBC: CPT | Performed by: INTERNAL MEDICINE

## 2025-03-31 PROCEDURE — 99232 SBSQ HOSP IP/OBS MODERATE 35: CPT | Performed by: INTERNAL MEDICINE

## 2025-03-31 PROCEDURE — 80053 COMPREHEN METABOLIC PANEL: CPT | Performed by: INTERNAL MEDICINE

## 2025-03-31 RX ORDER — HYDROXYZINE HYDROCHLORIDE 25 MG/1
25 TABLET, FILM COATED ORAL EVERY 6 HOURS PRN
Status: DISCONTINUED | OUTPATIENT
Start: 2025-03-31 | End: 2025-04-03 | Stop reason: HOSPADM

## 2025-03-31 RX ORDER — HYDROMORPHONE HCL/PF 1 MG/ML
0.5 SYRINGE (ML) INJECTION EVERY 4 HOURS PRN
Refills: 0 | Status: DISCONTINUED | OUTPATIENT
Start: 2025-03-31 | End: 2025-04-01

## 2025-03-31 RX ORDER — ONDANSETRON 2 MG/ML
4 INJECTION INTRAMUSCULAR; INTRAVENOUS EVERY 6 HOURS PRN
Status: DISCONTINUED | OUTPATIENT
Start: 2025-03-31 | End: 2025-04-03 | Stop reason: HOSPADM

## 2025-03-31 RX ORDER — PROCHLORPERAZINE MALEATE 5 MG/1
5 TABLET ORAL EVERY 6 HOURS PRN
Status: DISCONTINUED | OUTPATIENT
Start: 2025-03-31 | End: 2025-04-03 | Stop reason: HOSPADM

## 2025-03-31 RX ORDER — HYDROMORPHONE HCL IN WATER/PF 6 MG/30 ML
0.2 PATIENT CONTROLLED ANALGESIA SYRINGE INTRAVENOUS EVERY 4 HOURS PRN
Status: DISCONTINUED | OUTPATIENT
Start: 2025-03-31 | End: 2025-04-01

## 2025-03-31 RX ADMIN — HYDROMORPHONE HYDROCHLORIDE 0.5 MG: 1 INJECTION, SOLUTION INTRAMUSCULAR; INTRAVENOUS; SUBCUTANEOUS at 02:39

## 2025-03-31 RX ADMIN — PROCHLORPERAZINE MALEATE 5 MG: 5 TABLET ORAL at 11:14

## 2025-03-31 RX ADMIN — CHLORDIAZEPOXIDE HYDROCHLORIDE 50 MG: 25 CAPSULE ORAL at 11:53

## 2025-03-31 RX ADMIN — HYDROMORPHONE HYDROCHLORIDE 0.5 MG: 1 INJECTION, SOLUTION INTRAMUSCULAR; INTRAVENOUS; SUBCUTANEOUS at 07:12

## 2025-03-31 RX ADMIN — PANTOPRAZOLE SODIUM 40 MG: 40 INJECTION, POWDER, FOR SOLUTION INTRAVENOUS at 10:15

## 2025-03-31 RX ADMIN — HYDROMORPHONE HYDROCHLORIDE 0.5 MG: 1 INJECTION, SOLUTION INTRAMUSCULAR; INTRAVENOUS; SUBCUTANEOUS at 10:37

## 2025-03-31 RX ADMIN — CHLORDIAZEPOXIDE HYDROCHLORIDE 25 MG: 25 CAPSULE ORAL at 17:17

## 2025-03-31 RX ADMIN — MULTIPLE VITAMINS W/ MINERALS TAB 1 TABLET: TAB ORAL at 10:15

## 2025-03-31 RX ADMIN — CHLORDIAZEPOXIDE HYDROCHLORIDE 50 MG: 25 CAPSULE ORAL at 06:06

## 2025-03-31 RX ADMIN — SODIUM CHLORIDE, SODIUM LACTATE, POTASSIUM CHLORIDE, AND CALCIUM CHLORIDE 125 ML/HR: .6; .31; .03; .02 INJECTION, SOLUTION INTRAVENOUS at 13:24

## 2025-03-31 RX ADMIN — SODIUM CHLORIDE, SODIUM LACTATE, POTASSIUM CHLORIDE, AND CALCIUM CHLORIDE 200 ML/HR: .6; .31; .03; .02 INJECTION, SOLUTION INTRAVENOUS at 02:37

## 2025-03-31 RX ADMIN — THIAMINE HYDROCHLORIDE 100 MG: 100 INJECTION, SOLUTION INTRAMUSCULAR; INTRAVENOUS at 11:08

## 2025-03-31 RX ADMIN — SODIUM CHLORIDE, SODIUM LACTATE, POTASSIUM CHLORIDE, AND CALCIUM CHLORIDE 125 ML/HR: .6; .31; .03; .02 INJECTION, SOLUTION INTRAVENOUS at 22:42

## 2025-03-31 RX ADMIN — SODIUM CHLORIDE, SODIUM LACTATE, POTASSIUM CHLORIDE, AND CALCIUM CHLORIDE 200 ML/HR: .6; .31; .03; .02 INJECTION, SOLUTION INTRAVENOUS at 06:05

## 2025-03-31 RX ADMIN — FOLIC ACID 1 MG: 5 INJECTION, SOLUTION INTRAMUSCULAR; INTRAVENOUS; SUBCUTANEOUS at 10:16

## 2025-03-31 RX ADMIN — HYDROMORPHONE HYDROCHLORIDE 0.5 MG: 1 INJECTION, SOLUTION INTRAMUSCULAR; INTRAVENOUS; SUBCUTANEOUS at 14:37

## 2025-03-31 RX ADMIN — HYDROXYZINE HYDROCHLORIDE 25 MG: 25 TABLET, FILM COATED ORAL at 16:36

## 2025-03-31 RX ADMIN — CHLORDIAZEPOXIDE HYDROCHLORIDE 25 MG: 25 CAPSULE ORAL at 23:13

## 2025-03-31 RX ADMIN — HYDROXYZINE HYDROCHLORIDE 25 MG: 25 TABLET, FILM COATED ORAL at 03:42

## 2025-03-31 RX ADMIN — PANTOPRAZOLE SODIUM 40 MG: 40 INJECTION, POWDER, FOR SOLUTION INTRAVENOUS at 20:06

## 2025-03-31 RX ADMIN — HYDROMORPHONE HYDROCHLORIDE 0.5 MG: 1 INJECTION, SOLUTION INTRAMUSCULAR; INTRAVENOUS; SUBCUTANEOUS at 23:13

## 2025-03-31 RX ADMIN — HYDROMORPHONE HYDROCHLORIDE 0.5 MG: 1 INJECTION, SOLUTION INTRAMUSCULAR; INTRAVENOUS; SUBCUTANEOUS at 19:08

## 2025-03-31 RX ADMIN — NICOTINE 21 MG: 21 PATCH, EXTENDED RELEASE TRANSDERMAL at 10:16

## 2025-03-31 RX ADMIN — ONDANSETRON 4 MG: 2 INJECTION INTRAMUSCULAR; INTRAVENOUS at 16:33

## 2025-03-31 NOTE — ASSESSMENT & PLAN NOTE
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 Lipase 285 on admission (>3x ULN). Liver tests and creatinine normal. WBC 10.64 on admission, currently normal. CT shows acute interstitial edematous pancreatitis, multiple cystic lesions, some larger, some smaller from prior imaging, small volume ascites, LUQ and mesenteric varices, normal-appearing liver and spleen.     Patient is afebrile, vital signs stable. She is crying and appears uncomfortable. Epigastric area markedly tender to palpation. No guarding.    -Discussed with internal medicine  -Continue supportive care  -Clear liquid diet, advance as tolerated  -Continue lactated ringers 125 mg/hr  -Pain management as needed

## 2025-03-31 NOTE — ASSESSMENT & PLAN NOTE
Presented to the emergency department with epigastric/left upper quadrant pain  CT abdomen/pelvis: 1. Acute interstitial edematous pancreatitis. Redemonstrated multiple cystic lesions/collections, some of which are slightly larger than 3/6/2025, some smaller, and others similar.  2. Small volume ascites.  3. Similar left upper quadrant and mesenteric varices could be related to portal hypertension. Suggest correlation with liver function parameters and nonemergent ultrasound elastography.   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  Stent was removed in January  Continue IV hydration  Clear liquids  Pain management  Ultimately needs complete alcohol cessation unfortunately has declined alcohol rehab services previously  Consult GI

## 2025-03-31 NOTE — ASSESSMENT & PLAN NOTE
Patient quit drinking for 2 weeks since last hospitalization, then resumed due to stress surrounding a new job. Last drink 2 days ago. Ethanol 177 on admission. Does have history of withdrawal seizures.       -Again counseled on abstaining from alcohol  -Recommended inpatient/outpatient alcohol rehab  -Continue Librium and monitoring on telemetry  -Continue [facility] protocol  -Continue folic acid and thiamine

## 2025-03-31 NOTE — UTILIZATION REVIEW
NOTIFICATION OF INPATIENT ADMISSION   AUTHORIZATION REQUEST   SERVICING FACILITY:   [place] [address]  Tax ID: [id number]  NPI: [id number] ATTENDING PROVIDER:  Attending Name and NPI#: Ralph Marshall Md [[id number]]  Address: [address] [place] [address]  Phone: [phone]   ADMISSION INFORMATION:  Place of Service: Inpatient [facility]  Place of Service Code: 21  Inpatient Admission Date/Time: 3/30/25  3:17 PM  Discharge Date/Time: No discharge date for patient encounter.  Admitting Diagnosis Code/Description:  Acute pancreatitis [K85.90]  Hypomagnesemia [E83.42]  Nausea [R11.0]  Abdominal pain [R10.9]  Blood alcohol level of 120-199 mg/100 ml [Y90.6]  Left upper quadrant abdominal pain [R10.12]     UTILIZATION REVIEW CONTACT:  Sana Mckeon, Utilization   Network Utilization Review Department  Phone: [phone]  Fax: [phone]  Email: [email or web address]  Contact for approvals/pending authorizations, clinical reviews, and discharge.     PHYSICIAN ADVISORY SERVICES:  Medical Necessity Denial & Ixlq-kz-Trmy Review  Phone: [phone]  Fax: [phone]  Email: [email or web address]     DISCHARGE SUPPORT TEAM:  For Patients Discharge Needs & Updates  Phone: [phone] opt. [phone] Fax: [phone]  Email: [email or web address]

## 2025-03-31 NOTE — UTILIZATION REVIEW
Initial Clinical Review    Admission: Date/Time/Statement:   Admission Orders (From admission, onward)       Ordered        03/30/25 1517  INPATIENT ADMISSION  Once                          Orders Placed This Encounter   Procedures    INPATIENT ADMISSION     Standing Status:   Standing     Number of Occurrences:   1     Level of Care:   Med Surg [16]     Estimated length of stay:   More than 2 Midnights     Certification:   I certify that inpatient services are medically necessary for this patient for a duration of greater than two midnights. See H&P and MD Progress Notes for additional information about the patient's course of treatment.     ED Arrival Information       Expected   -    Arrival   3/30/2025 12:21    Acuity   Urgent              Means of arrival   Walk-In    Escorted by   [place]    Service   Hospitalist    Admission type   Emergency              Arrival complaint   Abdominal Pain             Chief Complaint   Patient presents with    Abdominal Pain     Started on Monday with pain, hx of pancreatitis and thinks its another flare; no meds for pain today       Initial Presentation: 34 y.o. female PMH alcohol use disorder, recurrent pancreatitis related to alcohol use disorder, HTN, HX alcohol withdrawal seizure/DTs, nicotine use presents as walk in endorsing worsening L upper quadrant / epigastric ABD pain; reports last alcohol intake day prior  EXAM  CT reveals acute pancreatitis, small vol ascites concern for portal HTN  Lipase 285, ethanol 177, mild leukocytosis 10.64; given IV Pepcid, IV Toradol x2 IV Dilaudid, IV MAG Bolus, 2 L IVF Bolus & cont IVF; PLAN Inpatient admission due to acute alcoholic pancreatitis, alcohol use disorder. Aggressive IVF, NPO, pain control; consult GI, start Librium previously did well with 50 mg q 6 hr x 1 day followed by 25 mg q 6 hr taper, CIWA, Thiamine/ Folic acid, tele; IV Protonix, NRT  Anticipated Length of Stay/Certification Statement: Patient will be admitted on  an inpatient basis with an anticipated length of stay of greater than 2 midnights secondary to acute pancreatitis.   Date: 3/31/2025   Day 2:   Afebrile, crying and appears uncomfortable. Epigastric area markedly tender to palpation. No guarding.   admit earlier this month with acute recurrent pancreatitis and treated supportively and discharged 3/8. Patient states she felt well for 1 to 2 weeks and did not have abdominal pain. Quit drinking for 2 weeks but resumed due to work stress. Last drink 2 days ago.   Continue supportive care; -Clear liquid diet, advance as tolerated, IVF:  lactated ringers 125 mg/hr, Pain management, counseled on abstaining from alcohol & tobacco cessation  Continue Librium and monitoring on telemetry   CIWA protocol,  folic acid and thiamine  ED Treatment-Medication Administration from 03/30/2025 1221 to 03/30/2025 1539         Date/Time Order Dose Route Action     03/30/2025 1317 HYDROmorphone (DILAUDID) injection 0.5 mg 0.5 mg Intravenous Given     03/30/2025 1317 ketorolac (TORADOL) injection 15 mg 15 mg Intravenous Given     03/30/2025 1315 sodium chloride 0.9 % bolus 1,000 mL 1,000 mL Intravenous New Bag     03/30/2025 1317 ondansetron (ZOFRAN) injection 4 mg 4 mg Intravenous Given     03/30/2025 1316 Famotidine (PF) (PEPCID) injection 20 mg 20 mg Intravenous Given     03/30/2025 1517 sodium chloride 0.9 % bolus 1,000 mL 1,000 mL Intravenous New Bag     03/30/2025 1409 iohexol (OMNIPAQUE) 350 MG/ML injection (MULTI-DOSE) 100 mL 100 mL Intravenous Given     03/30/2025 1517 HYDROmorphone (DILAUDID) injection 0.5 mg 0.5 mg Intravenous Given     03/30/2025 1517 ondansetron (ZOFRAN) injection 4 mg 4 mg Intravenous Given            Scheduled Medications:  chlordiazePOXIDE, 50 mg, Oral, Q6H RODRICK   Followed by  chlordiazePOXIDE, 25 mg, Oral, Q6H RODRICK  folic acid 1 mg in sodium chloride 0.9 % 50 mL IVPB, 1 mg, Intravenous, Daily  multivitamin-minerals, 1 tablet, Oral, Daily  nicotine, 21 mg,  Transdermal, Daily  pantoprazole, 40 mg, Intravenous, Q12H RODRICK  thiamine, 100 mg, Intravenous, Daily      Continuous IV Infusions:  lactated ringers, 125 mL/hr, Intravenous, Continuous      PRN Meds:  HYDROmorphone, 0.5 mg, Intravenous, Q4H PRN  3/30 x2 & 3/31 x3    HYDROmorphone, 0.2 mg, Intravenous, Q6H PRN  3/30 x1    hydrOXYzine HCL, 25 mg, Oral, Q6H PRN  ondansetron, 4 mg, Intravenous, Q6H PRN  prochlorperazine, 5 mg, Oral, Q6H PRN      ED Triage Vitals [03/30/25 1226]   Temperature Pulse Respirations Blood Pressure SpO2 Pain Score   98 °F (36.7 °C) (!) 125 16 140/96 98 % 8     Weight (last 2 days)       Date/Time Weight    03/30/25 1549 50.8 (112)    03/30/25 1226 54.4 (120)            Vital Signs (last 3 days)       Date/Time Temp Pulse Resp BP MAP (mmHg) SpO2 O2 Device Patient Position - Orthostatic VS Dulce Maria Coma Scale Score CIWA-Ar Total Pain    03/31/25 10:05:16 97.8 °F (36.6 °C) 76 -- 115/78 90 96 % None (Room air) -- 15 -- No Pain    03/31/25 10:04:29 -- 79 16 115/78 90 96 % -- Lying -- -- --    03/31/25 0800 -- -- -- -- -- -- -- -- -- 7 --    03/31/25 0712 -- -- -- -- -- -- -- -- -- -- 9    03/31/25 0600 -- -- -- -- -- -- -- -- -- 2 --    03/31/25 0500 -- 81 -- -- -- 94 % -- -- -- -- --    03/31/25 0400 -- -- -- -- -- -- -- -- -- 6 --    03/31/25 0309 -- 68 -- -- -- 94 % -- -- -- -- --    03/31/25 0300 -- 68 -- 114/80 91 95 % -- -- -- -- --    03/31/25 02:48:11 97.9 °F (36.6 °C) 76 16 114/80 91 97 % None (Room air) Lying -- -- --    03/31/25 0239 -- -- -- -- -- -- -- -- -- -- 9 03/31/25 0200 -- -- -- -- -- -- -- -- -- 0 --    03/31/25 0100 -- 74 -- -- -- 92 % -- -- -- -- --    03/31/25 0000 -- -- -- -- -- -- -- -- -- 4 --    03/30/25 2300 -- 81 -- -- -- 94 % -- -- -- -- --    03/30/25 2233 -- 89 -- -- -- 95 % -- -- -- -- --    03/30/25 2228 -- -- -- -- -- -- -- -- -- -- 9 03/30/25 2200 -- -- -- -- -- -- -- -- -- 8 --    03/30/25 2100 -- 88 -- -- -- 97 % -- -- -- -- --    03/30/25 2018 -- 91 --  111/74 86 94 % -- -- -- -- --    03/30/25 20:14:21 97.7 °F (36.5 °C) 92 20 111/74 86 95 % None (Room air) Lying -- -- --    03/30/25 2008 -- 89 -- -- -- 94 % -- -- 15 -- 9    03/30/25 2000 -- -- -- -- -- -- -- -- -- 4 --    03/30/25 1900 -- 99 -- -- -- 94 % -- -- -- -- --    03/30/25 1808 -- 89 -- -- -- 91 % -- -- -- -- --    03/30/25 1756 -- -- -- -- -- -- -- -- -- -- 9 03/30/25 1600 -- -- -- -- -- -- -- -- -- 8 --    03/30/25 1549 -- -- -- -- -- -- None (Room air) -- 15 -- 6    03/30/25 15:43:56 98 °F (36.7 °C) 108 20 116/77 90 92 % -- -- -- 10 --    03/30/25 1522 -- 110 -- 129/76 -- -- -- -- -- 8 --    03/30/25 1517 -- -- -- -- -- -- -- -- -- -- 9    03/30/25 1515 -- 110 19 129/76 -- 95 % None (Room air) Sitting -- -- --    03/30/25 1513 -- -- -- -- -- -- None (Room air) -- 15 -- --    03/30/25 1410 -- 92 -- -- -- -- -- -- -- -- --    03/30/25 1315 -- -- -- -- -- -- -- -- -- -- 9 03/30/25 1226 98 °F (36.7 °C) 125 16 140/96 110 98 % None (Room air) Sitting -- -- 8           DEE-Ar Score       Row Name 03/31/25 0800 03/31/25 0600 03/31/25 0400       CIWA-Ar    Nausea and Vomiting 1 0 0    Tactile Disturbances 0 0 1    Tremor 2 2 2    Auditory Disturbances 0 0 0    Paroxysmal Sweats 0 0 0    Visual Disturbances 0 0 0    Anxiety 4 0 3    Headache, Fullness in Head 0 0 0    Agitation 0 0 0    Orientation and Clouding of Sensorium 0 0 0    CIWA-Ar Total 7 2 6      Row Name 03/31/25 0200 03/31/25 0000 03/30/25 2200       CIWA-Ar    Nausea and Vomiting 0 1 2    Tactile Disturbances 0 1 1    Tremor 0 1 2    Auditory Disturbances 0 0 0    Paroxysmal Sweats 0 0 0    Visual Disturbances 0 0 0    Anxiety 0 1 1    Headache, Fullness in Head 0 0 2    Agitation 0 0 0    Orientation and Clouding of Sensorium 0 0 0    CIWA-Ar Total 0 4 8      Row Name 03/30/25 2000 03/30/25 1600 03/30/25 15:43:56       CIWA-Ar    Nausea and Vomiting 0 0 0    Tactile Disturbances 0 0 0    Tremor 3 3 3    Auditory Disturbances 0 0 0     Paroxysmal Sweats 0 2 0    Visual Disturbances 0 0 0    Anxiety 1 3 4    Headache, Fullness in Head 0 0 2    Agitation 0 0 1    Orientation and Clouding of Sensorium 0 0 0    CIWA-Ar Total 4 8 10      Row Name 03/30/25 1522             CIWA-Ar    /76      Pulse 110      Nausea and Vomiting 1      Tactile Disturbances 0      Tremor 2      Auditory Disturbances 0      Paroxysmal Sweats 0      Visual Disturbances 0      Anxiety 3      Headache, Fullness in Head 2      Agitation 0      Orientation and Clouding of Sensorium 0      CIWA-Ar Total 8                      Pertinent Labs/Diagnostic Test Results:   Radiology:  CT abdomen pelvis with contrast   ED Interpretation by Jorge Paulino PA-C (03/30 9156)   Reading Physician Reading Date Result Priority  Raheel Jiménez MD  [phone]    3/30/2025 STAT    Narrative & Impression  CT ABDOMEN AND PELVIS WITH IV CONTRAST     INDICATION: left sided abndominal pain; hx of pancreatitis; recently started drinking etoh. .     COMPARISON: CT abdomen pelvis 3/6/2025     TECHNIQUE: CT examination of the abdomen and pelvis was performed. Multiplanar 2D reformatted images were created from the source data.     This examination, like all CT scans performed in the [facility], was performed utilizing techniques to minimize radiation dose exposure, including the use of iterative reconstruction and automated exposure control. Radiation dose length   product (DLP) for this visit: 441.15 mGy-cm     IV Contrast: 100 mL of iohexol  Enteric Contrast: Not administered.     FINDINGS:     ABDOMEN     LOWER CHEST: No clinically significant abnormality in the visualized lower chest.     LIVER/BILIARY TREE: Unremarkable.     GALLBLADDER: No calcif   ied gallstones. No pericholecystic inflammatory change.     SPLEEN: Unremarkable.     PANCREAS: There is evidence of acute pancreatitis, which will be described based on the revised [place] Classification of Acute  Pancreatitis:  - TIME OF ONSET: less than or equal to 4 weeks  - NECROSIS: There is peripancreatic edema but no evidence of pancreatic or peripancreatic necrosis, therefore this is interstitial edematous pancreatitis (IEP.) Edema and fluid extend into the lesser sac.  - LOCAL COMPLICATIONS: Multiple peripancreatic cystic lesions/collections. Some similar to 3/6/2025 for example in the head measuring 0.9 x 0.9 cm (series 2 image 55). Some slightly smaller than prior for example in the body measuring 0.8 x 0.7 cm   (series 2 image 50), previously 1.0 x 0.8 cm). Others enlarged for example in the tail measuring 1.2 x 1.1 cm (series 2 image 44), previously 0.8 x 0.7 cm.  - INFECTION: There is no abnormal gas in or around the pancreas to suggest infection.        ADRENAL GLANDS:    Unremarkable.     KIDNEYS/URETERS: No hydronephrosis or urinary tract calculi. Subcentimeter hypoattenuating renal lesion(s), too small to characterize but statistically likely benign, which do not warrant follow-up (Radiology June 2019).     STOMACH AND BOWEL: Unremarkable.     APPENDIX: No findings to suggest appendicitis.     ABDOMINOPELVIC CAVITY: Small volume ascites.     VESSELS: Similar left upper quadrant and mesenteric varices.     PELVIS     REPRODUCTIVE ORGANS: Unremarkable for patient's age.     URINARY BLADDER: Unremarkable.     ABDOMINAL WALL/INGUINAL REGIONS: Unremarkable.     BONES: No acute fracture or suspicious osseous lesion.     IMPRESSION:     1. Acute interstitial edematous pancreatitis. Redemonstrated multiple cystic lesions/collections, some of which are slightly larger than 3/6/2025, some smaller, and others similar.     2. Small volume ascites.     3. Similar left upper quadrant and mesenteric varices could be related to portal hypertension. Suggest correlat   ion with liver function parameters and nonemergent ultrasound elastography.        The study was marked in EPIC for immediate notification.          "  Workstation performed: [id number]        Final Interpretation by Raheel Jiménez MD (03/30 1455)      1. Acute interstitial edematous pancreatitis. Redemonstrated multiple cystic lesions/collections, some of which are slightly larger than 3/6/2025, some smaller, and others similar.      2. Small volume ascites.      3. Similar left upper quadrant and mesenteric varices could be related to portal hypertension. Suggest correlation with liver function parameters and nonemergent ultrasound elastography.              Cardiology:  ECG 12 lead   Final Result by Sinai Holloway MD (03/30 1350)   Sinus tachycardia   Otherwise normal ECG   When compared with ECG of 28-Feb-2025 04:57,   Vent. rate has increased by  52 bpm   Confirmed by Sinai Holloway ([id number]) on 3/30/2025 1:50:31 PM        GI:  No orders to display           Results from last 7 days   Lab Units 03/31/25  0340 03/30/25  1322   WBC Thousand/uL 6.13 10.64*   HEMOGLOBIN g/dL 10.7* 13.3   HEMATOCRIT % 33.2* 40.6   PLATELETS Thousands/uL 149 217   TOTAL NEUT ABS Thousands/µL 2.56 6.33         Results from last 7 days   Lab Units 03/31/25  0243 03/30/25  1322   SODIUM mmol/L 132* 136   POTASSIUM mmol/L 4.0 3.6   CHLORIDE mmol/L 104 103   CO2 mmol/L 23 25   ANION GAP mmol/L 5 8   BUN mg/dL 6 7   CREATININE mg/dL 0.48* 0.43*   EGFR ml/min/1.73sq m 129 134   CALCIUM mg/dL 7.6* 8.4   MAGNESIUM mg/dL  --  1.3*   PHOSPHORUS mg/dL  --  3.8     Results from last 7 days   Lab Units 03/31/25  0243 03/30/25  1322   AST U/L 24 28   ALT U/L 12 16   ALK PHOS U/L 79 84   TOTAL PROTEIN g/dL 5.5* 6.5   ALBUMIN g/dL 3.2* 3.8   TOTAL BILIRUBIN mg/dL 1.07* 0.53         Results from last 7 days   Lab Units 03/31/25  0243 03/30/25  1322   GLUCOSE RANDOM mg/dL 78 121             No results found for: \"BETA-HYDROXYBUTYRATE\"                   Results from last 7 days   Lab Units 03/30/25  1322   HS TNI 0HR ng/L <2         Results from last 7 days   Lab Units 03/30/25  1322   PROTIME " seconds 15.7*   INR  1.20*   PTT seconds 28                                             Results from last 7 days   Lab Units 03/31/25  0243 03/30/25  1322   LIPASE u/L 157* 285*                 Results from last 7 days   Lab Units 03/30/25  1322   CLARITY UA  Clear   COLOR UA  Yellow   SPEC GRAV UA  >=1.030   PH UA  5.5   GLUCOSE UA mg/dl Negative   KETONES UA mg/dl Trace*   BLOOD UA  Negative   PROTEIN UA mg/dl 30 (1+)*   NITRITE UA  Negative   BILIRUBIN UA  Negative   UROBILINOGEN UA (BE) mg/dl <2.0   LEUKOCYTES UA  Negative   WBC UA /hpf None Seen   RBC UA /hpf None Seen   BACTERIA UA /hpf Occasional   EPITHELIAL CELLS WET PREP /hpf Moderate*   MUCUS THREADS  Occasional*                 Results from last 7 days   Lab Units 03/30/25  1322   ETHANOL LVL mg/dL 177*       Past Medical History:   Diagnosis Date    Acute alcoholic pancreatitis 11/03/2023    Hypertension     SIRS (systemic inflammatory response syndrome) (HCC) 04/10/2024     Present on Admission:   Acute alcoholic pancreatitis   Alcohol use disorder   Hypomagnesemia   Tobacco abuse   Peptic ulcer disease      Admitting Diagnosis: Acute pancreatitis [K85.90]  Hypomagnesemia [E83.42]  Nausea [R11.0]  Abdominal pain [R10.9]  Blood alcohol level of 120-199 mg/100 ml [Y90.6]  Left upper quadrant abdominal pain [R10.12]  Age/Sex: 34 y.o. female    Network Utilization Review Department  ATTENTION: Please call with any questions or concerns to [phone] and carefully listen to the prompts so that you are directed to the right person. All voicemails are confidential.   For Discharge needs, contact Care Management DC Support Team at [phone] opt. 2  Send all requests for admission clinical reviews, approved or denied determinations and any other requests to dedicated fax number below belonging to the campus where the patient is receiving treatment. List of dedicated fax numbers for the Facilities:  FACILITY NAME UR FAX NUMBER   ADMISSION DENIALS  (Administrative/Medical Necessity) [phone]   DISCHARGE SUPPORT TEAM (NETWORK) [phone]   PARENT CHILD HEALTH (Maternity/NICU/Pediatrics) [phone]   [facility] [phone]   [facility] [phone]   [facility] [phone]   [facility] [phone]   [facility] [phone]   [facility] [phone]   [facility] [phone]   [facility] [phone]   [facility] [phone]   Critical access hospital [phone]   [facility] [phone]   [facility] [phone]

## 2025-03-31 NOTE — CASE MANAGEMENT
Case Management Assessment & Discharge Planning Note    Patient name Kaykay Holland  Location /-01 MRN [id number]  : 1990 Date 3/31/2025       Current Admission Date: 3/30/2025  Current Admission Diagnosis:Acute alcoholic pancreatitis   Patient Active Problem List    Diagnosis Date Noted Date Diagnosed    Abdominal pain 2025     Splenic vein thrombosis 2024     Leukocytosis 2024     Thrombocytopenia (HCC) 2024     Ovarian cyst 2024     Constipation 2024     Pancreatic pseudocyst 2024     Electrolyte abnormality 2024     Alcohol use disorder 2024     Hematemesis 2024     Necrotizing pancreatitis 2024     Hypomagnesemia 2024     Pancytopenia (HCC) 04/10/2024     Alcohol abuse 2024     Provoked seizures (HCC) 2024     Alcohol withdrawal seizure (HCC) 2024     Transaminitis 2024     Prolonged QT interval 2024     Essential hypertension 2024     Tobacco abuse 2024     Acute alcoholic pancreatitis 2023     Hepatic steatosis 2023     Peptic ulcer disease 2021     Alcohol withdrawal (HCC) 2020     Anxiety 2019     Depression 2019       LOS (days): 1  Geometric Mean LOS (GMLOS) (days):   Days to GMLOS:     OBJECTIVE:  PATIENT READMITTED TO HOSPITAL  Risk of Unplanned Readmission Score: 41.83         Current admission status: Inpatient       Preferred Pharmacy:   [facility] #[id number] - [place] - [address]  [address]  [facility]   Phone: [phone] Fax: [phone]    Primary Care Provider: PATRICIA Moran    Primary Insurance: BLUE CROSS  Secondary Insurance:     ASSESSMENT:  Active Health Care Proxies       Chichi George [facility] Care Representative - Mother   Primary Phone: [phone] (Mobile)                 Advance Directives  Does patient have a Health Care POA?: No  Was patient offered  paperwork?: Yes  Does patient currently have a Health Care decision maker?: Yes, please see Health Care Proxy section  Does patient have Advance Directives?: No  Was patient offered paperwork?: Yes  Primary Contact: mother Cadet.         Readmission Root Cause  30 Day Readmission: Yes  During your hospital stay, did someone (provider, nurse, ) explain your care to you in a way you could understand?: Yes  Did you feel medically stable to leave the hospital?: Yes  Were you able to pay for your medication at the pharmacy?: Yes  Did you have reliable transportation to take you to your appointments?: Yes  During previous admission, was a post-acute recommendation made?: Yes  What post-acute resources were offered?: Offered, but declined (Pt declined [facility] referral/ ETOH treatment last admisison. Declining this admission as well.)  Patient was readmitted due to: Acute Alcoholic Pancreatitis.  Action Plan: DEE osman. GI consult. Declining [facility]    Patient Information  Admitted from:: Home  Mental Status: Alert  During Assessment patient was accompanied by: Not accompanied during assessment  Assessment information provided by:: Patient  Support Systems: Self, Friend, Parent  County of Residence: [facility]  What city do you live in?: [place]  Home entry access options. Select all that apply.: Stairs  Number of steps to enter home.: 7  Type of Current Residence: 2 story home  Upon entering residence, is there a bedroom on the main floor (no further steps)?: No  A bedroom is located on the following floor levels of residence (select all that apply):: 2nd Floor  Upon entering residence, is there a bathroom on the main floor (no further steps)?: No  Indicate which floors of current residence have a bathroom (select all the apply):: 2nd Floor  Number of steps to 2nd floor from main floor: One Flight  Living Arrangements: Lives Alone  Is patient a ?: No    Activities of Daily Living Prior to Admission  Functional  Status: Independent  Completes ADLs independently?: Yes  Ambulates independently?: Yes  Does patient use assisted devices?: Yes  Assisted Devices (DME) used: Other (Comment) (BP Cuff)  Does patient currently own DME?: Yes  What DME does the patient currently own?: Other (Comment) (BP Cuff)  Does patient have a history of Outpatient Therapy (PT/OT)?: No  Does the patient have a history of Short-Term Rehab?: No  Does patient have a history of HHC?: No  Does patient currently have HHC?: No         Patient Information Continued  Income Source: Unemployed  Does patient have prescription coverage?:  (Her insurance lapses 04/01/2025.)  Can the patient afford their medications and any related supplies (such as glucometers or test strips)?: N/A  Does patient receive dialysis treatments?: No  Does patient have a history of substance abuse?: Yes  Historical substance use preference: Alcohol/ETOH  Is patient currently in treatment for substance abuse?: No. Patient declined treatment information.  Does patient have a history of Mental Health Diagnosis?: Yes  Is patient receiving treatment for mental health?: No. Treatment options were provided.  Has patient received inpatient treatment related to mental health in the last 2 years?: No         Means of Transportation  Means of Transport to Appts:: Drives Self          DISCHARGE DETAILS:    Discharge planning discussed with:: Pt  Freedom of Choice: Yes  Comments - Freedom of Choice: Pt declining [facility]. CM will remain available during admission to re-broach topic.  CM contacted family/caregiver?: No- see comments (Pt is in contact with family.)  Were Treatment Team discharge recommendations reviewed with patient/caregiver?: Yes  Did patient/caregiver verbalize understanding of patient care needs?: Yes  Were patient/caregiver advised of the risks associated with not following Treatment Team discharge recommendations?: Yes         Requested Home Health Care         Is the patient  "interested in HHC at discharge?: No    DME Referral Provided  Referral made for DME?: No    Other Referral/Resources/Interventions Provided:  Interventions: Other (Specify)  Referral Comments: Pt declined referral to [facility]. Pt reports that she resigned from her job last week and will no longer have insurance starting tomorrow, 04/01. [facility] referral made. Per [facility], \"This patient’s insurance is still showing as active after tomorrow.  I am going to hold this account until tomorrow and I will check again.  If it is showing a termination date, then I will send it for a PATHS referral.  I will get back to you tomorrow with the outcome.\"         Treatment Team Recommendation: Substance Abuse Treatment  Discharge Destination Plan:: Home                                         Additional Comments: CM met with pt at bedside to discern discharge needs. Pt lives alone in a 2sh, 7ste, bed and bathroom in the upstairs. Pt reports being independent with ADLs and walking PTA. Drives. Uses and owns BP cuff. No hx of OPPT, STR, or HHC. Reports inpatient psych treatment 10 years ago; does not follow with psychotherapist or Psychiatrist, per pt. Report hx of ETOH treatment 7 years ago--[facility] through [facility]. CM and pt discussed [facility]--referral process, [facility] able to work with pt's preferences for either inpatient or outpatient treatment. Pt declined [facility] referral. CM stated will be available if pt changes mind. Pt recently resigned from job a week ago; reports she will no longer have insurance 04/01. [facility] referral made. No medical POA. Will have ride home. [facility] is preferred.                    "

## 2025-03-31 NOTE — CONSULTS
Consultation - Gastroenterology   Name: Kaykay Holland 34 y.o. 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 Lipase 285 on admission (>3x ULN). Liver tests and creatinine normal. WBC 10.64 on admission, currently normal. CT shows acute interstitial edematous pancreatitis, multiple cystic lesions, some larger, some smaller from prior imaging, small volume ascites, LUQ and mesenteric varices, normal-appearing liver and spleen.     Patient is afebrile, vital signs stable. She is crying and appears uncomfortable. Epigastric area markedly tender to palpation. No guarding.    -Discussed with internal medicine  -Continue supportive care  -Clear liquid diet, advance as tolerated  -Continue lactated ringers 125 mg/hr  -Pain management as needed  Alcohol use disorder  Patient quit drinking for 2 weeks since last hospitalization, then resumed due to stress surrounding a new job. Last drink 2 days ago. Ethanol 177 on admission. Does have history of withdrawal seizures.       -Again counseled on abstaining from alcohol  -Recommended inpatient/outpatient alcohol rehab  -Continue Librium and monitoring on telemetry  -Continue [facility] protocol  -Continue folic acid and thiamine  Tobacco abuse  Counseled on tobacco cessation    I have discussed the above management plan in detail with the primary service.     History of Present Illness   HPI:   Kaykay Holland is a 34 y.o. 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 She was admitted earlier this month with acute recurrent pancreatitis and treated supportively and discharged 3/8. Patient states she felt well for 1 to 2 weeks and did not have abdominal pain. She quit drinking for 2 weeks but resumed due to work stress. Last drink 2 days ago. She returns to the ED with recurrent epigastric and left upper quadrant pain. No nausea or vomiting since admission. No fevers or chills. She had a semiformed bowel movement this morning. No blood in the stool.    Review of Systems   Constitutional:  Negative for chills and fever.   HENT:  Negative for ear pain and sore throat.    Eyes:  Negative for pain and visual disturbance.   Respiratory:  Negative for cough and shortness of breath.    Cardiovascular:  Negative for chest pain and palpitations.   Gastrointestinal:  Positive for abdominal pain. Negative for vomiting.   Genitourinary:  Negative for dysuria and hematuria.   Musculoskeletal:  Negative for arthralgias and back pain.   Skin:  Negative for color change and rash.   Neurological:  Negative for seizures and syncope.   All other systems reviewed and are negative.        Objective :  Temp:  [97.7 °F (36.5 °C)-98 °F (36.7 °C)] 97.8 °F (36.6 °C)  HR:  [] 76  BP: (111-140)/(74-96) 115/78  Resp:  [16-20] 16  SpO2:  [91 %-98 %] 96 %  O2 Device: None (Room air)    Physical Exam  Vitals and nursing note reviewed.   Constitutional:       General: She is not in acute distress.     Appearance: She is well-developed.      Comments: Teary, upset   HENT:      Head: Normocephalic and atraumatic.   Eyes:      Conjunctiva/sclera: Conjunctivae normal.   Cardiovascular:      Rate and Rhythm: Normal rate and regular rhythm.      Heart sounds: No murmur  heard.  Pulmonary:      Effort: Pulmonary effort is normal. No respiratory distress.      Breath sounds: Normal breath sounds.   Abdominal:      Palpations: Abdomen is soft.      Tenderness: There is abdominal tenderness.      Comments: Marked epigastric tenderness   Musculoskeletal:         General: No swelling.      Cervical back: Neck supple.   Skin:     General: Skin is warm and dry.   Neurological:      Mental Status: She is alert.   Psychiatric:         Mood and Affect: Mood normal.           Lab Results: I have reviewed the following results:CBC/BMP:   .     03/30/25  1322 03/31/25  0243 03/31/25  0340   WBC 10.64*  --  6.13   HGB 13.3  --  10.7*   HCT 40.6  --  33.2*     --  149   SODIUM 136 132*  --    K 3.6 4.0  --     104  --    CO2 25 23  --    BUN 7 6  --    CREATININE 0.43* 0.48*  --    GLUC 121 78  --    MG 1.3*  --   --    PHOS 3.8  --   --     , Creatinine Clearance: Estimated Creatinine Clearance: 133.7 mL/min (A) (by C-G formula based on SCr of 0.48 mg/dL (L))., LFTs:   .     03/31/25  0243   AST 24   ALT 12   ALB 3.2*   TBILI 1.07*   ALKPHOS 79    , PTT/INR:  .     03/30/25  1322   PTT 28   INR 1.20*    , Troponin,BNP:  .     03/30/25  1322   HSTNI0 <2        Imaging Results Review: I reviewed radiology reports from this admission including: CT abdomen/pelvis.  Other Study Results Review: No additional pertinent studies reviewed.

## 2025-03-31 NOTE — PROGRESS NOTES
Progress Note - Hospitalist   Name: Kaykay Holland 34 y.o. female I MRN: [id number]  Unit/Bed#: -01 I Date of Admission: 3/30/2025   Date of Service: 3/31/2025 I Hospital Day: 1    Assessment & Plan  Acute alcoholic pancreatitis  Presented to the emergency department with epigastric/left upper quadrant pain  CT abdomen/pelvis: 1. Acute interstitial edematous pancreatitis. Redemonstrated multiple cystic lesions/collections, some of which are slightly larger than 3/6/2025, some smaller, and others similar.  2. Small volume ascites.  3. Similar left upper quadrant and mesenteric varices could be related to portal hypertension. Suggest correlation with liver function parameters and nonemergent ultrasound elastography.   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  Stent was removed in January  Continue IV hydration  Clear liquids  Pain management  Ultimately needs complete alcohol cessation unfortunately has declined alcohol rehab services previously  Consult GI  Alcohol use disorder  Patient with significant and currently active alcohol use  Reports last drink of alcohol yesterday - ethanol 177 on admission  Start librium - previously did well with 50 mg q 6 hr x 1 day followed by 25 mg q 6 hr taper  Monitor via CIWA protocol  Thiamine, folic acid, multivitamin  Monitor on telemetry due to history of alcohol withdrawal seizure/DTs  Tobacco abuse  Continue nicotine patch   cessation  Peptic ulcer disease  Continue IV Protonix  Hypomagnesemia  Magnesium 1.3 on admission  S/p repletion  Continue to trend    VTE Pharmacologic Prophylaxis: VTE Score: 0 Low Risk (Score 0-2) - Encourage Ambulation.    Mobility:   Basic Mobility Inpatient Raw Score: 22  JH-HLM Goal: 7: Walk 25 feet or more  JH-HLM Achieved: 7: Walk 25 feet or more  JH-HLM Goal achieved. Continue to encourage  appropriate mobility.    Patient Centered Rounds: I performed bedside rounds with nursing staff today.   Discussions with Specialists or Other Care Team Provider: GI, case management, nursing    Education and Discussions with Family / Patient: Patient declined call to .     Current Length of Stay: 1 day(s)  Current Patient Status: Inpatient   Certification Statement: The patient will continue to require additional inpatient hospital stay due to acute pancreatitis,, diet advancement, pain management  Discharge Plan: Anticipate discharge in 48 hrs to home.    Code Status: Level 1 - Full Code    Subjective   Patient complaining of pain, nausea.  No vomiting since admission.  Is also hungry, asking for clear liquid diet    Objective :  Temp:  [97.7 °F (36.5 °C)-98 °F (36.7 °C)] 97.8 °F (36.6 °C)  HR:  [] 76  BP: (111-140)/(74-96) 115/78  Resp:  [16-20] 16  SpO2:  [91 %-98 %] 96 %  O2 Device: None (Room air)    Body mass index is 18.64 kg/m².     Input and Output Summary (last 24 hours):     Intake/Output Summary (Last 24 hours) at 3/31/2025 1118  Last data filed at 3/31/2025 1108  Gross per 24 hour   Intake 1236 ml   Output 200 ml   Net 1036 ml       Physical Exam  Vitals and nursing note reviewed.   Constitutional:       General: She is not in acute distress.     Appearance: She is well-developed.   HENT:      Head: Normocephalic and atraumatic.      Mouth/Throat:      Mouth: Mucous membranes are moist.      Pharynx: Oropharynx is clear.   Eyes:      Conjunctiva/sclera: Conjunctivae normal.   Cardiovascular:      Rate and Rhythm: Normal rate and regular rhythm.      Heart sounds: No murmur heard.  Pulmonary:      Effort: Pulmonary effort is normal. No respiratory distress.      Breath sounds: Normal breath sounds.   Abdominal:      General: There is no distension.      Palpations: Abdomen is soft.      Tenderness: There is abdominal tenderness.   Musculoskeletal:         General: No swelling.       Cervical back: Neck supple.   Skin:     General: Skin is warm and dry.      Capillary Refill: Capillary refill takes 2 to 3 seconds.   Neurological:      Mental Status: She is alert and oriented to person, place, and time.   Psychiatric:         Mood and Affect: Mood normal.           Lines/Drains:        Telemetry:  Telemetry Orders (From admission, onward)               24 Hour Telemetry Monitoring  (ED Bridging Orders Panel)  Continuous x 24 Hours (Telem)        Expiring   Question:  Reason for 24 Hour Telemetry  Answer:  Alcohol withdrawal and CIWA >7, electrolyte abnormalities, abnormal ECG and/or heart disease                     Telemetry Reviewed: Normal Sinus Rhythm  Indication for Continued Telemetry Use: Arrthymias requiring medical therapy               Lab Results: I have reviewed the following results:   Results from last 7 days   Lab Units 03/31/25  0340   WBC Thousand/uL 6.13   HEMOGLOBIN g/dL 10.7*   HEMATOCRIT % 33.2*   PLATELETS Thousands/uL 149   SEGS PCT % 42*   LYMPHO PCT % 30   MONO PCT % 10   EOS PCT % 17*     Results from last 7 days   Lab Units 03/31/25  0243   SODIUM mmol/L 132*   POTASSIUM mmol/L 4.0   CHLORIDE mmol/L 104   CO2 mmol/L 23   BUN mg/dL 6   CREATININE mg/dL 0.48*   ANION GAP mmol/L 5   CALCIUM mg/dL 7.6*   ALBUMIN g/dL 3.2*   TOTAL BILIRUBIN mg/dL 1.07*   ALK PHOS U/L 79   ALT U/L 12   AST U/L 24   GLUCOSE RANDOM mg/dL 78     Results from last 7 days   Lab Units 03/30/25  1322   INR  1.20*                   Recent Cultures (last 7 days):         Imaging Results Review: No pertinent imaging studies reviewed.  Other Study Results Review: No additional pertinent studies reviewed.    Last 24 Hours Medication List:     Current Facility-Administered Medications:     chlordiazePOXIDE (LIBRIUM) capsule 50 mg, Q6H RODRICK **FOLLOWED BY** chlordiazePOXIDE (LIBRIUM) capsule 25 mg, Q6H RODRICK    folic acid 1 mg in sodium chloride 0.9 % 50 mL IVPB, Daily, Last Rate: 1 mg (03/31/25 1016)     HYDROmorphone (DILAUDID) injection 0.5 mg, Q4H PRN    HYDROmorphone HCl (DILAUDID) injection 0.2 mg, Q6H PRN    hydrOXYzine HCL (ATARAX) tablet 25 mg, Q6H PRN    lactated ringers infusion, Continuous, Last Rate: 125 mL/hr (03/31/25 1025)    multivitamin-minerals (CENTRUM) tablet 1 tablet, Daily    nicotine (NICODERM CQ) 21 mg/24 hr TD 24 hr patch 21 mg, Daily    ondansetron (ZOFRAN) injection 4 mg, Q6H PRN    pantoprazole (PROTONIX) injection 40 mg, Q12H RODRICK    prochlorperazine (COMPAZINE) tablet 5 mg, Q6H PRN    thiamine (VITAMIN B1) 100 mg in sodium chloride 0.9 % 50 mL IVPB, Daily, Last Rate: 100 mg (03/31/25 1108)    Administrative Statements   Today, Patient Was Seen By: Ralph Marshall MD  I have spent a total time of 45 minutes in caring for this patient on the day of the visit/encounter including Diagnostic results, Impressions, Counseling / Coordination of care, Documenting in the medical record, Reviewing/placing orders in the medical record (including tests, medications, and/or procedures), Obtaining or reviewing history  , and Communicating with other healthcare professionals .    **Please Note: This note may have been constructed using a voice recognition system.**

## 2025-03-31 NOTE — PLAN OF CARE
Problem: PAIN - ADULT  Goal: Verbalizes/displays adequate comfort level or baseline comfort level  Description: Interventions:- Encourage patient to monitor pain and request assistance- Assess pain using appropriate pain scale- Administer analgesics based on type and severity of pain and evaluate response- Implement non-pharmacological measures as appropriate and evaluate response- Consider cultural and social influences on pain and pain management- Notify physician/advanced practitioner if interventions unsuccessful or patient reports new pain  Outcome: Progressing     Problem: INFECTION - ADULT  Goal: Absence or prevention of progression during hospitalization  Description: INTERVENTIONS:- Assess and monitor for signs and symptoms of infection- Monitor lab/diagnostic results- Monitor all insertion sites, i.e. indwelling lines, tubes, and drains- Monitor endotracheal if appropriate and nasal secretions for changes in amount and color- Tucker appropriate cooling/warming therapies per order- Administer medications as ordered- Instruct and encourage patient and family to use good hand hygiene technique- Identify and instruct in appropriate isolation precautions for identified infection/condition  Outcome: Progressing     Problem: DISCHARGE PLANNING  Goal: Discharge to home or other facility with appropriate resources  Description: INTERVENTIONS:- Identify barriers to discharge w/patient and caregiver- Arrange for needed discharge resources and transportation as appropriate- Identify discharge learning needs (meds, wound care, etc.)- Arrange for interpretive services to assist at discharge as needed- Refer to Case Management Department for coordinating discharge planning if the patient needs post-hospital services based on physician/advanced practitioner order or complex needs related to functional status, cognitive ability, or social support system  Outcome: Progressing     Problem: Knowledge Deficit  Goal:  Patient/family/caregiver demonstrates understanding of disease process, treatment plan, medications, and discharge instructions  Description: Complete learning assessment and assess knowledge base.Interventions:- Provide teaching at level of understanding- Provide teaching via preferred learning methods  Outcome: Progressing

## 2025-04-01 PROBLEM — E87.1 HYPONATREMIA: Status: ACTIVE | Noted: 2025-04-01

## 2025-04-01 LAB
ALBUMIN SERPL BCG-MCNC: 2.9 G/DL (ref 3.5–5)
ALP SERPL-CCNC: 65 U/L (ref 34–104)
ALT SERPL W P-5'-P-CCNC: 9 U/L (ref 7–52)
ANION GAP SERPL CALCULATED.3IONS-SCNC: 6 MMOL/L (ref 4–13)
AST SERPL W P-5'-P-CCNC: 16 U/L (ref 13–39)
BASOPHILS # BLD AUTO: 0.04 THOUSANDS/ÂΜL (ref 0–0.1)
BASOPHILS NFR BLD AUTO: 1 % (ref 0–1)
BILIRUB SERPL-MCNC: 0.86 MG/DL (ref 0.2–1)
BUN SERPL-MCNC: 3 MG/DL (ref 5–25)
CALCIUM ALBUM COR SERPL-MCNC: 8.8 MG/DL (ref 8.3–10.1)
CALCIUM SERPL-MCNC: 7.9 MG/DL (ref 8.4–10.2)
CHLORIDE SERPL-SCNC: 104 MMOL/L (ref 96–108)
CO2 SERPL-SCNC: 27 MMOL/L (ref 21–32)
CREAT SERPL-MCNC: 0.47 MG/DL (ref 0.6–1.3)
EOSINOPHIL # BLD AUTO: 0.84 THOUSAND/ÂΜL (ref 0–0.61)
EOSINOPHIL NFR BLD AUTO: 16 % (ref 0–6)
ERYTHROCYTE [DISTWIDTH] IN BLOOD BY AUTOMATED COUNT: 13 % (ref 11.6–15.1)
GFR SERPL CREATININE-BSD FRML MDRD: 130 ML/MIN/1.73SQ M
GLUCOSE SERPL-MCNC: 87 MG/DL (ref 65–140)
HCT VFR BLD AUTO: 34 % (ref 34.8–46.1)
HGB BLD-MCNC: 10.9 G/DL (ref 11.5–15.4)
IMM GRANULOCYTES # BLD AUTO: 0.02 THOUSAND/UL (ref 0–0.2)
IMM GRANULOCYTES NFR BLD AUTO: 0 % (ref 0–2)
LYMPHOCYTES # BLD AUTO: 1.43 THOUSANDS/ÂΜL (ref 0.6–4.47)
LYMPHOCYTES NFR BLD AUTO: 27 % (ref 14–44)
MCH RBC QN AUTO: 34.1 PG (ref 26.8–34.3)
MCHC RBC AUTO-ENTMCNC: 32.1 G/DL (ref 31.4–37.4)
MCV RBC AUTO: 106 FL (ref 82–98)
MONOCYTES # BLD AUTO: 0.63 THOUSAND/ÂΜL (ref 0.17–1.22)
MONOCYTES NFR BLD AUTO: 12 % (ref 4–12)
NEUTROPHILS # BLD AUTO: 2.27 THOUSANDS/ÂΜL (ref 1.85–7.62)
NEUTS SEG NFR BLD AUTO: 44 % (ref 43–75)
NRBC BLD AUTO-RTO: 0 /100 WBCS
PLATELET # BLD AUTO: 133 THOUSANDS/UL (ref 149–390)
PMV BLD AUTO: 11 FL (ref 8.9–12.7)
POTASSIUM SERPL-SCNC: 3.8 MMOL/L (ref 3.5–5.3)
PROT SERPL-MCNC: 5 G/DL (ref 6.4–8.4)
RBC # BLD AUTO: 3.2 MILLION/UL (ref 3.81–5.12)
SODIUM SERPL-SCNC: 137 MMOL/L (ref 135–147)
WBC # BLD AUTO: 5.23 THOUSAND/UL (ref 4.31–10.16)

## 2025-04-01 PROCEDURE — 99232 SBSQ HOSP IP/OBS MODERATE 35: CPT | Performed by: STUDENT IN AN ORGANIZED HEALTH CARE EDUCATION/TRAINING PROGRAM

## 2025-04-01 PROCEDURE — 99232 SBSQ HOSP IP/OBS MODERATE 35: CPT | Performed by: INTERNAL MEDICINE

## 2025-04-01 PROCEDURE — 80053 COMPREHEN METABOLIC PANEL: CPT | Performed by: INTERNAL MEDICINE

## 2025-04-01 PROCEDURE — 85025 COMPLETE CBC W/AUTO DIFF WBC: CPT | Performed by: INTERNAL MEDICINE

## 2025-04-01 RX ORDER — HYDROMORPHONE HCL IN WATER/PF 6 MG/30 ML
0.2 PATIENT CONTROLLED ANALGESIA SYRINGE INTRAVENOUS EVERY 4 HOURS PRN
Status: DISCONTINUED | OUTPATIENT
Start: 2025-04-01 | End: 2025-04-01

## 2025-04-01 RX ORDER — OXYCODONE HYDROCHLORIDE 5 MG/1
5 TABLET ORAL EVERY 4 HOURS PRN
Refills: 0 | Status: DISCONTINUED | OUTPATIENT
Start: 2025-04-01 | End: 2025-04-01

## 2025-04-01 RX ORDER — LANOLIN ALCOHOL/MO/W.PET/CERES
100 CREAM (GRAM) TOPICAL DAILY
Status: DISCONTINUED | OUTPATIENT
Start: 2025-04-02 | End: 2025-04-03 | Stop reason: HOSPADM

## 2025-04-01 RX ORDER — OXYCODONE HYDROCHLORIDE 5 MG/1
5 TABLET ORAL EVERY 6 HOURS PRN
Refills: 0 | Status: DISCONTINUED | OUTPATIENT
Start: 2025-04-01 | End: 2025-04-02

## 2025-04-01 RX ORDER — HYDROMORPHONE HCL IN WATER/PF 6 MG/30 ML
0.2 PATIENT CONTROLLED ANALGESIA SYRINGE INTRAVENOUS EVERY 6 HOURS PRN
Status: DISCONTINUED | OUTPATIENT
Start: 2025-04-01 | End: 2025-04-02

## 2025-04-01 RX ORDER — FOLIC ACID 1 MG/1
1 TABLET ORAL DAILY
Status: DISCONTINUED | OUTPATIENT
Start: 2025-04-02 | End: 2025-04-03 | Stop reason: HOSPADM

## 2025-04-01 RX ADMIN — PROCHLORPERAZINE MALEATE 5 MG: 5 TABLET ORAL at 19:20

## 2025-04-01 RX ADMIN — ONDANSETRON 4 MG: 2 INJECTION INTRAMUSCULAR; INTRAVENOUS at 09:39

## 2025-04-01 RX ADMIN — THIAMINE HYDROCHLORIDE 100 MG: 100 INJECTION, SOLUTION INTRAMUSCULAR; INTRAVENOUS at 11:00

## 2025-04-01 RX ADMIN — HYDROMORPHONE HYDROCHLORIDE 0.2 MG: 0.2 INJECTION, SOLUTION INTRAMUSCULAR; INTRAVENOUS; SUBCUTANEOUS at 13:40

## 2025-04-01 RX ADMIN — HYDROMORPHONE HYDROCHLORIDE 0.5 MG: 1 INJECTION, SOLUTION INTRAMUSCULAR; INTRAVENOUS; SUBCUTANEOUS at 03:49

## 2025-04-01 RX ADMIN — HYDROXYZINE HYDROCHLORIDE 25 MG: 25 TABLET, FILM COATED ORAL at 22:19

## 2025-04-01 RX ADMIN — FOLIC ACID 1 MG: 5 INJECTION, SOLUTION INTRAMUSCULAR; INTRAVENOUS; SUBCUTANEOUS at 09:39

## 2025-04-01 RX ADMIN — CHLORDIAZEPOXIDE HYDROCHLORIDE 25 MG: 25 CAPSULE ORAL at 17:05

## 2025-04-01 RX ADMIN — CHLORDIAZEPOXIDE HYDROCHLORIDE 25 MG: 25 CAPSULE ORAL at 06:01

## 2025-04-01 RX ADMIN — OXYCODONE HYDROCHLORIDE 5 MG: 5 TABLET ORAL at 12:27

## 2025-04-01 RX ADMIN — HYDROMORPHONE HYDROCHLORIDE 0.2 MG: 0.2 INJECTION, SOLUTION INTRAMUSCULAR; INTRAVENOUS; SUBCUTANEOUS at 09:39

## 2025-04-01 RX ADMIN — SODIUM CHLORIDE, SODIUM LACTATE, POTASSIUM CHLORIDE, AND CALCIUM CHLORIDE 125 ML/HR: .6; .31; .03; .02 INJECTION, SOLUTION INTRAVENOUS at 06:04

## 2025-04-01 RX ADMIN — SODIUM CHLORIDE, SODIUM LACTATE, POTASSIUM CHLORIDE, AND CALCIUM CHLORIDE 125 ML/HR: .6; .31; .03; .02 INJECTION, SOLUTION INTRAVENOUS at 13:45

## 2025-04-01 RX ADMIN — OXYCODONE HYDROCHLORIDE 5 MG: 5 TABLET ORAL at 19:20

## 2025-04-01 RX ADMIN — PANTOPRAZOLE SODIUM 40 MG: 40 INJECTION, POWDER, FOR SOLUTION INTRAVENOUS at 09:51

## 2025-04-01 RX ADMIN — HYDROMORPHONE HYDROCHLORIDE 0.2 MG: 0.2 INJECTION, SOLUTION INTRAMUSCULAR; INTRAVENOUS; SUBCUTANEOUS at 20:01

## 2025-04-01 RX ADMIN — NICOTINE 21 MG: 21 PATCH, EXTENDED RELEASE TRANSDERMAL at 09:39

## 2025-04-01 RX ADMIN — HYDROXYZINE HYDROCHLORIDE 25 MG: 25 TABLET, FILM COATED ORAL at 15:47

## 2025-04-01 RX ADMIN — MULTIPLE VITAMINS W/ MINERALS TAB 1 TABLET: TAB ORAL at 09:39

## 2025-04-01 RX ADMIN — PANTOPRAZOLE SODIUM 40 MG: 40 INJECTION, POWDER, FOR SOLUTION INTRAVENOUS at 20:01

## 2025-04-01 RX ADMIN — HYDROXYZINE HYDROCHLORIDE 25 MG: 25 TABLET, FILM COATED ORAL at 01:57

## 2025-04-01 RX ADMIN — CHLORDIAZEPOXIDE HYDROCHLORIDE 25 MG: 25 CAPSULE ORAL at 12:28

## 2025-04-01 NOTE — ASSESSMENT & PLAN NOTE
Patient quit drinking for 2 weeks since last hospitalization, then resumed due to stress surrounding a new job. Last drink 2 days ago. Ethanol 177 on admission. Does have history of withdrawal seizures.       -Again counseled on abstaining from alcohol  -Recommended inpatient/outpatient alcohol rehab, patient declines  -Continue Librium   -Continue CIWA protocol  -Continue folic acid and thiamine

## 2025-04-01 NOTE — PROGRESS NOTES
Progress Note - Gastroenterology   Name: Kaykay Holland 34 y.o. 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 Lipase 285 on admission (>3x ULN). Liver tests and creatinine normal.  Mild leukocytosis, now resolved. CT shows acute interstitial edematous pancreatitis, multiple cystic lesions, some larger, some smaller from prior imaging, small volume ascites, LUQ and mesenteric varices, normal-appearing liver and spleen. Patient still complaining of abdominal pain although tolerating liquids. Nausea and vomiting improved.    -Continue supportive care -no need for transfer at this time  -Advance to low-fat diet as tolerated  -Pain management and antiemetics as needed  Alcohol use disorder  Patient quit drinking for 2 weeks since last hospitalization, then resumed due to stress surrounding a new job. Last drink 2 days ago. Ethanol 177 on admission. Does have history of withdrawal seizures.       -Again counseled on abstaining from alcohol  -Recommended inpatient/outpatient alcohol rehab, patient declines  -Continue Librium   -Continue CIWA protocol  -Continue folic acid and thiamine  Tobacco abuse  Counseled on tobacco cessation    Moderate protein-calorie malnutrition (HCC)  Malnutrition Findings:   Adult Malnutrition type: Acute illness  Adult Degree of Malnutrition: Malnutrition of moderate degree  Malnutrition Characteristics: Fat loss, Weight loss                  360 Statement: moderate malnutrition in the setting of chronic alcohol use as evidenced by:  moderate body fat depletion- somewhat hollow orbital area;  moderate muscle mass depletion- somewhat depressed  temporal area, visible clavicle;  significant 8 lb (6.6%) weight loss x 1 month.  treated with diet and supplement recommendations for when able to advance diet.    BMI Findings:           Body mass index is 18.64 kg/m².       I have discussed the above management plan in detail with the primary service.     Subjective   Patient seen and examined at bedside. She still complains of abdominal pain although her nausea and vomiting has improved. She tolerated clear liquids.    Objective :  Temp:  [97.8 °F (36.6 °C)-98.3 °F (36.8 °C)] 98.2 °F (36.8 °C)  HR:  [] 79  BP: (104-122)/(66-83) 122/83  Resp:  [16-17] 16  SpO2:  [93 %-96 %] 96 %  O2 Device: None (Room air)    Physical Exam  Vitals and nursing note reviewed.   Constitutional:       General: She is not in acute distress.     Appearance: She is well-developed.   HENT:      Head: Normocephalic and atraumatic.   Eyes:      Conjunctiva/sclera: Conjunctivae normal.   Cardiovascular:      Rate and Rhythm: Normal rate and regular rhythm.      Heart sounds: No murmur heard.  Pulmonary:      Effort: Pulmonary effort is normal. No respiratory distress.      Breath sounds: Normal breath sounds.   Abdominal:      Palpations: Abdomen is soft.      Tenderness: There is abdominal tenderness.      Comments: Epigastric   Musculoskeletal:         General: No swelling.      Cervical back: Neck supple.   Skin:     General: Skin is warm and dry.   Neurological:      Mental Status: She is alert.   Psychiatric:         Mood and Affect: Mood normal.         Lab Results: I have reviewed the following results:CBC/BMP:   .     04/01/25 0357   WBC 5.23   HGB 10.9*   HCT 34.0*   *   SODIUM 137   K 3.8      CO2 27   BUN 3*   CREATININE 0.47*   GLUC 87    , Creatinine Clearance: Estimated Creatinine Clearance: 136.5 mL/min (A) (by C-G formula based on SCr of 0.47 mg/dL (L))., LFTs:   .     04/01/25  0357   AST 16   ALT 9   ALB 2.9*   TBILI 0.86   ALKPHOS 65    , PTT/INR:No  new results in last 24 hours. , Troponin,BNP:No new results in last 24 hours.     Imaging Results Review: I reviewed radiology reports from this admission including: CT abdomen/pelvis.  Other Study Results Review: No additional pertinent studies reviewed.

## 2025-04-01 NOTE — PLAN OF CARE
Problem: PAIN - ADULT  Goal: Verbalizes/displays adequate comfort level or baseline comfort level  Description: Interventions:- Encourage patient to monitor pain and request assistance- Assess pain using appropriate pain scale- Administer analgesics based on type and severity of pain and evaluate response- Implement non-pharmacological measures as appropriate and evaluate response- Consider cultural and social influences on pain and pain management- Notify physician/advanced practitioner if interventions unsuccessful or patient reports new pain  Outcome: Progressing     Problem: INFECTION - ADULT  Goal: Absence or prevention of progression during hospitalization  Description: INTERVENTIONS:- Assess and monitor for signs and symptoms of infection- Monitor lab/diagnostic results- Monitor all insertion sites, i.e. indwelling lines, tubes, and drains- Monitor endotracheal if appropriate and nasal secretions for changes in amount and color- [place] appropriate cooling/warming therapies per order- Administer medications as ordered- Instruct and encourage patient and family to use good hand hygiene technique- Identify and instruct in appropriate isolation precautions for identified infection/condition  Outcome: Progressing  Goal: Absence of fever/infection during neutropenic period  Description: INTERVENTIONS:- Monitor WBC  Outcome: Progressing     Problem: DISCHARGE PLANNING  Goal: Discharge to home or other facility with appropriate resources  Description: INTERVENTIONS:- Identify barriers to discharge w/patient and caregiver- Arrange for needed discharge resources and transportation as appropriate- Identify discharge learning needs (meds, wound care, etc.)- Arrange for interpretive services to assist at discharge as needed- Refer to Case Management Department for coordinating discharge planning if the patient needs post-hospital services based on physician/advanced practitioner order or complex needs related to  functional status, cognitive ability, or social support system  Outcome: Progressing     Problem: Knowledge Deficit  Goal: Patient/family/caregiver demonstrates understanding of disease process, treatment plan, medications, and discharge instructions  Description: Complete learning assessment and assess knowledge base.Interventions:- Provide teaching at level of understanding- Provide teaching via preferred learning methods  Outcome: Progressing     Problem: Nutrition/Hydration-ADULT  Goal: Nutrient/Hydration intake appropriate for improving, restoring or maintaining nutritional needs  Description: Monitor and assess patient's nutrition/hydration status for malnutrition. Collaborate with interdisciplinary team and initiate plan and interventions as ordered.  Monitor patient's weight and dietary intake as ordered or per policy. Utilize nutrition screening tool and intervene as necessary. Determine patient's food preferences and provide high-protein, high-caloric foods as appropriate. INTERVENTIONS:- Monitor oral intake, urinary output, labs, and treatment plans- Assess nutrition and hydration status and recommend course of action- Evaluate amount of meals eaten- Assist patient with eating if necessary - Allow adequate time for meals- Recommend/ encourage appropriate diets, oral nutritional supplements, and vitamin/mineral supplements- Order, calculate, and assess calorie counts as needed- Recommend, monitor, and adjust tube feedings and TPN/PPN based on assessed needs- Assess need for intravenous fluids- Provide specific nutrition/hydration education as appropriate- Include patient/family/caregiver in decisions related to nutrition  Monitor and assess patient's nutrition/hydration status for malnutrition. Collaborate with interdisciplinary team and initiate plan and interventions as ordered.  Monitor patient's weight and dietary intake as ordered or per policy. Utilize nutrition screening tool and intervene as  necessary. Determine patient's food preferences and provide high-protein, high-caloric foods as appropriate. INTERVENTIONS:- Monitor oral intake, urinary output, labs, and treatment plans- Assess nutrition and hydration status and recommend course of action- Evaluate amount of meals eaten- Assist patient with eating if necessary - Allow adequate time for meals- Recommend/ encourage appropriate diets, oral nutritional supplements, and vitamin/mineral supplements- Order, calculate, and assess calorie counts as needed- Recommend, monitor, and adjust tube feedings and TPN/PPN based on assessed needs- Assess need for intravenous fluids- Provide specific nutrition/hydration education as appropriate- Include patient/family/caregiver in decisions related to nutrition  Outcome: Progressing

## 2025-04-01 NOTE — PLAN OF CARE
Problem: PAIN - ADULT  Goal: Verbalizes/displays adequate comfort level or baseline comfort level  Description: Interventions:- Encourage patient to monitor pain and request assistance- Assess pain using appropriate pain scale- Administer analgesics based on type and severity of pain and evaluate response- Implement non-pharmacological measures as appropriate and evaluate response- Consider cultural and social influences on pain and pain management- Notify physician/advanced practitioner if interventions unsuccessful or patient reports new pain  Outcome: Progressing     Problem: INFECTION - ADULT  Goal: Absence or prevention of progression during hospitalization  Description: INTERVENTIONS:- Assess and monitor for signs and symptoms of infection- Monitor lab/diagnostic results- Monitor all insertion sites, i.e. indwelling lines, tubes, and drains- Monitor endotracheal if appropriate and nasal secretions for changes in amount and color- [place] appropriate cooling/warming therapies per order- Administer medications as ordered- Instruct and encourage patient and family to use good hand hygiene technique- Identify and instruct in appropriate isolation precautions for identified infection/condition  Outcome: Progressing     Problem: SAFETY ADULT  Goal: Maintain or return to baseline ADL function  Description: INTERVENTIONS:-  Assess patient's ability to carry out ADLs; assess patient's baseline for ADL function and identify physical deficits which impact ability to perform ADLs (bathing, care of mouth/teeth, toileting, grooming, dressing, etc.)- Assess/evaluate cause of self-care deficits - Assess range of motion- Assess patient's mobility; develop plan if impaired- Assess patient's need for assistive devices and provide as appropriate- Encourage maximum independence but intervene and supervise when necessary- Involve family in performance of ADLs- Assess for home care needs following discharge - Consider OT consult  to assist with ADL evaluation and planning for discharge- Provide patient education as appropriate  Outcome: Progressing     Problem: DISCHARGE PLANNING  Goal: Discharge to home or other facility with appropriate resources  Description: INTERVENTIONS:- Identify barriers to discharge w/patient and caregiver- Arrange for needed discharge resources and transportation as appropriate- Identify discharge learning needs (meds, wound care, etc.)- Arrange for interpretive services to assist at discharge as needed- Refer to Case Management Department for coordinating discharge planning if the patient needs post-hospital services based on physician/advanced practitioner order or complex needs related to functional status, cognitive ability, or social support system  Outcome: Progressing     Problem: Nutrition/Hydration-ADULT  Goal: Nutrient/Hydration intake appropriate for improving, restoring or maintaining nutritional needs  Description: Monitor and assess patient's nutrition/hydration status for malnutrition. Collaborate with interdisciplinary team and initiate plan and interventions as ordered.  Monitor patient's weight and dietary intake as ordered or per policy. Utilize nutrition screening tool and intervene as necessary. Determine patient's food preferences and provide high-protein, high-caloric foods as appropriate. INTERVENTIONS:- Monitor oral intake, urinary output, labs, and treatment plans- Assess nutrition and hydration status and recommend course of action- Evaluate amount of meals eaten- Assist patient with eating if necessary - Allow adequate time for meals- Recommend/ encourage appropriate diets, oral nutritional supplements, and vitamin/mineral supplements- Order, calculate, and assess calorie counts as needed- Recommend, monitor, and adjust tube feedings and TPN/PPN based on assessed needs- Assess need for intravenous fluids- Provide specific nutrition/hydration education as appropriate- Include  patient/family/caregiver in decisions related to nutrition  Outcome: Progressing

## 2025-04-01 NOTE — UTILIZATION REVIEW
Continued Stay Review    Date: 4/1                          Current Patient Class: Inpatient  Current Level of Care: MEd/surg    HPI:34 y.o. female initially admitted on 3/30   Assessment/Plan: Continues to have LLQ and LUQ abdominal tenderness, is anxious. GCs 15.     Gi consult:  COntinue with supportive measures for pancreatitis: IV fluids, antiemetics, pain control PRN.  Counseled on need for alochol cessation.   Medications:   Scheduled Medications:  chlordiazePOXIDE, 25 mg, Oral, Q6H RODRICK  folic acid 1 mg in sodium chloride 0.9 % 50 mL IVPB, 1 mg, Intravenous, Daily  multivitamin-minerals, 1 tablet, Oral, Daily  nicotine, 21 mg, Transdermal, Daily  pantoprazole, 40 mg, Intravenous, Q12H RODRICK  thiamine, 100 mg, Intravenous, Daily      Continuous IV Infusions:  lactated ringers, 125 mL/hr, Intravenous, Continuous      PRN Meds:  HYDROmorphone, 0.2 mg, Intravenous, Q4H PRN x 124/1  hydrOXYzine HCL, 25 mg, Oral, Q6H PRN  ondansetron, 4 mg, Intravenous, Q6H PRN  oxyCODONE, 2.5 mg, Oral, Q6H PRN   And  oxyCODONE, 5 mg, Oral, Q6H PRN  prochlorperazine, 5 mg, Oral, Q6H PRN      Discharge Plan: TBD    Vital Signs (last 3 days)       Date/Time Temp Pulse Resp BP MAP (mmHg) SpO2 O2 Device Patient Position - Orthostatic VS Dulce Maria Coma Scale Score CIWA-Ar Total Pain    04/01/25 1227 -- -- -- -- -- -- -- -- -- -- 8    04/01/25 1140 -- -- -- -- -- -- -- -- -- -- No Pain    04/01/25 0939 -- -- -- -- -- -- -- -- -- -- 9    04/01/25 0349 -- -- -- -- -- -- -- -- -- -- 9    04/01/25 0348 98.2 °F (36.8 °C) 79 16 122/83 96 96 % None (Room air) Lying -- 2 --    04/01/25 0000 -- -- -- -- -- -- -- -- -- 1 --    03/31/25 2313 98.3 °F (36.8 °C) 103 16 121/83 96 96 % None (Room air) Lying -- 2 8    03/31/25 2009 -- -- -- -- -- -- -- -- 15 -- 7    03/31/25 20:04:49 97.8 °F (36.6 °C) 90 16 119/82 94 96 % None (Room air) Sitting -- 2 --    03/31/25 1908 -- -- -- -- -- -- -- -- -- -- 9    03/31/25 15:54:15 -- 89 -- 117/78 91 95 % -- -- -- 1  --    03/31/25 15:29:42 97.9 °F (36.6 °C) 87 17 104/66 79 93 % None (Room air) Lying -- -- --    03/31/25 1437 -- -- -- -- -- -- -- -- -- -- 8    03/31/25 11:55:34 -- 76 -- 115/79 91 97 % -- -- -- 1 --    03/31/25 1037 -- -- -- -- -- -- -- -- -- -- 9    03/31/25 10:05:16 97.8 °F (36.6 °C) 76 -- 115/78 90 96 % None (Room air) -- 15 -- No Pain    03/31/25 10:04:29 -- 79 16 115/78 90 96 % -- Lying -- -- --    03/31/25 0800 -- -- -- -- -- -- -- -- -- 7 --    03/31/25 0712 -- -- -- -- -- -- -- -- -- -- 9    03/31/25 0600 -- -- -- -- -- -- -- -- -- 2 --    03/31/25 0500 -- 81 -- -- -- 94 % -- -- -- -- --    03/31/25 0400 -- -- -- -- -- -- -- -- -- 6 --    03/31/25 0309 -- 68 -- -- -- 94 % -- -- -- -- --    03/31/25 0300 -- 68 -- 114/80 91 95 % -- -- -- -- --    03/31/25 02:48:11 97.9 °F (36.6 °C) 76 16 114/80 91 97 % None (Room air) Lying -- -- --    03/31/25 0239 -- -- -- -- -- -- -- -- -- -- 9    03/31/25 0200 -- -- -- -- -- -- -- -- -- 0 --    03/31/25 0100 -- 74 -- -- -- 92 % -- -- -- -- --    03/31/25 0000 -- -- -- -- -- -- -- -- -- 4 --    03/30/25 2300 -- 81 -- -- -- 94 % -- -- -- -- --    03/30/25 2233 -- 89 -- -- -- 95 % -- -- -- -- --    03/30/25 2228 -- -- -- -- -- -- -- -- -- -- 9 03/30/25 2200 -- -- -- -- -- -- -- -- -- 8 --    03/30/25 2100 -- 88 -- -- -- 97 % -- -- -- -- --    03/30/25 2018 -- 91 -- 111/74 86 94 % -- -- -- -- --    03/30/25 20:14:21 97.7 °F (36.5 °C) 92 20 111/74 86 95 % None (Room air) Lying -- -- --    03/30/25 2008 -- 89 -- -- -- 94 % -- -- 15 -- 9 03/30/25 2000 -- -- -- -- -- -- -- -- -- 4 --    03/30/25 1900 -- 99 -- -- -- 94 % -- -- -- -- --    03/30/25 1808 -- 89 -- -- -- 91 % -- -- -- -- --    03/30/25 1756 -- -- -- -- -- -- -- -- -- -- 9 03/30/25 1600 -- -- -- -- -- -- -- -- -- 8 --    03/30/25 1549 -- -- -- -- -- -- None (Room air) -- 15 -- 6 03/30/25 15:43:56 98 °F (36.7 °C) 108 20 116/77 90 92 % -- -- -- 10 --    03/30/25 1522 -- 110 -- 129/76 -- -- -- -- -- 8 --     03/30/25 1517 -- -- -- -- -- -- -- -- -- -- 9    03/30/25 1515 -- 110 19 129/76 -- 95 % None (Room air) Sitting -- -- --    03/30/25 1513 -- -- -- -- -- -- None (Room air) -- 15 -- --    03/30/25 1410 -- 92 -- -- -- -- -- -- -- -- --    03/30/25 1315 -- -- -- -- -- -- -- -- -- -- 9    03/30/25 1226 98 °F (36.7 °C) 125 16 140/96 110 98 % None (Room air) Sitting -- -- 8          Weight (last 2 days)       Date/Time Weight    03/30/25 1549 50.8 (112)    03/30/25 1226 54.4 (120)           CIWA-Ar Score       Row Name 04/01/25 0348 04/01/25 0000 03/31/25 2313       CIWA-Ar    Nausea and Vomiting 0 0 0    Tactile Disturbances 0 0 0    Tremor 0 0 0    Auditory Disturbances 0 0 0    Paroxysmal Sweats 0 0 0    Visual Disturbances 0 0 0    Anxiety 2 1 1    Headache, Fullness in Head 0 0 1    Agitation 0 0 0    Orientation and Clouding of Sensorium 0 0 0    CIWA-Ar Total 2 1 2      Row Name 03/31/25 20:04:49 03/31/25 15:54:15 03/31/25 11:55:34       CIWA-Ar    Nausea and Vomiting 1 0 0    Tactile Disturbances 0 0 0    Tremor 0 0 0    Auditory Disturbances 0 0 0    Paroxysmal Sweats 0 0 0    Visual Disturbances 0 0 0    Anxiety 1 1 0    Headache, Fullness in Head 0 0 1    Agitation 0 0 0    Orientation and Clouding of Sensorium 0 0 0    CIWA-Ar Total 2 1 1      Row Name 03/31/25 0800 03/31/25 0600 03/31/25 0400       CIWA-Ar    Nausea and Vomiting 1 0 0    Tactile Disturbances 0 0 1    Tremor 2 2 2    Auditory Disturbances 0 0 0    Paroxysmal Sweats 0 0 0    Visual Disturbances 0 0 0    Anxiety 4 0 3    Headache, Fullness in Head 0 0 0    Agitation 0 0 0    Orientation and Clouding of Sensorium 0 0 0    CIWA-Ar Total 7 2 6      Row Name 03/31/25 0200 03/31/25 0000 03/30/25 2200       CIWA-Ar    Nausea and Vomiting 0 1 2    Tactile Disturbances 0 1 1    Tremor 0 1 2    Auditory Disturbances 0 0 0    Paroxysmal Sweats 0 0 0    Visual Disturbances 0 0 0    Anxiety 0 1 1    Headache, Fullness in Head 0 0 2    Agitation 0 0 0     Orientation and Clouding of Sensorium 0 0 0    CIWA-Ar Total 0 4 8      Row Name 03/30/25 2000 03/30/25 1600 03/30/25 15:43:56       CIWA-Ar    Nausea and Vomiting 0 0 0    Tactile Disturbances 0 0 0    Tremor 3 3 3    Auditory Disturbances 0 0 0    Paroxysmal Sweats 0 2 0    Visual Disturbances 0 0 0    Anxiety 1 3 4    Headache, Fullness in Head 0 0 2    Agitation 0 0 1    Orientation and Clouding of Sensorium 0 0 0    CIWA-Ar Total 4 8 10      Row Name 03/30/25 1522             CIWA-Ar    /76      Pulse 110      Nausea and Vomiting 1      Tactile Disturbances 0      Tremor 2      Auditory Disturbances 0      Paroxysmal Sweats 0      Visual Disturbances 0      Anxiety 3      Headache, Fullness in Head 2      Agitation 0      Orientation and Clouding of Sensorium 0      CIWA-Ar Total 8                    Pertinent Labs/Diagnostic Results:   Radiology:  CT abdomen pelvis with contrast   ED Interpretation by Jorge Paulino PA-C (03/30 1506)   Reading Physician Reading Date Result Priority  Raheel Jiménez MD  [phone]    3/30/2025 STAT    Narrative & Impression  CT ABDOMEN AND PELVIS WITH IV CONTRAST     INDICATION: left sided abndominal pain; hx of pancreatitis; recently started drinking etoh. .     COMPARISON: CT abdomen pelvis 3/6/2025     TECHNIQUE: CT examination of the abdomen and pelvis was performed. Multiplanar 2D reformatted images were created from the source data.     This examination, like all CT scans performed in the [facility], was performed utilizing techniques to minimize radiation dose exposure, including the use of iterative reconstruction and automated exposure control. Radiation dose length   product (DLP) for this visit: 441.15 mGy-cm     IV Contrast: 100 mL of iohexol  Enteric Contrast: Not administered.     FINDINGS:     ABDOMEN     LOWER CHEST: No clinically significant abnormality in the visualized lower chest.     LIVER/BILIARY TREE: Unremarkable.      GALLBLADDER: No calcif   ied gallstones. No pericholecystic inflammatory change.     SPLEEN: Unremarkable.     PANCREAS: There is evidence of acute pancreatitis, which will be described based on the revised Rebekah Classification of Acute Pancreatitis:  - TIME OF ONSET: less than or equal to 4 weeks  - NECROSIS: There is peripancreatic edema but no evidence of pancreatic or peripancreatic necrosis, therefore this is interstitial edematous pancreatitis (IEP.) Edema and fluid extend into the lesser sac.  - LOCAL COMPLICATIONS: Multiple peripancreatic cystic lesions/collections. Some similar to 3/6/2025 for example in the head measuring 0.9 x 0.9 cm (series 2 image 55). Some slightly smaller than prior for example in the body measuring 0.8 x 0.7 cm   (series 2 image 50), previously 1.0 x 0.8 cm). Others enlarged for example in the tail measuring 1.2 x 1.1 cm (series 2 image 44), previously 0.8 x 0.7 cm.  - INFECTION: There is no abnormal gas in or around the pancreas to suggest infection.        ADRENAL GLANDS:    Unremarkable.     KIDNEYS/URETERS: No hydronephrosis or urinary tract calculi. Subcentimeter hypoattenuating renal lesion(s), too small to characterize but statistically likely benign, which do not warrant follow-up (Radiology June 2019).     STOMACH AND BOWEL: Unremarkable.     APPENDIX: No findings to suggest appendicitis.     ABDOMINOPELVIC CAVITY: Small volume ascites.     VESSELS: Similar left upper quadrant and mesenteric varices.     PELVIS     REPRODUCTIVE ORGANS: Unremarkable for patient's age.     URINARY BLADDER: Unremarkable.     ABDOMINAL WALL/INGUINAL REGIONS: Unremarkable.     BONES: No acute fracture or suspicious osseous lesion.     IMPRESSION:     1. Acute interstitial edematous pancreatitis. Redemonstrated multiple cystic lesions/collections, some of which are slightly larger than 3/6/2025, some smaller, and others similar.     2. Small volume ascites.     3. Similar left upper quadrant  and mesenteric varices could be related to portal hypertension. Suggest correlat   ion with liver function parameters and nonemergent ultrasound elastography.        The study was marked in EPIC for immediate notification.           Workstation performed: [id number]        Final Interpretation by Raheel Jiménez MD (03/30 1455)      1. Acute interstitial edematous pancreatitis. Redemonstrated multiple cystic lesions/collections, some of which are slightly larger than 3/6/2025, some smaller, and others similar.      2. Small volume ascites.      3. Similar left upper quadrant and mesenteric varices could be related to portal hypertension. Suggest correlation with liver function parameters and nonemergent ultrasound elastography.         The study was marked in EPIC for immediate notification.            Workstation performed: [id number]           Cardiology:  ECG 12 lead   Final Result by Sinai Holloway MD (03/30 1350)   Sinus tachycardia   Otherwise normal ECG   When compared with ECG of 28-Feb-2025 04:57,   Vent. rate has increased by  52 bpm   Confirmed by Sinai Holloway ([id number]) on 3/30/2025 1:50:31 PM                Results from last 7 days   Lab Units 04/01/25 0357 03/31/25  0340 03/30/25  1322   WBC Thousand/uL 5.23 6.13 10.64*   HEMOGLOBIN g/dL 10.9* 10.7* 13.3   HEMATOCRIT % 34.0* 33.2* 40.6   PLATELETS Thousands/uL 133* 149 217   TOTAL NEUT ABS Thousands/µL 2.27 2.56 6.33         Results from last 7 days   Lab Units 04/01/25 0357 03/31/25  0243 03/30/25  1322   SODIUM mmol/L 137 132* 136   POTASSIUM mmol/L 3.8 4.0 3.6   CHLORIDE mmol/L 104 104 103   CO2 mmol/L 27 23 25   ANION GAP mmol/L 6 5 8   BUN mg/dL 3* 6 7   CREATININE mg/dL 0.47* 0.48* 0.43*   EGFR ml/min/1.73sq m 130 129 134   CALCIUM mg/dL 7.9* 7.6* 8.4   MAGNESIUM mg/dL  --  2.0 1.3*   PHOSPHORUS mg/dL  --   --  3.8     Results from last 7 days   Lab Units 04/01/25 0357 03/31/25  0243 03/30/25  1322   AST U/L 16 24 28   ALT U/L 9 12 16    ALK PHOS U/L 65 79 84   TOTAL PROTEIN g/dL 5.0* 5.5* 6.5   ALBUMIN g/dL 2.9* 3.2* 3.8   TOTAL BILIRUBIN mg/dL 0.86 1.07* 0.53         Results from last 7 days   Lab Units 04/01/25  0357 03/31/25  0243 03/30/25  1322   GLUCOSE RANDOM mg/dL 87 78 121           Results from last 7 days   Lab Units 03/30/25  1322   HS TNI 0HR ng/L <2         Results from last 7 days   Lab Units 03/30/25  1322   PROTIME seconds 15.7*   INR  1.20*   PTT seconds 28     Results from last 7 days   Lab Units 03/31/25  0243 03/30/25  1322   LIPASE u/L 157* 285*     Results from last 7 days   Lab Units 03/30/25  1322   CLARITY UA  Clear   COLOR UA  Yellow   SPEC GRAV UA  >=1.030   PH UA  5.5   GLUCOSE UA mg/dl Negative   KETONES UA mg/dl Trace*   BLOOD UA  Negative   PROTEIN UA mg/dl 30 (1+)*   NITRITE UA  Negative   BILIRUBIN UA  Negative   UROBILINOGEN UA (BE) mg/dl <2.0   LEUKOCYTES UA  Negative   WBC UA /hpf None Seen   RBC UA /hpf None Seen   BACTERIA UA /hpf Occasional   EPITHELIAL CELLS WET PREP /hpf Moderate*   MUCUS THREADS  Occasional*     Results from last 7 days   Lab Units 03/30/25  1322   ETHANOL LVL mg/dL 177*           Network Utilization Review Department  ATTENTION: Please call with any questions or concerns to [phone] and carefully listen to the prompts so that you are directed to the right person. All voicemails are confidential.   For Discharge needs, contact Care Management DC Support Team at [phone] opt. 2  Send all requests for admission clinical reviews, approved or denied determinations and any other requests to dedicated fax number below belonging to the campus where the patient is receiving treatment. List of dedicated fax numbers for the Facilities:  FACILITY NAME UR FAX NUMBER   ADMISSION DENIALS (Administrative/Medical Necessity) [phone]   DISCHARGE SUPPORT TEAM (NETWORK) [phone]   PARENT CHILD HEALTH (Maternity/NICU/Pediatrics) [phone]   [facility]  [phone]   [facility] [phone]   [facility] [phone]   [facility] [phone]   [facility] [phone]   [facility] [phone]   [facility] [phone]   [facility] [phone]   [facility] [phone]   [facility] [phone]   [facility] [phone]   [facility] [phone]

## 2025-04-01 NOTE — ASSESSMENT & PLAN NOTE
And had one-time abnormality of sodium 132  Likely secondary to alcohol use/poor solute intake  Patient was treated with IV hydration for being n.p.o. not  for hyponatremia as per CDI query

## 2025-04-01 NOTE — PROGRESS NOTES
Progress Note - Hospitalist   Name: Kaykay Holland 34 y.o. female I MRN: [id number]  Unit/Bed#: -01 I Date of Admission: 3/30/2025   Date of Service: 4/1/2025 I Hospital Day: 2    Assessment & Plan  Acute alcoholic pancreatitis  Presented to the emergency department with epigastric/left upper quadrant pain  CT abdomen/pelvis: 1. Acute interstitial edematous pancreatitis. Redemonstrated multiple cystic lesions/collections, some of which are slightly larger than 3/6/2025, some smaller, and others similar.  2. Small volume ascites.  3. Similar left upper quadrant and mesenteric varices could be related to portal hypertension. Suggest correlation with liver function parameters and nonemergent ultrasound elastography.   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  Stent was removed in January  Continue IV hydration  Clear liquids  Pain management-will decrease the frequency and use of IV pain medication  Ultimately needs complete alcohol cessation unfortunately has declined alcohol rehab services previously  Consult GI  Alcohol use disorder  Patient with significant and currently active alcohol use  Reports last drink of alcohol yesterday - ethanol 177 on admission  Start librium - previously did well with 50 mg q 6 hr x 1 day followed by 25 mg q 6 hr taper  Monitor via CIWA protocol  Thiamine, folic acid, multivitamin  Monitor on telemetry due to history of alcohol withdrawal seizure/DTs  Tobacco abuse  Continue nicotine patch   cessation  Peptic ulcer disease  Continue IV Protonix  Hypomagnesemia  Magnesium 1.3 on admission  S/p repletion  Continue to trend  Moderate protein-calorie malnutrition (HCC)  Malnutrition Findings:   Adult Malnutrition type: Acute illness  Adult Degree of Malnutrition: Malnutrition of moderate degree  Malnutrition Characteristics: Fat loss, Weight  loss                  360 Statement: moderate malnutrition in the setting of chronic alcohol use as evidenced by:  moderate body fat depletion- somewhat hollow orbital area;  moderate muscle mass depletion- somewhat depressed temporal area, visible clavicle;  significant 8 lb (6.6%) weight loss x 1 month.  treated with diet and supplement recommendations for when able to advance diet.    BMI Findings:           Body mass index is 18.64 kg/m².     Hyponatremia  And had one-time abnormality of sodium 132  Likely secondary to alcohol use/poor solute intake  Patient was treated with IV hydration for being n.p.o. not  for hyponatremia as per CDI query    VTE Pharmacologic Prophylaxis: VTE Score: 0 Low Risk (Score 0-2) - Encourage Ambulation.    Mobility:   Basic Mobility Inpatient Raw Score: 24  JH-HLM Goal: 8: Walk 250 feet or more  JH-HLM Achieved: 8: Walk 250 feet ot more  JH-HLM Goal achieved. Continue to encourage appropriate mobility.    Patient Centered Rounds: I performed bedside rounds with nursing staff today.   Discussions with Specialists or Other Care Team Provider: GI, case management, nursing    Education and Discussions with Family / Patient: Patient declined call to .     Current Length of Stay: 2 day(s)  Current Patient Status: Inpatient   Certification Statement: The patient will continue to require additional inpatient hospital stay due to acute pancreatitis,, diet advancement, pain management  Discharge Plan: Anticipate discharge in 24-48 hrs to home.    Code Status: Level 1 - Full Code    Subjective   Patient complaining of pain, nausea.  No vomiting since admission.  Able to keep clear liquids and breath stated she has dry heaving  Objective :  Temp:  [97.8 °F (36.6 °C)-98.3 °F (36.8 °C)] 98.2 °F (36.8 °C)  HR:  [] 79  BP: (104-122)/(66-83) 122/83  Resp:  [16-17] 16  SpO2:  [93 %-96 %] 96 %  O2 Device: None (Room air)    Body mass index is 18.64 kg/m².     Input and Output  Summary (last 24 hours):     Intake/Output Summary (Last 24 hours) at 4/1/2025 1417  Last data filed at 4/1/2025 1253  Gross per 24 hour   Intake 3840.83 ml   Output 1400 ml   Net 2440.83 ml       Physical Exam  Vitals and nursing note reviewed.   Constitutional:       General: She is not in acute distress.     Appearance: She is well-developed.   HENT:      Head: Normocephalic and atraumatic.      Mouth/Throat:      Mouth: Mucous membranes are moist.      Pharynx: Oropharynx is clear.   Eyes:      Conjunctiva/sclera: Conjunctivae normal.   Cardiovascular:      Rate and Rhythm: Normal rate and regular rhythm.      Heart sounds: No murmur heard.  Pulmonary:      Effort: Pulmonary effort is normal. No respiratory distress.      Breath sounds: Normal breath sounds.   Abdominal:      General: There is no distension.      Palpations: Abdomen is soft.      Tenderness: There is abdominal tenderness.   Musculoskeletal:         General: No swelling.      Cervical back: Neck supple.   Skin:     General: Skin is warm and dry.      Capillary Refill: Capillary refill takes 2 to 3 seconds.   Neurological:      Mental Status: She is alert and oriented to person, place, and time.   Psychiatric:         Mood and Affect: Mood normal.           Lines/Drains:        Telemetry:  Telemetry Orders (From admission, onward)               24 Hour Telemetry Monitoring  (ED Bridging Orders Panel)  Continuous x 24 Hours (Telem)        Expiring   Question:  Reason for 24 Hour Telemetry  Answer:  Alcohol withdrawal and CIWA >7, electrolyte abnormalities, abnormal ECG and/or heart disease                     Telemetry Reviewed: Normal Sinus Rhythm  Indication for Continued Telemetry Use: Arrthymias requiring medical therapy               Lab Results: I have reviewed the following results:   Results from last 7 days   Lab Units 04/01/25  0357   WBC Thousand/uL 5.23   HEMOGLOBIN g/dL 10.9*   HEMATOCRIT % 34.0*   PLATELETS Thousands/uL 133*   SEGS  PCT % 44   LYMPHO PCT % 27   MONO PCT % 12   EOS PCT % 16*     Results from last 7 days   Lab Units 04/01/25  0357   SODIUM mmol/L 137   POTASSIUM mmol/L 3.8   CHLORIDE mmol/L 104   CO2 mmol/L 27   BUN mg/dL 3*   CREATININE mg/dL 0.47*   ANION GAP mmol/L 6   CALCIUM mg/dL 7.9*   ALBUMIN g/dL 2.9*   TOTAL BILIRUBIN mg/dL 0.86   ALK PHOS U/L 65   ALT U/L 9   AST U/L 16   GLUCOSE RANDOM mg/dL 87     Results from last 7 days   Lab Units 03/30/25  1322   INR  1.20*                   Recent Cultures (last 7 days):         Imaging Results Review: No pertinent imaging studies reviewed.  Other Study Results Review: No additional pertinent studies reviewed.    Last 24 Hours Medication List:     Current Facility-Administered Medications:     [COMPLETED] chlordiazePOXIDE (LIBRIUM) capsule 50 mg, Q6H RODRICK **FOLLOWED BY** chlordiazePOXIDE (LIBRIUM) capsule 25 mg, Q6H RODRICK    [START ON 4/2/2025] folic acid (FOLVITE) tablet 1 mg, Daily    HYDROmorphone HCl (DILAUDID) injection 0.2 mg, Q6H PRN    hydrOXYzine HCL (ATARAX) tablet 25 mg, Q6H PRN    lactated ringers infusion, Continuous, Last Rate: 125 mL/hr (04/01/25 1345)    multivitamin-minerals (CENTRUM) tablet 1 tablet, Daily    nicotine (NICODERM CQ) 21 mg/24 hr TD 24 hr patch 21 mg, Daily    ondansetron (ZOFRAN) injection 4 mg, Q6H PRN    oxyCODONE (ROXICODONE) split tablet 2.5 mg, Q6H PRN **AND** oxyCODONE (ROXICODONE) IR tablet 5 mg, Q6H PRN    pantoprazole (PROTONIX) injection 40 mg, Q12H RODRICK    prochlorperazine (COMPAZINE) tablet 5 mg, Q6H PRN    [START ON 4/2/2025] thiamine tablet 100 mg, Daily    Administrative Statements   Today, Patient Was Seen By: Ralph Marshall MD  I have spent a total time of 45 minutes in caring for this patient on the day of the visit/encounter including Diagnostic results, Impressions, Counseling / Coordination of care, Documenting in the medical record, Reviewing/placing orders in the medical record (including tests, medications, and/or  procedures), Obtaining or reviewing history  , and Communicating with other healthcare professionals .    **Please Note: This note may have been constructed using a voice recognition system.**

## 2025-04-01 NOTE — ASSESSMENT & PLAN NOTE
Presented to the emergency department with epigastric/left upper quadrant pain  CT abdomen/pelvis: 1. Acute interstitial edematous pancreatitis. Redemonstrated multiple cystic lesions/collections, some of which are slightly larger than 3/6/2025, some smaller, and others similar.  2. Small volume ascites.  3. Similar left upper quadrant and mesenteric varices could be related to portal hypertension. Suggest correlation with liver function parameters and nonemergent ultrasound elastography.   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  Stent was removed in January  Continue IV hydration  Clear liquids  Pain management-will decrease the frequency and use of IV pain medication  Ultimately needs complete alcohol cessation unfortunately has declined alcohol rehab services previously  Consult GI

## 2025-04-01 NOTE — ASSESSMENT & PLAN NOTE
Malnutrition Findings:   Adult Malnutrition type: Acute illness  Adult Degree of Malnutrition: Malnutrition of moderate degree  Malnutrition Characteristics: Fat loss, Weight loss                  360 Statement: moderate malnutrition in the setting of chronic alcohol use as evidenced by:  moderate body fat depletion- somewhat hollow orbital area;  moderate muscle mass depletion- somewhat depressed temporal area, visible clavicle;  significant 8 lb (6.6%) weight loss x 1 month.  treated with diet and supplement recommendations for when able to advance diet.    BMI Findings:           Body mass index is 18.64 kg/m².

## 2025-04-01 NOTE — CASE MANAGEMENT
"   Case Management Discharge Planning Note    Patient name Kaykay Holland  Location /-01 MRN [id number]  : 1990 Date 2025       Current Admission Date: 3/30/2025  Current Admission Diagnosis:Acute alcoholic pancreatitis   Patient Active Problem List    Diagnosis Date Noted Date Diagnosed    Hyponatremia 2025     Moderate protein-calorie malnutrition (HCC) 2025     Abdominal pain 2025     Splenic vein thrombosis 2024     Leukocytosis 2024     Thrombocytopenia (HCC) 2024     Ovarian cyst 2024     Constipation 2024     Pancreatic pseudocyst 2024     Electrolyte abnormality 2024     Alcohol use disorder 2024     Hematemesis 2024     Necrotizing pancreatitis 2024     Hypomagnesemia 2024     Pancytopenia (HCC) 04/10/2024     Alcohol abuse 2024     Provoked seizures (HCC) 2024     Alcohol withdrawal seizure (HCC) 2024     Transaminitis 2024     Prolonged QT interval 2024     Essential hypertension 2024     Tobacco abuse 2024     Acute alcoholic pancreatitis 2023     Hepatic steatosis 2023     Peptic ulcer disease 2021     Alcohol withdrawal (HCC) 2020     Anxiety 2019     Depression 2019       LOS (days): 2  Geometric Mean LOS (GMLOS) (days): 3  Days to GMLOS:0.9     OBJECTIVE:  Risk of Unplanned Readmission Score: 41.04         Current admission status: Inpatient   Preferred Pharmacy:   [facility]/pharmacy #[id number] - [place] - [address]  [address]  [place]   Phone: [phone] Fax: [phone]    Primary Care Provider: PATRICIA Moran    Primary Insurance: BLUE CROSS  Secondary Insurance:     DISCHARGE DETAILS:                                          Other Referral/Resources/Interventions Provided:  Referral Comments: Per PATHS, pt's insurance is still active-- \"I ran her " "coverage on [employer] and it is still coming back as active.  I will keep watch on this account and in the mean time, I will send a referral to the financial counselors to start a PATHS review.\"                                                                             "

## 2025-04-01 NOTE — ASSESSMENT & PLAN NOTE
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 Lipase 285 on admission (>3x ULN). Liver tests and creatinine normal.  Mild leukocytosis, now resolved. CT shows acute interstitial edematous pancreatitis, multiple cystic lesions, some larger, some smaller from prior imaging, small volume ascites, LUQ and mesenteric varices, normal-appearing liver and spleen. Patient still complaining of abdominal pain although tolerating liquids. Nausea and vomiting improved.    -Continue supportive care -no need for transfer at this time  -Advance to low-fat diet as tolerated  -Pain management and antiemetics as needed

## 2025-04-02 LAB
ANION GAP SERPL CALCULATED.3IONS-SCNC: 5 MMOL/L (ref 4–13)
BUN SERPL-MCNC: 4 MG/DL (ref 5–25)
CALCIUM SERPL-MCNC: 8.4 MG/DL (ref 8.4–10.2)
CHLORIDE SERPL-SCNC: 103 MMOL/L (ref 96–108)
CO2 SERPL-SCNC: 30 MMOL/L (ref 21–32)
CREAT SERPL-MCNC: 0.39 MG/DL (ref 0.6–1.3)
GFR SERPL CREATININE-BSD FRML MDRD: 138 ML/MIN/1.73SQ M
GLUCOSE SERPL-MCNC: 85 MG/DL (ref 65–140)
POTASSIUM SERPL-SCNC: 3.9 MMOL/L (ref 3.5–5.3)
SODIUM SERPL-SCNC: 138 MMOL/L (ref 135–147)

## 2025-04-02 PROCEDURE — 99232 SBSQ HOSP IP/OBS MODERATE 35: CPT | Performed by: STUDENT IN AN ORGANIZED HEALTH CARE EDUCATION/TRAINING PROGRAM

## 2025-04-02 PROCEDURE — 99232 SBSQ HOSP IP/OBS MODERATE 35: CPT | Performed by: INTERNAL MEDICINE

## 2025-04-02 PROCEDURE — 80048 BASIC METABOLIC PNL TOTAL CA: CPT | Performed by: INTERNAL MEDICINE

## 2025-04-02 RX ORDER — KETOROLAC TROMETHAMINE 30 MG/ML
15 INJECTION, SOLUTION INTRAMUSCULAR; INTRAVENOUS EVERY 8 HOURS PRN
Status: DISCONTINUED | OUTPATIENT
Start: 2025-04-02 | End: 2025-04-02

## 2025-04-02 RX ORDER — OXYCODONE HYDROCHLORIDE 5 MG/1
5 TABLET ORAL ONCE
Refills: 0 | Status: COMPLETED | OUTPATIENT
Start: 2025-04-02 | End: 2025-04-02

## 2025-04-02 RX ORDER — KETOROLAC TROMETHAMINE 30 MG/ML
15 INJECTION, SOLUTION INTRAMUSCULAR; INTRAVENOUS EVERY 8 HOURS PRN
Status: DISPENSED | OUTPATIENT
Start: 2025-04-02 | End: 2025-04-03

## 2025-04-02 RX ORDER — OXYCODONE HYDROCHLORIDE 10 MG/1
10 TABLET ORAL EVERY 6 HOURS PRN
Refills: 0 | Status: DISCONTINUED | OUTPATIENT
Start: 2025-04-02 | End: 2025-04-03 | Stop reason: HOSPADM

## 2025-04-02 RX ORDER — HYDROMORPHONE HCL IN WATER/PF 6 MG/30 ML
0.2 PATIENT CONTROLLED ANALGESIA SYRINGE INTRAVENOUS EVERY 8 HOURS PRN
Status: DISCONTINUED | OUTPATIENT
Start: 2025-04-02 | End: 2025-04-03

## 2025-04-02 RX ADMIN — HYDROMORPHONE HYDROCHLORIDE 0.2 MG: 0.2 INJECTION, SOLUTION INTRAMUSCULAR; INTRAVENOUS; SUBCUTANEOUS at 04:23

## 2025-04-02 RX ADMIN — MULTIPLE VITAMINS W/ MINERALS TAB 1 TABLET: TAB ORAL at 10:11

## 2025-04-02 RX ADMIN — CHLORDIAZEPOXIDE HYDROCHLORIDE 25 MG: 25 CAPSULE ORAL at 06:06

## 2025-04-02 RX ADMIN — FOLIC ACID 1 MG: 1 TABLET ORAL at 10:11

## 2025-04-02 RX ADMIN — OXYCODONE HYDROCHLORIDE 10 MG: 10 TABLET ORAL at 17:26

## 2025-04-02 RX ADMIN — HYDROMORPHONE HYDROCHLORIDE 0.2 MG: 0.2 INJECTION, SOLUTION INTRAMUSCULAR; INTRAVENOUS; SUBCUTANEOUS at 12:28

## 2025-04-02 RX ADMIN — OXYCODONE HYDROCHLORIDE 5 MG: 5 TABLET ORAL at 11:20

## 2025-04-02 RX ADMIN — CHLORDIAZEPOXIDE HYDROCHLORIDE 25 MG: 25 CAPSULE ORAL at 00:11

## 2025-04-02 RX ADMIN — HYDROXYZINE HYDROCHLORIDE 25 MG: 25 TABLET, FILM COATED ORAL at 21:59

## 2025-04-02 RX ADMIN — OXYCODONE HYDROCHLORIDE 5 MG: 5 TABLET ORAL at 10:11

## 2025-04-02 RX ADMIN — PROCHLORPERAZINE MALEATE 5 MG: 5 TABLET ORAL at 04:23

## 2025-04-02 RX ADMIN — CHLORDIAZEPOXIDE HYDROCHLORIDE 25 MG: 25 CAPSULE ORAL at 11:20

## 2025-04-02 RX ADMIN — OXYCODONE HYDROCHLORIDE 5 MG: 5 TABLET ORAL at 03:11

## 2025-04-02 RX ADMIN — PANTOPRAZOLE SODIUM 40 MG: 40 INJECTION, POWDER, FOR SOLUTION INTRAVENOUS at 08:42

## 2025-04-02 RX ADMIN — ONDANSETRON 4 MG: 2 INJECTION INTRAMUSCULAR; INTRAVENOUS at 16:22

## 2025-04-02 RX ADMIN — HYDROMORPHONE HYDROCHLORIDE 0.2 MG: 0.2 INJECTION, SOLUTION INTRAMUSCULAR; INTRAVENOUS; SUBCUTANEOUS at 22:00

## 2025-04-02 RX ADMIN — KETOROLAC TROMETHAMINE 15 MG: 30 INJECTION, SOLUTION INTRAMUSCULAR; INTRAVENOUS at 19:42

## 2025-04-02 RX ADMIN — ONDANSETRON 4 MG: 2 INJECTION INTRAMUSCULAR; INTRAVENOUS at 08:42

## 2025-04-02 RX ADMIN — PANTOPRAZOLE SODIUM 40 MG: 40 INJECTION, POWDER, FOR SOLUTION INTRAVENOUS at 21:59

## 2025-04-02 RX ADMIN — SODIUM CHLORIDE, SODIUM LACTATE, POTASSIUM CHLORIDE, AND CALCIUM CHLORIDE 75 ML/HR: .6; .31; .03; .02 INJECTION, SOLUTION INTRAVENOUS at 03:11

## 2025-04-02 RX ADMIN — NICOTINE 21 MG: 21 PATCH, EXTENDED RELEASE TRANSDERMAL at 08:40

## 2025-04-02 RX ADMIN — Medication 100 MG: at 10:11

## 2025-04-02 NOTE — ASSESSMENT & PLAN NOTE
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 Lipase 285. Liver tests and creatinine have remained normal.  Mild leukocytosis, now resolved. NO fevers. CT shows acute interstitial edematous pancreatitis, multiple cystic lesions, some larger, some smaller from prior imaging, small volume ascites, LUQ and mesenteric varices, normal-appearing liver and spleen. Patient still complaining of abdominal pain although tolerating liquids.     -Continue supportive care and advance to low fat diet  -Wean opioids   -No need for antibiotics at this time, will continue monitoring for s/s of infection such as fevers, worsening leukocytosis, worsening pain

## 2025-04-02 NOTE — PROGRESS NOTES
Progress Note - Hospitalist   Name: Kaykay Holland 34 y.o. female I MRN: [id number]  Unit/Bed#: -01 I Date of Admission: 3/30/2025   Date of Service: 4/2/2025 I Hospital Day: 3    Assessment & Plan  Acute alcoholic pancreatitis  Presented to the emergency department with epigastric/left upper quadrant pain  CT abdomen/pelvis: 1. Acute interstitial edematous pancreatitis. Redemonstrated multiple cystic lesions/collections, some of which are slightly larger than 3/6/2025, some smaller, and others similar.  2. Small volume ascites.  3. Similar left upper quadrant and mesenteric varices could be related to portal hypertension. Suggest correlation with liver function parameters and nonemergent ultrasound elastography.   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  Stent was removed in January  Advance diet to low-fat  Weaning of opioids  Toradol as needed  Alcohol use disorder  Patient with significant and currently active alcohol use  Reports last drink of alcohol yesterday - ethanol 177 on admission  Start librium - previously did well with 50 mg q 6 hr x 1 day followed by 25 mg q 6 hr taper  Monitor via CIWA protocol  Thiamine, folic acid, multivitamin  Monitor on telemetry due to history of alcohol withdrawal seizure/DTs  Tobacco abuse  Continue nicotine patch   cessation  Peptic ulcer disease  Continue IV Protonix  Hypomagnesemia  Magnesium 1.3 on admission  S/p repletion  Continue to trend  Moderate protein-calorie malnutrition (HCC)  Malnutrition Findings:   Adult Malnutrition type: Acute illness  Adult Degree of Malnutrition: Malnutrition of moderate degree  Malnutrition Characteristics: Fat loss, Weight loss                  360 Statement: moderate malnutrition in the setting of chronic alcohol use as evidenced by:  moderate body fat depletion- somewhat hollow orbital  area;  moderate muscle mass depletion- somewhat depressed temporal area, visible clavicle;  significant 8 lb (6.6%) weight loss x 1 month.  treated with diet and supplement recommendations for when able to advance diet.    BMI Findings:           Body mass index is 18.64 kg/m².     Hyponatremia  And had one-time abnormality of sodium 132  Likely secondary to alcohol use/poor solute intake  Patient was treated with IV hydration for being n.p.o. not  for hyponatremia as per CDI query  REsolved    VTE Pharmacologic Prophylaxis: VTE Score: 0 Low Risk (Score 0-2) - Encourage Ambulation.    Mobility:   Basic Mobility Inpatient Raw Score: 24  JH-HLM Goal: 8: Walk 250 feet or more  JH-HLM Achieved: 8: Walk 250 feet ot more  JH-HLM Goal achieved. Continue to encourage appropriate mobility.    Patient Centered Rounds: I performed bedside rounds with nursing staff today.   Discussions with Specialists or Other Care Team Provider: GI, case management, nursing    Education and Discussions with Family / Patient: Patient declined call to .     Current Length of Stay: 3 day(s)  Current Patient Status: Inpatient   Certification Statement: The patient will continue to require additional inpatient hospital stay due to pain management, advancement of diet  Discharge Plan: Anticipate discharge tomorrow to home.    Code Status: Level 1 - Full Code    Subjective   Complains of abdominal pain 8-9 out of 10, patient appears comfortable.  Is able to tolerate clear liquids.  Complains of nausea with p.o. opioids.    Objective :  Temp:  [98 °F (36.7 °C)-98.6 °F (37 °C)] 98.1 °F (36.7 °C)  HR:  [72-88] 83  BP: (117-129)/(79-84) 117/81  Resp:  [16-18] 18  SpO2:  [95 %-97 %] 97 %  O2 Device: None (Room air)    Body mass index is 18.64 kg/m².     Input and Output Summary (last 24 hours):     Intake/Output Summary (Last 24 hours) at 4/2/2025 1258  Last data filed at 4/2/2025 1003  Gross per 24 hour   Intake 2800 ml   Output 400 ml    Net 2400 ml       Physical Exam  Vitals and nursing note reviewed.   Constitutional:       General: She is not in acute distress.     Appearance: She is well-developed.   HENT:      Head: Normocephalic and atraumatic.   Eyes:      Conjunctiva/sclera: Conjunctivae normal.   Cardiovascular:      Rate and Rhythm: Normal rate and regular rhythm.      Heart sounds: No murmur heard.  Pulmonary:      Effort: Pulmonary effort is normal. No respiratory distress.      Breath sounds: Normal breath sounds.   Abdominal:      Palpations: Abdomen is soft.      Tenderness: There is abdominal tenderness.   Musculoskeletal:         General: No swelling.      Cervical back: Neck supple.   Skin:     General: Skin is warm and dry.      Capillary Refill: Capillary refill takes less than 2 seconds.   Neurological:      Mental Status: She is alert.   Psychiatric:         Mood and Affect: Mood normal.       Lines/Drains:              Lab Results: I have reviewed the following results:   Results from last 7 days   Lab Units 04/01/25  0357   WBC Thousand/uL 5.23   HEMOGLOBIN g/dL 10.9*   HEMATOCRIT % 34.0*   PLATELETS Thousands/uL 133*   SEGS PCT % 44   LYMPHO PCT % 27   MONO PCT % 12   EOS PCT % 16*     Results from last 7 days   Lab Units 04/02/25  0354 04/01/25  0357   SODIUM mmol/L 138 137   POTASSIUM mmol/L 3.9 3.8   CHLORIDE mmol/L 103 104   CO2 mmol/L 30 27   BUN mg/dL 4* 3*   CREATININE mg/dL 0.39* 0.47*   ANION GAP mmol/L 5 6   CALCIUM mg/dL 8.4 7.9*   ALBUMIN g/dL  --  2.9*   TOTAL BILIRUBIN mg/dL  --  0.86   ALK PHOS U/L  --  65   ALT U/L  --  9   AST U/L  --  16   GLUCOSE RANDOM mg/dL 85 87     Results from last 7 days   Lab Units 03/30/25  1322   INR  1.20*                   Recent Cultures (last 7 days):         Imaging Results Review: No pertinent imaging studies reviewed.  Other Study Results Review: No additional pertinent studies reviewed.    Last 24 Hours Medication List:     Current Facility-Administered Medications:      folic acid (FOLVITE) tablet 1 mg, Daily    HYDROmorphone HCl (DILAUDID) injection 0.2 mg, Q8H PRN    hydrOXYzine HCL (ATARAX) tablet 25 mg, Q6H PRN    lactated ringers infusion, Continuous, Last Rate: 75 mL/hr (04/02/25 0311)    multivitamin-minerals (CENTRUM) tablet 1 tablet, Daily    nicotine (NICODERM CQ) 21 mg/24 hr TD 24 hr patch 21 mg, Daily    ondansetron (ZOFRAN) injection 4 mg, Q6H PRN    oxyCODONE (ROXICODONE) split tablet 2.5 mg, Q6H PRN **AND** oxyCODONE (ROXICODONE) immediate release tablet 10 mg, Q6H PRN    pantoprazole (PROTONIX) injection 40 mg, Q12H RODRICK    prochlorperazine (COMPAZINE) tablet 5 mg, Q6H PRN    thiamine tablet 100 mg, Daily    Administrative Statements   Today, Patient Was Seen By: Ralph Marshall MD  I have spent a total time of 45 minutes in caring for this patient on the day of the visit/encounter including Diagnostic results, Impressions, Counseling / Coordination of care, Documenting in the medical record, Reviewing/placing orders in the medical record (including tests, medications, and/or procedures), Obtaining or reviewing history  , and Communicating with other healthcare professionals .    **Please Note: This note may have been constructed using a voice recognition system.**

## 2025-04-02 NOTE — ASSESSMENT & PLAN NOTE
Patient quit drinking for 2 weeks since last hospitalization, then resumed due to stress surrounding a new job. Ethanol 177 on admission. Does have history of withdrawal seizures.       -We have strongly counseled on abstaining from alcohol and explained that she is at risk for developing chronic pancreatitis and even death from complications related to pancreatitis  -Recommended inpatient/outpatient alcohol rehab, however patient declining  -Course of Librium completed  -Continue CIWA protocol  -Continue folic acid and thiamine

## 2025-04-02 NOTE — ASSESSMENT & PLAN NOTE
And had one-time abnormality of sodium 132  Likely secondary to alcohol use/poor solute intake  Patient was treated with IV hydration for being n.p.o. not  for hyponatremia as per CDI query  REsolved

## 2025-04-02 NOTE — CASE MANAGEMENT
Case Management Discharge Planning Note    Patient name Kaykay Holland  Location /-01 MRN [id number]  : 1990 Date 2025       Current Admission Date: 3/30/2025  Current Admission Diagnosis:Acute alcoholic pancreatitis   Patient Active Problem List    Diagnosis Date Noted Date Diagnosed    Hyponatremia 2025     Moderate protein-calorie malnutrition (HCC) 2025     Abdominal pain 2025     Splenic vein thrombosis 2024     Leukocytosis 2024     Thrombocytopenia (HCC) 2024     Ovarian cyst 2024     Constipation 2024     Pancreatic pseudocyst 2024     Electrolyte abnormality 2024     Alcohol use disorder 2024     Hematemesis 2024     Necrotizing pancreatitis 2024     Hypomagnesemia 2024     Pancytopenia (HCC) 04/10/2024     Alcohol abuse 2024     Provoked seizures (HCC) 2024     Alcohol withdrawal seizure (HCC) 2024     Transaminitis 2024     Prolonged QT interval 2024     Essential hypertension 2024     Tobacco abuse 2024     Acute alcoholic pancreatitis 2023     Hepatic steatosis 2023     Peptic ulcer disease 2021     Alcohol withdrawal (HCC) 2020     Anxiety 2019     Depression 2019       LOS (days): 3  Geometric Mean LOS (GMLOS) (days): 3  Days to GMLOS:-0.1     OBJECTIVE:  Risk of Unplanned Readmission Score: 25.12         Current admission status: Inpatient   Preferred Pharmacy:   [facility]pharmacy #[id number] - [place] - [address]  [address]  [place]   Phone: [phone] Fax: [phone]    Primary Care Provider: PATRICIA Moran    Primary Insurance: BLUE CROSS  Secondary Insurance:     DISCHARGE DETAILS:                                          Other Referral/Resources/Interventions Provided:  Referral Comments: Pt continues to decline resources for ETOH treatment. JEREMI  understood. Provide phone numbers to treatment resources/[facility] in the event she changed her mind. Pt will have ride home.         Treatment Team Recommendation: Substance Abuse Treatment  Discharge Destination Plan:: Home

## 2025-04-02 NOTE — PLAN OF CARE
Problem: PAIN - ADULT  Goal: Verbalizes/displays adequate comfort level or baseline comfort level  Description: Interventions:- Encourage patient to monitor pain and request assistance- Assess pain using appropriate pain scale- Administer analgesics based on type and severity of pain and evaluate response- Implement non-pharmacological measures as appropriate and evaluate response- Consider cultural and social influences on pain and pain management- Notify physician/advanced practitioner if interventions unsuccessful or patient reports new pain  Outcome: Progressing     Problem: INFECTION - ADULT  Goal: Absence or prevention of progression during hospitalization  Description: INTERVENTIONS:- Assess and monitor for signs and symptoms of infection- Monitor lab/diagnostic results- Monitor all insertion sites, i.e. indwelling lines, tubes, and drains- Monitor endotracheal if appropriate and nasal secretions for changes in amount and color- [place] appropriate cooling/warming therapies per order- Administer medications as ordered- Instruct and encourage patient and family to use good hand hygiene technique- Identify and instruct in appropriate isolation precautions for identified infection/condition  Outcome: Progressing  Goal: Absence of fever/infection during neutropenic period  Description: INTERVENTIONS:- Monitor WBC  Outcome: Progressing     Problem: DISCHARGE PLANNING  Goal: Discharge to home or other facility with appropriate resources  Description: INTERVENTIONS:- Identify barriers to discharge w/patient and caregiver- Arrange for needed discharge resources and transportation as appropriate- Identify discharge learning needs (meds, wound care, etc.)- Arrange for interpretive services to assist at discharge as needed- Refer to Case Management Department for coordinating discharge planning if the patient needs post-hospital services based on physician/advanced practitioner order or complex needs related to  functional status, cognitive ability, or social support system  Outcome: Progressing     Problem: Nutrition/Hydration-ADULT  Goal: Nutrient/Hydration intake appropriate for improving, restoring or maintaining nutritional needs  Description: Monitor and assess patient's nutrition/hydration status for malnutrition. Collaborate with interdisciplinary team and initiate plan and interventions as ordered.  Monitor patient's weight and dietary intake as ordered or per policy. Utilize nutrition screening tool and intervene as necessary. Determine patient's food preferences and provide high-protein, high-caloric foods as appropriate. INTERVENTIONS:- Monitor oral intake, urinary output, labs, and treatment plans- Assess nutrition and hydration status and recommend course of action- Evaluate amount of meals eaten- Assist patient with eating if necessary - Allow adequate time for meals- Recommend/ encourage appropriate diets, oral nutritional supplements, and vitamin/mineral supplements- Order, calculate, and assess calorie counts as needed- Recommend, monitor, and adjust tube feedings and TPN/PPN based on assessed needs- Assess need for intravenous fluids- Provide specific nutrition/hydration education as appropriate- Include patient/family/caregiver in decisions related to nutrition  Monitor and assess patient's nutrition/hydration status for malnutrition. Collaborate with interdisciplinary team and initiate plan and interventions as ordered.  Monitor patient's weight and dietary intake as ordered or per policy. Utilize nutrition screening tool and intervene as necessary. Determine patient's food preferences and provide high-protein, high-caloric foods as appropriate. INTERVENTIONS:- Monitor oral intake, urinary output, labs, and treatment plans- Assess nutrition and hydration status and recommend course of action- Evaluate amount of meals eaten- Assist patient with eating if necessary - Allow adequate time for meals-  Recommend/ encourage appropriate diets, oral nutritional supplements, and vitamin/mineral supplements- Order, calculate, and assess calorie counts as needed- Recommend, monitor, and adjust tube feedings and TPN/PPN based on assessed needs- Assess need for intravenous fluids- Provide specific nutrition/hydration education as appropriate- Include patient/family/caregiver in decisions related to nutrition  Outcome: Progressing

## 2025-04-02 NOTE — ASSESSMENT & PLAN NOTE
Presented to the emergency department with epigastric/left upper quadrant pain  CT abdomen/pelvis: 1. Acute interstitial edematous pancreatitis. Redemonstrated multiple cystic lesions/collections, some of which are slightly larger than 3/6/2025, some smaller, and others similar.  2. Small volume ascites.  3. Similar left upper quadrant and mesenteric varices could be related to portal hypertension. Suggest correlation with liver function parameters and nonemergent ultrasound elastography.   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  Stent was removed in January  Advance diet to low-fat  Weaning of opioids  Toradol as needed

## 2025-04-02 NOTE — PROGRESS NOTES
Progress Note - Gastroenterology   Name: Kaykay Holland 34 y.o. 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 Lipase 285. Liver tests and creatinine have remained normal.  Mild leukocytosis, now resolved. NO fevers. CT shows acute interstitial edematous pancreatitis, multiple cystic lesions, some larger, some smaller from prior imaging, small volume ascites, LUQ and mesenteric varices, normal-appearing liver and spleen. Patient still complaining of abdominal pain although tolerating liquids.     -Continue supportive care and advance to low fat diet  -Wean opioids   -No need for antibiotics at this time, will continue monitoring for s/s of infection such as fevers, worsening leukocytosis, worsening pain  Alcohol use disorder  Patient quit drinking for 2 weeks since last hospitalization, then resumed due to stress surrounding a new job. Ethanol 177 on admission. Does have history of withdrawal seizures.       -We have strongly counseled on abstaining from alcohol and explained that she is at risk for developing chronic pancreatitis and even death from complications related to pancreatitis  -Recommended inpatient/outpatient alcohol rehab, however patient declining  -Course of Librium completed  -Continue CIWA protocol  -Continue folic acid and thiamine  Tobacco abuse  Counseled on tobacco cessation    Moderate protein-calorie malnutrition (HCC)  Malnutrition Findings:   Adult Malnutrition type: Acute illness  Adult Degree of Malnutrition: Malnutrition of moderate degree  Malnutrition Characteristics: Fat loss, Weight loss                  360 Statement: moderate malnutrition  in the setting of chronic alcohol use as evidenced by:  moderate body fat depletion- somewhat hollow orbital area;  moderate muscle mass depletion- somewhat depressed temporal area, visible clavicle;  significant 8 lb (6.6%) weight loss x 1 month.  treated with diet and supplement recommendations for when able to advance diet.    BMI Findings:           Body mass index is 18.64 kg/m².       I have discussed the above management plan in detail with the primary service.     Subjective   Patient seen and examined at bedside.  She reports ongoing abdominal pain but is tolerating liquids and just advance to soft diet.  She had a bowel movement yesterday, none yet today.    Objective :  Temp:  [98 °F (36.7 °C)-98.6 °F (37 °C)] 98.1 °F (36.7 °C)  HR:  [72-88] 83  BP: (117-129)/(79-84) 117/81  Resp:  [16-18] 18  SpO2:  [95 %-97 %] 97 %  O2 Device: None (Room air)    Physical Exam  Vitals and nursing note reviewed.   Constitutional:       General: She is not in acute distress.     Appearance: She is well-developed.   HENT:      Head: Normocephalic and atraumatic.   Eyes:      Conjunctiva/sclera: Conjunctivae normal.   Cardiovascular:      Rate and Rhythm: Normal rate and regular rhythm.      Heart sounds: No murmur heard.  Pulmonary:      Effort: Pulmonary effort is normal. No respiratory distress.      Breath sounds: Normal breath sounds.   Abdominal:      Palpations: Abdomen is soft.      Tenderness: There is abdominal tenderness.   Musculoskeletal:         General: No swelling.      Cervical back: Neck supple.   Skin:     General: Skin is warm and dry.   Neurological:      Mental Status: She is alert.   Psychiatric:         Mood and Affect: Mood normal.           Lab Results: I have reviewed the following results:CBC/BMP:   .     04/02/25  0354   SODIUM 138   K 3.9      CO2 30   BUN 4*   CREATININE 0.39*   GLUC 85    , Creatinine Clearance: Estimated Creatinine Clearance: 164.5 mL/min (A) (by C-G formula based on  SCr of 0.39 mg/dL (L))., LFTs: No new results in last 24 hours.     Imaging Results Review: I reviewed radiology reports from this admission including: CT abdomen/pelvis.

## 2025-04-02 NOTE — PLAN OF CARE
Problem: INFECTION - ADULT  Goal: Absence or prevention of progression during hospitalization  Description: INTERVENTIONS:- Assess and monitor for signs and symptoms of infection- Monitor lab/diagnostic results- Monitor all insertion sites, i.e. indwelling lines, tubes, and drains- Monitor endotracheal if appropriate and nasal secretions for changes in amount and color- Winn appropriate cooling/warming therapies per order- Administer medications as ordered- Instruct and encourage patient and family to use good hand hygiene technique- Identify and instruct in appropriate isolation precautions for identified infection/condition  Outcome: Progressing     Problem: SAFETY ADULT  Goal: Patient will remain free of falls  Description: INTERVENTIONS:- Educate patient/family on patient safety including physical limitations- Instruct patient to call for assistance with activity - Consult OT/PT to assist with strengthening/mobility - Keep Call bell within reach- Keep bed low and locked with side rails adjusted as appropriate- Keep care items and personal belongings within reach- Initiate and maintain comfort rounds- Make Fall Risk Sign visible to staff- Offer Toileting every 2 Hours, in advance of need- Outcome: Progressing     Problem: DISCHARGE PLANNING  Goal: Discharge to home or other facility with appropriate resources  Description: INTERVENTIONS:- Identify barriers to discharge w/patient and caregiver- Arrange for needed discharge resources and transportation as appropriate- Identify discharge learning needs (meds, wound care, etc.)- Arrange for interpretive services to assist at discharge as needed- Refer to Case Management Department for coordinating discharge planning if the patient needs post-hospital services based on physician/advanced practitioner order or complex needs related to functional status, cognitive ability, or social support system  Outcome: Progressing     Problem:  Nutrition/Hydration-ADULT  Goal: Nutrient/Hydration intake appropriate for improving, restoring or maintaining nutritional needs  Description: Monitor and assess patient's nutrition/hydration status for malnutrition. Collaborate with interdisciplinary team and initiate plan and interventions as ordered.  Monitor patient's weight and dietary intake as ordered or per policy. Utilize nutrition screening tool and intervene as necessary. Determine patient's food preferences and provide high-protein, high-caloric foods as appropriate. INTERVENTIONS:- Monitor oral intake, urinary output, labs, and treatment plans- Assess nutrition and hydration status and recommend course of action- Evaluate amount of meals eaten- Assist patient with eating if necessary - Allow adequate time for meals- Recommend/ encourage appropriate diets, oral nutritional supplements, and vitamin/mineral supplements- Order, calculate, and assess calorie counts as needed- Recommend, monitor, and adjust tube feedings and TPN/PPN based on assessed needs- Assess need for intravenous fluids- Provide specific nutrition/hydration education as appropriate- Include patient/family/caregiver in decisions related to nutrition  Monitor and assess patient's nutrition/hydration status for malnutrition. Collaborate with interdisciplinary team and initiate plan and interventions as ordered.  Monitor patient's weight and dietary intake as ordered or per policy. Utilize nutrition screening tool and intervene as necessary. Determine patient's food preferences and provide high-protein, high-caloric foods as appropriate. INTERVENTIONS:- Monitor oral intake, urinary output, labs, and treatment plans- Assess nutrition and hydration status and recommend course of action- Evaluate amount of meals eaten- Assist patient with eating if necessary - Allow adequate time for meals- Recommend/ encourage appropriate diets, oral nutritional supplements, and vitamin/mineral supplements-  Order, calculate, and assess calorie counts as needed- Recommend, monitor, and adjust tube feedings and TPN/PPN based on assessed needs- Assess need for intravenous fluids- Provide specific nutrition/hydration education as appropriate- Include patient/family/caregiver in decisions related to nutrition  Outcome: Progressing

## 2025-04-03 VITALS
RESPIRATION RATE: 18 BRPM | OXYGEN SATURATION: 97 % | DIASTOLIC BLOOD PRESSURE: 69 MMHG | SYSTOLIC BLOOD PRESSURE: 102 MMHG | BODY MASS INDEX: 18.66 KG/M2 | HEART RATE: 96 BPM | HEIGHT: 65 IN | WEIGHT: 112 LBS | TEMPERATURE: 97.5 F

## 2025-04-03 PROCEDURE — 99232 SBSQ HOSP IP/OBS MODERATE 35: CPT | Performed by: STUDENT IN AN ORGANIZED HEALTH CARE EDUCATION/TRAINING PROGRAM

## 2025-04-03 PROCEDURE — 99239 HOSP IP/OBS DSCHRG MGMT >30: CPT | Performed by: INTERNAL MEDICINE

## 2025-04-03 RX ORDER — ONDANSETRON 4 MG/1
4 TABLET, ORALLY DISINTEGRATING ORAL EVERY 8 HOURS PRN
Qty: 20 TABLET | Refills: 0 | Status: SHIPPED | OUTPATIENT
Start: 2025-04-03

## 2025-04-03 RX ORDER — OXYCODONE HYDROCHLORIDE 5 MG/1
5 TABLET ORAL EVERY 4 HOURS PRN
Qty: 5 TABLET | Refills: 0 | Status: SHIPPED | OUTPATIENT
Start: 2025-04-03

## 2025-04-03 RX ADMIN — OXYCODONE HYDROCHLORIDE 10 MG: 10 TABLET ORAL at 02:07

## 2025-04-03 RX ADMIN — ONDANSETRON 4 MG: 2 INJECTION INTRAMUSCULAR; INTRAVENOUS at 12:31

## 2025-04-03 RX ADMIN — HYDROMORPHONE HYDROCHLORIDE 0.2 MG: 0.2 INJECTION, SOLUTION INTRAMUSCULAR; INTRAVENOUS; SUBCUTANEOUS at 07:39

## 2025-04-03 RX ADMIN — FOLIC ACID 1 MG: 1 TABLET ORAL at 09:56

## 2025-04-03 RX ADMIN — MULTIPLE VITAMINS W/ MINERALS TAB 1 TABLET: TAB ORAL at 09:56

## 2025-04-03 RX ADMIN — PANTOPRAZOLE SODIUM 40 MG: 40 INJECTION, POWDER, FOR SOLUTION INTRAVENOUS at 09:56

## 2025-04-03 RX ADMIN — NICOTINE 21 MG: 21 PATCH, EXTENDED RELEASE TRANSDERMAL at 09:58

## 2025-04-03 RX ADMIN — OXYCODONE HYDROCHLORIDE 10 MG: 10 TABLET ORAL at 12:32

## 2025-04-03 RX ADMIN — Medication 100 MG: at 09:56

## 2025-04-03 RX ADMIN — HYDROXYZINE HYDROCHLORIDE 25 MG: 25 TABLET, FILM COATED ORAL at 10:04

## 2025-04-03 NOTE — ASSESSMENT & PLAN NOTE
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 CT shows acute interstitial edematous pancreatitis, multiple cystic lesions, some larger, some smaller from prior imaging, small volume ascites. No evidence of infection. Labs and vital signs stable. Abdominal pain slowly improving, currently tolerating low-fat diet.      -Continue supportive care and low-fat diet  -Wean opioids   -GI will sign off, please call with questions or concerns

## 2025-04-03 NOTE — PLAN OF CARE
Problem: PAIN - ADULT  Goal: Verbalizes/displays adequate comfort level or baseline comfort level  Description: Interventions:- Encourage patient to monitor pain and request assistance- Assess pain using appropriate pain scale- Administer analgesics based on type and severity of pain and evaluate response- Implement non-pharmacological measures as appropriate and evaluate response- Consider cultural and social influences on pain and pain management- Notify physician/advanced practitioner if interventions unsuccessful or patient reports new pain  Outcome: Progressing     Problem: INFECTION - ADULT  Goal: Absence or prevention of progression during hospitalization  Description: INTERVENTIONS:- Assess and monitor for signs and symptoms of infection- Monitor lab/diagnostic results- Monitor all insertion sites, i.e. indwelling lines, tubes, and drains- Monitor endotracheal if appropriate and nasal secretions for changes in amount and color- Hugo appropriate cooling/warming therapies per order- Administer medications as ordered- Instruct and encourage patient and family to use good hand hygiene technique- Identify and instruct in appropriate isolation precautions for identified infection/condition  Outcome: Progressing  Goal: Absence of fever/infection during neutropenic period  Description: INTERVENTIONS:- Monitor WBC  Outcome: Progressing     Problem: DISCHARGE PLANNING  Goal: Discharge to home or other facility with appropriate resources  Description: INTERVENTIONS:- Identify barriers to discharge w/patient and caregiver- Arrange for needed discharge resources and transportation as appropriate- Identify discharge learning needs (meds, wound care, etc.)- Arrange for interpretive services to assist at discharge as needed- Refer to Case Management Department for coordinating discharge planning if the patient needs post-hospital services based on physician/advanced practitioner order or complex needs related to  functional status, cognitive ability, or social support system  Outcome: Progressing     Problem: Knowledge Deficit  Goal: Patient/family/caregiver demonstrates understanding of disease process, treatment plan, medications, and discharge instructions  Description: Complete learning assessment and assess knowledge base.Interventions:- Provide teaching at level of understanding- Provide teaching via preferred learning methods  Outcome: Progressing     Problem: Nutrition/Hydration-ADULT  Goal: Nutrient/Hydration intake appropriate for improving, restoring or maintaining nutritional needs  Description: Monitor and assess patient's nutrition/hydration status for malnutrition. Collaborate with interdisciplinary team and initiate plan and interventions as ordered.  Monitor patient's weight and dietary intake as ordered or per policy. Utilize nutrition screening tool and intervene as necessary. Determine patient's food preferences and provide high-protein, high-caloric foods as appropriate. INTERVENTIONS:- Monitor oral intake, urinary output, labs, and treatment plans- Assess nutrition and hydration status and recommend course of action- Evaluate amount of meals eaten- Assist patient with eating if necessary - Allow adequate time for meals- Recommend/ encourage appropriate diets, oral nutritional supplements, and vitamin/mineral supplements- Order, calculate, and assess calorie counts as needed- Recommend, monitor, and adjust tube feedings and TPN/PPN based on assessed needs- Assess need for intravenous fluids- Provide specific nutrition/hydration education as appropriate- Include patient/family/caregiver in decisions related to nutrition  Monitor and assess patient's nutrition/hydration status for malnutrition. Collaborate with interdisciplinary team and initiate plan and interventions as ordered.  Monitor patient's weight and dietary intake as ordered or per policy. Utilize nutrition screening tool and intervene as  necessary. Determine patient's food preferences and provide high-protein, high-caloric foods as appropriate. INTERVENTIONS:- Monitor oral intake, urinary output, labs, and treatment plans- Assess nutrition and hydration status and recommend course of action- Evaluate amount of meals eaten- Assist patient with eating if necessary - Allow adequate time for meals- Recommend/ encourage appropriate diets, oral nutritional supplements, and vitamin/mineral supplements- Order, calculate, and assess calorie counts as needed- Recommend, monitor, and adjust tube feedings and TPN/PPN based on assessed needs- Assess need for intravenous fluids- Provide specific nutrition/hydration education as appropriate- Include patient/family/caregiver in decisions related to nutrition  Outcome: Progressing

## 2025-04-03 NOTE — ASSESSMENT & PLAN NOTE
Presented to the emergency department with epigastric/left upper quadrant pain  CT abdomen/pelvis: 1. Acute interstitial edematous pancreatitis. Redemonstrated multiple cystic lesions/collections, some of which are slightly larger than 3/6/2025, some smaller, and others similar.  2. Small volume ascites.  3. Similar left upper quadrant and mesenteric varices could be related to portal hypertension. Suggest correlation with liver function parameters and nonemergent ultrasound elastography.   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  Stent was removed in January  Alcohol cessation counseling done  Prescription provided for pain  Low-fat diet advised

## 2025-04-03 NOTE — ASSESSMENT & PLAN NOTE
Patient quit drinking for 2 weeks since last hospitalization, then resumed due to stress surrounding a new job. Ethanol 177 on admission. Does have history of withdrawal seizures.       -We have strongly counseled on abstaining from alcohol and explained that she is at risk for developing chronic pancreatitis and even death from complications related to pancreatitis  -Recommended inpatient/outpatient alcohol rehab, however patient declining  -Continue CIWA protocol  -Continue folic acid and thiamine

## 2025-04-03 NOTE — PROGRESS NOTES
Progress Note - Gastroenterology   Name: Kaykay Holland 34 y.o. 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 CT shows acute interstitial edematous pancreatitis, multiple cystic lesions, some larger, some smaller from prior imaging, small volume ascites. No evidence of infection. Labs and vital signs stable. Abdominal pain slowly improving, currently tolerating low-fat diet.      -Continue supportive care and low-fat diet  -Wean opioids   -GI will sign off, please call with questions or concerns  Alcohol use disorder  Patient quit drinking for 2 weeks since last hospitalization, then resumed due to stress surrounding a new job. Ethanol 177 on admission. Does have history of withdrawal seizures.       -We have strongly counseled on abstaining from alcohol and explained that she is at risk for developing chronic pancreatitis and even death from complications related to pancreatitis  -Recommended inpatient/outpatient alcohol rehab, however patient declining  -Continue CIWA protocol  -Continue folic acid and thiamine  Tobacco abuse  Counseled on tobacco cessation    Moderate protein-calorie malnutrition (HCC)  Malnutrition Findings:   Adult Malnutrition type: Acute illness  Adult Degree of Malnutrition: Malnutrition of moderate degree  Malnutrition Characteristics: Fat loss, Weight loss                  360 Statement: moderate malnutrition in the setting of chronic alcohol use as evidenced by:  moderate body fat depletion- somewhat hollow orbital area;  moderate muscle mass depletion- somewhat depressed temporal area, visible clavicle;  significant 8 lb (6.6%) weight loss x 1  month.  treated with diet and supplement recommendations for when able to advance diet.    BMI Findings:           Body mass index is 18.64 kg/m².       I have discussed the above management plan in detail with the primary service.     Subjective   Patient has continued abdominal pain but is tolerating low-fat diet and appears comfortable lying in bed.    Objective :  Temp:  [97.5 °F (36.4 °C)-98.2 °F (36.8 °C)] 97.5 °F (36.4 °C)  HR:  [76-96] 96  BP: (102-127)/(67-81) 102/69  Resp:  [17-18] 18  SpO2:  [95 %-97 %] 97 %  O2 Device: None (Room air)    Physical Exam  Vitals and nursing note reviewed.   Constitutional:       General: She is not in acute distress.     Appearance: She is well-developed.   HENT:      Head: Normocephalic and atraumatic.   Eyes:      Conjunctiva/sclera: Conjunctivae normal.   Cardiovascular:      Rate and Rhythm: Normal rate and regular rhythm.      Heart sounds: No murmur heard.  Pulmonary:      Effort: Pulmonary effort is normal. No respiratory distress.      Breath sounds: Normal breath sounds.   Abdominal:      Palpations: Abdomen is soft.      Tenderness: There is abdominal tenderness.   Musculoskeletal:         General: No swelling.      Cervical back: Neck supple.   Skin:     General: Skin is warm and dry.   Neurological:      Mental Status: She is alert.   Psychiatric:         Mood and Affect: Mood normal.           Lab Results: I have reviewed the following results:CBC/BMP: No new results in last 24 hours. , Creatinine Clearance: Estimated Creatinine Clearance: 164.5 mL/min (A) (by C-G formula based on SCr of 0.39 mg/dL (L))., LFTs: No new results in last 24 hours. , PTT/INR:No new results in last 24 hours. , Troponin,BNP:No new results in last 24 hours.     Imaging Results Review: I reviewed radiology reports from this admission including: CT abdomen/pelvis.  Other Study Results Review: No additional pertinent studies reviewed.

## 2025-04-03 NOTE — DISCHARGE SUMMARY
Discharge Summary - Hospitalist   Name: Kaykay Holland 34 y.o. female I MRN: [id number]  Unit/Bed#: -01 I Date of Admission: 3/30/2025   Date of Service: 4/3/2025 I Hospital Day: 4     Assessment & Plan  Acute alcoholic pancreatitis  Presented to the emergency department with epigastric/left upper quadrant pain  CT abdomen/pelvis: 1. Acute interstitial edematous pancreatitis. Redemonstrated multiple cystic lesions/collections, some of which are slightly larger than 3/6/2025, some smaller, and others similar.  2. Small volume ascites.  3. Similar left upper quadrant and mesenteric varices could be related to portal hypertension. Suggest correlation with liver function parameters and nonemergent ultrasound elastography.   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  Stent was removed in January  Alcohol cessation counseling done  Prescription provided for pain  Low-fat diet advised  Alcohol use disorder  Patient with significant and currently active alcohol use  Alcohol cessation counseling done  Thiamine folate, multivitamin  Tobacco abuse  Continue nicotine patch   cessation  Peptic ulcer disease  Protonix p.o.  Hypomagnesemia  Magnesium 1.3 on admission  S/p repletion  Continue to trend  Moderate protein-calorie malnutrition (HCC)  Malnutrition Findings:   Adult Malnutrition type: Acute illness  Adult Degree of Malnutrition: Malnutrition of moderate degree  Malnutrition Characteristics: Fat loss, Weight loss                  360 Statement: moderate malnutrition in the setting of chronic alcohol use as evidenced by:  moderate body fat depletion- somewhat hollow orbital area;  moderate muscle mass depletion- somewhat depressed temporal area, visible clavicle;  significant 8 lb (6.6%) weight loss x 1 month.  treated with diet and supplement recommendations for when able to  advance diet.    BMI Findings:           Body mass index is 18.64 kg/m².     Hyponatremia  And had one-time abnormality of sodium 132  Likely secondary to alcohol use/poor solute intake  Patient was treated with IV hydration for being n.p.o. not  for hyponatremia as per CDI query  REsolved     Medical Problems       Resolved Problems  Date Reviewed: 3/30/2025   None       Discharging Physician / Practitioner: Ralph Marshall MD  PCP: PATRICIA Moran  Admission Date:   Admission Orders (From admission, onward)       Ordered        03/30/25 1517  INPATIENT ADMISSION  Once                          Discharge Date: 04/03/25    Consultations During Hospital Stay:  GI      Significant Findings / Test Results:   CT abdomen pelvis with contrast  Result Date: 3/30/2025  Impression: 1. Acute interstitial edematous pancreatitis. Redemonstrated multiple cystic lesions/collections, some of which are slightly larger than 3/6/2025, some smaller, and others similar. 2. Small volume ascites. 3. Similar left upper quadrant and mesenteric varices could be related to portal hypertension. Suggest correlation with liver function parameters and nonemergent ultrasound elastography. The study was marked in EPIC for immediate notification. Workstation performed: [id number]     Complications:  none    Reason for Admission:   Chief Complaint   Patient presents with    Abdominal Pain     Started on Monday with pain, hx of pancreatitis and thinks its another flare; no meds for pain today        Hospital Course:   Kaykay Holland is a 34 y.o. female patient who originally presented to the hospital on 3/30/2025 due to abdominal pain.  Patient was noted to have pancreatitis with elevated lipase, changes noted on imaging.  Patient treated with IV hydration, bowel rest, pain management,  gradually advanced her diet.  Patient today stable for discharge.    Patient was counseled multiple times during hospitalization regarding alcohol cessation.   "Patient declined rehab, wanted to be discharged home      Please see above list of diagnoses and related plan for additional information.     Condition at Discharge: stable    Discharge Day Visit / Exam:   Subjective: Stated able to tolerate low-fat diet  Vitals: Blood Pressure: 102/69 (04/03/25 0733)  Pulse: 96 (04/02/25 2159)  Temperature: 97.5 °F (36.4 °C) (04/03/25 0733)  Temp Source: Oral (04/03/25 0733)  Respirations: 18 (04/03/25 0733)  Height: 5' 5\" (165.1 cm) (03/30/25 1549)  Weight - Scale: 50.8 kg (112 lb) (03/30/25 1549)  SpO2: 97 % (04/02/25 2159)  Physical Exam  Vitals and nursing note reviewed.   Constitutional:       General: She is not in acute distress.     Appearance: She is well-developed.   HENT:      Head: Normocephalic and atraumatic.   Eyes:      Conjunctiva/sclera: Conjunctivae normal.   Cardiovascular:      Rate and Rhythm: Normal rate and regular rhythm.      Heart sounds: No murmur heard.  Pulmonary:      Effort: Pulmonary effort is normal. No respiratory distress.      Breath sounds: Normal breath sounds.   Abdominal:      Palpations: Abdomen is soft.      Tenderness: There is abdominal tenderness (Mild, improved compared to yesterday).   Musculoskeletal:         General: No swelling.      Cervical back: Neck supple.   Skin:     General: Skin is warm and dry.      Capillary Refill: Capillary refill takes less than 2 seconds.   Neurological:      Mental Status: She is alert.   Psychiatric:         Mood and Affect: Mood normal.          Discussion with Family: Patient declined call to .     Discharge instructions/Information to patient and family:   See after visit summary for information provided to patient and family.      Provisions for Follow-Up Care:  See after visit summary for information related to follow-up care and any pertinent home health orders.      Mobility at time of Discharge:   Basic Mobility Inpatient Raw Score: 24  -HLM Goal: 8: Walk 250 feet or " more  JH-HLM Achieved: 8: Walk 250 feet ot more  HLM Goal achieved. Continue to encourage appropriate mobility.     Disposition:   Home    Planned Readmission: none    Discharge Medications:  See after visit summary for reconciled discharge medications provided to patient and/or family.      Administrative Statements   Discharge Statement:  I have spent a total time of 33 minutes in caring for this patient on the day of the visit/encounter. >30 minutes of time was spent on: Diagnostic results, Impressions, Counseling / Coordination of care, Documenting in the medical record, Reviewing / ordering tests, medicine, procedures  , and Communicating with other healthcare professionals .    **Please Note: This note may have been constructed using a voice recognition system**

## 2025-04-03 NOTE — ASSESSMENT & PLAN NOTE
Patient with significant and currently active alcohol use  Alcohol cessation counseling done  Thiamine folate, multivitamin

## 2025-04-04 ENCOUNTER — TRANSITIONAL CARE MANAGEMENT (OUTPATIENT)
Dept: FAMILY MEDICINE CLINIC | Facility: HOSPITAL | Age: 35
End: 2025-04-04

## 2025-04-04 NOTE — UTILIZATION REVIEW
NOTIFICATION OF ADMISSION DISCHARGE   This is a Notification of Discharge from [facility]. Please be advised that this patient has been discharge from our facility. Below you will find the admission and discharge date and time including the patient’s disposition.   UTILIZATION REVIEW CONTACT:  Utilization Review Assistants  Network Utilization Review Department  Phone: [phone] x carefully listen to the prompts. All voicemails are confidential.  Email: [email or web address]     ADMISSION INFORMATION  PRESENTATION DATE: 3/30/2025 12:34 PM  OBERVATION ADMISSION DATE: N/A  INPATIENT ADMISSION DATE: 3/30/25  3:17 PM   DISCHARGE DATE: 4/3/2025  3:31 PM   DISPOSITION:Home/Self Care    Network Utilization Review Department  ATTENTION: Please call with any questions or concerns to [phone] and carefully listen to the prompts so that you are directed to the right person. All voicemails are confidential.   For Discharge needs, contact Care Management DC Support Team at [phone] opt. 2  Send all requests for admission clinical reviews, approved or denied determinations and any other requests to dedicated fax number below belonging to the campus where the patient is receiving treatment. List of dedicated fax numbers for the Facilities:  FACILITY NAME UR FAX NUMBER   ADMISSION DENIALS (Administrative/Medical Necessity) [phone]   DISCHARGE SUPPORT TEAM ([facility]) [phone]   PARENT CHILD HEALTH (Maternity/NICU/Pediatrics) [phone]   [facility] [phone]   [facility] [phone]   [facility] [phone]   [facility] [phone]   [facility] [phone]   [facility] [phone]   [facility] [phone]   [facility] [phone]   [facility]  [facility] [phone]   [facility] [phone]   [facility] [phone]   [facility] [phone]

## 2025-05-09 NOTE — ASSESSMENT & PLAN NOTE
Reported history of GERD on pantoprazole 40 mg twice daily at home.  Patient remains nauseous 12/22    Plan  Continue Protonix 40 mg IV every 12 hours.  Switch when appropriate.   81

## 2025-05-15 PROCEDURE — 99284 EMERGENCY DEPT VISIT MOD MDM: CPT

## 2025-05-16 ENCOUNTER — APPOINTMENT (EMERGENCY)
Dept: CT IMAGING | Facility: HOSPITAL | Age: 35
DRG: 282 | End: 2025-05-16
Payer: COMMERCIAL

## 2025-05-16 ENCOUNTER — HOSPITAL ENCOUNTER (INPATIENT)
Facility: HOSPITAL | Age: 35
LOS: 4 days | Discharge: HOME | DRG: 282 | End: 2025-05-20
Attending: EMERGENCY MEDICINE | Admitting: INTERNAL MEDICINE
Payer: COMMERCIAL

## 2025-05-16 DIAGNOSIS — K29.70 GASTRITIS: ICD-10-CM

## 2025-05-16 DIAGNOSIS — K85.90 ACUTE PANCREATITIS: ICD-10-CM

## 2025-05-16 DIAGNOSIS — K85.20 ALCOHOL-INDUCED ACUTE PANCREATITIS, UNSPECIFIED COMPLICATION STATUS: ICD-10-CM

## 2025-05-16 DIAGNOSIS — F10.929 ALCOHOL INTOXICATION (HCC): ICD-10-CM

## 2025-05-16 DIAGNOSIS — K86.3 PANCREATIC PSEUDOCYST: ICD-10-CM

## 2025-05-16 DIAGNOSIS — Z72.0 TOBACCO ABUSE: ICD-10-CM

## 2025-05-16 DIAGNOSIS — R10.9 ABDOMINAL PAIN: Primary | ICD-10-CM

## 2025-05-16 PROBLEM — F10.10 ALCOHOL ABUSE: Status: RESOLVED | Noted: 2024-04-09 | Resolved: 2025-05-16

## 2025-05-16 LAB
ALBUMIN SERPL BCG-MCNC: 3.4 G/DL (ref 3.5–5)
ALBUMIN SERPL BCG-MCNC: 4.1 G/DL (ref 3.5–5)
ALP SERPL-CCNC: 77 U/L (ref 34–104)
ALP SERPL-CCNC: 92 U/L (ref 34–104)
ALT SERPL W P-5'-P-CCNC: 16 U/L (ref 7–52)
ALT SERPL W P-5'-P-CCNC: 17 U/L (ref 7–52)
ANION GAP SERPL CALCULATED.3IONS-SCNC: 11 MMOL/L (ref 4–13)
ANION GAP SERPL CALCULATED.3IONS-SCNC: 12 MMOL/L (ref 4–13)
AST SERPL W P-5'-P-CCNC: 46 U/L (ref 13–39)
AST SERPL W P-5'-P-CCNC: 50 U/L (ref 13–39)
ATRIAL RATE: 63 BPM
B-HCG SERPL-ACNC: 1.1 MIU/ML (ref 0–5)
BACTERIA UR QL AUTO: NORMAL /HPF
BASOPHILS # BLD AUTO: 0.08 THOUSANDS/ÂΜL (ref 0–0.1)
BASOPHILS NFR BLD AUTO: 1 % (ref 0–1)
BILIRUB DIRECT SERPL-MCNC: 0.1 MG/DL (ref 0–0.2)
BILIRUB SERPL-MCNC: 0.38 MG/DL (ref 0.2–1)
BILIRUB SERPL-MCNC: 0.5 MG/DL (ref 0.2–1)
BILIRUB UR QL STRIP: NEGATIVE
BUN SERPL-MCNC: 6 MG/DL (ref 5–25)
BUN SERPL-MCNC: 7 MG/DL (ref 5–25)
CALCIUM SERPL-MCNC: 7.7 MG/DL (ref 8.4–10.2)
CALCIUM SERPL-MCNC: 8.7 MG/DL (ref 8.4–10.2)
CHLORIDE SERPL-SCNC: 105 MMOL/L (ref 96–108)
CHLORIDE SERPL-SCNC: 107 MMOL/L (ref 96–108)
CLARITY UR: CLEAR
CO2 SERPL-SCNC: 23 MMOL/L (ref 21–32)
CO2 SERPL-SCNC: 23 MMOL/L (ref 21–32)
COLOR UR: YELLOW
CREAT SERPL-MCNC: 0.32 MG/DL (ref 0.6–1.3)
CREAT SERPL-MCNC: 0.34 MG/DL (ref 0.6–1.3)
EOSINOPHIL # BLD AUTO: 0.13 THOUSAND/ÂΜL (ref 0–0.61)
EOSINOPHIL NFR BLD AUTO: 2 % (ref 0–6)
ERYTHROCYTE [DISTWIDTH] IN BLOOD BY AUTOMATED COUNT: 13.4 % (ref 11.6–15.1)
ETHANOL SERPL-MCNC: 408 MG/DL
EXT PREGNANCY TEST URINE: NEGATIVE
EXT. CONTROL: NORMAL
GFR SERPL CREATININE-BSD FRML MDRD: 143 ML/MIN/1.73SQ M
GFR SERPL CREATININE-BSD FRML MDRD: 146 ML/MIN/1.73SQ M
GLUCOSE SERPL-MCNC: 82 MG/DL (ref 65–140)
GLUCOSE SERPL-MCNC: 94 MG/DL (ref 65–140)
GLUCOSE UR STRIP-MCNC: NEGATIVE MG/DL
HCT VFR BLD AUTO: 41.7 % (ref 34.8–46.1)
HGB BLD-MCNC: 13.6 G/DL (ref 11.5–15.4)
HGB UR QL STRIP.AUTO: ABNORMAL
IMM GRANULOCYTES # BLD AUTO: 0.02 THOUSAND/UL (ref 0–0.2)
IMM GRANULOCYTES NFR BLD AUTO: 0 % (ref 0–2)
KETONES UR STRIP-MCNC: NEGATIVE MG/DL
LEUKOCYTE ESTERASE UR QL STRIP: NEGATIVE
LIPASE SERPL-CCNC: 124 U/L (ref 11–82)
LYMPHOCYTES # BLD AUTO: 3.76 THOUSANDS/ÂΜL (ref 0.6–4.47)
LYMPHOCYTES NFR BLD AUTO: 52 % (ref 14–44)
MAGNESIUM SERPL-MCNC: 1.4 MG/DL (ref 1.9–2.7)
MCH RBC QN AUTO: 32.8 PG (ref 26.8–34.3)
MCHC RBC AUTO-ENTMCNC: 32.6 G/DL (ref 31.4–37.4)
MCV RBC AUTO: 101 FL (ref 82–98)
MONOCYTES # BLD AUTO: 0.49 THOUSAND/ÂΜL (ref 0.17–1.22)
MONOCYTES NFR BLD AUTO: 7 % (ref 4–12)
MUCOUS THREADS UR QL AUTO: NORMAL
NEUTROPHILS # BLD AUTO: 2.69 THOUSANDS/ÂΜL (ref 1.85–7.62)
NEUTS SEG NFR BLD AUTO: 38 % (ref 43–75)
NITRITE UR QL STRIP: NEGATIVE
NON-SQ EPI CELLS URNS QL MICRO: NORMAL /HPF
NRBC BLD AUTO-RTO: 0 /100 WBCS
P AXIS: 65 DEGREES
PH UR STRIP.AUTO: 5.5 [PH]
PHOSPHATE SERPL-MCNC: 3.7 MG/DL (ref 2.7–4.5)
PLATELET # BLD AUTO: 241 THOUSANDS/UL (ref 149–390)
PMV BLD AUTO: 9.8 FL (ref 8.9–12.7)
POTASSIUM SERPL-SCNC: 4 MMOL/L (ref 3.5–5.3)
POTASSIUM SERPL-SCNC: 4.3 MMOL/L (ref 3.5–5.3)
PR INTERVAL: 140 MS
PROT SERPL-MCNC: 5.9 G/DL (ref 6.4–8.4)
PROT SERPL-MCNC: 7.1 G/DL (ref 6.4–8.4)
PROT UR STRIP-MCNC: ABNORMAL MG/DL
QRS AXIS: 56 DEGREES
QRSD INTERVAL: 84 MS
QT INTERVAL: 456 MS
QTC INTERVAL: 466 MS
RBC # BLD AUTO: 4.15 MILLION/UL (ref 3.81–5.12)
RBC #/AREA URNS AUTO: NORMAL /HPF
SODIUM SERPL-SCNC: 140 MMOL/L (ref 135–147)
SODIUM SERPL-SCNC: 141 MMOL/L (ref 135–147)
SP GR UR STRIP.AUTO: 1.01 (ref 1–1.03)
T WAVE AXIS: 23 DEGREES
UROBILINOGEN UR STRIP-ACNC: <2 MG/DL
VENTRICULAR RATE: 63 BPM
WBC # BLD AUTO: 7.17 THOUSAND/UL (ref 4.31–10.16)
WBC #/AREA URNS AUTO: NORMAL /HPF

## 2025-05-16 PROCEDURE — 99223 1ST HOSP IP/OBS HIGH 75: CPT | Performed by: INTERNAL MEDICINE

## 2025-05-16 PROCEDURE — 81001 URINALYSIS AUTO W/SCOPE: CPT

## 2025-05-16 PROCEDURE — 84100 ASSAY OF PHOSPHORUS: CPT | Performed by: PHYSICIAN ASSISTANT

## 2025-05-16 PROCEDURE — 99255 IP/OBS CONSLTJ NEW/EST HI 80: CPT | Performed by: PHYSICIAN ASSISTANT

## 2025-05-16 PROCEDURE — 74177 CT ABD & PELVIS W/CONTRAST: CPT

## 2025-05-16 PROCEDURE — 84702 CHORIONIC GONADOTROPIN TEST: CPT | Performed by: EMERGENCY MEDICINE

## 2025-05-16 PROCEDURE — 81025 URINE PREGNANCY TEST: CPT

## 2025-05-16 PROCEDURE — 80048 BASIC METABOLIC PNL TOTAL CA: CPT | Performed by: PHYSICIAN ASSISTANT

## 2025-05-16 PROCEDURE — 80076 HEPATIC FUNCTION PANEL: CPT | Performed by: PHYSICIAN ASSISTANT

## 2025-05-16 PROCEDURE — 85025 COMPLETE CBC W/AUTO DIFF WBC: CPT

## 2025-05-16 PROCEDURE — 80053 COMPREHEN METABOLIC PANEL: CPT

## 2025-05-16 PROCEDURE — 96375 TX/PRO/DX INJ NEW DRUG ADDON: CPT

## 2025-05-16 PROCEDURE — 96365 THER/PROPH/DIAG IV INF INIT: CPT

## 2025-05-16 PROCEDURE — 83690 ASSAY OF LIPASE: CPT

## 2025-05-16 PROCEDURE — 99254 IP/OBS CNSLTJ NEW/EST MOD 60: CPT | Performed by: STUDENT IN AN ORGANIZED HEALTH CARE EDUCATION/TRAINING PROGRAM

## 2025-05-16 PROCEDURE — 83735 ASSAY OF MAGNESIUM: CPT | Performed by: PHYSICIAN ASSISTANT

## 2025-05-16 PROCEDURE — 93010 ELECTROCARDIOGRAM REPORT: CPT | Performed by: INTERNAL MEDICINE

## 2025-05-16 PROCEDURE — 93005 ELECTROCARDIOGRAM TRACING: CPT

## 2025-05-16 PROCEDURE — 36415 COLL VENOUS BLD VENIPUNCTURE: CPT

## 2025-05-16 PROCEDURE — 96361 HYDRATE IV INFUSION ADD-ON: CPT

## 2025-05-16 PROCEDURE — 82077 ASSAY SPEC XCP UR&BREATH IA: CPT | Performed by: EMERGENCY MEDICINE

## 2025-05-16 RX ORDER — CHLORDIAZEPOXIDE HYDROCHLORIDE 25 MG/1
50 CAPSULE, GELATIN COATED ORAL EVERY 8 HOURS
Status: DISCONTINUED | OUTPATIENT
Start: 2025-05-17 | End: 2025-05-16

## 2025-05-16 RX ORDER — HYDROMORPHONE HCL IN WATER/PF 6 MG/30 ML
0.2 PATIENT CONTROLLED ANALGESIA SYRINGE INTRAVENOUS EVERY 4 HOURS PRN
Status: DISCONTINUED | OUTPATIENT
Start: 2025-05-16 | End: 2025-05-19

## 2025-05-16 RX ORDER — MAGNESIUM SULFATE HEPTAHYDRATE 40 MG/ML
2 INJECTION, SOLUTION INTRAVENOUS ONCE
Status: COMPLETED | OUTPATIENT
Start: 2025-05-16 | End: 2025-05-16

## 2025-05-16 RX ORDER — LORAZEPAM 2 MG/ML
2 INJECTION INTRAMUSCULAR ONCE
Status: COMPLETED | OUTPATIENT
Start: 2025-05-16 | End: 2025-05-16

## 2025-05-16 RX ORDER — LORAZEPAM 2 MG/ML
2 INJECTION INTRAMUSCULAR ONCE
Status: DISCONTINUED | OUTPATIENT
Start: 2025-05-16 | End: 2025-05-16

## 2025-05-16 RX ORDER — DIAZEPAM 5 MG/1
10 TABLET ORAL EVERY 4 HOURS PRN
Status: DISCONTINUED | OUTPATIENT
Start: 2025-05-16 | End: 2025-05-16

## 2025-05-16 RX ORDER — HYDROMORPHONE HCL/PF 1 MG/ML
0.5 SYRINGE (ML) INJECTION ONCE
Refills: 0 | Status: COMPLETED | OUTPATIENT
Start: 2025-05-16 | End: 2025-05-16

## 2025-05-16 RX ORDER — ACETAMINOPHEN 10 MG/ML
1000 INJECTION, SOLUTION INTRAVENOUS ONCE
Status: COMPLETED | OUTPATIENT
Start: 2025-05-16 | End: 2025-05-16

## 2025-05-16 RX ORDER — FOLIC ACID 1 MG/1
1 TABLET ORAL DAILY
Status: DISCONTINUED | OUTPATIENT
Start: 2025-05-16 | End: 2025-05-20 | Stop reason: HOSPADM

## 2025-05-16 RX ORDER — DIAZEPAM 10 MG/2ML
20 INJECTION, SOLUTION INTRAMUSCULAR; INTRAVENOUS ONCE
Status: DISCONTINUED | OUTPATIENT
Start: 2025-05-16 | End: 2025-05-16

## 2025-05-16 RX ORDER — HYDROMORPHONE HCL/PF 1 MG/ML
0.5 SYRINGE (ML) INJECTION EVERY 4 HOURS PRN
Status: DISCONTINUED | OUTPATIENT
Start: 2025-05-16 | End: 2025-05-19

## 2025-05-16 RX ORDER — CHLORDIAZEPOXIDE HYDROCHLORIDE 25 MG/1
50 CAPSULE, GELATIN COATED ORAL
Status: DISCONTINUED | OUTPATIENT
Start: 2025-05-19 | End: 2025-05-16

## 2025-05-16 RX ORDER — ONDANSETRON 2 MG/ML
4 INJECTION INTRAMUSCULAR; INTRAVENOUS EVERY 6 HOURS PRN
Status: DISCONTINUED | OUTPATIENT
Start: 2025-05-16 | End: 2025-05-20 | Stop reason: HOSPADM

## 2025-05-16 RX ORDER — CHLORDIAZEPOXIDE HYDROCHLORIDE 25 MG/1
50 CAPSULE, GELATIN COATED ORAL EVERY 12 HOURS
Status: DISCONTINUED | OUTPATIENT
Start: 2025-05-18 | End: 2025-05-16

## 2025-05-16 RX ORDER — NICOTINE 21 MG/24HR
14 PATCH, TRANSDERMAL 24 HOURS TRANSDERMAL DAILY
Status: DISCONTINUED | OUTPATIENT
Start: 2025-05-16 | End: 2025-05-20 | Stop reason: HOSPADM

## 2025-05-16 RX ORDER — DIAZEPAM 10 MG/2ML
10 INJECTION, SOLUTION INTRAMUSCULAR; INTRAVENOUS ONCE
Status: COMPLETED | OUTPATIENT
Start: 2025-05-16 | End: 2025-05-16

## 2025-05-16 RX ORDER — LANOLIN ALCOHOL/MO/W.PET/CERES
100 CREAM (GRAM) TOPICAL DAILY
Status: DISCONTINUED | OUTPATIENT
Start: 2025-05-16 | End: 2025-05-20 | Stop reason: HOSPADM

## 2025-05-16 RX ORDER — CHLORDIAZEPOXIDE HYDROCHLORIDE 25 MG/1
50 CAPSULE, GELATIN COATED ORAL EVERY 6 HOURS
Status: DISCONTINUED | OUTPATIENT
Start: 2025-05-16 | End: 2025-05-16

## 2025-05-16 RX ORDER — SODIUM CHLORIDE, SODIUM GLUCONATE, SODIUM ACETATE, POTASSIUM CHLORIDE, MAGNESIUM CHLORIDE, SODIUM PHOSPHATE, DIBASIC, AND POTASSIUM PHOSPHATE .53; .5; .37; .037; .03; .012; .00082 G/100ML; G/100ML; G/100ML; G/100ML; G/100ML; G/100ML; G/100ML
150 INJECTION, SOLUTION INTRAVENOUS CONTINUOUS
Status: DISCONTINUED | OUTPATIENT
Start: 2025-05-16 | End: 2025-05-16

## 2025-05-16 RX ORDER — MORPHINE SULFATE 4 MG/ML
4 INJECTION, SOLUTION INTRAMUSCULAR; INTRAVENOUS ONCE
Status: COMPLETED | OUTPATIENT
Start: 2025-05-16 | End: 2025-05-16

## 2025-05-16 RX ORDER — SODIUM CHLORIDE, SODIUM GLUCONATE, SODIUM ACETATE, POTASSIUM CHLORIDE, MAGNESIUM CHLORIDE, SODIUM PHOSPHATE, DIBASIC, AND POTASSIUM PHOSPHATE .53; .5; .37; .037; .03; .012; .00082 G/100ML; G/100ML; G/100ML; G/100ML; G/100ML; G/100ML; G/100ML
125 INJECTION, SOLUTION INTRAVENOUS CONTINUOUS
Status: DISCONTINUED | OUTPATIENT
Start: 2025-05-16 | End: 2025-05-17

## 2025-05-16 RX ORDER — SODIUM CHLORIDE, SODIUM GLUCONATE, SODIUM ACETATE, POTASSIUM CHLORIDE, MAGNESIUM CHLORIDE, SODIUM PHOSPHATE, DIBASIC, AND POTASSIUM PHOSPHATE .53; .5; .37; .037; .03; .012; .00082 G/100ML; G/100ML; G/100ML; G/100ML; G/100ML; G/100ML; G/100ML
200 INJECTION, SOLUTION INTRAVENOUS CONTINUOUS
Status: DISPENSED | OUTPATIENT
Start: 2025-05-16 | End: 2025-05-16

## 2025-05-16 RX ORDER — CHLORHEXIDINE GLUCONATE ORAL RINSE 1.2 MG/ML
15 SOLUTION DENTAL EVERY 12 HOURS SCHEDULED
Status: DISCONTINUED | OUTPATIENT
Start: 2025-05-16 | End: 2025-05-18

## 2025-05-16 RX ORDER — DIAZEPAM 10 MG/2ML
10 INJECTION, SOLUTION INTRAMUSCULAR; INTRAVENOUS
Status: DISCONTINUED | OUTPATIENT
Start: 2025-05-16 | End: 2025-05-16

## 2025-05-16 RX ORDER — PANTOPRAZOLE SODIUM 40 MG/10ML
40 INJECTION, POWDER, LYOPHILIZED, FOR SOLUTION INTRAVENOUS ONCE
Status: COMPLETED | OUTPATIENT
Start: 2025-05-16 | End: 2025-05-16

## 2025-05-16 RX ADMIN — ONDANSETRON 4 MG: 2 INJECTION INTRAMUSCULAR; INTRAVENOUS at 12:34

## 2025-05-16 RX ADMIN — SODIUM CHLORIDE 1000 ML: 0.9 INJECTION, SOLUTION INTRAVENOUS at 01:40

## 2025-05-16 RX ADMIN — LORAZEPAM 2 MG: 2 INJECTION INTRAMUSCULAR; INTRAVENOUS at 08:31

## 2025-05-16 RX ADMIN — ACETAMINOPHEN 1000 MG: 10 INJECTION INTRAVENOUS at 01:40

## 2025-05-16 RX ADMIN — Medication 100 MG: at 08:48

## 2025-05-16 RX ADMIN — ONDANSETRON 4 MG: 2 INJECTION INTRAMUSCULAR; INTRAVENOUS at 05:51

## 2025-05-16 RX ADMIN — PANTOPRAZOLE SODIUM 40 MG: 40 INJECTION, POWDER, FOR SOLUTION INTRAVENOUS at 01:39

## 2025-05-16 RX ADMIN — TRIMETHOBENZAMIDE HYDROCHLORIDE 200 MG: 100 INJECTION INTRAMUSCULAR at 16:45

## 2025-05-16 RX ADMIN — HYDROMORPHONE HYDROCHLORIDE 0.5 MG: 1 INJECTION, SOLUTION INTRAMUSCULAR; INTRAVENOUS; SUBCUTANEOUS at 17:35

## 2025-05-16 RX ADMIN — MAGNESIUM SULFATE HEPTAHYDRATE 2 G: 2 INJECTION, SOLUTION INTRAVENOUS at 08:32

## 2025-05-16 RX ADMIN — DIAZEPAM 10 MG: 10 INJECTION, SOLUTION INTRAMUSCULAR; INTRAVENOUS at 14:25

## 2025-05-16 RX ADMIN — HYDROMORPHONE HYDROCHLORIDE 0.5 MG: 1 INJECTION, SOLUTION INTRAMUSCULAR; INTRAVENOUS; SUBCUTANEOUS at 21:32

## 2025-05-16 RX ADMIN — SODIUM CHLORIDE, SODIUM GLUCONATE, SODIUM ACETATE, POTASSIUM CHLORIDE, MAGNESIUM CHLORIDE, SODIUM PHOSPHATE, DIBASIC, AND POTASSIUM PHOSPHATE 125 ML/HR: .53; .5; .37; .037; .03; .012; .00082 INJECTION, SOLUTION INTRAVENOUS at 22:00

## 2025-05-16 RX ADMIN — PHENOBARBITAL SODIUM 549 MG: 130 INJECTION INTRAMUSCULAR at 20:40

## 2025-05-16 RX ADMIN — NICOTINE 14 MG: 14 PATCH, EXTENDED RELEASE TRANSDERMAL at 05:31

## 2025-05-16 RX ADMIN — CHLORDIAZEPOXIDE HYDROCHLORIDE 50 MG: 25 CAPSULE ORAL at 12:17

## 2025-05-16 RX ADMIN — HYDROMORPHONE HYDROCHLORIDE 0.5 MG: 1 INJECTION, SOLUTION INTRAMUSCULAR; INTRAVENOUS; SUBCUTANEOUS at 02:37

## 2025-05-16 RX ADMIN — HYDROMORPHONE HYDROCHLORIDE 0.2 MG: 0.2 INJECTION, SOLUTION INTRAMUSCULAR; INTRAVENOUS; SUBCUTANEOUS at 04:26

## 2025-05-16 RX ADMIN — HYDROMORPHONE HYDROCHLORIDE 0.2 MG: 0.2 INJECTION, SOLUTION INTRAMUSCULAR; INTRAVENOUS; SUBCUTANEOUS at 05:30

## 2025-05-16 RX ADMIN — HYDROMORPHONE HYDROCHLORIDE 0.2 MG: 0.2 INJECTION, SOLUTION INTRAMUSCULAR; INTRAVENOUS; SUBCUTANEOUS at 13:42

## 2025-05-16 RX ADMIN — DIAZEPAM 10 MG: 10 INJECTION, SOLUTION INTRAMUSCULAR; INTRAVENOUS at 17:25

## 2025-05-16 RX ADMIN — SODIUM CHLORIDE, SODIUM GLUCONATE, SODIUM ACETATE, POTASSIUM CHLORIDE, MAGNESIUM CHLORIDE, SODIUM PHOSPHATE, DIBASIC, AND POTASSIUM PHOSPHATE 200 ML/HR: .53; .5; .37; .037; .03; .012; .00082 INJECTION, SOLUTION INTRAVENOUS at 05:31

## 2025-05-16 RX ADMIN — SODIUM CHLORIDE, SODIUM LACTATE, POTASSIUM CHLORIDE, AND CALCIUM CHLORIDE 1000 ML: .6; .31; .03; .02 INJECTION, SOLUTION INTRAVENOUS at 03:46

## 2025-05-16 RX ADMIN — HYDROMORPHONE HYDROCHLORIDE 0.2 MG: 0.2 INJECTION, SOLUTION INTRAMUSCULAR; INTRAVENOUS; SUBCUTANEOUS at 09:33

## 2025-05-16 RX ADMIN — ONDANSETRON 4 MG: 2 INJECTION INTRAMUSCULAR; INTRAVENOUS at 18:59

## 2025-05-16 RX ADMIN — MULTIPLE VITAMINS W/ MINERALS TAB 1 TABLET: TAB ORAL at 08:48

## 2025-05-16 RX ADMIN — FOLIC ACID 1 MG: 1 TABLET ORAL at 08:48

## 2025-05-16 RX ADMIN — DIAZEPAM 10 MG: 10 INJECTION, SOLUTION INTRAMUSCULAR; INTRAVENOUS at 18:42

## 2025-05-16 RX ADMIN — IOHEXOL 100 ML: 350 INJECTION, SOLUTION INTRAVENOUS at 02:18

## 2025-05-16 RX ADMIN — MORPHINE SULFATE 4 MG: 4 INJECTION, SOLUTION INTRAMUSCULAR; INTRAVENOUS at 01:59

## 2025-05-16 NOTE — ASSESSMENT & PLAN NOTE
Secondary to alcohol use disorder. AST mildly elevated, 46. Liver tests otherwise normal. CT shows hepatomegaly and hepatic steatosis, with slightly nodular liver contour raising concern for possible early cirrhosis. Upper abdominal varices are present. Spleen appears normal in size. There is trace ascites, decreased from prior study.    She did have EGD in Dec 2024/Jan 2025 and there was no evidence of esophageal or gastric varices at that time.    - She should follow-up in our GI office

## 2025-05-16 NOTE — H&P
"H&P - Hospitalist   Name: Kaykay Holland 35 y.o. 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 Multiple pseudocysts are present, increased in size and number when compared with March 30, 2025. \"  Continue IVF  Pain control  Antiemetics  GI consulted  Alcohol abuse  Reports that she started drinking 1 week ago -unclear if she has only been drinking for 1 week  Presents to the ED acutely intoxicated with alcohol level greater than 400-reports drinking half a bottle of wine prior to arrival to the ED  Has responded well to Librium taper in the past-will start at noon on 5/16  [facility] protocol  Pancreatic pseudocyst  CT with multiple pseudocyst increase in size and number when compared to March 2025  GI consulted  Hepatic steatosis  CT A/P: \"Mild hepatic steatosis. Portions of the surface contour of the liver appears slightly nodular, raising concern for possible early cirrhosis. Upper abdominal varices are present.  \"  Suspect in the setting of alcohol abuse  GI consulted  Discussed need for alcohol abstinence with patient  Tobacco abuse  Smokes half pack per day  NRT while inpatient      VTE Pharmacologic Prophylaxis: VTE Score: 0 Low Risk (Score 0-2) - Encourage Ambulation.  Code Status: Level 1 - Full Code   Discussion with family: Patient declined call to .     Anticipated Length of Stay: Patient will be admitted on an inpatient basis with an anticipated length of stay of greater than 2 midnights secondary to acute alcoholic " "pancreatitis.    History of Present Illness   Chief Complaint: \" Abdominal pain\"    Kaykay Holland is a 35 y.o. female with a PMH of alcohol abuse, tobacco abuse, and alcoholic pancreatitis who presents with recurrent LUQ abdominal pain with associated nausea and vomiting.  Patient reports pain onset 1 week ago and worsened in the last 4 to 5 days.  Has been having diarrhea as well.  Reports she started drinking 1 week ago.  States she has been drinking half a bottle of wine daily with last drink just prior to arrival to the ED.  No dyspnea.  No urinary symptoms.  No fevers or chills..    Review of Systems   Constitutional:  Negative for chills and fever.   HENT:  Negative for congestion.    Respiratory:  Negative for cough and shortness of breath.    Cardiovascular:  Negative for leg swelling.   Gastrointestinal:  Positive for abdominal pain, nausea and vomiting. Negative for blood in stool, constipation and diarrhea.   Genitourinary:  Negative for difficulty urinating, dysuria and hematuria.   Musculoskeletal:  Negative for gait problem.   Neurological:  Negative for weakness and numbness.   All other systems reviewed and are negative.      Historical Information   Past Medical History:   Diagnosis Date    Acute alcoholic pancreatitis 11/03/2023    Hypertension     SIRS (systemic inflammatory response syndrome) (HCC) 04/10/2024     Past Surgical History:   Procedure Laterality Date    WISDOM TOOTH EXTRACTION       Social History     Tobacco Use    Smoking status: Every Day     Current packs/day: 0.50     Types: Cigarettes     Passive exposure: Never    Smokeless tobacco: Never    Tobacco comments:     Per pt last drink was today 9/24/24 one glass of wine. Had previously stop on 4/4/24 for 1 month    Vaping Use    Vaping status: Never Used   Substance and Sexual Activity    Alcohol use: Yes     Alcohol/week: 21.0 standard drinks of alcohol     Types: 21 Glasses of wine per week     Comment: last drank 3/30/25    " Drug use: Never    Sexual activity: Not on file     E-Cigarette/Vaping    E-Cigarette Use Never User      E-Cigarette/Vaping Substances    Nicotine No     THC No     CBD No     Flavoring No     Other No     Unknown No      History reviewed. No pertinent family history.  Social History:  Marital Status: Single   Occupation: Starting new job as a   Patient Pre-hospital Living Situation: Home, Alone  Patient Pre-hospital Level of Mobility: walks  Patient Pre-hospital Diet Restrictions: None    Meds/Allergies   I have reviewed home medications with patient personally.  Prior to Admission medications    Medication Sig Start Date End Date Taking? Authorizing Provider   DULoxetine (CYMBALTA) 30 mg delayed release capsule Take 1 capsule (30 mg total) by mouth daily 12/25/24 5/16/25  Roxie Curry DO   folic acid ( Folic Acid) 1 mg tablet Take 1 tablet (1 mg total) by mouth daily 12/1/24 5/16/25  Elena Vásquez PA-C   nicotine (NICODERM CQ) 21 mg/24 hr TD 24 hr patch Place 1 patch on the skin over 24 hours daily 12/25/24 5/16/25  Roxie Curry DO   ondansetron (ZOFRAN-ODT) 4 mg disintegrating tablet Take 1 tablet (4 mg total) by mouth every 8 (eight) hours as needed for nausea or vomiting 4/3/25 5/16/25  Ralph Marshall MD   oxyCODONE (ROXICODONE) 5 immediate release tablet Take 1 tablet (5 mg total) by mouth every 4 (four) hours as needed for moderate pain for up to 5 doses Max Daily Amount: 30 mg 4/3/25 5/16/25  Ralph Marshall MD   pantoprazole (PROTONIX) 40 mg tablet Take 1 tablet (40 mg total) by mouth 2 (two) times a day 1/10/25 5/16/25  PATRICIA Barrett   thiamine 100 MG tablet Take 1 tablet (100 mg total) by mouth daily 12/25/24 5/16/25  Roxie Curry DO     Allergies   Allergen Reactions    Bee Pollen Anaphylaxis    Other Edema     Bee stings (localized edema)       Objective :  Temp:  [97.6 °F (36.4 °C)] 97.6 °F (36.4 °C)  HR:  [75-87] 75  BP: (118-139)/(81-90)  "118/81  Resp:  [15-18] 15  SpO2:  [94 %-97 %] 94 %  O2 Device: None (Room air)    Physical Exam  Vitals and nursing note reviewed.   Constitutional:       Comments: Intermittently tearful on exam.  Cooperative.   HENT:      Head: Normocephalic.      Nose: Nose normal.      Mouth/Throat:      Mouth: Mucous membranes are moist.     Eyes:      Conjunctiva/sclera: Conjunctivae normal.       Cardiovascular:      Rate and Rhythm: Normal rate and regular rhythm.   Pulmonary:      Effort: Pulmonary effort is normal.      Breath sounds: Normal breath sounds.   Abdominal:      General: Abdomen is flat. There is no distension.      Palpations: Abdomen is soft.      Tenderness: There is abdominal tenderness (LUQ). There is no rebound.     Musculoskeletal:         General: Normal range of motion.      Cervical back: Normal range of motion.      Right lower leg: No edema.      Left lower leg: No edema.     Skin:     General: Skin is warm and dry.      Coloration: Skin is not pale.     Neurological:      General: No focal deficit present.      Mental Status: She is alert and oriented to person, place, and time.     Psychiatric:      Comments: Intermittently tearful          Lines/Drains:            Lab Results: I have reviewed the following results:  Results from last 7 days   Lab Units 05/16/25  0029   WBC Thousand/uL 7.17   HEMOGLOBIN g/dL 13.6   HEMATOCRIT % 41.7   PLATELETS Thousands/uL 241   SEGS PCT % 38*   LYMPHO PCT % 52*   MONO PCT % 7   EOS PCT % 2     Results from last 7 days   Lab Units 05/16/25  0028   SODIUM mmol/L 140   POTASSIUM mmol/L 4.0   CHLORIDE mmol/L 105   CO2 mmol/L 23   BUN mg/dL 7   CREATININE mg/dL 0.32*   ANION GAP mmol/L 12   CALCIUM mg/dL 8.7   ALBUMIN g/dL 4.1   TOTAL BILIRUBIN mg/dL 0.50   ALK PHOS U/L 92   ALT U/L 17   AST U/L 50*   GLUCOSE RANDOM mg/dL 94             No results found for: \"HGBA1C\"        Imaging Results Review: I reviewed radiology reports from this admission including: CT " abdomen/pelvis.  Other Study Results Review: EKG was personally reviewed and my interpretation is: NSR without acute ischemia.  Normal QTc...    Administrative Statements       ** Please Note: This note has been constructed using a voice recognition system. **

## 2025-05-16 NOTE — ASSESSMENT & PLAN NOTE
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     CT shows acute interstitial edematous pancreatitis, multiple pseudocysts increased in size and number compared to CT from March (largest in the pancreatic tail measuring 4.5 x 3 x 2.3 cm), trace ascites, thickened appearance of the stomach and duodenal wall. No evidence of pancreatic necrosis, or abnormal gas in or around the pancreas to suggest infection.    Lipase minimally elevated 124. Ethanol 408. Creatinine normal. No leukocytosis or fever. She is drowsy but appears non-toxic. Marked tenderness in epigastric and LUQ on physical exam.    -Continue supportive care  -NPO, IV fluids, pain management and antiemetics as needed  -Advance to clear liquids as tolerated

## 2025-05-16 NOTE — ED NOTES
Patient became tearful after triage stating let me be honest I had 2 glasses of wine tonight after not drinking for 2 months  I started a new job and it's stressful     John Barnes RN  05/16/25 0019

## 2025-05-16 NOTE — CONSULTS
Consultation - Gastroenterology   Name: Kaykay Holland 35 y.o. 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     CT shows acute interstitial edematous pancreatitis, multiple pseudocysts increased in size and number compared to CT from March (largest in the pancreatic tail measuring 4.5 x 3 x 2.3 cm), trace ascites, thickened appearance of the stomach and duodenal wall. No evidence of pancreatic necrosis, or abnormal gas in or around the pancreas to suggest infection.    Lipase minimally elevated 124. Ethanol 408. Creatinine normal. No leukocytosis or fever. She is drowsy but appears non-toxic. Marked tenderness in epigastric and LUQ on physical exam.    -Continue supportive care  -NPO, IV fluids, pain management and antiemetics as needed  -Advance to clear liquids as tolerated  Pancreatic pseudocyst  See plan above  Alcohol abuse  Blood alcohol level greater than 400 on presentation to the ED.    -[facility] protocol  -Continue folic acid, thiamine, multivitamin  -Strongly encouraged ongoing cessation and seeking out formal treatment for her alcohol use disorder  Hepatic steatosis  Secondary to alcohol use disorder. AST mildly elevated, 46. Liver tests otherwise normal. CT shows hepatomegaly and hepatic steatosis, with slightly nodular liver contour raising concern for possible early cirrhosis. Upper abdominal varices are present. Spleen  appears normal in size. There is trace ascites, decreased from prior study.    She did have EGD in Dec 2024/Jan 2025 and there was no evidence of esophageal or gastric varices at that time.    - She should follow-up in our GI office  Tobacco abuse  Discussed importance of tobacco cessation    I have discussed the above management plan in detail with the primary service.     History of Present Illness   HPI:  Kaykay Holland is a 35 y.o. 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 This is her fourth admission this year related to pancreatitis.     She reports recurrent epigastric and left upper quadrant pain for the past week associated with nausea, vomiting, poor oral intake. She noticed some streaks of blood in the emesis, but this was not grossly bloody. No fevers or chills.  No bloody or black stools.    She started a new job and says she is unable to go to an alcohol rehab at this time but is interested in AA.    Review of Systems   Constitutional:  Negative for chills and fever.   HENT:  Negative for ear pain and sore throat.    Eyes:  Negative for pain and visual disturbance.   Respiratory:  Negative for cough and shortness of breath.    Cardiovascular:  Negative for chest pain and palpitations.   Gastrointestinal:  Positive for abdominal pain and nausea. Negative for vomiting.   Genitourinary:  Negative for dysuria and hematuria.   Musculoskeletal:  Negative for arthralgias and back pain.   Skin:  Negative for color change and rash.   Neurological:  Negative for seizures and syncope.   All other systems reviewed and are negative.    Medical History Review: I have reviewed the patient's PMH, PSH, Social History, Family History, Meds, and Allergies     Objective :  Temp:  [97.5 °F (36.4 °C)-97.6 °F (36.4 °C)] 97.5 °F (36.4 °C)  HR:  [67-87] 67  BP:  ()/(65-90) 99/65  Resp:  [15-18] 16  SpO2:  [94 %-97 %] 97 %  O2 Device: None (Room air)    Physical Exam  Vitals and nursing note reviewed.   Constitutional:       General: She is not in acute distress.     Appearance: She is well-developed.   HENT:      Head: Normocephalic and atraumatic.     Eyes:      Conjunctiva/sclera: Conjunctivae normal.       Cardiovascular:      Rate and Rhythm: Normal rate and regular rhythm.      Heart sounds: No murmur heard.  Pulmonary:      Effort: Pulmonary effort is normal. No respiratory distress.      Breath sounds: Normal breath sounds.   Abdominal:      Palpations: Abdomen is soft.      Tenderness: There is abdominal tenderness.      Comments: Marked tenderness in epigastric and LUQ on physical exam     Musculoskeletal:         General: No swelling.      Cervical back: Neck supple.     Skin:     General: Skin is warm and dry.     Neurological:      Mental Status: She is alert.     Psychiatric:         Mood and Affect: Mood normal.           Lab Results: I have reviewed the following results:CBC/BMP:   .     05/16/25  0029 05/16/25  0601   WBC 7.17  --    HGB 13.6  --    HCT 41.7  --      --    SODIUM  --  141   K  --  4.3   CL  --  107   CO2  --  23   BUN  --  6   CREATININE  --  0.34*   GLUC  --  82   MG  --  1.4*   PHOS  --  3.7    , Creatinine Clearance: Estimated Creatinine Clearance: 202.1 mL/min (A) (by C-G formula based on SCr of 0.34 mg/dL (L))., LFTs:   .     05/16/25  0601   AST 46*   ALT 16   ALB 3.4*   TBILI 0.38   ALKPHOS 77    , PTT/INR:No new results in last 24 hours. , Lipase:   Lab Results   Component Value Date    LIPASE 124 (H) 05/16/2025       Imaging Results Review: I reviewed radiology reports from this admission including: CT abdomen/pelvis.  Other Study Results Review: No additional pertinent studies reviewed.

## 2025-05-16 NOTE — ED PROVIDER NOTES
Time reflects when diagnosis was documented in both MDM as applicable and the Disposition within this note       Time User Action Codes Description Comment    5/16/2025  2:54 AM Aleshia Andrade [R10.9] Abdominal pain     5/16/2025  2:55 AM Aleshia Andrade [F10.929] Alcohol intoxication (HCC)     5/16/2025  3:14 AM Aleshia Andrade [K85.90] Acute pancreatitis     5/16/2025  3:14 AM Aleshia Andrade [K29.70] Gastritis     5/16/2025  3:15 AM Aleshia Andrade [K86.3] Pancreatic pseudocyst           ED Disposition       ED Disposition   Admit    Condition   Stable    Date/Time   Fri May 16, 2025  3:16 AM    Comment   Case was discussed with JIMMY and the patient's admission status was agreed to be Admission Status: inpatient status to the service of Dr. Marshall .               Assessment & Plan       Medical Decision Making  35-year-old female with epigastric abdominal pain differential diagnosis gastritis, pancreatitis, peptic ulcer disease cholelithiasis, biliary colic, cholecystitis    Amount and/or Complexity of Data Reviewed  Labs: ordered. Decision-making details documented in ED Course.  Radiology: ordered.    Risk  Prescription drug management.  Decision regarding hospitalization.        ED Course as of 05/16/25 0321   Fri May 16, 2025   0053 Medical record reviewed patient admitted to the hospital 3/25/2023 for acute alcoholic pancreatitis at that time lipase was mildly elevated at 285   0219 ETHANOL(!): 408   0314 Patient with acute pancreatitis, also likely with gastritis in setting of alcohol use currently not in acute withdrawal will admit for further evaluation and care       Medications   sodium chloride 0.9 % bolus 1,000 mL (1,000 mL Intravenous New Bag 5/16/25 0140)   lactated ringers bolus 1,000 mL (has no administration in time range)   pantoprazole (PROTONIX) injection 40 mg (40 mg Intravenous Given 5/16/25 0139)   acetaminophen (Ofirmev) injection 1,000 mg (0 mg Intravenous Stopped 5/16/25 0201)    morphine injection 4 mg (4 mg Intravenous Given 5/16/25 0159)   iohexol (OMNIPAQUE) 350 MG/ML injection (MULTI-DOSE) 100 mL (100 mL Intravenous Given 5/16/25 0218)   HYDROmorphone (DILAUDID) injection 0.5 mg (0.5 mg Intravenous Given 5/16/25 0237)       ED Risk Strat Scores                    No data recorded        SBIRT 22yo+      Flowsheet Row Most Recent Value   Initial Alcohol Screen: US AUDIT-C     1. How often do you have a drink containing alcohol? 1 Filed at: 05/16/2025 0016   2. How many drinks containing alcohol do you have on a typical day you are drinking?  0 Filed at: 05/16/2025 0016   3b. FEMALE Any Age, or MALE 65+: How often do you have 4 or more drinks on one occassion? 0 Filed at: 05/16/2025 0016   Audit-C Score 1 Filed at: 05/16/2025 0016   DUANE: How many times in the past year have you...    Used an illegal drug or used a prescription medication for non-medical reasons? Never Filed at: 05/16/2025 0016                            History of Present Illness       Chief Complaint   Patient presents with    Abdominal Pain     Stabbing left abdominal pain with guarding.  Started 3 days ago       Past Medical History:   Diagnosis Date    Acute alcoholic pancreatitis 11/03/2023    Hypertension     SIRS (systemic inflammatory response syndrome) (HCC) 04/10/2024      Past Surgical History:   Procedure Laterality Date    WISDOM TOOTH EXTRACTION        History reviewed. No pertinent family history.   Social History[1]   E-Cigarette/Vaping    E-Cigarette Use Never User       E-Cigarette/Vaping Substances    Nicotine No     THC No     CBD No     Flavoring No     Other No     Unknown No       I have reviewed and agree with the history as documented.     35-year-old female with history of gastritis, alcoholic pancreatitis presents for evaluation of epigastric abdominal pain started tonight associate with nausea and diarrhea no hematemesis, no fevers, she does have a history of alcoholism in the past, recently  relapsed started drinking about a week ago drinks 3 glasses of white wine daily to cope with stress, does have a history of withdrawal in the past but does not appear to be in acute withdrawal        Review of Systems   Constitutional:  Negative for appetite change and fever.   HENT:  Negative for rhinorrhea and sore throat.    Eyes:  Negative for photophobia and visual disturbance.   Respiratory:  Negative for cough, chest tightness and wheezing.    Cardiovascular:  Negative for chest pain, palpitations and leg swelling.   Gastrointestinal:  Positive for abdominal pain, nausea and vomiting. Negative for abdominal distention, blood in stool, constipation and diarrhea.   Genitourinary:  Negative for dysuria, flank pain, frequency, hematuria and urgency.   Musculoskeletal:  Negative for back pain.   Skin:  Negative for rash.   Neurological:  Negative for dizziness, weakness and headaches.   All other systems reviewed and are negative.          Objective       ED Triage Vitals [05/16/25 0013]   Temperature Pulse Blood Pressure Respirations SpO2 Patient Position - Orthostatic VS   97.6 °F (36.4 °C) 87 139/90 18 97 % Sitting      Temp src Heart Rate Source BP Location FiO2 (%) Pain Score    -- -- Left arm -- 8      Vitals      Date and Time Temp Pulse SpO2 Resp BP Pain Score FACES Pain Rating User   05/16/25 0013 97.6 °F (36.4 °C) 87 97 % 18 139/90 8 -- SV            Physical Exam  Vitals and nursing note reviewed.   Constitutional:       Appearance: She is well-developed.   HENT:      Head: Normocephalic and atraumatic.     Eyes:      Pupils: Pupils are equal, round, and reactive to light.       Cardiovascular:      Rate and Rhythm: Normal rate and regular rhythm.      Heart sounds: No murmur heard.     No friction rub. No gallop.   Pulmonary:      Effort: Pulmonary effort is normal.      Breath sounds: No wheezing or rales.   Chest:      Chest wall: No tenderness.   Abdominal:      General: There is no distension.       Palpations: Abdomen is soft. There is no mass.      Tenderness: There is generalized abdominal tenderness and tenderness in the epigastric area. There is no guarding or rebound.     Musculoskeletal:      Cervical back: Normal range of motion and neck supple.     Skin:     General: Skin is warm and dry.     Neurological:      Mental Status: She is alert and oriented to person, place, and time.         Results Reviewed       Procedure Component Value Units Date/Time    Ethanol [[id number]]  (Abnormal) Collected: 05/16/25 0140    Lab Status: Final result Specimen: Blood from Arm, Right Updated: 05/16/25 0212     Ethanol Lvl 408 mg/dL     Urine Microscopic [[id number]]  (Normal) Collected: 05/16/25 0140    Lab Status: Final result Specimen: Urine, Clean Catch Updated: 05/16/25 0203     RBC, UA None Seen /hpf      WBC, UA 2-4 /hpf      Epithelial Cells Occasional /hpf      Bacteria, UA Occasional /hpf      MUCUS THREADS None Seen    Cameron draw [[id number]] Collected: 05/16/25 0028    Lab Status: Final result Specimen: Blood Updated: 05/16/25 0201    Narrative:      The following orders were created for panel order Cameron draw.  Procedure                               Abnormality         Status                     ---------                               -----------         ------                     Light Blue Top on hold[[id number]]                           Final result               Gold top on hold[[id number]]                                 Final result                 Please view results for these tests on the individual orders.    UA w Reflex to Microscopic w Reflex to Culture [[id number]]  (Abnormal) Collected: 05/16/25 0140    Lab Status: Final result Specimen: Urine, Clean Catch Updated: 05/16/25 0155     Color, UA Yellow     Clarity, UA Clear     Specific Gravity, UA 1.015     pH, UA 5.5     Leukocytes, UA Negative     Nitrite, UA Negative     Protein, UA Trace mg/dl      Glucose, UA Negative mg/dl      Ketones,  UA Negative mg/dl      Urobilinogen, UA <2.0 mg/dl      Bilirubin, UA Negative     Occult Blood, UA Trace    hCG, quantitative [[id number]]  (Normal) Collected: 05/16/25 0028    Lab Status: Final result Specimen: Blood Updated: 05/16/25 0148     HCG, Quant 1.1 mIU/mL     Narrative:       Expected Ranges:    HCG results between 5.0 and 25.0 mIU/mL may be indicative of early pregnancy but should be interpreted in light of the total clinical presentation.    HCG can rise to detectable levels in brenda and post menopausal women (0-11.6 mIU/mL).     Approximate               Approximate HCG  Gestation age          Concentration ( mIU/mL)  _____________          ______________________   Weeks                      HCG values  0.2-1                       5-50  1-2                           2-3                         100-5000  3-4                         500-75052  4-5                         1000-79400  5-6                         [id number]  6-8                         [id number]  8-12                        [id number]      POCT pregnancy, urine [[id number]]  (Normal) Collected: 05/16/25 0142    Lab Status: Final result Specimen: Urine Updated: 05/16/25 0142     EXT Preg Test, Ur Negative     Control Valid    Comprehensive metabolic panel [[id number]]  (Abnormal) Collected: 05/16/25 0028    Lab Status: Final result Specimen: Blood Updated: 05/16/25 0049     Sodium 140 mmol/L      Potassium 4.0 mmol/L      Chloride 105 mmol/L      CO2 23 mmol/L      ANION GAP 12 mmol/L      BUN 7 mg/dL      Creatinine 0.32 mg/dL      Glucose 94 mg/dL      Calcium 8.7 mg/dL      AST 50 U/L      ALT 17 U/L      Alkaline Phosphatase 92 U/L      Total Protein 7.1 g/dL      Albumin 4.1 g/dL      Total Bilirubin 0.50 mg/dL      eGFR 146 ml/min/1.73sq m     Narrative:      [employer] guidelines for Chronic Kidney Disease (CKD):     Stage 1 with normal or high GFR (GFR > 90 mL/min/1.73 square meters)    Stage 2 Mild  CKD (GFR = 60-89 mL/min/1.73 square meters)    Stage 3A Moderate CKD (GFR = 45-59 mL/min/1.73 square meters)    Stage 3B Moderate CKD (GFR = 30-44 mL/min/1.73 square meters)    Stage 4 Severe CKD (GFR = 15-29 mL/min/1.73 square meters)    Stage 5 End Stage CKD (GFR <15 mL/min/1.73 square meters)  Note: GFR calculation is accurate only with a steady state creatinine    Lipase [[id number]]  (Abnormal) Collected: 05/16/25 0028    Lab Status: Final result Specimen: Blood Updated: 05/16/25 0049     Lipase 124 u/L     CBC and differential [[id number]]  (Abnormal) Collected: 05/16/25 0029    Lab Status: Final result Specimen: Blood Updated: 05/16/25 0031     WBC 7.17 Thousand/uL      RBC 4.15 Million/uL      Hemoglobin 13.6 g/dL      Hematocrit 41.7 %       fL      MCH 32.8 pg      MCHC 32.6 g/dL      RDW 13.4 %      MPV 9.8 fL      Platelets 241 Thousands/uL      nRBC 0 /100 WBCs      Segmented % 38 %      Immature Grans % 0 %      Lymphocytes % 52 %      Monocytes % 7 %      Eosinophils Relative 2 %      Basophils Relative 1 %      Absolute Neutrophils 2.69 Thousands/µL      Absolute Immature Grans 0.02 Thousand/uL      Absolute Lymphocytes 3.76 Thousands/µL      Absolute Monocytes 0.49 Thousand/µL      Eosinophils Absolute 0.13 Thousand/µL      Basophils Absolute 0.08 Thousands/µL             CT abdomen pelvis with contrast   Final Interpretation by Macrina Mccall MD (05/16 0308)      Acute interstitial edematous pancreatitis. Multiple pseudocysts are present, increased in size and number when compared with March 30, 2025. Please see discussion.      There is trace ascites, decreased in amount compared with March 30, 2025.      The wall of the stomach appears thickened and edematous. The duodenal wall appears thickened. Follow-up recommended.      Hepatomegaly. Mild hepatic steatosis. Portions of the surface contour of the liver appears slightly nodular, raising concern for possible early cirrhosis. Upper  abdominal varices are present. The splenic vein is opacified, however, appears somewhat    attenuated as it passes posterior to the pancreas.      Other nonemergent findings as above.      The study was marked in EPIC for immediate notification.         Workstation performed: [id number]             Procedures    ED Medication and Procedure Management   Prior to Admission Medications   Prescriptions Last Dose Informant Patient Reported? Taking?   DULoxetine (CYMBALTA) 30 mg delayed release capsule   No No   Sig: Take 1 capsule (30 mg total) by mouth daily   folic acid (KP Folic Acid) 1 mg tablet   No No   Sig: Take 1 tablet (1 mg total) by mouth daily   nicotine (NICODERM CQ) 21 mg/24 hr TD 24 hr patch   No No   Sig: Place 1 patch on the skin over 24 hours daily   ondansetron (ZOFRAN-ODT) 4 mg disintegrating tablet   No No   Sig: Take 1 tablet (4 mg total) by mouth every 8 (eight) hours as needed for nausea or vomiting   oxyCODONE (ROXICODONE) 5 immediate release tablet   No No   Sig: Take 1 tablet (5 mg total) by mouth every 4 (four) hours as needed for moderate pain for up to 5 doses Max Daily Amount: 30 mg   pantoprazole (PROTONIX) 40 mg tablet   No No   Sig: Take 1 tablet (40 mg total) by mouth 2 (two) times a day   thiamine 100 MG tablet   No No   Sig: Take 1 tablet (100 mg total) by mouth daily      Facility-Administered Medications: None     Patient's Medications   Discharge Prescriptions    No medications on file     No discharge procedures on file.  ED SEPSIS DOCUMENTATION   Time reflects when diagnosis was documented in both MDM as applicable and the Disposition within this note       Time User Action Codes Description Comment    5/16/2025  2:54 AM Aleshia Andrade [R10.9] Abdominal pain     5/16/2025  2:55 AM Aleshia Andrade [F10.929] Alcohol intoxication (HCC)     5/16/2025  3:14 AM Aleshia Andrade [K85.90] Acute pancreatitis     5/16/2025  3:14 AM Aleshia Andrade [K29.70] Gastritis     5/16/2025  3:15 AM  Andrade, Aleshia Add [K86.3] Pancreatic pseudocyst            Initial Sepsis Screening       Row Name 05/16/25 0315                Is the patient's history suggestive of a new or worsening infection? Yes (Proceed)  -EB        Suspected source of infection acute abdominal infection  -EB        Indicate SIRS criteria --        Are two or more of the above signs & symptoms of infection both present and new to the patient? No  -EB                  User Key  (r) = Recorded By, (t) = Taken By, (c) = Cosigned By      Initials Name Provider Type    EB Aleshia Andrade DO Physician                           [1]   Social History  Tobacco Use    Smoking status: Every Day     Current packs/day: 0.50     Types: Cigarettes     Passive exposure: Never    Smokeless tobacco: Never    Tobacco comments:     Per pt last drink was today 9/24/24 one glass of wine. Had previously stop on 4/4/24 for 1 month    Vaping Use    Vaping status: Never Used   Substance Use Topics    Alcohol use: Yes     Alcohol/week: 21.0 standard drinks of alcohol     Types: 21 Glasses of wine per week     Comment: last drank 3/30/25    Drug use: Never        Aleshia Andrade DO  05/16/25 0321

## 2025-05-16 NOTE — TELEMEDICINE
"e-Consult (IPC)  - Medical Toxicology   Name: Kaykay Holland 35 y.o. female I MRN: [id number]  Unit/Bed#: -01 I Date of Admission: 5/16/2025   Date of Service: 5/16/2025 I Hospital Day: 0      Inpatient consult to Toxicology     Date/Time  5/16/2025 12:14 PM     Performed by  Eli Garcia MD   Authorized by  Tiara Valencia MD           Physician Requesting Evaluation: Tiara Valencai MD   Reason for Evaluation / Principal Problem: Alcohol Withdrawl    Assessment & Plan  Alcohol abuse  Recently began drinking again, has been drinking for the last week  Per chart review, patient started on Librium and given 2mg Ativan for CIWA of 10 this morning    Recommendations   Stop Librium- this medication is good for outpatient mild withdrawal symptoms, but there are better medications we can use in the inpatient setting  Initiate CIWA protocol with Valium dosing, protocol listed below  Supportive care including IV fluids, antiemetics, nonopioid analgesics, antidiarrheals as needed  Cardiac monitoring and telemetry  Seizure precautions      If patient continues to have high CIWA scores despite being on CIWA protocol with Valium, please reach back out to the medical toxicology team, may initiate phenobarbital in these circumstances    Medical Toxicology recommendations for CIWA monitoring using diazepam for treatment:    CIWA score & Treatment     Monitoring:   Modified CIWA Score   Telemetry  Continuous pulse oximetry   Initiate RASS with dosing and hold any sedatives for RASS less than -2  Request provider re-evaluation after every three doses.  Step down for CIWA > 7  Contact Medical Toxicology/Addiction \"[facility] On Call\" via Tiger Text for worsening CIWA, persistent tachycardia or encephalopathy.       0  No intervention  Reassess q4 hours.   Consider discontinuing CIWA 24 hours after ethanol concentration of zero, with a score of zero    1-7  Diazepam 10 mg PO Q4hr PRN score 1-7  Reassess q4hr    8-14  Diazepam " "10mg IV Q1hr PRN score 8-14  Reassess q1hr  Contact Medical Toxicology/Addiction \"[facility] On Call\" via Tiger Text to discuss transfer to detox unit, step down or critical care per Detox AP.    15-19  Diazepam 20mg IV Q1hr PRN score 15-19  Reassess q1hr  Contact Medical Toxicology/Addiction \"[facility] On Call\" via Tiger Text to discuss transfer to detox unit, step down or critical care per Detox AP and further treatment recommendations.    >20  Diazepam 40mg IV Q1hr PRN score > 20  Contact Medical Toxicology/Addiction \"[facility] On Call\" via Tiger Text for additional treatment recommendations.       Once the patient's withdrawal is effectively managed, the patient can be placed on a diazepam taper, if needed.    Diazepam can be decreased by 1/2 of the last dose every hour until the patient is not needing more than 10 mg at a time; at which point diazepam 5mg PO  may be given Q6 hours PRN for 24 hours. Prior to discontinuation.     Please reach out to Medical Toxicology/Addiction \"[facility] On Call\" via Tiger Text with any additional concerns.     For further questions, please contact the medical  on call via SecureChat between 8am and 9pm. If between 9pm and 8am, please reach out to the Poison [facility] at [phone].     Hx and PE limited by the dynamics of a phone consultation. I have not personally interviewed or evaluated the patient, but only advised based on the information provided to me. Primary provider is responsible for all clinical decisions.     History of Present Illness   Kaykay Holland is a 35 y.o. year old female past medical history of alcohol use disorder and gastritis who presented to [facility] ED for epigastric pain. Is currently admitted for alcoholic pancreatitis.  Per hospitalist H&P, patient recently relapsed and began drinking again.  Alcohol level in the ED was over 400.  Toxicology consulted for additional recommendations for alcohol " withdrawal.      Historical Information   Historical Information   Past Medical History:   Diagnosis Date    Acute alcoholic pancreatitis 11/03/2023    Hypertension     SIRS (systemic inflammatory response syndrome) (HCC) 04/10/2024     Past Surgical History:   Procedure Laterality Date    WISDOM TOOTH EXTRACTION       Social History     Tobacco Use    Smoking status: Every Day     Current packs/day: 0.50     Types: Cigarettes     Passive exposure: Never    Smokeless tobacco: Never    Tobacco comments:     Per pt last drink was today 9/24/24 one glass of wine. Had previously stop on 4/4/24 for 1 month    Vaping Use    Vaping status: Never Used   Substance and Sexual Activity    Alcohol use: Yes     Alcohol/week: 21.0 standard drinks of alcohol     Types: 21 Glasses of wine per week     Comment: last drank 3/30/25    Drug use: Never    Sexual activity: Not on file     E-Cigarette/Vaping    E-Cigarette Use Never User      E-Cigarette/Vaping Substances    Nicotine No     THC No     CBD No     Flavoring No     Other No     Unknown No      Family history non-contributory  Social History     Tobacco Use    Smoking status: Every Day     Current packs/day: 0.50     Types: Cigarettes     Passive exposure: Never    Smokeless tobacco: Never    Tobacco comments:     Per pt last drink was today 9/24/24 one glass of wine. Had previously stop on 4/4/24 for 1 month    Vaping Use    Vaping status: Never Used   Substance and Sexual Activity    Alcohol use: Yes     Alcohol/week: 21.0 standard drinks of alcohol     Types: 21 Glasses of wine per week     Comment: last drank 3/30/25    Drug use: Never    Sexual activity: Not on file     History reviewed. No pertinent family history.    Meds/Allergies   Prior to Admission medications    Medication Sig Start Date End Date Taking? Authorizing Provider   DULoxetine (CYMBALTA) 30 mg delayed release capsule Take 1 capsule (30 mg total) by mouth daily 12/25/24 5/16/25  Roxie Curry DO    folic acid (KP Folic Acid) 1 mg tablet Take 1 tablet (1 mg total) by mouth daily 12/1/24 5/16/25  Elena Vásquez PA-C   nicotine (NICODERM CQ) 21 mg/24 hr TD 24 hr patch Place 1 patch on the skin over 24 hours daily 12/25/24 5/16/25  Roxie Curry DO   ondansetron (ZOFRAN-ODT) 4 mg disintegrating tablet Take 1 tablet (4 mg total) by mouth every 8 (eight) hours as needed for nausea or vomiting 4/3/25 5/16/25  Ralph Marshall MD   oxyCODONE (ROXICODONE) 5 immediate release tablet Take 1 tablet (5 mg total) by mouth every 4 (four) hours as needed for moderate pain for up to 5 doses Max Daily Amount: 30 mg 4/3/25 5/16/25  Ralph Marshall MD   pantoprazole (PROTONIX) 40 mg tablet Take 1 tablet (40 mg total) by mouth 2 (two) times a day 1/10/25 5/16/25  PATRICIA Barrett   thiamine 100 MG tablet Take 1 tablet (100 mg total) by mouth daily 12/25/24 5/16/25  Roxie Curry DO   Current Medications[1]   Allergies[2]    Objective :  Temp:  [97.3 °F (36.3 °C)-97.6 °F (36.4 °C)] 97.3 °F (36.3 °C)  HR:  [65-87] 65  BP: ()/(65-90) 101/65  Resp:  [15-18] 16  SpO2:  [94 %-97 %] 96 %  O2 Device: None (Room air)      Intake/Output Summary (Last 24 hours) at 5/16/2025 1214  Last data filed at 5/16/2025 0850  Gross per 24 hour   Intake 2290 ml   Output 350 ml   Net 1940 ml       Physical exam not performed.      Lab Results: I have reviewed the following results:  Results from last 7 days   Lab Units 05/16/25  0029   WBC Thousand/uL 7.17   HEMOGLOBIN g/dL 13.6   HEMATOCRIT % 41.7   PLATELETS Thousands/uL 241   SEGS PCT % 38*   LYMPHO PCT % 52*   MONO PCT % 7   EOS PCT % 2      Results from last 7 days   Lab Units 05/16/25  0601   POTASSIUM mmol/L 4.3   CHLORIDE mmol/L 107   CO2 mmol/L 23   BUN mg/dL 6   CREATININE mg/dL 0.34*   CALCIUM mg/dL 7.7*   ALBUMIN g/dL 3.4*   ALK PHOS U/L 77   ALT U/L 16   AST U/L 46*   MAGNESIUM mg/dL 1.4*   PHOSPHORUS mg/dL 3.7                       Results from last 7  days   Lab Units 05/16/25  0140   ETHANOL LVL mg/dL 408*     Imaging Results Review: I reviewed radiology reports from this admission including: CT abdomen/pelvis.  Other Study Results Review: EKG was personally reviewed and my interpretation is: NSR. HR 63, ..    Administrative Statements    21-30 minutes, >50% of the total time devoted to medical consultative verbal/EMR discussion between providers. Written report will be generated in the EMR.    Patient or appropriate family member was verbally informed by  of this consultative service on their behalf to provide more timely access to specialty care in lieu of an in person consultation. Verbal consent was obtained.         [1]   Current Facility-Administered Medications:     chlordiazePOXIDE (LIBRIUM) capsule 50 mg, 50 mg, Oral, Q6H **FOLLOWED BY** [START ON 5/17/2025] chlordiazePOXIDE (LIBRIUM) capsule 50 mg, 50 mg, Oral, Q8H **FOLLOWED BY** [START ON 5/18/2025] chlordiazePOXIDE (LIBRIUM) capsule 50 mg, 50 mg, Oral, Q12H **FOLLOWED BY** [START ON 5/19/2025] chlordiazePOXIDE (LIBRIUM) capsule 50 mg, 50 mg, Oral, HS, Florina Schafer PA-C    folic acid (FOLVITE) tablet 1 mg, 1 mg, Oral, Daily, Florina Schafer PA-C, 1 mg at 05/16/25 0848    HYDROmorphone HCl (DILAUDID) injection 0.2 mg, 0.2 mg, Intravenous, Q4H PRN, 0.2 mg at 05/16/25 0933 **OR** HYDROmorphone (DILAUDID) injection 0.5 mg, 0.5 mg, Intravenous, Q4H PRN, CAMPOS Lei-JOSHUA    HYDROmorphone HCl (DILAUDID) injection 0.2 mg, 0.2 mg, Intravenous, Q4H PRN, Florina Schafer PA-C, 0.2 mg at 05/16/25 0530    multi-electrolyte (Plasmalyte-A/Isolyte-S PH 7.4/Normosol-R) IV solution, 200 mL/hr, Intravenous, Continuous, Florina Schafer PA-C, Last Rate: 200 mL/hr at 05/16/25 0531, 200 mL/hr at 05/16/25 0531    multivitamin-minerals (CENTRUM) tablet 1 tablet, 1 tablet, Oral, Daily, Florina Schafer PA-C, 1 tablet at 05/16/25 0848    nicotine (NICODERM CQ) 14 mg/24hr TD 24 hr patch 14  mg, 14 mg, Transdermal, Daily, CAMPOS Lei-C, 14 mg at 05/16/25 0531    ondansetron (ZOFRAN) injection 4 mg, 4 mg, Intravenous, Q6H PRN, CAMPOS Lei-JOSHUA, 4 mg at 05/16/25 0551    thiamine tablet 100 mg, 100 mg, Oral, Daily, CAMPOS Lei-C, 100 mg at 05/16/25 0848  [2]   Allergies  Allergen Reactions    Bee Pollen Anaphylaxis    Other Edema     Bee stings (localized edema)

## 2025-05-16 NOTE — ASSESSMENT & PLAN NOTE
Recently began drinking again, has been drinking for the last week  Per chart review, patient started on Librium and given 2mg Ativan for CIWA of 10 this morning    Recommendations   Stop Librium- this medication is good for outpatient mild withdrawal symptoms, but there are better medications we can use in the inpatient setting  Initiate CIWA protocol with Valium dosing, protocol listed below  Supportive care including IV fluids, antiemetics, nonopioid analgesics, antidiarrheals as needed  Cardiac monitoring and telemetry  Seizure precautions      If patient continues to have high CIWA scores despite being on CIWA protocol with Valium, please reach back out to the medical toxicology team, may initiate phenobarbital in these circumstances

## 2025-05-16 NOTE — ASSESSMENT & PLAN NOTE
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 Multiple pseudocysts are present, increased in size and number when compared with March 30, 2025. \"  Continue IVF  Pain control  Antiemetics  GI consulted  "

## 2025-05-16 NOTE — ED NOTES
Patient admitted to starting to drink a day or 2 prior to pain starting.  States 2 glasses of wine a day     John Barnes RN  05/16/25 0020

## 2025-05-16 NOTE — NURSING NOTE
CIWA was 11 at 1711- diazepam 10mg IV given as per medical toxicology's note/orders    CIWA was 14 at 1833- Diazepam 10mg IV given as per medical toxicology's note/orders    Notified attending and medical toxicology of increased CIWA and increased frequency.

## 2025-05-16 NOTE — PLAN OF CARE
Problem: Potential for Falls  Goal: Patient will remain free of falls  Description: INTERVENTIONS:  - Educate patient/family on patient safety including physical limitations  - Instruct patient to call for assistance with activity   - Consider consulting OT/PT to assist with strengthening/mobility based on AM PAC & JH-HLM score  - Consult OT/PT to assist with strengthening/mobility   - Keep Call bell within reach  - Keep bed low and locked with side rails adjusted as appropriate  - Keep care items and personal belongings within reach  - Initiate and maintain comfort rounds  - Make Fall Risk Sign visible to staff  - Offer Toileting every 3 Hours, in advance of need  - Initiate/Maintain bed alarm  - Obtain necessary fall risk management equipment:   - Apply yellow socks and bracelet for high fall risk patients  - Consider moving patient to room near nurses station  Outcome: Progressing     Problem: PAIN - ADULT  Goal: Verbalizes/displays adequate comfort level or baseline comfort level  Description: Interventions:  - Encourage patient to monitor pain and request assistance  - Assess pain using appropriate pain scale  - Administer analgesics as ordered based on type and severity of pain and evaluate response  - Implement non-pharmacological measures as appropriate and evaluate response  - Consider cultural and social influences on pain and pain management  - Notify physician/advanced practitioner if interventions unsuccessful or patient reports new pain  - Educate patient/family on pain management process including their role and importance of  reporting pain   - Provide non-pharmacologic/complimentary pain relief interventions  Outcome: Progressing     Problem: INFECTION - ADULT  Goal: Absence or prevention of progression during hospitalization  Description: INTERVENTIONS:  - Assess and monitor for signs and symptoms of infection  - Monitor lab/diagnostic results  - Monitor all insertion sites, i.e. indwelling  lines, tubes, and drains  - Monitor endotracheal if appropriate and nasal secretions for changes in amount and color  - [place] appropriate cooling/warming therapies per order  - Administer medications as ordered  - Instruct and encourage patient and family to use good hand hygiene technique  - Identify and instruct in appropriate isolation precautions for identified infection/condition  Outcome: Progressing  Goal: Absence of fever/infection during neutropenic period  Description: INTERVENTIONS:  - Monitor WBC  - Perform strict hand hygiene  - Limit to healthy visitors only  - No plants, dried, fresh or silk flowers with butler in patient room  Outcome: Progressing     Problem: SAFETY ADULT  Goal: Patient will remain free of falls  Description: INTERVENTIONS:  - Educate patient/family on patient safety including physical limitations  - Instruct patient to call for assistance with activity   - Consider consulting OT/PT to assist with strengthening/mobility based on AM PAC & -HLM score  - Consult OT/PT to assist with strengthening/mobility   - Keep Call bell within reach  - Keep bed low and locked with side rails adjusted as appropriate  - Keep care items and personal belongings within reach  - Initiate and maintain comfort rounds  - Make Fall Risk Sign visible to staff  - Offer Toileting every 3 Hours, in advance of need  - Initiate/Maintain bed alarm  - Obtain necessary fall risk management equipment:   - Apply yellow socks and bracelet for high fall risk patients  - Consider moving patient to room near nurses station  Outcome: Progressing  Goal: Maintain or return to baseline ADL function  Description: INTERVENTIONS:  -  Assess patient's ability to carry out ADLs; assess patient's baseline for ADL function and identify physical deficits which impact ability to perform ADLs (bathing, care of mouth/teeth, toileting, grooming, dressing, etc.)  - Assess/evaluate cause of self-care deficits   - Assess range of  motion  - Assess patient's mobility; develop plan if impaired  - Assess patient's need for assistive devices and provide as appropriate  - Encourage maximum independence but intervene and supervise when necessary  - Involve family in performance of ADLs  - Assess for home care needs following discharge   - Consider OT consult to assist with ADL evaluation and planning for discharge  - Provide patient education as appropriate  - Monitor functional capacity and physical performance, use of AM PAC & JH-HLM   - Monitor gait, balance and fatigue with ambulation    Outcome: Progressing  Goal: Maintains/Returns to pre admission functional level  Description: INTERVENTIONS:  - Perform AM-PAC 6 Click Basic Mobility/ Daily Activity assessment daily.  - Set and communicate daily mobility goal to care team and patient/family/caregiver.   - Collaborate with rehabilitation services on mobility goals if consulted  - Perform Range of Motion 3 times a day.  - Reposition patient every 2 hours.  - Dangle patient 3 times a day  - Stand patient 3 times a day  - Ambulate patient 3 times a day  - Out of bed to chair 3 times a day   - Out of bed for meals 3 times a day  - Out of bed for toileting  - Record patient progress and toleration of activity level   Outcome: Progressing     Problem: Knowledge Deficit  Goal: Patient/family/caregiver demonstrates understanding of disease process, treatment plan, medications, and discharge instructions  Description: Complete learning assessment and assess knowledge base.  Interventions:  - Provide teaching at level of understanding  - Provide teaching via preferred learning methods  Outcome: Progressing

## 2025-05-16 NOTE — CASE MANAGEMENT
Case Management Assessment & Discharge Planning Note    Patient name Kaykay Holland  Location /-01 MRN [id number]  : 1990 Date 2025       Current Admission Date: 2025  Current Admission Diagnosis:Acute alcoholic pancreatitis   Patient Active Problem List    Diagnosis Date Noted    Hyponatremia 2025    Moderate protein-calorie malnutrition (HCC) 2025    Abdominal pain 2025    Splenic vein thrombosis 2024    Leukocytosis 2024    Thrombocytopenia (HCC) 2024    Ovarian cyst 2024    Constipation 2024    Pancreatic pseudocyst 2024    Electrolyte abnormality 2024    Alcohol use disorder 2024    Hematemesis 2024    Necrotizing pancreatitis 2024    Hypomagnesemia 2024    Pancytopenia (HCC) 04/10/2024    Alcohol abuse 2024    Provoked seizures (HCC) 2024    Alcohol withdrawal seizure (HCC) 2024    Transaminitis 2024    Prolonged QT interval 2024    Essential hypertension 2024    Tobacco abuse 2024    Acute alcoholic pancreatitis 2023    Hepatic steatosis 2023    Peptic ulcer disease 2021    Alcohol withdrawal (HCC) 2020    Anxiety 2019    Depression 2019      LOS (days): 0  Geometric Mean LOS (GMLOS) (days):   Days to GMLOS:     OBJECTIVE:    Risk of Unplanned Readmission Score: 30.97         Current admission status: Inpatient       Preferred Pharmacy:   [id number] - [place] - [address]  [address]  [place]   Phone: [phone] Fax: [phone]    Primary Care Provider: PATRICIA Moran    Primary Insurance:   Secondary Insurance:     ASSESSMENT:  Active Health Care Proxies       Chichi George [facility] Care Representative - Mother   Primary Phone: [phone] (Mobile)                 Advance Directives  Does patient have a Health Care POA?: No  Was patient  offered paperwork?: Yes  Does patient currently have a Health Care decision maker?: Yes, please see Health Care Proxy section  Does patient have Advance Directives?: No  Was patient offered paperwork?: Yes  Primary Contact: mother Cadet.         Readmission Root Cause  30 Day Readmission: No    Patient Information  Admitted from:: Home  Mental Status: Alert  During Assessment patient was accompanied by: Not accompanied during assessment  Assessment information provided by:: Patient  Primary Caregiver: Self  Support Systems: Self, Spouse/significant other, Family members, Parent  County of Residence: [facility]  What city do you live in?: [place].  Home entry access options. Select all that apply.: Stairs  Number of steps to enter home.: 7  Do the steps have railings?: Yes  Type of Current Residence: 2 story home  Upon entering residence, is there a bedroom on the main floor (no further steps)?: No  A bedroom is located on the following floor levels of residence (select all that apply):: 2nd Floor  Upon entering residence, is there a bathroom on the main floor (no further steps)?: No  Indicate which floors of current residence have a bathroom (select all the apply):: 2nd Floor  Number of steps to 2nd floor from main floor: One Flight  Living Arrangements: Lives Alone  Is patient a ?: No    Activities of Daily Living Prior to Admission  Functional Status: Independent  Completes ADLs independently?: Yes  Ambulates independently?: Yes  Does patient use assisted devices?: No  Does patient currently own DME?: Yes  What DME does the patient currently own?: Other (Comment) (BP cuff)  Does patient have a history of Outpatient Therapy (PT/OT)?: No  Does the patient have a history of Short-Term Rehab?: No  Does patient have a history of HHC?: No  Does patient currently have HHC?: No         Patient Information Continued  Income Source: Employed  Does patient have prescription coverage?: No  Can the patient afford their  medications and any related supplies (such as glucometers or test strips)?: No (Pt stopped taking meds due to lack of insurance. Started new job. Insurance is not active yet.)  Does patient receive dialysis treatments?: No  Does patient have a history of substance abuse?: Yes, Currently using  Current substance use preference: Alcohol/ETOH  Historical substance use preference: Alcohol/ETOH  History of Withdrawal Symptoms: Seizures  Is patient currently in treatment for substance abuse?: No. Patient declined treatment information.  Does patient have a history of Mental Health Diagnosis?: Yes  Is patient receiving treatment for mental health?: No. Patient declined treatment information.  Has patient received inpatient treatment related to mental health in the last 2 years?: No         Means of Transportation  Means of Transport to Appts:: Drives Self          DISCHARGE DETAILS:    Discharge planning discussed with:: Pt  Freedom of Choice: Yes  Comments - Freedom of Choice: Declining [facility]ARES.  CM contacted family/caregiver?: Yes  Were Treatment Team discharge recommendations reviewed with patient/caregiver?: Yes  Did patient/caregiver verbalize understanding of patient care needs?: Yes  Were patient/caregiver advised of the risks associated with not following Treatment Team discharge recommendations?: Yes    Contacts  Patient Contacts: Chichi, mother.  Relationship to Patient:: Family  Contact Method: Phone  Phone Number: [phone]  Reason/Outcome: Discharge Planning, Emergency Contact    Requested Home Health Care         Is the patient interested in HHC at discharge?: No    DME Referral Provided  Referral made for DME?: No    Other Referral/Resources/Interventions Provided:  Interventions: Outpatient , High Utilizer, Other (Specify), D&A Warm Handoff  Referral Comments: Reports having no insurance due to starting new job, the insurance of which begins in one month. Informed Hospital Financial Counselors  and Inpatient Insurance Verifiers. PATHs referred. OPCM referred and plan from admin pool is to follow up. Broached BCARES/ D&A treatment. Pt declined. Per chart review, pt declined OPCM referral twice after discharge. Emailed High Utilizer Care Plan Committee to see if pt could have care plan in place.         Treatment Team Recommendation: Substance Abuse Treatment  Discharge Destination Plan:: Home                                         Additional Comments: CM met with pt at bedside to plan discharge. Pt lives alone in a 2sh, 7ste, bed and bathroom on 2nd fl. Pt reports being independent with walking and ADLs PTA. Recently employed and drives. Does not use DME. Owns BP cuff. No STR, HHC, OPPT. Reports ETOH treatment 6-7 years ago and inpatient psych 10 years ago. Stated she follows with a virtual talk therapist. Declining BCARES. No medical POA. [facility] preferred. SO or friend to transport.

## 2025-05-16 NOTE — ASSESSMENT & PLAN NOTE
Blood alcohol level greater than 400 on presentation to the ED.    -[facility] protocol  -Continue folic acid, thiamine, multivitamin  -Strongly encouraged ongoing cessation and seeking out formal treatment for her alcohol use disorder

## 2025-05-16 NOTE — ASSESSMENT & PLAN NOTE
"CT A/P: \"Mild hepatic steatosis. Portions of the surface contour of the liver appears slightly nodular, raising concern for possible early cirrhosis. Upper abdominal varices are present.  \"  Suspect in the setting of alcohol abuse  GI consulted  Discussed need for alcohol abstinence with patient  "

## 2025-05-16 NOTE — ASSESSMENT & PLAN NOTE
Reports that she started drinking 1 week ago -unclear if she has only been drinking for 1 week  Presents to the ED acutely intoxicated with alcohol level greater than 400-reports drinking half a bottle of wine prior to arrival to the ED  Has responded well to Librium taper in the past-will start at noon on 5/16  [facility] protocol

## 2025-05-16 NOTE — LETTER
[facility] MED SURG [facility]  [address]  [place] [id number]  Dept: [phone]    May 20, 2025     Patient: Kaykay Holland   YOB: 1990   Date of Visit: 5/16/2025       To Whom it May Concern:    Kaykay Holland is under my professional care. She was seen in the hospital from 5/16/2025 to 05/20/25. She may return to work on 05/22/25 without limitations.    If you have any questions or concerns, please don't hesitate to call.         Sincerely,          Florina Arce PA-C

## 2025-05-16 NOTE — PLAN OF CARE
Problem: Potential for Falls  Goal: Patient will remain free of falls  Description: INTERVENTIONS:  - Educate patient/family on patient safety including physical limitations  - Instruct patient to call for assistance with activity   - Consider consulting OT/PT to assist with strengthening/mobility based on AM PAC & JH-HLM score  - Consult OT/PT to assist with strengthening/mobility   - Keep Call bell within reach  - Keep bed low and locked with side rails adjusted as appropriate  - Keep care items and personal belongings within reach  - Initiate and maintain comfort rounds  - Make Fall Risk Sign visible to staff  - Offer Toileting every 3 Hours, in advance of need  - Initiate/Maintain bed alarm  - Obtain necessary fall risk management equipment:   - Apply yellow socks and bracelet for high fall risk patients  - Consider moving patient to room near nurses station  Outcome: Progressing     Problem: PAIN - ADULT  Goal: Verbalizes/displays adequate comfort level or baseline comfort level  Description: Interventions:  - Encourage patient to monitor pain and request assistance  - Assess pain using appropriate pain scale  - Administer analgesics as ordered based on type and severity of pain and evaluate response  - Implement non-pharmacological measures as appropriate and evaluate response  - Consider cultural and social influences on pain and pain management  - Notify physician/advanced practitioner if interventions unsuccessful or patient reports new pain  - Educate patient/family on pain management process including their role and importance of  reporting pain   - Provide non-pharmacologic/complimentary pain relief interventions  Outcome: Progressing     Problem: INFECTION - ADULT  Goal: Absence or prevention of progression during hospitalization  Description: INTERVENTIONS:  - Assess and monitor for signs and symptoms of infection  - Monitor lab/diagnostic results  - Monitor all insertion sites, i.e. indwelling  lines, tubes, and drains  - Monitor endotracheal if appropriate and nasal secretions for changes in amount and color  - [place] appropriate cooling/warming therapies per order  - Administer medications as ordered  - Instruct and encourage patient and family to use good hand hygiene technique  - Identify and instruct in appropriate isolation precautions for identified infection/condition  Outcome: Progressing  Goal: Absence of fever/infection during neutropenic period  Description: INTERVENTIONS:  - Monitor WBC  - Perform strict hand hygiene  - Limit to healthy visitors only  - No plants, dried, fresh or silk flowers with butler in patient room  Outcome: Progressing     Problem: SAFETY ADULT  Goal: Patient will remain free of falls  Description: INTERVENTIONS:  - Educate patient/family on patient safety including physical limitations  - Instruct patient to call for assistance with activity   - Consider consulting OT/PT to assist with strengthening/mobility based on AM PAC & -HLM score  - Consult OT/PT to assist with strengthening/mobility   - Keep Call bell within reach  - Keep bed low and locked with side rails adjusted as appropriate  - Keep care items and personal belongings within reach  - Initiate and maintain comfort rounds  - Make Fall Risk Sign visible to staff  - Offer Toileting every 3 Hours, in advance of need  - Initiate/Maintain bed alarm  - Obtain necessary fall risk management equipment:   - Apply yellow socks and bracelet for high fall risk patients  - Consider moving patient to room near nurses station  Outcome: Progressing  Goal: Maintain or return to baseline ADL function  Description: INTERVENTIONS:  -  Assess patient's ability to carry out ADLs; assess patient's baseline for ADL function and identify physical deficits which impact ability to perform ADLs (bathing, care of mouth/teeth, toileting, grooming, dressing, etc.)  - Assess/evaluate cause of self-care deficits   - Assess range of  motion  - Assess patient's mobility; develop plan if impaired  - Assess patient's need for assistive devices and provide as appropriate  - Encourage maximum independence but intervene and supervise when necessary  - Involve family in performance of ADLs  - Assess for home care needs following discharge   - Consider OT consult to assist with ADL evaluation and planning for discharge  - Provide patient education as appropriate  - Monitor functional capacity and physical performance, use of AM PAC & JH-HLM   - Monitor gait, balance and fatigue with ambulation    Outcome: Progressing  Goal: Maintains/Returns to pre admission functional level  Description: INTERVENTIONS:  - Perform AM-PAC 6 Click Basic Mobility/ Daily Activity assessment daily.  - Set and communicate daily mobility goal to care team and patient/family/caregiver.   - Collaborate with rehabilitation services on mobility goals if consulted  - Perform Range of Motion 3 times a day.  - Reposition patient every 2 hours.  - Dangle patient 3 times a day  - Stand patient 3 times a day  - Ambulate patient 3 times a day  - Out of bed to chair 3 times a day   - Out of bed for meals 3 times a day  - Out of bed for toileting  - Record patient progress and toleration of activity level   Outcome: Progressing     Problem: DISCHARGE PLANNING  Goal: Discharge to home or other facility with appropriate resources  Description: INTERVENTIONS:  - Identify barriers to discharge w/patient and caregiver  - Arrange for needed discharge resources and transportation as appropriate  - Identify discharge learning needs (meds, wound care, etc.)  - Arrange for interpretive services to assist at discharge as needed  - Refer to Case Management Department for coordinating discharge planning if the patient needs post-hospital services based on physician/advanced practitioner order or complex needs related to functional status, cognitive ability, or social support system  Outcome:  Progressing     Problem: Knowledge Deficit  Goal: Patient/family/caregiver demonstrates understanding of disease process, treatment plan, medications, and discharge instructions  Description: Complete learning assessment and assess knowledge base.  Interventions:  - Provide teaching at level of understanding  - Provide teaching via preferred learning methods  Outcome: Progressing

## 2025-05-17 LAB
ALBUMIN SERPL BCG-MCNC: 3.3 G/DL (ref 3.5–5)
ALP SERPL-CCNC: 76 U/L (ref 34–104)
ALT SERPL W P-5'-P-CCNC: 14 U/L (ref 7–52)
ANION GAP SERPL CALCULATED.3IONS-SCNC: 5 MMOL/L (ref 4–13)
ANION GAP SERPL CALCULATED.3IONS-SCNC: 6 MMOL/L (ref 4–13)
AST SERPL W P-5'-P-CCNC: 34 U/L (ref 13–39)
BASOPHILS # BLD AUTO: 0.03 THOUSANDS/ÂΜL (ref 0–0.1)
BASOPHILS NFR BLD AUTO: 1 % (ref 0–1)
BILIRUB SERPL-MCNC: 1.36 MG/DL (ref 0.2–1)
BUN SERPL-MCNC: 4 MG/DL (ref 5–25)
BUN SERPL-MCNC: 4 MG/DL (ref 5–25)
CALCIUM ALBUM COR SERPL-MCNC: 8.6 MG/DL (ref 8.3–10.1)
CALCIUM SERPL-MCNC: 8 MG/DL (ref 8.4–10.2)
CALCIUM SERPL-MCNC: 8 MG/DL (ref 8.4–10.2)
CHLORIDE SERPL-SCNC: 95 MMOL/L (ref 96–108)
CHLORIDE SERPL-SCNC: 95 MMOL/L (ref 96–108)
CO2 SERPL-SCNC: 31 MMOL/L (ref 21–32)
CO2 SERPL-SCNC: 32 MMOL/L (ref 21–32)
CREAT SERPL-MCNC: 0.42 MG/DL (ref 0.6–1.3)
CREAT SERPL-MCNC: 0.43 MG/DL (ref 0.6–1.3)
EOSINOPHIL # BLD AUTO: 0.14 THOUSAND/ÂΜL (ref 0–0.61)
EOSINOPHIL NFR BLD AUTO: 3 % (ref 0–6)
ERYTHROCYTE [DISTWIDTH] IN BLOOD BY AUTOMATED COUNT: 13.1 % (ref 11.6–15.1)
GFR SERPL CREATININE-BSD FRML MDRD: 133 ML/MIN/1.73SQ M
GFR SERPL CREATININE-BSD FRML MDRD: 134 ML/MIN/1.73SQ M
GLUCOSE SERPL-MCNC: 80 MG/DL (ref 65–140)
GLUCOSE SERPL-MCNC: 82 MG/DL (ref 65–140)
HCT VFR BLD AUTO: 37.5 % (ref 34.8–46.1)
HGB BLD-MCNC: 12.2 G/DL (ref 11.5–15.4)
IMM GRANULOCYTES # BLD AUTO: 0.02 THOUSAND/UL (ref 0–0.2)
IMM GRANULOCYTES NFR BLD AUTO: 0 % (ref 0–2)
LDH SERPL-CCNC: 136 U/L (ref 140–271)
LYMPHOCYTES # BLD AUTO: 1.37 THOUSANDS/ÂΜL (ref 0.6–4.47)
LYMPHOCYTES NFR BLD AUTO: 30 % (ref 14–44)
MAGNESIUM SERPL-MCNC: 1.6 MG/DL (ref 1.9–2.7)
MCH RBC QN AUTO: 32.8 PG (ref 26.8–34.3)
MCHC RBC AUTO-ENTMCNC: 32.5 G/DL (ref 31.4–37.4)
MCV RBC AUTO: 101 FL (ref 82–98)
MONOCYTES # BLD AUTO: 0.53 THOUSAND/ÂΜL (ref 0.17–1.22)
MONOCYTES NFR BLD AUTO: 12 % (ref 4–12)
NEUTROPHILS # BLD AUTO: 2.53 THOUSANDS/ÂΜL (ref 1.85–7.62)
NEUTS SEG NFR BLD AUTO: 54 % (ref 43–75)
NRBC BLD AUTO-RTO: 0 /100 WBCS
PLATELET # BLD AUTO: 143 THOUSANDS/UL (ref 149–390)
PMV BLD AUTO: 10.1 FL (ref 8.9–12.7)
POTASSIUM SERPL-SCNC: 3.8 MMOL/L (ref 3.5–5.3)
POTASSIUM SERPL-SCNC: 3.9 MMOL/L (ref 3.5–5.3)
PROT SERPL-MCNC: 5.9 G/DL (ref 6.4–8.4)
RBC # BLD AUTO: 3.72 MILLION/UL (ref 3.81–5.12)
SODIUM SERPL-SCNC: 131 MMOL/L (ref 135–147)
SODIUM SERPL-SCNC: 133 MMOL/L (ref 135–147)
WBC # BLD AUTO: 4.62 THOUSAND/UL (ref 4.31–10.16)

## 2025-05-17 PROCEDURE — 80048 BASIC METABOLIC PNL TOTAL CA: CPT

## 2025-05-17 PROCEDURE — 85025 COMPLETE CBC W/AUTO DIFF WBC: CPT | Performed by: PHYSICIAN ASSISTANT

## 2025-05-17 PROCEDURE — 80053 COMPREHEN METABOLIC PANEL: CPT | Performed by: PHYSICIAN ASSISTANT

## 2025-05-17 PROCEDURE — 99232 SBSQ HOSP IP/OBS MODERATE 35: CPT | Performed by: INTERNAL MEDICINE

## 2025-05-17 PROCEDURE — 83615 LACTATE (LD) (LDH) ENZYME: CPT

## 2025-05-17 PROCEDURE — 83735 ASSAY OF MAGNESIUM: CPT | Performed by: PHYSICIAN ASSISTANT

## 2025-05-17 RX ORDER — HYDROXYZINE HYDROCHLORIDE 25 MG/1
25 TABLET, FILM COATED ORAL ONCE
Status: COMPLETED | OUTPATIENT
Start: 2025-05-17 | End: 2025-05-17

## 2025-05-17 RX ORDER — PHENOBARBITAL SODIUM 65 MG/ML
65 INJECTION, SOLUTION INTRAMUSCULAR; INTRAVENOUS ONCE
Status: COMPLETED | OUTPATIENT
Start: 2025-05-17 | End: 2025-05-17

## 2025-05-17 RX ORDER — MAGNESIUM SULFATE HEPTAHYDRATE 40 MG/ML
2 INJECTION, SOLUTION INTRAVENOUS ONCE
Status: COMPLETED | OUTPATIENT
Start: 2025-05-17 | End: 2025-05-17

## 2025-05-17 RX ORDER — SODIUM CHLORIDE, SODIUM GLUCONATE, SODIUM ACETATE, POTASSIUM CHLORIDE, MAGNESIUM CHLORIDE, SODIUM PHOSPHATE, DIBASIC, AND POTASSIUM PHOSPHATE .53; .5; .37; .037; .03; .012; .00082 G/100ML; G/100ML; G/100ML; G/100ML; G/100ML; G/100ML; G/100ML
200 INJECTION, SOLUTION INTRAVENOUS CONTINUOUS
Status: DISPENSED | OUTPATIENT
Start: 2025-05-17 | End: 2025-05-17

## 2025-05-17 RX ORDER — CETIRIZINE HYDROCHLORIDE 10 MG/1
10 TABLET ORAL DAILY
Status: DISCONTINUED | OUTPATIENT
Start: 2025-05-17 | End: 2025-05-17

## 2025-05-17 RX ORDER — DEXMEDETOMIDINE HYDROCHLORIDE 4 UG/ML
.1-.7 INJECTION, SOLUTION INTRAVENOUS
Status: DISCONTINUED | OUTPATIENT
Start: 2025-05-17 | End: 2025-05-17

## 2025-05-17 RX ORDER — SODIUM CHLORIDE, SODIUM LACTATE, POTASSIUM CHLORIDE, CALCIUM CHLORIDE 600; 310; 30; 20 MG/100ML; MG/100ML; MG/100ML; MG/100ML
200 INJECTION, SOLUTION INTRAVENOUS CONTINUOUS
Status: DISCONTINUED | OUTPATIENT
Start: 2025-05-17 | End: 2025-05-20 | Stop reason: HOSPADM

## 2025-05-17 RX ORDER — DIAZEPAM 5 MG/1
5 TABLET ORAL EVERY 6 HOURS PRN
Status: DISCONTINUED | OUTPATIENT
Start: 2025-05-17 | End: 2025-05-20 | Stop reason: HOSPADM

## 2025-05-17 RX ADMIN — CHLORHEXIDINE GLUCONATE 15 ML: 1.2 SOLUTION ORAL at 20:35

## 2025-05-17 RX ADMIN — NICOTINE 14 MG: 14 PATCH, EXTENDED RELEASE TRANSDERMAL at 10:27

## 2025-05-17 RX ADMIN — HYDROMORPHONE HYDROCHLORIDE 0.2 MG: 0.2 INJECTION, SOLUTION INTRAMUSCULAR; INTRAVENOUS; SUBCUTANEOUS at 10:40

## 2025-05-17 RX ADMIN — SODIUM CHLORIDE, SODIUM LACTATE, POTASSIUM CHLORIDE, AND CALCIUM CHLORIDE 200 ML/HR: .6; .31; .03; .02 INJECTION, SOLUTION INTRAVENOUS at 17:42

## 2025-05-17 RX ADMIN — HYDROMORPHONE HYDROCHLORIDE 0.5 MG: 1 INJECTION, SOLUTION INTRAMUSCULAR; INTRAVENOUS; SUBCUTANEOUS at 22:51

## 2025-05-17 RX ADMIN — FOLIC ACID 1 MG: 1 TABLET ORAL at 10:30

## 2025-05-17 RX ADMIN — HYDROMORPHONE HYDROCHLORIDE 0.5 MG: 1 INJECTION, SOLUTION INTRAMUSCULAR; INTRAVENOUS; SUBCUTANEOUS at 18:52

## 2025-05-17 RX ADMIN — SODIUM CHLORIDE, SODIUM GLUCONATE, SODIUM ACETATE, POTASSIUM CHLORIDE, MAGNESIUM CHLORIDE, SODIUM PHOSPHATE, DIBASIC, AND POTASSIUM PHOSPHATE 200 ML/HR: .53; .5; .37; .037; .03; .012; .00082 INJECTION, SOLUTION INTRAVENOUS at 13:19

## 2025-05-17 RX ADMIN — HYDROMORPHONE HYDROCHLORIDE 0.2 MG: 0.2 INJECTION, SOLUTION INTRAMUSCULAR; INTRAVENOUS; SUBCUTANEOUS at 00:11

## 2025-05-17 RX ADMIN — MAGNESIUM SULFATE HEPTAHYDRATE 2 G: 2 INJECTION, SOLUTION INTRAVENOUS at 18:27

## 2025-05-17 RX ADMIN — SODIUM CHLORIDE, SODIUM GLUCONATE, SODIUM ACETATE, POTASSIUM CHLORIDE, MAGNESIUM CHLORIDE, SODIUM PHOSPHATE, DIBASIC, AND POTASSIUM PHOSPHATE 125 ML/HR: .53; .5; .37; .037; .03; .012; .00082 INJECTION, SOLUTION INTRAVENOUS at 10:32

## 2025-05-17 RX ADMIN — HYDROMORPHONE HYDROCHLORIDE 0.5 MG: 1 INJECTION, SOLUTION INTRAMUSCULAR; INTRAVENOUS; SUBCUTANEOUS at 07:48

## 2025-05-17 RX ADMIN — PHENOBARBITAL SODIUM 65 MG: 65 INJECTION INTRAMUSCULAR; INTRAVENOUS at 05:55

## 2025-05-17 RX ADMIN — HYDROMORPHONE HYDROCHLORIDE 0.2 MG: 0.2 INJECTION, SOLUTION INTRAMUSCULAR; INTRAVENOUS; SUBCUTANEOUS at 16:01

## 2025-05-17 RX ADMIN — DIAZEPAM 5 MG: 5 TABLET ORAL at 18:46

## 2025-05-17 RX ADMIN — SODIUM CHLORIDE, SODIUM LACTATE, POTASSIUM CHLORIDE, AND CALCIUM CHLORIDE 200 ML/HR: .6; .31; .03; .02 INJECTION, SOLUTION INTRAVENOUS at 13:22

## 2025-05-17 RX ADMIN — MULTIPLE VITAMINS W/ MINERALS TAB 1 TABLET: TAB ORAL at 10:30

## 2025-05-17 RX ADMIN — HYDROMORPHONE HYDROCHLORIDE 0.5 MG: 1 INJECTION, SOLUTION INTRAMUSCULAR; INTRAVENOUS; SUBCUTANEOUS at 03:42

## 2025-05-17 RX ADMIN — SODIUM CHLORIDE, SODIUM LACTATE, POTASSIUM CHLORIDE, AND CALCIUM CHLORIDE 200 ML/HR: .6; .31; .03; .02 INJECTION, SOLUTION INTRAVENOUS at 22:40

## 2025-05-17 RX ADMIN — HYDROMORPHONE HYDROCHLORIDE 0.5 MG: 1 INJECTION, SOLUTION INTRAMUSCULAR; INTRAVENOUS; SUBCUTANEOUS at 13:14

## 2025-05-17 RX ADMIN — Medication 100 MG: at 10:30

## 2025-05-17 RX ADMIN — CHLORHEXIDINE GLUCONATE 15 ML: 1.2 SOLUTION ORAL at 10:37

## 2025-05-17 RX ADMIN — MAGNESIUM SULFATE HEPTAHYDRATE 2 G: 40 INJECTION, SOLUTION INTRAVENOUS at 07:22

## 2025-05-17 RX ADMIN — HYDROXYZINE HYDROCHLORIDE 25 MG: 25 TABLET, FILM COATED ORAL at 15:52

## 2025-05-17 NOTE — ASSESSMENT & PLAN NOTE
History of alcohol abuse with history of alcohol abuse seizures  Patient reports that she started drinking again 1 week ago.  Reports drinking wine at home  Ethanol greater than 400 on admission  Developed alcohol withdrawal symptoms today that were poorly controlled with Valium    Plan  Load with phenobarbital 10 mg/kg (549 mg)  CIWA assessments with PRN phenobarbital  Continue thiamine and folic acid

## 2025-05-17 NOTE — PROGRESS NOTES
Progress Note - Critical Care/ICU   Name: Kaykay Holland 35 y.o. female I MRN: [id number]  Unit/Bed#: -01 SDU I Date of Admission: 5/16/2025   Date of Service: 5/17/2025 I Hospital Day: 1      Assessment & Plan  Acute alcoholic pancreatitis  Presented with abdominal pain, nausea, and vomiting  History of alcohol abuse with multiple episodes of alcoholic pancreatitis  Lipase 124  CTAP: Acute interstitial edematous pancreatitis. Multiple pseudocysts are present, increased in size and number when compared with March 30, 2025. Please see discussion. There is trace ascites, decreased in amount compared with March 30, 2025. The wall of the stomach appears thickened and edematous. The duodenal wall appears thickened. Follow-up recommended. Hepatomegaly. Mild hepatic steatosis. Portions of the surface contour of the liver appears slightly nodular, raising concern for possible early cirrhosis. Upper abdominal varices are present. The splenic vein is opacified, however, appears somewhat attenuated as it passes posterior to the pancreas.  GI consulted    Plan  Continue IV fluids  As needed Dilaudid for pain control  N.p.o.  Serial abdominal exams  Alcohol withdrawal (HCC)  History of alcohol abuse with history of alcohol abuse seizures  Patient reports that she started drinking again 1 week ago.  Reports drinking wine at home  Ethanol greater than 400 on admission  Developed alcohol withdrawal symptoms today that were poorly controlled with Valium    Plan  Loaded with phenobarbital 10 mg/kg (549 mg)  CIWA assessments with PRN phenobarbital  Continue thiamine and folic acid  Pancreatic pseudocyst  Noted on CT  GI consulted  Tobacco abuse  NRT  Hepatic steatosis  Encourage alcohol cessation  Disposition: Stepdown Level 1    ICU Core Measures     A: Assess, Prevent, and Manage Pain Has pain been assessed? Yes  Need for changes to pain regimen? No   B: Both SAT/SAT  N/A   C: Choice of Sedation RASS Goal: 0 Alert and Calm  or N/A patient not on sedation  Need for changes to sedation or analgesia regimen? N/A   D: Delirium CAM-ICU: Negative   E: Early Mobility  Plan for early mobility? Yes   F: Family Engagement Plan for family engagement today? Yes         Prophylaxis:  VTE Contraindicated secondary to: Low Risk   Stress Ulcer  not ordered         24 Hour Events : After 10 mg/kg load with phenobarbital, withdrawal symptoms significantly improved. No acute events overnight. No additional doses of phenobarbital required. Patient states her pain is well controlled and her nausea has resolved.    Subjective   Review of Systems: See HPI for Review of Systems    Objective :                   Vitals I/O      Most Recent Min/Max in 24hrs   Temp 98.2 °F (36.8 °C) Temp  Min: 97.3 °F (36.3 °C)  Max: 98.8 °F (37.1 °C)   Pulse 75 Pulse  Min: 65  Max: 84   Resp (!) 27 Resp  Min: 14  Max: 27   /95 BP  Min: 99/65  Max: 141/95   O2 Sat 97 % SpO2  Min: 94 %  Max: 98 %      Intake/Output Summary (Last 24 hours) at 5/17/2025 0013  Last data filed at 5/16/2025 0850  Gross per 24 hour   Intake 2290 ml   Output 350 ml   Net 1940 ml       Diet NPO    Invasive Monitoring           Physical Exam   Physical Exam  Vitals reviewed.   Eyes:      Pupils: Pupils are equal, round, and reactive to light.   Skin:     General: Skin is warm and dry.      Capillary Refill: Capillary refill takes 2 to 3 seconds.   HENT:      Head: Normocephalic and atraumatic.      Mouth/Throat:      Mouth: Mucous membranes are moist.   Cardiovascular:      Rate and Rhythm: Normal rate and regular rhythm.      Heart sounds: No murmur heard.     No friction rub. No gallop.   Musculoskeletal:      Right lower leg: No edema.      Left lower leg: No edema.   Abdominal: General: There is no distension.      Palpations: Abdomen is soft.      Tenderness: There is no abdominal tenderness.   Constitutional:       Appearance: She is well-developed and well-nourished.   Pulmonary:      Effort:  Pulmonary effort is normal.      Breath sounds: No wheezing, rhonchi or rales.   Neurological:      General: No focal deficit present.      Mental Status: She is alert and oriented to person, place and time. Mental status is at baseline.      Motor: Strength full and intact in all extremities.          Diagnostic Studies        Lab Results: I have reviewed the following results:     Medications:  Scheduled PRN   chlorhexidine, 15 mL, Q12H RODRICK  folic acid, 1 mg, Daily  multivitamin-minerals, 1 tablet, Daily  nicotine, 14 mg, Daily  thiamine, 100 mg, Daily      HYDROmorphone, 0.2 mg, Q4H PRN   Or  HYDROmorphone, 0.5 mg, Q4H PRN  HYDROmorphone, 0.2 mg, Q4H PRN  ondansetron, 4 mg, Q6H PRN  trimethobenzamide, 200 mg, Q6H PRN       Continuous    multi-electrolyte, 125 mL/hr, Last Rate: 125 mL/hr (05/16/25 2200)         Labs:   CBC    Recent Labs     05/16/25  0029   WBC 7.17   HGB 13.6   HCT 41.7        BMP    Recent Labs     05/16/25  0028 05/16/25  0601   SODIUM 140 141   K 4.0 4.3    107   CO2 23 23   AGAP 12 11   BUN 7 6   CREATININE 0.32* 0.34*   CALCIUM 8.7 7.7*       Coags    No recent results     Additional Electrolytes  Recent Labs     05/16/25  0601   MG 1.4*   PHOS 3.7          Blood Gas    No recent results  No recent results LFTs  Recent Labs     05/16/25  0028 05/16/25  0601   ALT 17 16   AST 50* 46*   ALKPHOS 92 77   ALB 4.1 3.4*   TBILI 0.50 0.38       Infectious  No recent results  Glucose  Recent Labs     05/16/25  0028 05/16/25  0601   GLUC 94 82

## 2025-05-17 NOTE — CONSULTS
Consultation - Critical Care/ICU   Name: Kaykay Holland 35 y.o. female I MRN: [id number]  Unit/Bed#: -01 SDU I Date of Admission: 5/16/2025   Date of Service: 5/16/2025 I Hospital Day: 0   Consults  Physician Requesting Evaluation: Florina Bledsoe DO   Reason for Evaluation / Principal Problem: Alcohol withdrawal        Assessment & Plan  Acute alcoholic pancreatitis  Presented with abdominal pain, nausea, and vomiting  History of alcohol abuse with multiple episodes of alcoholic pancreatitis  Lipase 124  CTAP: Acute interstitial edematous pancreatitis. Multiple pseudocysts are present, increased in size and number when compared with March 30, 2025. Please see discussion. There is trace ascites, decreased in amount compared with March 30, 2025. The wall of the stomach appears thickened and edematous. The duodenal wall appears thickened. Follow-up recommended. Hepatomegaly. Mild hepatic steatosis. Portions of the surface contour of the liver appears slightly nodular, raising concern for possible early cirrhosis. Upper abdominal varices are present. The splenic vein is opacified, however, appears somewhat attenuated as it passes posterior to the pancreas.  GI consulted    Plan  Continue IV fluids  As needed Dilaudid for pain control  N.p.o.  Serial abdominal exams  Alcohol withdrawal (HCC)  History of alcohol abuse with history of alcohol abuse seizures  Patient reports that she started drinking again 1 week ago.  Reports drinking wine at home  Ethanol greater than 400 on admission  Developed alcohol withdrawal symptoms today that were poorly controlled with Valium    Plan  Load with phenobarbital 10 mg/kg (549 mg)  CIWA assessments with PRN phenobarbital  Continue thiamine and folic acid  Pancreatic pseudocyst  Noted on CT  GI consulted  Tobacco abuse  NRT  Hepatic steatosis  Encourage alcohol cessation  Disposition: Stepdown Level 1    History of Present Illness   Kaykay Holland is a 35 y.o. female with  past medical history significant for alcohol abuse, frequent episodes of alcoholic pancreatitis, tobacco abuse who presented on 5/16 with abdominal pain and was admitted to internal medicine with alcoholic pancreatitis.  Today she developed alcohol withdrawal symptoms and toxicology was consulted.  Patient was started on IV Valium with poor control of symptoms.  Patient is now upgraded to stepdown 1 for phenobarbital.    History obtained from chart review.  Review of Systems: See HPI for Review of Systems    Historical Information   Past Medical History:  11/03/2023: Acute alcoholic pancreatitis  No date: Hypertension  04/10/2024: SIRS (systemic inflammatory response syndrome) (HCC) Past Surgical History:  No date: WISDOM TOOTH EXTRACTION   No current outpatient medications Allergies[1]   Social History[2] History reviewed. No pertinent family history.       Objective :                   Vitals I/O      Most Recent Min/Max in 24hrs   Temp 98.8 °F (37.1 °C) Temp  Min: 97.3 °F (36.3 °C)  Max: 98.8 °F (37.1 °C)   Pulse 76 Pulse  Min: 65  Max: 87   Resp 22 Resp  Min: 14  Max: 23   /92 BP  Min: 99/65  Max: 141/88   O2 Sat 98 % SpO2  Min: 94 %  Max: 98 %      Intake/Output Summary (Last 24 hours) at 5/16/2025 2258  Last data filed at 5/16/2025 0850  Gross per 24 hour   Intake 2290 ml   Output 350 ml   Net 1940 ml       Diet Clear Liquid    Invasive Monitoring           Physical Exam   Physical Exam  Vitals reviewed.   Eyes:      Pupils: Pupils are equal, round, and reactive to light.   Skin:     General: Skin is warm.   HENT:      Head: Normocephalic and atraumatic.      Mouth/Throat:      Mouth: Mucous membranes are moist.   Cardiovascular:      Rate and Rhythm: Normal rate and regular rhythm.      Heart sounds: No murmur heard.     No friction rub. No gallop.   Musculoskeletal:      Right lower leg: No edema.      Left lower leg: No edema.   Abdominal: General: There is distension.     Palpations: Abdomen is soft.       Tenderness: There is abdominal tenderness.   Constitutional:       Appearance: She is well-developed and well-nourished. She is diaphoretic.   Pulmonary:      Effort: Tachypnea present.      Breath sounds: No wheezing, rhonchi or rales.   Psychiatric:         Mood and Affect: Mood is anxious.   Neurological:      General: No focal deficit present.      Mental Status: She is alert.      Motor: Tremor.          Diagnostic Studies        Lab Results: I have reviewed the following results:     Medications:  Scheduled PRN   folic acid, 1 mg, Daily  multivitamin-minerals, 1 tablet, Daily  nicotine, 14 mg, Daily  thiamine, 100 mg, Daily      HYDROmorphone, 0.2 mg, Q4H PRN   Or  HYDROmorphone, 0.5 mg, Q4H PRN  HYDROmorphone, 0.2 mg, Q4H PRN  ondansetron, 4 mg, Q6H PRN  trimethobenzamide, 200 mg, Q6H PRN       Continuous    multi-electrolyte, 125 mL/hr, Last Rate: 125 mL/hr (05/16/25 2200)         Labs:   CBC    Recent Labs     05/16/25  0029   WBC 7.17   HGB 13.6   HCT 41.7        BMP    Recent Labs     05/16/25  0028 05/16/25  0601   SODIUM 140 141   K 4.0 4.3    107   CO2 23 23   AGAP 12 11   BUN 7 6   CREATININE 0.32* 0.34*   CALCIUM 8.7 7.7*       Coags    No recent results     Additional Electrolytes  Recent Labs     05/16/25  0601   MG 1.4*   PHOS 3.7          Blood Gas    No recent results  No recent results LFTs  Recent Labs     05/16/25  0028 05/16/25  0601   ALT 17 16   AST 50* 46*   ALKPHOS 92 77   ALB 4.1 3.4*   TBILI 0.50 0.38       Infectious  No recent results  Glucose  Recent Labs     05/16/25  0028 05/16/25  0601   GLUC 94 82        Administrative Statements          [1]   Allergies  Allergen Reactions    Bee Pollen Anaphylaxis    Other Edema     Bee stings (localized edema)   [2]   Social History  Tobacco Use    Smoking status: Every Day     Current packs/day: 0.50     Types: Cigarettes     Passive exposure: Never    Smokeless tobacco: Never    Tobacco comments:     Per pt last drink was  today 9/24/24 one glass of wine. Had previously stop on 4/4/24 for 1 month    Vaping Use    Vaping status: Never Used   Substance Use Topics    Alcohol use: Yes     Alcohol/week: 21.0 standard drinks of alcohol     Types: 21 Glasses of wine per week     Comment: last drank 3/30/25    Drug use: Never

## 2025-05-17 NOTE — ASSESSMENT & PLAN NOTE
History of alcohol abuse with history of alcohol abuse seizures  Patient reports that she started drinking again 1 week ago.  Reports drinking wine at home  Ethanol greater than 400 on admission  Developed alcohol withdrawal symptoms today that were poorly controlled with Valium    Plan  Loaded with phenobarbital 10 mg/kg (549 mg)  CIWA assessments with PRN phenobarbital  Continue thiamine and folic acid

## 2025-05-17 NOTE — ASSESSMENT & PLAN NOTE
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     CT shows acute interstitial edematous pancreatitis, multiple pseudocysts increased in size and number compared to CT from March (largest in the pancreatic tail measuring 4.5 x 3 x 2.3 cm), trace ascites, thickened appearance of the stomach and duodenal wall. No evidence of pancreatic necrosis, or abnormal gas in or around the pancreas to suggest infection.    On admission, lipase minimally elevated 124. Ethanol 408. Creatinine normal. No leukocytosis or fever.  Patient with increased left-sided, epigastric tenderness with palpation    -Continue supportive care  - Clear liquids as tolerated   -IV fluids, pain management and antiemetics as needed  - Check MELD score labs 5/18

## 2025-05-17 NOTE — ASSESSMENT & PLAN NOTE
Presented with abdominal pain, nausea, and vomiting  History of alcohol abuse with multiple episodes of alcoholic pancreatitis  Lipase 124  CTAP: Acute interstitial edematous pancreatitis. Multiple pseudocysts are present, increased in size and number when compared with March 30, 2025. Please see discussion. There is trace ascites, decreased in amount compared with March 30, 2025. The wall of the stomach appears thickened and edematous. The duodenal wall appears thickened. Follow-up recommended. Hepatomegaly. Mild hepatic steatosis. Portions of the surface contour of the liver appears slightly nodular, raising concern for possible early cirrhosis. Upper abdominal varices are present. The splenic vein is opacified, however, appears somewhat attenuated as it passes posterior to the pancreas.  GI consulted    Plan  Continue IV fluids  As needed Dilaudid for pain control  Started on clear liquids- up titrate base on clinical condition  Serial abdominal exams  Daily MELD labs  GI following- appreciate recommendations

## 2025-05-17 NOTE — ASSESSMENT & PLAN NOTE
Patient was transferred to ICU overnight due to increased withdrawal symptoms.  Patient feels much better this morning after receiving IV phenobarbital overnight.  Continue clear liquids as tolerated.  Monitor for withdrawal symptoms.

## 2025-05-17 NOTE — PROGRESS NOTES
Progress Note - Gastroenterology   Name: Kaykay Holland 35 y.o. 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     CT shows acute interstitial edematous pancreatitis, multiple pseudocysts increased in size and number compared to CT from March (largest in the pancreatic tail measuring 4.5 x 3 x 2.3 cm), trace ascites, thickened appearance of the stomach and duodenal wall. No evidence of pancreatic necrosis, or abnormal gas in or around the pancreas to suggest infection.    On admission, lipase minimally elevated 124. Ethanol 408. Creatinine normal. No leukocytosis or fever.  Patient with increased left-sided, epigastric tenderness with palpation    -Continue supportive care  - Clear liquids as tolerated   -IV fluids, pain management and antiemetics as needed  - Check MELD score labs 5/18    Pancreatic pseudocyst  See plan above  Hepatic steatosis  Hepatic steatosis likely 2/2 to alcohol use disorder. AST mildly elevated on admission, resolved.  T.bili 1.36 with a.m. labs today.   Liver tests otherwise normal. CT shows hepatomegaly and hepatic steatosis, with slightly nodular liver contour raising concern for possible early cirrhosis. Upper abdominal varices are present. Spleen appears normal in size. There is trace ascites, decreased from prior study.    She did have EGD in Dec 2024/Jan 2025 and there was no evidence of esophageal or gastric varices at that time.    - Will need  GI outpatient follow-up   Tobacco abuse  Discussed importance of tobacco cessation    Alcohol withdrawal (HCC)  Patient was transferred to ICU overnight due to increased withdrawal symptoms.   Patient feels much better this morning after receiving IV phenobarbital overnight.  Continue clear liquids as tolerated.  Monitor for withdrawal symptoms.        Subjective   Patient was transferred to ICU overnight due to withdrawal symptoms.  She was given IV phenobarb.  She is a little better this morning.  She is going to attempt to tolerate clear liquids this morning.  Continues to have left upper quadrant tenderness with palpation.    Objective :  Temp:  [97.3 °F (36.3 °C)-98.8 °F (37.1 °C)] 98.6 °F (37 °C)  HR:  [55-84] 67  BP: ()/(65-97) 154/97  Resp:  [14-27] 16  SpO2:  [95 %-98 %] 98 %  O2 Device: None (Room air)    Physical Exam  Vitals and nursing note reviewed.   Constitutional:       General: She is not in acute distress.     Appearance: She is well-developed.   HENT:      Head: Normocephalic and atraumatic.     Eyes:      Conjunctiva/sclera: Conjunctivae normal.       Cardiovascular:      Rate and Rhythm: Normal rate and regular rhythm.      Heart sounds: No murmur heard.  Pulmonary:      Effort: Pulmonary effort is normal. No respiratory distress.      Breath sounds: Normal breath sounds.   Abdominal:      Palpations: Abdomen is soft.      Tenderness: There is abdominal tenderness (Left upper tenderness with palpation).     Musculoskeletal:         General: No swelling.      Cervical back: Neck supple.     Skin:     General: Skin is warm and dry.     Neurological:      Mental Status: She is alert and oriented to person, place, and time.     Psychiatric:         Mood and Affect: Mood normal.       Lab Results: I have reviewed the following results:

## 2025-05-17 NOTE — ASSESSMENT & PLAN NOTE
Hepatic steatosis likely 2/2 to alcohol use disorder. AST mildly elevated on admission, resolved.  T.bili 1.36 with a.m. labs today.   Liver tests otherwise normal. CT shows hepatomegaly and hepatic steatosis, with slightly nodular liver contour raising concern for possible early cirrhosis. Upper abdominal varices are present. Spleen appears normal in size. There is trace ascites, decreased from prior study.    She did have EGD in Dec 2024/Jan 2025 and there was no evidence of esophageal or gastric varices at that time.    - Will need  GI outpatient follow-up

## 2025-05-17 NOTE — PLAN OF CARE
Problem: Potential for Falls  Goal: Patient will remain free of falls  Description: INTERVENTIONS:  - Educate patient/family on patient safety including physical limitations  - Instruct patient to call for assistance with activity   - Consider consulting OT/PT to assist with strengthening/mobility based on AM PAC & JH-HLM score  - Consult OT/PT to assist with strengthening/mobility   - Keep Call bell within reach  - Keep bed low and locked with side rails adjusted as appropriate  - Keep care items and personal belongings within reach  - Initiate and maintain comfort rounds  - Make Fall Risk Sign visible to staff  - Offer Toileting every 3 Hours, in advance of need  - Initiate/Maintain bed alarm  - Obtain necessary fall risk management equipment:   - Apply yellow socks and bracelet for high fall risk patients  - Consider moving patient to room near nurses station  Outcome: Progressing     Problem: PAIN - ADULT  Goal: Verbalizes/displays adequate comfort level or baseline comfort level  Description: Interventions:  - Encourage patient to monitor pain and request assistance  - Assess pain using appropriate pain scale  - Administer analgesics as ordered based on type and severity of pain and evaluate response  - Implement non-pharmacological measures as appropriate and evaluate response  - Consider cultural and social influences on pain and pain management  - Notify physician/advanced practitioner if interventions unsuccessful or patient reports new pain  - Educate patient/family on pain management process including their role and importance of  reporting pain   - Provide non-pharmacologic/complimentary pain relief interventions  Outcome: Progressing     Problem: INFECTION - ADULT  Goal: Absence or prevention of progression during hospitalization  Description: INTERVENTIONS:  - Assess and monitor for signs and symptoms of infection  - Monitor lab/diagnostic results  - Monitor all insertion sites, i.e. indwelling  lines, tubes, and drains  - Monitor endotracheal if appropriate and nasal secretions for changes in amount and color  - [place] appropriate cooling/warming therapies per order  - Administer medications as ordered  - Instruct and encourage patient and family to use good hand hygiene technique  - Identify and instruct in appropriate isolation precautions for identified infection/condition  Outcome: Progressing  Goal: Absence of fever/infection during neutropenic period  Description: INTERVENTIONS:  - Monitor WBC  - Perform strict hand hygiene  - Limit to healthy visitors only  - No plants, dried, fresh or silk flowers with butler in patient room  Outcome: Progressing     Problem: SAFETY ADULT  Goal: Patient will remain free of falls  Description: INTERVENTIONS:  - Educate patient/family on patient safety including physical limitations  - Instruct patient to call for assistance with activity   - Consider consulting OT/PT to assist with strengthening/mobility based on AM PAC & -HLM score  - Consult OT/PT to assist with strengthening/mobility   - Keep Call bell within reach  - Keep bed low and locked with side rails adjusted as appropriate  - Keep care items and personal belongings within reach  - Initiate and maintain comfort rounds  - Make Fall Risk Sign visible to staff  - Offer Toileting every 3 Hours, in advance of need  - Initiate/Maintain bed alarm  - Obtain necessary fall risk management equipment:   - Apply yellow socks and bracelet for high fall risk patients  - Consider moving patient to room near nurses station  Outcome: Progressing  Goal: Maintain or return to baseline ADL function  Description: INTERVENTIONS:  -  Assess patient's ability to carry out ADLs; assess patient's baseline for ADL function and identify physical deficits which impact ability to perform ADLs (bathing, care of mouth/teeth, toileting, grooming, dressing, etc.)  - Assess/evaluate cause of self-care deficits   - Assess range of  motion  - Assess patient's mobility; develop plan if impaired  - Assess patient's need for assistive devices and provide as appropriate  - Encourage maximum independence but intervene and supervise when necessary  - Involve family in performance of ADLs  - Assess for home care needs following discharge   - Consider OT consult to assist with ADL evaluation and planning for discharge  - Provide patient education as appropriate  - Monitor functional capacity and physical performance, use of AM PAC & JH-HLM   - Monitor gait, balance and fatigue with ambulation    Outcome: Progressing  Goal: Maintains/Returns to pre admission functional level  Description: INTERVENTIONS:  - Perform AM-PAC 6 Click Basic Mobility/ Daily Activity assessment daily.  - Set and communicate daily mobility goal to care team and patient/family/caregiver.   - Collaborate with rehabilitation services on mobility goals if consulted  - Perform Range of Motion 3 times a day.  - Reposition patient every 2 hours.  - Dangle patient 3 times a day  - Stand patient 3 times a day  - Ambulate patient 3 times a day  - Out of bed to chair 3 times a day   - Out of bed for meals 3 times a day  - Out of bed for toileting  - Record patient progress and toleration of activity level   Outcome: Progressing     Problem: DISCHARGE PLANNING  Goal: Discharge to home or other facility with appropriate resources  Description: INTERVENTIONS:  - Identify barriers to discharge w/patient and caregiver  - Arrange for needed discharge resources and transportation as appropriate  - Identify discharge learning needs (meds, wound care, etc.)  - Arrange for interpretive services to assist at discharge as needed  - Refer to Case Management Department for coordinating discharge planning if the patient needs post-hospital services based on physician/advanced practitioner order or complex needs related to functional status, cognitive ability, or social support system  Outcome:  Progressing     Problem: Knowledge Deficit  Goal: Patient/family/caregiver demonstrates understanding of disease process, treatment plan, medications, and discharge instructions  Description: Complete learning assessment and assess knowledge base.  Interventions:  - Provide teaching at level of understanding  - Provide teaching via preferred learning methods  Outcome: Progressing

## 2025-05-17 NOTE — ASSESSMENT & PLAN NOTE
History of alcohol abuse with history of alcohol abuse seizures  Patient reports that she started drinking again 1 week ago.  Reports drinking wine at home  Ethanol greater than 400 on admission  Developed alcohol withdrawal symptoms today that were poorly controlled with Valium  Total Phenobarbital 609 mg    Plan  Loaded with 10 mg/kg phenobarbital (549 mg), additional 65 mg x 1  CIWA assessments with PRN valium moving forward  Continue thiamine and folic acid  Not interested in pursuing alcohol rehab

## 2025-05-17 NOTE — ASSESSMENT & PLAN NOTE
Presented with abdominal pain, nausea, and vomiting  History of alcohol abuse with multiple episodes of alcoholic pancreatitis  Lipase 124  CTAP: Acute interstitial edematous pancreatitis. Multiple pseudocysts are present, increased in size and number when compared with March 30, 2025. Please see discussion. There is trace ascites, decreased in amount compared with March 30, 2025. The wall of the stomach appears thickened and edematous. The duodenal wall appears thickened. Follow-up recommended. Hepatomegaly. Mild hepatic steatosis. Portions of the surface contour of the liver appears slightly nodular, raising concern for possible early cirrhosis. Upper abdominal varices are present. The splenic vein is opacified, however, appears somewhat attenuated as it passes posterior to the pancreas.  GI consulted    Plan  Continue IV fluids  As needed Dilaudid for pain control  N.p.o.  Serial abdominal exams

## 2025-05-18 LAB
ALBUMIN SERPL BCG-MCNC: 3.2 G/DL (ref 3.5–5)
ALP SERPL-CCNC: 74 U/L (ref 34–104)
ALT SERPL W P-5'-P-CCNC: 10 U/L (ref 7–52)
ANION GAP SERPL CALCULATED.3IONS-SCNC: 7 MMOL/L (ref 4–13)
AST SERPL W P-5'-P-CCNC: 26 U/L (ref 13–39)
BASOPHILS # BLD AUTO: 0.04 THOUSANDS/ÂΜL (ref 0–0.1)
BASOPHILS NFR BLD AUTO: 1 % (ref 0–1)
BILIRUB DIRECT SERPL-MCNC: 0.17 MG/DL (ref 0–0.2)
BILIRUB SERPL-MCNC: 0.92 MG/DL (ref 0.2–1)
BUN SERPL-MCNC: 3 MG/DL (ref 5–25)
CALCIUM SERPL-MCNC: 8.2 MG/DL (ref 8.4–10.2)
CHLORIDE SERPL-SCNC: 98 MMOL/L (ref 96–108)
CO2 SERPL-SCNC: 27 MMOL/L (ref 21–32)
CREAT SERPL-MCNC: 0.4 MG/DL (ref 0.6–1.3)
EOSINOPHIL # BLD AUTO: 0.17 THOUSAND/ÂΜL (ref 0–0.61)
EOSINOPHIL NFR BLD AUTO: 4 % (ref 0–6)
ERYTHROCYTE [DISTWIDTH] IN BLOOD BY AUTOMATED COUNT: 13.2 % (ref 11.6–15.1)
GFR SERPL CREATININE-BSD FRML MDRD: 136 ML/MIN/1.73SQ M
GLUCOSE SERPL-MCNC: 103 MG/DL (ref 65–140)
HCT VFR BLD AUTO: 36.6 % (ref 34.8–46.1)
HGB BLD-MCNC: 11.6 G/DL (ref 11.5–15.4)
IMM GRANULOCYTES # BLD AUTO: 0.02 THOUSAND/UL (ref 0–0.2)
IMM GRANULOCYTES NFR BLD AUTO: 1 % (ref 0–2)
INR PPP: 1.06 (ref 0.85–1.19)
LYMPHOCYTES # BLD AUTO: 1.63 THOUSANDS/ÂΜL (ref 0.6–4.47)
LYMPHOCYTES NFR BLD AUTO: 38 % (ref 14–44)
MAGNESIUM SERPL-MCNC: 1.7 MG/DL (ref 1.9–2.7)
MCH RBC QN AUTO: 33.3 PG (ref 26.8–34.3)
MCHC RBC AUTO-ENTMCNC: 31.7 G/DL (ref 31.4–37.4)
MCV RBC AUTO: 105 FL (ref 82–98)
MONOCYTES # BLD AUTO: 0.44 THOUSAND/ÂΜL (ref 0.17–1.22)
MONOCYTES NFR BLD AUTO: 10 % (ref 4–12)
NEUTROPHILS # BLD AUTO: 1.99 THOUSANDS/ÂΜL (ref 1.85–7.62)
NEUTS SEG NFR BLD AUTO: 46 % (ref 43–75)
NRBC BLD AUTO-RTO: 0 /100 WBCS
PHOSPHATE SERPL-MCNC: 3.7 MG/DL (ref 2.7–4.5)
PLATELET # BLD AUTO: 141 THOUSANDS/UL (ref 149–390)
PMV BLD AUTO: 10.9 FL (ref 8.9–12.7)
POTASSIUM SERPL-SCNC: 4 MMOL/L (ref 3.5–5.3)
PROT SERPL-MCNC: 5.9 G/DL (ref 6.4–8.4)
PROTHROMBIN TIME: 14.3 SECONDS (ref 12.3–15)
RBC # BLD AUTO: 3.48 MILLION/UL (ref 3.81–5.12)
SODIUM SERPL-SCNC: 132 MMOL/L (ref 135–147)
WBC # BLD AUTO: 4.29 THOUSAND/UL (ref 4.31–10.16)

## 2025-05-18 PROCEDURE — 99232 SBSQ HOSP IP/OBS MODERATE 35: CPT | Performed by: INTERNAL MEDICINE

## 2025-05-18 PROCEDURE — 85610 PROTHROMBIN TIME: CPT | Performed by: PHYSICIAN ASSISTANT

## 2025-05-18 PROCEDURE — NC001 PR NO CHARGE

## 2025-05-18 PROCEDURE — 80048 BASIC METABOLIC PNL TOTAL CA: CPT | Performed by: PHYSICIAN ASSISTANT

## 2025-05-18 PROCEDURE — 83735 ASSAY OF MAGNESIUM: CPT

## 2025-05-18 PROCEDURE — 80076 HEPATIC FUNCTION PANEL: CPT

## 2025-05-18 PROCEDURE — 85025 COMPLETE CBC W/AUTO DIFF WBC: CPT | Performed by: PHYSICIAN ASSISTANT

## 2025-05-18 PROCEDURE — 84100 ASSAY OF PHOSPHORUS: CPT

## 2025-05-18 RX ORDER — ENOXAPARIN SODIUM 100 MG/ML
30 INJECTION SUBCUTANEOUS
Status: DISCONTINUED | OUTPATIENT
Start: 2025-05-18 | End: 2025-05-20 | Stop reason: HOSPADM

## 2025-05-18 RX ADMIN — ENOXAPARIN SODIUM 30 MG: 100 INJECTION SUBCUTANEOUS at 10:32

## 2025-05-18 RX ADMIN — NICOTINE 14 MG: 14 PATCH, EXTENDED RELEASE TRANSDERMAL at 07:30

## 2025-05-18 RX ADMIN — HYDROMORPHONE HYDROCHLORIDE 0.5 MG: 1 INJECTION, SOLUTION INTRAMUSCULAR; INTRAVENOUS; SUBCUTANEOUS at 21:33

## 2025-05-18 RX ADMIN — HYDROMORPHONE HYDROCHLORIDE 0.2 MG: 0.2 INJECTION, SOLUTION INTRAMUSCULAR; INTRAVENOUS; SUBCUTANEOUS at 07:01

## 2025-05-18 RX ADMIN — Medication 100 MG: at 07:29

## 2025-05-18 RX ADMIN — ONDANSETRON 4 MG: 2 INJECTION INTRAMUSCULAR; INTRAVENOUS at 16:48

## 2025-05-18 RX ADMIN — HYDROMORPHONE HYDROCHLORIDE 0.5 MG: 1 INJECTION, SOLUTION INTRAMUSCULAR; INTRAVENOUS; SUBCUTANEOUS at 03:22

## 2025-05-18 RX ADMIN — HYDROMORPHONE HYDROCHLORIDE 0.2 MG: 0.2 INJECTION, SOLUTION INTRAMUSCULAR; INTRAVENOUS; SUBCUTANEOUS at 09:25

## 2025-05-18 RX ADMIN — CHLORHEXIDINE GLUCONATE 15 ML: 1.2 SOLUTION ORAL at 07:32

## 2025-05-18 RX ADMIN — MULTIPLE VITAMINS W/ MINERALS TAB 1 TABLET: TAB ORAL at 07:29

## 2025-05-18 RX ADMIN — SODIUM CHLORIDE, SODIUM LACTATE, POTASSIUM CHLORIDE, AND CALCIUM CHLORIDE 200 ML/HR: .6; .31; .03; .02 INJECTION, SOLUTION INTRAVENOUS at 14:39

## 2025-05-18 RX ADMIN — HYDROMORPHONE HYDROCHLORIDE 0.5 MG: 1 INJECTION, SOLUTION INTRAMUSCULAR; INTRAVENOUS; SUBCUTANEOUS at 12:52

## 2025-05-18 RX ADMIN — SODIUM CHLORIDE, SODIUM LACTATE, POTASSIUM CHLORIDE, AND CALCIUM CHLORIDE 200 ML/HR: .6; .31; .03; .02 INJECTION, SOLUTION INTRAVENOUS at 20:01

## 2025-05-18 RX ADMIN — SODIUM CHLORIDE, SODIUM LACTATE, POTASSIUM CHLORIDE, AND CALCIUM CHLORIDE 200 ML/HR: .6; .31; .03; .02 INJECTION, SOLUTION INTRAVENOUS at 09:28

## 2025-05-18 RX ADMIN — SODIUM CHLORIDE, SODIUM LACTATE, POTASSIUM CHLORIDE, AND CALCIUM CHLORIDE 200 ML/HR: .6; .31; .03; .02 INJECTION, SOLUTION INTRAVENOUS at 03:49

## 2025-05-18 RX ADMIN — DIAZEPAM 5 MG: 5 TABLET ORAL at 16:21

## 2025-05-18 RX ADMIN — FOLIC ACID 1 MG: 1 TABLET ORAL at 07:28

## 2025-05-18 RX ADMIN — HYDROMORPHONE HYDROCHLORIDE 0.5 MG: 1 INJECTION, SOLUTION INTRAMUSCULAR; INTRAVENOUS; SUBCUTANEOUS at 17:26

## 2025-05-18 NOTE — PROGRESS NOTES
Progress Note - Critical Care/ICU   Name: Kaykay Holland 35 y.o. female I MRN: [id number]  Unit/Bed#: -01 SDU I Date of Admission: 5/16/2025   Date of Service: 5/18/2025 I Hospital Day: 2       Critical Care Interval Transfer Note:    Brief Hospital Summary: 35 YOF with a significant history of ETOH abuse- multiple admission for ETOH withdrawal/pancreatitis, chronic pancreatitis with pseudocyst, tobacco abuse. Presented to [facility] initially on 5/16 with abdominal pain. Found to have acute on chronic pancreatitis. Ethanol level at that time was 406. She was initially admitted to medicine under CIWA protocol and was treated for her pancreatitis. Unfortunately she started having worsening withdrawal symptoms and required transfer to the ICU for phenobarbital load. From a withdrawal standpoint she is doing much better. CIWA is 0. Giving her PO agents if she complaints of anxiety. GI is following for her pancreatitis. Continuing IVF and IV pain meds. She was started on clear liquids yesterday. Tolerating reasonably well. Still requiring IV pain medication so may need an additional 48-72 hours in the hospital.     Barriers to discharge:   Able to tolerate regular diet   No longer needing IV pain medication  Stable from withdrawal standpoint- should be consisently encouraged to seek outpatient support for alcohol cessation      Consults: IP CONSULT TO GASTROENTEROLOGY  IP CONSULT TO TOXICOLOGY  IP CONSULT TO CASE MANAGEMENT    Recommended to review admission imaging for incidental findings and document in discharge navigator: Chart reviewed, no known incidental findings noted at this time.      Discharge Plan: Anticipate discharge in 24-48 hrs to home.       Patient seen and evaluated by Critical Care today and deemed to be appropriate for transfer to Med Surg. Spoke to MD Marshall from [facility] to accept transfer. Critical care can be contacted via SecureChat with any questions or concerns. Please use the Critical Care  AP Role in Secure Chat for any provider inquires until the patient is transferred out of the ICU or until tomorrow at 0600.

## 2025-05-18 NOTE — ASSESSMENT & PLAN NOTE
Hepatic steatosis likely 2/2 to alcohol use disorder. AST mildly elevated on admission, LFTs resolved.    CT shows hepatomegaly and hepatic steatosis, with slightly nodular liver contour raising concern for possible early cirrhosis. Upper abdominal varices are present. Spleen appears normal in size. There is trace ascites, decreased from prior study.    She did have EGD in Dec 2024/Jan 2025 and there was no evidence of esophageal or gastric varices at that time.    - Will need  GI outpatient follow-up

## 2025-05-18 NOTE — ASSESSMENT & PLAN NOTE
"Patient presented with left upper quadrant abdominal pain and symptoms of nausea/vomiting x 1 week.  Per chart review, patient with multiple admissions for alcoholic pancreatitis, including necrotizing pancreatitis requiring EUS with cystogastrostomy stent placement in December 2024 and removal in January 2025.    Per patient, reportedly started drinking about a week ago.      CT a/p (5/16: \"Acute interstitial edematous pancreatitis. Multiple pseudocysts are present, increased in size and number when compared with March 30, 2025.\"   In addition, noting acute pancreatitis, no abnormal gas in/around pancreas suggestive of infection.    Appreciate GI consultation.    Continue IV fluid hydration, while patient receiving IV opiates.  Continue pain management.    Continue PRN antiemetics.    Continue to check MELD score labs, daily.  Continue supportive care.    Continue encourage adequate nutrition/hydration, as tolerated.    Will continue GI diet, low-fat.  "

## 2025-05-18 NOTE — PROGRESS NOTES
Progress Note - Gastroenterology   Name: Kaykay Holland 35 y.o. 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     CT shows acute interstitial edematous pancreatitis, multiple pseudocysts increased in size and number compared to CT from March (largest in the pancreatic tail measuring 4.5 x 3 x 2.3 cm), trace ascites, thickened appearance of the stomach and duodenal wall. No evidence of pancreatic necrosis, or abnormal gas in or around the pancreas to suggest infection.    On admission, lipase minimally elevated 124. Ethanol 408. Creatinine normal. No leukocytosis or fever.  Patient with increased left-sided, epigastric tenderness with palpation    MELD 3.0: 13 at 5/18/2025  3:45 AM  MELD-Na: 7 at 5/18/2025  3:45 AM  Calculated from:  Serum Creatinine: 0.4 mg/dL (Using min of 1 mg/dL) at 5/18/2025  3:45 AM  Serum Sodium: 132 mmol/L at 5/18/2025  3:45 AM  Total Bilirubin: 0.92 mg/dL (Using min of 1 mg/dL) at 5/18/2025  3:45 AM  Serum Albumin: 3.2 g/dL at 5/18/2025  3:45 AM  INR(ratio): 1.06 at 5/18/2025  3:40 AM  Age at listing (hypothetical): 35 years  Sex: Female at 5/18/2025  3:45 AM       -Continue supportive care  - Tolerating advance diet    -  IV fluids, pain management and antiemetics as needed, continue IV therapy as long as patient is having pain requiring IV opioid.  - Check MELD score labs     Pancreatic pseudocyst  See plan above  Hepatic steatosis  Hepatic steatosis likely 2/2 to alcohol use disorder. AST mildly elevated on admission, LFTs resolved.    CT shows hepatomegaly and hepatic steatosis, with slightly nodular liver contour raising concern for possible  early cirrhosis. Upper abdominal varices are present. Spleen appears normal in size. There is trace ascites, decreased from prior study.    She did have EGD in Dec 2024/Jan 2025 and there was no evidence of esophageal or gastric varices at that time.    - Will need  GI outpatient follow-up   Tobacco abuse  Discussed importance of tobacco cessation    Alcohol withdrawal (HCC)  Patient was transferred to ICU overnight due to increased withdrawal symptoms.  Patient feels much better this morning after receiving IV phenobarbital overnight.  Tolerating advance diet   Monitor for withdrawal symptoms.        Subjective   Patient continues to have left-sided abdominal tenderness with palpation.  Patient did tolerate advanced diet.  Denies nausea or vomiting.  Encourage patient to ambulate.  Reemphasized the patient need for complete alcohol cessation.    Objective :  Temp:  [97.9 °F (36.6 °C)-98.5 °F (36.9 °C)] 98.2 °F (36.8 °C)  HR:  [61-84] 69  BP: (129-145)/(85-97) 140/97  Resp:  [13-18] 18  SpO2:  [98 %-99 %] 98 %  O2 Device: None (Room air)    Physical Exam  Vitals and nursing note reviewed.   Constitutional:       General: She is not in acute distress.     Appearance: She is well-developed.   HENT:      Head: Normocephalic and atraumatic.      Nose: Nose normal.      Mouth/Throat:      Mouth: Mucous membranes are moist.     Eyes:      Conjunctiva/sclera: Conjunctivae normal.       Cardiovascular:      Rate and Rhythm: Normal rate and regular rhythm.      Heart sounds: No murmur heard.  Pulmonary:      Effort: Pulmonary effort is normal. No respiratory distress.      Breath sounds: Normal breath sounds.   Abdominal:      Palpations: Abdomen is soft.      Tenderness: There is abdominal tenderness.     Musculoskeletal:         General: No swelling.      Cervical back: Normal range of motion and neck supple.     Skin:     General: Skin is warm and dry.     Neurological:      Mental Status: She is alert and oriented to  person, place, and time.     Psychiatric:         Mood and Affect: Mood normal.         Lab Results: I have reviewed the following results:

## 2025-05-18 NOTE — UTILIZATION REVIEW
"Initial Clinical Review    Admission: Date/Time/Statement:   Admission Orders (From admission, onward)       Ordered        05/16/25 0320  INPATIENT ADMISSION  Once                          Orders Placed This Encounter   Procedures    INPATIENT ADMISSION     Standing Status:   Standing     Number of Occurrences:   1     Level of Care:   Med Surg [16]     Estimated length of stay:   More than 2 Midnights     Certification:   I certify that inpatient services are medically necessary for this patient for a duration of greater than two midnights. See H&P and MD Progress Notes for additional information about the patient's course of treatment.     ED Arrival Information       Expected   -    Arrival   5/15/2025 23:54    Acuity   Urgent              Means of arrival   Walk-In    Escorted by   Family Member    Service   Hospitalist    Admission type   Emergency              Arrival complaint   abdominal pain             Chief Complaint   Patient presents with    Abdominal Pain     Stabbing left abdominal pain with guarding.  Started 3 days ago       Initial Presentation: 35 y.o. female to ED via walk-in from home  Present to ED with worsening recurrent LUQ abdominal pain with associated nausea and vomiting.  Has been having diarrhea as well.  Reports she started drinking 1 week ago.  States she has been drinking half a bottle of wine daily with last drink just prior to arrival to the ED.   PMHX alcohol abuse, tobacco abuse, and alcoholic pancreatitis   Admitted to IP - Level 1 Stepdown with DX: Acute alcoholic pancreatitis /  alcohol withdrawal symptoms   on exam: tachypnea; hypertensive; CIWA 20 (n/v, h/a, tremors, anxiety, visual dist); abdominal tenderness (LUQ).  pain 8/10; Mg 1.4  CT  A/P: \"Acute interstitial edematous pancreatitis. Multiple pseudocysts are present, increased in size and number when compared with March 30, 2025. \"   PLAN: cont ivf; recd phenobarbitol 549 mg iv x1; recd Mg iv x1; neuro checks; monitor " labs; CIWA; pain/ nausea / anxiety control (see below); GI consult; CM consulted       Anticipated Length of Stay/Certification Statement: Patient will be admitted on an inpatient basis with an anticipated length of stay of greater than 2 midnights secondary to acute alcoholic pancreatitis.       GI CONSULT   Assessment: Pain is consistent with previous pancreatitis episodes.  No fevers, chills or signs of infection.    AST 46, ALT 16, bilirubin 0.38, lipase 124. CT abdomen/pelvis notable for acute interstitial edematous pancreatitis, multiple pseudocysts, largest measuring 4.5 x 3.2 0.3 cm in the tail, partially loculated.  Additional findings include trace ascites, decreased as compared with March 30 CT scan, edematous/thickened stomach and hepatomegaly, steatosis and nodularity of the liver concerning for possible early cirrhosis. She states she cannot have inpatient rehab due to starting a new job.   Plan: Continue supportive care for pancreatitis, IV fluids, antiemetics as needed.  Minimize opiates as much as possible. Recommend discussing with social work and/or psychiatry.       TOXICOLOGY CONSULT   Alcohol abuse: Recently began drinking again, has been drinking for the last week. Per chart review, patient started on Librium and given 2mg Ativan for CIWA of 10 this morning  Recommendations: Stop Librium- this medication is good for outpatient mild withdrawal symptoms, but there are better medications we can use in the inpatient setting. Initiate CIWA protocol with Valium dosing, protocol listed below. Supportive care including IV fluids, antiemetics, nonopioid analgesics, antidiarrheals as needed. Cardiac monitoring and telemetry. Seizure precautions        Date: 5/17/25     Day 2   After 10 mg/kg load with phenobarbital, withdrawal symptoms significantly improved. No acute events overnight. Patient states her pain is well controlled and her nausea has resolved. Exam: CIWA 14 (n/v, h/a, tremors, anxiety,  sweats, agitation) ; Mg 1.6  Plan: cont ivf; recd phenobarbitol 65 mg iv x1; recd Mg iv x2; tele monitoring; continuous pulse ox; neuro checks; monitor labs; CIWA; pain/ nausea / anxiety control (see below); CM following       ED Treatment-Medication Administration from 05/15/2025 2354 to 05/16/2025 0353         Date/Time Order Dose Route Action     05/16/2025 0139 pantoprazole (PROTONIX) injection 40 mg 40 mg Intravenous Given     05/16/2025 0140 sodium chloride 0.9 % bolus 1,000 mL 1,000 mL Intravenous New Bag     05/16/2025 0140 acetaminophen (Ofirmev) injection 1,000 mg 1,000 mg Intravenous New Bag     05/16/2025 0159 morphine injection 4 mg 4 mg Intravenous Given     05/16/2025 0218 iohexol (OMNIPAQUE) 350 MG/ML injection (MULTI-DOSE) 100 mL 100 mL Intravenous Given     05/16/2025 0237 HYDROmorphone (DILAUDID) injection 0.5 mg 0.5 mg Intravenous Given     05/16/2025 0346 lactated ringers bolus 1,000 mL 1,000 mL Intravenous New Bag            Scheduled Medications:  enoxaparin, 30 mg, Subcutaneous, Q24H RODRICK  folic acid, 1 mg, Oral, Daily  multivitamin-minerals, 1 tablet, Oral, Daily  nicotine, 14 mg, Transdermal, Daily  thiamine, 100 mg, Oral, Daily    PHENobarbital 549 mg in sodium chloride 0.9 % 100 mL IVPB  Dose: 10 mg/kg  Weight Dosing Info: 54.9 kg  Freq: Once Route: IV  Last Dose: Stopped (05/17/25 0400)  Start: 05/16/25 2030 End: 05/17/25 0400    magnesium sulfate 2 g/50 mL IVPB (premix) 2 g  Dose: 2 g  Freq: Once Route: IV  Last Dose: Stopped (05/16/25 1300)  Start: 05/16/25 0800 End: 05/16/25 1300    magnesium sulfate 2 g/50 mL IVPB (premix) 2 g  Dose: 2 g  Freq: Once Route: IV  Last Dose: Stopped (05/17/25 0922)  Start: 05/17/25 0615 End: 05/17/25 0922    magnesium sulfate 2 g/50 mL IVPB (premix) 2 g  Dose: 2 g  Freq: Once Route: IV  Last Dose: Stopped (05/17/25 2037)  Start: 05/17/25 1815 End: 05/17/25 2037    PHENobarbital injection 65 mg  Dose: 65 mg  Freq: Once Route: IV  Start: 05/17/25 0600 End:  05/17/25 0555      Continuous IV Infusions:  lactated ringers, 200 mL/hr, Intravenous, Continuous  multi-electrolyte (Plasmalyte-A/Isolyte-S PH 7.4/Normosol-R) IV solution Rate: 200 mL/hr        PRN Meds:  diazepam (VALIUM) injection 10 mg, Intravenous Q1H PRN: 5/16 x2  diazepam, 5 mg, Oral, Q6H PRN: 5/17 x1  HYDROmorphone, 0.2 mg, Intravenous, Q4H PRN: 5/16 x2   Or  HYDROmorphone, 0.5 mg, Intravenous, Q4H PRN: 5/16 x2; 5/17 x5  HYDROmorphone, 0.2 mg, Intravenous, Q4H PRN: 5/16 x2; 5/17 x3  ondansetron, 4 mg, Intravenous, Q6H PRN: 5/16 x2  trimethobenzamide, 200 mg, Intramuscular, Q6H PRN: 5/16 x1    LORazepam (ATIVAN) injection 2 mg  Dose: 2 mg  Freq: Once Route: IV  Start: 05/16/25 0830 End: 05/16/25 0831    ED Triage Vitals [05/16/25 0013]   Temperature Pulse Respirations Blood Pressure SpO2 Pain Score   97.6 °F (36.4 °C) 87 18 139/90 97 % 8     Weight (last 2 days)       Date/Time Weight    05/18/25 0536 57.5 (126.76)    05/17/25 0549 56.1 (123.68)    05/16/25 0421 54.9 (121)    05/16/25 0400 54.9 (121)    05/16/25 0300 55.1 (121.5)    05/16/25 0013 54.4 (120)            Vital Signs (last 3 days)       Date/Time Temp Pulse Resp BP MAP (mmHg) SpO2 O2 Device Patient Position - Orthostatic VS Dulce Maria Coma Scale Score CIWA-Ar Total Pain    05/17/25 2311 98.5 °F (36.9 °C) 69 13 129/87 103 99 % None (Room air) Lying -- -- --    05/17/25 2251 -- -- -- -- -- -- -- -- -- -- 9    05/17/25 2000 -- -- -- -- -- -- -- -- 15 4 5    05/17/25 1927 98.2 °F (36.8 °C) 84 15 142/89 113 99 % None (Room air) Lying -- -- --    05/17/25 1852 -- -- -- -- -- -- -- -- -- -- 8 05/17/25 1835 -- 79 -- 145/96 -- -- -- -- -- 10 --    05/17/25 1601 -- -- -- -- -- -- -- -- -- -- 8 05/17/25 1600 -- -- -- -- -- -- -- -- 15 -- --    05/17/25 1523 98.3 °F (36.8 °C) 72 14 139/85 107 99 % None (Room air) Sitting -- 3 --    05/17/25 1314 -- 64 15 143/74 -- 100 % -- -- -- -- 8 05/17/25 1200 -- -- -- -- -- -- -- -- 15 -- --    05/17/25 1129 98.3  °F (36.8 °C) 72 16 136/89 108 98 % None (Room air) Lying -- 0 --    05/17/25 1040 -- -- -- -- -- -- -- -- -- -- 8    05/17/25 0800 -- -- -- -- -- -- -- -- 15 -- --    05/17/25 0748 -- -- -- -- -- -- -- -- -- -- 9    05/17/25 0727 98.6 °F (37 °C) 67 16 154/97 120 98 % None (Room air) Lying -- 1 --    05/17/25 0600 -- 55 15 152/93 118 98 % -- -- -- -- --    05/17/25 0550 -- -- -- 152/93 -- -- -- -- -- 14 --    05/17/25 0410 98.2 °F (36.8 °C) 70 15 134/94 108 96 % None (Room air) Lying -- 5 --    05/17/25 0400 -- -- -- -- -- -- -- -- 15 -- 3    05/17/25 0342 -- -- -- -- -- -- -- -- -- -- 10 - Worst Possible Pain    05/17/25 0011 -- -- -- -- -- -- -- -- -- -- 7    05/17/25 0000 -- -- -- -- -- -- -- -- 15 -- --    05/16/25 2306 98.2 °F (36.8 °C) 75 27 141/95 114 97 % None (Room air) Lying -- -- --    05/16/25 2132 -- -- -- -- -- -- -- -- -- -- 9    05/16/25 2100 98.8 °F (37.1 °C) 76 22 136/92 110 98 % -- Lying 15 12 --    05/16/25 2030 -- 80 23 135/90 108 97 % -- Lying -- 20 --    05/16/25 19:35:59 -- 73 -- 141/88 106 96 % None (Room air) -- -- 15 8    05/16/25 1833 -- -- -- -- -- -- -- -- -- 14 --    05/16/25 1735 -- -- -- -- -- -- -- -- -- -- 9 05/16/25 1711 -- -- -- -- -- -- -- -- -- 11 --    05/16/25 16:32:14 -- 83 -- 136/89 105 95 % -- -- -- -- --    05/16/25 15:37:38 98.5 °F (36.9 °C) 84 14 120/81 94 98 % -- -- -- -- --    05/16/25 1353 -- -- -- -- -- -- -- -- -- 11 --    05/16/25 1342 -- -- -- -- -- -- -- -- -- -- 9 05/16/25 12:17:01 98.2 °F (36.8 °C) 73 -- 106/84 91 96 % -- -- -- -- --    05/16/25 10:48:42 97.3 °F (36.3 °C) 65 -- 101/65 77 96 % -- -- -- -- --    05/16/25 0934 -- -- -- -- -- -- -- -- -- 3 --    05/16/25 0933 -- -- -- -- -- -- -- -- -- -- 8 05/16/25 09:29:10 97.5 °F (36.4 °C) 67 16 99/65 76 97 % -- -- -- -- --    05/16/25 0850 -- -- -- -- -- -- -- -- -- -- 5 05/16/25 0826 -- -- -- -- -- -- -- -- -- 10 --    05/16/25 07:21:23 97.5 °F (36.4 °C) 81 16 100/68 79 97 % -- -- -- -- --     05/16/25 0700 -- -- -- -- -- -- -- -- -- -- 9    05/16/25 0538 97.6 °F (36.4 °C) 75 15 118/81 -- 94 % -- -- -- -- --    05/16/25 0534 -- -- -- -- -- 94 % None (Room air) -- -- -- --    05/16/25 0530 -- -- -- -- -- -- -- -- -- -- 9    05/16/25 0426 -- -- -- -- -- -- -- -- -- -- 10 - Worst Possible Pain    05/16/25 0421 97.6 °F (36.4 °C) 75 15 118/81 -- -- -- -- -- -- --    05/16/25 0400 97.6 °F (36.4 °C) 75 -- 118/81 -- 94 % None (Room air) -- -- -- 8    05/16/25 0013 97.6 °F (36.4 °C) 87 18 139/90 -- 97 % None (Room air) Sitting -- -- 8           CIWA-Ar Score       Row Name 05/18/25 0400 05/18/25 0000 05/17/25 2000       CIWA-Ar    Nausea and Vomiting 0 0 0    Tactile Disturbances 0 0 0    Tremor 0 0 0    Auditory Disturbances 0 0 0    Paroxysmal Sweats 0 0 0    Visual Disturbances 0 0 0    Anxiety 0 0 2    Headache, Fullness in Head 0 0 2    Agitation 0 0 0    Orientation and Clouding of Sensorium 0 0 0    CIWA-Ar Total 0 0 4      Row Name 05/17/25 1835 05/17/25 1523 05/17/25 1129       CIWA-Ar    /96 -- --    Pulse 79 -- --    Nausea and Vomiting 0 0 0    Tactile Disturbances 0 0 0    Tremor 3 0 0    Auditory Disturbances 0 0 0    Paroxysmal Sweats 0 0 0    Visual Disturbances 0 0 0    Anxiety 4 3 0    Headache, Fullness in Head 3 0 0    Agitation 0 0 0    Orientation and Clouding of Sensorium 0 0 0    CIWA-Ar Total -- 3 0      Row Name 05/17/25 0727 05/17/25 0550 05/17/25 0410       CIWA-Ar    BP -- 152/93 --    Nausea and Vomiting 0 2 0    Tactile Disturbances 0 0 0    Tremor 1 2 2    Auditory Disturbances 0 0 0    Paroxysmal Sweats 0 1 0    Visual Disturbances 0 0 0    Anxiety 0 5 3    Headache, Fullness in Head 0 2 0    Agitation 0 2 0    Orientation and Clouding of Sensorium 0 0 0    CIWA-Ar Total 1 14 5      Row Name 05/16/25 2100 05/16/25 2030 05/16/25 19:35:59       CIWA-Ar    Nausea and Vomiting 2 4 2    Tactile Disturbances 1 1 1    Tremor 2 3 2    Auditory Disturbances 0 0 0    Paroxysmal Sweats  2 3 2    Visual Disturbances 0 0 0    Anxiety 4 5 5    Headache, Fullness in Head 1 2 3    Agitation 0 2 0    Orientation and Clouding of Sensorium 0 0 0    CIWA-Ar Total 12 20 15      Row Name 05/16/25 1833 05/16/25 1711 05/16/25 1353       CIWA-Ar    Nausea and Vomiting 3 3 1    Tactile Disturbances 0 0 0    Tremor 3 3 3    Auditory Disturbances 0 0 0    Paroxysmal Sweats 3 0 1    Visual Disturbances 1 1 2    Anxiety 3 3 2    Headache, Fullness in Head 0 1 2    Agitation 1 0 0    Orientation and Clouding of Sensorium 0 0 0    CIWA-Ar Total 14 11 11      Row Name 05/16/25 0934 05/16/25 0826          CIWA-Ar    Nausea and Vomiting 1 2     Tactile Disturbances 0 0     Tremor 1 2     Auditory Disturbances 0 0     Paroxysmal Sweats 0 0     Visual Disturbances 0 1     Anxiety 0 3     Headache, Fullness in Head 1 2     Agitation 0 0     Orientation and Clouding of Sensorium 0 0     CIWA-Ar Total 3 10                     Pertinent Labs/Diagnostic Test Results:   Radiology:  CT abdomen pelvis with contrast   Final Interpretation by Macrina Mccall MD (05/16 0308)      Acute interstitial edematous pancreatitis. Multiple pseudocysts are present, increased in size and number when compared with March 30, 2025. Please see discussion.      There is trace ascites, decreased in amount compared with March 30, 2025.      The wall of the stomach appears thickened and edematous. The duodenal wall appears thickened. Follow-up recommended.      Hepatomegaly. Mild hepatic steatosis. Portions of the surface contour of the liver appears slightly nodular, raising concern for possible early cirrhosis. Upper abdominal varices are present. The splenic vein is opacified, however, appears somewhat    attenuated as it passes posterior to the pancreas.      Other nonemergent findings as above.      The study was marked in EPIC for immediate notification.         Workstation performed: [id number]           Cardiology:  ECG 12 lead   Final  Result by Harris Quiros MD (05/16 1039)   Normal sinus rhythm   Low voltage QRS   Borderline ECG   When compared with ECG of 30-Mar-2025 13:22,   Vent. rate has decreased by  44 bpm   Confirmed by Harris Quiros ([id number]) on 5/16/2025 10:39:12 AM           Results from last 7 days   Lab Units 05/17/25  0549 05/16/25  0029   WBC Thousand/uL 4.62 7.17   HEMOGLOBIN g/dL 12.2 13.6   HEMATOCRIT % 37.5 41.7   PLATELETS Thousands/uL 143* 241   TOTAL NEUT ABS Thousands/µL 2.53 2.69        Results from last 7 days   Lab Units 05/17/25  0737 05/17/25  0549 05/16/25  0601 05/16/25  0028   SODIUM mmol/L 133* 131* 141 140   POTASSIUM mmol/L 3.8 3.9 4.3 4.0   CHLORIDE mmol/L 95* 95* 107 105   CO2 mmol/L 32 31 23 23   ANION GAP mmol/L 6 5 11 12   BUN mg/dL 4* 4* 6 7   CREATININE mg/dL 0.42* 0.43* 0.34* 0.32*   EGFR ml/min/1.73sq m 134 133 143 146   CALCIUM mg/dL 8.0* 8.0* 7.7* 8.7   MAGNESIUM mg/dL  --  1.6* 1.4*  --    PHOSPHORUS mg/dL  --   --  3.7  --      Results from last 7 days   Lab Units 05/17/25  0549 05/16/25  0601 05/16/25  0028   AST U/L 34 46* 50*   ALT U/L 14 16 17   ALK PHOS U/L 76 77 92   TOTAL PROTEIN g/dL 5.9* 5.9* 7.1   ALBUMIN g/dL 3.3* 3.4* 4.1   TOTAL BILIRUBIN mg/dL 1.36* 0.38 0.50   BILIRUBIN DIRECT mg/dL  --  0.10  --          Results from last 7 days   Lab Units 05/17/25  0737 05/17/25  0549 05/16/25  0601 05/16/25  0028   GLUCOSE RANDOM mg/dL 80 82 82 94         Results from last 7 days   Lab Units 05/16/25  0028   LIPASE u/L 124*        Results from last 7 days   Lab Units 05/16/25  0140   CLARITY UA  Clear   COLOR UA  Yellow   SPEC GRAV UA  1.015   PH UA  5.5   GLUCOSE UA mg/dl Negative   KETONES UA mg/dl Negative   BLOOD UA  Trace*   PROTEIN UA mg/dl Trace*   NITRITE UA  Negative   BILIRUBIN UA  Negative   UROBILINOGEN UA (BE) mg/dl <2.0   LEUKOCYTES UA  Negative   WBC UA /hpf 2-4   RBC UA /hpf None Seen   BACTERIA UA /hpf Occasional   EPITHELIAL CELLS WET PREP /hpf Occasional   MUCUS THREADS   None Seen                 Results from last 7 days   Lab Units 05/16/25  0140   ETHANOL LVL mg/dL 408*          Past Medical History:   Diagnosis Date    Acute alcoholic pancreatitis 11/03/2023    Hypertension     SIRS (systemic inflammatory response syndrome) (HCC) 04/10/2024     Present on Admission:   Pancreatic pseudocyst   (Resolved) Alcohol abuse   Tobacco abuse   Acute alcoholic pancreatitis   Hepatic steatosis   Alcohol withdrawal (HCC)      Admitting Diagnosis: Acute pancreatitis [K85.90]  Alcohol intoxication (HCC) [F10.929]  Gastritis [K29.70]  Pancreatic pseudocyst [K86.3]  Abdominal pain [R10.9]  Age/Sex: 35 y.o. female    Network Utilization Review Department  ATTENTION: Please call with any questions or concerns to [phone] and carefully listen to the prompts so that you are directed to the right person. All voicemails are confidential.   For Discharge needs, contact Care Management DC Support Team at [phone] opt. [phone]  Send all requests for admission clinical reviews, approved or denied determinations and any other requests to dedicated fax number below belonging to the [facility] where the patient is receiving treatment. List of dedicated fax numbers for the Facilities:  FACILITY NAME UR FAX NUMBER   ADMISSION DENIALS (Administrative/Medical Necessity) [phone]   DISCHARGE SUPPORT TEAM (NETWORK) [phone]   PARENT CHILD HEALTH (Maternity/NICU/Pediatrics) [phone]   [facility] [phone]   [facility] [phone]   [facility] [phone]   [facility] [phone]   [facility] [phone]   [facility] [phone]   [facility] [phone]   [facility] [phone]   [facility] [phone]   [facility]  [phone]   [facility] [phone]   [facility] [phone]

## 2025-05-18 NOTE — PLAN OF CARE
Problem: Potential for Falls  Goal: Patient will remain free of falls  Description: INTERVENTIONS:  - Educate patient/family on patient safety including physical limitations  - Instruct patient to call for assistance with activity   - Consider consulting OT/PT to assist with strengthening/mobility based on AM PAC & JH-HLM score  - Consult OT/PT to assist with strengthening/mobility   - Keep Call bell within reach  - Keep bed low and locked with side rails adjusted as appropriate  - Keep care items and personal belongings within reach  - Initiate and maintain comfort rounds  - Make Fall Risk Sign visible to staff  - Offer Toileting every 3 Hours, in advance of need  - Initiate/Maintain bed alarm  - Obtain necessary fall risk management equipment:   - Apply yellow socks and bracelet for high fall risk patients  - Consider moving patient to room near nurses station  Outcome: Progressing     Problem: PAIN - ADULT  Goal: Verbalizes/displays adequate comfort level or baseline comfort level  Description: Interventions:  - Encourage patient to monitor pain and request assistance  - Assess pain using appropriate pain scale  - Administer analgesics as ordered based on type and severity of pain and evaluate response  - Implement non-pharmacological measures as appropriate and evaluate response  - Consider cultural and social influences on pain and pain management  - Notify physician/advanced practitioner if interventions unsuccessful or patient reports new pain  - Educate patient/family on pain management process including their role and importance of  reporting pain   - Provide non-pharmacologic/complimentary pain relief interventions  Outcome: Progressing     Problem: INFECTION - ADULT  Goal: Absence or prevention of progression during hospitalization  Description: INTERVENTIONS:  - Assess and monitor for signs and symptoms of infection  - Monitor lab/diagnostic results  - Monitor all insertion sites, i.e. indwelling  lines, tubes, and drains  - Monitor endotracheal if appropriate and nasal secretions for changes in amount and color  - [place] appropriate cooling/warming therapies per order  - Administer medications as ordered  - Instruct and encourage patient and family to use good hand hygiene technique  - Identify and instruct in appropriate isolation precautions for identified infection/condition  Outcome: Progressing  Goal: Absence of fever/infection during neutropenic period  Description: INTERVENTIONS:  - Monitor WBC  - Perform strict hand hygiene  - Limit to healthy visitors only  - No plants, dried, fresh or silk flowers with butler in patient room  Outcome: Progressing     Problem: SAFETY ADULT  Goal: Patient will remain free of falls  Description: INTERVENTIONS:  - Educate patient/family on patient safety including physical limitations  - Instruct patient to call for assistance with activity   - Consider consulting OT/PT to assist with strengthening/mobility based on AM PAC & -HLM score  - Consult OT/PT to assist with strengthening/mobility   - Keep Call bell within reach  - Keep bed low and locked with side rails adjusted as appropriate  - Keep care items and personal belongings within reach  - Initiate and maintain comfort rounds  - Make Fall Risk Sign visible to staff  - Offer Toileting every 3 Hours, in advance of need  - Initiate/Maintain bed alarm  - Obtain necessary fall risk management equipment:   - Apply yellow socks and bracelet for high fall risk patients  - Consider moving patient to room near nurses station  Outcome: Progressing  Goal: Maintain or return to baseline ADL function  Description: INTERVENTIONS:  -  Assess patient's ability to carry out ADLs; assess patient's baseline for ADL function and identify physical deficits which impact ability to perform ADLs (bathing, care of mouth/teeth, toileting, grooming, dressing, etc.)  - Assess/evaluate cause of self-care deficits   - Assess range of  motion  - Assess patient's mobility; develop plan if impaired  - Assess patient's need for assistive devices and provide as appropriate  - Encourage maximum independence but intervene and supervise when necessary  - Involve family in performance of ADLs  - Assess for home care needs following discharge   - Consider OT consult to assist with ADL evaluation and planning for discharge  - Provide patient education as appropriate  - Monitor functional capacity and physical performance, use of AM PAC & JH-HLM   - Monitor gait, balance and fatigue with ambulation    Outcome: Progressing  Goal: Maintains/Returns to pre admission functional level  Description: INTERVENTIONS:  - Perform AM-PAC 6 Click Basic Mobility/ Daily Activity assessment daily.  - Set and communicate daily mobility goal to care team and patient/family/caregiver.   - Collaborate with rehabilitation services on mobility goals if consulted  - Perform Range of Motion 3 times a day.  - Reposition patient every 2 hours.  - Dangle patient 3 times a day  - Stand patient 3 times a day  - Ambulate patient 3 times a day  - Out of bed to chair 3 times a day   - Out of bed for meals 3 times a day  - Out of bed for toileting  - Record patient progress and toleration of activity level   Outcome: Progressing     Problem: DISCHARGE PLANNING  Goal: Discharge to home or other facility with appropriate resources  Description: INTERVENTIONS:  - Identify barriers to discharge w/patient and caregiver  - Arrange for needed discharge resources and transportation as appropriate  - Identify discharge learning needs (meds, wound care, etc.)  - Arrange for interpretive services to assist at discharge as needed  - Refer to Case Management Department for coordinating discharge planning if the patient needs post-hospital services based on physician/advanced practitioner order or complex needs related to functional status, cognitive ability, or social support system  Outcome:  Progressing     Problem: Knowledge Deficit  Goal: Patient/family/caregiver demonstrates understanding of disease process, treatment plan, medications, and discharge instructions  Description: Complete learning assessment and assess knowledge base.  Interventions:  - Provide teaching at level of understanding  - Provide teaching via preferred learning methods  Outcome: Progressing

## 2025-05-18 NOTE — PROGRESS NOTES
Progress Note - Critical Care/ICU   Name: Kaykay Holland 35 y.o. female I MRN: [id number]  Unit/Bed#: -01 SDU I Date of Admission: 5/16/2025   Date of Service: 5/18/2025 I Hospital Day: 2      Assessment & Plan  Acute alcoholic pancreatitis  Presented with abdominal pain, nausea, and vomiting  History of alcohol abuse with multiple episodes of alcoholic pancreatitis  Lipase 124  CTAP: Acute interstitial edematous pancreatitis. Multiple pseudocysts are present, increased in size and number when compared with March 30, 2025. Please see discussion. There is trace ascites, decreased in amount compared with March 30, 2025. The wall of the stomach appears thickened and edematous. The duodenal wall appears thickened. Follow-up recommended. Hepatomegaly. Mild hepatic steatosis. Portions of the surface contour of the liver appears slightly nodular, raising concern for possible early cirrhosis. Upper abdominal varices are present. The splenic vein is opacified, however, appears somewhat attenuated as it passes posterior to the pancreas.  GI consulted    Plan  Continue IV fluids  As needed Dilaudid for pain control  Started on clear liquids- up titrate base on clinical condition  Serial abdominal exams  Daily MELD labs  GI following- appreciate recommendations   Alcohol withdrawal (HCC)  History of alcohol abuse with history of alcohol abuse seizures  Patient reports that she started drinking again 1 week ago.  Reports drinking wine at home  Ethanol greater than 400 on admission  Developed alcohol withdrawal symptoms today that were poorly controlled with Valium  Total Phenobarbital 609 mg    Plan  Loaded with 10 mg/kg phenobarbital (549 mg), additional 65 mg x 1  CIWA assessments with PRN valium moving forward  Continue thiamine and folic acid  Not interested in pursuing alcohol rehab   Pancreatic pseudocyst  Noted on CT  GI consulted  Tobacco abuse  NRT  Hepatic steatosis  Encourage alcohol cessation  Disposition:  Stepdown Level 1    ICU Core Measures     A: Assess, Prevent, and Manage Pain Has pain been assessed? Yes  Need for changes to pain regimen? No   B: Both SAT/SAT  N/A   C: Choice of Sedation RASS Goal: 0 Alert and Calm or N/A patient not on sedation  Need for changes to sedation or analgesia regimen? N/A   D: Delirium CAM-ICU: Negative   E: Early Mobility  Plan for early mobility? Yes   F: Family Engagement Plan for family engagement today? Yes         Prophylaxis:  VTE Contraindicated secondary to: Low Risk   Stress Ulcer  not ordered         24 Hour Events : Received Valium 5 mg PO x 1 in the evening due to anxiety. No acute events overnight. Pt endorses epigastric pain but no further N/V. Pt states that she would like to be discharged today due to work obligations. Explained that she is still receiving IV pain medications and has been unable to tolerate more than sips of clear liquids so she should really stay in the hospital until her pancreatitis symptoms improve. Pt expressed understanding but is unsure if she will be willing to remain inpatient.    Subjective   Review of Systems: See HPI for Review of Systems    Objective :                   Vitals I/O      Most Recent Min/Max in 24hrs   Temp 98.5 °F (36.9 °C) Temp  Min: 98.2 °F (36.8 °C)  Max: 98.6 °F (37 °C)   Pulse 69 Pulse  Min: 55  Max: 84   Resp 13 Resp  Min: 13  Max: 16   /87 BP  Min: 129/87  Max: 154/97   O2 Sat 99 % SpO2  Min: 96 %  Max: 100 %      Intake/Output Summary (Last 24 hours) at 5/18/2025 0037  Last data filed at 5/18/2025 0000  Gross per 24 hour   Intake 7308.33 ml   Output --   Net 7308.33 ml       Diet Clear Liquid    Invasive Monitoring           Physical Exam   Physical Exam  Vitals reviewed.   Eyes:      Extraocular Movements: Extraocular movements intact.      Pupils: Pupils are equal, round, and reactive to light.   Skin:     General: Skin is warm and dry.      Capillary Refill: Capillary refill takes 2 to 3 seconds.   HENT:       Head: Normocephalic and atraumatic.      Mouth/Throat:      Mouth: Mucous membranes are moist.   Cardiovascular:      Rate and Rhythm: Normal rate and regular rhythm.      Heart sounds: No murmur heard.     No friction rub. No gallop.   Musculoskeletal:      Right lower leg: No edema.      Left lower leg: No edema.   Abdominal: General: There is no distension.      Palpations: Abdomen is soft.      Tenderness: There is abdominal tenderness.   Constitutional:       Appearance: She is well-developed and well-nourished.   Pulmonary:      Effort: Pulmonary effort is normal.      Breath sounds: Normal breath sounds.   Neurological:      General: No focal deficit present.      Mental Status: She is alert and oriented to person, place and time. Mental status is at baseline.      Motor: Strength full and intact in all extremities.          Diagnostic Studies        Lab Results: I have reviewed the following results:     Medications:  Scheduled PRN   chlorhexidine, 15 mL, Q12H RODRICK  folic acid, 1 mg, Daily  multivitamin-minerals, 1 tablet, Daily  nicotine, 14 mg, Daily  thiamine, 100 mg, Daily      diazepam, 5 mg, Q6H PRN  HYDROmorphone, 0.2 mg, Q4H PRN   Or  HYDROmorphone, 0.5 mg, Q4H PRN  HYDROmorphone, 0.2 mg, Q4H PRN  ondansetron, 4 mg, Q6H PRN  trimethobenzamide, 200 mg, Q6H PRN       Continuous    lactated ringers, 200 mL/hr, Last Rate: 200 mL/hr (05/17/25 2240)         Labs:   CBC    Recent Labs     05/17/25  0549   WBC 4.62   HGB 12.2   HCT 37.5   *     BMP    Recent Labs     05/17/25  0549 05/17/25  0737   SODIUM 131* 133*   K 3.9 3.8   CL 95* 95*   CO2 31 32   AGAP 5 6   BUN 4* 4*   CREATININE 0.43* 0.42*   CALCIUM 8.0* 8.0*       Coags    No recent results     Additional Electrolytes  Recent Labs     05/16/25  0601 05/17/25  0549   MG 1.4* 1.6*   PHOS 3.7  --           Blood Gas    No recent results  No recent results LFTs  Recent Labs     05/16/25  0601 05/17/25  0549   ALT 16 14   AST 46* 34   ALKPHOS  77 76   ALB 3.4* 3.3*   TBILI 0.38 1.36*       Infectious  No recent results  Glucose  Recent Labs     05/16/25  0601 05/17/25  0549 05/17/25  0737   GLUC 82 82 80

## 2025-05-18 NOTE — ASSESSMENT & PLAN NOTE
Patient was transferred to ICU overnight due to increased withdrawal symptoms.  Patient feels much better this morning after receiving IV phenobarbital overnight.  Tolerating advance diet   Monitor for withdrawal symptoms.

## 2025-05-18 NOTE — ASSESSMENT & PLAN NOTE
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     CT shows acute interstitial edematous pancreatitis, multiple pseudocysts increased in size and number compared to CT from March (largest in the pancreatic tail measuring 4.5 x 3 x 2.3 cm), trace ascites, thickened appearance of the stomach and duodenal wall. No evidence of pancreatic necrosis, or abnormal gas in or around the pancreas to suggest infection.    On admission, lipase minimally elevated 124. Ethanol 408. Creatinine normal. No leukocytosis or fever.  Patient with increased left-sided, epigastric tenderness with palpation    MELD 3.0: 13 at 5/18/2025  3:45 AM  MELD-Na: 7 at 5/18/2025  3:45 AM  Calculated from:  Serum Creatinine: 0.4 mg/dL (Using min of 1 mg/dL) at 5/18/2025  3:45 AM  Serum Sodium: 132 mmol/L at 5/18/2025  3:45 AM  Total Bilirubin: 0.92 mg/dL (Using min of 1 mg/dL) at 5/18/2025  3:45 AM  Serum Albumin: 3.2 g/dL at 5/18/2025  3:45 AM  INR(ratio): 1.06 at 5/18/2025  3:40 AM  Age at listing (hypothetical): 35 years  Sex: Female at 5/18/2025  3:45 AM       -Continue supportive care  - Tolerating advance diet    -  IV fluids, pain management and antiemetics as needed, continue IV therapy as long as patient is having pain requiring IV opioid.  - Check MELD score labs

## 2025-05-19 LAB
ALBUMIN SERPL BCG-MCNC: 3.3 G/DL (ref 3.5–5)
ALP SERPL-CCNC: 75 U/L (ref 34–104)
ALT SERPL W P-5'-P-CCNC: 10 U/L (ref 7–52)
ANION GAP SERPL CALCULATED.3IONS-SCNC: 7 MMOL/L (ref 4–13)
AST SERPL W P-5'-P-CCNC: 22 U/L (ref 13–39)
BASOPHILS # BLD AUTO: 0.02 THOUSANDS/ÂΜL (ref 0–0.1)
BASOPHILS NFR BLD AUTO: 1 % (ref 0–1)
BILIRUB DIRECT SERPL-MCNC: 0.17 MG/DL (ref 0–0.2)
BILIRUB SERPL-MCNC: 0.61 MG/DL (ref 0.2–1)
BUN SERPL-MCNC: 4 MG/DL (ref 5–25)
CALCIUM SERPL-MCNC: 8.8 MG/DL (ref 8.4–10.2)
CHLORIDE SERPL-SCNC: 97 MMOL/L (ref 96–108)
CO2 SERPL-SCNC: 30 MMOL/L (ref 21–32)
CREAT SERPL-MCNC: 0.33 MG/DL (ref 0.6–1.3)
EOSINOPHIL # BLD AUTO: 0.16 THOUSAND/ÂΜL (ref 0–0.61)
EOSINOPHIL NFR BLD AUTO: 4 % (ref 0–6)
ERYTHROCYTE [DISTWIDTH] IN BLOOD BY AUTOMATED COUNT: 12.9 % (ref 11.6–15.1)
GFR SERPL CREATININE-BSD FRML MDRD: 145 ML/MIN/1.73SQ M
GLUCOSE SERPL-MCNC: 87 MG/DL (ref 65–140)
HCT VFR BLD AUTO: 34.3 % (ref 34.8–46.1)
HGB BLD-MCNC: 11.4 G/DL (ref 11.5–15.4)
IMM GRANULOCYTES # BLD AUTO: 0.01 THOUSAND/UL (ref 0–0.2)
IMM GRANULOCYTES NFR BLD AUTO: 0 % (ref 0–2)
INR PPP: 1.01 (ref 0.85–1.19)
LYMPHOCYTES # BLD AUTO: 1.45 THOUSANDS/ÂΜL (ref 0.6–4.47)
LYMPHOCYTES NFR BLD AUTO: 33 % (ref 14–44)
MAGNESIUM SERPL-MCNC: 1.3 MG/DL (ref 1.9–2.7)
MCH RBC QN AUTO: 33.4 PG (ref 26.8–34.3)
MCHC RBC AUTO-ENTMCNC: 33.2 G/DL (ref 31.4–37.4)
MCV RBC AUTO: 101 FL (ref 82–98)
MONOCYTES # BLD AUTO: 0.5 THOUSAND/ÂΜL (ref 0.17–1.22)
MONOCYTES NFR BLD AUTO: 11 % (ref 4–12)
NEUTROPHILS # BLD AUTO: 2.24 THOUSANDS/ÂΜL (ref 1.85–7.62)
NEUTS SEG NFR BLD AUTO: 51 % (ref 43–75)
NRBC BLD AUTO-RTO: 0 /100 WBCS
PHOSPHATE SERPL-MCNC: 4.9 MG/DL (ref 2.7–4.5)
PLATELET # BLD AUTO: 145 THOUSANDS/UL (ref 149–390)
PMV BLD AUTO: 11.2 FL (ref 8.9–12.7)
POTASSIUM SERPL-SCNC: 3.7 MMOL/L (ref 3.5–5.3)
PROT SERPL-MCNC: 6.2 G/DL (ref 6.4–8.4)
PROTHROMBIN TIME: 13.8 SECONDS (ref 12.3–15)
RBC # BLD AUTO: 3.41 MILLION/UL (ref 3.81–5.12)
SODIUM SERPL-SCNC: 134 MMOL/L (ref 135–147)
WBC # BLD AUTO: 4.38 THOUSAND/UL (ref 4.31–10.16)

## 2025-05-19 PROCEDURE — 85610 PROTHROMBIN TIME: CPT

## 2025-05-19 PROCEDURE — 84100 ASSAY OF PHOSPHORUS: CPT

## 2025-05-19 PROCEDURE — 83735 ASSAY OF MAGNESIUM: CPT

## 2025-05-19 PROCEDURE — 85025 COMPLETE CBC W/AUTO DIFF WBC: CPT

## 2025-05-19 PROCEDURE — 80048 BASIC METABOLIC PNL TOTAL CA: CPT

## 2025-05-19 PROCEDURE — 99232 SBSQ HOSP IP/OBS MODERATE 35: CPT

## 2025-05-19 PROCEDURE — 80076 HEPATIC FUNCTION PANEL: CPT | Performed by: NURSE PRACTITIONER

## 2025-05-19 RX ORDER — HYDROMORPHONE HCL/PF 1 MG/ML
0.5 SYRINGE (ML) INJECTION EVERY 6 HOURS PRN
Status: DISCONTINUED | OUTPATIENT
Start: 2025-05-19 | End: 2025-05-20

## 2025-05-19 RX ORDER — HYDROMORPHONE HCL IN WATER/PF 6 MG/30 ML
0.2 PATIENT CONTROLLED ANALGESIA SYRINGE INTRAVENOUS EVERY 6 HOURS PRN
Status: DISCONTINUED | OUTPATIENT
Start: 2025-05-19 | End: 2025-05-20

## 2025-05-19 RX ORDER — LANOLIN ALCOHOL/MO/W.PET/CERES
800 CREAM (GRAM) TOPICAL 2 TIMES DAILY
Status: DISCONTINUED | OUTPATIENT
Start: 2025-05-19 | End: 2025-05-20

## 2025-05-19 RX ORDER — MAGNESIUM SULFATE HEPTAHYDRATE 40 MG/ML
2 INJECTION, SOLUTION INTRAVENOUS ONCE
Status: COMPLETED | OUTPATIENT
Start: 2025-05-19 | End: 2025-05-19

## 2025-05-19 RX ADMIN — Medication 800 MG: at 17:43

## 2025-05-19 RX ADMIN — HYDROMORPHONE HYDROCHLORIDE 0.5 MG: 1 INJECTION, SOLUTION INTRAMUSCULAR; INTRAVENOUS; SUBCUTANEOUS at 14:04

## 2025-05-19 RX ADMIN — SODIUM CHLORIDE, SODIUM LACTATE, POTASSIUM CHLORIDE, AND CALCIUM CHLORIDE 200 ML/HR: .6; .31; .03; .02 INJECTION, SOLUTION INTRAVENOUS at 05:32

## 2025-05-19 RX ADMIN — SODIUM CHLORIDE, SODIUM LACTATE, POTASSIUM CHLORIDE, AND CALCIUM CHLORIDE 200 ML/HR: .6; .31; .03; .02 INJECTION, SOLUTION INTRAVENOUS at 00:29

## 2025-05-19 RX ADMIN — ONDANSETRON 4 MG: 2 INJECTION INTRAMUSCULAR; INTRAVENOUS at 22:35

## 2025-05-19 RX ADMIN — MAGNESIUM SULFATE HEPTAHYDRATE 2 G: 2 INJECTION, SOLUTION INTRAVENOUS at 17:43

## 2025-05-19 RX ADMIN — HYDROMORPHONE HYDROCHLORIDE 0.2 MG: 0.2 INJECTION, SOLUTION INTRAMUSCULAR; INTRAVENOUS; SUBCUTANEOUS at 00:25

## 2025-05-19 RX ADMIN — SODIUM CHLORIDE, SODIUM LACTATE, POTASSIUM CHLORIDE, AND CALCIUM CHLORIDE 200 ML/HR: .6; .31; .03; .02 INJECTION, SOLUTION INTRAVENOUS at 16:01

## 2025-05-19 RX ADMIN — HYDROMORPHONE HYDROCHLORIDE 0.5 MG: 1 INJECTION, SOLUTION INTRAMUSCULAR; INTRAVENOUS; SUBCUTANEOUS at 08:26

## 2025-05-19 RX ADMIN — HYDROMORPHONE HYDROCHLORIDE 0.2 MG: 0.2 INJECTION, SOLUTION INTRAMUSCULAR; INTRAVENOUS; SUBCUTANEOUS at 11:46

## 2025-05-19 RX ADMIN — NICOTINE 14 MG: 14 PATCH, EXTENDED RELEASE TRANSDERMAL at 08:26

## 2025-05-19 RX ADMIN — SODIUM CHLORIDE, SODIUM LACTATE, POTASSIUM CHLORIDE, AND CALCIUM CHLORIDE 200 ML/HR: .6; .31; .03; .02 INJECTION, SOLUTION INTRAVENOUS at 21:06

## 2025-05-19 RX ADMIN — SODIUM CHLORIDE, SODIUM LACTATE, POTASSIUM CHLORIDE, AND CALCIUM CHLORIDE 200 ML/HR: .6; .31; .03; .02 INJECTION, SOLUTION INTRAVENOUS at 10:43

## 2025-05-19 RX ADMIN — DIAZEPAM 5 MG: 5 TABLET ORAL at 21:08

## 2025-05-19 RX ADMIN — HYDROMORPHONE HYDROCHLORIDE 0.5 MG: 1 INJECTION, SOLUTION INTRAMUSCULAR; INTRAVENOUS; SUBCUTANEOUS at 03:33

## 2025-05-19 RX ADMIN — DIAZEPAM 5 MG: 5 TABLET ORAL at 12:15

## 2025-05-19 RX ADMIN — HYDROMORPHONE HYDROCHLORIDE 0.2 MG: 0.2 INJECTION, SOLUTION INTRAMUSCULAR; INTRAVENOUS; SUBCUTANEOUS at 16:01

## 2025-05-19 RX ADMIN — HYDROMORPHONE HYDROCHLORIDE 0.2 MG: 0.2 INJECTION, SOLUTION INTRAMUSCULAR; INTRAVENOUS; SUBCUTANEOUS at 05:32

## 2025-05-19 RX ADMIN — HYDROMORPHONE HYDROCHLORIDE 0.5 MG: 1 INJECTION, SOLUTION INTRAMUSCULAR; INTRAVENOUS; SUBCUTANEOUS at 19:37

## 2025-05-19 RX ADMIN — MULTIPLE VITAMINS W/ MINERALS TAB 1 TABLET: TAB ORAL at 08:26

## 2025-05-19 RX ADMIN — ENOXAPARIN SODIUM 30 MG: 100 INJECTION SUBCUTANEOUS at 08:26

## 2025-05-19 RX ADMIN — ONDANSETRON 4 MG: 2 INJECTION INTRAMUSCULAR; INTRAVENOUS at 16:48

## 2025-05-19 RX ADMIN — FOLIC ACID 1 MG: 1 TABLET ORAL at 08:26

## 2025-05-19 RX ADMIN — Medication 100 MG: at 08:26

## 2025-05-19 NOTE — ASSESSMENT & PLAN NOTE
"CT a/p (5/16): \"Hepatomegaly. Mild hepatic steatosis. Portions of the surface contour of the liver appears slightly nodular, raising concern for possible early cirrhosis. Upper abdominal varices are present. The splenic vein is opacified, however, appears somewhat attenuated as it passes posterior to the pancreas.\"   It is suspected that steatosis in the setting of chronic alcohol use.    Appreciate GI consultation.    Continue to encourage alcohol abstinence.    Patient refusing rehabilitation at discharge.    Will need outpatient follow-up with GI on discharge.  "

## 2025-05-19 NOTE — ASSESSMENT & PLAN NOTE
Continue CIWA protocol.  Continue thiamine, folic acid, and multivitamin.  Patient denying rehabilitation services at discharge.

## 2025-05-19 NOTE — PLAN OF CARE
Problem: Potential for Falls  Goal: Patient will remain free of falls  Description: INTERVENTIONS:  - Educate patient/family on patient safety including physical limitations  - Instruct patient to call for assistance with activity   - Consider consulting OT/PT to assist with strengthening/mobility based on AM PAC & JH-HLM score  - Consult OT/PT to assist with strengthening/mobility   - Keep Call bell within reach  - Keep bed low and locked with side rails adjusted as appropriate  - Keep care items and personal belongings within reach  - Initiate and maintain comfort rounds  - Make Fall Risk Sign visible to staff  - Offer Toileting every 3 Hours, in advance of need  - Initiate/Maintain bed alarm  - Obtain necessary fall risk management equipment:   - Apply yellow socks and bracelet for high fall risk patients  - Consider moving patient to room near nurses station  Outcome: Progressing     Problem: PAIN - ADULT  Goal: Verbalizes/displays adequate comfort level or baseline comfort level  Description: Interventions:  - Encourage patient to monitor pain and request assistance  - Assess pain using appropriate pain scale  - Administer analgesics as ordered based on type and severity of pain and evaluate response  - Implement non-pharmacological measures as appropriate and evaluate response  - Consider cultural and social influences on pain and pain management  - Notify physician/advanced practitioner if interventions unsuccessful or patient reports new pain  - Educate patient/family on pain management process including their role and importance of  reporting pain   - Provide non-pharmacologic/complimentary pain relief interventions  Outcome: Progressing     Problem: INFECTION - ADULT  Goal: Absence or prevention of progression during hospitalization  Description: INTERVENTIONS:  - Assess and monitor for signs and symptoms of infection  - Monitor lab/diagnostic results  - Monitor all insertion sites, i.e. indwelling  lines, tubes, and drains  - Monitor endotracheal if appropriate and nasal secretions for changes in amount and color  - [place] appropriate cooling/warming therapies per order  - Administer medications as ordered  - Instruct and encourage patient and family to use good hand hygiene technique  - Identify and instruct in appropriate isolation precautions for identified infection/condition  Outcome: Progressing  Goal: Absence of fever/infection during neutropenic period  Description: INTERVENTIONS:  - Monitor WBC  - Perform strict hand hygiene  - Limit to healthy visitors only  - No plants, dried, fresh or silk flowers with butler in patient room  Outcome: Progressing     Problem: SAFETY ADULT  Goal: Patient will remain free of falls  Description: INTERVENTIONS:  - Educate patient/family on patient safety including physical limitations  - Instruct patient to call for assistance with activity   - Consider consulting OT/PT to assist with strengthening/mobility based on AM PAC & -HLM score  - Consult OT/PT to assist with strengthening/mobility   - Keep Call bell within reach  - Keep bed low and locked with side rails adjusted as appropriate  - Keep care items and personal belongings within reach  - Initiate and maintain comfort rounds  - Make Fall Risk Sign visible to staff  - Offer Toileting every 3 Hours, in advance of need  - Initiate/Maintain bed alarm  - Obtain necessary fall risk management equipment:   - Apply yellow socks and bracelet for high fall risk patients  - Consider moving patient to room near nurses station  Outcome: Progressing  Goal: Maintain or return to baseline ADL function  Description: INTERVENTIONS:  -  Assess patient's ability to carry out ADLs; assess patient's baseline for ADL function and identify physical deficits which impact ability to perform ADLs (bathing, care of mouth/teeth, toileting, grooming, dressing, etc.)  - Assess/evaluate cause of self-care deficits   - Assess range of  motion  - Assess patient's mobility; develop plan if impaired  - Assess patient's need for assistive devices and provide as appropriate  - Encourage maximum independence but intervene and supervise when necessary  - Involve family in performance of ADLs  - Assess for home care needs following discharge   - Consider OT consult to assist with ADL evaluation and planning for discharge  - Provide patient education as appropriate  - Monitor functional capacity and physical performance, use of AM PAC & JH-HLM   - Monitor gait, balance and fatigue with ambulation    Outcome: Progressing  Goal: Maintains/Returns to pre admission functional level  Description: INTERVENTIONS:  - Perform AM-PAC 6 Click Basic Mobility/ Daily Activity assessment daily.  - Set and communicate daily mobility goal to care team and patient/family/caregiver.   - Collaborate with rehabilitation services on mobility goals if consulted  - Perform Range of Motion 3 times a day.  - Reposition patient every 2 hours.  - Dangle patient 3 times a day  - Stand patient 3 times a day  - Ambulate patient 3 times a day  - Out of bed to chair 3 times a day   - Out of bed for meals 3 times a day  - Out of bed for toileting  - Record patient progress and toleration of activity level   Outcome: Progressing     Problem: DISCHARGE PLANNING  Goal: Discharge to home or other facility with appropriate resources  Description: INTERVENTIONS:  - Identify barriers to discharge w/patient and caregiver  - Arrange for needed discharge resources and transportation as appropriate  - Identify discharge learning needs (meds, wound care, etc.)  - Arrange for interpretive services to assist at discharge as needed  - Refer to Case Management Department for coordinating discharge planning if the patient needs post-hospital services based on physician/advanced practitioner order or complex needs related to functional status, cognitive ability, or social support system  Outcome:  Progressing     Problem: Knowledge Deficit  Goal: Patient/family/caregiver demonstrates understanding of disease process, treatment plan, medications, and discharge instructions  Description: Complete learning assessment and assess knowledge base.  Interventions:  - Provide teaching at level of understanding  - Provide teaching via preferred learning methods  Outcome: Progressing

## 2025-05-19 NOTE — PLAN OF CARE
Problem: Potential for Falls  Goal: Patient will remain free of falls  Description: INTERVENTIONS:  - Educate patient/family on patient safety including physical limitations  - Instruct patient to call for assistance with activity   - Consider consulting OT/PT to assist with strengthening/mobility based on AM PAC & JH-HLM score  - Consult OT/PT to assist with strengthening/mobility   - Keep Call bell within reach  - Keep bed low and locked with side rails adjusted as appropriate  - Keep care items and personal belongings within reach  - Initiate and maintain comfort rounds  - Make Fall Risk Sign visible to staff  - Offer Toileting every 3 Hours, in advance of need  - Initiate/Maintain bed alarm  - Obtain necessary fall risk management equipment:   - Apply yellow socks and bracelet for high fall risk patients  - Consider moving patient to room near nurses station  Outcome: Progressing     Problem: PAIN - ADULT  Goal: Verbalizes/displays adequate comfort level or baseline comfort level  Description: Interventions:  - Encourage patient to monitor pain and request assistance  - Assess pain using appropriate pain scale  - Administer analgesics as ordered based on type and severity of pain and evaluate response  - Implement non-pharmacological measures as appropriate and evaluate response  - Consider cultural and social influences on pain and pain management  - Notify physician/advanced practitioner if interventions unsuccessful or patient reports new pain  - Educate patient/family on pain management process including their role and importance of  reporting pain   - Provide non-pharmacologic/complimentary pain relief interventions  Outcome: Progressing     Problem: INFECTION - ADULT  Goal: Absence or prevention of progression during hospitalization  Description: INTERVENTIONS:  - Assess and monitor for signs and symptoms of infection  - Monitor lab/diagnostic results  - Monitor all insertion sites, i.e. indwelling  lines, tubes, and drains  - Monitor endotracheal if appropriate and nasal secretions for changes in amount and color  - [place] appropriate cooling/warming therapies per order  - Administer medications as ordered  - Instruct and encourage patient and family to use good hand hygiene technique  - Identify and instruct in appropriate isolation precautions for identified infection/condition  Outcome: Progressing  Goal: Absence of fever/infection during neutropenic period  Description: INTERVENTIONS:  - Monitor WBC  - Perform strict hand hygiene  - Limit to healthy visitors only  - No plants, dried, fresh or silk flowers with butler in patient room  Outcome: Progressing     Problem: SAFETY ADULT  Goal: Patient will remain free of falls  Description: INTERVENTIONS:  - Educate patient/family on patient safety including physical limitations  - Instruct patient to call for assistance with activity   - Consider consulting OT/PT to assist with strengthening/mobility based on AM PAC & -HLM score  - Consult OT/PT to assist with strengthening/mobility   - Keep Call bell within reach  - Keep bed low and locked with side rails adjusted as appropriate  - Keep care items and personal belongings within reach  - Initiate and maintain comfort rounds  - Make Fall Risk Sign visible to staff  - Offer Toileting every 3 Hours, in advance of need  - Initiate/Maintain bed alarm  - Obtain necessary fall risk management equipment:   - Apply yellow socks and bracelet for high fall risk patients  - Consider moving patient to room near nurses station  Outcome: Progressing  Goal: Maintain or return to baseline ADL function  Description: INTERVENTIONS:  -  Assess patient's ability to carry out ADLs; assess patient's baseline for ADL function and identify physical deficits which impact ability to perform ADLs (bathing, care of mouth/teeth, toileting, grooming, dressing, etc.)  - Assess/evaluate cause of self-care deficits   - Assess range of  motion  - Assess patient's mobility; develop plan if impaired  - Assess patient's need for assistive devices and provide as appropriate  - Encourage maximum independence but intervene and supervise when necessary  - Involve family in performance of ADLs  - Assess for home care needs following discharge   - Consider OT consult to assist with ADL evaluation and planning for discharge  - Provide patient education as appropriate  - Monitor functional capacity and physical performance, use of AM PAC & JH-HLM   - Monitor gait, balance and fatigue with ambulation    Outcome: Progressing  Goal: Maintains/Returns to pre admission functional level  Description: INTERVENTIONS:  - Perform AM-PAC 6 Click Basic Mobility/ Daily Activity assessment daily.  - Set and communicate daily mobility goal to care team and patient/family/caregiver.   - Collaborate with rehabilitation services on mobility goals if consulted  - Perform Range of Motion 3 times a day.  - Reposition patient every 2 hours.  - Dangle patient 3 times a day  - Stand patient 3 times a day  - Ambulate patient 3 times a day  - Out of bed to chair 3 times a day   - Out of bed for meals 3 times a day  - Out of bed for toileting  - Record patient progress and toleration of activity level   Outcome: Progressing     Problem: DISCHARGE PLANNING  Goal: Discharge to home or other facility with appropriate resources  Description: INTERVENTIONS:  - Identify barriers to discharge w/patient and caregiver  - Arrange for needed discharge resources and transportation as appropriate  - Identify discharge learning needs (meds, wound care, etc.)  - Arrange for interpretive services to assist at discharge as needed  - Refer to Case Management Department for coordinating discharge planning if the patient needs post-hospital services based on physician/advanced practitioner order or complex needs related to functional status, cognitive ability, or social support system  Outcome:  Progressing     Problem: Knowledge Deficit  Goal: Patient/family/caregiver demonstrates understanding of disease process, treatment plan, medications, and discharge instructions  Description: Complete learning assessment and assess knowledge base.  Interventions:  - Provide teaching at level of understanding  - Provide teaching via preferred learning methods  Outcome: Progressing

## 2025-05-19 NOTE — ASSESSMENT & PLAN NOTE
Continue nicotine replacement therapy, while inpatient.    Continue to encourage complete smoking cessation.

## 2025-05-19 NOTE — PROGRESS NOTES
"Progress Note - Hospitalist   Name: Kaykay Holland 35 y.o. female I MRN: [id number]  Unit/Bed#: -01 I Date of Admission: 5/16/2025   Date of Service: 5/19/2025 I Hospital Day: 3    Assessment & Plan  Acute alcoholic pancreatitis  Patient presented with left upper quadrant abdominal pain and symptoms of nausea/vomiting x 1 week.  Per chart review, patient with multiple admissions for alcoholic pancreatitis, including necrotizing pancreatitis requiring EUS with cystogastrostomy stent placement in December 2024 and removal in January 2025.    Per patient, reportedly started drinking about a week ago.      CT a/p (5/16: \"Acute interstitial edematous pancreatitis. Multiple pseudocysts are present, increased in size and number when compared with March 30, 2025.\"   In addition, noting acute pancreatitis, no abnormal gas in/around pancreas suggestive of infection.    Appreciate GI consultation.    Continue IV fluid hydration, while patient receiving IV opiates.  Continue pain management.    Continue PRN antiemetics.    Continue to check MELD score labs, daily.  Continue supportive care.    Continue encourage adequate nutrition/hydration, as tolerated.    Will continue GI diet, low-fat.  Pancreatic pseudocyst  Appreciate GI consultation.  Continue further management, as described above.  Hepatic steatosis  CT a/p (5/16): \"Hepatomegaly. Mild hepatic steatosis. Portions of the surface contour of the liver appears slightly nodular, raising concern for possible early cirrhosis. Upper abdominal varices are present. The splenic vein is opacified, however, appears somewhat attenuated as it passes posterior to the pancreas.\"   It is suspected that steatosis in the setting of chronic alcohol use.    Appreciate GI consultation.    Continue to encourage alcohol abstinence.    Patient refusing rehabilitation at discharge.    Will need outpatient follow-up with GI on discharge.  Tobacco abuse  Continue nicotine replacement " therapy, while inpatient.    Continue to encourage complete smoking cessation.   Alcohol withdrawal (HCC)  Continue CIWA protocol.  Continue thiamine, folic acid, and multivitamin.  Patient denying rehabilitation services at discharge.    VTE Pharmacologic Prophylaxis: VTE Score: 0 Low Risk (Score 0-2) - Encourage Ambulation.    Mobility:   Basic Mobility Inpatient Raw Score: 24  JH-HLM Goal: 8: Walk 250 feet or more  JH-HLM Achieved: 7: Walk 25 feet or more  JH-HLM Goal NOT achieved. Continue with multidisciplinary rounding and encourage appropriate mobility to improve upon JH-HLM goals.    Patient Centered Rounds: I performed bedside rounds with nursing staff today.   Discussions with Specialists or Other Care Team Provider: None     Education and Discussions with Family / Patient: Patient declined call to .     Current Length of Stay: 3 day(s)  Current Patient Status: Inpatient   Certification Statement: The patient will continue to require additional inpatient hospital stay due to proving in pain control, tolerating oral diet, and clinical improvement.  Discharge Plan: Anticipate discharge in 24-48 hrs to home.    Code Status: Level 1 - Full Code    Subjective   The patient was seen and examined at the bedside this morning.  The patient expressing that she is feeling well, but very somnolent on exam.  The patient expressing that she is eating somewhat, but not eating a lot.  The patient does endorse that having IV pain medication prior to eating allows her to eat more items.  The patient is agreeable to stay another night, as there is still requiring IV pain medication to eat.  However, patient does endorse wanting to leave tomorrow.  The patient does endorse left upper quadrant tenderness.    Per nursing, no acute events overnight.    Objective :  Temp:  [97.9 °F (36.6 °C)-98.7 °F (37.1 °C)] 97.9 °F (36.6 °C)  HR:  [54-83] 70  BP: (129-159)/() 129/93  Resp:  [17] 17  SpO2:  [97 %-99 %] 98  %  O2 Device: None (Room air)    Body mass index is 20.91 kg/m².     Input and Output Summary (last 24 hours):     Intake/Output Summary (Last 24 hours) at 5/19/2025 1328  Last data filed at 5/19/2025 1043  Gross per 24 hour   Intake 6103.33 ml   Output --   Net 6103.33 ml       Physical Exam  Vitals and nursing note reviewed.   Constitutional:       General: She is awake. She is not in acute distress.     Appearance: She is not ill-appearing.   HENT:      Head: Normocephalic and atraumatic.     Eyes:      General: No scleral icterus.     Conjunctiva/sclera: Conjunctivae normal.      Comments: On exam, patient wears glasses.     Cardiovascular:      Rate and Rhythm: Normal rate and regular rhythm.      Heart sounds: Normal heart sounds, S1 normal and S2 normal.   Pulmonary:      Effort: Pulmonary effort is normal.      Breath sounds: Normal breath sounds. No wheezing, rhonchi or rales.   Abdominal:      General: Abdomen is flat. Bowel sounds are normal.      Palpations: Abdomen is soft.      Tenderness: There is abdominal tenderness in the left upper quadrant.     Musculoskeletal:      Right lower leg: No edema.      Left lower leg: No edema.     Skin:     General: Skin is warm and dry.      Comments: On exam, no evidence of open lesions/wounds on exposed skin.     Neurological:      Mental Status: She is alert and oriented to person, place, and time.      Sensory: Sensation is intact.      Motor: Motor function is intact.     Psychiatric:         Attention and Perception: Attention normal.         Mood and Affect: Mood normal.         Speech: Speech normal.         Behavior: Behavior is cooperative.       Lines/Drains:      Lab Results: I have reviewed the following results:   Results from last 7 days   Lab Units 05/19/25  0454   WBC Thousand/uL 4.38   HEMOGLOBIN g/dL 11.4*   HEMATOCRIT % 34.3*   PLATELETS Thousands/uL 145*   SEGS PCT % 51   LYMPHO PCT % 33   MONO PCT % 11   EOS PCT % 4     Results from last 7 days    Lab Units 05/19/25  0454   SODIUM mmol/L 134*   POTASSIUM mmol/L 3.7   CHLORIDE mmol/L 97   CO2 mmol/L 30   BUN mg/dL 4*   CREATININE mg/dL 0.33*   ANION GAP mmol/L 7   CALCIUM mg/dL 8.8   ALBUMIN g/dL 3.3*   TOTAL BILIRUBIN mg/dL 0.61   ALK PHOS U/L 75   ALT U/L 10   AST U/L 22   GLUCOSE RANDOM mg/dL 87     Results from last 7 days   Lab Units 05/19/25  0454   INR  1.01                   Recent Cultures (last 7 days):         Imaging Results Review: I reviewed radiology reports from this admission including: CT abdomen/pelvis.      Last 24 Hours Medication List:     Current Facility-Administered Medications:     diazepam (VALIUM) tablet 5 mg, Q6H PRN    enoxaparin (LOVENOX) subcutaneous injection 30 mg, Q24H RODRICK    folic acid (FOLVITE) tablet 1 mg, Daily    HYDROmorphone HCl (DILAUDID) injection 0.2 mg, Q4H PRN **OR** HYDROmorphone (DILAUDID) injection 0.5 mg, Q4H PRN    HYDROmorphone HCl (DILAUDID) injection 0.2 mg, Q4H PRN    lactated ringers infusion, Continuous, Last Rate: 200 mL/hr (05/19/25 1043)    multivitamin-minerals (CENTRUM) tablet 1 tablet, Daily    nicotine (NICODERM CQ) 14 mg/24hr TD 24 hr patch 14 mg, Daily    ondansetron (ZOFRAN) injection 4 mg, Q6H PRN    thiamine tablet 100 mg, Daily    trimethobenzamide (TIGAN) IM injection 200 mg, Q6H PRN    Administrative Statements   Today, Patient Was Seen By: PATRICIA Petty      **Please Note: This note may have been constructed using a voice recognition system.**

## 2025-05-20 ENCOUNTER — TRANSITIONAL CARE MANAGEMENT (OUTPATIENT)
Dept: FAMILY MEDICINE CLINIC | Facility: HOSPITAL | Age: 35
End: 2025-05-20

## 2025-05-20 VITALS
RESPIRATION RATE: 19 BRPM | TEMPERATURE: 98.2 F | HEART RATE: 68 BPM | WEIGHT: 125.88 LBS | OXYGEN SATURATION: 96 % | DIASTOLIC BLOOD PRESSURE: 85 MMHG | SYSTOLIC BLOOD PRESSURE: 114 MMHG | BODY MASS INDEX: 20.97 KG/M2 | HEIGHT: 65 IN

## 2025-05-20 DIAGNOSIS — Z59.71 DOES NOT HAVE HEALTH INSURANCE: Primary | ICD-10-CM

## 2025-05-20 DIAGNOSIS — F10.10 ALCOHOL ABUSE: ICD-10-CM

## 2025-05-20 LAB
ALBUMIN SERPL BCG-MCNC: 3.5 G/DL (ref 3.5–5)
ALP SERPL-CCNC: 80 U/L (ref 34–104)
ALT SERPL W P-5'-P-CCNC: 12 U/L (ref 7–52)
ANION GAP SERPL CALCULATED.3IONS-SCNC: 9 MMOL/L (ref 4–13)
AST SERPL W P-5'-P-CCNC: 24 U/L (ref 13–39)
BILIRUB SERPL-MCNC: 0.45 MG/DL (ref 0.2–1)
BUN SERPL-MCNC: 4 MG/DL (ref 5–25)
CALCIUM SERPL-MCNC: 8.9 MG/DL (ref 8.4–10.2)
CHLORIDE SERPL-SCNC: 98 MMOL/L (ref 96–108)
CO2 SERPL-SCNC: 28 MMOL/L (ref 21–32)
CREAT SERPL-MCNC: 0.39 MG/DL (ref 0.6–1.3)
ERYTHROCYTE [DISTWIDTH] IN BLOOD BY AUTOMATED COUNT: 13 % (ref 11.6–15.1)
GFR SERPL CREATININE-BSD FRML MDRD: 137 ML/MIN/1.73SQ M
GLUCOSE SERPL-MCNC: 99 MG/DL (ref 65–140)
HCT VFR BLD AUTO: 36.5 % (ref 34.8–46.1)
HGB BLD-MCNC: 12 G/DL (ref 11.5–15.4)
INR PPP: 1.04 (ref 0.85–1.19)
LIPASE SERPL-CCNC: 79 U/L (ref 11–82)
MAGNESIUM SERPL-MCNC: 1.5 MG/DL (ref 1.9–2.7)
MCH RBC QN AUTO: 33.3 PG (ref 26.8–34.3)
MCHC RBC AUTO-ENTMCNC: 32.9 G/DL (ref 31.4–37.4)
MCV RBC AUTO: 101 FL (ref 82–98)
PLATELET # BLD AUTO: 147 THOUSANDS/UL (ref 149–390)
PMV BLD AUTO: 11.4 FL (ref 8.9–12.7)
POTASSIUM SERPL-SCNC: 4 MMOL/L (ref 3.5–5.3)
PROT SERPL-MCNC: 6.4 G/DL (ref 6.4–8.4)
PROTHROMBIN TIME: 14.1 SECONDS (ref 12.3–15)
RBC # BLD AUTO: 3.6 MILLION/UL (ref 3.81–5.12)
SODIUM SERPL-SCNC: 135 MMOL/L (ref 135–147)
WBC # BLD AUTO: 5.07 THOUSAND/UL (ref 4.31–10.16)

## 2025-05-20 PROCEDURE — 80053 COMPREHEN METABOLIC PANEL: CPT

## 2025-05-20 PROCEDURE — 85027 COMPLETE CBC AUTOMATED: CPT

## 2025-05-20 PROCEDURE — 99239 HOSP IP/OBS DSCHRG MGMT >30: CPT | Performed by: PHYSICIAN ASSISTANT

## 2025-05-20 PROCEDURE — 83735 ASSAY OF MAGNESIUM: CPT

## 2025-05-20 PROCEDURE — 83690 ASSAY OF LIPASE: CPT | Performed by: PHYSICIAN ASSISTANT

## 2025-05-20 PROCEDURE — 85610 PROTHROMBIN TIME: CPT

## 2025-05-20 RX ORDER — OXYCODONE HYDROCHLORIDE 5 MG/1
5 TABLET ORAL EVERY 4 HOURS PRN
Refills: 0 | Status: DISCONTINUED | OUTPATIENT
Start: 2025-05-20 | End: 2025-05-20 | Stop reason: HOSPADM

## 2025-05-20 RX ORDER — OXYCODONE HYDROCHLORIDE 5 MG/1
5 TABLET ORAL EVERY 6 HOURS PRN
Refills: 0 | Status: DISCONTINUED | OUTPATIENT
Start: 2025-05-20 | End: 2025-05-20

## 2025-05-20 RX ORDER — HYDROMORPHONE HCL/PF 1 MG/ML
0.5 SYRINGE (ML) INJECTION EVERY 6 HOURS PRN
Status: DISCONTINUED | OUTPATIENT
Start: 2025-05-20 | End: 2025-05-20

## 2025-05-20 RX ORDER — FOLIC ACID 1 MG/1
1 TABLET ORAL DAILY
Qty: 30 TABLET | Refills: 0 | Status: SHIPPED | OUTPATIENT
Start: 2025-05-21

## 2025-05-20 RX ORDER — NICOTINE 21 MG/24HR
1 PATCH, TRANSDERMAL 24 HOURS TRANSDERMAL DAILY
Qty: 28 PATCH | Refills: 0 | Status: SHIPPED | OUTPATIENT
Start: 2025-05-21

## 2025-05-20 RX ORDER — MORPHINE SULFATE 4 MG/ML
4 INJECTION, SOLUTION INTRAMUSCULAR; INTRAVENOUS EVERY 4 HOURS PRN
Status: DISCONTINUED | OUTPATIENT
Start: 2025-05-20 | End: 2025-05-20 | Stop reason: HOSPADM

## 2025-05-20 RX ORDER — LANOLIN ALCOHOL/MO/W.PET/CERES
100 CREAM (GRAM) TOPICAL DAILY
Qty: 30 TABLET | Refills: 0 | Status: SHIPPED | OUTPATIENT
Start: 2025-05-21

## 2025-05-20 RX ORDER — HYDROXYZINE HYDROCHLORIDE 50 MG/1
50 TABLET, FILM COATED ORAL EVERY 6 HOURS PRN
Qty: 60 TABLET | Refills: 0 | Status: SHIPPED | OUTPATIENT
Start: 2025-05-20

## 2025-05-20 RX ORDER — HYDROMORPHONE HCL IN WATER/PF 6 MG/30 ML
0.2 PATIENT CONTROLLED ANALGESIA SYRINGE INTRAVENOUS EVERY 6 HOURS PRN
Status: DISCONTINUED | OUTPATIENT
Start: 2025-05-20 | End: 2025-05-20 | Stop reason: HOSPADM

## 2025-05-20 RX ORDER — HYDROXYZINE HYDROCHLORIDE 25 MG/1
50 TABLET, FILM COATED ORAL EVERY 6 HOURS PRN
Status: DISCONTINUED | OUTPATIENT
Start: 2025-05-20 | End: 2025-05-20 | Stop reason: HOSPADM

## 2025-05-20 RX ORDER — HYDROMORPHONE HCL IN WATER/PF 6 MG/30 ML
0.2 PATIENT CONTROLLED ANALGESIA SYRINGE INTRAVENOUS EVERY 6 HOURS PRN
Status: DISCONTINUED | OUTPATIENT
Start: 2025-05-20 | End: 2025-05-20

## 2025-05-20 RX ORDER — MAGNESIUM SULFATE HEPTAHYDRATE 40 MG/ML
4 INJECTION, SOLUTION INTRAVENOUS ONCE
Status: COMPLETED | OUTPATIENT
Start: 2025-05-20 | End: 2025-05-20

## 2025-05-20 RX ADMIN — Medication 100 MG: at 08:17

## 2025-05-20 RX ADMIN — MAGNESIUM SULFATE HEPTAHYDRATE 4 G: 40 INJECTION, SOLUTION INTRAVENOUS at 08:14

## 2025-05-20 RX ADMIN — HYDROMORPHONE HYDROCHLORIDE 0.5 MG: 1 INJECTION, SOLUTION INTRAMUSCULAR; INTRAVENOUS; SUBCUTANEOUS at 01:00

## 2025-05-20 RX ADMIN — Medication 800 MG: at 08:17

## 2025-05-20 RX ADMIN — NICOTINE 14 MG: 14 PATCH, EXTENDED RELEASE TRANSDERMAL at 08:17

## 2025-05-20 RX ADMIN — HYDROMORPHONE HYDROCHLORIDE 0.2 MG: 0.2 INJECTION, SOLUTION INTRAMUSCULAR; INTRAVENOUS; SUBCUTANEOUS at 04:44

## 2025-05-20 RX ADMIN — MORPHINE SULFATE 4 MG: 4 INJECTION, SOLUTION INTRAMUSCULAR; INTRAVENOUS at 10:30

## 2025-05-20 RX ADMIN — HYDROMORPHONE HYDROCHLORIDE 0.5 MG: 1 INJECTION, SOLUTION INTRAMUSCULAR; INTRAVENOUS; SUBCUTANEOUS at 07:47

## 2025-05-20 RX ADMIN — SODIUM CHLORIDE, SODIUM LACTATE, POTASSIUM CHLORIDE, AND CALCIUM CHLORIDE 200 ML/HR: .6; .31; .03; .02 INJECTION, SOLUTION INTRAVENOUS at 03:06

## 2025-05-20 RX ADMIN — ENOXAPARIN SODIUM 30 MG: 100 INJECTION SUBCUTANEOUS at 08:17

## 2025-05-20 RX ADMIN — HYDROMORPHONE HYDROCHLORIDE 0.2 MG: 0.2 INJECTION, SOLUTION INTRAMUSCULAR; INTRAVENOUS; SUBCUTANEOUS at 14:08

## 2025-05-20 RX ADMIN — SODIUM CHLORIDE, SODIUM LACTATE, POTASSIUM CHLORIDE, AND CALCIUM CHLORIDE 200 ML/HR: .6; .31; .03; .02 INJECTION, SOLUTION INTRAVENOUS at 08:14

## 2025-05-20 RX ADMIN — MULTIPLE VITAMINS W/ MINERALS TAB 1 TABLET: TAB ORAL at 08:17

## 2025-05-20 RX ADMIN — FOLIC ACID 1 MG: 1 TABLET ORAL at 08:17

## 2025-05-20 NOTE — PLAN OF CARE
Problem: Potential for Falls  Goal: Patient will remain free of falls  Description: INTERVENTIONS:  - Educate patient/family on patient safety including physical limitations  - Instruct patient to call for assistance with activity   - Consider consulting OT/PT to assist with strengthening/mobility based on AM PAC & JH-HLM score  - Consult OT/PT to assist with strengthening/mobility   - Keep Call bell within reach  - Keep bed low and locked with side rails adjusted as appropriate  - Keep care items and personal belongings within reach  - Initiate and maintain comfort rounds  - Make Fall Risk Sign visible to staff  - Offer Toileting every 3 Hours, in advance of need  - Initiate/Maintain bed alarm  - Obtain necessary fall risk management equipment:   - Apply yellow socks and bracelet for high fall risk patients  - Consider moving patient to room near nurses station  Outcome: Progressing     Problem: PAIN - ADULT  Goal: Verbalizes/displays adequate comfort level or baseline comfort level  Description: Interventions:  - Encourage patient to monitor pain and request assistance  - Assess pain using appropriate pain scale  - Administer analgesics as ordered based on type and severity of pain and evaluate response  - Implement non-pharmacological measures as appropriate and evaluate response  - Consider cultural and social influences on pain and pain management  - Notify physician/advanced practitioner if interventions unsuccessful or patient reports new pain  - Educate patient/family on pain management process including their role and importance of  reporting pain   - Provide non-pharmacologic/complimentary pain relief interventions  Outcome: Progressing     Problem: INFECTION - ADULT  Goal: Absence or prevention of progression during hospitalization  Description: INTERVENTIONS:  - Assess and monitor for signs and symptoms of infection  - Monitor lab/diagnostic results  - Monitor all insertion sites, i.e. indwelling  lines, tubes, and drains  - Monitor endotracheal if appropriate and nasal secretions for changes in amount and color  - [place] appropriate cooling/warming therapies per order  - Administer medications as ordered  - Instruct and encourage patient and family to use good hand hygiene technique  - Identify and instruct in appropriate isolation precautions for identified infection/condition  Outcome: Progressing  Goal: Absence of fever/infection during neutropenic period  Description: INTERVENTIONS:  - Monitor WBC  - Perform strict hand hygiene  - Limit to healthy visitors only  - No plants, dried, fresh or silk flowers with butler in patient room  Outcome: Progressing     Problem: SAFETY ADULT  Goal: Patient will remain free of falls  Description: INTERVENTIONS:  - Educate patient/family on patient safety including physical limitations  - Instruct patient to call for assistance with activity   - Consider consulting OT/PT to assist with strengthening/mobility based on AM PAC & -HLM score  - Consult OT/PT to assist with strengthening/mobility   - Keep Call bell within reach  - Keep bed low and locked with side rails adjusted as appropriate  - Keep care items and personal belongings within reach  - Initiate and maintain comfort rounds  - Make Fall Risk Sign visible to staff  - Offer Toileting every 3 Hours, in advance of need  - Initiate/Maintain bed alarm  - Obtain necessary fall risk management equipment:   - Apply yellow socks and bracelet for high fall risk patients  - Consider moving patient to room near nurses station  Outcome: Progressing  Goal: Maintain or return to baseline ADL function  Description: INTERVENTIONS:  -  Assess patient's ability to carry out ADLs; assess patient's baseline for ADL function and identify physical deficits which impact ability to perform ADLs (bathing, care of mouth/teeth, toileting, grooming, dressing, etc.)  - Assess/evaluate cause of self-care deficits   - Assess range of  motion  - Assess patient's mobility; develop plan if impaired  - Assess patient's need for assistive devices and provide as appropriate  - Encourage maximum independence but intervene and supervise when necessary  - Involve family in performance of ADLs  - Assess for home care needs following discharge   - Consider OT consult to assist with ADL evaluation and planning for discharge  - Provide patient education as appropriate  - Monitor functional capacity and physical performance, use of AM PAC & JH-HLM   - Monitor gait, balance and fatigue with ambulation    Outcome: Progressing  Goal: Maintains/Returns to pre admission functional level  Description: INTERVENTIONS:  - Perform AM-PAC 6 Click Basic Mobility/ Daily Activity assessment daily.  - Set and communicate daily mobility goal to care team and patient/family/caregiver.   - Collaborate with rehabilitation services on mobility goals if consulted  - Perform Range of Motion 3 times a day.  - Reposition patient every 2 hours.  - Dangle patient 3 times a day  - Stand patient 3 times a day  - Ambulate patient 3 times a day  - Out of bed to chair 3 times a day   - Out of bed for meals 3 times a day  - Out of bed for toileting  - Record patient progress and toleration of activity level   Outcome: Progressing     Problem: DISCHARGE PLANNING  Goal: Discharge to home or other facility with appropriate resources  Description: INTERVENTIONS:  - Identify barriers to discharge w/patient and caregiver  - Arrange for needed discharge resources and transportation as appropriate  - Identify discharge learning needs (meds, wound care, etc.)  - Arrange for interpretive services to assist at discharge as needed  - Refer to Case Management Department for coordinating discharge planning if the patient needs post-hospital services based on physician/advanced practitioner order or complex needs related to functional status, cognitive ability, or social support system  Outcome:  Progressing     Problem: Knowledge Deficit  Goal: Patient/family/caregiver demonstrates understanding of disease process, treatment plan, medications, and discharge instructions  Description: Complete learning assessment and assess knowledge base.  Interventions:  - Provide teaching at level of understanding  - Provide teaching via preferred learning methods  Outcome: Progressing

## 2025-05-20 NOTE — DISCHARGE INSTR - AVS FIRST PAGE
Please return to the emergency department for any concerning symptoms.    We highly advise you to follow-up with your family doctor and gastroenterology.  Please call their offices directly for an appointment.    We highly advise you to get outpatient lab work completed by family doctor within 1 to 2 weeks of discharge.    We highly advise you to stop drinking alcohol.

## 2025-05-20 NOTE — ASSESSMENT & PLAN NOTE
"Patient presented w/ LUQ abd pain & nausea/vomiting x 1 week.   Chart review:   H/o multiple prior admissions for alcoholic pancreatitis, including necrotizing pancreatitis requiring EUS w/ cystogastrostomy stent placement in 12/2024.    Stent removed in 01/2025.  Pt endorses resuming alcohol consumption 1 week PTA  CT a/p (5/16: \"Acute interstitial edematous pancreatitis. Multiple pseudocysts are present, increased in size and number when compared with March 30, 2025.\"   No abnormal gas in/around pancreas suggestive of infection.  GI consulted during hospital course, recommendations were appreciated  S/p aggressive IV fluid, antiemetics, analgesia  Patient tolerating diet, ready for discharge  Pt notes she had leftover p.o. Dilaudid pills at home from prior hospitalization    Recent Labs     05/20/25  0439   LIPASE 79     "

## 2025-05-20 NOTE — PROGRESS NOTES
Spoke to pt, she needs to look at her work schedule and call us back, she is still using [facility] as her PCP.

## 2025-05-20 NOTE — DISCHARGE SUMMARY
"Discharge Summary - Hospitalist   Name: Kaykay Holland 35 y.o. female I MRN: [id number]  Unit/Bed#: -01 I Date of Admission: 5/16/2025   Date of Service: 5/20/2025 I Hospital Day: 4     Assessment & Plan  Acute alcoholic pancreatitis  Patient presented w/ LUQ abd pain & nausea/vomiting x 1 week.   Chart review:   H/o multiple prior admissions for alcoholic pancreatitis, including necrotizing pancreatitis requiring EUS w/ cystogastrostomy stent placement in 12/2024.    Stent removed in 01/2025.  Pt endorses resuming alcohol consumption 1 week PTA  CT a/p (5/16: \"Acute interstitial edematous pancreatitis. Multiple pseudocysts are present, increased in size and number when compared with March 30, 2025.\"   No abnormal gas in/around pancreas suggestive of infection.  GI consulted during hospital course, recommendations were appreciated  S/p aggressive IV fluid, antiemetics, analgesia  Patient tolerating diet, ready for discharge  Pt notes she had leftover p.o. Dilaudid pills at home from prior hospitalization    Recent Labs     05/20/25  0439   LIPASE 79     Pancreatic pseudocyst  GI consult during hospital stay  Further management as described above   Hepatic steatosis  Noted on CT a/p (05/16): \"Hepatomegaly. Mild hepatic steatosis. Portions of the surface contour of the liver appears slightly nodular, raising concern for possible early cirrhosis. Upper abdominal varices are present. The splenic vein is opacified, however, appears somewhat attenuated as it passes posterior to the pancreas.\"   Suspected steatosis is present in the setting of chronic alcohol use.    GI consulted during hospital course; encourage outpt f/u   Alcohol cessation stressed  Alcohol withdrawal (HCC)  S/p Valium 10 mg, 10mg/kg phenobarb on 05/16 + 65 mg on 05/17 while in ICU  Toxicology consulted  Placed on CIWA protocol upon transfer to floor  C/w thiamine, folic acid, and multivitamin  Clinically stable from a withdrawal standpoint; " CIWA d/c'd; last CIWA score around midnight on 05/20 noted to be 1    Patient declined rehabilitation services at discharge   Tobacco abuse  Cessation encouraged  NRT   Electrolyte abnormality  Recent Labs     05/18/25  0345 05/19/25  0454 05/20/25  0439   MG  --  1.3* 1.5*   K 4.0 3.7 4.0     S/p PO & IV repletion   Trend in outpt setting   Encourage PO intake      Medical Problems       Resolved Problems  Date Reviewed: 3/30/2025          Resolved    Alcohol abuse 5/16/2025     Resolved by  Carola Theodore PA-C        Discharging Physician / Practitioner: Florina Arce PA-C  PCP: PATRICIA Moran  Admission Date:   Admission Orders (From admission, onward)       Ordered        05/16/25 0320  INPATIENT ADMISSION  Once                          Discharge Date: 05/20/25    Next Steps for Physician/AP Assuming Care:  Routine lab monitoring within 1 week of discharge  Outpatient GI follow-up highly advised    Test Results Pending at Discharge (will require follow up):  N/A    Medication Changes for Discharge & Rationale:   Thiamine, folate added for alcohol use disorder  Atarax added for anxiety PRN   See after visit summary for reconciled discharge medications provided to patient and/or family.     Consultations During Hospital Stay:  GI    Procedures Performed:   N/A    Significant Findings / Test Results:   CT abdomen pelvis with contrast  Result Date: 5/16/2025  Impression: Acute interstitial edematous pancreatitis. Multiple pseudocysts are present, increased in size and number when compared with March 30, 2025. Please see discussion. There is trace ascites, decreased in amount compared with March 30, 2025. The wall of the stomach appears thickened and edematous. The duodenal wall appears thickened. Follow-up recommended. Hepatomegaly. Mild hepatic steatosis. Portions of the surface contour of the liver appears slightly nodular, raising concern for possible early cirrhosis. Upper abdominal varices are present.  The splenic vein is opacified, however, appears somewhat attenuated as it passes posterior to the pancreas. Other nonemergent findings as above. The study was marked in EPIC for immediate notification. Workstation performed: [id number]      Incidental Findings:   N/A     Hospital Course:   Kaykay Holland is a 35 y.o. female patient who originally presented to the hospital on 5/16/2025 due to alcohol acute pancreatitis.  Patient presented to the emergency department left upper quadrant discomfort and associated nausea and vomiting occurring 1 week prior to admission.  She has a notable history of multiple admissions for alcoholic pancreatitis including necrotizing pancreatitis requiring EUS with cystogastrostomy stent placement in December 2024.  Patient was seen and examined by gastroenterology during hospital course.  Patient's lipase level returned to normal prior to discharge.  Her pain is manageable.  She is tolerating p.o. intake.    Patient was initially admitted to the ICU for concern for alcohol withdrawal.  Patient received Valium 10 mg, 10mg/kg phenobarb on 05/16 + 65 mg on 05/17 while in ICU.  Patient was subsequently placed on CIWA protocol upon transfer to the medical floor.  He was discontinued with multiple negative scoring's.  Last CIWA was scored at a 1 on 05/20 around midnight.  Patient is stable from an alcohol withdrawal standpoint.  Patient declined any alcohol use disorder resources upon discharge.    Please see above list of diagnoses and related plan for additional information.     Discharge Day Visit / Exam:   Subjective: Patient is doing okay.  She still admits to some mid abdominal discomfort.  She is tolerating advance diet.  She is passing gas.  She is without dysuria.  She is very anxious to be discharged home.  Patient is currently not interested in any alcohol support resources at discharge.  Vitals: Blood Pressure: 114/85 (05/20/25 1116)  Pulse: 68 (05/20/25 1116)  Temperature:  "98.2 °F (36.8 °C) (05/19/25 2110)  Temp Source: Oral (05/19/25 2110)  Respirations: 19 (05/20/25 0013)  Height: 5' 5\" (165.1 cm) (05/16/25 0421)  Weight - Scale: 57.1 kg (125 lb 14.1 oz) (05/20/25 0443)  SpO2: 96 % (05/20/25 1116)    Physical Exam  Constitutional:       General: She is not in acute distress.     Appearance: She is not toxic-appearing.      Comments: Thin, resting comfortably when I enter room but subsequently arousable   HENT:      Head: Normocephalic.      Right Ear: External ear normal.      Left Ear: External ear normal.      Mouth/Throat:      Mouth: Mucous membranes are moist.      Pharynx: Oropharynx is clear.     Eyes:      Extraocular Movements: Extraocular movements intact.      Conjunctiva/sclera: Conjunctivae normal.       Cardiovascular:      Rate and Rhythm: Normal rate and regular rhythm.      Pulses: Normal pulses.      Heart sounds: Normal heart sounds.   Pulmonary:      Effort: Pulmonary effort is normal. No respiratory distress.      Breath sounds: Normal breath sounds. No wheezing or rales.   Abdominal:      General: Abdomen is flat. There is no distension.      Palpations: Abdomen is soft.      Tenderness: There is abdominal tenderness (Mild Lower abd TTP). There is no guarding.     Musculoskeletal:         General: No swelling. Normal range of motion.      Cervical back: Normal range of motion.     Skin:     General: Skin is warm and dry.      Coloration: Skin is not jaundiced.      Findings: No bruising or lesion.     Neurological:      General: No focal deficit present.      Mental Status: She is alert and oriented to person, place, and time. Mental status is at baseline.     Psychiatric:         Mood and Affect: Mood normal.         Behavior: Behavior normal.          Discussion with Family: Patient declined call to .     Discharge instructions/Information to patient and family:   See after visit summary for information provided to patient and family.  "     Provisions for Follow-Up Care:  See after visit summary for information related to follow-up care and any pertinent home health orders.      Mobility at time of Discharge:   Basic Mobility Inpatient Raw Score: 24  JH-HLM Goal: 8: Walk 250 feet or more  JH-HLM Achieved: 8: Walk 250 feet ot more  HLM Goal achieved. Continue to encourage appropriate mobility.     Disposition:   Home    Planned Readmission: None    Administrative Statements   Discharge Statement:  I have spent a total time of 65 minutes in caring for this patient on the day of the visit/encounter. >30 minutes of time was spent on: Diagnostic results, Prognosis, Risks and benefits of tx options, Instructions for management, Patient and family education, Importance of tx compliance, Risk factor reductions, Impressions, Counseling / Coordination of care, Documenting in the medical record, Reviewing / ordering tests, medicine, procedures  , and Communicating with other healthcare professionals .    **Please Note: This note may have been constructed using a voice recognition system**

## 2025-05-20 NOTE — ASSESSMENT & PLAN NOTE
Recent Labs     05/18/25  0345 05/19/25  0454 05/20/25  0439   MG  --  1.3* 1.5*   K 4.0 3.7 4.0     S/p PO & IV repletion   Trend in outpt setting   Encourage PO intake

## 2025-05-20 NOTE — ASSESSMENT & PLAN NOTE
S/p Valium 10 mg, 10mg/kg phenobarb on 05/16 + 65 mg on 05/17 while in ICU  Toxicology consulted  Placed on CIWA protocol upon transfer to floor  C/w thiamine, folic acid, and multivitamin  Clinically stable from a withdrawal standpoint; CIWA d/c'd; last CIWA score around midnight on 05/20 noted to be 1    Patient declined rehabilitation services at discharge

## 2025-05-20 NOTE — PLAN OF CARE
Problem: Potential for Falls  Goal: Patient will remain free of falls  Description: INTERVENTIONS:  - Educate patient/family on patient safety including physical limitations  - Instruct patient to call for assistance with activity   - Consider consulting OT/PT to assist with strengthening/mobility based on AM PAC & JH-HLM score  - Consult OT/PT to assist with strengthening/mobility   - Keep Call bell within reach  - Keep bed low and locked with side rails adjusted as appropriate  - Keep care items and personal belongings within reach  - Initiate and maintain comfort rounds  - Make Fall Risk Sign visible to staff  - Offer Toileting every 3 Hours, in advance of need  - Initiate/Maintain bed alarm  - Obtain necessary fall risk management equipment:   - Apply yellow socks and bracelet for high fall risk patients  - Consider moving patient to room near nurses station  Outcome: Progressing     Problem: PAIN - ADULT  Goal: Verbalizes/displays adequate comfort level or baseline comfort level  Description: Interventions:  - Encourage patient to monitor pain and request assistance  - Assess pain using appropriate pain scale  - Administer analgesics as ordered based on type and severity of pain and evaluate response  - Implement non-pharmacological measures as appropriate and evaluate response  - Consider cultural and social influences on pain and pain management  - Notify physician/advanced practitioner if interventions unsuccessful or patient reports new pain  - Educate patient/family on pain management process including their role and importance of  reporting pain   - Provide non-pharmacologic/complimentary pain relief interventions  Outcome: Progressing     Problem: INFECTION - ADULT  Goal: Absence or prevention of progression during hospitalization  Description: INTERVENTIONS:  - Assess and monitor for signs and symptoms of infection  - Monitor lab/diagnostic results  - Monitor all insertion sites, i.e. indwelling  lines, tubes, and drains  - Monitor endotracheal if appropriate and nasal secretions for changes in amount and color  - [place] appropriate cooling/warming therapies per order  - Administer medications as ordered  - Instruct and encourage patient and family to use good hand hygiene technique  - Identify and instruct in appropriate isolation precautions for identified infection/condition  Outcome: Progressing  Goal: Absence of fever/infection during neutropenic period  Description: INTERVENTIONS:  - Monitor WBC  - Perform strict hand hygiene  - Limit to healthy visitors only  - No plants, dried, fresh or silk flowers with butler in patient room  Outcome: Progressing     Problem: SAFETY ADULT  Goal: Patient will remain free of falls  Description: INTERVENTIONS:  - Educate patient/family on patient safety including physical limitations  - Instruct patient to call for assistance with activity   - Consider consulting OT/PT to assist with strengthening/mobility based on AM PAC & -HLM score  - Consult OT/PT to assist with strengthening/mobility   - Keep Call bell within reach  - Keep bed low and locked with side rails adjusted as appropriate  - Keep care items and personal belongings within reach  - Initiate and maintain comfort rounds  - Make Fall Risk Sign visible to staff  - Offer Toileting every 3 Hours, in advance of need  - Initiate/Maintain bed alarm  - Obtain necessary fall risk management equipment:   - Apply yellow socks and bracelet for high fall risk patients  - Consider moving patient to room near nurses station  Outcome: Progressing  Goal: Maintain or return to baseline ADL function  Description: INTERVENTIONS:  -  Assess patient's ability to carry out ADLs; assess patient's baseline for ADL function and identify physical deficits which impact ability to perform ADLs (bathing, care of mouth/teeth, toileting, grooming, dressing, etc.)  - Assess/evaluate cause of self-care deficits   - Assess range of  motion  - Assess patient's mobility; develop plan if impaired  - Assess patient's need for assistive devices and provide as appropriate  - Encourage maximum independence but intervene and supervise when necessary  - Involve family in performance of ADLs  - Assess for home care needs following discharge   - Consider OT consult to assist with ADL evaluation and planning for discharge  - Provide patient education as appropriate  - Monitor functional capacity and physical performance, use of AM PAC & JH-HLM   - Monitor gait, balance and fatigue with ambulation    Outcome: Progressing  Goal: Maintains/Returns to pre admission functional level  Description: INTERVENTIONS:  - Perform AM-PAC 6 Click Basic Mobility/ Daily Activity assessment daily.  - Set and communicate daily mobility goal to care team and patient/family/caregiver.   - Collaborate with rehabilitation services on mobility goals if consulted  - Perform Range of Motion 3 times a day.  - Reposition patient every 2 hours.  - Dangle patient 3 times a day  - Stand patient 3 times a day  - Ambulate patient 3 times a day  - Out of bed to chair 3 times a day   - Out of bed for meals 3 times a day  - Out of bed for toileting  - Record patient progress and toleration of activity level   Outcome: Progressing     Problem: DISCHARGE PLANNING  Goal: Discharge to home or other facility with appropriate resources  Description: INTERVENTIONS:  - Identify barriers to discharge w/patient and caregiver  - Arrange for needed discharge resources and transportation as appropriate  - Identify discharge learning needs (meds, wound care, etc.)  - Arrange for interpretive services to assist at discharge as needed  - Refer to Case Management Department for coordinating discharge planning if the patient needs post-hospital services based on physician/advanced practitioner order or complex needs related to functional status, cognitive ability, or social support system  Outcome:  Progressing     Problem: Knowledge Deficit  Goal: Patient/family/caregiver demonstrates understanding of disease process, treatment plan, medications, and discharge instructions  Description: Complete learning assessment and assess knowledge base.  Interventions:  - Provide teaching at level of understanding  - Provide teaching via preferred learning methods  Outcome: Progressing

## 2025-05-20 NOTE — ASSESSMENT & PLAN NOTE
"Noted on CT a/p (05/16): \"Hepatomegaly. Mild hepatic steatosis. Portions of the surface contour of the liver appears slightly nodular, raising concern for possible early cirrhosis. Upper abdominal varices are present. The splenic vein is opacified, however, appears somewhat attenuated as it passes posterior to the pancreas.\"   Suspected steatosis is present in the setting of chronic alcohol use.    GI consulted during hospital course; encourage outpt f/u   Alcohol cessation stressed  "

## 2025-05-21 NOTE — PROGRESS NOTES
LM for pt to call our office.    Subjective   History of Present Illness  Chief complaint: Chest pain      Context: Chest tightness for the last 2 days that he states feels like it is similar to last time he had some blockages.  Took a nitro today with some relief.  Has had mild shortness of breath with any productive cough or fever.  Recently took up smoking again.  Denies any edema of his lower extremities.        PCP:natalie patel      Review of Systems   Constitutional:  Negative for fever.       Past Medical History:   Diagnosis Date    Coronary artery disease involving native coronary artery of native heart 2016    PCI  PCI RCA 16 PCI to LAD 2018    Hypertension     Mixed hyperlipidemia 2019    Type 2 diabetes mellitus without complications 2019       Allergies   Allergen Reactions    Crestor [Rosuvastatin] Myalgia       Past Surgical History:   Procedure Laterality Date    CARDIAC CATHETERIZATION      CORONARY STENT PLACEMENT         Family History   Problem Relation Age of Onset    Heart disease Mother     COPD Mother     Cancer Mother     No Known Problems Father        Social History     Socioeconomic History    Marital status:    Tobacco Use    Smoking status: Former     Types: Cigarettes     Quit date:      Years since quittin.9    Smokeless tobacco: Never   Vaping Use    Vaping Use: Never used   Substance and Sexual Activity    Alcohol use: Not Currently    Drug use: Never    Sexual activity: Defer           Objective   Physical Exam    Due to significant overcrowding in the emergency department patient was evaluated by myself in a hallway bed.  Explained to the patient our limitations due to overcrowding and they were in agreement to continue exam and treatment at this time.     Vital signs and triage nurse note reviewed.  Constitutional: Awake, alert; well-developed and well-nourished. No acute distress is noted.  Spouse at bedside  HEENT: Normocephalic, atraumatic; pupils are  PERRL with intact EOM; oropharynx is pink and moist without exudate or erythema.  Chronic voice changes  Neck: Supple, full range of motion without pain;    Cardiovascular: Regular rate and rhythm, normal S1-S2.  No murmur  Pulmonary: Respiratory effort regular nonlabored, breath sounds faint expiratory wheezes bilaterally  Abdomen: Soft, nontender nondistended with normoactive bowel sounds; no rebound or guarding.  Musculoskeletal: Independent range of motion of all extremities with no palpable tenderness or edema.  Neuro: Alert oriented x3, speech is clear and appropriate, GCS 15  Skin:  Fleshtone warm, dry, intact;        Procedures           ED Course  ED Course as of 12/22/23 1451   Fri Dec 22, 2023   1322 Assumed care [JW]   1427 Asked for vitals as none have been documented since   [JW]      ED Course User Index  [JW] Elsi Reyes, KEVIN      Labs Reviewed   BASIC METABOLIC PANEL - Abnormal; Notable for the following components:       Result Value    Glucose 183 (*)     Potassium 3.4 (*)     CO2 34.0 (*)     All other components within normal limits    Narrative:     GFR Normal >60  Chronic Kidney Disease <60  Kidney Failure <15     APTT - Abnormal; Notable for the following components:    PTT 25.8 (*)     All other components within normal limits   CBC WITH AUTO DIFFERENTIAL - Abnormal; Notable for the following components:    Lymphocyte % 16.0 (*)     All other components within normal limits   PROTIME-INR - Normal   BNP (IN-HOUSE) - Normal    Narrative:     This assay is used as an aid in the diagnosis of individuals suspected of having heart failure. It can be used as an aid in the diagnosis of acute decompensated heart failure (ADHF) in patients presenting with signs and symptoms of ADHF to the emergency department (ED). In addition, NT-proBNP of <300 pg/mL indicates ADHF is not likely.    Age Range Result Interpretation  NT-proBNP Concentration (pg/mL:      <50             Positive            >450                    Gray                 300-450                    Negative             <300    50-75           Positive            >900                  Gray                300-900                  Negative            <300      >75             Positive            >1800                  Gray                300-1800                  Negative            <300   TROPONIN - Normal    Narrative:     High Sensitive Troponin T Reference Range:  <14.0 ng/L- Negative Female for AMI  <22.0 ng/L- Negative Male for AMI  >=14 - Abnormal Female indicating possible myocardial injury.  >=22 - Abnormal Male indicating possible myocardial injury.   Clinicians would have to utilize clinical acumen, EKG, Troponin, and serial changes to determine if it is an Acute Myocardial Infarction or myocardial injury due to an underlying chronic condition.        MAGNESIUM - Normal   TSH - Normal   HIGH SENSITIVITIY TROPONIN T 2HR   CBC AND DIFFERENTIAL    Narrative:     The following orders were created for panel order CBC & Differential.  Procedure                               Abnormality         Status                     ---------                               -----------         ------                     CBC Auto Differential[[id number]]        Abnormal            Final result                 Please view results for these tests on the individual orders.   EXTRA TUBES    Narrative:     The following orders were created for panel order Extra Tubes.  Procedure                               Abnormality         Status                     ---------                               -----------         ------                     Gold Top - SST[[id number]]                                   Final result                 Please view results for these tests on the individual orders.   GOLD TOP - SST     Medications   sodium chloride 0.9 % flush 10 mL (10 mL Intravenous Given 12/22/23 1327)   ipratropium-albuterol (DUO-NEB) nebulizer solution 3 mL (has no  "administration in time range)     XR Chest 1 View    Result Date: 12/22/2023  Impression: No acute cardiopulmonary findings. Electronically Signed: Surya Rai MD  12/22/2023 1:57 PM EST  Workstation ID: [id number]   Prior to Admission medications    Medication Sig Start Date End Date Taking? Authorizing Provider   albuterol sulfate  (90 Base) MCG/ACT inhaler Inhale 2 puffs Every 4 (Four) Hours As Needed for Wheezing or Shortness of Air. 5/21/23   Nery Beltran APRN   amLODIPine (NORVASC) 10 MG tablet Take 1 tablet by mouth Daily. 3/22/23   Kong Brennan MD   cefdinir (OMNICEF) 300 MG capsule Take 1 capsule by mouth 2 (Two) Times a Day. 9/20/23   Nir Mckeon MD   losartan (COZAAR) 100 MG tablet Take 1 tablet by mouth Daily. 3/6/23   Kong Brennan MD   metoprolol succinate XL (TOPROL-XL) 50 MG 24 hr tablet Take 1 tablet by mouth Daily. 11/25/22   Kong Brennan MD   nitroglycerin (NITROSTAT) 0.4 MG SL tablet 1 under the tongue as needed for angina, may repeat q5mins for up three doses 6/28/21   Pradip Mcneill MD   pravastatin (PRAVACHOL) 40 MG tablet Daily. 5/18/18   Kong Brennan MD   terazosin (HYTRIN) 5 MG capsule Take 1 capsule by mouth Daily. 3/17/23   Kong Brennan MD                                            Medical Decision Making      /86 (BP Location: Left arm, Patient Position: Sitting)   Pulse 72   Temp 98.1 °F (36.7 °C) (Oral)   Resp 14   Ht 172.7 cm (68\")   Wt 110 kg (243 lb 9.7 oz)   SpO2 100%   BMI 37.04 kg/m²           Radiology interpretation:  X-rays reviewed independently and interpreted by me: No focal infiltrates  Further interpretation by radiologist as above  Lab interpretation:  Labs all viewed by me and significant for, glucose 183, initial troponin 13,     EKG viewed and interpreted by me and corroborated by Dr. Ibrahim, sinus rhythm rate of 72  comparison: 10/24/2023 sinus rhythm            Appropriate PPE " worn during exam.  Patient was seen in a rachel due to ER overcrowding and census.  Aspirin prior to arrival.  Labs x-ray obtained to evaluate for pneumonia electrolyte abnormality STEMI non-STEMI angina.  On reexam resting comfortably no acute distress.  We placed in observation for further evaluation and repeat troponin with stress or cath tomorrow and cardiac evaluation.  Spoke with Susan bishop discussed findings with patient who voices understanding of observation placement    Amount and/or Complexity of Data Reviewed  Labs: ordered.  Radiology: ordered.  ECG/medicine tests: ordered.    Risk  Prescription drug management.  Decision regarding hospitalization.        Final diagnoses:   Chest pain, unspecified type       ED Disposition  ED Disposition       ED Disposition   Decision to Admit    Condition   --    Comment   --               No follow-up provider specified.       Medication List      No changes were made to your prescriptions during this visit.            Elsi Reyes, APRN  12/22/23 2637

## 2025-05-23 ENCOUNTER — PATIENT OUTREACH (OUTPATIENT)
Dept: CASE MANAGEMENT | Facility: OTHER | Age: 35
End: 2025-05-23

## 2025-05-23 NOTE — PROGRESS NOTES
Referral received from [facility] for Outpatient Social Work Care Manager (OP SWCM) to outreach patient and assess needs.   Patient lacks insurance and has history of alcohol use; previously declined ETOH treatment information.  Patient started new job.    Per chart review, patient has no health insurance and lives in [facility]).    Call placed to patient to introduce role of OP SWCM and assess needs.  No answer and unable to leave voicemail as mailbox is full.  MyChart not active.  Will attempt additional outreach at later date.

## 2025-05-29 ENCOUNTER — PATIENT OUTREACH (OUTPATIENT)
Dept: CASE MANAGEMENT | Facility: OTHER | Age: 35
End: 2025-05-29

## 2025-05-29 NOTE — PROGRESS NOTES
2nd outreach attempt to patient to assess needs.   Patient lacks insurance and has history of alcohol use; previously declined ETOH treatment information.  Patient started new job.     Per chart review, patient has no health insurance and lives in [facility]).    No answer and unable to leave voicemail as mailbox is full.    Will send Unable to Reach letter to patient via email and close case at this time.  Will remain available to assist with any future needs.

## 2025-06-01 ENCOUNTER — HOSPITAL ENCOUNTER (INPATIENT)
Facility: HOSPITAL | Age: 35
LOS: 5 days | Discharge: HOME | End: 2025-06-06
Attending: INTERNAL MEDICINE | Admitting: INTERNAL MEDICINE
Payer: COMMERCIAL

## 2025-06-01 DIAGNOSIS — F10.10 ALCOHOL ABUSE: ICD-10-CM

## 2025-06-01 DIAGNOSIS — K85.21 ALCOHOL-INDUCED ACUTE PANCREATITIS WITH UNINFECTED NECROSIS: Primary | ICD-10-CM

## 2025-06-01 DIAGNOSIS — Z72.0 TOBACCO ABUSE: ICD-10-CM

## 2025-06-01 DIAGNOSIS — K85.91 NECROTIZING PANCREATITIS: ICD-10-CM

## 2025-06-01 DIAGNOSIS — K85.20 ALCOHOL-INDUCED ACUTE PANCREATITIS, UNSPECIFIED COMPLICATION STATUS: ICD-10-CM

## 2025-06-01 DIAGNOSIS — F10.939 ALCOHOL WITHDRAWAL SYNDROME WITH COMPLICATION (HCC): ICD-10-CM

## 2025-06-01 PROCEDURE — 99222 1ST HOSP IP/OBS MODERATE 55: CPT | Performed by: PHYSICIAN ASSISTANT

## 2025-06-01 RX ORDER — ONDANSETRON 2 MG/ML
4 INJECTION INTRAMUSCULAR; INTRAVENOUS EVERY 4 HOURS PRN
Status: DISCONTINUED | OUTPATIENT
Start: 2025-06-01 | End: 2025-06-06 | Stop reason: HOSPADM

## 2025-06-01 RX ORDER — HYDROMORPHONE HCL IN WATER/PF 6 MG/30 ML
0.2 PATIENT CONTROLLED ANALGESIA SYRINGE INTRAVENOUS EVERY 4 HOURS PRN
Status: DISCONTINUED | OUTPATIENT
Start: 2025-06-01 | End: 2025-06-05

## 2025-06-01 RX ORDER — FOLIC ACID 1 MG/1
1 TABLET ORAL DAILY
Status: DISCONTINUED | OUTPATIENT
Start: 2025-06-02 | End: 2025-06-06 | Stop reason: HOSPADM

## 2025-06-01 RX ORDER — LANOLIN ALCOHOL/MO/W.PET/CERES
100 CREAM (GRAM) TOPICAL DAILY
Status: DISCONTINUED | OUTPATIENT
Start: 2025-06-02 | End: 2025-06-06 | Stop reason: HOSPADM

## 2025-06-01 RX ORDER — LORAZEPAM 2 MG/ML
2 INJECTION INTRAMUSCULAR ONCE
Status: COMPLETED | OUTPATIENT
Start: 2025-06-01 | End: 2025-06-01

## 2025-06-01 RX ORDER — HYDROMORPHONE HCL/PF 1 MG/ML
0.5 SYRINGE (ML) INJECTION EVERY 4 HOURS PRN
Status: DISCONTINUED | OUTPATIENT
Start: 2025-06-01 | End: 2025-06-05

## 2025-06-01 RX ORDER — SODIUM CHLORIDE, SODIUM GLUCONATE, SODIUM ACETATE, POTASSIUM CHLORIDE, MAGNESIUM CHLORIDE, SODIUM PHOSPHATE, DIBASIC, AND POTASSIUM PHOSPHATE .53; .5; .37; .037; .03; .012; .00082 G/100ML; G/100ML; G/100ML; G/100ML; G/100ML; G/100ML; G/100ML
150 INJECTION, SOLUTION INTRAVENOUS CONTINUOUS
Status: DISCONTINUED | OUTPATIENT
Start: 2025-06-01 | End: 2025-06-02

## 2025-06-01 RX ADMIN — LORAZEPAM 2 MG: 2 INJECTION INTRAMUSCULAR; INTRAVENOUS at 23:06

## 2025-06-01 RX ADMIN — HYDROMORPHONE HYDROCHLORIDE 0.5 MG: 1 INJECTION, SOLUTION INTRAMUSCULAR; INTRAVENOUS; SUBCUTANEOUS at 23:06

## 2025-06-01 RX ADMIN — SODIUM CHLORIDE, SODIUM GLUCONATE, SODIUM ACETATE, POTASSIUM CHLORIDE, MAGNESIUM CHLORIDE, SODIUM PHOSPHATE, DIBASIC, AND POTASSIUM PHOSPHATE 150 ML/HR: .53; .5; .37; .037; .03; .012; .00082 INJECTION, SOLUTION INTRAVENOUS at 23:06

## 2025-06-01 RX ADMIN — ONDANSETRON 4 MG: 2 INJECTION INTRAMUSCULAR; INTRAVENOUS at 23:13

## 2025-06-01 NOTE — LETTER
[facility] MED SURG UNIT  [address]  [place] [address]  Dept: [phone]    June 6, 2025     Patient: Kaykay Holland   YOB: 1990   Date of Visit: 6/1/2025       To Whom it May Concern:    Kaykay Holland is under my professional care. She was seen in the hospital from 6/1/2025 to 06/06/25. She may return to work on 6/9/2025 without limitations.    If you have any questions or concerns, please don't hesitate to call.         Sincerely,          Eduardo Mercer PA-C

## 2025-06-02 ENCOUNTER — APPOINTMENT (INPATIENT)
Dept: ULTRASOUND IMAGING | Facility: HOSPITAL | Age: 35
End: 2025-06-02
Payer: COMMERCIAL

## 2025-06-02 ENCOUNTER — PATIENT OUTREACH (OUTPATIENT)
Dept: CASE MANAGEMENT | Facility: OTHER | Age: 35
End: 2025-06-02

## 2025-06-02 PROBLEM — F10.10 ALCOHOL ABUSE: Status: ACTIVE | Noted: 2025-06-02

## 2025-06-02 LAB
ALBUMIN SERPL BCG-MCNC: 3.2 G/DL (ref 3.5–5)
ALP SERPL-CCNC: 83 U/L (ref 34–104)
ALT SERPL W P-5'-P-CCNC: 33 U/L (ref 7–52)
ANION GAP SERPL CALCULATED.3IONS-SCNC: 10 MMOL/L (ref 4–13)
APTT PPP: 31 SECONDS (ref 23–34)
AST SERPL W P-5'-P-CCNC: 57 U/L (ref 13–39)
BILIRUB DIRECT SERPL-MCNC: 0.4 MG/DL (ref 0–0.2)
BILIRUB SERPL-MCNC: 1.58 MG/DL (ref 0.2–1)
BUN SERPL-MCNC: 5 MG/DL (ref 5–25)
CALCIUM SERPL-MCNC: 7.9 MG/DL (ref 8.4–10.2)
CHLORIDE SERPL-SCNC: 97 MMOL/L (ref 96–108)
CO2 SERPL-SCNC: 25 MMOL/L (ref 21–32)
CREAT SERPL-MCNC: 0.33 MG/DL (ref 0.6–1.3)
ERYTHROCYTE [DISTWIDTH] IN BLOOD BY AUTOMATED COUNT: 12.6 % (ref 11.6–15.1)
GFR SERPL CREATININE-BSD FRML MDRD: 145 ML/MIN/1.73SQ M
GLUCOSE SERPL-MCNC: 80 MG/DL (ref 65–140)
HCT VFR BLD AUTO: 35.5 % (ref 34.8–46.1)
HGB BLD-MCNC: 11.6 G/DL (ref 11.5–15.4)
INR PPP: 1.14 (ref 0.85–1.19)
MAGNESIUM SERPL-MCNC: 1.4 MG/DL (ref 1.9–2.7)
MCH RBC QN AUTO: 32.7 PG (ref 26.8–34.3)
MCHC RBC AUTO-ENTMCNC: 32.7 G/DL (ref 31.4–37.4)
MCV RBC AUTO: 100 FL (ref 82–98)
PHOSPHATE SERPL-MCNC: 3.7 MG/DL (ref 2.7–4.5)
PLATELET # BLD AUTO: 209 THOUSANDS/UL (ref 149–390)
PMV BLD AUTO: 10.3 FL (ref 8.9–12.7)
POTASSIUM SERPL-SCNC: 3.9 MMOL/L (ref 3.5–5.3)
PROT SERPL-MCNC: 5.7 G/DL (ref 6.4–8.4)
PROTHROMBIN TIME: 15.1 SECONDS (ref 12.3–15)
RBC # BLD AUTO: 3.55 MILLION/UL (ref 3.81–5.12)
SODIUM SERPL-SCNC: 132 MMOL/L (ref 135–147)
WBC # BLD AUTO: 7.63 THOUSAND/UL (ref 4.31–10.16)

## 2025-06-02 PROCEDURE — 76705 ECHO EXAM OF ABDOMEN: CPT

## 2025-06-02 PROCEDURE — 80048 BASIC METABOLIC PNL TOTAL CA: CPT | Performed by: PHYSICIAN ASSISTANT

## 2025-06-02 PROCEDURE — 85610 PROTHROMBIN TIME: CPT | Performed by: PHYSICIAN ASSISTANT

## 2025-06-02 PROCEDURE — 85730 THROMBOPLASTIN TIME PARTIAL: CPT | Performed by: PHYSICIAN ASSISTANT

## 2025-06-02 PROCEDURE — 99232 SBSQ HOSP IP/OBS MODERATE 35: CPT | Performed by: INTERNAL MEDICINE

## 2025-06-02 PROCEDURE — 99255 IP/OBS CONSLTJ NEW/EST HI 80: CPT | Performed by: EMERGENCY MEDICINE

## 2025-06-02 PROCEDURE — 85027 COMPLETE CBC AUTOMATED: CPT | Performed by: PHYSICIAN ASSISTANT

## 2025-06-02 PROCEDURE — 83735 ASSAY OF MAGNESIUM: CPT | Performed by: PHYSICIAN ASSISTANT

## 2025-06-02 PROCEDURE — 84100 ASSAY OF PHOSPHORUS: CPT | Performed by: PHYSICIAN ASSISTANT

## 2025-06-02 PROCEDURE — 80076 HEPATIC FUNCTION PANEL: CPT | Performed by: PHYSICIAN ASSISTANT

## 2025-06-02 PROCEDURE — 99222 1ST HOSP IP/OBS MODERATE 55: CPT | Performed by: INTERNAL MEDICINE

## 2025-06-02 RX ORDER — ONDANSETRON 2 MG/ML
4 INJECTION INTRAMUSCULAR; INTRAVENOUS ONCE
Status: COMPLETED | OUTPATIENT
Start: 2025-06-02 | End: 2025-06-02

## 2025-06-02 RX ORDER — HYDROXYZINE HYDROCHLORIDE 25 MG/1
25 TABLET, FILM COATED ORAL EVERY 6 HOURS PRN
Status: DISCONTINUED | OUTPATIENT
Start: 2025-06-02 | End: 2025-06-06 | Stop reason: HOSPADM

## 2025-06-02 RX ORDER — SODIUM CHLORIDE, SODIUM LACTATE, POTASSIUM CHLORIDE, CALCIUM CHLORIDE 600; 310; 30; 20 MG/100ML; MG/100ML; MG/100ML; MG/100ML
100 INJECTION, SOLUTION INTRAVENOUS CONTINUOUS
Status: DISPENSED | OUTPATIENT
Start: 2025-06-02 | End: 2025-06-05

## 2025-06-02 RX ORDER — LORAZEPAM 1 MG/1
2 TABLET ORAL ONCE
Status: COMPLETED | OUTPATIENT
Start: 2025-06-02 | End: 2025-06-02

## 2025-06-02 RX ORDER — SODIUM CHLORIDE 9 MG/ML
75 INJECTION, SOLUTION INTRAVENOUS CONTINUOUS
Status: DISCONTINUED | OUTPATIENT
Start: 2025-06-02 | End: 2025-06-02

## 2025-06-02 RX ORDER — MAGNESIUM SULFATE HEPTAHYDRATE 40 MG/ML
2 INJECTION, SOLUTION INTRAVENOUS ONCE
Status: COMPLETED | OUTPATIENT
Start: 2025-06-02 | End: 2025-06-02

## 2025-06-02 RX ORDER — HYDROMORPHONE HCL IN WATER/PF 6 MG/30 ML
0.2 PATIENT CONTROLLED ANALGESIA SYRINGE INTRAVENOUS ONCE
Status: COMPLETED | OUTPATIENT
Start: 2025-06-02 | End: 2025-06-02

## 2025-06-02 RX ORDER — NICOTINE 21 MG/24HR
1 PATCH, TRANSDERMAL 24 HOURS TRANSDERMAL
Status: DISCONTINUED | OUTPATIENT
Start: 2025-06-02 | End: 2025-06-06 | Stop reason: HOSPADM

## 2025-06-02 RX ADMIN — TRIMETHOBENZAMIDE HYDROCHLORIDE 200 MG: 100 INJECTION INTRAMUSCULAR at 04:44

## 2025-06-02 RX ADMIN — MAGNESIUM SULFATE HEPTAHYDRATE 2 G: 40 INJECTION, SOLUTION INTRAVENOUS at 12:35

## 2025-06-02 RX ADMIN — THIAMINE HCL TAB 100 MG 100 MG: 100 TAB at 08:22

## 2025-06-02 RX ADMIN — MULTIPLE VITAMINS W/ MINERALS TAB 1 TABLET: TAB ORAL at 08:22

## 2025-06-02 RX ADMIN — HYDROMORPHONE HYDROCHLORIDE 0.5 MG: 1 INJECTION, SOLUTION INTRAMUSCULAR; INTRAVENOUS; SUBCUTANEOUS at 04:29

## 2025-06-02 RX ADMIN — HYDROMORPHONE HYDROCHLORIDE 0.2 MG: 0.2 INJECTION, SOLUTION INTRAMUSCULAR; INTRAVENOUS; SUBCUTANEOUS at 07:25

## 2025-06-02 RX ADMIN — HYDROMORPHONE HYDROCHLORIDE 0.5 MG: 1 INJECTION, SOLUTION INTRAMUSCULAR; INTRAVENOUS; SUBCUTANEOUS at 15:16

## 2025-06-02 RX ADMIN — HYDROMORPHONE HYDROCHLORIDE 0.2 MG: 0.2 INJECTION, SOLUTION INTRAMUSCULAR; INTRAVENOUS; SUBCUTANEOUS at 02:07

## 2025-06-02 RX ADMIN — SODIUM CHLORIDE, SODIUM GLUCONATE, SODIUM ACETATE, POTASSIUM CHLORIDE, MAGNESIUM CHLORIDE, SODIUM PHOSPHATE, DIBASIC, AND POTASSIUM PHOSPHATE 150 ML/HR: .53; .5; .37; .037; .03; .012; .00082 INJECTION, SOLUTION INTRAVENOUS at 08:25

## 2025-06-02 RX ADMIN — HYDROMORPHONE HYDROCHLORIDE 0.2 MG: 0.2 INJECTION, SOLUTION INTRAMUSCULAR; INTRAVENOUS; SUBCUTANEOUS at 22:04

## 2025-06-02 RX ADMIN — NICOTINE 1 PATCH: 21 PATCH, EXTENDED RELEASE TRANSDERMAL at 00:33

## 2025-06-02 RX ADMIN — HYDROMORPHONE HYDROCHLORIDE 0.5 MG: 1 INJECTION, SOLUTION INTRAMUSCULAR; INTRAVENOUS; SUBCUTANEOUS at 10:23

## 2025-06-02 RX ADMIN — HYDROMORPHONE HYDROCHLORIDE 0.5 MG: 1 INJECTION, SOLUTION INTRAMUSCULAR; INTRAVENOUS; SUBCUTANEOUS at 19:56

## 2025-06-02 RX ADMIN — LORAZEPAM 2 MG: 1 TABLET ORAL at 04:44

## 2025-06-02 RX ADMIN — SODIUM CHLORIDE 75 ML/HR: 0.9 INJECTION, SOLUTION INTRAVENOUS at 12:35

## 2025-06-02 RX ADMIN — HYDROMORPHONE HYDROCHLORIDE 0.2 MG: 0.2 INJECTION, SOLUTION INTRAMUSCULAR; INTRAVENOUS; SUBCUTANEOUS at 17:50

## 2025-06-02 RX ADMIN — NICOTINE 1 PATCH: 21 PATCH, EXTENDED RELEASE TRANSDERMAL at 22:04

## 2025-06-02 RX ADMIN — FOLIC ACID 1 MG: 1 TABLET ORAL at 08:22

## 2025-06-02 RX ADMIN — HYDROXYZINE HYDROCHLORIDE 25 MG: 25 TABLET, FILM COATED ORAL at 23:20

## 2025-06-02 RX ADMIN — ONDANSETRON 4 MG: 2 INJECTION INTRAMUSCULAR; INTRAVENOUS at 16:07

## 2025-06-02 RX ADMIN — SODIUM CHLORIDE, SODIUM LACTATE, POTASSIUM CHLORIDE, AND CALCIUM CHLORIDE 200 ML/HR: .6; .31; .03; .02 INJECTION, SOLUTION INTRAVENOUS at 16:07

## 2025-06-02 RX ADMIN — HYDROXYZINE HYDROCHLORIDE 25 MG: 25 TABLET, FILM COATED ORAL at 10:23

## 2025-06-02 RX ADMIN — SODIUM CHLORIDE, SODIUM LACTATE, POTASSIUM CHLORIDE, AND CALCIUM CHLORIDE 200 ML/HR: .6; .31; .03; .02 INJECTION, SOLUTION INTRAVENOUS at 22:00

## 2025-06-02 RX ADMIN — ONDANSETRON 4 MG: 2 INJECTION INTRAMUSCULAR; INTRAVENOUS at 02:07

## 2025-06-02 RX ADMIN — HYDROMORPHONE HYDROCHLORIDE 0.2 MG: 0.2 INJECTION, SOLUTION INTRAMUSCULAR; INTRAVENOUS; SUBCUTANEOUS at 01:03

## 2025-06-02 RX ADMIN — HYDROXYZINE HYDROCHLORIDE 25 MG: 25 TABLET, FILM COATED ORAL at 16:28

## 2025-06-02 RX ADMIN — ONDANSETRON 4 MG: 2 INJECTION INTRAMUSCULAR; INTRAVENOUS at 11:39

## 2025-06-02 RX ADMIN — HYDROMORPHONE HYDROCHLORIDE 0.2 MG: 0.2 INJECTION, SOLUTION INTRAMUSCULAR; INTRAVENOUS; SUBCUTANEOUS at 11:38

## 2025-06-02 NOTE — H&P
"H&P - Hospitalist   Name: Kaykay Holland 35 y.o. female I MRN: [id number]  Unit/Bed#: -01 I Date of Admission: 6/1/2025   Date of Service: 6/2/2025 I Hospital Day: 1     Assessment & Plan  Acute on chronic alcoholic pancreatitis  Presented to outside hospital with epigastric and RUQ abdominal pain with associated nausea and vomiting  CT A/P: \"Edematous thickening of the pancreatic parenchyma with peripancreatic inflammatory stranding. There is hypoattenuation of the pancreatic parenchyma   with body/tail junction with findings of acute collection emanating from the main pancreatic duct superiorly to lie within apposition of the lesser curvature   of the gastric stomach causing underlying reactive gastritis. Cannot exclude acute necrotic collection. No findings for pseudoaneurysm, portal vein thrombosis, or splenic vein thrombosis clearly identified. 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  Pain control  IV fluids  BISAP score for pancreatitis 1  RUQ ultrasound ordered due to acute collection emanating from main pancreatic duct with new transaminitis  Necrotizing pancreatitis  CT A/P on admit: \"There is hypoattenuation of the pancreatic parenchyma with body/tail junction with findings of acute collection emanating from the main pancreatic duct superiorly to lie within apposition of the lesser curvature of the gastric stomach causing underlying reactive gastritis. \"  RUQ ultrasound ordered due to presence of new transaminitis  GI consulted  Alcohol abuse  Reports that she started drinking day of discharge on 5/20  Drinking wine daily  Last drink was 5/31 evening  CIWA 10 on admit  Placed on CIWA protocol-held on Librium due to new transaminitis  Has previously required phenobarb " "in the past  Toxicology consulted  Transaminitis  , ALT 66, , and bilirubin 1.1  Patient with history of mild transaminitis in the past, however AST and ALT significantly more elevated than prior  Evidence of a acute possible necrotic collection arising from main pancreatic duct-therefore will obtain RUQ ultrasound to determine if affecting bile duct  GI consulted  Trend hepatic function panel and coags daily  Pancreatic pseudocyst  CT A/P from recent admission showing multiple pseudocyst-unclear if collection seen on admit CT performed at OSH is same or new collections  GI consulted  Leukocytosis of 13K on labs from OSH-denies fevers or chills, therefore will hold off on antibiotics at this time  Hepatic steatosis  Hepatic steatosis noted on CT scan  Encourage alcohol cessation  Tobacco abuse  Smokes 1 pack/day  NRT while inpatient      VTE Pharmacologic Prophylaxis: VTE Score: 0 Low Risk (Score 0-2) - Encourage Ambulation.  Code Status: Level 1 - Full Code   Discussion with family: Patient declined call to .     Anticipated Length of Stay: Patient will be admitted on an inpatient basis with an anticipated length of stay of greater than 2 midnights secondary to acute on chronic alcoholic pancreatitis requiring n.p.o. status, IV fluids, GI consult and transaminitis requiring RUQ ultrasound due to concern for new necrotic collection.    History of Present Illness   Chief Complaint: \" Really bad stomach pains\"    Kaykay Holland is a 35 y.o. female with a PMH of alcoholic pancreatitis with necrosis, alcohol abuse, and tobacco abuse who presents with recurrent epigastric and RUQ abdominal pain that onset early in the day Sunday.  Endorses associated nausea and vomiting.  No fevers or chills.  States she has had occasional intermittent chest pain.  No dyspnea.  Normal bowel movements and urination.  Reports that she started drinking wine after day of discharge on 5/20.  Last drink was " Saturday evening.    Review of Systems   Constitutional:  Negative for chills and fever.   HENT:  Negative for congestion.    Respiratory:  Negative for cough and shortness of breath.    Cardiovascular:  Positive for chest pain. Negative for leg swelling.   Gastrointestinal:  Positive for abdominal pain, nausea and vomiting. Negative for blood in stool, constipation and diarrhea.   Genitourinary:  Negative for difficulty urinating, dysuria and hematuria.   Musculoskeletal:  Negative for gait problem.   Neurological:  Negative for weakness and numbness.   All other systems reviewed and are negative.      Historical Information   Past Medical History[1]  Past Surgical History[2]  Social History[3]  E-Cigarette/Vaping    E-Cigarette Use Never User      E-Cigarette/Vaping Substances    Nicotine No     THC No     CBD No     Flavoring No     Other No     Unknown No      Family History[4]  Social History:  Marital Status: Single   Occupation: Not assessed  Patient Pre-hospital Living Situation: Home  Patient Pre-hospital Level of Mobility: walks  Patient Pre-hospital Diet Restrictions: None    Meds/Allergies   I have reviewed home medications with patient personally.  Prior to Admission medications    Medication Sig Start Date End Date Taking? Authorizing Provider   folic acid (FOLVITE) 1 mg tablet Take 1 tablet (1 mg total) by mouth daily 5/21/25  Yes Florina Arce PA-C   hydrOXYzine HCL (ATARAX) 50 mg tablet Take 1 tablet (50 mg total) by mouth every 6 (six) hours as needed for anxiety 5/20/25  Yes Florina Arce PA-C   nicotine (NICODERM CQ) 14 mg/24hr TD 24 hr patch Place 1 patch on the skin daily over 24 hours 5/21/25  Yes Florina Arce PA-C   thiamine 100 MG tablet Take 1 tablet (100 mg total) by mouth daily 5/21/25  Yes Florina Arce PA-C     Allergies   Allergen Reactions    Bee Pollen Anaphylaxis    Other Edema     Bee stings (localized edema)       Objective :  Temp:  [98 °F (36.7 °C)] 98 °F (36.7 °C)  HR:   "[85-92] 85  BP: (133-163)/() 133/91  Resp:  [20] 20  SpO2:  [94 %-96 %] 96 %  O2 Device: None (Room air)    Physical Exam  Vitals and nursing note reviewed.   Constitutional:       Comments: Appears comfortable.  Somnolent after receiving 2 mg IV Ativan for CIWA.  Awakens easily.   HENT:      Head: Normocephalic.      Nose: Nose normal.      Mouth/Throat:      Mouth: Mucous membranes are moist.     Eyes:      Conjunctiva/sclera: Conjunctivae normal.       Cardiovascular:      Rate and Rhythm: Normal rate and regular rhythm.   Pulmonary:      Effort: Pulmonary effort is normal.      Breath sounds: Normal breath sounds.   Abdominal:      General: There is no distension.      Palpations: Abdomen is soft.      Tenderness: There is abdominal tenderness (Epigastric and RUQ).     Musculoskeletal:         General: Normal range of motion.      Cervical back: Normal range of motion.      Right lower leg: No edema.      Left lower leg: No edema.     Skin:     General: Skin is warm and dry.      Coloration: Skin is not pale.     Neurological:      General: No focal deficit present.      Mental Status: She is alert.     Psychiatric:         Mood and Affect: Mood normal.          Lines/Drains:            Lab Results: I have reviewed the following results:                  No results found for: \"HGBA1C\"        Imaging Results Review: I reviewed radiology reports from this admission including: CT abdomen/pelvis.  Other Study Results Review: No additional pertinent studies reviewed.    Administrative Statements       ** Please Note: This note has been constructed using a voice recognition system. **         [1]   Past Medical History:  Diagnosis Date    Acute alcoholic pancreatitis 11/03/2023    Hypertension     SIRS (systemic inflammatory response syndrome) (HCC) 04/10/2024   [2]   Past Surgical History:  Procedure Laterality Date    WISDOM TOOTH EXTRACTION     [3]   Social History  Tobacco Use    Smoking status: Every Day    "  Current packs/day: 0.50     Types: Cigarettes     Passive exposure: Never    Smokeless tobacco: Never    Tobacco comments:     Per pt last drink was today 9/24/24 one glass of wine. Had previously stop on 4/4/24 for 1 month    Vaping Use    Vaping status: Never Used   Substance and Sexual Activity    Alcohol use: Yes     Alcohol/week: 21.0 standard drinks of alcohol     Types: 21 Glasses of wine per week     Comment: last drank 3/30/25    Drug use: Never   [4] No family history on file.

## 2025-06-02 NOTE — ASSESSMENT & PLAN NOTE
She has several cysts in the pancreas consistent with pseudocysts, within the head of the pancreas measuring 1.6 x 1.4 cm and 1.2 x 0.9 cm, appear smaller in size compared to prior imaging.

## 2025-06-02 NOTE — ASSESSMENT & PLAN NOTE
Secondary to alcohol use disorder. Bilirubin and AST mildly elevated, 1.58 and 57 respectively. Discriminant function 11 (LOW). CT A/P 5/16/25 showed hepatomegaly and hepatic steatosis, with slightly nodular liver contour raising concern for possible early cirrhosis with upper abdominal varices and trace ascites. CTA A/P 6/1 shows similar findings. EGD Dec 2024/Jan 2025 showed no evidence of esophageal or gastric varices.    -Complete alcohol cessation is imperative  - Monitor LFTs  - Follow-up RUQ ultrasound to rule out biliary obstruction  - Finding on CT could be from acute liver injury from recurrent alcohol use versus early cirrhosis. Outpatient follow-up is advised. Patient was advised that if she has new onset cirrhosis from alcohol, this can be a severe life-threatening process and ongoing alcohol use would preclude her from any transplant consideration in the future.

## 2025-06-02 NOTE — ASSESSMENT & PLAN NOTE
34 yo F with history of necrotizing pancreatitis/walled off necrosis post EUS guided cyst drainage December 2024 who presents with pancreatitis in setting of recurrent alcohol abuse. CT A/P shows findings consistent with pancreatitis and acute collection emanating from the main pancreatic duct, cannot exclude acute necrotic collection. No reports of gas in or around the pancreas to suggest infected necrosis. Initial labs revealed leukocytosis WBC 13,000 - now normalized. Creatinine normal. No lipase to review.     No fevers or hemodynamic instability. She appears nontoxic. Abdomen soft, non-distended, moderate epigastric tenderness.    - Continue supportive care  - NPO, IV fluids, pain management and antiemetics as needed  - Advance diet as tolerated  - No indication for antibiotics at this time but continue to monitor CBC and temperature  - Consider repeat imaging in 4 weeks to evaluate for development of walled off necrosis

## 2025-06-02 NOTE — UTILIZATION REVIEW
"Initial Clinical Review    Admission: Date/Time/Statement:   Admission Orders (From admission, onward)       Ordered        06/01/25 2254  INPATIENT ADMISSION  Once                          Orders Placed This Encounter   Procedures    INPATIENT ADMISSION     Standing Status:   Standing     Number of Occurrences:   1     Level of Care:   Med Surg [16]     Estimated length of stay:   More than 2 Midnights     Certification:   I certify that inpatient services are medically necessary for this patient for a duration of greater than two midnights. See H&P and MD Progress Notes for additional information about the patient's course of treatment.     Arrival Information       Patient transfer from [facility] [place] ED and no IP there.   Previous care at [facility]                       chief complaint:  \" Really bad stomach pains\"     Initial Presentation: 35 y.o. female  to Med surg admission via EMS from [place] ED/[facility] network.    Admitted to inpatient with Dx: acute on chronic alcoholic pancreatitis/necrotizing pancreatitis/alcohol abuse/transaminitis/pancreatic pseudocyst.  Drinks wine daily.   Presented to ED at [place]  with epigastric and RUQ abdominal pain starting the morning of arrival.  Nausea and vomiting, unable to tolerate po.  Starting drinking wine after hospital dc on 5/20/25.  Has stopped all previous medications for anxiety and depression.  PMHx:alcoholic pancreatitis with necrosis, alcohol abuse, and tobacco abuse . On exam: appeared in moderated distress in ED.   Wbc 13.1.   amylase 111.  .  ALT 66.    Imaging shows \"Edematous thickening of the pancreatic parenchyma with peripancreatic inflammatory stranding. There is hypoattenuation of the pancreatic parenchyma with body/tail junction with findings of acute collection emanating from the main pancreatic duct superiorly to lie within apposition of the lesser curvature of the gastric stomach causing underlying reactive " "gastritis. Cannot exclude acute necrotic collection. \" . ED treatment:  IVF bolus of 1 liter, morphine and Zofran.   Requested transfer to [facility].   Referral to transfer center,  accepted at [facility] and transferred.    At [facility]:  abdominal pain, nausea and vomiting. Intermittent chest pain.  CIWA 10 on admission.  Last wine 5/31 in evening.    On exam:  epigastric and RUQ tenderness.  Awakens easily.  Somnolent after receiving 2 mg IV ativan for CIWA, oral thiamine, folic acid and MVI.  Ativan as needed.     Plan includes :  npo.  IVF.  Pain control.  Consult GI.  RUQ US.  CIWA.  Consult toxicology.    Trend LFTs    Anticipated Length of Stay/Certification Statement: Patient will be admitted on an inpatient basis with an anticipated length of stay of greater than 2 midnights secondary to acute on chronic alcoholic pancreatitis requiring n.p.o. status, IV fluids, GI consult and transaminitis requiring RUQ ultrasound due to concern for new necrotic collection.     Date: 6/2/25   Day 2:  continued abdominal pain, rates 8/10.  On exam: moderate epigastric tenderness.   Mg 1.4.  AST 57.  ALT 33.   Continue IVF.  NPO continues.  CIWA.  Replete Mg.   Pain control.     6/2/25 per GI - acute on chronic pancreatitis. She has several cysts in the pancreas consistent with pseudocysts, within the head of the pancreas measuring 1.6 x 1.4 cm and 1.2 x 0.9 cm, appear smaller in size compared to prior imaging.  Plan is NPO, IV fluids, pain management and antiemetics as needed.  Complete alcohol cessation.  Monitor LFTs.  Check RUQ US.     6/2/25 Per toxicology - alcohol use disorder/alcohol withdrawal.  Recommend to offer resources for cessation/aftercare.   Symptom management with CIWA with either Phenobarb or diazepam.  Continuous pulse ox and telemetry.      Scheduled Medications:  folic acid, 1 mg, Oral, Daily  magnesium sulfate, 2 g, Intravenous, Once 1235 6/2/25  multivitamin-minerals, 1 tablet, Oral, " Daily  nicotine, 1 patch, Transdermal, HS  thiamine, 100 mg, Oral, Daily    HYDROmorphone HCl (DILAUDID) injection 0.2 mg  Dose: 0.2 mg  Freq: Once Route: IV  Start: 06/02/25 0200 End: 06/02/25 0207  LORazepam (ATIVAN) injection 2 mg  Dose: 2 mg  Freq: Once Route: IV  Start: 06/01/25 2315 End: 06/01/25 2306   LORazepam (ATIVAN) tablet 2 mg  Dose: 2 mg  Freq: Once Route: PO  Indications of Use: ALCOHOL WITHDRAWAL SYNDROME  Start: 06/02/25 0445 End: 06/02/25 0444  ondansetron (ZOFRAN) injection 4 mg  Dose: 4 mg  Freq: Once Route: IV  Start: 06/02/25 0200 End: 06/02/25 0207    Continuous IV Infusions:  sodium chloride, 75 mL/hr, Intravenous, Continuous    multi-electrolyte (Plasmalyte-A/Isolyte-S PH 7.4/Normosol-R) IV solution  Rate: 150 mL/hr Dose: 150 mL/hr  Freq: Continuous Route: IV  Last Dose: Stopped (06/02/25 1115)  Start: 06/01/25 2315 End: 06/02/25 1105    PRN Meds:  HYDROmorphone, 0.2 mg, Intravenous, Q4H PRN - 0103, 0725, 1138 on 6/2/25   Or  HYDROmorphone, 0.5 mg, Intravenous, Q4H PRN - 2306 on 6/1.  0429, 1023 on 6/2/25  HYDROmorphone, 0.2 mg, Intravenous, Q4H PRN 1023 on 6/2/25  hydrOXYzine HCL, 25 mg, Oral, Q6H PRN  ondansetron, 4 mg, Intravenous, Q4H PRN 2313 on 6/1.  1139 on 6/2.    trimethobenzamide, 200 mg, Intramuscular, Q6H PRN 0444 on 6/2      ED Triage Vitals   Temperature Pulse Respirations Blood Pressure SpO2 Pain Score   06/02/25 0002 06/01/25 2251 06/02/25 0002 06/01/25 2251 06/01/25 2251 06/01/25 2306   98 °F (36.7 °C) 92 20 (!) 163/102 94 % 10 - Worst Possible Pain     Weight (last 2 days)       Date/Time Weight    06/02/25 0713 57.1 (125.88)          Vital Signs (last 3 days)       Date/Time Temp Pulse Resp BP MAP (mmHg) SpO2 O2 Device Patient Position - Orthostatic VS CIWA-Ar Total Pain    06/03/25 0630 -- -- -- -- -- -- -- -- -- 10 - Worst Possible Pain    06/03/25 0400 -- -- -- 147/99 -- -- -- -- 1 --    06/03/25 0335 -- -- -- -- -- -- -- -- -- 8    06/03/25 03:34:50 -- 83 -- 147/99 115  93 % -- -- -- --    06/03/25 0049 -- -- -- -- -- -- -- -- -- 10 - Worst Possible Pain    06/03/25 0000 -- -- -- -- -- -- -- -- 2 --    06/02/25 2204 -- -- -- -- -- -- -- -- -- 8 06/02/25 2005 -- -- -- -- -- -- None (Room air) -- -- 5 06/02/25 2000 -- -- -- 148/99 -- -- -- -- 3 --    06/02/25 1956 -- -- -- -- -- -- -- -- -- 9 06/02/25 19:39:34 98.3 °F (36.8 °C) 95 20 148/99 115 97 % None (Room air) Lying -- --    06/02/25 1750 -- -- -- -- -- -- -- -- -- 9 06/02/25 1516 -- -- -- -- -- -- -- -- -- 9 06/02/25 14:22:43 98.5 °F (36.9 °C) 78 17 131/88 102 95 % None (Room air) Lying -- --    06/02/25 1138 -- -- -- -- -- -- -- -- -- 8 06/02/25 1023 -- -- -- -- -- -- -- -- -- 9 06/02/25 0800 -- -- -- 130/84 -- 94 % None (Room air) -- 4 9    06/02/25 0725 -- -- -- -- -- -- -- -- -- 9 06/02/25 07:14:36 98.1 °F (36.7 °C) 75 16 130/84 99 96 % None (Room air) Lying -- --    06/02/25 0713 98.1 °F (36.7 °C) 75 16 130/84 -- -- -- -- -- --    06/02/25 0700 -- 83 -- -- -- 95 % None (Room air) -- -- --    06/02/25 05:45:40 98.4 °F (36.9 °C) 68 -- 132/85 101 95 % None (Room air) -- 3 --    06/02/25 0538 98.4 °F (36.9 °C) 68 20 132/86 -- 95 % None (Room air) -- -- --    06/02/25 0500 -- 72 -- -- -- 94 % None (Room air) -- -- --    06/02/25 04:34:04 98.2 °F (36.8 °C) 84 -- 133/93 106 94 % None (Room air) -- -- --    06/02/25 0434 -- 84 -- 133/93 -- -- -- -- 11 --    06/02/25 0429 -- -- -- -- -- -- -- -- -- 9    06/02/25 0300 -- 79 -- -- -- 95 % None (Room air) -- -- --    06/02/25 0207 -- -- -- -- -- -- -- -- -- 9    06/02/25 01:51:53 98.2 °F (36.8 °C) 79 -- 133/93 106 98 % None (Room air) -- -- --    06/02/25 0103 -- 78 -- -- -- 94 % None (Room air) -- -- 8    06/02/25 0018 98 °F (36.7 °C) 85 20 133/91 -- 96 % None (Room air) -- -- --    06/02/25 00:10:17 98 °F (36.7 °C) 85 -- 133/91 105 96 % None (Room air) -- 5 7    06/02/25 0002 98 °F (36.7 °C) 85 20 133/91 -- 96 % None (Room air) -- -- --    06/01/25 2306 -- --  -- -- -- -- -- -- -- 10 - Worst Possible Pain    06/01/25 2300 -- 75 -- -- -- 96 % None (Room air) -- -- --    06/01/25 2259 -- -- -- -- -- 94 % None (Room air) -- -- --    06/01/25 2251 -- 92 -- 163/102 122 94 % None (Room air) -- 10 --           CIWA-Ar Score       Row Name 06/03/25 0400 06/03/25 0000 06/02/25 2000       CIWA-Ar    /99 -- 148/99    Nausea and Vomiting 0 0 0    Tactile Disturbances 0 0 0    Tremor 1 1 2    Auditory Disturbances 0 0 0    Paroxysmal Sweats 0 0 0    Visual Disturbances 0 0 0    Anxiety 0  Pt half asleep 1 1    Headache, Fullness in Head 0 0 0    Agitation 0 0 0    Orientation and Clouding of Sensorium 0 0 0    CIWA-Ar Total 1 2 3      Row Name 06/02/25 0800 06/02/25 05:45:40 06/02/25 0434       CIWA-Ar    /84 -- 133/93    Pulse -- -- 84    Nausea and Vomiting 0 0 3    Tactile Disturbances 0 0 1    Tremor 3 3 4    Auditory Disturbances 0 0 0    Paroxysmal Sweats 0 0 0    Visual Disturbances 0 0 0    Anxiety 1 0 3    Headache, Fullness in Head 0 0 0    Agitation 0 0 0    Orientation and Clouding of Sensorium 0 0 0    CIWA-Ar Total 4 3 11      Row Name 06/02/25 00:10:17 06/01/25 2251          CIWA-Ar    Nausea and Vomiting 0 2     Tactile Disturbances 0 0     Tremor 2 4     Auditory Disturbances 0 0     Paroxysmal Sweats 0 0     Visual Disturbances 0 0     Anxiety 3 4     Headache, Fullness in Head 0 0     Agitation 0 0     Orientation and Clouding of Sensorium 0 0     CIWA-Ar Total 5 10                     Pertinent Labs/Diagnostic Test Results:   Radiology:  US right upper quadrant   Final Interpretation by Raheel Jiménez MD (06/02 1651)      1. Limited visualization of the pancreas due to shadowing from bowel gas. Limited visualization of hypoechoic areas in the pancreatic tail measuring approximately 2.4 x 2.6 x 2.6 cm and in the head measuring 1.1 x 1.1 x 0.9 cm correlating with    pseudocysts seen on prior CT.      2. Mild hepatomegaly and mild hepatic steatosis.  Areas of possible mild hepatic surface contour nodularity. Suggest correlation with ultrasound elastography.      3. Small volume perihepatic ascites.               Workstation performed: [id number]             At [facility] ED   CTA abdomen pelvis w wo contrast  Order: [id number]  Impression    IMPRESSION:  Edematous thickening of the pancreatic parenchyma with peripancreatic  inflammatory stranding. There is hypoattenuation of the pancreatic parenchyma  with body/tail junction with findings of acute collection emanating from the  main pancreatic duct superiorly to lie within apposition of the lesser curvature  of the gastric stomach causing underlying reactive gastritis. Cannot exclude  acute necrotic collection.  No findings for pseudoaneurysm, portal vein thrombosis, or splenic vein  thrombosis clearly identified.     Cardiology:  No orders to display     GI:  No orders to display     At [facility]  Component  Ref Range & Units 6/1/25 1219   BUN, POC  7 - 25 mg/dL <7 Low    Chloride, POC  100 - 109 mmol/L 100   Creatinine, POC  0.40 - 1.10 mg/dL 0.7   Glucose, POC  65 - 99 mg/dL 109 High    ICAL, POC  1.18 - 1.32 mmol/L 1.1 Low    Potassium, POC  3.5 - 5.2 mmol/L 4.2   Sodium, POC  135 - 145 mmol/L 136   TCO2, POC  21 - 31 mmol/L 22     Component  Ref Range & Units 6/1/25 1215   Albumin, POC  3.3 - 5.5 g/dL 4.2   Alk Phosphatase, POC  42 - 141 U/L 105   ALT, POC  10 - 47 U/L 66 High    Amylase, POC  14 - 97 U/L 111 High    AST, POC  11 - 38 U/L 205 High    Gamma GT  5 - 65 U/L 147 High    Bilirubin Total, POC  0.2 - 1.6 mg/dL 1.1   Protein Total, POC  6.4 - 8.1 g/dL 7.3     Component  Ref Range & Units 6/1/25 1209   Hemoglobin  11.5 - 14.5 g/dL 13.8   Hematocrit  35.0 - 43.0 % 40.6   WBC  4.0 - 10.0 thou/cmm 13.1 High    RBC  3.70 - 4.70 mill/cmm 4.18   Platelet  140 - 350 thou/cmm 427 High    MPV  7.5 - 11.3 fL 9.8   MCV  80 - 100 fL 97   MCH  26.0 - 34.0 pg 33   MCHC  32.0 - 37.0 g/dL 34    RDW  12.0 - 16.0 % 12.7   Differential Type AUTO   Absolute Neutrophils  1.8 - 7.8 thou/cmm 9.9 High    Absolute Lymphocytes  1.0 - 3.0 thou/cmm 2.2   Absolute Monocytes  0.3 - 1.0 thou/cmm 0.7   Absolute Eosinophils  0.0 - 0.5 thou/cmm 0.2   Absolute Basophils  0.0 - 0.1 thou/cmm 0.1   Neutrophils  % 75   Lymphocytes Manual  % 17   Monocytes  % 5   Eosinophils  % 2   Basophils  % 1     Results from last 7 days   Lab Units 06/03/25  0335 06/02/25  0550   WBC Thousand/uL 5.97 7.63   HEMOGLOBIN g/dL 11.4* 11.6   HEMATOCRIT % 35.3 35.5   PLATELETS Thousands/uL 111* 209     Results from last 7 days   Lab Units 06/03/25  0335 06/02/25  0550   SODIUM mmol/L 133* 132*   POTASSIUM mmol/L 3.4* 3.9   CHLORIDE mmol/L 99 97   CO2 mmol/L 29 25   ANION GAP mmol/L 5 10   BUN mg/dL 3* 5   CREATININE mg/dL 0.36* 0.33*   EGFR ml/min/1.73sq m 141 145   CALCIUM mg/dL 8.1* 7.9*   MAGNESIUM mg/dL 1.4* 1.4*   PHOSPHORUS mg/dL  --  3.7     Results from last 7 days   Lab Units 06/03/25  0335 06/02/25  0550   AST U/L 36 57*   ALT U/L 24 33   ALK PHOS U/L 79 83   TOTAL PROTEIN g/dL 5.7* 5.7*   ALBUMIN g/dL 3.1* 3.2*   TOTAL BILIRUBIN mg/dL 1.42* 1.58*   BILIRUBIN DIRECT mg/dL  --  0.40*     Results from last 7 days   Lab Units 06/03/25  0335 06/02/25  0550   GLUCOSE RANDOM mg/dL 128 80     Results from last 7 days   Lab Units 06/02/25  0550   PROTIME seconds 15.1*   INR  1.14   PTT seconds 31         Past Medical History[1]  Present on Admission:   Acute on chronic alcoholic pancreatitis   Necrotizing pancreatitis   Pancreatic pseudocyst   Hepatic steatosis   Tobacco abuse   Transaminitis   Alcohol use disorder   Alcohol withdrawal (HCC)   Hyponatremia      Admitting Diagnosis: Pancreatitis [K85.90]  Age/Sex: 35 y.o. female    Network Utilization Review Department  ATTENTION: Please call with any questions or concerns to [phone] and carefully listen to the prompts so that you are directed to the right person. All voicemails are  confidential.   For Discharge needs, contact Care Management DC Support Team at [phone] opt. 2  Send all requests for admission clinical reviews, approved or denied determinations and any other requests to dedicated fax number below belonging to the campus where the patient is receiving treatment. List of dedicated fax numbers for the Facilities:  FACILITY NAME UR FAX NUMBER   ADMISSION DENIALS (Administrative/Medical Necessity) [phone]   DISCHARGE SUPPORT TEAM (NETWORK) [phone]   PARENT CHILD HEALTH (Maternity/NICU/Pediatrics) [phone]   [facility] [phone]   [facility] [phone]   [facility] [phone]   [facility] [phone]   [facility] [phone]   [facility] [phone]   [facility] [phone]   [facility] [phone]   [facility] [phone]   Count includes the [facility] [phone]   [facility] [phone]   [facility] [phone]              [1]   Past Medical History:  Diagnosis Date    Acute alcoholic pancreatitis 11/03/2023    Hypertension     SIRS (systemic inflammatory response syndrome) (HCC) 04/10/2024

## 2025-06-02 NOTE — TELEMEDICINE
Tele-Consultation - Medical Toxicology   Name: Kaykay Holland 35 y.o. female I MRN: [id number]  Unit/Bed#: -01 I Date of Admission: 6/1/2025   Date of Service: 6/2/2025 I Hospital Day: 1   Inpatient consult to Toxicology  Consult performed by: Zev Lamar DO  Consult ordered by: Florina Schafer PA-C        Physician Requesting Evaluation: Soheila Coleman MD   Reason for Evaluation / Principal Problem: Alcohol use disorder, alcohol withdrawal    Assessment & Plan  Alcohol use disorder    Continue daily vitamin supplementation with thiamine, folic acid, and multivitamin  Appreciate certified  and case management consultations for recommendations regarding aftercare resources, recovery support and disposition planning  Patient is not interested in any medications for her AUD or rehab. Would continue to offer resources    Alcohol withdrawal (HCC)  Patient with a history of chronic daily alcohol use  Continuous pulse ox and telemetry monitoring    Recommend continued symptom-triggered management via CIWA with either phenobarbital or diazepam.  For phenobarbital dosing, can use:   130 mg IV for mild to moderate symptoms and phenobarbital 260 mg IV for moderate to severe symptoms.  Titrate to RASS -1 and hold for significant sedation  Maximum total dose of phenobarbital is 2 grams.  if patient is approaching 2 grams of phenobarbital with continued withdrawal symptoms:  Recommend escalation of care for adjunctive use of dexmedetomidine or ketamine infusions  Can start ketamine infusion at 0.3 mg/kg/hr for 24 hours. Beyond this please use 0.15 mg/kg/hr until clinical improvement  If using dexmedetomidine infusion, please use adjunctive benzodiazepines as dexmedetomidine has no direct effect at the JOMAR receptor  Alternatively can also use a diazepam-based CIWA. Please see below for dosing regimen.     Medical Toxicology recommendations for CIWA monitoring using diazepam  "for treatment:    CIWA score & Treatment     Monitoring:   Modified CIWA Score   Telemetry  Continuous pulse oximetry   Initiate RASS with dosing and hold any sedatives for RASS less than -2  Request provider re-evaluation after every three doses.  Step down for CIWA > 7  Contact Medical Toxicology/Addiction \"[facility] On Call\" via Tiger Text for worsening CIWA, persistent tachycardia or encephalopathy.       0  No intervention  Reassess q4 hours.   Consider discontinuing CIWA 24 hours after ethanol concentration of zero, with a score of zero    1-7  Diazepam 10 mg PO Q4hr PRN score 1-7  Reassess q4hr    8-14  Diazepam 10mg IV Q1hr PRN score 8-14  Reassess q1hr  Contact Medical Toxicology/Addiction \"[facility]ox [facility] On Call\" via Tiger Text to discuss transfer to detox unit, step down or critical care per Detox AP.    15-19  Diazepam 20mg IV Q1hr PRN score 15-19  Reassess q1hr  Contact Medical Toxicology/Addiction \"Network Detox [facility] On Call\" via Tiger Text to discuss transfer to detox unit, step down or critical care per Detox AP and further treatment recommendations.    >20  Diazepam 40mg IV Q1hr PRN score > 20  Contact Medical Toxicology/Addiction \"Network Detox [facility] On Call\" via Tiger Text for additional treatment recommendations.       Once the patient's withdrawal is effectively managed, the patient can be placed on a diazepam taper, if needed.    Diazepam can be decreased by 1/2 of the last dose every hour until the patient is not needing more than 10 mg at a time; at which point diazepam 5mg PO  may be given Q6 hours PRN for 24 hours. Prior to discontinuation.     Please reach out to Medical Toxicology/Addiction \"[facility] On Call\" via Tiger Text with any additional concerns.     Acute on chronic alcoholic pancreatitis  Encourage cessation  Transaminitis  In the setting of continued alcohol use.  Encouraged cessation  Tobacco abuse  Offer nicotine replacement.  Encouraged cessation  Hepatic " steatosis    Necrotizing pancreatitis    Pancreatic pseudocyst    I have discussed the above management plan in detail with the primary service.   Medical Toxicology service will follow.    For further questions, please contact the medical  on call via SecureChat between 8am and 9pm. If between 9pm and 8am, please reach out to the [facility] at [phone].     History of Present Illness   Kaykay Holland is a 35 y.o. year old female who presents with right upper quadrant abdominal pain with nausea and vomiting.  Found to have edematous thickening of pancreatic parenchyma with peripancreatic inflammatory stranding.    Patient does have a history of multiple admissions for pancreatitis.  Patient was recently admitted 5/16 through 5/20.  Does have history of alcohol use disorder.  Drinks wine on a daily basis.  Did start drinking after discharge on 5/20.  Last drink was reported to be the evening of 5/31.    She denies being on any medications for alcohol use disorder and does not want to be on any medications for alcohol use disorder.  At this time she is not interested in any rehab referrals.    Review of Systems   Constitutional:  Negative for fever.   Eyes:  Negative for visual disturbance.   Respiratory:  Negative for cough and shortness of breath.    Gastrointestinal:  Positive for abdominal pain and nausea. Negative for vomiting.   Genitourinary:  Negative for difficulty urinating.   Neurological:  Negative for tremors and headaches.   Psychiatric/Behavioral:  Negative for agitation, confusion and hallucinations.        Historical Information   Medical History Review: I have reviewed the patient's PMH, PSH, Social History, Family History, Meds, and Allergies   Social History[1]  Family History[2]    Meds/Allergies   Prior to Admission medications    Medication Sig Start Date End Date Taking? Authorizing Provider   folic acid (FOLVITE) 1 mg tablet Take 1 tablet (1 mg total) by mouth daily  5/21/25  Yes Florina Arce PA-C   hydrOXYzine HCL (ATARAX) 50 mg tablet Take 1 tablet (50 mg total) by mouth every 6 (six) hours as needed for anxiety 5/20/25  Yes Florina Arce PA-C   nicotine (NICODERM CQ) 14 mg/24hr TD 24 hr patch Place 1 patch on the skin daily over 24 hours 5/21/25  Yes Florina Arce PA-C   thiamine 100 MG tablet Take 1 tablet (100 mg total) by mouth daily 5/21/25  Yes Florina Arce PA-C   Current Medications[3]   Allergies[4]    Objective :  Temp:  [98 °F (36.7 °C)-98.4 °F (36.9 °C)] 98.1 °F (36.7 °C)  HR:  [68-92] 75  BP: (130-163)/() 130/84  Resp:  [16-20] 16  SpO2:  [94 %-98 %] 94 %  O2 Device: None (Room air)      Intake/Output Summary (Last 24 hours) at 6/2/2025 1353  Last data filed at 6/2/2025 1230  Gross per 24 hour   Intake 0 ml   Output 0 ml   Net 0 ml       Physical Exam  Vitals and nursing note reviewed.   Constitutional:       General: She is not in acute distress.     Appearance: She is not ill-appearing.   HENT:      Head: Normocephalic and atraumatic.      Comments: No tongue fasciculations     Eyes:      Extraocular Movements: Extraocular movements intact.     Pulmonary:      Effort: Pulmonary effort is normal.     Neurological:      General: No focal deficit present.      Mental Status: She is alert.      GCS: GCS eye subscore is 4. GCS verbal subscore is 5. GCS motor subscore is 6.      Cranial Nerves: No dysarthria or facial asymmetry.      Motor: No tremor, abnormal muscle tone or seizure activity.     Psychiatric:         Attention and Perception: She does not perceive visual hallucinations.         Mood and Affect: Affect is flat.           Lab Results: I have reviewed the following results:  Results from last 7 days   Lab Units 06/02/25  0550   WBC Thousand/uL 7.63   HEMOGLOBIN g/dL 11.6   HEMATOCRIT % 35.5   PLATELETS Thousands/uL 209      Results from last 7 days   Lab Units 06/02/25  0550   POTASSIUM mmol/L 3.9   CHLORIDE mmol/L 97   CO2 mmol/L 25   BUN mg/dL 5    CREATININE mg/dL 0.33*   CALCIUM mg/dL 7.9*   ALBUMIN g/dL 3.2*   ALK PHOS U/L 83   ALT U/L 33   AST U/L 57*   MAGNESIUM mg/dL 1.4*   PHOSPHORUS mg/dL 3.7      Results from last 7 days   Lab Units 06/02/25  0550   INR  1.14   PTT seconds 31                      Imaging Results Review: I reviewed radiology reports from this admission including: CT abdomen/pelvis.  Other Study Results Review: No additional pertinent studies reviewed.    Administrative Statements   I have spent a total time of 31 minutes in caring for this patient on the day of the visit/encounter including Diagnostic results, Prognosis, Risks and benefits of tx options, Instructions for management, Patient and family education, Importance of tx compliance, Risk factor reductions, Impressions, Counseling / Coordination of care, Documenting in the medical record, Reviewing/placing orders in the medical record (including tests, medications, and/or procedures), Obtaining or reviewing history  , and Communicating with other healthcare professionals .    Administrative Statements   VIRTUAL CARE DOCUMENTATION:     1. This service was provided via Telemedicine using Teams Virtual Rounding      2. Parties in the room with patient during teleconsult Patient only    3. Confidentiality My office door was closed     4. Participants No one else was in the room    5. Patient acknowledged consent and understanding of privacy and security of the  Telemedicine consult. I informed the patient that I have reviewed their record in Epic and presented the opportunity for them to ask any questions regarding the visit today.  The patient agreed to participate.    6. I have spent a total time of 31 minutes in caring for this patient on the day of the visit/encounter including Diagnostic results, Prognosis, Risks and benefits of tx options, Instructions for management, Patient and family education, Importance of tx compliance, Risk factor reductions, Impressions, Counseling /  Coordination of care, Documenting in the medical record, Reviewing/placing orders in the medical record (including tests, medications, and/or procedures), Obtaining or reviewing history  , and Communicating with other healthcare professionals , not including the time spent for establishing the audio/video connection.          [1]   Social History  Tobacco Use    Smoking status: Every Day     Current packs/day: 0.50     Types: Cigarettes     Passive exposure: Never    Smokeless tobacco: Never    Tobacco comments:     Per pt last drink was today 9/24/24 one glass of wine. Had previously stop on 4/4/24 for 1 month    Vaping Use    Vaping status: Never Used   Substance and Sexual Activity    Alcohol use: Yes     Alcohol/week: 21.0 standard drinks of alcohol     Types: 21 Glasses of wine per week     Comment: last drank 3/30/25    Drug use: Never   [2] No family history on file.  [3]   Current Facility-Administered Medications:     folic acid (FOLVITE) tablet 1 mg, 1 mg, Oral, Daily, Florina Schafer PA-C, 1 mg at 06/02/25 0822    HYDROmorphone HCl (DILAUDID) injection 0.2 mg, 0.2 mg, Intravenous, Q4H PRN **OR** HYDROmorphone (DILAUDID) injection 0.5 mg, 0.5 mg, Intravenous, Q4H PRN, Florina Schafer PA-C, 0.5 mg at 06/02/25 1023    HYDROmorphone HCl (DILAUDID) injection 0.2 mg, 0.2 mg, Intravenous, Q4H PRN, Florina Schafer PA-C, 0.2 mg at 06/02/25 1138    hydrOXYzine HCL (ATARAX) tablet 25 mg, 25 mg, Oral, Q6H PRN, Soheila Coleman MD, 25 mg at 06/02/25 1023    magnesium sulfate 2 g/50 mL IVPB (premix) 2 g, 2 g, Intravenous, Once, Soheila Coleman MD, Last Rate: 25 mL/hr at 06/02/25 1235, 2 g at 06/02/25 1235    multivitamin-minerals (CENTRUM) tablet 1 tablet, 1 tablet, Oral, Daily, Florina Schafer PA-C, 1 tablet at 06/02/25 0822    nicotine (NICODERM CQ) 21 mg/24 hr TD 24 hr patch 1 patch, 1 patch, Transdermal, HS, Florina Schafer PA-C, 1 patch at 06/02/25 0033    ondansetron (ZOFRAN) injection 4  mg, 4 mg, Intravenous, Q4H PRN, Florina Schafer PA-C, 4 mg at 06/02/25 1139    sodium chloride 0.9 % infusion, 75 mL/hr, Intravenous, Continuous, Soheila Coleman MD, Last Rate: 75 mL/hr at 06/02/25 1235, 75 mL/hr at 06/02/25 1235    thiamine tablet 100 mg, 100 mg, Oral, Daily, Florina Schafer PA-C, 100 mg at 06/02/25 0822    trimethobenzamide (TIGAN) IM injection 200 mg, 200 mg, Intramuscular, Q6H PRN, Soheila Coleman MD  [4]   Allergies  Allergen Reactions    Bee Pollen Anaphylaxis    Other Edema     Bee stings (localized edema)

## 2025-06-02 NOTE — ASSESSMENT & PLAN NOTE
"Patient with a history of chronic daily alcohol use  Continuous pulse ox and telemetry monitoring    Recommend continued symptom-triggered management via CIWA with either phenobarbital or diazepam.  For phenobarbital dosing, can use:   130 mg IV for mild to moderate symptoms and phenobarbital 260 mg IV for moderate to severe symptoms.  Titrate to RASS -1 and hold for significant sedation  Maximum total dose of phenobarbital is 2 grams.  if patient is approaching 2 grams of phenobarbital with continued withdrawal symptoms:  Recommend escalation of care for adjunctive use of dexmedetomidine or ketamine infusions  Can start ketamine infusion at 0.3 mg/kg/hr for 24 hours. Beyond this please use 0.15 mg/kg/hr until clinical improvement  If using dexmedetomidine infusion, please use adjunctive benzodiazepines as dexmedetomidine has no direct effect at the JOMAR receptor  Alternatively can also use a diazepam-based CIWA. Please see below for dosing regimen.     Medical Toxicology recommendations for CIWA monitoring using diazepam for treatment:    CIWA score & Treatment     Monitoring:   Modified CIWA Score   Telemetry  Continuous pulse oximetry   Initiate RASS with dosing and hold any sedatives for RASS less than -2  Request provider re-evaluation after every three doses.  Step down for CIWA > 7  Contact Medical Toxicology/Addiction \"Network Detox [facility] On Call\" via Tiger Text for worsening CIWA, persistent tachycardia or encephalopathy.       0  No intervention  Reassess q4 hours.   Consider discontinuing CIWA 24 hours after ethanol concentration of zero, with a score of zero    1-7  Diazepam 10 mg PO Q4hr PRN score 1-7  Reassess q4hr    8-14  Diazepam 10mg IV Q1hr PRN score 8-14  Reassess q1hr  Contact Medical Toxicology/Addiction \"Network Detox [facility] On Call\" via Tiger Text to discuss transfer to detox unit, step down or critical care per Detox AP.    15-19  Diazepam 20mg IV Q1hr PRN score 15-19  Reassess q1hr  Contact " "Medical Toxicology/Addiction \"Network Detox [facility] On Call\" via Tiger Text to discuss transfer to detox unit, step down or critical care per Detox AP and further treatment recommendations.    >20  Diazepam 40mg IV Q1hr PRN score > 20  Contact Medical Toxicology/Addiction \"Network Detox [facility] On Call\" via Tiger Text for additional treatment recommendations.       Once the patient's withdrawal is effectively managed, the patient can be placed on a diazepam taper, if needed.    Diazepam can be decreased by 1/2 of the last dose every hour until the patient is not needing more than 10 mg at a time; at which point diazepam 5mg PO  may be given Q6 hours PRN for 24 hours. Prior to discontinuation.     Please reach out to Medical Toxicology/Addiction \"Network Detox [facility] On Call\" via Tiger Text with any additional concerns.     "

## 2025-06-02 NOTE — CONSULTS
Consultation - Gastroenterology   Name: Kaykay Holland 35 y.o. female I MRN: [id number]  Unit/Bed#: -01 I Date of Admission: 6/1/2025   Date of Service: 6/2/2025 I Hospital Day: 1   Inpatient consult to gastroenterology  Consult performed by: Olivia Alexandra PA-C  Consult ordered by: Florina Schafer PA-C        Physician Requesting Evaluation: Soheila Coleman MD   Reason for Evaluation / Principal Problem: pancreatitis    Assessment & Plan  Acute on chronic alcoholic pancreatitis  34 yo F with history of necrotizing pancreatitis/walled off necrosis post EUS guided cyst drainage December 2024 who presents with pancreatitis in setting of recurrent alcohol abuse. CT A/P shows findings consistent with pancreatitis and acute collection emanating from the main pancreatic duct, cannot exclude acute necrotic collection. No reports of gas in or around the pancreas to suggest infected necrosis. Initial labs revealed leukocytosis WBC 13,000 - now normalized. Creatinine normal. No lipase to review.     No fevers or hemodynamic instability. She appears nontoxic. Abdomen soft, non-distended, moderate epigastric tenderness.    - Continue supportive care  - NPO, IV fluids, pain management and antiemetics as needed  - Advance diet as tolerated  - No indication for antibiotics at this time but continue to monitor CBC and temperature  - Consider repeat imaging in 4 weeks to evaluate for development of walled off necrosis  Pancreatic pseudocyst  She has several cysts in the pancreas consistent with pseudocysts, within the head of the pancreas measuring 1.6 x 1.4 cm and 1.2 x 0.9 cm, appear smaller in size compared to prior imaging.  Transaminitis  Secondary to alcohol use disorder. Bilirubin and AST mildly elevated, 1.58 and 57 respectively. Discriminant function 11 (LOW). CT A/P 5/16/25 showed hepatomegaly and hepatic steatosis, with slightly nodular liver contour raising concern for possible early cirrhosis with  upper abdominal varices and trace ascites. CTA A/P 6/1 shows similar findings. EGD Dec 2024/Jan 2025 showed no evidence of esophageal or gastric varices.    -Complete alcohol cessation is imperative  - Monitor LFTs  - Follow-up RUQ ultrasound to rule out biliary obstruction  - Finding on CT could be from acute liver injury from recurrent alcohol use versus early cirrhosis. Outpatient follow-up is advised. Patient was advised that if she has new onset cirrhosis from alcohol, this can be a severe life-threatening process and ongoing alcohol use would preclude her from any transplant consideration in the future.   Hepatic steatosis  See plan above  Alcohol abuse  -Again, counseled patient on need for alcohol cessation   -Continue [facility] protocol  -Continue folic acid and thiamine and multivitamin  -Given her recurrent episodes, uncertain whether she would benefit from some form of intensive outpatient vs inpatient rehab   -Recommend discussing with social work and/or psychiatry.     I have discussed the above management plan in detail with the primary service.     History of Present Illness   HPI:  Kaykay Holland is a 35 y.o. female with history of necrotizing pancreatitis/walled off necrosis post EUS guided cyst drainage December 2024 who presents with pancreatitis in setting of recurrent alcohol abuse.    She was at work yesterday and developed worsening epigastric pain, nausea, and vomiting and presented to [facility]. She was ultimately transferred to [facility] since she normally receives care here. She says she was doing relatively well since her hospitalization last month, no significant pain.  However she developed progressive abdominal pain over the past 3 days. No fevers or chills. No change in bowel habits or bleeding.    Last drink of ETOH was Saturday evening.    Review of Systems   Constitutional:  Negative for chills and fever.   HENT:  Negative for ear pain and sore throat.    Eyes:  Negative for  pain and visual disturbance.   Respiratory:  Negative for cough and shortness of breath.    Cardiovascular:  Negative for chest pain and palpitations.   Gastrointestinal:  Positive for abdominal pain, nausea and vomiting.   Genitourinary:  Negative for dysuria and hematuria.   Musculoskeletal:  Negative for arthralgias and back pain.   Skin:  Negative for color change and rash.   Neurological:  Negative for seizures and syncope.   All other systems reviewed and are negative.    Medical History Review: I have reviewed the patient's PMH, PSH, Social History, Family History, Meds, and Allergies     Objective :  Temp:  [98 °F (36.7 °C)-98.4 °F (36.9 °C)] 98.1 °F (36.7 °C)  HR:  [68-92] 75  BP: (130-163)/() 130/84  Resp:  [16-20] 16  SpO2:  [94 %-98 %] 94 %  O2 Device: None (Room air)    Physical Exam  Vitals and nursing note reviewed.   Constitutional:       General: She is not in acute distress.     Appearance: She is well-developed.   HENT:      Head: Normocephalic and atraumatic.     Eyes:      Conjunctiva/sclera: Conjunctivae normal.       Cardiovascular:      Rate and Rhythm: Normal rate and regular rhythm.      Heart sounds: No murmur heard.  Pulmonary:      Effort: Pulmonary effort is normal. No respiratory distress.      Breath sounds: Normal breath sounds.   Abdominal:      Palpations: Abdomen is soft.      Tenderness: There is abdominal tenderness.      Comments: Moderate epigastric tenderness     Musculoskeletal:         General: No swelling.      Cervical back: Neck supple.     Skin:     General: Skin is warm and dry.     Neurological:      Mental Status: She is alert.     Psychiatric:         Mood and Affect: Mood normal.           Lab Results: I have reviewed the following results:CBC/BMP:   .     06/02/25  0550   WBC 7.63   HGB 11.6   HCT 35.5      SODIUM 132*   K 3.9   CL 97   CO2 25   BUN 5   CREATININE 0.33*   GLUC 80   MG 1.4*   PHOS 3.7    , Creatinine Clearance: Estimated Creatinine  Clearance: 216.1 mL/min (A) (by C-G formula based on SCr of 0.33 mg/dL (L))., LFTs:   .     06/02/25  0550   AST 57*   ALT 33   ALB 3.2*   TBILI 1.58*   ALKPHOS 83    , PTT/INR:  .     06/02/25  0550   PTT 31   INR 1.14    , Troponin,BNP:No new results in last 24 hours. , Lactic Acid: No new results in last 24 hours.     Imaging Results Review: I reviewed radiology reports from this admission including: CT abdomen/pelvis.  Other Study Results Review: No additional pertinent studies reviewed.

## 2025-06-02 NOTE — ASSESSMENT & PLAN NOTE
Continue daily vitamin supplementation with thiamine, folic acid, and multivitamin  Appreciate certified  and case management consultations for recommendations regarding aftercare resources, recovery support and disposition planning  Patient is not interested in any medications for her AUD or rehab. Would continue to offer resources

## 2025-06-02 NOTE — ASSESSMENT & PLAN NOTE
"Presented to outside hospital with epigastric and RUQ abdominal pain with associated nausea and vomiting  CT A/P: \"Edematous thickening of the pancreatic parenchyma with peripancreatic inflammatory stranding. There is hypoattenuation of the pancreatic parenchyma   with body/tail junction with findings of acute collection emanating from the main pancreatic duct superiorly to lie within apposition of the lesser curvature   of the gastric stomach causing underlying reactive gastritis. Cannot exclude acute necrotic collection. No findings for pseudoaneurysm, portal vein thrombosis, or splenic vein thrombosis clearly identified. 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  Pain control  IV fluids  BISAP score for pancreatitis 1  RUQ ultrasound ordered due to acute collection emanating from main pancreatic duct with new transaminitis  "

## 2025-06-02 NOTE — ASSESSMENT & PLAN NOTE
, ALT 66, , and bilirubin 1.1  Patient with history of mild transaminitis in the past, however AST and ALT significantly more elevated than prior  Evidence of a acute possible necrotic collection arising from main pancreatic duct-therefore will obtain RUQ ultrasound to determine if affecting bile duct  GI consulted  Trend hepatic function panel and coags daily

## 2025-06-02 NOTE — PLAN OF CARE
Problem: PAIN - ADULT  Goal: Verbalizes/displays adequate comfort level or baseline comfort level  Description: Interventions:  - Encourage patient to monitor pain and request assistance  - Assess pain using appropriate pain scale  - Administer analgesics as ordered based on type and severity of pain and evaluate response  - Implement non-pharmacological measures as appropriate and evaluate response  - Consider cultural and social influences on pain and pain management  - Notify physician/advanced practitioner if interventions unsuccessful or patient reports new pain  - Educate patient/family on pain management process including their role and importance of  reporting pain   - Provide non-pharmacologic/complimentary pain relief interventions  Outcome: Progressing     Problem: INFECTION - ADULT  Goal: Absence or prevention of progression during hospitalization  Description: INTERVENTIONS:  - Assess and monitor for signs and symptoms of infection  - Monitor lab/diagnostic results  - Monitor all insertion sites, i.e. indwelling lines, tubes, and drains  - Monitor endotracheal if appropriate and nasal secretions for changes in amount and color  - Cool appropriate cooling/warming therapies per order  - Administer medications as ordered  - Instruct and encourage patient and family to use good hand hygiene technique  - Identify and instruct in appropriate isolation precautions for identified infection/condition  Outcome: Progressing

## 2025-06-02 NOTE — PROGRESS NOTES
Email response received from patient after receiving OP SWCM's UTR letter.  Patient shared that she is needing assistance with bills and does not have health insurance coverage at this time.  Patient noted she was recently in the hospital and is unable to afford these bills.  Patient works 8AM to 5PM and has off on Wednesday's.    Per chart review, patient is currently admitted to [facility].    "[employer]" Secure Chat message sent to [facility] who confirmed PATHS was referred to patient again during this admission regarding insurance coverage.  Patient is between jobs and has denied ETOH treatment in the past.  OP SWCM will follow for discharge plan recommendations and will follow-up with patient upon discharge.

## 2025-06-02 NOTE — ASSESSMENT & PLAN NOTE
"CT A/P on admit: \"There is hypoattenuation of the pancreatic parenchyma with body/tail junction with findings of acute collection emanating from the main pancreatic duct superiorly to lie within apposition of the lesser curvature of the gastric stomach causing underlying reactive gastritis. \"  RUQ ultrasound ordered due to presence of new transaminitis  GI consulted  "

## 2025-06-02 NOTE — PROGRESS NOTES
"Progress Note - Hospitalist   Name: Kaykay Holland 35 y.o. female I MRN: [id number]  Unit/Bed#: -01 I Date of Admission: 6/1/2025   Date of Service: 6/2/2025 I Hospital Day: 1    Assessment & Plan  Acute on chronic alcoholic pancreatitis  Presented to outside hospital with epigastric and RUQ abdominal pain with associated nausea and vomiting  CT A/P: \"Edematous thickening of the pancreatic parenchyma with peripancreatic inflammatory stranding. There is hypoattenuation of the pancreatic parenchyma   with body/tail junction with findings of acute collection emanating from the main pancreatic duct superiorly to lie within apposition of the lesser curvature   of the gastric stomach causing underlying reactive gastritis. Cannot exclude acute necrotic collection. No findings for pseudoaneurysm, portal vein thrombosis, or splenic vein thrombosis clearly identified. 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  Continue n.p.o., IV fluids pain management and antiemetics  No need for antibiotics at this time.  Follow-up ultrasound results  Necrotizing pancreatitis  CT A/P on admit: \"There is hypoattenuation of the pancreatic parenchyma with body/tail junction with findings of acute collection emanating from the main pancreatic duct superiorly to lie within apposition of the lesser curvature of the gastric stomach causing underlying reactive gastritis. \"  RUQ ultrasound ordered due to presence of new transaminitis  GI consulted  Alcohol abuse  Reports that she started drinking day of discharge on 5/20  Drinking wine daily  Last drink was 5/31 evening  Placed on CIWA protocol-held on Librium due to new transaminitis  Has previously required phenobarb in the past  Toxicology consulted, appreciate " recommendations.  Patient seems well from a withdrawal perspective.  Also she denies any withdrawal symptoms currently.  Strongly encouraged cessation  Not interested in rehab currently  Transaminitis  Recent Labs     06/02/25  0550   AST 57*   ALT 33   ALKPHOS 83   TBILI 1.58*   BILIDIR 0.40*      , ALT 66, , and bilirubin 1.1 on outpatient labs  Improved     Pancreatic pseudocyst  CT A/P from recent admission showing multiple pseudocyst-unclear if collection seen on admit CT performed at OSH is same or new collections  GI consulted  Leukocytosis of 13K on labs from OSH-denies fevers or chills, therefore will hold off on antibiotics at this time  Hepatic steatosis  Hepatic steatosis noted on CT scan  Encourage alcohol cessation  Tobacco abuse  Smokes 1 pack/day  NRT while inpatient  Alcohol withdrawal (HCC)    Alcohol use disorder    Hyponatremia  Recent Labs     06/02/25  0550   SODIUM 132*    Mild   Might be SIADH relate din the context of nausea and pain vs poor oral intake   Continue iv fluids for now, pt is npo   Monitor am     VTE Pharmacologic Prophylaxis: VTE Score: 0 Low Risk (Score 0-2) - Encourage Ambulation.    Mobility:   Basic Mobility Inpatient Raw Score: 24  JH-HLM Goal: 8: Walk 250 feet or more  JH-HLM Achieved: 7: Walk 25 feet or more  JH-HLM Goal achieved. Continue to encourage appropriate mobility.    Patient Centered Rounds: I performed bedside rounds with nursing staff today.   Discussions with Specialists or Other Care Team Provider: cm, toxicology    Education and Discussions with Family / Patient: Patient declined call to .     Current Length of Stay: 1 day(s)  Current Patient Status: Inpatient   Certification Statement: The patient will continue to require additional inpatient hospital stay due to acute on chronic pancreatitis   Discharge Plan: Anticipate discharge in 48 hrs to home.    Code Status: Level 1 - Full Code    Subjective     Still with significant  abdominal pain and nausea. No vomiting     Objective :  Temp:  [98 °F (36.7 °C)-98.5 °F (36.9 °C)] 98.5 °F (36.9 °C)  HR:  [68-92] 78  BP: (130-163)/() 131/88  Resp:  [16-20] 17  SpO2:  [94 %-98 %] 95 %  O2 Device: None (Room air)    Body mass index is 20.95 kg/m².     Input and Output Summary (last 24 hours):     Intake/Output Summary (Last 24 hours) at 6/2/2025 1529  Last data filed at 6/2/2025 1422  Gross per 24 hour   Intake 0 ml   Output 300 ml   Net -300 ml       Physical Exam  Vitals and nursing note reviewed.   Constitutional:       General: She is not in acute distress.     Appearance: She is not diaphoretic.   HENT:      Head: Normocephalic.     Eyes:      General:         Right eye: No discharge.         Left eye: No discharge.       Cardiovascular:      Rate and Rhythm: Normal rate and regular rhythm.      Heart sounds: No murmur heard.  Pulmonary:      Effort: Pulmonary effort is normal. No respiratory distress.      Breath sounds: Normal breath sounds. No wheezing, rhonchi or rales.   Abdominal:      General: There is no distension.      Palpations: Abdomen is soft.      Tenderness: There is abdominal tenderness. There is no guarding or rebound.     Musculoskeletal:      Cervical back: Normal range of motion.      Right lower leg: No edema.      Left lower leg: No edema.     Skin:     General: Skin is warm.     Neurological:      Mental Status: She is alert and oriented to person, place, and time.     Psychiatric:         Mood and Affect: Mood normal.         Behavior: Behavior normal.         Thought Content: Thought content normal.         Judgment: Judgment normal.           Lines/Drains:              Lab Results: I have reviewed the following results:   Results from last 7 days   Lab Units 06/02/25  0550   WBC Thousand/uL 7.63   HEMOGLOBIN g/dL 11.6   HEMATOCRIT % 35.5   PLATELETS Thousands/uL 209     Results from last 7 days   Lab Units 06/02/25  0550   SODIUM mmol/L 132*   POTASSIUM mmol/L  3.9   CHLORIDE mmol/L 97   CO2 mmol/L 25   BUN mg/dL 5   CREATININE mg/dL 0.33*   ANION GAP mmol/L 10   CALCIUM mg/dL 7.9*   ALBUMIN g/dL 3.2*   TOTAL BILIRUBIN mg/dL 1.58*   ALK PHOS U/L 83   ALT U/L 33   AST U/L 57*   GLUCOSE RANDOM mg/dL 80     Results from last 7 days   Lab Units 06/02/25  0550   INR  1.14                   Recent Cultures (last 7 days):         Imaging Results Review: I reviewed radiology reports from this admission including: CT abdomen/pelvis.  Other Study Results Review: No additional pertinent studies reviewed.    Last 24 Hours Medication List:     Current Facility-Administered Medications:     folic acid (FOLVITE) tablet 1 mg, Daily    HYDROmorphone HCl (DILAUDID) injection 0.2 mg, Q4H PRN **OR** HYDROmorphone (DILAUDID) injection 0.5 mg, Q4H PRN    HYDROmorphone HCl (DILAUDID) injection 0.2 mg, Q4H PRN    hydrOXYzine HCL (ATARAX) tablet 25 mg, Q6H PRN    multivitamin-minerals (CENTRUM) tablet 1 tablet, Daily    nicotine (NICODERM CQ) 21 mg/24 hr TD 24 hr patch 1 patch, HS    ondansetron (ZOFRAN) injection 4 mg, Q4H PRN    sodium chloride 0.9 % infusion, Continuous, Last Rate: 75 mL/hr (06/02/25 1235)    thiamine tablet 100 mg, Daily    trimethobenzamide (TIGAN) IM injection 200 mg, Q6H PRN    Administrative Statements   Today, Patient Was Seen By: Soheila Coleman MD      **Please Note: This note may have been constructed using a voice recognition system.**

## 2025-06-02 NOTE — ASSESSMENT & PLAN NOTE
Recent Labs     06/02/25  0550   AST 57*   ALT 33   ALKPHOS 83   TBILI 1.58*   BILIDIR 0.40*      , ALT 66, , and bilirubin 1.1 on outpatient labs  Improved

## 2025-06-02 NOTE — ASSESSMENT & PLAN NOTE
CT A/P from recent admission showing multiple pseudocyst-unclear if collection seen on admit CT performed at OSH is same or new collections  GI consulted  Leukocytosis of 13K on labs from OSH-denies fevers or chills, therefore will hold off on antibiotics at this time

## 2025-06-02 NOTE — ASSESSMENT & PLAN NOTE
-Again, counseled patient on need for alcohol cessation   -Continue CIWA protocol  -Continue folic acid and thiamine and multivitamin  -Given her recurrent episodes, uncertain whether she would benefit from some form of intensive outpatient vs inpatient rehab   -Recommend discussing with social work and/or psychiatry.

## 2025-06-02 NOTE — ASSESSMENT & PLAN NOTE
Reports that she started drinking day of discharge on 5/20  Drinking wine daily  Last drink was 5/31 evening  Placed on CIWA protocol-held on Librium due to new transaminitis  Has previously required phenobarb in the past  Toxicology consulted, appreciate recommendations.  Patient seems well from a withdrawal perspective.  Also she denies any withdrawal symptoms currently.  Strongly encouraged cessation  Not interested in rehab currently

## 2025-06-02 NOTE — ASSESSMENT & PLAN NOTE
Recent Labs     06/02/25  0550   SODIUM 132*    Mild   Might be SIADH relate din the context of nausea and pain vs poor oral intake   Continue iv fluids for now, pt is npo   Monitor am    home

## 2025-06-02 NOTE — PLAN OF CARE
Problem: PAIN - ADULT  Goal: Verbalizes/displays adequate comfort level or baseline comfort level  Description: Interventions:  - Encourage patient to monitor pain and request assistance  - Assess pain using appropriate pain scale  - Administer analgesics as ordered based on type and severity of pain and evaluate response  - Implement non-pharmacological measures as appropriate and evaluate response  - Consider cultural and social influences on pain and pain management  - Notify physician/advanced practitioner if interventions unsuccessful or patient reports new pain  - Educate patient/family on pain management process including their role and importance of  reporting pain   - Provide non-pharmacologic/complimentary pain relief interventions  Outcome: Progressing     Problem: INFECTION - ADULT  Goal: Absence or prevention of progression during hospitalization  Description: INTERVENTIONS:  - Assess and monitor for signs and symptoms of infection  - Monitor lab/diagnostic results  - Monitor all insertion sites, i.e. indwelling lines, tubes, and drains  - Monitor endotracheal if appropriate and nasal secretions for changes in amount and color  - Malin appropriate cooling/warming therapies per order  - Administer medications as ordered  - Instruct and encourage patient and family to use good hand hygiene technique  - Identify and instruct in appropriate isolation precautions for identified infection/condition  Outcome: Progressing  Goal: Absence of fever/infection during neutropenic period  Description: INTERVENTIONS:  - Monitor WBC  - Perform strict hand hygiene  - Limit to healthy visitors only  - No plants, dried, fresh or silk flowers with butler in patient room  Outcome: Progressing     Problem: SAFETY ADULT  Goal: Patient will remain free of falls  Description: INTERVENTIONS:  - Educate patient/family on patient safety including physical limitations  - Instruct patient to call for assistance with activity   -  Consider consulting OT/PT to assist with strengthening/mobility based on AM PAC & JH-HLM score  - Consult OT/PT to assist with strengthening/mobility   - Keep Call bell within reach  - Keep bed low and locked with side rails adjusted as appropriate  - Keep care items and personal belongings within reach  - Initiate and maintain comfort rounds  - Make Fall Risk Sign visible to staff  - Offer Toileting every 2 Hours, in advance of need  - Initiate/Maintain bed alarm  - Obtain necessary fall risk management equipment: non skid socks  - Apply yellow socks and bracelet for high fall risk patients  - Consider moving patient to room near nurses station  Outcome: Progressing  Goal: Maintain or return to baseline ADL function  Description: INTERVENTIONS:  -  Assess patient's ability to carry out ADLs; assess patient's baseline for ADL function and identify physical deficits which impact ability to perform ADLs (bathing, care of mouth/teeth, toileting, grooming, dressing, etc.)  - Assess/evaluate cause of self-care deficits   - Assess range of motion  - Assess patient's mobility; develop plan if impaired  - Assess patient's need for assistive devices and provide as appropriate  - Encourage maximum independence but intervene and supervise when necessary  - Involve family in performance of ADLs  - Assess for home care needs following discharge   - Consider OT consult to assist with ADL evaluation and planning for discharge  - Provide patient education as appropriate  - Monitor functional capacity and physical performance, use of AM PAC & JH-HLM   - Monitor gait, balance and fatigue with ambulation    Outcome: Progressing  Goal: Maintains/Returns to pre admission functional level  Description: INTERVENTIONS:  - Perform AM-PAC 6 Click Basic Mobility/ Daily Activity assessment daily.  - Set and communicate daily mobility goal to care team and patient/family/caregiver.   - Collaborate with rehabilitation services on mobility goals  if consulted  - Perform Range of Motion 3 times a day.  - Reposition patient every 2 hours.  - Dangle patient 3 times a day  - Stand patient 3 times a day  - Ambulate patient 3 times a day  - Out of bed to chair 3 times a day   - Out of bed for meals 3 times a day  - Out of bed for toileting  - Record patient progress and toleration of activity level   Outcome: Progressing     Problem: DISCHARGE PLANNING  Goal: Discharge to home or other facility with appropriate resources  Description: INTERVENTIONS:  - Identify barriers to discharge w/patient and caregiver  - Arrange for needed discharge resources and transportation as appropriate  - Identify discharge learning needs (meds, wound care, etc.)  - Arrange for interpretive services to assist at discharge as needed  - Refer to Case Management Department for coordinating discharge planning if the patient needs post-hospital services based on physician/advanced practitioner order or complex needs related to functional status, cognitive ability, or social support system  Outcome: Progressing     Problem: Knowledge Deficit  Goal: Patient/family/caregiver demonstrates understanding of disease process, treatment plan, medications, and discharge instructions  Description: Complete learning assessment and assess knowledge base.  Interventions:  - Provide teaching at level of understanding  - Provide teaching via preferred learning methods  Outcome: Progressing     Problem: Nutrition/Hydration-ADULT  Goal: Nutrient/Hydration intake appropriate for improving, restoring or maintaining nutritional needs  Description: Monitor and assess patient's nutrition/hydration status for malnutrition. Collaborate with interdisciplinary team and initiate plan and interventions as ordered.  Monitor patient's weight and dietary intake as ordered or per policy. Utilize nutrition screening tool and intervene as necessary. Determine patient's food preferences and provide high-protein, high-caloric  foods as appropriate.     INTERVENTIONS:  - Monitor oral intake, urinary output, labs, and treatment plans  - Assess nutrition and hydration status and recommend course of action  - Evaluate amount of meals eaten  - Assist patient with eating if necessary   - Allow adequate time for meals  - Recommend/ encourage appropriate diets, oral nutritional supplements, and vitamin/mineral supplements  - Order, calculate, and assess calorie counts as needed  - Recommend, monitor, and adjust tube feedings and TPN/PPN based on assessed needs  - Assess need for intravenous fluids  - Provide specific nutrition/hydration education as appropriate  - Include patient/family/caregiver in decisions related to nutrition  Outcome: Progressing     Problem: GASTROINTESTINAL - ADULT  Goal: Minimal or absence of nausea and/or vomiting  Description: INTERVENTIONS:  - Administer IV fluids if ordered to ensure adequate hydration  - Maintain NPO status until nausea and vomiting are resolved  - Nasogastric tube if ordered  - Administer ordered antiemetic medications as needed  - Provide nonpharmacologic comfort measures as appropriate  - Advance diet as tolerated, if ordered  - Consider nutrition services referral to assist patient with adequate nutrition and appropriate food choices  Outcome: Progressing     Problem: METABOLIC, FLUID AND ELECTROLYTES - ADULT  Goal: Electrolytes maintained within normal limits  Description: INTERVENTIONS:  - Monitor labs and assess patient for signs and symptoms of electrolyte imbalances  - Administer electrolyte replacement as ordered  - Monitor response to electrolyte replacements, including repeat lab results as appropriate  - Instruct patient on fluid and nutrition as appropriate  Outcome: Progressing

## 2025-06-02 NOTE — ASSESSMENT & PLAN NOTE
Reports that she started drinking day of discharge on 5/20  Drinking wine daily  Last drink was 5/31 evening  CIWA 10 on admit  Placed on CIWA protocol-held on Librium due to new transaminitis  Has previously required phenobarb in the past  Toxicology consulted

## 2025-06-02 NOTE — ASSESSMENT & PLAN NOTE
"Presented to outside hospital with epigastric and RUQ abdominal pain with associated nausea and vomiting  CT A/P: \"Edematous thickening of the pancreatic parenchyma with peripancreatic inflammatory stranding. There is hypoattenuation of the pancreatic parenchyma   with body/tail junction with findings of acute collection emanating from the main pancreatic duct superiorly to lie within apposition of the lesser curvature   of the gastric stomach causing underlying reactive gastritis. Cannot exclude acute necrotic collection. No findings for pseudoaneurysm, portal vein thrombosis, or splenic vein thrombosis clearly identified. 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  Continue n.p.o., IV fluids pain management and antiemetics  No need for antibiotics at this time.  Follow-up ultrasound results  "

## 2025-06-03 LAB
ALBUMIN SERPL BCG-MCNC: 3.1 G/DL (ref 3.5–5)
ALP SERPL-CCNC: 79 U/L (ref 34–104)
ALT SERPL W P-5'-P-CCNC: 24 U/L (ref 7–52)
ANION GAP SERPL CALCULATED.3IONS-SCNC: 5 MMOL/L (ref 4–13)
AST SERPL W P-5'-P-CCNC: 36 U/L (ref 13–39)
BILIRUB SERPL-MCNC: 1.42 MG/DL (ref 0.2–1)
BUN SERPL-MCNC: 3 MG/DL (ref 5–25)
CALCIUM ALBUM COR SERPL-MCNC: 8.8 MG/DL (ref 8.3–10.1)
CALCIUM SERPL-MCNC: 8.1 MG/DL (ref 8.4–10.2)
CHLORIDE SERPL-SCNC: 99 MMOL/L (ref 96–108)
CO2 SERPL-SCNC: 29 MMOL/L (ref 21–32)
CREAT SERPL-MCNC: 0.36 MG/DL (ref 0.6–1.3)
ERYTHROCYTE [DISTWIDTH] IN BLOOD BY AUTOMATED COUNT: 12.2 % (ref 11.6–15.1)
GFR SERPL CREATININE-BSD FRML MDRD: 141 ML/MIN/1.73SQ M
GLUCOSE SERPL-MCNC: 128 MG/DL (ref 65–140)
HCT VFR BLD AUTO: 35.3 % (ref 34.8–46.1)
HGB BLD-MCNC: 11.4 G/DL (ref 11.5–15.4)
MAGNESIUM SERPL-MCNC: 1.4 MG/DL (ref 1.9–2.7)
MCH RBC QN AUTO: 32.3 PG (ref 26.8–34.3)
MCHC RBC AUTO-ENTMCNC: 32.3 G/DL (ref 31.4–37.4)
MCV RBC AUTO: 100 FL (ref 82–98)
PLATELET # BLD AUTO: 111 THOUSANDS/UL (ref 149–390)
PMV BLD AUTO: 11.4 FL (ref 8.9–12.7)
POTASSIUM SERPL-SCNC: 3.4 MMOL/L (ref 3.5–5.3)
PROT SERPL-MCNC: 5.7 G/DL (ref 6.4–8.4)
RBC # BLD AUTO: 3.53 MILLION/UL (ref 3.81–5.12)
SODIUM SERPL-SCNC: 133 MMOL/L (ref 135–147)
WBC # BLD AUTO: 5.97 THOUSAND/UL (ref 4.31–10.16)

## 2025-06-03 PROCEDURE — 99232 SBSQ HOSP IP/OBS MODERATE 35: CPT | Performed by: STUDENT IN AN ORGANIZED HEALTH CARE EDUCATION/TRAINING PROGRAM

## 2025-06-03 PROCEDURE — 83735 ASSAY OF MAGNESIUM: CPT | Performed by: INTERNAL MEDICINE

## 2025-06-03 PROCEDURE — 99232 SBSQ HOSP IP/OBS MODERATE 35: CPT

## 2025-06-03 PROCEDURE — 80053 COMPREHEN METABOLIC PANEL: CPT | Performed by: INTERNAL MEDICINE

## 2025-06-03 PROCEDURE — 85027 COMPLETE CBC AUTOMATED: CPT | Performed by: INTERNAL MEDICINE

## 2025-06-03 RX ORDER — MAGNESIUM SULFATE HEPTAHYDRATE 40 MG/ML
2 INJECTION, SOLUTION INTRAVENOUS ONCE
Status: COMPLETED | OUTPATIENT
Start: 2025-06-03 | End: 2025-06-03

## 2025-06-03 RX ORDER — POTASSIUM CHLORIDE 1500 MG/1
40 TABLET, EXTENDED RELEASE ORAL ONCE
Status: COMPLETED | OUTPATIENT
Start: 2025-06-03 | End: 2025-06-03

## 2025-06-03 RX ADMIN — HYDROXYZINE HYDROCHLORIDE 25 MG: 25 TABLET, FILM COATED ORAL at 20:54

## 2025-06-03 RX ADMIN — FOLIC ACID 1 MG: 1 TABLET ORAL at 09:07

## 2025-06-03 RX ADMIN — HYDROMORPHONE HYDROCHLORIDE 0.5 MG: 1 INJECTION, SOLUTION INTRAMUSCULAR; INTRAVENOUS; SUBCUTANEOUS at 06:30

## 2025-06-03 RX ADMIN — MULTIPLE VITAMINS W/ MINERALS TAB 1 TABLET: TAB ORAL at 09:07

## 2025-06-03 RX ADMIN — HYDROMORPHONE HYDROCHLORIDE 0.2 MG: 0.2 INJECTION, SOLUTION INTRAMUSCULAR; INTRAVENOUS; SUBCUTANEOUS at 13:25

## 2025-06-03 RX ADMIN — HYDROMORPHONE HYDROCHLORIDE 0.5 MG: 1 INJECTION, SOLUTION INTRAMUSCULAR; INTRAVENOUS; SUBCUTANEOUS at 00:49

## 2025-06-03 RX ADMIN — HYDROXYZINE HYDROCHLORIDE 25 MG: 25 TABLET, FILM COATED ORAL at 11:23

## 2025-06-03 RX ADMIN — HYDROMORPHONE HYDROCHLORIDE 0.2 MG: 0.2 INJECTION, SOLUTION INTRAMUSCULAR; INTRAVENOUS; SUBCUTANEOUS at 09:07

## 2025-06-03 RX ADMIN — SODIUM CHLORIDE, SODIUM LACTATE, POTASSIUM CHLORIDE, AND CALCIUM CHLORIDE 150 ML/HR: .6; .31; .03; .02 INJECTION, SOLUTION INTRAVENOUS at 17:03

## 2025-06-03 RX ADMIN — HYDROMORPHONE HYDROCHLORIDE 0.2 MG: 0.2 INJECTION, SOLUTION INTRAMUSCULAR; INTRAVENOUS; SUBCUTANEOUS at 21:46

## 2025-06-03 RX ADMIN — SODIUM CHLORIDE, SODIUM LACTATE, POTASSIUM CHLORIDE, AND CALCIUM CHLORIDE 200 ML/HR: .6; .31; .03; .02 INJECTION, SOLUTION INTRAVENOUS at 09:10

## 2025-06-03 RX ADMIN — THIAMINE HCL TAB 100 MG 100 MG: 100 TAB at 09:07

## 2025-06-03 RX ADMIN — HYDROMORPHONE HYDROCHLORIDE 0.5 MG: 1 INJECTION, SOLUTION INTRAMUSCULAR; INTRAVENOUS; SUBCUTANEOUS at 10:41

## 2025-06-03 RX ADMIN — MAGNESIUM SULFATE HEPTAHYDRATE 2 G: 40 INJECTION, SOLUTION INTRAVENOUS at 11:24

## 2025-06-03 RX ADMIN — POTASSIUM CHLORIDE 40 MEQ: 1500 TABLET, EXTENDED RELEASE ORAL at 11:24

## 2025-06-03 RX ADMIN — HYDROMORPHONE HYDROCHLORIDE 0.2 MG: 0.2 INJECTION, SOLUTION INTRAMUSCULAR; INTRAVENOUS; SUBCUTANEOUS at 16:59

## 2025-06-03 RX ADMIN — HYDROMORPHONE HYDROCHLORIDE 0.2 MG: 0.2 INJECTION, SOLUTION INTRAMUSCULAR; INTRAVENOUS; SUBCUTANEOUS at 03:35

## 2025-06-03 RX ADMIN — HYDROMORPHONE HYDROCHLORIDE 0.5 MG: 1 INJECTION, SOLUTION INTRAMUSCULAR; INTRAVENOUS; SUBCUTANEOUS at 15:45

## 2025-06-03 RX ADMIN — HYDROMORPHONE HYDROCHLORIDE 0.5 MG: 1 INJECTION, SOLUTION INTRAMUSCULAR; INTRAVENOUS; SUBCUTANEOUS at 20:14

## 2025-06-03 RX ADMIN — NICOTINE 1 PATCH: 21 PATCH, EXTENDED RELEASE TRANSDERMAL at 20:57

## 2025-06-03 RX ADMIN — SODIUM CHLORIDE, SODIUM LACTATE, POTASSIUM CHLORIDE, AND CALCIUM CHLORIDE 200 ML/HR: .6; .31; .03; .02 INJECTION, SOLUTION INTRAVENOUS at 03:00

## 2025-06-03 NOTE — ASSESSMENT & PLAN NOTE
Per chart review, patient with noted hypomagnesemia 1.4.  Will replete with IV magnesium sulfate 2 g, once.  Will recheck magnesium level in the morning.

## 2025-06-03 NOTE — ASSESSMENT & PLAN NOTE
-Again, counseled patient on need for alcohol cessation and recommended patient be open to alcohol rehabilitation services to prevent recurrent pancreatitis, chronic pancreatitis, cirrhosis, even death in the future  -Continue CIWA protocol  -Continue folic acid and thiamine and multivitamin  -Recommend discussing with social work and/or psychiatry.

## 2025-06-03 NOTE — ASSESSMENT & PLAN NOTE
Continue CIWA protocol.  Continue to encourage complete cessation.  Continue to encourage rehabilitation at discharge.

## 2025-06-03 NOTE — PLAN OF CARE
Problem: PAIN - ADULT  Goal: Verbalizes/displays adequate comfort level or baseline comfort level  Description: Interventions:  - Encourage patient to monitor pain and request assistance  - Assess pain using appropriate pain scale  - Administer analgesics as ordered based on type and severity of pain and evaluate response  - Implement non-pharmacological measures as appropriate and evaluate response  - Consider cultural and social influences on pain and pain management  - Notify physician/advanced practitioner if interventions unsuccessful or patient reports new pain  - Educate patient/family on pain management process including their role and importance of  reporting pain   - Provide non-pharmacologic/complimentary pain relief interventions  Outcome: Progressing     Problem: INFECTION - ADULT  Goal: Absence or prevention of progression during hospitalization  Description: INTERVENTIONS:  - Assess and monitor for signs and symptoms of infection  - Monitor lab/diagnostic results  - Monitor all insertion sites, i.e. indwelling lines, tubes, and drains  - Monitor endotracheal if appropriate and nasal secretions for changes in amount and color  - [place] appropriate cooling/warming therapies per order  - Administer medications as ordered  - Instruct and encourage patient and family to use good hand hygiene technique  - Identify and instruct in appropriate isolation precautions for identified infection/condition  Outcome: Progressing  Goal: Absence of fever/infection during neutropenic period  Description: INTERVENTIONS:  - Monitor WBC  - Perform strict hand hygiene  - Limit to healthy visitors only  - No plants, dried, fresh or silk flowers with butler in patient room  Outcome: Progressing     Problem: SAFETY ADULT  Goal: Patient will remain free of falls  Description: INTERVENTIONS:  - Educate patient/family on patient safety including physical limitations  - Instruct patient to call for assistance with activity   -  Consider consulting OT/PT to assist with strengthening/mobility based on AM PAC & JH-HLM score  - Consult OT/PT to assist with strengthening/mobility   - Keep Call bell within reach  - Keep bed low and locked with side rails adjusted as appropriate  - Keep care items and personal belongings within reach  - Initiate and maintain comfort rounds  - Make Fall Risk Sign visible to staff  - Offer Toileting every 2 Hours, in advance of need  - Initiate/Maintain bed alarm  - Obtain necessary fall risk management equipment  - Apply yellow socks and bracelet for high fall risk patients  - Consider moving patient to room near nurses station  Outcome: Progressing  Goal: Maintain or return to baseline ADL function  Description: INTERVENTIONS:  -  Assess patient's ability to carry out ADLs; assess patient's baseline for ADL function and identify physical deficits which impact ability to perform ADLs (bathing, care of mouth/teeth, toileting, grooming, dressing, etc.)  - Assess/evaluate cause of self-care deficits   - Assess range of motion  - Assess patient's mobility; develop plan if impaired  - Assess patient's need for assistive devices and provide as appropriate  - Encourage maximum independence but intervene and supervise when necessary  - Involve family in performance of ADLs  - Assess for home care needs following discharge   - Consider OT consult to assist with ADL evaluation and planning for discharge  - Provide patient education as appropriate  - Monitor functional capacity and physical performance, use of AM PAC & JH-HLM   - Monitor gait, balance and fatigue with ambulation    Outcome: Progressing  Goal: Maintains/Returns to pre admission functional level  Description: INTERVENTIONS:  - Perform AM-PAC 6 Click Basic Mobility/ Daily Activity assessment daily.  - Set and communicate daily mobility goal to care team and patient/family/caregiver.   - Collaborate with rehabilitation services on mobility goals if consulted  -  Perform Range of Motion 3 times a day.  - Reposition patient every 4 hours.  - Dangle patient 3 times a day  - Stand patient 3 times a day  - Ambulate patient 3 times a day  - Out of bed to chair 3 times a day   - Out of bed for meals 3 times a day  - Out of bed for toileting  - Record patient progress and toleration of activity level   Outcome: Progressing     Problem: DISCHARGE PLANNING  Goal: Discharge to home or other facility with appropriate resources  Description: INTERVENTIONS:  - Identify barriers to discharge w/patient and caregiver  - Arrange for needed discharge resources and transportation as appropriate  - Identify discharge learning needs (meds, wound care, etc.)  - Arrange for interpretive services to assist at discharge as needed  - Refer to Case Management Department for coordinating discharge planning if the patient needs post-hospital services based on physician/advanced practitioner order or complex needs related to functional status, cognitive ability, or social support system  Outcome: Progressing     Problem: Knowledge Deficit  Goal: Patient/family/caregiver demonstrates understanding of disease process, treatment plan, medications, and discharge instructions  Description: Complete learning assessment and assess knowledge base.  Interventions:  - Provide teaching at level of understanding  - Provide teaching via preferred learning methods  Outcome: Progressing     Problem: Nutrition/Hydration-ADULT  Goal: Nutrient/Hydration intake appropriate for improving, restoring or maintaining nutritional needs  Description: Monitor and assess patient's nutrition/hydration status for malnutrition. Collaborate with interdisciplinary team and initiate plan and interventions as ordered.  Monitor patient's weight and dietary intake as ordered or per policy. Utilize nutrition screening tool and intervene as necessary. Determine patient's food preferences and provide high-protein, high-caloric foods as  appropriate.     INTERVENTIONS:  - Monitor oral intake, urinary output, labs, and treatment plans  - Assess nutrition and hydration status and recommend course of action  - Evaluate amount of meals eaten  - Assist patient with eating if necessary   - Allow adequate time for meals  - Recommend/ encourage appropriate diets, oral nutritional supplements, and vitamin/mineral supplements  - Order, calculate, and assess calorie counts as needed  - Recommend, monitor, and adjust tube feedings and TPN/PPN based on assessed needs  - Assess need for intravenous fluids  - Provide specific nutrition/hydration education as appropriate  - Include patient/family/caregiver in decisions related to nutrition  Outcome: Progressing     Problem: GASTROINTESTINAL - ADULT  Goal: Minimal or absence of nausea and/or vomiting  Description: INTERVENTIONS:  - Administer IV fluids if ordered to ensure adequate hydration  - Maintain NPO status until nausea and vomiting are resolved  - Nasogastric tube if ordered  - Administer ordered antiemetic medications as needed  - Provide nonpharmacologic comfort measures as appropriate  - Advance diet as tolerated, if ordered  - Consider nutrition services referral to assist patient with adequate nutrition and appropriate food choices  Outcome: Progressing     Problem: METABOLIC, FLUID AND ELECTROLYTES - ADULT  Goal: Electrolytes maintained within normal limits  Description: INTERVENTIONS:  - Monitor labs and assess patient for signs and symptoms of electrolyte imbalances  - Administer electrolyte replacement as ordered  - Monitor response to electrolyte replacements, including repeat lab results as appropriate  - Instruct patient on fluid and nutrition as appropriate  Outcome: Progressing

## 2025-06-03 NOTE — ASSESSMENT & PLAN NOTE
36 yo F with history of necrotizing pancreatitis/walled off necrosis post EUS guided cyst drainage December 2024 who presents with pancreatitis in setting of recurrent alcohol abuse. CT A/P 6/1 shows findings consistent with pancreatitis and acute collection emanating from the main pancreatic duct, cannot exclude acute necrotic collection. No reports of gas in or around the pancreas to suggest infected necrosis. Initial labs revealed leukocytosis, which has since resolved. Creatinine remains normal. She is afebrile, hemodynamically stable. She reports improving abdominal tenderness.    - Advance to clear liquids  - Continue supportive care with IV fluids, antiemetics, pain medication as needed  - No indication for antibiotics at this time but continue to monitor CBC and temperature  - Consider repeat imaging in 4 weeks to evaluate for development of walled off necrosis

## 2025-06-03 NOTE — ASSESSMENT & PLAN NOTE
Per patient, reports drinking about x 2 glasses of wine, nightly.    Continue CIWA per protocol.    Appreciate toxicology consultation.    Continue to strongly encourage complete cessation.    Again, educated patient about importance of cessation and potential rehabilitation placement upon discharge.

## 2025-06-03 NOTE — ASSESSMENT & PLAN NOTE
Recent Labs     06/02/25  0550 06/03/25  0335   SODIUM 132* 133*      Per chart review, patient with mild hyponatremia likely in the setting of pain versus poor oral intake.    Continue IV fluids with LR at 200 mL/h.    Continue to monitor via BMP, daily.

## 2025-06-03 NOTE — ASSESSMENT & PLAN NOTE
Recent Labs     06/02/25  0550 06/03/25  0335   AST 57* 36   ALT 33 24   ALKPHOS 83 79   TBILI 1.58* 1.42*   BILIDIR 0.40*  --       Per chart review, noted elevation of liver transaminase levels on labs, as outpatient.   AST (205), ALT (66), amylase (111), GGT (147)   Now, appears transaminase levels decreasing and normalized.  Continue to encourage complete alcohol cessation.  Continue to monitor liver transaminase levels via CMP, daily.   Patient will need outpatient follow-up with GI.

## 2025-06-03 NOTE — CASE MANAGEMENT
Case Management Assessment & Discharge Planning Note    Patient name Kaykay Holland  Location /-01 MRN [id number]  : 1990 Date 6/3/2025       Current Admission Date: 2025  Current Admission Diagnosis:Acute on chronic alcoholic pancreatitis   Patient Active Problem List    Diagnosis Date Noted    Alcohol abuse 2025    Hyponatremia 2025    Moderate protein-calorie malnutrition (HCC) 2025    Abdominal pain 2025    Splenic vein thrombosis 2024    Leukocytosis 2024    Thrombocytopenia (HCC) 2024    Ovarian cyst 2024    Constipation 2024    Pancreatic pseudocyst 2024    Electrolyte abnormality 2024    Alcohol use disorder 2024    Hematemesis 2024    Necrotizing pancreatitis 2024    Hypomagnesemia 2024    Pancytopenia (HCC) 04/10/2024    Provoked seizures (HCC) 2024    Alcohol withdrawal seizure (HCC) 2024    Transaminitis 2024    Prolonged QT interval 2024    Tobacco abuse 2024    Acute on chronic alcoholic pancreatitis 2023    Hepatic steatosis 2023    Peptic ulcer disease 2021    Alcohol withdrawal (HCC) 2020    Anxiety 2019    Depression 2019      LOS (days): 2  Geometric Mean LOS (GMLOS) (days):   Days to GMLOS:     OBJECTIVE:  PATIENT READMITTED TO HOSPITAL  Risk of Unplanned Readmission Score: 28.68         Current admission status: Inpatient       Preferred Pharmacy:   [id number] [place] - [address]  [address]  [place]   Phone: [phone] Fax: [phone]    Primary Care Provider: PATRICIA Moran    Primary Insurance: [employer]  Secondary Insurance:     ASSESSMENT:  Active Health Care Proxies       Chichi George [facility] Care Representative - Mother   Primary Phone: [phone] (Mobile)                 Advance Directives  Does patient have a Health Care  POA?: No  Was patient offered paperwork?: Yes  Does patient currently have a Health Care decision maker?: Yes, please see Health Care Proxy section  Does patient have Advance Directives?: No  Was patient offered paperwork?: Yes  Primary Contact: mother Cadet.         Readmission Root Cause  30 Day Readmission: Yes  During your hospital stay, did someone (provider, nurse, ) explain your care to you in a way you could understand?: No  Did you feel medically stable to leave the hospital?: Yes  Were you able to pay for your medication at the pharmacy?: No  Did you have reliable transportation to take you to your appointments?: Yes  During previous admission, was a post-acute recommendation made?: No  Patient was readmitted due to: Acute Pancreatitis.  Action Plan: GI consult. IVFs.    Patient Information  Admitted from:: Home  Mental Status: Alert  During Assessment patient was accompanied by: Not accompanied during assessment  Assessment information provided by:: Patient  Primary Caregiver: Self  Support Systems: Self, Spouse/significant other, Family members, Parent, /  County of Residence: [facility] do you live in?: [place]  Home entry access options. Select all that apply.: Stairs  Number of steps to enter home.: 7  Type of Current Residence: 2 story home  Upon entering residence, is there a bedroom on the main floor (no further steps)?: No  A bedroom is located on the following floor levels of residence (select all that apply):: 2nd Floor  Upon entering residence, is there a bathroom on the main floor (no further steps)?: No  Indicate which floors of current residence have a bathroom (select all the apply):: 2nd Floor  Number of steps to 2nd floor from main floor: One Flight  Living Arrangements: Lives Alone  Is patient a ?: No    Activities of Daily Living Prior to Admission  Functional Status: Independent  Completes ADLs independently?: Yes  Ambulates  independently?: Yes  Does patient use assisted devices?: No  Does patient currently own DME?: No  Does patient have a history of Outpatient Therapy (PT/OT)?: No  Does the patient have a history of Short-Term Rehab?: No  Does patient have a history of HHC?: No  Does patient currently have HHC?: No         Patient Information Continued  Income Source: Unemployed  Does patient have prescription coverage?: No  Can the patient afford their medications and any related supplies (such as glucometers or test strips)?: No  Does patient receive dialysis treatments?: No  Does patient have a history of substance abuse?: Currently using  Is patient currently in treatment for substance abuse?: No. Patient declined treatment information.  Does patient have a history of Mental Health Diagnosis?: Yes  Has patient received inpatient treatment related to mental health in the last 2 years?: No         Means of Transportation  Means of Transport to App:: Drives Self          DISCHARGE DETAILS:    Discharge planning discussed with:: Pt  Freedom of Choice: Yes     CM contacted family/caregiver?: No- see comments (Pt in contact with family.)  Were Treatment Team discharge recommendations reviewed with patient/caregiver?: Yes  Did patient/caregiver verbalize understanding of patient care needs?: Yes  Were patient/caregiver advised of the risks associated with not following Treatment Team discharge recommendations?: Yes         Requested Home Health Care         Is the patient interested in HHC at discharge?: No    DME Referral Provided  Referral made for DME?: No    Other Referral/Resources/Interventions Provided:  Interventions: Outpatient , Other (Specify)  Referral Comments: [facility] Referred. OPCM stated received email from pt. CM provided pt OPCM's direct phone and provided OPCM pt's direct phone.            Discharge Destination Plan:: Home                                         Additional Comments: CM met with pt at  bedside to plan discharge. Pt lives alone in a 2sh, 7ste, bed and bath upstairs. Reports being independent with walking and ADLs PTA. Drives. No STR, HHC, or OPPT hx. Reports IP psych 10 years ago and does not follow with therapist/psychiatrist. Hx of ETOH treatment 10 years ago as well. Pt continues to declined ETOH treatment options/ [facility] referral at this time. Pt reports that she lost her most recent job this past Sunday due to being admitted to hospital. Has no insurance. [facility] referred. KOJO Ramírez will fu w/ pt at discharge. Pt agreeable and has KOJO Ramírez's direct phone number. No medical POA. [facility] is [facility].

## 2025-06-03 NOTE — ASSESSMENT & PLAN NOTE
"CT a/p (6/1): \"Additional cystic observations within the head of the pancreas measuring 1.6 x 1.4 cm and 1.2 x 0.9 cm may reflect pseudocysts from previous pancreatitis.\"   Appreciate GI consultation.  Continue to hold off IV antibiotics, as patient denying fever/chills and no noted leukocytosis.  "

## 2025-06-03 NOTE — PROGRESS NOTES
"Progress Note - Hospitalist   Name: Kaykay Holland 35 y.o. female I MRN: [id number]  Unit/Bed#: -01 I Date of Admission: 6/1/2025   Date of Service: 6/3/2025 I Hospital Day: 2    Assessment & Plan  Acute on chronic alcoholic pancreatitis  Initially, patient presented to outside hospital with epigastric RUQ abdominal pain with nausea/vomiting symptoms.  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  CT a/p (6/1): \"No main pancreatic duct dilation. Edematous thickening of the pancreatic parenchyma with peripancreatic inflammatory stranding. There is hypoattenuation of the pancreatic parenchyma with body/tail junction with findings of acute collection emanating from the main pancreatic duct superiorly to lie within apposition of the lesser curvature of the gastric stomach causing underlying reactive gastritis.\"  RUQ ultrasound (6/2): \"Limited visualization of the pancreas due to shadowing from bowel gas. Limited visualization of hypoechoic areas in the pancreatic tail measuring approximately 2.4 x 2.6 x 2.6 cm and in the head measuring 1.1 x 1.1 x 0.9 cm correlating with pseudocysts seen on prior CT. Mild hepatomegaly and mild hepatic steatosis. Areas of possible mild hepatic surface contour nodularity. Suggest correlation with ultrasound elastography. Small volume perihepatic ascites\"     Appreciate GI consultation.  Will advance diet to clear liquids.  Will continue IV fluids with LR at 200 mL/h.  Will continue to monitor off IV antibiotics.  Will need repeat imaging in 4 weeks to evaluate for the development of walled off necrosis.  Continue supportive care.  Continue to encourage complete alcohol cessation and rehabilitation placement.  Alcohol abuse  Per patient, reports drinking about x 2 glasses of wine, nightly.    Continue CIWA per protocol.    Appreciate toxicology " "consultation.    Continue to strongly encourage complete cessation.    Again, educated patient about importance of cessation and potential rehabilitation placement upon discharge.    Transaminitis  Recent Labs     06/02/25  0550 06/03/25  0335   AST 57* 36   ALT 33 24   ALKPHOS 83 79   TBILI 1.58* 1.42*   BILIDIR 0.40*  --       Per chart review, noted elevation of liver transaminase levels on labs, as outpatient.   AST (205), ALT (66), amylase (111), GGT (147)   Now, appears transaminase levels decreasing and normalized.  Continue to encourage complete alcohol cessation.  Continue to monitor liver transaminase levels via CMP, daily.   Patient will need outpatient follow-up with GI.  Pancreatic pseudocyst  CT a/p (6/1): \"Additional cystic observations within the head of the pancreas measuring 1.6 x 1.4 cm and 1.2 x 0.9 cm may reflect pseudocysts from previous pancreatitis.\"   Appreciate GI consultation.  Continue to hold off IV antibiotics, as patient denying fever/chills and no noted leukocytosis.  Hepatic steatosis  Per CT a/p (6/1), again noted hepatic steatosis.    Appreciate GI consultation.    Continue encourage alcohol cessation.    Tobacco abuse  Continue to encourage complete cessation.    Order nicotine replacement therapy, while inpatient.    Alcohol withdrawal (HCC)  Continue CIWA protocol.  Continue to encourage complete cessation.  Continue to encourage rehabilitation at discharge.  Alcohol use disorder  Continue further management, as described under alcohol abuse.  Hyponatremia  Recent Labs     06/02/25  0550 06/03/25  0335   SODIUM 132* 133*      Per chart review, patient with mild hyponatremia likely in the setting of pain versus poor oral intake.    Continue IV fluids with LR at 200 mL/h.    Continue to monitor via BMP, daily.    Hypomagnesemia  Per chart review, patient with noted hypomagnesemia 1.4.  Will replete with IV magnesium sulfate 2 g, once.  Will recheck magnesium level in the " "morning.    VTE Pharmacologic Prophylaxis: VTE Score: 0 Low Risk (Score 0-2) - Encourage Ambulation.    Mobility:   Basic Mobility Inpatient Raw Score: 24  JH-HLM Goal: 8: Walk 250 feet or more  JH-HLM Achieved: 8: Walk 250 feet ot more  JH-HLM Goal achieved. Continue to encourage appropriate mobility.    Patient Centered Rounds: I performed bedside rounds with nursing staff today.   Discussions with Specialists or Other Care Team Provider: GI     Education and Discussions with Family / Patient: Updated  (mother) via phone.    Current Length of Stay: 2 day(s)  Current Patient Status: Inpatient   Certification Statement: The patient will continue to require additional inpatient hospital stay due to pain, advancing diet, and IV fluid hydration.  Discharge Plan: The patient should be discharged in 24 to 48 hours depending on clinical improvement and potential rehabilitation placement?    Code Status: Level 1 - Full Code    Subjective   The patient was seen and examined at the bedside this morning.  The patient is very tearful on exam, noting that she does not want to do this again.  The patient expresses that she recently just lost her job, which is causing stress in her life.  The patient expressed that she has not even \"drink that much\".  During conversation, did speak to patient about potential rehabilitation placement for alcohol use disorder on discharge, but patient hesitant at first.  However, did continue to speak to patient regarding effects of continued alcohol use, which patient seemed to express understanding.  However, patient very apologetic for  tearfulness/agitation.    Per nursing, no acute events overnight.    Objective :  Temp:  [98.3 °F (36.8 °C)-98.9 °F (37.2 °C)] 98.9 °F (37.2 °C)  HR:  [83-95] 85  BP: (145-148)/(99) 145/99  Resp:  [18-20] 18  SpO2:  [93 %-97 %] 95 %  O2 Device: None (Room air)    Body mass index is 20.95 kg/m².     Input and Output Summary (last 24 hours): "     Intake/Output Summary (Last 24 hours) at 6/3/2025 1428  Last data filed at 6/3/2025 0912  Gross per 24 hour   Intake 2925 ml   Output 1400 ml   Net 1525 ml       Physical Exam  Vitals and nursing note reviewed.   Constitutional:       General: She is awake. She is not in acute distress.     Appearance: She is not ill-appearing.   HENT:      Head: Normocephalic and atraumatic.     Eyes:      General: No scleral icterus.     Conjunctiva/sclera: Conjunctivae normal.      Comments: On exam, no evidence of open lesions/wounds on exposed skin.     Cardiovascular:      Rate and Rhythm: Normal rate and regular rhythm.      Heart sounds: Normal heart sounds, S1 normal and S2 normal. No murmur heard.  Pulmonary:      Effort: Pulmonary effort is normal.      Breath sounds: Normal breath sounds. No decreased breath sounds, wheezing, rhonchi or rales.   Abdominal:      General: Bowel sounds are normal. There is no distension.      Palpations: Abdomen is soft.      Tenderness: There is abdominal tenderness in the left upper quadrant and left lower quadrant.     Musculoskeletal:      Right lower leg: No edema.      Left lower leg: No edema.     Skin:     General: Skin is warm and dry.     Neurological:      Mental Status: She is alert and oriented to person, place, and time.      Sensory: Sensation is intact.      Motor: Motor function is intact.      Coordination: Coordination is intact.     Psychiatric:         Attention and Perception: Attention normal.         Mood and Affect: Mood is anxious and depressed. Affect is tearful.         Speech: Speech normal.         Behavior: Behavior is agitated.         Thought Content: Thought content normal.       Lines/Drains:      Lab Results: I have reviewed the following results:   Results from last 7 days   Lab Units 06/03/25  0335   WBC Thousand/uL 5.97   HEMOGLOBIN g/dL 11.4*   HEMATOCRIT % 35.3   PLATELETS Thousands/uL 111*     Results from last 7 days   Lab Units 06/03/25  0335    SODIUM mmol/L 133*   POTASSIUM mmol/L 3.4*   CHLORIDE mmol/L 99   CO2 mmol/L 29   BUN mg/dL 3*   CREATININE mg/dL 0.36*   ANION GAP mmol/L 5   CALCIUM mg/dL 8.1*   ALBUMIN g/dL 3.1*   TOTAL BILIRUBIN mg/dL 1.42*   ALK PHOS U/L 79   ALT U/L 24   AST U/L 36   GLUCOSE RANDOM mg/dL 128     Results from last 7 days   Lab Units 06/02/25  0550   INR  1.14                   Recent Cultures (last 7 days):               Last 24 Hours Medication List:     Current Facility-Administered Medications:     folic acid (FOLVITE) tablet 1 mg, Daily    HYDROmorphone HCl (DILAUDID) injection 0.2 mg, Q4H PRN **OR** HYDROmorphone (DILAUDID) injection 0.5 mg, Q4H PRN    HYDROmorphone HCl (DILAUDID) injection 0.2 mg, Q4H PRN    hydrOXYzine HCL (ATARAX) tablet 25 mg, Q6H PRN    lactated ringers infusion, Continuous, Last Rate: 150 mL/hr (06/03/25 1306)    multivitamin-minerals (CENTRUM) tablet 1 tablet, Daily    nicotine (NICODERM CQ) 21 mg/24 hr TD 24 hr patch 1 patch, HS    ondansetron (ZOFRAN) injection 4 mg, Q4H PRN    thiamine tablet 100 mg, Daily    trimethobenzamide (TIGAN) IM injection 200 mg, Q6H PRN    Administrative Statements   Today, Patient Was Seen By: PATRICIA Petty      **Please Note: This note may have been constructed using a voice recognition system.**

## 2025-06-03 NOTE — ASSESSMENT & PLAN NOTE
Per CT a/p (6/1), again noted hepatic steatosis.    Appreciate GI consultation.    Continue encourage alcohol cessation.

## 2025-06-03 NOTE — PLAN OF CARE
Problem: INFECTION - ADULT  Goal: Absence or prevention of progression during hospitalization  Description: INTERVENTIONS:  - Assess and monitor for signs and symptoms of infection  - Monitor lab/diagnostic results  - Monitor all insertion sites, i.e. indwelling lines, tubes, and drains  - Monitor endotracheal if appropriate and nasal secretions for changes in amount and color  - [place] appropriate cooling/warming therapies per order  - Administer medications as ordered  - Instruct and encourage patient and family to use good hand hygiene technique  - Identify and instruct in appropriate isolation precautions for identified infection/condition  Outcome: Progressing  Goal: Absence of fever/infection during neutropenic period  Description: INTERVENTIONS:  - Monitor WBC  - Perform strict hand hygiene  - Limit to healthy visitors only  - No plants, dried, fresh or silk flowers with butler in patient room  Outcome: Progressing     Problem: PAIN - ADULT  Goal: Verbalizes/displays adequate comfort level or baseline comfort level  Description: Interventions:  - Encourage patient to monitor pain and request assistance  - Assess pain using appropriate pain scale  - Administer analgesics as ordered based on type and severity of pain and evaluate response  - Implement non-pharmacological measures as appropriate and evaluate response  - Consider cultural and social influences on pain and pain management  - Notify physician/advanced practitioner if interventions unsuccessful or patient reports new pain  - Educate patient/family on pain management process including their role and importance of  reporting pain   - Provide non-pharmacologic/complimentary pain relief interventions  Outcome: Progressing     Problem: Nutrition/Hydration-ADULT  Goal: Nutrient/Hydration intake appropriate for improving, restoring or maintaining nutritional needs  Description: Monitor and assess patient's nutrition/hydration status for malnutrition.  Collaborate with interdisciplinary team and initiate plan and interventions as ordered.  Monitor patient's weight and dietary intake as ordered or per policy. Utilize nutrition screening tool and intervene as necessary. Determine patient's food preferences and provide high-protein, high-caloric foods as appropriate.     INTERVENTIONS:  - Monitor oral intake, urinary output, labs, and treatment plans  - Assess nutrition and hydration status and recommend course of action  - Evaluate amount of meals eaten  - Assist patient with eating if necessary   - Allow adequate time for meals  - Recommend/ encourage appropriate diets, oral nutritional supplements, and vitamin/mineral supplements  - Order, calculate, and assess calorie counts as needed  - Recommend, monitor, and adjust tube feedings and TPN/PPN based on assessed needs  - Assess need for intravenous fluids  - Provide specific nutrition/hydration education as appropriate  - Include patient/family/caregiver in decisions related to nutrition  Outcome: Progressing     Problem: GASTROINTESTINAL - ADULT  Goal: Minimal or absence of nausea and/or vomiting  Description: INTERVENTIONS:  - Administer IV fluids if ordered to ensure adequate hydration  - Maintain NPO status until nausea and vomiting are resolved  - Nasogastric tube if ordered  - Administer ordered antiemetic medications as needed  - Provide nonpharmacologic comfort measures as appropriate  - Advance diet as tolerated, if ordered  - Consider nutrition services referral to assist patient with adequate nutrition and appropriate food choices  Outcome: Progressing

## 2025-06-03 NOTE — ASSESSMENT & PLAN NOTE
"Initially, patient presented to outside hospital with epigastric RUQ abdominal pain with nausea/vomiting symptoms.  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  CT a/p (6/1): \"No main pancreatic duct dilation. Edematous thickening of the pancreatic parenchyma with peripancreatic inflammatory stranding. There is hypoattenuation of the pancreatic parenchyma with body/tail junction with findings of acute collection emanating from the main pancreatic duct superiorly to lie within apposition of the lesser curvature of the gastric stomach causing underlying reactive gastritis.\"  RUQ ultrasound (6/2): \"Limited visualization of the pancreas due to shadowing from bowel gas. Limited visualization of hypoechoic areas in the pancreatic tail measuring approximately 2.4 x 2.6 x 2.6 cm and in the head measuring 1.1 x 1.1 x 0.9 cm correlating with pseudocysts seen on prior CT. Mild hepatomegaly and mild hepatic steatosis. Areas of possible mild hepatic surface contour nodularity. Suggest correlation with ultrasound elastography. Small volume perihepatic ascites\"     Appreciate GI consultation.  Will advance diet to clear liquids.  Will continue IV fluids with LR at 200 mL/h.  Will continue to monitor off IV antibiotics.  Will need repeat imaging in 4 weeks to evaluate for the development of walled off necrosis.  Continue supportive care.  Continue to encourage complete alcohol cessation and rehabilitation placement.  "

## 2025-06-03 NOTE — ASSESSMENT & PLAN NOTE
Secondary to alcohol use disorder. Liver tests improving, AST 36, ALT 24, alk phos 79, T. bili 1.42. Discriminant function on admission 11. Recent imaging (CT/RUQ US) shows hepatomegaly, steatosis, mild surface nodularity concerning for early cirrhosis. EGD Dec 2024/Jan 2025 showed no evidence of esophageal or gastric varices.    - Complete alcohol cessation is imperative  - Continue to monitor liver tests  - Recommend outpatient GI follow-up for consideration of elastography

## 2025-06-03 NOTE — PROGRESS NOTES
Progress Note - Gastroenterology   Name: Kaykay Holland 35 y.o. female I MRN: [id number]  Unit/Bed#: -01 I Date of Admission: 6/1/2025   Date of Service: 6/3/2025 I Hospital Day: 2    Assessment & Plan  Acute on chronic alcoholic pancreatitis  36 yo F with history of necrotizing pancreatitis/walled off necrosis post EUS guided cyst drainage December 2024 who presents with pancreatitis in setting of recurrent alcohol abuse. CT A/P 6/1 shows findings consistent with pancreatitis and acute collection emanating from the main pancreatic duct, cannot exclude acute necrotic collection. No reports of gas in or around the pancreas to suggest infected necrosis. Initial labs revealed leukocytosis, which has since resolved. Creatinine remains normal. She is afebrile, hemodynamically stable. She reports improving abdominal tenderness.    - Advance to clear liquids  - Continue supportive care with IV fluids, antiemetics, pain medication as needed  - No indication for antibiotics at this time but continue to monitor CBC and temperature  - Consider repeat imaging in 4 weeks to evaluate for development of walled off necrosis  Pancreatic pseudocyst  She has several cysts in the pancreas consistent with pseudocysts, within the head of the pancreas measuring 1.6 x 1.4 cm and 1.2 x 0.9 cm, appear smaller in size compared to prior imaging.  Transaminitis  Secondary to alcohol use disorder. Liver tests improving, AST 36, ALT 24, alk phos 79, T. bili 1.42. Discriminant function on admission 11. Recent imaging (CT/RUQ US) shows hepatomegaly, steatosis, mild surface nodularity concerning for early cirrhosis. EGD Dec 2024/Jan 2025 showed no evidence of esophageal or gastric varices.    - Complete alcohol cessation is imperative  - Continue to monitor liver tests  - Recommend outpatient GI follow-up for consideration of elastography  Hepatic steatosis  See plan above  Alcohol abuse  -Again, counseled patient on need for alcohol  cessation and recommended patient be open to alcohol rehabilitation services to prevent recurrent pancreatitis, chronic pancreatitis, cirrhosis, even death in the future  -Continue CIWA protocol  -Continue folic acid and thiamine and multivitamin  -Recommend discussing with social work and/or psychiatry.       I have discussed the above management plan in detail with the primary service.     Subjective   She reports some improvement in abdominal pain. She denies vomiting. She is passing some gas.    Objective :  Temp:  [98.3 °F (36.8 °C)-98.9 °F (37.2 °C)] 98.9 °F (37.2 °C)  HR:  [78-95] 85  BP: (131-148)/(88-99) 145/99  Resp:  [17-20] 18  SpO2:  [93 %-97 %] 95 %  O2 Device: None (Room air)    Physical Exam  Vitals and nursing note reviewed.   Constitutional:       General: She is not in acute distress.     Appearance: She is well-developed.   HENT:      Head: Normocephalic and atraumatic.     Eyes:      Conjunctiva/sclera: Conjunctivae normal.       Cardiovascular:      Rate and Rhythm: Normal rate and regular rhythm.      Heart sounds: No murmur heard.  Pulmonary:      Effort: Pulmonary effort is normal. No respiratory distress.      Breath sounds: Normal breath sounds.   Abdominal:      Palpations: Abdomen is soft.      Tenderness: There is abdominal tenderness.      Comments: Epigastric     Musculoskeletal:         General: No swelling.      Cervical back: Neck supple.     Skin:     General: Skin is warm and dry.     Neurological:      Mental Status: She is alert.     Psychiatric:         Mood and Affect: Mood normal.           Lab Results: I have reviewed the following results:CBC/BMP:   .     06/03/25  0335   WBC 5.97   HGB 11.4*   HCT 35.3   *   SODIUM 133*   K 3.4*   CL 99   CO2 29   BUN 3*   CREATININE 0.36*   GLUC 128   MG 1.4*    , Creatinine Clearance: Estimated Creatinine Clearance: 198.1 mL/min (A) (by C-G formula based on SCr of 0.36 mg/dL (L)).    Imaging Results Review: I reviewed radiology  reports from this admission including: CT abdomen/pelvis.  Other Study Results Review: Other studies reviewed include: EGD

## 2025-06-03 NOTE — ASSESSMENT & PLAN NOTE
Continue to encourage complete cessation.    Order nicotine replacement therapy, while inpatient.

## 2025-06-04 PROBLEM — E87.1 HYPONATREMIA: Status: RESOLVED | Noted: 2025-04-01 | Resolved: 2025-06-04

## 2025-06-04 LAB
ALBUMIN SERPL BCG-MCNC: 3.5 G/DL (ref 3.5–5)
ALP SERPL-CCNC: 79 U/L (ref 34–104)
ALT SERPL W P-5'-P-CCNC: 20 U/L (ref 7–52)
ANION GAP SERPL CALCULATED.3IONS-SCNC: 8 MMOL/L (ref 4–13)
AST SERPL W P-5'-P-CCNC: 26 U/L (ref 13–39)
BILIRUB SERPL-MCNC: 0.87 MG/DL (ref 0.2–1)
BUN SERPL-MCNC: 2 MG/DL (ref 5–25)
CALCIUM SERPL-MCNC: 8.8 MG/DL (ref 8.4–10.2)
CHLORIDE SERPL-SCNC: 100 MMOL/L (ref 96–108)
CO2 SERPL-SCNC: 28 MMOL/L (ref 21–32)
CREAT SERPL-MCNC: 0.3 MG/DL (ref 0.6–1.3)
ERYTHROCYTE [DISTWIDTH] IN BLOOD BY AUTOMATED COUNT: 12.6 % (ref 11.6–15.1)
GFR SERPL CREATININE-BSD FRML MDRD: 149 ML/MIN/1.73SQ M
GLUCOSE SERPL-MCNC: 107 MG/DL (ref 65–140)
HCT VFR BLD AUTO: 35.4 % (ref 34.8–46.1)
HGB BLD-MCNC: 11.6 G/DL (ref 11.5–15.4)
MAGNESIUM SERPL-MCNC: 1.5 MG/DL (ref 1.9–2.7)
MCH RBC QN AUTO: 33.2 PG (ref 26.8–34.3)
MCHC RBC AUTO-ENTMCNC: 32.8 G/DL (ref 31.4–37.4)
MCV RBC AUTO: 101 FL (ref 82–98)
PLATELET # BLD AUTO: 164 THOUSANDS/UL (ref 149–390)
PMV BLD AUTO: 10.9 FL (ref 8.9–12.7)
POTASSIUM SERPL-SCNC: 3.4 MMOL/L (ref 3.5–5.3)
PROT SERPL-MCNC: 6.5 G/DL (ref 6.4–8.4)
RBC # BLD AUTO: 3.49 MILLION/UL (ref 3.81–5.12)
SODIUM SERPL-SCNC: 136 MMOL/L (ref 135–147)
WBC # BLD AUTO: 5.04 THOUSAND/UL (ref 4.31–10.16)

## 2025-06-04 PROCEDURE — 80053 COMPREHEN METABOLIC PANEL: CPT

## 2025-06-04 PROCEDURE — 99232 SBSQ HOSP IP/OBS MODERATE 35: CPT | Performed by: INTERNAL MEDICINE

## 2025-06-04 PROCEDURE — 85027 COMPLETE CBC AUTOMATED: CPT

## 2025-06-04 PROCEDURE — 99232 SBSQ HOSP IP/OBS MODERATE 35: CPT | Performed by: PHYSICIAN ASSISTANT

## 2025-06-04 PROCEDURE — 83735 ASSAY OF MAGNESIUM: CPT

## 2025-06-04 RX ORDER — MAGNESIUM SULFATE HEPTAHYDRATE 40 MG/ML
2 INJECTION, SOLUTION INTRAVENOUS ONCE
Status: COMPLETED | OUTPATIENT
Start: 2025-06-04 | End: 2025-06-04

## 2025-06-04 RX ORDER — POTASSIUM CHLORIDE 1500 MG/1
40 TABLET, EXTENDED RELEASE ORAL ONCE
Status: COMPLETED | OUTPATIENT
Start: 2025-06-04 | End: 2025-06-04

## 2025-06-04 RX ADMIN — HYDROMORPHONE HYDROCHLORIDE 0.5 MG: 1 INJECTION, SOLUTION INTRAMUSCULAR; INTRAVENOUS; SUBCUTANEOUS at 00:30

## 2025-06-04 RX ADMIN — HYDROMORPHONE HYDROCHLORIDE 0.2 MG: 0.2 INJECTION, SOLUTION INTRAMUSCULAR; INTRAVENOUS; SUBCUTANEOUS at 23:16

## 2025-06-04 RX ADMIN — SODIUM CHLORIDE, SODIUM LACTATE, POTASSIUM CHLORIDE, AND CALCIUM CHLORIDE 100 ML/HR: .6; .31; .03; .02 INJECTION, SOLUTION INTRAVENOUS at 17:58

## 2025-06-04 RX ADMIN — HYDROMORPHONE HYDROCHLORIDE 0.5 MG: 1 INJECTION, SOLUTION INTRAMUSCULAR; INTRAVENOUS; SUBCUTANEOUS at 20:05

## 2025-06-04 RX ADMIN — HYDROMORPHONE HYDROCHLORIDE 0.2 MG: 0.2 INJECTION, SOLUTION INTRAMUSCULAR; INTRAVENOUS; SUBCUTANEOUS at 15:06

## 2025-06-04 RX ADMIN — ONDANSETRON 4 MG: 2 INJECTION INTRAMUSCULAR; INTRAVENOUS at 08:18

## 2025-06-04 RX ADMIN — NICOTINE 1 PATCH: 21 PATCH, EXTENDED RELEASE TRANSDERMAL at 20:05

## 2025-06-04 RX ADMIN — ONDANSETRON 4 MG: 2 INJECTION INTRAMUSCULAR; INTRAVENOUS at 12:41

## 2025-06-04 RX ADMIN — ONDANSETRON 4 MG: 2 INJECTION INTRAMUSCULAR; INTRAVENOUS at 03:14

## 2025-06-04 RX ADMIN — FOLIC ACID 1 MG: 1 TABLET ORAL at 08:18

## 2025-06-04 RX ADMIN — MAGNESIUM SULFATE HEPTAHYDRATE 2 G: 40 INJECTION, SOLUTION INTRAVENOUS at 10:44

## 2025-06-04 RX ADMIN — HYDROMORPHONE HYDROCHLORIDE 0.2 MG: 0.2 INJECTION, SOLUTION INTRAMUSCULAR; INTRAVENOUS; SUBCUTANEOUS at 03:13

## 2025-06-04 RX ADMIN — HYDROXYZINE HYDROCHLORIDE 25 MG: 25 TABLET, FILM COATED ORAL at 18:28

## 2025-06-04 RX ADMIN — HYDROXYZINE HYDROCHLORIDE 25 MG: 25 TABLET, FILM COATED ORAL at 12:41

## 2025-06-04 RX ADMIN — SODIUM CHLORIDE, SODIUM LACTATE, POTASSIUM CHLORIDE, AND CALCIUM CHLORIDE 150 ML/HR: .6; .31; .03; .02 INJECTION, SOLUTION INTRAVENOUS at 01:56

## 2025-06-04 RX ADMIN — HYDROMORPHONE HYDROCHLORIDE 0.2 MG: 0.2 INJECTION, SOLUTION INTRAMUSCULAR; INTRAVENOUS; SUBCUTANEOUS at 08:51

## 2025-06-04 RX ADMIN — HYDROMORPHONE HYDROCHLORIDE 0.5 MG: 1 INJECTION, SOLUTION INTRAMUSCULAR; INTRAVENOUS; SUBCUTANEOUS at 12:41

## 2025-06-04 RX ADMIN — MULTIPLE VITAMINS W/ MINERALS TAB 1 TABLET: TAB ORAL at 08:18

## 2025-06-04 RX ADMIN — THIAMINE HCL TAB 100 MG 100 MG: 100 TAB at 08:18

## 2025-06-04 RX ADMIN — ONDANSETRON 4 MG: 2 INJECTION INTRAMUSCULAR; INTRAVENOUS at 17:58

## 2025-06-04 RX ADMIN — SODIUM CHLORIDE, SODIUM LACTATE, POTASSIUM CHLORIDE, AND CALCIUM CHLORIDE 150 ML/HR: .6; .31; .03; .02 INJECTION, SOLUTION INTRAVENOUS at 08:52

## 2025-06-04 RX ADMIN — POTASSIUM CHLORIDE 40 MEQ: 1500 TABLET, EXTENDED RELEASE ORAL at 12:41

## 2025-06-04 NOTE — ASSESSMENT & PLAN NOTE
Secondary to alcohol use disorder. Liver tests have normalized. Discriminant function on admission 11. Recent imaging (CT/RUQ US) shows hepatomegaly, steatosis, mild surface nodularity concerning for early cirrhosis. EGD Dec 2024/Jan 2025 showed no evidence of esophageal or gastric varices.    - Complete alcohol cessation is imperative  - Recommend outpatient GI follow-up for consideration of elastography

## 2025-06-04 NOTE — PLAN OF CARE
Problem: PAIN - ADULT  Goal: Verbalizes/displays adequate comfort level or baseline comfort level  Description: Interventions:  - Encourage patient to monitor pain and request assistance  - Assess pain using appropriate pain scale  - Administer analgesics as ordered based on type and severity of pain and evaluate response  - Implement non-pharmacological measures as appropriate and evaluate response  - Consider cultural and social influences on pain and pain management  - Notify physician/advanced practitioner if interventions unsuccessful or patient reports new pain  - Educate patient/family on pain management process including their role and importance of  reporting pain   - Provide non-pharmacologic/complimentary pain relief interventions  Outcome: Progressing     Problem: GASTROINTESTINAL - ADULT  Goal: Minimal or absence of nausea and/or vomiting  Description: INTERVENTIONS:  - Administer IV fluids if ordered to ensure adequate hydration  - Maintain NPO status until nausea and vomiting are resolved  - Nasogastric tube if ordered  - Administer ordered antiemetic medications as needed  - Provide nonpharmacologic comfort measures as appropriate  - Advance diet as tolerated, if ordered  - Consider nutrition services referral to assist patient with adequate nutrition and appropriate food choices  Outcome: Progressing     Problem: METABOLIC, FLUID AND ELECTROLYTES - ADULT  Goal: Electrolytes maintained within normal limits  Description: INTERVENTIONS:  - Monitor labs and assess patient for signs and symptoms of electrolyte imbalances  - Administer electrolyte replacement as ordered  - Monitor response to electrolyte replacements, including repeat lab results as appropriate  - Instruct patient on fluid and nutrition as appropriate  Outcome: Progressing

## 2025-06-04 NOTE — PLAN OF CARE
Problem: PAIN - ADULT  Goal: Verbalizes/displays adequate comfort level or baseline comfort level  Description: Interventions:  - Encourage patient to monitor pain and request assistance  - Assess pain using appropriate pain scale  - Administer analgesics as ordered based on type and severity of pain and evaluate response  - Implement non-pharmacological measures as appropriate and evaluate response  - Consider cultural and social influences on pain and pain management  - Notify physician/advanced practitioner if interventions unsuccessful or patient reports new pain  - Educate patient/family on pain management process including their role and importance of  reporting pain   - Provide non-pharmacologic/complimentary pain relief interventions  Outcome: Progressing     Problem: INFECTION - ADULT  Goal: Absence or prevention of progression during hospitalization  Description: INTERVENTIONS:  - Assess and monitor for signs and symptoms of infection  - Monitor lab/diagnostic results  - Monitor all insertion sites, i.e. indwelling lines, tubes, and drains  - Monitor endotracheal if appropriate and nasal secretions for changes in amount and color  - [place] appropriate cooling/warming therapies per order  - Administer medications as ordered  - Instruct and encourage patient and family to use good hand hygiene technique  - Identify and instruct in appropriate isolation precautions for identified infection/condition  Outcome: Progressing  Goal: Absence of fever/infection during neutropenic period  Description: INTERVENTIONS:  - Monitor WBC  - Perform strict hand hygiene  - Limit to healthy visitors only  - No plants, dried, fresh or silk flowers with butler in patient room  Outcome: Progressing     Problem: SAFETY ADULT  Goal: Patient will remain free of falls  Description: INTERVENTIONS:  - Educate patient/family on patient safety including physical limitations  - Instruct patient to call for assistance with activity   -  Consider consulting OT/PT to assist with strengthening/mobility based on AM PAC & JH-HLM score  - Consult OT/PT to assist with strengthening/mobility   - Keep Call bell within reach  - Keep bed low and locked with side rails adjusted as appropriate  - Keep care items and personal belongings within reach  - Initiate and maintain comfort rounds  - Make Fall Risk Sign visible to staff  - Offer Toileting every 3 Hours, in advance of need  - Initiate/Maintain bed / chair alarm  - Obtain necessary fall risk management equipment  - Apply yellow socks and bracelet for high fall risk patients  - Consider moving patient to room near nurses station  Outcome: Progressing  Goal: Maintain or return to baseline ADL function  Description: INTERVENTIONS:  -  Assess patient's ability to carry out ADLs; assess patient's baseline for ADL function and identify physical deficits which impact ability to perform ADLs (bathing, care of mouth/teeth, toileting, grooming, dressing, etc.)  - Assess/evaluate cause of self-care deficits   - Assess range of motion  - Assess patient's mobility; develop plan if impaired  - Assess patient's need for assistive devices and provide as appropriate  - Encourage maximum independence but intervene and supervise when necessary  - Involve family in performance of ADLs  - Assess for home care needs following discharge   - Consider OT consult to assist with ADL evaluation and planning for discharge  - Provide patient education as appropriate  - Monitor functional capacity and physical performance, use of AM PAC & JH-HLM   - Monitor gait, balance and fatigue with ambulation    Outcome: Progressing  Goal: Maintains/Returns to pre admission functional level  Description: INTERVENTIONS:  - Perform AM-PAC 6 Click Basic Mobility/ Daily Activity assessment daily.  - Set and communicate daily mobility goal to care team and patient/family/caregiver.   - Collaborate with rehabilitation services on mobility goals if  consulted  - Perform Range of Motion 3 times a day.  - Reposition patient every 2 hours.  - Dangle patient 3 times a day  - Stand patient 3 times a day  - Ambulate patient 3 times a day  - Out of bed to chair 3 times a day   - Out of bed for meals 3 times a day  - Out of bed for toileting  - Record patient progress and toleration of activity level   Outcome: Progressing     Problem: DISCHARGE PLANNING  Goal: Discharge to home or other facility with appropriate resources  Description: INTERVENTIONS:  - Identify barriers to discharge w/patient and caregiver  - Arrange for needed discharge resources and transportation as appropriate  - Identify discharge learning needs (meds, wound care, etc.)  - Arrange for interpretive services to assist at discharge as needed  - Refer to Case Management Department for coordinating discharge planning if the patient needs post-hospital services based on physician/advanced practitioner order or complex needs related to functional status, cognitive ability, or social support system  Outcome: Progressing     Problem: Knowledge Deficit  Goal: Patient/family/caregiver demonstrates understanding of disease process, treatment plan, medications, and discharge instructions  Description: Complete learning assessment and assess knowledge base.  Interventions:  - Provide teaching at level of understanding  - Provide teaching via preferred learning methods  Outcome: Progressing     Problem: Nutrition/Hydration-ADULT  Goal: Nutrient/Hydration intake appropriate for improving, restoring or maintaining nutritional needs  Description: Monitor and assess patient's nutrition/hydration status for malnutrition. Collaborate with interdisciplinary team and initiate plan and interventions as ordered.  Monitor patient's weight and dietary intake as ordered or per policy. Utilize nutrition screening tool and intervene as necessary. Determine patient's food preferences and provide high-protein, high-caloric  foods as appropriate.     INTERVENTIONS:  - Monitor oral intake, urinary output, labs, and treatment plans  - Assess nutrition and hydration status and recommend course of action  - Evaluate amount of meals eaten  - Assist patient with eating if necessary   - Allow adequate time for meals  - Recommend/ encourage appropriate diets, oral nutritional supplements, and vitamin/mineral supplements  - Order, calculate, and assess calorie counts as needed  - Recommend, monitor, and adjust tube feedings and TPN/PPN based on assessed needs  - Assess need for intravenous fluids  - Provide specific nutrition/hydration education as appropriate  - Include patient/family/caregiver in decisions related to nutrition  Outcome: Progressing     Problem: GASTROINTESTINAL - ADULT  Goal: Minimal or absence of nausea and/or vomiting  Description: INTERVENTIONS:  - Administer IV fluids if ordered to ensure adequate hydration  - Maintain NPO status until nausea and vomiting are resolved  - Nasogastric tube if ordered  - Administer ordered antiemetic medications as needed  - Provide nonpharmacologic comfort measures as appropriate  - Advance diet as tolerated, if ordered  - Consider nutrition services referral to assist patient with adequate nutrition and appropriate food choices  Outcome: Progressing     Problem: METABOLIC, FLUID AND ELECTROLYTES - ADULT  Goal: Electrolytes maintained within normal limits  Description: INTERVENTIONS:  - Monitor labs and assess patient for signs and symptoms of electrolyte imbalances  - Administer electrolyte replacement as ordered  - Monitor response to electrolyte replacements, including repeat lab results as appropriate  - Instruct patient on fluid and nutrition as appropriate  Outcome: Progressing

## 2025-06-04 NOTE — CASE MANAGEMENT
Case Management Discharge Planning Note    Patient name Kaykay Holland  Location /-01 MRN [id number]  : 1990 Date 2025       Current Admission Date: 2025  Current Admission Diagnosis:Acute on chronic alcoholic pancreatitis   Patient Active Problem List    Diagnosis Date Noted    Alcohol abuse 2025    Moderate protein-calorie malnutrition (HCC) 2025    Abdominal pain 2025    Splenic vein thrombosis 2024    Leukocytosis 2024    Thrombocytopenia (HCC) 2024    Ovarian cyst 2024    Constipation 2024    Pancreatic pseudocyst 2024    Electrolyte abnormality 2024    Alcohol use disorder 2024    Hematemesis 2024    Necrotizing pancreatitis 2024    Hypomagnesemia 2024    Pancytopenia (HCC) 04/10/2024    Provoked seizures (HCC) 2024    Alcohol withdrawal seizure (HCC) 2024    Transaminitis 2024    Prolonged QT interval 2024    Tobacco abuse 2024    Acute on chronic alcoholic pancreatitis 2023    Hepatic steatosis 2023    Peptic ulcer disease 2021    Alcohol withdrawal (HCC) 2020    Anxiety 2019    Depression 2019      LOS (days): 3  Geometric Mean LOS (GMLOS) (days):   Days to GMLOS:     OBJECTIVE:  Risk of Unplanned Readmission Score: 24.04         Current admission status: Inpatient   Preferred Pharmacy:   [id number] - [place] - [address]  [address]  [place] [address]  Phone: [phone] Fax: [phone]    Primary Care Provider: PATRICIA Moran    Primary Insurance: [employer]  Secondary Insurance:     DISCHARGE DETAILS:                                          Other Referral/Resources/Interventions Provided:  Referral Comments: Followed up and again broached [facility] referral/treatment options for ETOH use with pt at bedside. Pt stated that if she is interested in treatment,  she will set it up herself, and asked CM not to contact TOMMY. CM understood. Pt stated she will have a ride home at discharge. Pt has been given TOMMY's phone number and further information on treatment options last two admissions. CM has been updating OPCM regarding planning/dc timeframes.         Treatment Team Recommendation: Substance Abuse Treatment  Discharge Destination Plan:: Home

## 2025-06-04 NOTE — APP STUDENT NOTE
"ARY STUDENT  Inpatient Progress Note for TRAINING ONLY  Not Part of Legal Medical Record       Progress Note - Kaykay Holland 35 y.o. female MRN: [id number]    Unit/Bed#: -[facility] Encounter: [id number]      Assessment and Plan:  Acute on chronic alcoholic pancreatitis   CT a/p (6/1): \"No main pancreatic duct dilation. Edematous thickening of the pancreatic parenchyma with peripancreatic inflammatory stranding. There is hypoattenuation of the pancreatic parenchyma with body/tail junction with findings of acute collection emanating from the main pancreatic duct superiorly to lie within apposition of the lesser curvature of the gastric stomach causing underlying reactive gastritis.\"  RUQ ultrasound (6/2): \"Limited visualization of the pancreas due to shadowing from bowel gas. Limited visualization of hypoechoic areas in the pancreatic tail measuring approximately 2.4 x 2.6 x 2.6 cm and in the head measuring 1.1 x 1.1 x 0.9 cm correlating with pseudocysts seen on prior CT. Mild hepatomegaly and mild hepatic steatosis. Areas of possible mild hepatic surface contour nodularity. Suggest correlation with ultrasound elastography. Small volume perihepatic ascites\"   Diet was advanced, with patient reporting nausea and anorexia. Continue to monitor diet tolerance and intake  Continue with pain control regimen.  Monitor labs and vitals. Currently afebrile without leukocytosis  GI recommends repeat imaging in 4 weeks to evaluate for development of walled off necrosis   Alcohol abuse   Offered outpatient resources, which patient declined.  Continue to encourage alcohol cessation  Continue CIWA protocol  Continue folic acid and thiamine and multivitamin  Transaminitis  Continue to monitor LFTs  Management outpatient per GI note  Encourage alcohol cessation  Pancreatic Pseudocyst  See GI note  Hepatic steatosis  Continue to monitor LFTs  Management outpatient per GI note  Encourage alcohol cessation  Tobacco abuse  Continue to " "encourage complete cessation  Nicotine replacement therapy while inpatient  Alcohol withdrawal  Continue [facility] protocol  Outpatient resources were discussed and offered. Patient declined  Continue to encourage alcohol cessation  Hyponatremia  Resolved  Hypomagnesemia  1.5 today  Repleted today  Check mag tomorrow  Hypokalemia  3.4 today  Repleted with 40 mEq potassium chloride today  Follow BMP  Anxiety  PRN atarax.   Patient declined SSRI and psych care  Continue to encourage care in outpatient setting      Subjective:   Patient is a 35 year old female with history of necrotizing pancreatitis, chronic alcoholic pancreatitis, tobacco abuse, and alcohol abuse who presents on hospital day 3 for acute on chronic alcoholic pancreatitis. Today she reports significant anxiety about her current situation. She has been experiencing stressful personal life events, including death of loved one and ex-spouse remarrying, that contributed to her desire to drink. She reported approx 1 week of heavier drinking. She is still resistant to formal rehab program, as she says those have not been helpful before. She goes to AA and finds that beneficial. She reports worse nausea today, without vomiting. Abdominal pain is still bothersome 7/10 on left side with some periodic radiation to shoulder. She experienced some leg spasms overnight. She was able to have a bowel movement this AM and has been tolerating the advancement in diet and fluids fairly. She notes profound fatigue as she has been having poor sleep secondary to anxiety and pain. She denies chest pain, palpitations, SOB, diarrhea, edema, leg pain.    Objective:     Vitals: Blood pressure 146/98, pulse 78, temperature 98.7 °F (37.1 °C), temperature source Oral, resp. rate 20, height 5' 5\" (1.651 m), weight 57.1 kg (125 lb 14.1 oz), last menstrual period 04/23/2025, SpO2 97%.,Body mass index is 20.95 kg/m².      Intake/Output Summary (Last 24 hours) at 6/4/2025 1101  Last data " "filed at 6/4/2025 0700  Gross per 24 hour   Intake 4734.17 ml   Output 380 ml   Net 4354.17 ml       Physical Exam: /98 (BP Location: Left arm)   Pulse 78   Temp 98.7 °F (37.1 °C) (Oral)   Resp 20   Ht 5' 5\" (1.651 m)   Wt 57.1 kg (125 lb 14.1 oz)   LMP 04/23/2025   SpO2 97%   BMI 20.95 kg/m²   General appearance: alert and in no acute distress, appears uncomfortable secondary to nausea. Is tearful  Head: Normocephalic, without obvious abnormality, atraumatic  Lungs: clear to auscultation bilaterally  Heart: regular rate and rhythm, S1, S2 normal, no murmur, click, rub or gallop  Abdomen: soft, nondistended. Tender to palpation in LUQ and LLQ. Guarding is present. No rigidity  Extremities: extremities normal, warm and well-perfused; no cyanosis, clubbing, or edema and no edema, redness or tenderness in the calves or thighs  Psych: Mood is anxious, affect is congruent and tearful.      Invasive Devices       Peripheral Intravenous Line  Duration             Peripheral IV 06/03/25 Right;Ventral (anterior) Forearm <1 day                      VTE Mechanical Prophylaxis: sequential compression device    "

## 2025-06-04 NOTE — ASSESSMENT & PLAN NOTE
Recent Labs     06/02/25  0550 06/03/25  0335 06/04/25  0540   SODIUM 132* 133* 136      Per chart review, patient with mild hyponatremia likely in the setting of pain versus poor oral intake.

## 2025-06-04 NOTE — PROGRESS NOTES
"Progress Note - Hospitalist   Name: Kaykay Holland 35 y.o. female I MRN: [id number]  Unit/Bed#: -01 I Date of Admission: 6/1/2025   Date of Service: 6/4/2025 I Hospital Day: 3    Assessment & Plan  Acute on chronic alcoholic pancreatitis  Initially, patient presented to outside hospital with epigastric RUQ abdominal pain with nausea/vomiting symptoms. 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  CT a/p (6/1): \"No main pancreatic duct dilation. Edematous thickening of the pancreatic parenchyma with peripancreatic inflammatory stranding. There is hypoattenuation of the pancreatic parenchyma with body/tail junction with findings of acute collection emanating from the main pancreatic duct superiorly to lie within apposition of the lesser curvature of the gastric stomach causing underlying reactive gastritis.\"  RUQ ultrasound (6/2): \"Limited visualization of the pancreas due to shadowing from bowel gas. Limited visualization of hypoechoic areas in the pancreatic tail measuring approximately 2.4 x 2.6 x 2.6 cm and in the head measuring 1.1 x 1.1 x 0.9 cm correlating with pseudocysts seen on prior CT. Mild hepatomegaly and mild hepatic steatosis. Areas of possible mild hepatic surface contour nodularity. Suggest correlation with ultrasound elastography. Small volume perihepatic ascites\"     Appreciate GI consultation.  She is on low-fat diet now, has some nausea and abdominal pain presently  Continue LR  Will need repeat imaging in 4 weeks to evaluate for the development of walled off necrosis.  Continue supportive care.  Continue to encourage complete alcohol cessation and rehabilitation placement.  Alcohol abuse  Per patient, reports drinking about x 2 glasses of wine, nightly.    Continue CIWA per protocol.    Appreciate toxicology consultation.    Continue to strongly encourage " "complete cessation.    Again, educated patient about importance of cessation and potential rehabilitation placement upon discharge.    Transaminitis  Recent Labs     06/02/25  0550 06/03/25  0335 06/04/25  0540   AST 57* 36 26   ALT 33 24 20   ALKPHOS 83 79 79   TBILI 1.58* 1.42* 0.87   BILIDIR 0.40*  --   --       Per chart review, noted elevation of liver transaminase levels on labs, as outpatient.   AST (205), ALT (66), amylase (111), GGT (147)   Now, appears transaminase levels decreasing and normalized.  Continue to encourage complete alcohol cessation.  Continue to monitor liver transaminase levels via CMP, daily.   Patient will need outpatient follow-up with GI.  Pancreatic pseudocyst  CT a/p (6/1): \"Additional cystic observations within the head of the pancreas measuring 1.6 x 1.4 cm and 1.2 x 0.9 cm may reflect pseudocysts from previous pancreatitis.\"   Appreciate GI consultation.  Continue to hold off IV antibiotics, as patient denying fever/chills and no noted leukocytosis.  Hepatic steatosis  Per CT a/p (6/1), again noted hepatic steatosis.    Appreciate GI consultation.    Continue encourage alcohol cessation.    Tobacco abuse  Continue to encourage complete cessation.    Order nicotine replacement therapy, while inpatient.    Alcohol withdrawal (HCC)  Continue CIWA protocol.  Continue to encourage complete cessation.  Continue to encourage rehabilitation at discharge.  Alcohol use disorder  Continue further management, as described under alcohol abuse.  Hyponatremia (Resolved: 6/4/2025)  Recent Labs     06/02/25  0550 06/03/25  0335 06/04/25  0540   SODIUM 132* 133* 136      Per chart review, patient with mild hyponatremia likely in the setting of pain versus poor oral intake.   Hypomagnesemia  Magnesium continues to be low, will continue repletion repeat tomorrow    Mobility:   Basic Mobility Inpatient Raw Score: 24  -HLM Goal: 8: Walk 250 feet or more  JH-HLM Achieved: 8: Walk 250 feet ot " more  -HLM Goal achieved. Continue to encourage appropriate mobility.    VTE Pharmacologic Prophylaxis: VTE Score: 0 Low Risk (Score 0-2) - Encourage Ambulation.    Education and Discussions with Family / Patient: Patient declined call to .     Current Length of Stay: 3 day(s)  Current Patient Status: Inpatient   Certification Statement: The patient will continue to require additional inpatient hospital stay due to acute pancreatitis with pain requiring IV analgesia, IV fluid, GI consult  Discharge Plan: Anticipate discharge in 24-48 hrs to home.    Code Status: Level 1 - Full Code    Subjective:   Patient endorsing 7 out of 10 abdominal pain and nausea.  She is having severe anxiety but declines further treatment for this including psychiatric consultation.  Discussed with her again regarding importance of alcohol rehab but she continues to decline    Objective:     Vitals:   Temp (24hrs), Av.5 °F (36.9 °C), Min:97.9 °F (36.6 °C), Max:98.8 °F (37.1 °C)    Temp:  [97.9 °F (36.6 °C)-98.8 °F (37.1 °C)] 98.7 °F (37.1 °C)  HR:  [78-92] 78  Resp:  [17-20] 20  BP: (142-146)/(97-99) 146/98  SpO2:  [96 %-97 %] 97 %  Body mass index is 20.95 kg/m².     Input and Output Summary (last 24 hours):     Intake/Output Summary (Last 24 hours) at 2025 1136  Last data filed at 2025 0700  Gross per 24 hour   Intake 4734.17 ml   Output 380 ml   Net 4354.17 ml       Physical Exam:   Physical Exam  Vitals and nursing note reviewed.   Constitutional:       General: She is not in acute distress.     Appearance: Normal appearance. She is not ill-appearing.   HENT:      Head: Normocephalic and atraumatic.      Mouth/Throat:      Mouth: Mucous membranes are moist.     Eyes:      General: No scleral icterus.        Right eye: No discharge.         Left eye: No discharge.      Pupils: Pupils are equal, round, and reactive to light.       Cardiovascular:      Rate and Rhythm: Normal rate and regular rhythm.      Heart  sounds: No murmur heard.     No friction rub. No gallop.   Pulmonary:      Effort: No respiratory distress.      Breath sounds: No wheezing, rhonchi or rales.   Abdominal:      General: Bowel sounds are normal. There is no distension.      Palpations: Abdomen is soft.      Tenderness: There is abdominal tenderness. There is no guarding or rebound.     Musculoskeletal:         General: No swelling or deformity.      Cervical back: Neck supple.      Right lower leg: No edema.      Left lower leg: No edema.     Skin:     General: Skin is warm and dry.      Capillary Refill: Capillary refill takes less than 2 seconds.      Coloration: Skin is not jaundiced or pale.      Findings: No erythema or rash.     Neurological:      General: No focal deficit present.      Mental Status: She is alert and oriented to person, place, and time. Mental status is at baseline.     Psychiatric:         Mood and Affect: Mood is anxious. Affect is tearful.         Behavior: Behavior normal.          Additional Data:     Labs:  Results from last 7 days   Lab Units 06/04/25  0540   WBC Thousand/uL 5.04   HEMOGLOBIN g/dL 11.6   HEMATOCRIT % 35.4   PLATELETS Thousands/uL 164     Results from last 7 days   Lab Units 06/04/25  0540   SODIUM mmol/L 136   POTASSIUM mmol/L 3.4*   CHLORIDE mmol/L 100   CO2 mmol/L 28   BUN mg/dL 2*   CREATININE mg/dL 0.30*   ANION GAP mmol/L 8   CALCIUM mg/dL 8.8   ALBUMIN g/dL 3.5   TOTAL BILIRUBIN mg/dL 0.87   ALK PHOS U/L 79   ALT U/L 20   AST U/L 26   GLUCOSE RANDOM mg/dL 107     Results from last 7 days   Lab Units 06/02/25  0550   INR  1.14                   Imaging: Radiology Review: No additional pertinent studies reviewed.    Recent Cultures (last 7 days):              Last 24 Hours Medication List:   Current Facility-Administered Medications   Medication Dose Route Frequency Provider Last Rate    folic acid  1 mg Oral Daily Florina Schafer PA-C      HYDROmorphone  0.2 mg Intravenous Q4H PRN Florina COLUNGA  ARJUN Schafer      Or    HYDROmorphone  0.5 mg Intravenous Q4H PRN Florina Schafer PA-C      HYDROmorphone  0.2 mg Intravenous Q4H PRN Florina Schafer PA-C      hydrOXYzine HCL  25 mg Oral Q6H PRN Soheila Coleman MD      lactated ringers  150 mL/hr Intravenous Continuous Olivia Alexandra PA-C 150 mL/hr (06/04/25 0852)    magnesium sulfate  2 g Intravenous Once Eduardo Mercer PA-C 2 g (06/04/25 1044)    multivitamin-minerals  1 tablet Oral Daily Florina Schafer PA-C      nicotine  1 patch Transdermal HS Florina Schafer PA-C      ondansetron  4 mg Intravenous Q4H PRN Florina Schafer PA-C      potassium chloride  40 mEq Oral Once Eduardo Mercer PA-C      thiamine  100 mg Oral Daily Florina Schafer PA-C      trimethobenzamide  200 mg Intramuscular Q6H PRN Soheila Coleman MD          Today, Patient Was Seen By: Eduardo Mercer PA-C    **Please Note: This note may have been constructed using a voice recognition system.**

## 2025-06-04 NOTE — ASSESSMENT & PLAN NOTE
Recent Labs     06/02/25  0550 06/03/25  0335 06/04/25  0540   AST 57* 36 26   ALT 33 24 20   ALKPHOS 83 79 79   TBILI 1.58* 1.42* 0.87   BILIDIR 0.40*  --   --       Per chart review, noted elevation of liver transaminase levels on labs, as outpatient.   AST (205), ALT (66), amylase (111), GGT (147)   Now, appears transaminase levels decreasing and normalized.  Continue to encourage complete alcohol cessation.  Continue to monitor liver transaminase levels via CMP, daily.   Patient will need outpatient follow-up with GI.

## 2025-06-04 NOTE — ASSESSMENT & PLAN NOTE
34 yo F with history of necrotizing pancreatitis/walled off necrosis post EUS guided cyst drainage December 2024 who presents with pancreatitis in setting of recurrent alcohol abuse. CT A/P 6/1 shows findings consistent with pancreatitis and acute collection emanating from the main pancreatic duct, cannot exclude acute necrotic collection. No reports of gas in or around the pancreas to suggest infected necrosis. Initial labs revealed leukocytosis, which has since resolved. Creatinine remains normal. She is afebrile, hemodynamically stable. She reports ongoing abdominal tenderness, nausea but is tolerating low fat diet.    - Continue low fat diet as tolerated  - Would discontinue PO potassium given nausea   - Continue weaning off IV fluids, antiemetics, pain medication as needed  - No indication for antibiotics at this time but continue to monitor CBC and temperature  - Consider repeat imaging in 4 weeks to evaluate for development of walled off necrosis

## 2025-06-04 NOTE — PROGRESS NOTES
Progress Note - Gastroenterology   Name: Kaykay Holland 35 y.o. female I MRN: [id number]  Unit/Bed#: -01 I Date of Admission: 6/1/2025   Date of Service: 6/4/2025 I Hospital Day: 3    Assessment & Plan  Acute on chronic alcoholic pancreatitis  34 yo F with history of necrotizing pancreatitis/walled off necrosis post EUS guided cyst drainage December 2024 who presents with pancreatitis in setting of recurrent alcohol abuse. CT A/P 6/1 shows findings consistent with pancreatitis and acute collection emanating from the main pancreatic duct, cannot exclude acute necrotic collection. No reports of gas in or around the pancreas to suggest infected necrosis. Initial labs revealed leukocytosis, which has since resolved. Creatinine remains normal. She is afebrile, hemodynamically stable. She reports ongoing abdominal tenderness, nausea but is tolerating low fat diet.    - Continue low fat diet as tolerated  - Would discontinue PO potassium given nausea   - Continue weaning off IV fluids, antiemetics, pain medication as needed  - No indication for antibiotics at this time but continue to monitor CBC and temperature  - Consider repeat imaging in 4 weeks to evaluate for development of walled off necrosis  Pancreatic pseudocyst  She has several cysts in the pancreas consistent with pseudocysts, within the head of the pancreas measuring 1.6 x 1.4 cm and 1.2 x 0.9 cm, appear smaller in size compared to prior imaging.  Transaminitis  Secondary to alcohol use disorder. Liver tests have normalized. Discriminant function on admission 11. Recent imaging (CT/RUQ US) shows hepatomegaly, steatosis, mild surface nodularity concerning for early cirrhosis. EGD Dec 2024/Jan 2025 showed no evidence of esophageal or gastric varices.    - Complete alcohol cessation is imperative  - Recommend outpatient GI follow-up for consideration of elastography  Hepatic steatosis  See plan above  Alcohol abuse  -Despite counseling on dangers of  continued alcohol abuse including recurrent pancreatitis, chronic pancreatitis, cirrhosis, even death in the future patient is not open to alcohol rehab or psychiatry services  -Continue CIWA protocol  -Continue folic acid and thiamine and multivitamin  -Recommend discussing with social work and/or psychiatry.       I have discussed the above management plan in detail with the primary service.     Subjective   Patient seen and examined at bedside. Still reports upper abdominal pain and nausea but tolerating low fat diet. Did have a BM this morning.    Objective :  Temp:  [97.9 °F (36.6 °C)-98.8 °F (37.1 °C)] 98.7 °F (37.1 °C)  HR:  [78-92] 78  BP: (142-146)/(97-99) 146/98  Resp:  [17-20] 20  SpO2:  [96 %-97 %] 97 %  O2 Device: None (Room air)    Physical Exam  Vitals and nursing note reviewed.   Constitutional:       General: She is not in acute distress.     Appearance: She is well-developed.   HENT:      Head: Normocephalic and atraumatic.     Eyes:      Conjunctiva/sclera: Conjunctivae normal.       Cardiovascular:      Rate and Rhythm: Normal rate and regular rhythm.      Heart sounds: No murmur heard.  Pulmonary:      Effort: Pulmonary effort is normal. No respiratory distress.      Breath sounds: Normal breath sounds.   Abdominal:      Palpations: Abdomen is soft.      Tenderness: There is abdominal tenderness.     Musculoskeletal:         General: No swelling.      Cervical back: Neck supple.     Skin:     General: Skin is warm and dry.     Neurological:      Mental Status: She is alert.     Psychiatric:         Mood and Affect: Mood normal.           Lab Results: I have reviewed the following results:CBC/BMP:   .     06/04/25  0540   WBC 5.04   HGB 11.6   HCT 35.4      SODIUM 136   K 3.4*      CO2 28   BUN 2*   CREATININE 0.30*   GLUC 107   MG 1.5*    , Creatinine Clearance: Estimated Creatinine Clearance: 237.8 mL/min (A) (by C-G formula based on SCr of 0.3 mg/dL (L))., LFTs:   .      06/04/25  0540   AST 26   ALT 20   ALB 3.5   TBILI 0.87   ALKPHOS 79    , PTT/INR:No new results in last 24 hours. , Troponin,BNP:No new results in last 24 hours.     Imaging Results Review: I reviewed radiology reports from this admission including: CT abdomen/pelvis.  Other Study Results Review: No additional pertinent studies reviewed.

## 2025-06-04 NOTE — ASSESSMENT & PLAN NOTE
"Initially, patient presented to outside hospital with epigastric RUQ abdominal pain with nausea/vomiting symptoms. 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  CT a/p (6/1): \"No main pancreatic duct dilation. Edematous thickening of the pancreatic parenchyma with peripancreatic inflammatory stranding. There is hypoattenuation of the pancreatic parenchyma with body/tail junction with findings of acute collection emanating from the main pancreatic duct superiorly to lie within apposition of the lesser curvature of the gastric stomach causing underlying reactive gastritis.\"  RUQ ultrasound (6/2): \"Limited visualization of the pancreas due to shadowing from bowel gas. Limited visualization of hypoechoic areas in the pancreatic tail measuring approximately 2.4 x 2.6 x 2.6 cm and in the head measuring 1.1 x 1.1 x 0.9 cm correlating with pseudocysts seen on prior CT. Mild hepatomegaly and mild hepatic steatosis. Areas of possible mild hepatic surface contour nodularity. Suggest correlation with ultrasound elastography. Small volume perihepatic ascites\"     Appreciate GI consultation.  She is on low-fat diet now, has some nausea and abdominal pain presently  Continue LR  Will need repeat imaging in 4 weeks to evaluate for the development of walled off necrosis.  Continue supportive care.  Continue to encourage complete alcohol cessation and rehabilitation placement.  "

## 2025-06-04 NOTE — ASSESSMENT & PLAN NOTE
-Despite counseling on dangers of continued alcohol abuse including recurrent pancreatitis, chronic pancreatitis, cirrhosis, even death in the future patient is not open to alcohol rehab or psychiatry services  -Continue CIWA protocol  -Continue folic acid and thiamine and multivitamin  -Recommend discussing with social work and/or psychiatry.

## 2025-06-05 LAB
ALBUMIN SERPL BCG-MCNC: 3.3 G/DL (ref 3.5–5)
ALP SERPL-CCNC: 71 U/L (ref 34–104)
ALT SERPL W P-5'-P-CCNC: 15 U/L (ref 7–52)
ANION GAP SERPL CALCULATED.3IONS-SCNC: 7 MMOL/L (ref 4–13)
AST SERPL W P-5'-P-CCNC: 19 U/L (ref 13–39)
BILIRUB SERPL-MCNC: 0.64 MG/DL (ref 0.2–1)
BUN SERPL-MCNC: 3 MG/DL (ref 5–25)
CALCIUM ALBUM COR SERPL-MCNC: 9.2 MG/DL (ref 8.3–10.1)
CALCIUM SERPL-MCNC: 8.6 MG/DL (ref 8.4–10.2)
CHLORIDE SERPL-SCNC: 99 MMOL/L (ref 96–108)
CO2 SERPL-SCNC: 28 MMOL/L (ref 21–32)
CREAT SERPL-MCNC: 0.39 MG/DL (ref 0.6–1.3)
GFR SERPL CREATININE-BSD FRML MDRD: 137 ML/MIN/1.73SQ M
GLUCOSE SERPL-MCNC: 88 MG/DL (ref 65–140)
MAGNESIUM SERPL-MCNC: 1.5 MG/DL (ref 1.9–2.7)
POTASSIUM SERPL-SCNC: 3.8 MMOL/L (ref 3.5–5.3)
PROT SERPL-MCNC: 6.2 G/DL (ref 6.4–8.4)
SODIUM SERPL-SCNC: 134 MMOL/L (ref 135–147)

## 2025-06-05 PROCEDURE — 99232 SBSQ HOSP IP/OBS MODERATE 35: CPT | Performed by: INTERNAL MEDICINE

## 2025-06-05 PROCEDURE — 99232 SBSQ HOSP IP/OBS MODERATE 35: CPT | Performed by: PHYSICIAN ASSISTANT

## 2025-06-05 PROCEDURE — 80053 COMPREHEN METABOLIC PANEL: CPT | Performed by: PHYSICIAN ASSISTANT

## 2025-06-05 PROCEDURE — 83735 ASSAY OF MAGNESIUM: CPT | Performed by: PHYSICIAN ASSISTANT

## 2025-06-05 RX ORDER — HYDROMORPHONE HYDROCHLORIDE 2 MG/1
1 TABLET ORAL EVERY 4 HOURS PRN
Refills: 0 | Status: DISCONTINUED | OUTPATIENT
Start: 2025-06-05 | End: 2025-06-05

## 2025-06-05 RX ORDER — HYDROMORPHONE HCL/PF 1 MG/ML
0.5 SYRINGE (ML) INJECTION EVERY 4 HOURS PRN
Refills: 0 | Status: DISCONTINUED | OUTPATIENT
Start: 2025-06-05 | End: 2025-06-05

## 2025-06-05 RX ORDER — SODIUM CHLORIDE, SODIUM LACTATE, POTASSIUM CHLORIDE, CALCIUM CHLORIDE 600; 310; 30; 20 MG/100ML; MG/100ML; MG/100ML; MG/100ML
100 INJECTION, SOLUTION INTRAVENOUS CONTINUOUS
Status: DISPENSED | OUTPATIENT
Start: 2025-06-05 | End: 2025-06-06

## 2025-06-05 RX ORDER — HYDROMORPHONE HYDROCHLORIDE 2 MG/1
2 TABLET ORAL EVERY 4 HOURS PRN
Refills: 0 | Status: DISCONTINUED | OUTPATIENT
Start: 2025-06-05 | End: 2025-06-05

## 2025-06-05 RX ORDER — HYDROMORPHONE HCL IN WATER/PF 6 MG/30 ML
0.2 PATIENT CONTROLLED ANALGESIA SYRINGE INTRAVENOUS EVERY 4 HOURS PRN
Status: DISCONTINUED | OUTPATIENT
Start: 2025-06-05 | End: 2025-06-06 | Stop reason: HOSPADM

## 2025-06-05 RX ORDER — HYDROMORPHONE HCL/PF 1 MG/ML
0.5 SYRINGE (ML) INJECTION ONCE
Status: COMPLETED | OUTPATIENT
Start: 2025-06-05 | End: 2025-06-05

## 2025-06-05 RX ORDER — HYDROMORPHONE HCL/PF 1 MG/ML
0.5 SYRINGE (ML) INJECTION EVERY 4 HOURS PRN
Refills: 0 | Status: DISCONTINUED | OUTPATIENT
Start: 2025-06-05 | End: 2025-06-06 | Stop reason: HOSPADM

## 2025-06-05 RX ORDER — HYDROMORPHONE HCL/PF 1 MG/ML
0.5 SYRINGE (ML) INJECTION EVERY 4 HOURS PRN
Status: DISCONTINUED | OUTPATIENT
Start: 2025-06-05 | End: 2025-06-06 | Stop reason: HOSPADM

## 2025-06-05 RX ORDER — MAGNESIUM SULFATE HEPTAHYDRATE 40 MG/ML
2 INJECTION, SOLUTION INTRAVENOUS ONCE
Status: COMPLETED | OUTPATIENT
Start: 2025-06-05 | End: 2025-06-05

## 2025-06-05 RX ORDER — HYDROMORPHONE HYDROCHLORIDE 2 MG/1
2 TABLET ORAL EVERY 4 HOURS PRN
Status: CANCELLED | OUTPATIENT
Start: 2025-06-05

## 2025-06-05 RX ORDER — HYDROMORPHONE HCL IN WATER/PF 6 MG/30 ML
0.2 PATIENT CONTROLLED ANALGESIA SYRINGE INTRAVENOUS EVERY 4 HOURS PRN
Status: DISCONTINUED | OUTPATIENT
Start: 2025-06-05 | End: 2025-06-05

## 2025-06-05 RX ADMIN — MAGNESIUM SULFATE HEPTAHYDRATE 2 G: 40 INJECTION, SOLUTION INTRAVENOUS at 08:25

## 2025-06-05 RX ADMIN — NICOTINE 1 PATCH: 21 PATCH, EXTENDED RELEASE TRANSDERMAL at 21:00

## 2025-06-05 RX ADMIN — FOLIC ACID 1 MG: 1 TABLET ORAL at 08:25

## 2025-06-05 RX ADMIN — ONDANSETRON 4 MG: 2 INJECTION INTRAMUSCULAR; INTRAVENOUS at 08:25

## 2025-06-05 RX ADMIN — HYDROMORPHONE HYDROCHLORIDE 0.5 MG: 1 INJECTION, SOLUTION INTRAMUSCULAR; INTRAVENOUS; SUBCUTANEOUS at 14:49

## 2025-06-05 RX ADMIN — HYDROMORPHONE HYDROCHLORIDE 0.5 MG: 1 INJECTION, SOLUTION INTRAMUSCULAR; INTRAVENOUS; SUBCUTANEOUS at 17:26

## 2025-06-05 RX ADMIN — HYDROMORPHONE HYDROCHLORIDE 0.5 MG: 1 INJECTION, SOLUTION INTRAMUSCULAR; INTRAVENOUS; SUBCUTANEOUS at 12:24

## 2025-06-05 RX ADMIN — HYDROMORPHONE HYDROCHLORIDE 0.2 MG: 0.2 INJECTION, SOLUTION INTRAMUSCULAR; INTRAVENOUS; SUBCUTANEOUS at 10:16

## 2025-06-05 RX ADMIN — HYDROMORPHONE HYDROCHLORIDE 0.5 MG: 1 INJECTION, SOLUTION INTRAMUSCULAR; INTRAVENOUS; SUBCUTANEOUS at 20:23

## 2025-06-05 RX ADMIN — HYDROMORPHONE HYDROCHLORIDE 0.2 MG: 0.2 INJECTION, SOLUTION INTRAMUSCULAR; INTRAVENOUS; SUBCUTANEOUS at 04:03

## 2025-06-05 RX ADMIN — HYDROMORPHONE HYDROCHLORIDE 0.5 MG: 1 INJECTION, SOLUTION INTRAMUSCULAR; INTRAVENOUS; SUBCUTANEOUS at 00:34

## 2025-06-05 RX ADMIN — SODIUM CHLORIDE, SODIUM LACTATE, POTASSIUM CHLORIDE, AND CALCIUM CHLORIDE 100 ML/HR: .6; .31; .03; .02 INJECTION, SOLUTION INTRAVENOUS at 23:30

## 2025-06-05 RX ADMIN — MULTIPLE VITAMINS W/ MINERALS TAB 1 TABLET: TAB ORAL at 08:25

## 2025-06-05 RX ADMIN — HYDROXYZINE HYDROCHLORIDE 25 MG: 25 TABLET, FILM COATED ORAL at 22:38

## 2025-06-05 RX ADMIN — ONDANSETRON 4 MG: 2 INJECTION INTRAMUSCULAR; INTRAVENOUS at 17:26

## 2025-06-05 RX ADMIN — ONDANSETRON 4 MG: 2 INJECTION INTRAMUSCULAR; INTRAVENOUS at 04:03

## 2025-06-05 RX ADMIN — HYDROXYZINE HYDROCHLORIDE 25 MG: 25 TABLET, FILM COATED ORAL at 00:34

## 2025-06-05 RX ADMIN — THIAMINE HCL TAB 100 MG 100 MG: 100 TAB at 08:25

## 2025-06-05 RX ADMIN — SODIUM CHLORIDE, SODIUM LACTATE, POTASSIUM CHLORIDE, AND CALCIUM CHLORIDE 100 ML/HR: .6; .31; .03; .02 INJECTION, SOLUTION INTRAVENOUS at 17:27

## 2025-06-05 RX ADMIN — HYDROMORPHONE HYDROCHLORIDE 0.5 MG: 1 INJECTION, SOLUTION INTRAMUSCULAR; INTRAVENOUS; SUBCUTANEOUS at 08:25

## 2025-06-05 NOTE — ASSESSMENT & PLAN NOTE
Recent Labs     06/03/25  0335 06/04/25  0540 06/05/25  0415   AST 36 26 19   ALT 24 20 15   ALKPHOS 79 79 71   TBILI 1.42* 0.87 0.64      Per chart review, noted elevation of liver transaminase levels on labs, as outpatient.   AST (205), ALT (66), amylase (111), GGT (147)   Now, appears transaminase levels decreasing and normalized.  Continue to encourage complete alcohol cessation.  Continue to monitor liver transaminase levels via CMP, daily.   Patient will need outpatient follow-up with GI.

## 2025-06-05 NOTE — ASSESSMENT & PLAN NOTE
Secondary to alcohol use disorder. Liver tests have normalized. Discriminant function on admission 11. Recent imaging (CT/RUQ US) shows hepatomegaly, steatosis, mild surface nodularity concerning for early cirrhosis. EGD Dec 2024/Jan 2025 showed no evidence of esophageal or gastric varices. No ascites or signs of hepatic encephalopathy.    - Complete alcohol cessation is imperative  - Recommend outpatient GI follow-up for consideration of elastography   Subjective   Jennifer Espinosa 

## 2025-06-05 NOTE — ASSESSMENT & PLAN NOTE
36 yo F with history of necrotizing pancreatitis/walled off necrosis post EUS guided cyst drainage December 2024 who presents with pancreatitis in setting of recurrent alcohol abuse. CT A/P 6/1 shows findings consistent with pancreatitis and acute collection emanating from the main pancreatic duct, cannot exclude acute necrotic collection. No reports of gas in or around the pancreas to suggest infected necrosis. Initial labs revealed leukocytosis, which has since resolved. Creatinine remains normal. She remains afebrile, hemodynamically stable.     Abdominal pain improving, nausea improving, having bowel movements, tolerating low-fat diet.    - Continue low fat diet as tolerated  - Stop IV fluids  - Antiemetics and pain management per primary  - She should be stable for discharge within the next 24 hours  - She has follow-up scheduled in our office on 7/11

## 2025-06-05 NOTE — PROGRESS NOTES
Progress Note - Gastroenterology   Name: Kaykay Holland 35 y.o. female I MRN: [id number]  Unit/Bed#: -01 I Date of Admission: 6/1/2025   Date of Service: 6/5/2025 I Hospital Day: 4    Assessment & Plan  Acute on chronic alcoholic pancreatitis  34 yo F with history of necrotizing pancreatitis/walled off necrosis post EUS guided cyst drainage December 2024 who presents with pancreatitis in setting of recurrent alcohol abuse. CT A/P 6/1 shows findings consistent with pancreatitis and acute collection emanating from the main pancreatic duct, cannot exclude acute necrotic collection. No reports of gas in or around the pancreas to suggest infected necrosis. Initial labs revealed leukocytosis, which has since resolved. Creatinine remains normal. She remains afebrile, hemodynamically stable.     Abdominal pain improving, nausea improving, having bowel movements, tolerating low-fat diet.    - Continue low fat diet as tolerated  - Stop IV fluids  - Antiemetics and pain management per primary  - She should be stable for discharge within the next 24 hours  - She has follow-up scheduled in our office on 7/11  Pancreatic pseudocyst  She has several cysts in the pancreas consistent with pseudocysts, within the head of the pancreas measuring 1.6 x 1.4 cm and 1.2 x 0.9 cm, appear smaller in size compared to prior imaging.  Transaminitis  Secondary to alcohol use disorder. Liver tests have normalized. Discriminant function on admission 11. Recent imaging (CT/RUQ US) shows hepatomegaly, steatosis, mild surface nodularity concerning for early cirrhosis. EGD Dec 2024/Jan 2025 showed no evidence of esophageal or gastric varices. No ascites or signs of hepatic encephalopathy.    - Complete alcohol cessation is imperative  - Recommend outpatient GI follow-up for consideration of elastography  Hepatic steatosis  See plan above  Alcohol abuse  -Despite counseling on dangers of continued alcohol abuse including recurrent  pancreatitis, chronic pancreatitis, cirrhosis, even death in the future patient is not open to alcohol rehab or psychiatry services  -Continue folic acid and thiamine and multivitamin      I have discussed the above management plan in detail with the primary service.     Subjective   Patient seen and examined at bedside.  She states she is feeling better today.  Pain and nausea are improving.  She is having bowel movements.  She is tolerating low-fat diet.    Objective :  Temp:  [97.9 °F (36.6 °C)-98.2 °F (36.8 °C)] 97.9 °F (36.6 °C)  HR:  [57-84] 57  BP: (146-152)/() 148/99  Resp:  [17-20] 17  SpO2:  [95 %-97 %] 97 %  O2 Device: None (Room air)    Physical Exam  Vitals and nursing note reviewed.   Constitutional:       General: She is not in acute distress.     Appearance: She is well-developed.   HENT:      Head: Normocephalic and atraumatic.     Eyes:      Conjunctiva/sclera: Conjunctivae normal.       Cardiovascular:      Rate and Rhythm: Normal rate and regular rhythm.      Heart sounds: No murmur heard.  Pulmonary:      Effort: Pulmonary effort is normal. No respiratory distress.      Breath sounds: Normal breath sounds.   Abdominal:      Palpations: Abdomen is soft.      Tenderness: There is abdominal tenderness.     Musculoskeletal:         General: No swelling.      Cervical back: Neck supple.     Skin:     General: Skin is warm and dry.     Neurological:      Mental Status: She is alert.     Psychiatric:         Mood and Affect: Mood normal.           Lab Results: I have reviewed the following results:CBC/BMP:   .     06/05/25  0415   SODIUM 134*   K 3.8   CL 99   CO2 28   BUN 3*   CREATININE 0.39*   GLUC 88   MG 1.5*    , Creatinine Clearance: Estimated Creatinine Clearance: 182.9 mL/min (A) (by C-G formula based on SCr of 0.39 mg/dL (L)).    Imaging Results Review: I reviewed radiology reports from this admission including: CT abdomen/pelvis.

## 2025-06-05 NOTE — ASSESSMENT & PLAN NOTE
-Despite counseling on dangers of continued alcohol abuse including recurrent pancreatitis, chronic pancreatitis, cirrhosis, even death in the future patient is not open to alcohol rehab or psychiatry services  -Continue folic acid and thiamine and multivitamin

## 2025-06-05 NOTE — PLAN OF CARE
Problem: PAIN - ADULT  Goal: Verbalizes/displays adequate comfort level or baseline comfort level  Description: Interventions:  - Encourage patient to monitor pain and request assistance  - Assess pain using appropriate pain scale  - Administer analgesics as ordered based on type and severity of pain and evaluate response  - Implement non-pharmacological measures as appropriate and evaluate response  - Consider cultural and social influences on pain and pain management  - Notify physician/advanced practitioner if interventions unsuccessful or patient reports new pain  - Educate patient/family on pain management process including their role and importance of  reporting pain   - Provide non-pharmacologic/complimentary pain relief interventions  Outcome: Progressing     Problem: INFECTION - ADULT  Goal: Absence or prevention of progression during hospitalization  Description: INTERVENTIONS:  - Assess and monitor for signs and symptoms of infection  - Monitor lab/diagnostic results  - Monitor all insertion sites, i.e. indwelling lines, tubes, and drains  - Monitor endotracheal if appropriate and nasal secretions for changes in amount and color  - [place] appropriate cooling/warming therapies per order  - Administer medications as ordered  - Instruct and encourage patient and family to use good hand hygiene technique  - Identify and instruct in appropriate isolation precautions for identified infection/condition  Outcome: Progressing  Goal: Absence of fever/infection during neutropenic period  Description: INTERVENTIONS:  - Monitor WBC  - Perform strict hand hygiene  - Limit to healthy visitors only  - No plants, dried, fresh or silk flowers with butler in patient room  Outcome: Progressing     Problem: SAFETY ADULT  Goal: Patient will remain free of falls  Description: INTERVENTIONS:  - Educate patient/family on patient safety including physical limitations  - Instruct patient to call for assistance with activity   -  Consider consulting OT/PT to assist with strengthening/mobility based on AM PAC & JH-HLM score  - Consult OT/PT to assist with strengthening/mobility   - Keep Call bell within reach  - Keep bed low and locked with side rails adjusted as appropriate  - Keep care items and personal belongings within reach  - Initiate and maintain comfort rounds  - Make Fall Risk Sign visible to staff  - Offer Toileting every 2 Hours, in advance of need  - Initiate/Maintain bed/chair alarm  - Obtain necessary fall risk management equipment: yellow bracelt/socks  - Apply yellow socks and bracelet for high fall risk patients  - Consider moving patient to room near nurses station  Outcome: Progressing  Goal: Maintain or return to baseline ADL function  Description: INTERVENTIONS:  -  Assess patient's ability to carry out ADLs; assess patient's baseline for ADL function and identify physical deficits which impact ability to perform ADLs (bathing, care of mouth/teeth, toileting, grooming, dressing, etc.)  - Assess/evaluate cause of self-care deficits   - Assess range of motion  - Assess patient's mobility; develop plan if impaired  - Assess patient's need for assistive devices and provide as appropriate  - Encourage maximum independence but intervene and supervise when necessary  - Involve family in performance of ADLs  - Assess for home care needs following discharge   - Consider OT consult to assist with ADL evaluation and planning for discharge  - Provide patient education as appropriate  - Monitor functional capacity and physical performance, use of AM PAC & JH-HLM   - Monitor gait, balance and fatigue with ambulation    Outcome: Progressing  Goal: Maintains/Returns to pre admission functional level  Description: INTERVENTIONS:  - Perform AM-PAC 6 Click Basic Mobility/ Daily Activity assessment daily.  - Set and communicate daily mobility goal to care team and patient/family/caregiver.   - Collaborate with rehabilitation services on  mobility goals if consulted  - Perform Range of Motion 3 times a day.  - Reposition patient every 2 hours.  - Dangle patient 3 times a day  - Stand patient 3 times a day  - Ambulate patient 3 times a day  - Out of bed to chair 3 times a day   - Out of bed for meals 3 times a day  - Out of bed for toileting  - Record patient progress and toleration of activity level   Outcome: Progressing     Problem: DISCHARGE PLANNING  Goal: Discharge to home or other facility with appropriate resources  Description: INTERVENTIONS:  - Identify barriers to discharge w/patient and caregiver  - Arrange for needed discharge resources and transportation as appropriate  - Identify discharge learning needs (meds, wound care, etc.)  - Arrange for interpretive services to assist at discharge as needed  - Refer to Case Management Department for coordinating discharge planning if the patient needs post-hospital services based on physician/advanced practitioner order or complex needs related to functional status, cognitive ability, or social support system  Outcome: Progressing     Problem: Knowledge Deficit  Goal: Patient/family/caregiver demonstrates understanding of disease process, treatment plan, medications, and discharge instructions  Description: Complete learning assessment and assess knowledge base.  Interventions:  - Provide teaching at level of understanding  - Provide teaching via preferred learning methods  Outcome: Progressing     Problem: Nutrition/Hydration-ADULT  Goal: Nutrient/Hydration intake appropriate for improving, restoring or maintaining nutritional needs  Description: Monitor and assess patient's nutrition/hydration status for malnutrition. Collaborate with interdisciplinary team and initiate plan and interventions as ordered.  Monitor patient's weight and dietary intake as ordered or per policy. Utilize nutrition screening tool and intervene as necessary. Determine patient's food preferences and provide  high-protein, high-caloric foods as appropriate.     INTERVENTIONS:  - Monitor oral intake, urinary output, labs, and treatment plans  - Assess nutrition and hydration status and recommend course of action  - Evaluate amount of meals eaten  - Assist patient with eating if necessary   - Allow adequate time for meals  - Recommend/ encourage appropriate diets, oral nutritional supplements, and vitamin/mineral supplements  - Order, calculate, and assess calorie counts as needed  - Recommend, monitor, and adjust tube feedings and TPN/PPN based on assessed needs  - Assess need for intravenous fluids  - Provide specific nutrition/hydration education as appropriate  - Include patient/family/caregiver in decisions related to nutrition  Outcome: Progressing     Problem: METABOLIC, FLUID AND ELECTROLYTES - ADULT  Goal: Electrolytes maintained within normal limits  Description: INTERVENTIONS:  - Monitor labs and assess patient for signs and symptoms of electrolyte imbalances  - Administer electrolyte replacement as ordered  - Monitor response to electrolyte replacements, including repeat lab results as appropriate  - Instruct patient on fluid and nutrition as appropriate  Outcome: Progressing

## 2025-06-05 NOTE — APP STUDENT NOTE
"ARY STUDENT  Inpatient Progress Note for TRAINING ONLY  Not Part of Legal Medical Record       Progress Note - Kaykay Holland 35 y.o. female MRN: [id number]    Unit/Bed#: -[facility] Encounter: [id number]      Assessment and Plan:  Acute on chronic alcoholic pancreatitis   CT a/p (6/1): \"No main pancreatic duct dilation. Edematous thickening of the pancreatic parenchyma with peripancreatic inflammatory stranding. There is hypoattenuation of the pancreatic parenchyma with body/tail junction with findings of acute collection emanating from the main pancreatic duct superiorly to lie within apposition of the lesser curvature of the gastric stomach causing underlying reactive gastritis.\"  RUQ ultrasound (6/2): \"Limited visualization of the pancreas due to shadowing from bowel gas. Limited visualization of hypoechoic areas in the pancreatic tail measuring approximately 2.4 x 2.6 x 2.6 cm and in the head measuring 1.1 x 1.1 x 0.9 cm correlating with pseudocysts seen on prior CT. Mild hepatomegaly and mild hepatic steatosis. Areas of possible mild hepatic surface contour nodularity. Suggest correlation with ultrasound elastography. Small volume perihepatic ascites\"   Patient reports she is tolerating the advancement in diet well and has reduced nausea compared to yesterday. Continue to assess diet tolerance and reduce IV fluids in favor of oral intake  Patient is still with abdominal pain, citing a bimodal peak of early morning and at night experiencing the worst pain. Continue with pain control regimen. Monitor need for pain medication so patient may be discharged with adequate pain control.   Monitor labs and vitals. Currently afebrile, LFTs normalizing  GI recommends repeat imaging in 4 weeks to evaluate for development of walled off necrosis   Alcohol abuse   Patient declined outpatient resources  Continue to encourage alcohol cessation  Continue CIWA protocol  Continue folic acid and thiamine and multivitamin. " "  Transaminitis  Continue to monitor LFTs  Management outpatient per GI note  Encourage alcohol cessation  Pancreatic Pseudocyst  See GI note  Hepatic steatosis  Continue to monitor LFTs  Management outpatient per GI note  Encourage alcohol cessation  Tobacco abuse  Continue to encourage complete cessation  Nicotine replacement therapy while inpatient  Alcohol withdrawal  Continue CIWA protocol  Patient has declined outpatient rehab  Continue to encourage alcohol cessation  Hyponatremia  134 today. Continue to follow labs and PO intake  Hypomagnesemia  1.5 today  Repleted today  Check mag tomorrow  Hypokalemia  Resolved, 3.8 today.   GI has recommended d/c of oral potassium to further assist with reduction in nausea  Anxiety  PRN atarax.   Patient declined SSRI and psych care  Continue to encourage care in outpatient setting      Subjective:   Patient is a 35 year old female with history of necrotizing pancreatitis, chronic alcoholic pancreatitis, tobacco abuse, and alcohol abuse who presents on hospital day 4 for acute on chronic alcoholic pancreatitis. Today she reports that nausea is significantly better and she has been tolerating the advancement in diet. She reports a bimodal peak to abdominal pain, worse in the evening and morning. She says pain was 9/10 upon awakening and currently 7/10. She notes left shoulder pain has resolved. She endorses paresthesias in bilateral feet. She has been able to have a BM and denies diarrhea. Denies chest pain, SOB.   Objective:     Vitals: Blood pressure 148/99, pulse 57, temperature 97.9 °F (36.6 °C), temperature source Oral, resp. rate 17, height 5' 5\" (1.651 m), weight 57.1 kg (125 lb 14.1 oz), last menstrual period 04/23/2025, SpO2 97%.,Body mass index is 20.95 kg/m².      Intake/Output Summary (Last 24 hours) at 6/5/2025 0925  Last data filed at 6/5/2025 0000  Gross per 24 hour   Intake 922 ml   Output 200 ml   Net 722 ml       Physical Exam  Vitals reviewed. "   Constitutional:       General: She is not in acute distress.     Appearance: Normal appearance.   HENT:      Head: Normocephalic and atraumatic.     Cardiovascular:      Rate and Rhythm: Normal rate and regular rhythm.      Heart sounds: No murmur heard.     No gallop.   Pulmonary:      Effort: Pulmonary effort is normal. No respiratory distress.      Breath sounds: Normal breath sounds.   Abdominal:      General: There is no distension.      Palpations: Abdomen is soft. There is no mass.      Tenderness: There is abdominal tenderness (Left upper and lower quadrants). There is guarding.     Skin:     General: Skin is warm and dry.     Neurological:      General: No focal deficit present.      Mental Status: She is alert.      Gait: Gait normal.      Comments: Observed walking from bathroom with normal gait. No issues with balance during exam   Psychiatric:         Mood and Affect: Mood normal.         Behavior: Behavior normal.      Comments: Affect is brighter          Invasive Devices       Peripheral Intravenous Line  Duration             Peripheral IV 06/04/25 Distal;Dorsal (posterior);Right Forearm <1 day                      VTE Mechanical Prophylaxis: sequential compression device

## 2025-06-05 NOTE — PROGRESS NOTES
"Progress Note - Hospitalist   Name: Kaykay Holland 35 y.o. female I MRN: [id number]  Unit/Bed#: -01 I Date of Admission: 6/1/2025   Date of Service: 6/5/2025 I Hospital Day: 4    Assessment & Plan  Acute on chronic alcoholic pancreatitis  Initially, patient presented to outside hospital with epigastric RUQ abdominal pain with nausea/vomiting symptoms. 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  CT a/p (6/1): \"No main pancreatic duct dilation. Edematous thickening of the pancreatic parenchyma with peripancreatic inflammatory stranding. There is hypoattenuation of the pancreatic parenchyma with body/tail junction with findings of acute collection emanating from the main pancreatic duct superiorly to lie within apposition of the lesser curvature of the gastric stomach causing underlying reactive gastritis.\"  RUQ ultrasound (6/2): \"Limited visualization of the pancreas due to shadowing from bowel gas. Limited visualization of hypoechoic areas in the pancreatic tail measuring approximately 2.4 x 2.6 x 2.6 cm and in the head measuring 1.1 x 1.1 x 0.9 cm correlating with pseudocysts seen on prior CT. Mild hepatomegaly and mild hepatic steatosis. Areas of possible mild hepatic surface contour nodularity. Suggest correlation with ultrasound elastography. Small volume perihepatic ascites\"     Appreciate GI consultation.  She is on low-fat diet now, has some nausea and abdominal pain presently  Continue LR  Will need repeat imaging in 4 weeks to evaluate for the development of walled off necrosis.  Continue supportive care.  Continue to encourage complete alcohol cessation and rehabilitation placement.  Alcohol abuse  Per patient, reports drinking about x 2 glasses of wine, nightly.    Continue CIWA per protocol.    Appreciate toxicology consultation.    Continue to strongly encourage " "complete cessation.    Again, educated patient about importance of cessation and potential rehabilitation placement upon discharge.    Transaminitis  Recent Labs     06/03/25  0335 06/04/25  0540 06/05/25  0415   AST 36 26 19   ALT 24 20 15   ALKPHOS 79 79 71   TBILI 1.42* 0.87 0.64      Per chart review, noted elevation of liver transaminase levels on labs, as outpatient.   AST (205), ALT (66), amylase (111), GGT (147)   Now, appears transaminase levels decreasing and normalized.  Continue to encourage complete alcohol cessation.  Continue to monitor liver transaminase levels via CMP, daily.   Patient will need outpatient follow-up with GI.  Pancreatic pseudocyst  CT a/p (6/1): \"Additional cystic observations within the head of the pancreas measuring 1.6 x 1.4 cm and 1.2 x 0.9 cm may reflect pseudocysts from previous pancreatitis.\"   Appreciate GI consultation.  Continue to hold off IV antibiotics, as patient denying fever/chills and no noted leukocytosis.  Hepatic steatosis  Per CT a/p (6/1), again noted hepatic steatosis.    Appreciate GI consultation.    Continue encourage alcohol cessation.    Tobacco abuse  Continue to encourage complete cessation.    Order nicotine replacement therapy, while inpatient.    Alcohol withdrawal (HCC)  Continue CIWA protocol.  Continue to encourage complete cessation.  Continue to encourage rehabilitation at discharge.  Alcohol use disorder  Continue further management, as described under alcohol abuse.  Hypomagnesemia  Magnesium continues to be low, will continue repletion repeat tomorrow    Mobility:   Basic Mobility Inpatient Raw Score: 24  JH-HLM Goal: 8: Walk 250 feet or more  JH-HLM Achieved: 8: Walk 250 feet ot more  JH-HLM Goal achieved. Continue to encourage appropriate mobility.    VTE Pharmacologic Prophylaxis: VTE Score: 0 Low Risk (Score 0-2) - Encourage Ambulation.    Education and Discussions with Family / Patient: Patient declined call to . "     Current Length of Stay: 4 day(s)  Current Patient Status: Inpatient   Certification Statement: The patient will continue to require additional inpatient hospital stay due to pancreatitis with continued pain requiring analgesia, continue to monitor magnesium levels and continue GI follow-up  Discharge Plan: Anticipate discharge tomorrow to home.    Code Status: Level 1 - Full Code    Subjective:   Continuing to improve, tolerating diet.  Still with some discomfort early this morning and last night    Objective:     Vitals:   Temp (24hrs), Av °F (36.7 °C), Min:97.9 °F (36.6 °C), Max:98.2 °F (36.8 °C)    Temp:  [97.9 °F (36.6 °C)-98.2 °F (36.8 °C)] 97.9 °F (36.6 °C)  HR:  [57-84] 57  Resp:  [17-20] 17  BP: (146-152)/() 148/99  SpO2:  [95 %-97 %] 97 %  Body mass index is 20.95 kg/m².     Input and Output Summary (last 24 hours):     Intake/Output Summary (Last 24 hours) at 2025 1103  Last data filed at 2025 0000  Gross per 24 hour   Intake 922 ml   Output 200 ml   Net 722 ml       Physical Exam:   Physical Exam  Vitals and nursing note reviewed.   Constitutional:       General: She is not in acute distress.     Appearance: Normal appearance. She is not ill-appearing.   HENT:      Head: Normocephalic and atraumatic.      Mouth/Throat:      Mouth: Mucous membranes are moist.     Eyes:      General: No scleral icterus.        Right eye: No discharge.         Left eye: No discharge.      Pupils: Pupils are equal, round, and reactive to light.       Cardiovascular:      Rate and Rhythm: Normal rate and regular rhythm.      Heart sounds: No murmur heard.     No friction rub. No gallop.   Pulmonary:      Effort: No respiratory distress.      Breath sounds: No wheezing, rhonchi or rales.   Abdominal:      General: Bowel sounds are normal. There is no distension.      Palpations: Abdomen is soft.      Tenderness: There is abdominal tenderness. There is no guarding or rebound.     Musculoskeletal:          General: No swelling or deformity.      Cervical back: Neck supple.      Right lower leg: No edema.      Left lower leg: No edema.     Skin:     General: Skin is warm and dry.      Capillary Refill: Capillary refill takes less than 2 seconds.      Coloration: Skin is not jaundiced or pale.      Findings: No erythema or rash.     Neurological:      General: No focal deficit present.      Mental Status: She is alert and oriented to person, place, and time. Mental status is at baseline.     Psychiatric:         Mood and Affect: Mood is anxious. Affect is tearful.         Behavior: Behavior normal.          Additional Data:     Labs:  Results from last 7 days   Lab Units 06/04/25  0540   WBC Thousand/uL 5.04   HEMOGLOBIN g/dL 11.6   HEMATOCRIT % 35.4   PLATELETS Thousands/uL 164     Results from last 7 days   Lab Units 06/05/25  0415   SODIUM mmol/L 134*   POTASSIUM mmol/L 3.8   CHLORIDE mmol/L 99   CO2 mmol/L 28   BUN mg/dL 3*   CREATININE mg/dL 0.39*   ANION GAP mmol/L 7   CALCIUM mg/dL 8.6   ALBUMIN g/dL 3.3*   TOTAL BILIRUBIN mg/dL 0.64   ALK PHOS U/L 71   ALT U/L 15   AST U/L 19   GLUCOSE RANDOM mg/dL 88     Results from last 7 days   Lab Units 06/02/25  0550   INR  1.14                   Imaging: Radiology Review: No additional pertinent studies reviewed.    Recent Cultures (last 7 days):              Last 24 Hours Medication List:   Current Facility-Administered Medications   Medication Dose Route Frequency Provider Last Rate    folic acid  1 mg Oral Daily Florina Schafer PA-C      HYDROmorphone  0.2 mg Intravenous Q4H PRN Florina Schafer PA-C      Or    HYDROmorphone  0.5 mg Intravenous Q4H PRN Florina Schafer PA-C      HYDROmorphone  0.2 mg Intravenous Q4H PRN Florina Schafer PA-C      hydrOXYzine HCL  25 mg Oral Q6H PRN Soheila Coleman MD      lactated ringers  100 mL/hr Intravenous Continuous Eduardo Mercer PA-C 100 mL/hr (06/04/25 2593)    multivitamin-minerals  1 tablet Oral Daily Florina  CRISTINE Schafer PA-C      nicotine  1 patch Transdermal HS Florina Schafer PA-C      ondansetron  4 mg Intravenous Q4H PRN Florina Schafer PA-C      thiamine  100 mg Oral Daily Florina Schafer PA-C      trimethobenzamide  200 mg Intramuscular Q6H PRN Soheila Coleman MD          Today, Patient Was Seen By: Eduardo Mercer PA-C    **Please Note: This note may have been constructed using a voice recognition system.**

## 2025-06-05 NOTE — PLAN OF CARE
Problem: PAIN - ADULT  Goal: Verbalizes/displays adequate comfort level or baseline comfort level  Description: Interventions:  - Encourage patient to monitor pain and request assistance  - Assess pain using appropriate pain scale  - Administer analgesics as ordered based on type and severity of pain and evaluate response  - Implement non-pharmacological measures as appropriate and evaluate response  - Consider cultural and social influences on pain and pain management  - Notify physician/advanced practitioner if interventions unsuccessful or patient reports new pain  - Educate patient/family on pain management process including their role and importance of  reporting pain   - Provide non-pharmacologic/complimentary pain relief interventions  Outcome: Progressing     Problem: INFECTION - ADULT  Goal: Absence or prevention of progression during hospitalization  Description: INTERVENTIONS:  - Assess and monitor for signs and symptoms of infection  - Monitor lab/diagnostic results  - Monitor all insertion sites, i.e. indwelling lines, tubes, and drains  - Monitor endotracheal if appropriate and nasal secretions for changes in amount and color  - [place] appropriate cooling/warming therapies per order  - Administer medications as ordered  - Instruct and encourage patient and family to use good hand hygiene technique  - Identify and instruct in appropriate isolation precautions for identified infection/condition  Outcome: Progressing  Goal: Absence of fever/infection during neutropenic period  Description: INTERVENTIONS:  - Monitor WBC  - Perform strict hand hygiene  - Limit to healthy visitors only  - No plants, dried, fresh or silk flowers with butler in patient room  Outcome: Progressing     Problem: SAFETY ADULT  Goal: Patient will remain free of falls  Description: INTERVENTIONS:  - Educate patient/family on patient safety including physical limitations  - Instruct patient to call for assistance with activity   -  Consider consulting OT/PT to assist with strengthening/mobility based on AM PAC & JH-HLM score  - Consult OT/PT to assist with strengthening/mobility   - Keep Call bell within reach  - Keep bed low and locked with side rails adjusted as appropriate  - Keep care items and personal belongings within reach  - Initiate and maintain comfort rounds  - Make Fall Risk Sign visible to staff  - Offer Toileting every 2 Hours, in advance of need  - Initiate/Maintain bed alarm  - Obtain necessary fall risk management equipment  - Apply yellow socks and bracelet for high fall risk patients  - Consider moving patient to room near nurses station  Outcome: Progressing  Goal: Maintain or return to baseline ADL function  Description: INTERVENTIONS:  -  Assess patient's ability to carry out ADLs; assess patient's baseline for ADL function and identify physical deficits which impact ability to perform ADLs (bathing, care of mouth/teeth, toileting, grooming, dressing, etc.)  - Assess/evaluate cause of self-care deficits   - Assess range of motion  - Assess patient's mobility; develop plan if impaired  - Assess patient's need for assistive devices and provide as appropriate  - Encourage maximum independence but intervene and supervise when necessary  - Involve family in performance of ADLs  - Assess for home care needs following discharge   - Consider OT consult to assist with ADL evaluation and planning for discharge  - Provide patient education as appropriate  - Monitor functional capacity and physical performance, use of AM PAC & JH-HLM   - Monitor gait, balance and fatigue with ambulation    Outcome: Progressing  Goal: Maintains/Returns to pre admission functional level  Description: INTERVENTIONS:  - Perform AM-PAC 6 Click Basic Mobility/ Daily Activity assessment daily.  - Set and communicate daily mobility goal to care team and patient/family/caregiver.   - Collaborate with rehabilitation services on mobility goals if consulted  -  Perform Range of Motion 3 times a day.  - Reposition patient every 2 hours.  - Dangle patient 3 times a day  - Stand patient 3 times a day  - Ambulate patient 3 times a day  - Out of bed to chair 3 times a day   - Out of bed for meals 3 times a day  - Out of bed for toileting  - Record patient progress and toleration of activity level   Outcome: Progressing     Problem: DISCHARGE PLANNING  Goal: Discharge to home or other facility with appropriate resources  Description: INTERVENTIONS:  - Identify barriers to discharge w/patient and caregiver  - Arrange for needed discharge resources and transportation as appropriate  - Identify discharge learning needs (meds, wound care, etc.)  - Arrange for interpretive services to assist at discharge as needed  - Refer to Case Management Department for coordinating discharge planning if the patient needs post-hospital services based on physician/advanced practitioner order or complex needs related to functional status, cognitive ability, or social support system  Outcome: Progressing     Problem: Knowledge Deficit  Goal: Patient/family/caregiver demonstrates understanding of disease process, treatment plan, medications, and discharge instructions  Description: Complete learning assessment and assess knowledge base.  Interventions:  - Provide teaching at level of understanding  - Provide teaching via preferred learning methods  Outcome: Progressing     Problem: Nutrition/Hydration-ADULT  Goal: Nutrient/Hydration intake appropriate for improving, restoring or maintaining nutritional needs  Description: Monitor and assess patient's nutrition/hydration status for malnutrition. Collaborate with interdisciplinary team and initiate plan and interventions as ordered.  Monitor patient's weight and dietary intake as ordered or per policy. Utilize nutrition screening tool and intervene as necessary. Determine patient's food preferences and provide high-protein, high-caloric foods as  appropriate.     INTERVENTIONS:  - Monitor oral intake, urinary output, labs, and treatment plans  - Assess nutrition and hydration status and recommend course of action  - Evaluate amount of meals eaten  - Assist patient with eating if necessary   - Allow adequate time for meals  - Recommend/ encourage appropriate diets, oral nutritional supplements, and vitamin/mineral supplements  - Order, calculate, and assess calorie counts as needed  - Recommend, monitor, and adjust tube feedings and TPN/PPN based on assessed needs  - Assess need for intravenous fluids  - Provide specific nutrition/hydration education as appropriate  - Include patient/family/caregiver in decisions related to nutrition  Outcome: Progressing     Problem: GASTROINTESTINAL - ADULT  Goal: Minimal or absence of nausea and/or vomiting  Description: INTERVENTIONS:  - Administer IV fluids if ordered to ensure adequate hydration  - Maintain NPO status until nausea and vomiting are resolved  - Nasogastric tube if ordered  - Administer ordered antiemetic medications as needed  - Provide nonpharmacologic comfort measures as appropriate  - Advance diet as tolerated, if ordered  - Consider nutrition services referral to assist patient with adequate nutrition and appropriate food choices  Outcome: Progressing     Problem: METABOLIC, FLUID AND ELECTROLYTES - ADULT  Goal: Electrolytes maintained within normal limits  Description: INTERVENTIONS:  - Monitor labs and assess patient for signs and symptoms of electrolyte imbalances  - Administer electrolyte replacement as ordered  - Monitor response to electrolyte replacements, including repeat lab results as appropriate  - Instruct patient on fluid and nutrition as appropriate  Outcome: Progressing

## 2025-06-06 VITALS
RESPIRATION RATE: 18 BRPM | WEIGHT: 125.88 LBS | BODY MASS INDEX: 20.97 KG/M2 | HEART RATE: 64 BPM | DIASTOLIC BLOOD PRESSURE: 93 MMHG | HEIGHT: 65 IN | SYSTOLIC BLOOD PRESSURE: 133 MMHG | OXYGEN SATURATION: 98 % | TEMPERATURE: 98 F

## 2025-06-06 PROBLEM — R74.01 TRANSAMINITIS: Status: RESOLVED | Noted: 2024-04-08 | Resolved: 2025-06-06

## 2025-06-06 PROBLEM — F10.939 ALCOHOL WITHDRAWAL (HCC): Status: RESOLVED | Noted: 2020-02-29 | Resolved: 2025-06-06

## 2025-06-06 LAB
ALBUMIN SERPL BCG-MCNC: 3.3 G/DL (ref 3.5–5)
ALP SERPL-CCNC: 70 U/L (ref 34–104)
ALT SERPL W P-5'-P-CCNC: 16 U/L (ref 7–52)
ANION GAP SERPL CALCULATED.3IONS-SCNC: 5 MMOL/L (ref 4–13)
AST SERPL W P-5'-P-CCNC: 20 U/L (ref 13–39)
BILIRUB SERPL-MCNC: 0.46 MG/DL (ref 0.2–1)
BUN SERPL-MCNC: 4 MG/DL (ref 5–25)
CALCIUM ALBUM COR SERPL-MCNC: 9.3 MG/DL (ref 8.3–10.1)
CALCIUM SERPL-MCNC: 8.7 MG/DL (ref 8.4–10.2)
CHLORIDE SERPL-SCNC: 101 MMOL/L (ref 96–108)
CO2 SERPL-SCNC: 30 MMOL/L (ref 21–32)
CREAT SERPL-MCNC: 0.42 MG/DL (ref 0.6–1.3)
GFR SERPL CREATININE-BSD FRML MDRD: 134 ML/MIN/1.73SQ M
GLUCOSE SERPL-MCNC: 96 MG/DL (ref 65–140)
MAGNESIUM SERPL-MCNC: 1.4 MG/DL (ref 1.9–2.7)
POTASSIUM SERPL-SCNC: 4 MMOL/L (ref 3.5–5.3)
PROT SERPL-MCNC: 5.9 G/DL (ref 6.4–8.4)
SODIUM SERPL-SCNC: 136 MMOL/L (ref 135–147)

## 2025-06-06 PROCEDURE — 99239 HOSP IP/OBS DSCHRG MGMT >30: CPT | Performed by: PHYSICIAN ASSISTANT

## 2025-06-06 PROCEDURE — 83735 ASSAY OF MAGNESIUM: CPT | Performed by: PHYSICIAN ASSISTANT

## 2025-06-06 PROCEDURE — 80053 COMPREHEN METABOLIC PANEL: CPT | Performed by: PHYSICIAN ASSISTANT

## 2025-06-06 RX ORDER — HYDROMORPHONE HYDROCHLORIDE 2 MG/1
2 TABLET ORAL EVERY 4 HOURS PRN
Qty: 12 TABLET | Refills: 0 | Status: SHIPPED | OUTPATIENT
Start: 2025-06-06 | End: 2025-06-10

## 2025-06-06 RX ORDER — HYDROXYZINE HYDROCHLORIDE 50 MG/1
25 TABLET, FILM COATED ORAL EVERY 6 HOURS PRN
Qty: 60 TABLET | Refills: 0 | Status: SHIPPED | OUTPATIENT
Start: 2025-06-06

## 2025-06-06 RX ORDER — MAGNESIUM SULFATE HEPTAHYDRATE 40 MG/ML
2 INJECTION, SOLUTION INTRAVENOUS ONCE
Status: COMPLETED | OUTPATIENT
Start: 2025-06-06 | End: 2025-06-06

## 2025-06-06 RX ORDER — ONDANSETRON 4 MG/1
4 TABLET, FILM COATED ORAL EVERY 8 HOURS PRN
Qty: 20 TABLET | Refills: 0 | Status: SHIPPED | OUTPATIENT
Start: 2025-06-06

## 2025-06-06 RX ORDER — SODIUM CHLORIDE, SODIUM LACTATE, POTASSIUM CHLORIDE, CALCIUM CHLORIDE 600; 310; 30; 20 MG/100ML; MG/100ML; MG/100ML; MG/100ML
250 INJECTION, SOLUTION INTRAVENOUS CONTINUOUS
Status: DISPENSED | OUTPATIENT
Start: 2025-06-06 | End: 2025-06-06

## 2025-06-06 RX ORDER — NICOTINE 21 MG/24HR
1 PATCH, TRANSDERMAL 24 HOURS TRANSDERMAL DAILY
Qty: 28 PATCH | Refills: 0 | Status: SHIPPED | OUTPATIENT
Start: 2025-06-06

## 2025-06-06 RX ADMIN — ONDANSETRON 4 MG: 2 INJECTION INTRAMUSCULAR; INTRAVENOUS at 10:57

## 2025-06-06 RX ADMIN — MULTIPLE VITAMINS W/ MINERALS TAB 1 TABLET: TAB ORAL at 08:21

## 2025-06-06 RX ADMIN — FOLIC ACID 1 MG: 1 TABLET ORAL at 08:21

## 2025-06-06 RX ADMIN — HYDROMORPHONE HYDROCHLORIDE 0.5 MG: 1 INJECTION, SOLUTION INTRAMUSCULAR; INTRAVENOUS; SUBCUTANEOUS at 00:11

## 2025-06-06 RX ADMIN — HYDROMORPHONE HYDROCHLORIDE 0.5 MG: 1 INJECTION, SOLUTION INTRAMUSCULAR; INTRAVENOUS; SUBCUTANEOUS at 07:59

## 2025-06-06 RX ADMIN — SODIUM CHLORIDE, SODIUM LACTATE, POTASSIUM CHLORIDE, AND CALCIUM CHLORIDE 100 ML/HR: .6; .31; .03; .02 INJECTION, SOLUTION INTRAVENOUS at 09:37

## 2025-06-06 RX ADMIN — HYDROMORPHONE HYDROCHLORIDE 0.5 MG: 1 INJECTION, SOLUTION INTRAMUSCULAR; INTRAVENOUS; SUBCUTANEOUS at 04:31

## 2025-06-06 RX ADMIN — HYDROMORPHONE HYDROCHLORIDE 0.5 MG: 1 INJECTION, SOLUTION INTRAMUSCULAR; INTRAVENOUS; SUBCUTANEOUS at 10:57

## 2025-06-06 RX ADMIN — HYDROXYZINE HYDROCHLORIDE 25 MG: 25 TABLET, FILM COATED ORAL at 09:47

## 2025-06-06 RX ADMIN — MAGNESIUM SULFATE HEPTAHYDRATE 2 G: 40 INJECTION, SOLUTION INTRAVENOUS at 09:36

## 2025-06-06 RX ADMIN — THIAMINE HCL TAB 100 MG 100 MG: 100 TAB at 08:21

## 2025-06-06 RX ADMIN — HYDROMORPHONE HYDROCHLORIDE 0.5 MG: 1 INJECTION, SOLUTION INTRAMUSCULAR; INTRAVENOUS; SUBCUTANEOUS at 13:00

## 2025-06-06 NOTE — ASSESSMENT & PLAN NOTE
Per patient, reports drinking about x 2 glasses of wine, nightly.    Continue to strongly encourage complete cessation.    Again, educated patient about importance of cessation and potential rehabilitation placement upon discharge.

## 2025-06-06 NOTE — ASSESSMENT & PLAN NOTE
"Initially, patient presented to outside hospital with epigastric RUQ abdominal pain with nausea/vomiting symptoms. 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  CT a/p (6/1): \"No main pancreatic duct dilation. Edematous thickening of the pancreatic parenchyma with peripancreatic inflammatory stranding. There is hypoattenuation of the pancreatic parenchyma with body/tail junction with findings of acute collection emanating from the main pancreatic duct superiorly to lie within apposition of the lesser curvature of the gastric stomach causing underlying reactive gastritis.\"  RUQ ultrasound (6/2): \"Limited visualization of the pancreas due to shadowing from bowel gas. Limited visualization of hypoechoic areas in the pancreatic tail measuring approximately 2.4 x 2.6 x 2.6 cm and in the head measuring 1.1 x 1.1 x 0.9 cm correlating with pseudocysts seen on prior CT. Mild hepatomegaly and mild hepatic steatosis. Areas of possible mild hepatic surface contour nodularity. Suggest correlation with ultrasound elastography. Small volume perihepatic ascites\"     Appreciate GI consultation.  Tolerating low-fat diet  Will need repeat imaging in 4 weeks to evaluate for the development of walled off necrosis.  Continue supportive care.  Continue to encourage complete alcohol cessation and rehabilitation placement.  "

## 2025-06-06 NOTE — DISCHARGE SUMMARY
"Discharge Summary - Hospitalist   Name: Kaykay Holland 35 y.o. female I MRN: [id number]  Unit/Bed#: -01 I Date of Admission: 6/1/2025   Date of Service: 6/6/2025 I Hospital Day: 5     Assessment & Plan  Acute on chronic alcoholic pancreatitis  Initially, patient presented to outside hospital with epigastric RUQ abdominal pain with nausea/vomiting symptoms. 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  CT a/p (6/1): \"No main pancreatic duct dilation. Edematous thickening of the pancreatic parenchyma with peripancreatic inflammatory stranding. There is hypoattenuation of the pancreatic parenchyma with body/tail junction with findings of acute collection emanating from the main pancreatic duct superiorly to lie within apposition of the lesser curvature of the gastric stomach causing underlying reactive gastritis.\"  RUQ ultrasound (6/2): \"Limited visualization of the pancreas due to shadowing from bowel gas. Limited visualization of hypoechoic areas in the pancreatic tail measuring approximately 2.4 x 2.6 x 2.6 cm and in the head measuring 1.1 x 1.1 x 0.9 cm correlating with pseudocysts seen on prior CT. Mild hepatomegaly and mild hepatic steatosis. Areas of possible mild hepatic surface contour nodularity. Suggest correlation with ultrasound elastography. Small volume perihepatic ascites\"     Appreciate GI consultation.  Tolerating low-fat diet  Will need repeat imaging in 4 weeks to evaluate for the development of walled off necrosis.  Continue supportive care.  Continue to encourage complete alcohol cessation and rehabilitation placement.  Alcohol abuse  Per patient, reports drinking about x 2 glasses of wine, nightly.    Continue to strongly encourage complete cessation.    Again, educated patient about importance of cessation and potential rehabilitation placement upon discharge.  " "  Transaminitis (Resolved: 6/6/2025)  Recent Labs     06/04/25  0540 06/05/25  0415 06/06/25  0346   AST 26 19 20   ALT 20 15 16   ALKPHOS 79 71 70   TBILI 0.87 0.64 0.46      Per chart review, noted elevation of liver transaminase levels on labs, as outpatient.   AST (205), ALT (66), amylase (111), GGT (147)   Now, appears transaminase levels decreasing and normalized.  Continue to encourage complete alcohol cessation.  Patient will need outpatient follow-up with GI.  Pancreatic pseudocyst  CT a/p (6/1): \"Additional cystic observations within the head of the pancreas measuring 1.6 x 1.4 cm and 1.2 x 0.9 cm may reflect pseudocysts from previous pancreatitis.\"   GI to follow, likely needs repeat imaging in 4 weeks  Hepatic steatosis  Per CT a/p (6/1), again noted hepatic steatosis.    Outpatient GI follow up.    Continue encourage alcohol cessation.    Tobacco abuse  Continue to encourage complete cessation.    Order nicotine replacement therapy, while inpatient.    Alcohol withdrawal (HCC) (Resolved: 6/6/2025)  Continue CIWA protocol.  Continue to encourage complete cessation.  Continue to encourage rehabilitation at discharge.  Alcohol use disorder  Continue further management, as described under alcohol abuse.  Hypomagnesemia  Magnesium continues to be low, will continue repletion today     Medical Problems       Resolved Problems  Date Reviewed: 3/30/2025          Resolved    Transaminitis 6/6/2025     Resolved by  Eduardo Mercer PA-C    Alcohol withdrawal (HCC) 6/6/2025     Resolved by  Eduardo Mercer PA-C    Hyponatremia 6/4/2025     Resolved by  Eduardo Mercer PA-C        Discharging Physician / Practitioner: Eduardo Mercer PA-C  PCP: PATRICIA Moran  Admission Date:   Admission Orders (From admission, onward)       Ordered        06/01/25 8270  INPATIENT ADMISSION  Once                          Discharge Date: 06/06/25    Next Steps for Physician/AP Assuming Care:  Monitor hypomagnesemia and " "LFTs  Repeat CT A/P in 4 weeks to evaluate for walled-off necrosis   GI follow up for evaluation of chronic conditions and consideration for elastography  Continue to encourage alcohol / tobacco cessation and rehab / AUD resources    Test Results Pending at Discharge (will require follow up):  None pending, see recommendations for further outpatient management    Medication Changes for Discharge & Rationale:   None   See after visit summary for reconciled discharge medications provided to patient and/or family.     Consultations During Hospital Stay:  Medical Toxicology - Dr. Zev Lamar DO  Gastroenterology - Azul Alexandra PA-C     Procedures Performed:   None    Significant Findings / Test Results:   CT a/p (6/1): \"No main pancreatic duct dilation. Edematous thickening of the pancreatic parenchyma with peripancreatic inflammatory stranding. There is hypoattenuation of the pancreatic parenchyma with body/tail junction with findings of acute collection emanating from the main pancreatic duct superiorly to lie within apposition of the lesser curvature of the gastric stomach causing underlying reactive gastritis.\"   RUQ ultrasound (6/2): \"Limited visualization of the pancreas due to shadowing from bowel gas. Limited visualization of hypoechoic areas in the pancreatic tail measuring approximately 2.4 x 2.6 x 2.6 cm and in the head measuring 1.1 x 1.1 x 0.9 cm correlating with pseudocysts seen on prior CT. Mild hepatomegaly and mild hepatic steatosis. Areas of possible mild hepatic surface contour nodularity. Suggest correlation with ultrasound elastography. Small volume perihepatic ascites\"     Incidental Findings:   Limited visualization due to shadowing from bowel gas. Limited visualization of hypoechoic areas in the pancreatic tail measuring approximately 2.4 x 2.6 x 2.6 cm and in the head measuring 1.1 x 1.1 x 0.9 cm correlating with pseudocysts seen on prior CT.    I reviewed the above mentioned " incidental findings with the patient and/or family and they expressed understanding.    Hospital Course:   Kaykay Holland is a 35 y.o. female patient with a history of alcohol abuse who originally presented to the hospital on 6/1/2025 due to epigastric and RUQ pain with associated nausea and vomiting. She had a prior history of multiple admissions for alcoholic pancreatitis and necrotizing pancreatitis. She was admitted to the internal medicine service for IV fluids, pain control, and acute management after CT A/P on 6/1 demonstrated findings consistent with acute pancreatitis, reactive gastritis, and inability to exclude an acute necrotic collection. Other diagnoses on admission included alcohol abuse, transaminitis, pancreatic pseudocyst, hepatic steatosis, and tobacco abuse. Toxicology consultation on 6/2 recommended CIWA monitoring with diazepam treatment, daily vitamin supplementation with thiamine, folic acid, and multivitamin, and continued recommendation for rehab and aftercare resources. Gastroenterology consult service saw patient daily from 6/2 onward. Antibiotics were not indicated during stay per GI due to lack of leukocytosis after initial WBC 13,000 on 6/1 and patient remaining afebrile. Repeat imaging was recommended in 4 weeks to evaluate for walled off necrosis, and GI follow up is recommended for continuation of care and evaluation for elastography study. Diet was advanced from NPO 6/1 to clear liquids 6/3 to GI low fat diet 6/3 evening and advancements were tolerated well. Patient had successful bowel function and bowel movements during admission. Nausea was well controlled at day of discharge with some continued abdominal pain that is consistent in location from hospital course. At discharge, patient is medically stable and encouraged to follow up outpatient with PCP, GI, rehab resources, and maintain complete alcohol cessation.    Please see above list of diagnoses and related plan for  "additional information.     Discharge Day Visit / Exam:   Subjective:  Patient is a 35 year old female with history of necrotizing pancreatitis, chronic alcoholic pancreatitis, tobacco abuse, and alcohol abuse who presents on hospital day 5 for acute on chronic alcoholic pancreatitis. Today she reports improvement in nausea symptoms, with 1 episode of emesis yesterday evening and no subsequent vomiting. She reports continued abdominal pain, predominantly left sided, rated 9/10 severity. She notes paresthesias in the bilateral feet and periodic leg muscle spasms but denies numbness / loss of sensation / loss of strength / gait disturbance. She denies chest pain, shortness of breath, diarrhea, constipation, and headache. She reports one episode of dizziness and blurry vision last night when she stood to ambulate to the restroom, but says this has resolved and denies subsequent events. She feels comfortable and ready to discharge today, and does not have concerns about outpatient alcohol cessation or questions about resources.       Vitals: Blood Pressure: 133/93 (06/06/25 0757)  Pulse: 64 (06/06/25 0757)  Temperature: 98 °F (36.7 °C) (06/06/25 0757)  Temp Source: Oral (06/06/25 0345)  Respirations: 18 (06/06/25 0757)  Height: 5' 5\" (165.1 cm) (06/02/25 0713)  Weight - Scale: 57.1 kg (125 lb 14.1 oz) (06/02/25 0713)  SpO2: 98 % (06/06/25 0757)  Physical Exam  Vitals and nursing note reviewed.   Constitutional:       General: She is not in acute distress.     Appearance: Normal appearance. She is not ill-appearing or toxic-appearing.   HENT:      Head: Normocephalic and atraumatic.      Nose: Nose normal. No congestion.      Mouth/Throat:      Mouth: Mucous membranes are moist.     Eyes:      General: No scleral icterus.        Right eye: No discharge.         Left eye: No discharge.      Pupils: Pupils are equal, round, and reactive to light.       Cardiovascular:      Rate and Rhythm: Normal rate and regular rhythm. "      Heart sounds: Normal heart sounds. No murmur heard.     No friction rub. No gallop.   Pulmonary:      Effort: Pulmonary effort is normal. No respiratory distress.      Breath sounds: Normal breath sounds. No wheezing, rhonchi or rales.   Abdominal:      General: Bowel sounds are normal. There is no distension.      Palpations: Abdomen is soft. There is no mass.      Tenderness: There is abdominal tenderness. There is guarding. There is no rebound.      Comments: Tenderness in LUQ and LLQ. Without R sided tenderness     Musculoskeletal:         General: No swelling or deformity. Normal range of motion.      Cervical back: Normal range of motion and neck supple.      Right lower leg: No edema.      Left lower leg: No edema.     Skin:     General: Skin is warm and dry.      Capillary Refill: Capillary refill takes less than 2 seconds.      Coloration: Skin is not jaundiced or pale.      Findings: No erythema or rash.     Neurological:      General: No focal deficit present.      Mental Status: She is alert and oriented to person, place, and time. Mental status is at baseline.      Sensory: No sensory deficit.      Comments: Sensation and motor function intact in bilateral lower extremities   Psychiatric:         Mood and Affect: Mood normal.         Behavior: Behavior normal.          Discussion with Family: Patient declined call to .     Discharge instructions/Information to patient and family:   See after visit summary for information provided to patient and family.      Provisions for Follow-Up Care:  See after visit summary for information related to follow-up care and any pertinent home health orders.      Mobility at time of Discharge:   Basic Mobility Inpatient Raw Score: 24  JH-HLM Goal: 8: Walk 250 feet or more  JH-HLM Achieved: 8: Walk 250 feet ot more  HLM Goal achieved. Continue to encourage appropriate mobility.     Disposition:   Home. Patient declined rehab resources    Planned  Readmission: None    Administrative Statements   Discharge Statement:  I have spent a total time of 45 minutes in caring for this patient on the day of the visit/encounter. >30 minutes of time was spent on: Diagnostic results, Prognosis, Risks and benefits of tx options, Instructions for management, Patient and family education, Importance of tx compliance, Risk factor reductions, Impressions, Counseling / Coordination of care, Documenting in the medical record, Reviewing / ordering tests, medicine, procedures  , and Communicating with other healthcare professionals .    **Please Note: This note may have been constructed using a voice recognition system**

## 2025-06-06 NOTE — ASSESSMENT & PLAN NOTE
Per CT a/p (6/1), again noted hepatic steatosis.    Outpatient GI follow up.    Continue encourage alcohol cessation.

## 2025-06-06 NOTE — NURSING NOTE
Went over discharge instructions including printed information about tobacco cessation with the patient. IV removed.

## 2025-06-06 NOTE — ASSESSMENT & PLAN NOTE
"CT a/p (6/1): \"Additional cystic observations within the head of the pancreas measuring 1.6 x 1.4 cm and 1.2 x 0.9 cm may reflect pseudocysts from previous pancreatitis.\"   GI to follow, likely needs repeat imaging in 4 weeks  "

## 2025-06-06 NOTE — ASSESSMENT & PLAN NOTE
Recent Labs     06/04/25  0540 06/05/25  0415 06/06/25  0346   AST 26 19 20   ALT 20 15 16   ALKPHOS 79 71 70   TBILI 0.87 0.64 0.46      Per chart review, noted elevation of liver transaminase levels on labs, as outpatient.   AST (205), ALT (66), amylase (111), GGT (147)   Now, appears transaminase levels decreasing and normalized.  Continue to encourage complete alcohol cessation.  Patient will need outpatient follow-up with GI.

## 2025-06-06 NOTE — PLAN OF CARE
Problem: INFECTION - ADULT  Goal: Absence or prevention of progression during hospitalization  Description: INTERVENTIONS:  - Assess and monitor for signs and symptoms of infection  - Monitor lab/diagnostic results  - Monitor all insertion sites, i.e. indwelling lines, tubes, and drains  - Monitor endotracheal if appropriate and nasal secretions for changes in amount and color  - Ray appropriate cooling/warming therapies per order  - Administer medications as ordered  - Instruct and encourage patient and family to use good hand hygiene technique  - Identify and instruct in appropriate isolation precautions for identified infection/condition  Outcome: Progressing  Goal: Absence of fever/infection during neutropenic period  Description: INTERVENTIONS:  - Monitor WBC  - Perform strict hand hygiene  - Limit to healthy visitors only  - No plants, dried, fresh or silk flowers with butler in patient room  Outcome: Progressing     Problem: PAIN - ADULT  Goal: Verbalizes/displays adequate comfort level or baseline comfort level  Description: Interventions:  - Encourage patient to monitor pain and request assistance  - Assess pain using appropriate pain scale  - Administer analgesics as ordered based on type and severity of pain and evaluate response  - Implement non-pharmacological measures as appropriate and evaluate response  - Consider cultural and social influences on pain and pain management  - Notify physician/advanced practitioner if interventions unsuccessful or patient reports new pain  - Educate patient/family on pain management process including their role and importance of  reporting pain   - Provide non-pharmacologic/complimentary pain relief interventions  Outcome: Progressing     Problem: Nutrition/Hydration-ADULT  Goal: Nutrient/Hydration intake appropriate for improving, restoring or maintaining nutritional needs  Description: Monitor and assess patient's nutrition/hydration status for malnutrition.  Collaborate with interdisciplinary team and initiate plan and interventions as ordered.  Monitor patient's weight and dietary intake as ordered or per policy. Utilize nutrition screening tool and intervene as necessary. Determine patient's food preferences and provide high-protein, high-caloric foods as appropriate.     INTERVENTIONS:  - Monitor oral intake, urinary output, labs, and treatment plans  - Assess nutrition and hydration status and recommend course of action  - Evaluate amount of meals eaten  - Assist patient with eating if necessary   - Allow adequate time for meals  - Recommend/ encourage appropriate diets, oral nutritional supplements, and vitamin/mineral supplements  - Order, calculate, and assess calorie counts as needed  - Recommend, monitor, and adjust tube feedings and TPN/PPN based on assessed needs  - Assess need for intravenous fluids  - Provide specific nutrition/hydration education as appropriate  - Include patient/family/caregiver in decisions related to nutrition  Outcome: Progressing     Problem: Knowledge Deficit  Goal: Patient/family/caregiver demonstrates understanding of disease process, treatment plan, medications, and discharge instructions  Description: Complete learning assessment and assess knowledge base.  Interventions:  - Provide teaching at level of understanding  - Provide teaching via preferred learning methods  Outcome: Progressing     Problem: METABOLIC, FLUID AND ELECTROLYTES - ADULT  Goal: Electrolytes maintained within normal limits  Description: INTERVENTIONS:  - Monitor labs and assess patient for signs and symptoms of electrolyte imbalances  - Administer electrolyte replacement as ordered  - Monitor response to electrolyte replacements, including repeat lab results as appropriate  - Instruct patient on fluid and nutrition as appropriate  Outcome: Progressing

## 2025-06-09 ENCOUNTER — PATIENT OUTREACH (OUTPATIENT)
Dept: CASE MANAGEMENT | Facility: OTHER | Age: 35
End: 2025-06-09

## 2025-06-09 ENCOUNTER — TRANSITIONAL CARE MANAGEMENT (OUTPATIENT)
Dept: FAMILY MEDICINE CLINIC | Facility: HOSPITAL | Age: 35
End: 2025-06-09

## 2025-06-09 NOTE — PROGRESS NOTES
Update received from [facility].  Patient declined ETOH treatment.  [facility] provided patient with information for [facility]; patient informed [facility] that she will set up treatment herself if interested.    ADT Notification received.  Patient discharged home.    Call placed to patient to check status and follow-up on insurance coverage & bill needs.    No answer, voicemail left, and awaiting return call.

## 2025-06-10 RX ORDER — HYDROMORPHONE HYDROCHLORIDE 2 MG/1
2 TABLET ORAL EVERY 4 HOURS PRN
Qty: 12 TABLET | Refills: 0 | Status: SHIPPED | OUTPATIENT
Start: 2025-06-10 | End: 2025-06-20

## 2025-06-16 ENCOUNTER — PATIENT OUTREACH (OUTPATIENT)
Dept: CASE MANAGEMENT | Facility: OTHER | Age: 35
End: 2025-06-16

## 2025-06-16 NOTE — PROGRESS NOTES
2nd outreach attempt to patient to check status and follow-up on insurance coverage & bill needs.  Per recent admission, patient declined ETOH treatment. [facility] provided patient with information for [facility]; patient informed [facility] that she will set up treatment herself if interested.    No answer, voicemail left, and awaiting return call.    Will send Unable to Reach letter to patient via email and close case at this time.  Will remain available to assist with any future needs.

## 2025-07-08 ENCOUNTER — TELEPHONE (OUTPATIENT)
Age: 35
End: 2025-07-08

## 2025-07-08 NOTE — TELEPHONE ENCOUNTER
Patients GI provider:  Dr. MOJICA    Number to return call: ( [phone]    Reason for call: Pt is self pay. Estimate complete. Please send estimate via mail as patient does not have mychart.

## 2025-07-09 NOTE — TELEPHONE ENCOUNTER
Could not mail as ov is 7/11/25-emailed copy of estimate to pt  Patient is aware it is being emailed

## 2025-07-11 ENCOUNTER — HOSPITAL ENCOUNTER (INPATIENT)
Facility: HOSPITAL | Age: 35
LOS: 6 days | Discharge: HOME | DRG: 282 | End: 2025-07-17
Attending: EMERGENCY MEDICINE | Admitting: STUDENT IN AN ORGANIZED HEALTH CARE EDUCATION/TRAINING PROGRAM
Payer: COMMERCIAL

## 2025-07-11 ENCOUNTER — OFFICE VISIT (OUTPATIENT)
Dept: GASTROENTEROLOGY | Facility: CLINIC | Age: 35
End: 2025-07-11

## 2025-07-11 ENCOUNTER — APPOINTMENT (EMERGENCY)
Dept: CT IMAGING | Facility: HOSPITAL | Age: 35
DRG: 282 | End: 2025-07-11
Payer: COMMERCIAL

## 2025-07-11 VITALS
HEIGHT: 65 IN | DIASTOLIC BLOOD PRESSURE: 98 MMHG | BODY MASS INDEX: 19.49 KG/M2 | SYSTOLIC BLOOD PRESSURE: 162 MMHG | WEIGHT: 117 LBS

## 2025-07-11 DIAGNOSIS — K85.21 ALCOHOL-INDUCED ACUTE PANCREATITIS WITH UNINFECTED NECROSIS: ICD-10-CM

## 2025-07-11 DIAGNOSIS — K74.00 FIBROSIS OF LIVER: ICD-10-CM

## 2025-07-11 DIAGNOSIS — K85.20 ALCOHOL-INDUCED ACUTE PANCREATITIS, UNSPECIFIED COMPLICATION STATUS: Primary | ICD-10-CM

## 2025-07-11 DIAGNOSIS — K85.20 ACUTE ALCOHOLIC PANCREATITIS: Primary | ICD-10-CM

## 2025-07-11 DIAGNOSIS — K85.90 ACUTE RECURRENT PANCREATITIS: ICD-10-CM

## 2025-07-11 DIAGNOSIS — R56.9 ALCOHOL WITHDRAWAL SEIZURE WITHOUT COMPLICATION (HCC): ICD-10-CM

## 2025-07-11 DIAGNOSIS — F10.930 ALCOHOL WITHDRAWAL SEIZURE WITHOUT COMPLICATION (HCC): ICD-10-CM

## 2025-07-11 DIAGNOSIS — F10.90 ALCOHOL USE DISORDER: ICD-10-CM

## 2025-07-11 DIAGNOSIS — R10.9 ABDOMINAL PAIN: ICD-10-CM

## 2025-07-11 DIAGNOSIS — F10.10 ALCOHOL ABUSE: ICD-10-CM

## 2025-07-11 LAB
ALBUMIN SERPL BCG-MCNC: 4.5 G/DL (ref 3.5–5)
ALP SERPL-CCNC: 77 U/L (ref 34–104)
ALT SERPL W P-5'-P-CCNC: 34 U/L (ref 7–52)
ANION GAP SERPL CALCULATED.3IONS-SCNC: 15 MMOL/L (ref 4–13)
AST SERPL W P-5'-P-CCNC: 93 U/L (ref 13–39)
B-HCG SERPL-ACNC: 1.9 MIU/ML (ref 0–5)
BASOPHILS # BLD AUTO: 0.05 THOUSANDS/ÂΜL (ref 0–0.1)
BASOPHILS NFR BLD AUTO: 1 % (ref 0–1)
BILIRUB SERPL-MCNC: 1.12 MG/DL (ref 0.2–1)
BILIRUB UR QL STRIP: NEGATIVE
BUN SERPL-MCNC: 8 MG/DL (ref 5–25)
CALCIUM SERPL-MCNC: 9.3 MG/DL (ref 8.4–10.2)
CHLORIDE SERPL-SCNC: 96 MMOL/L (ref 96–108)
CLARITY UR: CLEAR
CO2 SERPL-SCNC: 23 MMOL/L (ref 21–32)
COLOR UR: YELLOW
CREAT SERPL-MCNC: 0.47 MG/DL (ref 0.6–1.3)
CRP SERPL QL: <1 MG/L
EOSINOPHIL # BLD AUTO: 0.05 THOUSAND/ÂΜL (ref 0–0.61)
EOSINOPHIL NFR BLD AUTO: 1 % (ref 0–6)
ERYTHROCYTE [DISTWIDTH] IN BLOOD BY AUTOMATED COUNT: 14.6 % (ref 11.6–15.1)
EXT PREGNANCY TEST URINE: NEGATIVE
EXT. CONTROL: NORMAL
GFR SERPL CREATININE-BSD FRML MDRD: 129 ML/MIN/1.73SQ M
GLUCOSE SERPL-MCNC: 114 MG/DL (ref 65–140)
GLUCOSE UR STRIP-MCNC: NEGATIVE MG/DL
HCG SERPL QL: NEGATIVE
HCT VFR BLD AUTO: 34.8 % (ref 34.8–46.1)
HGB BLD-MCNC: 11.6 G/DL (ref 11.5–15.4)
HGB UR QL STRIP.AUTO: NEGATIVE
IMM GRANULOCYTES # BLD AUTO: 0.02 THOUSAND/UL (ref 0–0.2)
IMM GRANULOCYTES NFR BLD AUTO: 0 % (ref 0–2)
KETONES UR STRIP-MCNC: NEGATIVE MG/DL
LEUKOCYTE ESTERASE UR QL STRIP: NEGATIVE
LIPASE SERPL-CCNC: 131 U/L (ref 11–82)
LYMPHOCYTES # BLD AUTO: 1.85 THOUSANDS/ÂΜL (ref 0.6–4.47)
LYMPHOCYTES NFR BLD AUTO: 31 % (ref 14–44)
MCH RBC QN AUTO: 33.3 PG (ref 26.8–34.3)
MCHC RBC AUTO-ENTMCNC: 33.3 G/DL (ref 31.4–37.4)
MCV RBC AUTO: 100 FL (ref 82–98)
MONOCYTES # BLD AUTO: 0.45 THOUSAND/ÂΜL (ref 0.17–1.22)
MONOCYTES NFR BLD AUTO: 8 % (ref 4–12)
NEUTROPHILS # BLD AUTO: 3.47 THOUSANDS/ÂΜL (ref 1.85–7.62)
NEUTS SEG NFR BLD AUTO: 59 % (ref 43–75)
NITRITE UR QL STRIP: NEGATIVE
NRBC BLD AUTO-RTO: 0 /100 WBCS
PH UR STRIP.AUTO: 5.5 [PH]
PLATELET # BLD AUTO: 235 THOUSANDS/UL (ref 149–390)
PMV BLD AUTO: 10.2 FL (ref 8.9–12.7)
POTASSIUM SERPL-SCNC: 4.1 MMOL/L (ref 3.5–5.3)
PROT SERPL-MCNC: 7.3 G/DL (ref 6.4–8.4)
PROT UR STRIP-MCNC: NEGATIVE MG/DL
RBC # BLD AUTO: 3.48 MILLION/UL (ref 3.81–5.12)
SODIUM SERPL-SCNC: 134 MMOL/L (ref 135–147)
SP GR UR STRIP.AUTO: 1.01 (ref 1–1.03)
UROBILINOGEN UR STRIP-ACNC: <2 MG/DL
WBC # BLD AUTO: 5.89 THOUSAND/UL (ref 4.31–10.16)

## 2025-07-11 PROCEDURE — 80053 COMPREHEN METABOLIC PANEL: CPT

## 2025-07-11 PROCEDURE — 81025 URINE PREGNANCY TEST: CPT | Performed by: EMERGENCY MEDICINE

## 2025-07-11 PROCEDURE — 86140 C-REACTIVE PROTEIN: CPT | Performed by: PHYSICIAN ASSISTANT

## 2025-07-11 PROCEDURE — 84702 CHORIONIC GONADOTROPIN TEST: CPT

## 2025-07-11 PROCEDURE — 96374 THER/PROPH/DIAG INJ IV PUSH: CPT

## 2025-07-11 PROCEDURE — 99215 OFFICE O/P EST HI 40 MIN: CPT | Performed by: INTERNAL MEDICINE

## 2025-07-11 PROCEDURE — 99285 EMERGENCY DEPT VISIT HI MDM: CPT | Performed by: EMERGENCY MEDICINE

## 2025-07-11 PROCEDURE — 99223 1ST HOSP IP/OBS HIGH 75: CPT | Performed by: PHYSICIAN ASSISTANT

## 2025-07-11 PROCEDURE — 96376 TX/PRO/DX INJ SAME DRUG ADON: CPT

## 2025-07-11 PROCEDURE — 84703 CHORIONIC GONADOTROPIN ASSAY: CPT | Performed by: EMERGENCY MEDICINE

## 2025-07-11 PROCEDURE — 36415 COLL VENOUS BLD VENIPUNCTURE: CPT

## 2025-07-11 PROCEDURE — 74177 CT ABD & PELVIS W/CONTRAST: CPT

## 2025-07-11 PROCEDURE — 99285 EMERGENCY DEPT VISIT HI MDM: CPT

## 2025-07-11 PROCEDURE — 96375 TX/PRO/DX INJ NEW DRUG ADDON: CPT

## 2025-07-11 PROCEDURE — 81003 URINALYSIS AUTO W/O SCOPE: CPT | Performed by: EMERGENCY MEDICINE

## 2025-07-11 PROCEDURE — 85025 COMPLETE CBC W/AUTO DIFF WBC: CPT

## 2025-07-11 PROCEDURE — 96361 HYDRATE IV INFUSION ADD-ON: CPT

## 2025-07-11 PROCEDURE — 83690 ASSAY OF LIPASE: CPT

## 2025-07-11 RX ORDER — LORAZEPAM 2 MG/ML
4 INJECTION INTRAMUSCULAR ONCE
Status: COMPLETED | OUTPATIENT
Start: 2025-07-11 | End: 2025-07-11

## 2025-07-11 RX ORDER — ACETAMINOPHEN 325 MG/1
650 TABLET ORAL EVERY 6 HOURS PRN
Status: DISCONTINUED | OUTPATIENT
Start: 2025-07-11 | End: 2025-07-17 | Stop reason: HOSPADM

## 2025-07-11 RX ORDER — LANOLIN ALCOHOL/MO/W.PET/CERES
100 CREAM (GRAM) TOPICAL DAILY
Status: DISCONTINUED | OUTPATIENT
Start: 2025-07-12 | End: 2025-07-17 | Stop reason: HOSPADM

## 2025-07-11 RX ORDER — SODIUM CHLORIDE, SODIUM LACTATE, POTASSIUM CHLORIDE, CALCIUM CHLORIDE 600; 310; 30; 20 MG/100ML; MG/100ML; MG/100ML; MG/100ML
1.5 INJECTION, SOLUTION INTRAVENOUS CONTINUOUS
Status: DISCONTINUED | OUTPATIENT
Start: 2025-07-11 | End: 2025-07-13

## 2025-07-11 RX ORDER — FENTANYL CITRATE 50 UG/ML
50 INJECTION, SOLUTION INTRAMUSCULAR; INTRAVENOUS ONCE
Refills: 0 | Status: COMPLETED | OUTPATIENT
Start: 2025-07-11 | End: 2025-07-11

## 2025-07-11 RX ORDER — NICOTINE 21 MG/24HR
1 PATCH, TRANSDERMAL 24 HOURS TRANSDERMAL DAILY
Status: DISCONTINUED | OUTPATIENT
Start: 2025-07-11 | End: 2025-07-17 | Stop reason: HOSPADM

## 2025-07-11 RX ORDER — ONDANSETRON 2 MG/ML
4 INJECTION INTRAMUSCULAR; INTRAVENOUS ONCE
Status: DISCONTINUED | OUTPATIENT
Start: 2025-07-11 | End: 2025-07-17 | Stop reason: HOSPADM

## 2025-07-11 RX ORDER — HYDROMORPHONE HCL/PF 1 MG/ML
1 SYRINGE (ML) INJECTION ONCE
Status: COMPLETED | OUTPATIENT
Start: 2025-07-11 | End: 2025-07-11

## 2025-07-11 RX ORDER — ENOXAPARIN SODIUM 100 MG/ML
40 INJECTION SUBCUTANEOUS DAILY
Status: DISCONTINUED | OUTPATIENT
Start: 2025-07-12 | End: 2025-07-17 | Stop reason: HOSPADM

## 2025-07-11 RX ORDER — ONDANSETRON 2 MG/ML
4 INJECTION INTRAMUSCULAR; INTRAVENOUS EVERY 6 HOURS PRN
Status: DISCONTINUED | OUTPATIENT
Start: 2025-07-11 | End: 2025-07-17 | Stop reason: HOSPADM

## 2025-07-11 RX ORDER — ONDANSETRON 2 MG/ML
4 INJECTION INTRAMUSCULAR; INTRAVENOUS ONCE
Status: COMPLETED | OUTPATIENT
Start: 2025-07-11 | End: 2025-07-11

## 2025-07-11 RX ORDER — POLYETHYLENE GLYCOL 3350 17 G/17G
17 POWDER, FOR SOLUTION ORAL DAILY PRN
Status: DISCONTINUED | OUTPATIENT
Start: 2025-07-11 | End: 2025-07-13

## 2025-07-11 RX ORDER — HYDROMORPHONE HYDROCHLORIDE 2 MG/1
2 TABLET ORAL EVERY 4 HOURS PRN
Status: ON HOLD | COMMUNITY
End: 2025-07-17

## 2025-07-11 RX ORDER — HYDROMORPHONE HCL/PF 1 MG/ML
0.5 SYRINGE (ML) INJECTION EVERY 2 HOUR PRN
Status: DISCONTINUED | OUTPATIENT
Start: 2025-07-11 | End: 2025-07-14

## 2025-07-11 RX ORDER — HYDROXYZINE HYDROCHLORIDE 25 MG/1
25 TABLET, FILM COATED ORAL EVERY 6 HOURS PRN
Status: DISCONTINUED | OUTPATIENT
Start: 2025-07-11 | End: 2025-07-17 | Stop reason: HOSPADM

## 2025-07-11 RX ORDER — FOLIC ACID 1 MG/1
1 TABLET ORAL DAILY
Status: DISCONTINUED | OUTPATIENT
Start: 2025-07-12 | End: 2025-07-17 | Stop reason: HOSPADM

## 2025-07-11 RX ORDER — NICOTINE 21 MG/24HR
21 PATCH, TRANSDERMAL 24 HOURS TRANSDERMAL ONCE
Status: DISCONTINUED | OUTPATIENT
Start: 2025-07-11 | End: 2025-07-11

## 2025-07-11 RX ADMIN — SODIUM CHLORIDE 1000 ML: 0.9 INJECTION, SOLUTION INTRAVENOUS at 19:06

## 2025-07-11 RX ADMIN — LORAZEPAM 4 MG: 2 INJECTION INTRAMUSCULAR; INTRAVENOUS at 23:10

## 2025-07-11 RX ADMIN — SODIUM CHLORIDE, SODIUM LACTATE, POTASSIUM CHLORIDE, AND CALCIUM CHLORIDE 531 ML: .6; .31; .03; .02 INJECTION, SOLUTION INTRAVENOUS at 22:46

## 2025-07-11 RX ADMIN — TRIMETHOBENZAMIDE HYDROCHLORIDE 200 MG: 100 INJECTION INTRAMUSCULAR at 23:16

## 2025-07-11 RX ADMIN — SODIUM CHLORIDE, SODIUM LACTATE, POTASSIUM CHLORIDE, AND CALCIUM CHLORIDE 1.5 ML/KG/HR: .6; .31; .03; .02 INJECTION, SOLUTION INTRAVENOUS at 23:50

## 2025-07-11 RX ADMIN — FENTANYL CITRATE 50 MCG: 50 INJECTION INTRAMUSCULAR; INTRAVENOUS at 20:54

## 2025-07-11 RX ADMIN — HYDROMORPHONE HYDROCHLORIDE 1 MG: 1 INJECTION, SOLUTION INTRAMUSCULAR; INTRAVENOUS; SUBCUTANEOUS at 22:14

## 2025-07-11 RX ADMIN — FENTANYL CITRATE 50 MCG: 50 INJECTION INTRAMUSCULAR; INTRAVENOUS at 19:12

## 2025-07-11 RX ADMIN — ONDANSETRON 4 MG: 2 INJECTION INTRAMUSCULAR; INTRAVENOUS at 19:06

## 2025-07-11 RX ADMIN — IOHEXOL 50 ML: 350 INJECTION, SOLUTION INTRAVENOUS at 20:21

## 2025-07-11 RX ADMIN — ONDANSETRON 4 MG: 2 INJECTION INTRAMUSCULAR; INTRAVENOUS at 22:14

## 2025-07-11 RX ADMIN — NICOTINE 1 PATCH: 21 PATCH, EXTENDED RELEASE TRANSDERMAL at 22:14

## 2025-07-11 NOTE — ED PROVIDER NOTES
Time reflects when diagnosis was documented in both MDM as applicable and the Disposition within this note       Time User Action Codes Description Comment    7/11/2025 10:02 PM Eduardo Mercer Add [K85.20] Acute on chronic alcoholic pancreatitis     7/11/2025 10:04 PM Osman Olsen Add [K85.90] Acute recurrent pancreatitis     7/11/2025 10:04 PM Osman Olsen Add [F10.90] Alcohol use disorder     7/12/2025  8:02 AM Lester Rudd [F10.930,  R56.9] Alcohol withdrawal seizure without complication (HCC)     7/12/2025  8:02 AM Lester Rudd [F10.10] Alcohol abuse           ED Disposition       ED Disposition   Admit    Condition   Stable    Date/Time   Fri Jul 11, 2025 10:03 PM    Comment   Case was discussed with [facility] and the patient's admission status was agreed to be Admission Status: inpatient status to the service of Dr. Maynard .               Assessment & Plan       Medical Decision Making  Abdominal pain nausea vomiting with history of pancreatitis will check labs and CAT scan for further evaluation    Amount and/or Complexity of Data Reviewed  Labs: ordered.  Radiology: ordered.    Risk  Prescription drug management.  Decision regarding hospitalization.             Medications   ondansetron (ZOFRAN) injection 4 mg (4 mg Intravenous Given 7/11/25 2214)   acetaminophen (TYLENOL) tablet 650 mg (has no administration in time range)   nicotine (NICODERM CQ) 21 mg/24 hr TD 24 hr patch 1 patch (1 patch Transdermal Medication Applied 7/11/25 2214)   enoxaparin (LOVENOX) subcutaneous injection 40 mg (has no administration in time range)   ondansetron (ZOFRAN) injection 4 mg (4 mg Intravenous Not Given 7/11/25 2220)   sodium chloride 0.9 % bolus 1,000 mL (0 mL Intravenous Stopped 7/11/25 2006)   fentaNYL injection 50 mcg (50 mcg Intravenous Given 7/11/25 1912)   ondansetron (ZOFRAN) injection 4 mg (4 mg Intravenous Given 7/11/25 1906)   iohexol (OMNIPAQUE) 350 MG/ML injection (MULTI-DOSE) 50 mL (50 mL  Intravenous Given 7/11/25 2021)   fentaNYL injection 50 mcg (50 mcg Intravenous Given 7/11/25 2054)   HYDROmorphone (DILAUDID) injection 1 mg (1 mg Intravenous Given 7/11/25 2214)       ED Risk Strat Scores                 CIWA-Ar Score       Row Name 07/13/25 0500 07/13/25 0458 07/13/25 0353       CIWA-Ar    Blood Pressure 124/78 -- --    Pulse 67 68 92    Nausea and Vomiting -- 2 1    Tactile Disturbances -- 2 0    Tremor -- 2 0    Auditory Disturbances -- 0 0    Paroxysmal Sweats -- 0 0    Visual Disturbances -- 0 0    Anxiety -- 5 6    Headache, Fullness in Head -- 3 2    Agitation -- 0 0    Orientation and Clouding of Sensorium -- 0 0    CIWA-Ar Total -- 14 9      [facility] Name 07/13/25 0130 07/13/25 0100 07/12/25 23:05:13       CIWA-Ar    Pulse -- 78 --    Nausea and Vomiting 1 -- 1    Tactile Disturbances 0 -- 3    Tremor 1 -- 2    Auditory Disturbances 0 -- 0    Paroxysmal Sweats 0 -- 0    Visual Disturbances 0 -- 0    Anxiety 4 -- 5    Headache, Fullness in Head 0 -- 0    Agitation 0 -- 0    Orientation and Clouding of Sensorium 0 -- 0    CIWA-Ar Total 6 -- --      Row Name 07/12/25 2030 07/12/25 19:30:21 07/12/25 1927       CIWA-Ar    Pulse 61 -- 71    Nausea and Vomiting 2 2 2    Tactile Disturbances 1 1 1    Tremor 1 2 2    Auditory Disturbances 0 0 0    Paroxysmal Sweats 0 0 0    Visual Disturbances 0 0 0    Anxiety 3 4 4    Headache, Fullness in Head 0 0 0    Agitation 0 0 0    Orientation and Clouding of Sensorium 0 0 0    CIWA-Ar Total 7 9 --      Row Name 07/12/25 1600 07/12/25 1200 07/12/25 0700       CIWA-Ar    Nausea and Vomiting 1 1 2    Tactile Disturbances 1 1 2    Tremor 3 3 4    Auditory Disturbances 0 0 0    Paroxysmal Sweats 0 0 0    Visual Disturbances 0 0 0    Anxiety 1 1 4    Headache, Fullness in Head 0 0 0    Agitation 0 0 0    Orientation and Clouding of Sensorium 0 0 0    CIWA-Ar Total 6 6 12      Row Name 07/12/25 0407 07/12/25 0400 07/11/25 2350       CIWA-Ar    Blood Pressure 141/86  -- --    Pulse 63 76 --    Nausea and Vomiting 1 1 2    Tactile Disturbances 2 2 2    Tremor 4 5 2    Auditory Disturbances 0 0 0    Paroxysmal Sweats 0 0 0    Visual Disturbances 0 0 0    Anxiety 3 1 1    Headache, Fullness in Head 2 2 3    Agitation 0 0 0    Orientation and Clouding of Sensorium 0 0 0    CIWA-Ar Total 12 -- 10      Row Name 07/11/25 2348 07/11/25 2248          CIWA-Ar    Pulse 78 --     Nausea and Vomiting 2 6     Tactile Disturbances 2 3     Tremor 2 6     Auditory Disturbances 0 0     Paroxysmal Sweats 0 1     Visual Disturbances 0 0     Anxiety 1 6     Headache, Fullness in Head 3 3     Agitation 0 0     Orientation and Clouding of Sensorium 0 0     CIWA-Ar Total 10 25                     No data recorded                            History of Present Illness       Chief Complaint   Patient presents with    Abdominal Pain     Pt states that she started a few days ago and saw a GI doctor today and was told to come here. Pt states that the pain has been going on for 3 weeks pt states that she started drinking again. Pt states that she drink 3 glasses of wine a day. Pt states that she has been taking oxycodone last taken 3 weeks ago and has 2 left, hydrophone was a few months ago        Past Medical History[1]   Past Surgical History[2]   Family History[3]   Social History[4]   E-Cigarette/Vaping    E-Cigarette Use Never User       E-Cigarette/Vaping Substances    Nicotine No     THC No     CBD No     Flavoring No     Other No     Unknown No       I have reviewed and agree with the history as documented.     Abdominal pain and vomiting with a history of pancreatitis and recent alcohol use sent here by gastroenterology from the office      History provided by:  Patient  Medical Problem  Location:  Upper abdomen  Quality:  Sharp pain with nausea vomiting history of pancreatitis  Severity:  Severe  Onset quality:  Gradual  Duration:  2 weeks  Timing:  Constant  Progression:  Worsening  Chronicity:   Recurrent  Context:  Abdominal pain nausea and vomiting worsening over the past 2 weeks  Worsened by:  Alcohol use  Associated symptoms: abdominal pain, nausea and vomiting        Review of Systems   Gastrointestinal:  Positive for abdominal pain, nausea and vomiting.   All other systems reviewed and are negative.          Objective       ED Triage Vitals   Temperature Pulse Blood Pressure Respirations SpO2 Patient Position - Orthostatic VS   07/11/25 1640 07/11/25 1640 07/11/25 1640 07/11/25 1640 07/11/25 1640 07/11/25 1915   97.8 °F (36.6 °C) (!) 109 140/88 18 97 % Sitting      Temp Source Heart Rate Source BP Location FiO2 (%) Pain Score    07/11/25 1640 07/11/25 1915 07/11/25 1915 -- 07/11/25 1912    Temporal Monitor Right arm  10 - Worst Possible Pain      Vitals      Date and Time Temp Pulse SpO2 Resp BP Pain Score FACES Pain Rating User   07/13/25 0504 -- -- -- -- -- 9 -- VS   07/13/25 0501 -- 72 96 % -- 124/78 -- -- DII   07/13/25 0500 -- 67 -- -- 124/78 -- -- VS   07/13/25 0458 -- 68 -- -- -- -- -- VS   07/13/25 0356 -- -- -- 18 -- -- -- VEDA   07/13/25 0356 -- 56 98 % -- 144/85 -- -- DII   07/13/25 0353 -- 92 -- -- -- -- -- VS   07/13/25 0128 -- -- -- -- -- 10 - Worst Possible Pain -- VS   07/13/25 0100 -- 78 -- -- -- -- -- VS   07/12/25 2309 -- -- -- -- -- 9 -- VS   07/12/25 2305 97.7 °F (36.5 °C) 78 98 % 17 127/87 -- -- DII   07/12/25 2030 -- 61 -- -- -- -- -- VS   07/12/25 1943 -- -- -- -- -- 9 -- VS   07/12/25 1930 97.8 °F (36.6 °C) 74 92 % 17 130/96 -- -- DII   07/12/25 1927 -- 71 -- -- -- -- -- VS   07/12/25 1925 -- -- -- -- -- 9 -- VS   07/12/25 1702 -- -- -- -- -- 10 - Worst Possible Pain -- AD   07/12/25 1518 98.5 °F (36.9 °C) 68 96 % 18 123/85 -- -- DII   07/12/25 1420 -- -- -- -- -- 9 -- AD   07/12/25 1200 -- -- -- -- -- 9 -- AD   07/12/25 0900 -- -- 94 % -- -- -- -- AD   07/12/25 0839 98.3 °F (36.8 °C) 84 96 % 20 140/100 -- -- DII   07/12/25 0754 -- -- -- -- -- 9 -- AD   07/12/25 0407 97.6 °F  (36.4 °C) 63 92 % 18 141/86 -- -- DII   07/12/25 0407 -- 63 -- -- 141/86 -- -- VS   07/12/25 0400 -- 76 -- -- -- -- -- VS   07/12/25 0357 -- -- -- -- -- 10 - Worst Possible Pain -- VS   07/11/25 2350 -- -- -- 16 -- -- -- VS   07/11/25 2350 -- 76 92 % -- 98/62 -- -- DII   07/11/25 2348 -- 78 -- -- -- -- -- VS   07/11/25 2318 -- 78 90 % 16 122/74 -- -- DII   07/11/25 2301 -- 78 94 % -- 148/93 -- -- DII   07/11/25 2248 99.1 °F (37.3 °C) 72 95 % 16 139/80 -- -- VS   07/11/25 2237 -- -- -- 16 -- -- -- VEDA   07/11/25 2237 99.1 °F (37.3 °C) 72 95 % -- 139/80 -- -- DII   07/11/25 2237 -- -- -- -- -- 9 -- VS   07/11/25 2214 -- -- -- -- -- 10 - Worst Possible Pain -- SS   07/11/25 2100 -- 76 95 % 18 117/68 -- -- SS   07/11/25 2054 -- -- -- -- -- 10 - Worst Possible Pain -- SS   07/11/25 1915 -- 82 97 % 17 126/78 -- -- SS   07/11/25 1912 -- -- -- -- -- 10 - Worst Possible Pain -- SS   07/11/25 1640 97.8 °F (36.6 °C) 109 97 % 18 140/88 -- -- LD            Physical Exam  Vitals and nursing note reviewed.   Constitutional:       General: She is in acute distress.      Appearance: She is ill-appearing.   HENT:      Head: Normocephalic and atraumatic.      Right Ear: Tympanic membrane, ear canal and external ear normal.      Left Ear: Tympanic membrane, ear canal and external ear normal.      Mouth/Throat:      Mouth: Mucous membranes are dry.     Eyes:      General:         Right eye: No discharge.         Left eye: No discharge.      Extraocular Movements: Extraocular movements intact.      Pupils: Pupils are equal, round, and reactive to light.       Cardiovascular:      Rate and Rhythm: Normal rate and regular rhythm.      Pulses: Normal pulses.      Heart sounds: No murmur heard.     No friction rub. No gallop.   Pulmonary:      Effort: No respiratory distress.      Breath sounds: No stridor. No wheezing, rhonchi or rales.   Abdominal:      General: There is no distension.      Palpations: Abdomen is soft.      Tenderness:  There is abdominal tenderness. There is guarding.      Comments: Left upper quadrant tenderness     Musculoskeletal:         General: No swelling, tenderness, deformity or signs of injury. Normal range of motion.      Cervical back: Neck supple. No rigidity.      Right lower leg: No edema.      Left lower leg: No edema.     Skin:     General: Skin is warm and dry.      Findings: No erythema or rash.     Neurological:      General: No focal deficit present.      Mental Status: She is alert and oriented to person, place, and time.      Cranial Nerves: No cranial nerve deficit.      Motor: No weakness.      Gait: Gait normal.     Psychiatric:         Mood and Affect: Mood normal.         Behavior: Behavior normal.         Thought Content: Thought content normal.         Results Reviewed       Procedure Component Value Units Date/Time    hCG, qualitative pregnancy [[id number]]  (Normal) Collected: 07/11/25 1642    Lab Status: Final result Specimen: Blood from Arm, Right Updated: 07/11/25 2350     Preg, Serum Negative    C-reactive protein [[id number]]  (Normal) Collected: 07/11/25 1642    Lab Status: Final result Specimen: Blood from Arm, Right Updated: 07/11/25 2221     CRP <1.0 mg/L     UA (URINE) with reflex to Scope [[id number]] Collected: 07/11/25 1911    Lab Status: Final result Specimen: Urine, Clean Catch Updated: 07/11/25 1940     Color, UA Yellow     Clarity, UA Clear     Specific Gravity, UA 1.010     pH, UA 5.5     Leukocytes, UA Negative     Nitrite, UA Negative     Protein, UA Negative mg/dl      Glucose, UA Negative mg/dl      Ketones, UA Negative mg/dl      Urobilinogen, UA <2.0 mg/dl      Bilirubin, UA Negative     Occult Blood, UA Negative    POCT pregnancy, urine [[id number]]  (Normal) Collected: 07/11/25 1908    Lab Status: Final result Updated: 07/11/25 1912     EXT Preg Test, Ur Negative     Control Valid    hCG, quantitative, pregnancy [[id number]]  (Normal) Collected: 07/11/25 1642    Lab Status:  Final result Specimen: Blood from Arm, Right Updated: 07/11/25 1713     HCG, Quant 1.9 mIU/mL     Narrative:       Expected Ranges:    HCG results between 5.0 and 25.0 mIU/mL may be indicative of early pregnancy but should be interpreted in light of the total clinical presentation.    HCG can rise to detectable levels in brenda and post menopausal women (0-11.6 mIU/mL).     Approximate               Approximate HCG  Gestation age          Concentration ( mIU/mL)  _____________          ______________________   Weeks                      HCG values  0.2-1                       5-50  1-2                           2-3                         100-5000  3-4                         500-17475  4-5                         1000-62309  5-6                         [id number]  6-8                         [id number]  8-12                        [id number]      Comprehensive metabolic panel [[id number]]  (Abnormal) Collected: 07/11/25 1642    Lab Status: Final result Specimen: Blood from Arm, Right Updated: 07/11/25 1705     Sodium 134 mmol/L      Potassium 4.1 mmol/L      Chloride 96 mmol/L      CO2 23 mmol/L      ANION GAP 15 mmol/L      BUN 8 mg/dL      Creatinine 0.47 mg/dL      Glucose 114 mg/dL      Calcium 9.3 mg/dL      AST 93 U/L      ALT 34 U/L      Alkaline Phosphatase 77 U/L      Total Protein 7.3 g/dL      Albumin 4.5 g/dL      Total Bilirubin 1.12 mg/dL      eGFR 129 ml/min/1.73sq m     Narrative:      [employer] guidelines for Chronic Kidney Disease (CKD):     Stage 1 with normal or high GFR (GFR > 90 mL/min/1.73 square meters)    Stage 2 Mild CKD (GFR = 60-89 mL/min/1.73 square meters)    Stage 3A Moderate CKD (GFR = 45-59 mL/min/1.73 square meters)    Stage 3B Moderate CKD (GFR = 30-44 mL/min/1.73 square meters)    Stage 4 Severe CKD (GFR = 15-29 mL/min/1.73 square meters)    Stage 5 End Stage CKD (GFR <15 mL/min/1.73 square meters)  Note: GFR calculation is accurate only with a  steady state creatinine    Lipase [[id number]]  (Abnormal) Collected: 07/11/25 1642    Lab Status: Final result Specimen: Blood from Arm, Right Updated: 07/11/25 1705     Lipase 131 u/L     CBC and differential [[id number]]  (Abnormal) Collected: 07/11/25 1642    Lab Status: Final result Specimen: Blood from Arm, Right Updated: 07/11/25 1649     WBC 5.89 Thousand/uL      RBC 3.48 Million/uL      Hemoglobin 11.6 g/dL      Hematocrit 34.8 %       fL      MCH 33.3 pg      MCHC 33.3 g/dL      RDW 14.6 %      MPV 10.2 fL      Platelets 235 Thousands/uL      nRBC 0 /100 WBCs      Segmented % 59 %      Immature Grans % 0 %      Lymphocytes % 31 %      Monocytes % 8 %      Eosinophils Relative 1 %      Basophils Relative 1 %      Absolute Neutrophils 3.47 Thousands/µL      Absolute Immature Grans 0.02 Thousand/uL      Absolute Lymphocytes 1.85 Thousands/µL      Absolute Monocytes 0.45 Thousand/µL      Eosinophils Absolute 0.05 Thousand/µL      Basophils Absolute 0.05 Thousands/µL             CT abdomen pelvis with contrast   Final Interpretation by Erica Oconnor MD (07/11 2127)      Mild worsening stranding involving the pancreaticoduodenal groove, adjacent to the pancreatic head, which likely represents recurrent acute pancreatitis in this region. No pancreatic parenchymal necrosis noted.      Multiple pancreatic pseudocysts, which have decreased in size when compared to the prior study.      Reactive wall thickening involving the stomach along the lesser curvature. No bowel obstruction noted.      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      Diffuse hepatic steatosis.         Workstation performed: [id number]             Procedures    ED Medication and Procedure Management   Prior to Admission Medications   Prescriptions Last Dose Informant Patient Reported? Taking?   HYDROmorphone (DILAUDID) 2 mg tablet Past Month Self Yes Yes   Sig: Take 2 mg by mouth every  4 (four) hours as needed for moderate pain   folic acid (FOLVITE) 1 mg tablet Not Taking Self No No   Sig: Take 1 tablet (1 mg total) by mouth daily   Patient not taking: Reported on 7/11/2025   hydrOXYzine HCL (ATARAX) 50 mg tablet Past Month Self No Yes   Sig: Take 0.5 tablets (25 mg total) by mouth every 6 (six) hours as needed for anxiety Can take 1-2 tabs for anxiety as needed   nicotine (NICODERM CQ) 14 mg/24hr TD 24 hr patch 7/11/2025 Self No Yes   Sig: Place 1 patch on the skin daily over 24 hours   ondansetron (ZOFRAN) 4 mg tablet Not Taking Self No No   Sig: Take 1 tablet (4 mg total) by mouth every 8 (eight) hours as needed for nausea or vomiting   Patient not taking: Reported on 7/11/2025   thiamine 100 MG tablet Not Taking Self No No   Sig: Take 1 tablet (100 mg total) by mouth daily   Patient not taking: Reported on 7/11/2025      Facility-Administered Medications: None     Current Discharge Medication List        CONTINUE these medications which have NOT CHANGED    Details   HYDROmorphone (DILAUDID) 2 mg tablet Take 2 mg by mouth every 4 (four) hours as needed for moderate pain      hydrOXYzine HCL (ATARAX) 50 mg tablet Take 0.5 tablets (25 mg total) by mouth every 6 (six) hours as needed for anxiety Can take 1-2 tabs for anxiety as needed  Qty: 60 tablet, Refills: 0    Associated Diagnoses: Alcohol-induced acute pancreatitis, unspecified complication status      nicotine (NICODERM CQ) 14 mg/24hr TD 24 hr patch Place 1 patch on the skin daily over 24 hours  Qty: 28 patch, Refills: 0    Associated Diagnoses: Tobacco abuse      folic acid (FOLVITE) 1 mg tablet Take 1 tablet (1 mg total) by mouth daily  Qty: 30 tablet, Refills: 0    Associated Diagnoses: Alcohol-induced acute pancreatitis, unspecified complication status      ondansetron (ZOFRAN) 4 mg tablet Take 1 tablet (4 mg total) by mouth every 8 (eight) hours as needed for nausea or vomiting  Qty: 20 tablet, Refills: 0    Associated Diagnoses:  Alcohol-induced acute pancreatitis with uninfected necrosis      thiamine 100 MG tablet Take 1 tablet (100 mg total) by mouth daily  Qty: 30 tablet, Refills: 0    Associated Diagnoses: Alcohol-induced acute pancreatitis, unspecified complication status           No discharge procedures on file.  ED SEPSIS DOCUMENTATION   Time reflects when diagnosis was documented in both MDM as applicable and the Disposition within this note       Time User Action Codes Description Comment    7/11/2025 10:02 PM Eduardo Mercer [K85.20] Acute on chronic alcoholic pancreatitis     7/11/2025 10:04 PM Osman Olsen [K85.90] Acute recurrent pancreatitis     7/11/2025 10:04 PM Osman Olsen [F10.90] Alcohol use disorder     7/12/2025  8:02 AM Lester Rudd [F10.930,  R56.9] Alcohol withdrawal seizure without complication (HCC)     7/12/2025  8:02 AM Lester Rudd [F10.10] Alcohol abuse                      [1]   Past Medical History:  Diagnosis Date    Acute alcoholic pancreatitis 11/03/2023    Hypertension     SIRS (systemic inflammatory response syndrome) (HCC) 04/10/2024   [2]   Past Surgical History:  Procedure Laterality Date    WISDOM TOOTH EXTRACTION     [3] No family history on file.  [4]   Social History  Tobacco Use    Smoking status: Every Day     Current packs/day: 0.50     Types: Cigarettes     Passive exposure: Never    Smokeless tobacco: Never    Tobacco comments:     Per pt last drink was today 9/24/24 one glass of wine. Had previously stop on 4/4/24 for 1 month    Vaping Use    Vaping status: Never Used   Substance Use Topics    Alcohol use: Yes     Alcohol/week: 21.0 standard drinks of alcohol     Types: 21 Glasses of wine per week     Comment: 3 glasses of wine a day    Drug use: Never        Shadi Gipson DO  07/13/25 0717

## 2025-07-11 NOTE — PROGRESS NOTES
Name: Kaykay Holland      : 1990      MRN: [id number]  Encounter Provider: Tee Stockton MD  Encounter Date: 2025   Encounter department: [facility] [place]      Assessment & Plan  1. Acute on Chronic Pancreatitis:  - Flare-up likely due to recent alcohol consumption  - Persistent abdominal pain radiating to lower back, darker stools, hematemesis, alternating constipation and diarrhea, difficulty eating, night sweats, and tingling in feet  - Rest bowel, maintain hydration with clear liquids, avoid alcohol  - Recommend Pedialyte or oral rehydration solutions over Gatorade  - Recommended hospitalization from pancreatitis if condition worsens or food intolerable  - Ordered blood work and CT scan    2. Alcohol Use Disorder:  - Recent alcohol use impacting health  - Attended AA meetings but finds them anxiety-inducing  - Recommend inpatient rehabilitation  - Encourage therapy and consider inpatient rehab if hospitalized  - Counseled on absolute cessation  - check ultrasound elastography to see if cirrhosis is present.    Results  - CT abdomen/pelvis (2025):    - Pancreatitis    - Acute collection from main pancreatic duct    - Pseudocyst in pancreatic head (1.6 x 1.4 cm)    - Hepatomegaly    - Steatosis    - Mild surface nodularity suggesting early cirrhosis     Problem List Items Addressed This Visit       Acute on chronic alcoholic pancreatitis - Primary    Relevant Orders    CT abdomen pelvis w contrast    Lipase    CBC and differential    Comprehensive metabolic panel    Protime-INR    Alcohol use disorder    Abdominal pain    Relevant Orders    CT abdomen pelvis w contrast    Lipase    CBC and differential    Comprehensive metabolic panel    Protime-INR     Other Visit Diagnoses         Fibrosis of liver        Relevant Orders    US elastography             Chief Complaint   Patient presents with    Follow-up     Still having abdominal pain       History of  Present Illness  The patient is a 35-year-old female with a history of alcohol use disorder, recurrent chronic pancreatitis, and recently diagnosed cirrhosis. She was hospitalized in June 2025 for acute exacerbation of chronic pancreatitis and alcoholic cirrhosis. Her medical history includes an EUS-guided cyst drainage performed in December 2024. A CT scan of the abdomen and pelvis on June 1, 2025, revealed findings consistent with pancreatitis, an acute fluid collection from the main pancreatic duct, a pseudocyst in the pancreatic head measuring 1.6 x 1.4 cm, hepatomegaly, hepatic steatosis, and mild surface nodularity indicative of early cirrhosis. An esophagogastroduodenoscopy (EGD) conducted in December 2024 showed no evidence of esophageal or gastric varices. During her hospitalization, there were no signs of ascites or hepatic encephalopathy. Plans are in place for ultrasound elastography to confirm the diagnosis of cirrhosis.    Abdominal Pain  - Persistent abdominal pain radiating to the lower back  - Accompanied by nausea, vomiting, alternating constipation and diarrhea, melena, poor appetite, night sweats, and paresthesia in the feet  - Reports no fevers    Diet and Lifestyle  - Diet primarily consists of cottage cheese, peanut butter, jelly sandwiches, rice, oatmeal, and bananas  - Recently consumed alcohol and used Liquid IV for hydration  - Currently unemployed and expresses concern about her financial situation    PAST SURGICAL HISTORY:  EUS-guided cyst drainage in December 2024.    SOCIAL HISTORY  - Currently unemployed  - Consumes cottage cheese, peanut butter and jelly sandwiches, rice, oatmeal, and bananas  - Consumes alcohol, including wine       Historical Information   Past Medical History[1]  Past Surgical History[2]  Social History     Substance and Sexual Activity   Alcohol Use Yes    Alcohol/week: 21.0 standard drinks of alcohol    Types: 21 Glasses of wine per week    Comment: last drank  "3/30/25     Social History     Substance and Sexual Activity   Drug Use Never     Tobacco Use History[3]  Family History[4]    Meds/Allergies   Current Medications[5]  Allergies[6]    PHYSICAL EXAM:    Blood pressure 162/98, height 5' 5\" (1.651 m), weight 53.1 kg (117 lb). Body mass index is 19.47 kg/m².  Physical Exam  - Abdomen: Severe Tenderness upon palpation, particularly in the LUQ region    General Appearance: No apparent distress, cooperative, alert.  Eyes: Anicteric.   Rectal: Deferred.  Musculoskeletal: No edema.  Skin: No jaundice.     OTHER LAB RESULTS:   Lab Results   Component Value Date    WBC 5.04 06/04/2025    WBC 5.97 06/03/2025    WBC 7.63 06/02/2025    HGB 11.6 06/04/2025    HGB 11.4 (L) 06/03/2025    HGB 11.6 06/02/2025     (H) 06/04/2025     06/04/2025     (L) 06/03/2025     06/02/2025    INR 1.14 06/02/2025    INR 1.04 05/20/2025    INR 1.01 05/19/2025     Lab Results   Component Value Date    K 4.0 06/06/2025     06/06/2025    CO2 30 06/06/2025    BUN 4 (L) 06/06/2025    CREATININE 0.42 (L) 06/06/2025    GLUF 81 04/09/2024    CALCIUM 8.7 06/06/2025    CORRECTEDCA 9.3 06/06/2025    AST 20 06/06/2025    AST 19 06/05/2025    AST 26 06/04/2025    ALT 16 06/06/2025    ALT 15 06/05/2025    ALT 20 06/04/2025    ALKPHOS 70 06/06/2025    ALKPHOS 71 06/05/2025    ALKPHOS 79 06/04/2025    ALB 3.3 (L) 06/06/2025    TBILI 0.46 06/06/2025    TBILI 0.64 06/05/2025    TBILI 0.87 06/04/2025    EGFR 134 06/06/2025     Lab Results   Component Value Date    IRON 44 (L) 04/10/2024    TIBC 275 04/10/2024    FERRITIN 2,560 (H) 04/10/2024     Lab Results   Component Value Date    LIPASE 79 05/20/2025       OTHER RADIOLOGY RESULTS:   No results found.         [1]   Past Medical History:  Diagnosis Date    Acute alcoholic pancreatitis 11/03/2023    Hypertension     SIRS (systemic inflammatory response syndrome) (HCC) 04/10/2024   [2]   Past Surgical History:  Procedure Laterality Date "    WISDOM TOOTH EXTRACTION     [3]   Social History  Tobacco Use   Smoking Status Every Day    Current packs/day: 0.50    Types: Cigarettes    Passive exposure: Never   Smokeless Tobacco Never   Tobacco Comments    Per pt last drink was today 9/24/24 one glass of wine. Had previously stop on 4/4/24 for 1 month    [4] No family history on file.  [5]   Current Outpatient Medications:     folic acid (FOLVITE) 1 mg tablet    HYDROmorphone (DILAUDID) 2 mg tablet    hydrOXYzine HCL (ATARAX) 50 mg tablet    nicotine (NICODERM CQ) 14 mg/24hr TD 24 hr patch    ondansetron (ZOFRAN) 4 mg tablet    thiamine 100 MG tablet  [6]   Allergies  Allergen Reactions    Bee Pollen Anaphylaxis    Other Edema     Bee stings (localized edema)

## 2025-07-12 PROBLEM — F10.20 ALCOHOL USE DISORDER, SEVERE, DEPENDENCE (HCC): Status: ACTIVE | Noted: 2024-11-23

## 2025-07-12 LAB
ALBUMIN SERPL BCG-MCNC: 3.9 G/DL (ref 3.5–5)
ALP SERPL-CCNC: 63 U/L (ref 34–104)
ALT SERPL W P-5'-P-CCNC: 28 U/L (ref 7–52)
ANION GAP SERPL CALCULATED.3IONS-SCNC: 10 MMOL/L (ref 4–13)
AST SERPL W P-5'-P-CCNC: 63 U/L (ref 13–39)
BASOPHILS # BLD AUTO: 0.05 THOUSANDS/ÂΜL (ref 0–0.1)
BASOPHILS NFR BLD AUTO: 1 % (ref 0–1)
BILIRUB SERPL-MCNC: 1.37 MG/DL (ref 0.2–1)
BUN SERPL-MCNC: 8 MG/DL (ref 5–25)
CALCIUM SERPL-MCNC: 8.7 MG/DL (ref 8.4–10.2)
CHLORIDE SERPL-SCNC: 100 MMOL/L (ref 96–108)
CO2 SERPL-SCNC: 26 MMOL/L (ref 21–32)
CREAT SERPL-MCNC: 0.53 MG/DL (ref 0.6–1.3)
EOSINOPHIL # BLD AUTO: 0.15 THOUSAND/ÂΜL (ref 0–0.61)
EOSINOPHIL NFR BLD AUTO: 2 % (ref 0–6)
ERYTHROCYTE [DISTWIDTH] IN BLOOD BY AUTOMATED COUNT: 14.7 % (ref 11.6–15.1)
ETHANOL SERPL-MCNC: <10 MG/DL
GFR SERPL CREATININE-BSD FRML MDRD: 124 ML/MIN/1.73SQ M
GLUCOSE SERPL-MCNC: 73 MG/DL (ref 65–140)
HCT VFR BLD AUTO: 30.1 % (ref 34.8–46.1)
HGB BLD-MCNC: 10.2 G/DL (ref 11.5–15.4)
IMM GRANULOCYTES # BLD AUTO: 0.01 THOUSAND/UL (ref 0–0.2)
IMM GRANULOCYTES NFR BLD AUTO: 0 % (ref 0–2)
LYMPHOCYTES # BLD AUTO: 3.13 THOUSANDS/ÂΜL (ref 0.6–4.47)
LYMPHOCYTES NFR BLD AUTO: 49 % (ref 14–44)
MCH RBC QN AUTO: 34 PG (ref 26.8–34.3)
MCHC RBC AUTO-ENTMCNC: 33.9 G/DL (ref 31.4–37.4)
MCV RBC AUTO: 100 FL (ref 82–98)
MONOCYTES # BLD AUTO: 0.58 THOUSAND/ÂΜL (ref 0.17–1.22)
MONOCYTES NFR BLD AUTO: 9 % (ref 4–12)
NEUTROPHILS # BLD AUTO: 2.48 THOUSANDS/ÂΜL (ref 1.85–7.62)
NEUTS SEG NFR BLD AUTO: 39 % (ref 43–75)
NRBC BLD AUTO-RTO: 0 /100 WBCS
PLATELET # BLD AUTO: 185 THOUSANDS/UL (ref 149–390)
PMV BLD AUTO: 10.5 FL (ref 8.9–12.7)
POTASSIUM SERPL-SCNC: 4.4 MMOL/L (ref 3.5–5.3)
PROT SERPL-MCNC: 6.1 G/DL (ref 6.4–8.4)
RBC # BLD AUTO: 3 MILLION/UL (ref 3.81–5.12)
SODIUM SERPL-SCNC: 136 MMOL/L (ref 135–147)
WBC # BLD AUTO: 6.4 THOUSAND/UL (ref 4.31–10.16)

## 2025-07-12 PROCEDURE — 85025 COMPLETE CBC W/AUTO DIFF WBC: CPT | Performed by: STUDENT IN AN ORGANIZED HEALTH CARE EDUCATION/TRAINING PROGRAM

## 2025-07-12 PROCEDURE — 99254 IP/OBS CNSLTJ NEW/EST MOD 60: CPT | Performed by: INTERNAL MEDICINE

## 2025-07-12 PROCEDURE — 82077 ASSAY SPEC XCP UR&BREATH IA: CPT | Performed by: INTERNAL MEDICINE

## 2025-07-12 PROCEDURE — 99233 SBSQ HOSP IP/OBS HIGH 50: CPT | Performed by: INTERNAL MEDICINE

## 2025-07-12 PROCEDURE — 99255 IP/OBS CONSLTJ NEW/EST HI 80: CPT | Performed by: EMERGENCY MEDICINE

## 2025-07-12 PROCEDURE — 80053 COMPREHEN METABOLIC PANEL: CPT | Performed by: STUDENT IN AN ORGANIZED HEALTH CARE EDUCATION/TRAINING PROGRAM

## 2025-07-12 RX ORDER — PHENOBARBITAL SODIUM 65 MG/ML
130 INJECTION, SOLUTION INTRAMUSCULAR; INTRAVENOUS ONCE
Status: COMPLETED | OUTPATIENT
Start: 2025-07-12 | End: 2025-07-12

## 2025-07-12 RX ADMIN — HYDROMORPHONE HYDROCHLORIDE 0.5 MG: 1 INJECTION, SOLUTION INTRAMUSCULAR; INTRAVENOUS; SUBCUTANEOUS at 14:20

## 2025-07-12 RX ADMIN — ENOXAPARIN SODIUM 40 MG: 40 INJECTION SUBCUTANEOUS at 08:37

## 2025-07-12 RX ADMIN — HYDROMORPHONE HYDROCHLORIDE 0.5 MG: 1 INJECTION, SOLUTION INTRAMUSCULAR; INTRAVENOUS; SUBCUTANEOUS at 12:00

## 2025-07-12 RX ADMIN — NICOTINE 1 PATCH: 21 PATCH, EXTENDED RELEASE TRANSDERMAL at 08:36

## 2025-07-12 RX ADMIN — PHENOBARBITAL SODIUM 260 MG: 65 INJECTION INTRAMUSCULAR; INTRAVENOUS at 14:21

## 2025-07-12 RX ADMIN — ONDANSETRON 4 MG: 2 INJECTION INTRAMUSCULAR; INTRAVENOUS at 14:32

## 2025-07-12 RX ADMIN — HYDROMORPHONE HYDROCHLORIDE 0.5 MG: 1 INJECTION, SOLUTION INTRAMUSCULAR; INTRAVENOUS; SUBCUTANEOUS at 19:25

## 2025-07-12 RX ADMIN — PHENOBARBITAL SODIUM 130 MG: 65 INJECTION INTRAMUSCULAR; INTRAVENOUS at 21:17

## 2025-07-12 RX ADMIN — SODIUM CHLORIDE, SODIUM LACTATE, POTASSIUM CHLORIDE, AND CALCIUM CHLORIDE 1.5 ML/KG/HR: .6; .31; .03; .02 INJECTION, SOLUTION INTRAVENOUS at 19:16

## 2025-07-12 RX ADMIN — TRIMETHOBENZAMIDE HYDROCHLORIDE 200 MG: 100 INJECTION INTRAMUSCULAR at 19:34

## 2025-07-12 RX ADMIN — PHENOBARBITAL SODIUM 260 MG: 130 INJECTION INTRAMUSCULAR; INTRAVENOUS at 08:50

## 2025-07-12 RX ADMIN — ONDANSETRON 4 MG: 2 INJECTION INTRAMUSCULAR; INTRAVENOUS at 07:55

## 2025-07-12 RX ADMIN — HYDROMORPHONE HYDROCHLORIDE 0.5 MG: 1 INJECTION, SOLUTION INTRAMUSCULAR; INTRAVENOUS; SUBCUTANEOUS at 03:57

## 2025-07-12 RX ADMIN — HYDROMORPHONE HYDROCHLORIDE 0.5 MG: 1 INJECTION, SOLUTION INTRAMUSCULAR; INTRAVENOUS; SUBCUTANEOUS at 23:09

## 2025-07-12 RX ADMIN — HYDROMORPHONE HYDROCHLORIDE 0.5 MG: 1 INJECTION, SOLUTION INTRAMUSCULAR; INTRAVENOUS; SUBCUTANEOUS at 17:02

## 2025-07-12 RX ADMIN — HYDROMORPHONE HYDROCHLORIDE 0.5 MG: 1 INJECTION, SOLUTION INTRAMUSCULAR; INTRAVENOUS; SUBCUTANEOUS at 07:54

## 2025-07-12 RX ADMIN — HYDROXYZINE HYDROCHLORIDE 25 MG: 25 TABLET, FILM COATED ORAL at 04:10

## 2025-07-12 NOTE — CONSULTS
Consultation - Gastroenterology   Name: Kaykay Holland 35 y.o. female I MRN: [id number]  Unit/Bed#: -01 I Date of Admission: 7/11/2025   Date of Service: 7/12/2025 I Hospital Day: 1   Inpatient consult to gastroenterology  Consult performed by: Stepan Clayton DO  Consult ordered by: Eduardo Mercer PA-C        Physician Requesting Evaluation: Lester Rudd DO   Reason for Evaluation / Principal Problem: Acute on chronic pancreatitis from alcohol    Assessment & Plan  Acute on chronic alcoholic pancreatitis  Patient already would like to try clear liquid diet, so advance diet as tolerated.  Continue IV fluids until tolerating p.o. diet  Alcohol withdrawal with complication with inpatient treatment (HCC)  Withdrawal protocol per primary team  Tobacco abuse  I have informed her many times in the past that not only recurrence of alcohol use but also tobacco can trigger acute on chronic pancreatitis  Hepatic steatosis  Likely cirrhotic, as imaging has shown nodularity in the past  Alcohol use disorder, severe, dependence (HCC)  We have counseled her numerous times to stop drinking alcohol, but she never wishes to        GI will sign off--please call with questions              History of Present Illness   HPI:  Kaykay Holland is a 35 y.o. female who presents with acute on chronic uncomplicated pancreatitis from alcohol.  Our service knows her quite well due to multiple admissions for the same.  She continues to use alcohol as well as tobacco.  She was seen in our office by Dr. Stockton who recommended admission to the hospital.  Currently she does have pain, but is ready to try clear liquid diet.    Review of Systems  Historical Information   Past Medical History[1]  Past Surgical History[2]  Social History[3]  E-Cigarette/Vaping    E-Cigarette Use Never User      E-Cigarette/Vaping Substances    Nicotine No     THC No     CBD No     Flavoring No     Other No     Unknown No          Objective  :  Temp:  [97.6 °F (36.4 °C)-99.1 °F (37.3 °C)] 98.3 °F (36.8 °C)  HR:  [] 84  BP: ()/() 140/100  Resp:  [16-20] 20  SpO2:  [90 %-97 %] 96 %  O2 Device: None (Room air)    Physical Exam  General Appearance: NAD, cooperative, alert  Eyes: Anicteric  GI:  Soft, tender in epigastrium and left upper quadrant, non-distended; normal bowel sounds; no masses, no organomegaly   Rectal: Deferred  Musculoskeletal: No edema.  Skin:  No jaundice      Lab Results: I have reviewed the following results:                 [1]   Past Medical History:  Diagnosis Date    Acute alcoholic pancreatitis 11/03/2023    Hypertension     SIRS (systemic inflammatory response syndrome) (HCC) 04/10/2024   [2]   Past Surgical History:  Procedure Laterality Date    WISDOM TOOTH EXTRACTION     [3]   Social History  Tobacco Use    Smoking status: Every Day     Current packs/day: 0.50     Types: Cigarettes     Passive exposure: Never    Smokeless tobacco: Never    Tobacco comments:     Per pt last drink was today 9/24/24 one glass of wine. Had previously stop on 4/4/24 for 1 month    Vaping Use    Vaping status: Never Used   Substance and Sexual Activity    Alcohol use: Yes     Alcohol/week: 21.0 standard drinks of alcohol     Types: 21 Glasses of wine per week     Comment: 3 glasses of wine a day    Drug use: Never

## 2025-07-12 NOTE — ASSESSMENT & PLAN NOTE
Recommend daily thiamine, folate, and multivitamin supplementation.    Patient is interested in naltrexone PO; however, in setting of pancreatitis, she is receiving opioids for pain. Will need to hold naltrexone for 10-14 days after last opioid use due to risk of precipitated withdrawal as naltrexone is an opioid receptor antagonist.    Recommend case management consult for disposition planning; patient desires outpatient follow up with AA.

## 2025-07-12 NOTE — ASSESSMENT & PLAN NOTE
Recent Labs     07/11/25  1642 07/12/25  0401   AST 93* 63*   ALT 34 28     Likely due to chronic alcohol use and possible fatty liver disease; LFTs improving

## 2025-07-12 NOTE — H&P
H&P - Hospitalist   Name: Kaykay Holland 35 y.o. female I MRN: [id number]  Unit/Bed#: ED 08 I Date of Admission: 7/11/2025   Date of Service: 7/11/2025 I Hospital Day: 0     Assessment & Plan  Acute on chronic alcoholic pancreatitis  Recurrent admissions for alcoholic pancreatitis, presenting with epigastric pain and CT findings suspicious for acute pancreatitis.  Mildly elevated lipase also noted  Keep n.p.o.  IV fluids  Pain control  GI consult  Encourage alcohol cessation  Hepatic steatosis  Noted history, stable LFTs  Outpatient GI follow-up  Alcohol use disorder  With last alcohol consumption 24 hours ago  Start CIWA protocol  Multivitamin, thiamine, folate supplementation  Encourage cessation and would highly recommend alcohol rehab on discharge due to recurrent admissions for alcoholic pancreatitis  Tobacco abuse  NRT offered  Cessation encouraged    VTE Pharmacologic Prophylaxis: VTE Score: 0 Low Risk (Score 0-2) - Encourage Ambulation.  Code Status: Level 1 - Full Code     Anticipated Length of Stay: Patient will be admitted on an inpatient basis with an anticipated length of stay of greater than 2 midnights secondary to acute alcoholic pancreatitis requiring IV fluids, pain control, GI consult.    Total Time for Visit, including Counseling / Coordination of Care: 75 Minutes. Greater than 50% of this total time spent on direct patient counseling and coordination of care.    Chief Complaint: Epigastric pain    History of Present Illness:  Kaykay Holland is a 35 y.o. female with a PMH of recurrent pancreatitis, alcohol use who presents with abdominal pain.  Findings equivocal for acute pancreatitis with recent alcohol use 24 hours ago.  Reporting pain, nausea, anxiety.    Review of Systems:  Review of Systems   Constitutional:  Negative for activity change, appetite change, chills, fatigue and fever.   HENT:  Negative for congestion, rhinorrhea, sinus pressure and sore throat.    Eyes:  Negative for  photophobia, pain and visual disturbance.   Respiratory:  Negative for cough, shortness of breath and wheezing.    Cardiovascular:  Negative for chest pain, palpitations and leg swelling.   Gastrointestinal:  Positive for abdominal pain, nausea and vomiting. Negative for abdominal distention, constipation and diarrhea.   Endocrine: Negative for cold intolerance, heat intolerance, polydipsia and polyuria.   Genitourinary:  Negative for difficulty urinating, dysuria, flank pain, frequency and hematuria.   Musculoskeletal:  Negative for arthralgias, back pain and joint swelling.   Skin:  Negative for color change, pallor and rash.   Allergic/Immunologic: Negative.    Neurological:  Negative for dizziness, syncope, weakness, light-headedness and headaches.   Hematological: Negative.    Psychiatric/Behavioral: Negative.         Past Medical and Surgical History:   Past Medical History[1]    Past Surgical History[2]    Meds/Allergies:  Prior to Admission medications    Medication Sig Start Date End Date Taking? Authorizing Provider   folic acid (FOLVITE) 1 mg tablet Take 1 tablet (1 mg total) by mouth daily 5/21/25   Florina Arce PA-C   HYDROmorphone (DILAUDID) 2 mg tablet Take 2 mg by mouth every 4 (four) hours as needed for moderate pain    Historical Provider, MD   hydrOXYzine HCL (ATARAX) 50 mg tablet Take 0.5 tablets (25 mg total) by mouth every 6 (six) hours as needed for anxiety Can take 1-2 tabs for anxiety as needed 6/6/25   Eduardo Mercer PA-C   nicotine (NICODERM CQ) 14 mg/24hr TD 24 hr patch Place 1 patch on the skin daily over 24 hours 6/6/25   Eduardo Mercer PA-C   ondansetron (ZOFRAN) 4 mg tablet Take 1 tablet (4 mg total) by mouth every 8 (eight) hours as needed for nausea or vomiting 6/6/25   Eduardo Mercer PA-C   thiamine 100 MG tablet Take 1 tablet (100 mg total) by mouth daily 5/21/25   Florina Arce PA-C       All medications reviewed.    Allergies: Allergies[3]    Social History:  Marital  "Status:      Substance Use History:   Social History     Substance and Sexual Activity   Alcohol Use Yes    Alcohol/week: 21.0 standard drinks of alcohol    Types: 21 Glasses of wine per week    Comment: 3 glasses of wine a day     Tobacco Use History[4]  Social History     Substance and Sexual Activity   Drug Use Never       Family History:  Non-contributory    Physical Exam:     Vitals:   Blood Pressure: 117/68 (07/11/25 2100)  Pulse: 76 (07/11/25 2100)  Temperature: 97.8 °F (36.6 °C) (07/11/25 1640)  Temp Source: Temporal (07/11/25 1640)  Respirations: 18 (07/11/25 2100)  Height: 5' 5\" (165.1 cm) (07/11/25 1640)  Weight - Scale: 53.1 kg (117 lb) (07/11/25 1640)  SpO2: 95 % (07/11/25 2100)    Physical Exam  Vitals and nursing note reviewed.   Constitutional:       General: She is not in acute distress.     Appearance: Normal appearance. She is not ill-appearing.   HENT:      Head: Normocephalic and atraumatic.      Mouth/Throat:      Mouth: Mucous membranes are moist.     Eyes:      General: No scleral icterus.        Right eye: No discharge.         Left eye: No discharge.      Pupils: Pupils are equal, round, and reactive to light.       Cardiovascular:      Rate and Rhythm: Normal rate and regular rhythm.      Heart sounds: No murmur heard.     No friction rub. No gallop.   Pulmonary:      Effort: No respiratory distress.      Breath sounds: No wheezing, rhonchi or rales.   Abdominal:      General: Bowel sounds are normal. There is no distension.      Palpations: Abdomen is soft.      Tenderness: There is abdominal tenderness (epigastric). There is no guarding or rebound.     Musculoskeletal:         General: No swelling or deformity.      Cervical back: Neck supple.      Right lower leg: No edema.      Left lower leg: No edema.     Skin:     General: Skin is warm and dry.      Capillary Refill: Capillary refill takes less than 2 seconds.      Coloration: Skin is not jaundiced or pale.      Findings: No " erythema or rash.     Neurological:      General: No focal deficit present.      Mental Status: She is alert and oriented to person, place, and time. Mental status is at baseline.      Cranial Nerves: No cranial nerve deficit.      Sensory: No sensory deficit.     Psychiatric:         Mood and Affect: Mood is anxious.         Behavior: Behavior normal.          Additional Data:     Lab Results:  Results from last 7 days   Lab Units 07/11/25  1642   WBC Thousand/uL 5.89   HEMOGLOBIN g/dL 11.6   HEMATOCRIT % 34.8   PLATELETS Thousands/uL 235   SEGS PCT % 59   LYMPHO PCT % 31   MONO PCT % 8   EOS PCT % 1     Results from last 7 days   Lab Units 07/11/25  1642   SODIUM mmol/L 134*   POTASSIUM mmol/L 4.1   CHLORIDE mmol/L 96   CO2 mmol/L 23   BUN mg/dL 8   CREATININE mg/dL 0.47*   ANION GAP mmol/L 15*   CALCIUM mg/dL 9.3   ALBUMIN g/dL 4.5   TOTAL BILIRUBIN mg/dL 1.12*   ALK PHOS U/L 77   ALT U/L 34   AST U/L 93*   GLUCOSE RANDOM mg/dL 114                       Imaging: All pertinent imaging reviewed.  CT abdomen pelvis with contrast   Final Result by Erica Oconnor MD (07/11 2127)      Mild worsening stranding involving the pancreaticoduodenal groove, adjacent to the pancreatic head, which likely represents recurrent acute pancreatitis in this region. No pancreatic parenchymal necrosis noted.      Multiple pancreatic pseudocysts, which have decreased in size when compared to the prior study.      Reactive wall thickening involving the stomach along the lesser curvature. No bowel obstruction noted.      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      Diffuse hepatic steatosis.         Workstation performed: [id number]             EKG and Other Studies Reviewed on Admission:   Prior pertinent studies and records reviewed in [facility] Everywhere.    ** Please Note: This note has been constructed using a voice recognition system. **       [1]   Past Medical  History:  Diagnosis Date    Acute alcoholic pancreatitis 11/03/2023    Hypertension     SIRS (systemic inflammatory response syndrome) (HCC) 04/10/2024   [2]   Past Surgical History:  Procedure Laterality Date    WISDOM TOOTH EXTRACTION     [3]   Allergies  Allergen Reactions    Bee Pollen Anaphylaxis    Other Edema     Bee stings (localized edema)   [4]   Social History  Tobacco Use   Smoking Status Every Day    Current packs/day: 0.50    Types: Cigarettes    Passive exposure: Never   Smokeless Tobacco Never   Tobacco Comments    Per pt last drink was today 9/24/24 one glass of wine. Had previously stop on 4/4/24 for 1 month

## 2025-07-12 NOTE — ASSESSMENT & PLAN NOTE
Recurrent admissions for alcoholic pancreatitis, presenting with epigastric pain and CT findings suspicious for acute pancreatitis.  Mildly elevated lipase also noted  Francis to clear liquid diet  IV fluids  Pain control, oral and IV pain medication  Alcohol and tobacco cessation will be key  BISAP score is 0

## 2025-07-12 NOTE — ASSESSMENT & PLAN NOTE
I have informed her many times in the past that not only recurrence of alcohol use but also tobacco can trigger acute on chronic pancreatitis

## 2025-07-12 NOTE — PLAN OF CARE
Problem: Potential for Falls  Goal: Patient will remain free of falls  Description: INTERVENTIONS:  - Educate patient/family on patient safety including physical limitations  - Instruct patient to call for assistance with activity   - Consider consulting OT/PT to assist with strengthening/mobility based on AM PAC & JH-HLM score  - Consult OT/PT to assist with strengthening/mobility   - Keep Call bell within reach  - Keep bed low and locked with side rails adjusted as appropriate  - Keep care items and personal belongings within reach  - Initiate and maintain comfort rounds  - Make Fall Risk Sign visible to staff  - Offer Toileting every 2 Hours, in advance of need  - Initiate/Maintain bed/chair alarm  - Obtain necessary fall risk management equipment:   - Apply yellow socks and bracelet for high fall risk patients  - Consider moving patient to room near nurses station  Outcome: Progressing     Problem: PAIN - ADULT  Goal: Verbalizes/displays adequate comfort level or baseline comfort level  Description: Interventions:  - Encourage patient to monitor pain and request assistance  - Assess pain using appropriate pain scale  - Administer analgesics as ordered based on type and severity of pain and evaluate response  - Implement non-pharmacological measures as appropriate and evaluate response  - Consider cultural and social influences on pain and pain management  - Notify physician/advanced practitioner if interventions unsuccessful or patient reports new pain  - Educate patient/family on pain management process including their role and importance of  reporting pain   - Provide non-pharmacologic/complimentary pain relief interventions  Outcome: Progressing     Problem: INFECTION - ADULT  Goal: Absence or prevention of progression during hospitalization  Description: INTERVENTIONS:  - Assess and monitor for signs and symptoms of infection  - Monitor lab/diagnostic results  - Monitor all insertion sites, i.e. indwelling  lines, tubes, and drains  - Monitor endotracheal if appropriate and nasal secretions for changes in amount and color  - [place] appropriate cooling/warming therapies per order  - Administer medications as ordered  - Instruct and encourage patient and family to use good hand hygiene technique  - Identify and instruct in appropriate isolation precautions for identified infection/condition  Outcome: Progressing  Goal: Absence of fever/infection during neutropenic period  Description: INTERVENTIONS:  - Monitor WBC  - Perform strict hand hygiene  - Limit to healthy visitors only  - No plants, dried, fresh or silk flowers with butler in patient room  Outcome: Progressing     Problem: SAFETY ADULT  Goal: Patient will remain free of falls  Description: INTERVENTIONS:  - Educate patient/family on patient safety including physical limitations  - Instruct patient to call for assistance with activity   - Consider consulting OT/PT to assist with strengthening/mobility based on AM PAC & -HLM score  - Consult OT/PT to assist with strengthening/mobility   - Keep Call bell within reach  - Keep bed low and locked with side rails adjusted as appropriate  - Keep care items and personal belongings within reach  - Initiate and maintain comfort rounds  - Make Fall Risk Sign visible to staff  - Offer Toileting every 2 Hours, in advance of need  - Initiate/Maintain bed/chair alarm  - Obtain necessary fall risk management equipment:   - Apply yellow socks and bracelet for high fall risk patients  - Consider moving patient to room near nurses station  Outcome: Progressing  Goal: Maintain or return to baseline ADL function  Description: INTERVENTIONS:  -  Assess patient's ability to carry out ADLs; assess patient's baseline for ADL function and identify physical deficits which impact ability to perform ADLs (bathing, care of mouth/teeth, toileting, grooming, dressing, etc.)  - Assess/evaluate cause of self-care deficits   - Assess range of  motion  - Assess patient's mobility; develop plan if impaired  - Assess patient's need for assistive devices and provide as appropriate  - Encourage maximum independence but intervene and supervise when necessary  - Involve family in performance of ADLs  - Assess for home care needs following discharge   - Consider OT consult to assist with ADL evaluation and planning for discharge  - Provide patient education as appropriate  - Monitor functional capacity and physical performance, use of AM PAC & JH-HLM   - Monitor gait, balance and fatigue with ambulation    Outcome: Progressing  Goal: Maintains/Returns to pre admission functional level  Description: INTERVENTIONS:  - Perform AM-PAC 6 Click Basic Mobility/ Daily Activity assessment daily.  - Set and communicate daily mobility goal to care team and patient/family/caregiver.   - Collaborate with rehabilitation services on mobility goals if consulted  - Perform Range of Motion 3 times a day.  - Reposition patient every 3 hours.  - Dangle patient 3 times a day  - Stand patient 3 times a day  - Ambulate patient 3 times a day  - Out of bed to chair 3 times a day   - Out of bed for meals 3 times a day  - Out of bed for toileting  - Record patient progress and toleration of activity level   Outcome: Progressing     Problem: GASTROINTESTINAL - ADULT  Goal: Maintains or returns to baseline bowel function  Description: INTERVENTIONS:  - Assess bowel function  - Encourage oral fluids to ensure adequate hydration  - Administer IV fluids if ordered to ensure adequate hydration  - Administer ordered medications as needed  - Encourage mobilization and activity  - Consider nutritional services referral to assist patient with adequate nutrition and appropriate food choices  Outcome: Progressing     Problem: GASTROINTESTINAL - ADULT  Goal: Minimal or absence of nausea and/or vomiting  Description: INTERVENTIONS:  - Administer IV fluids if ordered to ensure adequate hydration  -  Maintain NPO status until nausea and vomiting are resolved  - Nasogastric tube if ordered  - Administer ordered antiemetic medications as needed  - Provide nonpharmacologic comfort measures as appropriate  - Advance diet as tolerated, if ordered  - Consider nutrition services referral to assist patient with adequate nutrition and appropriate food choices  Outcome: Progressing  Goal: Maintains or returns to baseline bowel function  Description: INTERVENTIONS:  - Assess bowel function  - Encourage oral fluids to ensure adequate hydration  - Administer IV fluids if ordered to ensure adequate hydration  - Administer ordered medications as needed  - Encourage mobilization and activity  - Consider nutritional services referral to assist patient with adequate nutrition and appropriate food choices  Outcome: Progressing  Goal: Maintains adequate nutritional intake  Description: INTERVENTIONS:  - Monitor percentage of each meal consumed  - Identify factors contributing to decreased intake, treat as appropriate  - Assist with meals as needed  - Monitor I&O, weight, and lab values if indicated  - Obtain nutrition services referral as needed  Outcome: Progressing  Goal: Oral mucous membranes remain intact  Description: INTERVENTIONS  - Assess oral mucosa and hygiene practices  - Implement preventative oral hygiene regimen  - Implement oral medicated treatments as ordered  - Initiate Nutrition services referral as needed  Outcome: Progressing     Problem: Nutrition/Hydration-ADULT  Goal: Nutrient/Hydration intake appropriate for improving, restoring or maintaining nutritional needs  Description: Monitor and assess patient's nutrition/hydration status for malnutrition. Collaborate with interdisciplinary team and initiate plan and interventions as ordered.  Monitor patient's weight and dietary intake as ordered or per policy. Utilize nutrition screening tool and intervene as necessary. Determine patient's food preferences and  provide high-protein, high-caloric foods as appropriate.     INTERVENTIONS:  - Monitor oral intake, urinary output, labs, and treatment plans  - Assess nutrition and hydration status and recommend course of action  - Evaluate amount of meals eaten  - Assist patient with eating if necessary   - Allow adequate time for meals  - Recommend/ encourage appropriate diets, oral nutritional supplements, and vitamin/mineral supplements  - Order, calculate, and assess calorie counts as needed  - Recommend, monitor, and adjust tube feedings and TPN/PPN based on assessed needs  - Assess need for intravenous fluids  - Provide specific nutrition/hydration education as appropriate  - Include patient/family/caregiver in decisions related to nutrition  Outcome: Progressing

## 2025-07-12 NOTE — ASSESSMENT & PLAN NOTE
With last alcohol consumption 24 hours ago  Start CIWA protocol  Multivitamin, thiamine, folate supplementation  Encourage cessation and would highly recommend alcohol rehab on discharge due to recurrent admissions for alcoholic pancreatitis

## 2025-07-12 NOTE — ED CARE HANDOFF
Emergency Department Sign Out Note        Sign out and transfer of care from Dr. Gipson. See Separate Emergency Department note.     The patient, Kaykay Holland, was evaluated by the previous provider for Abdominal pain and vomiting with a history of pancreatitis and recent alcohol use .    Workup Completed:  Exam, labs, fluids, analgesics given.  Patient getting CT scan now.  Likely needs admission    ED Course / Workup Pending (followup):  Continues to c/o abdominal pain, requiring parenteral narcotic.  CT reviewed, consistent with acute recurrent pancreatitis, pseudocysts. Hemodynamically stable. Will admit.        No data recorded                             Procedures  Medical Decision Making  Amount and/or Complexity of Data Reviewed  Labs: ordered.  Radiology: ordered.    Risk  Prescription drug management.  Decision regarding hospitalization.            Disposition  Final diagnoses:   Acute recurrent pancreatitis   Alcohol use disorder     Time reflects when diagnosis was documented in both MDM as applicable and the Disposition within this note       Time User Action Codes Description Comment    7/11/2025 10:02 PM Eduardo Mercer [K85.20] Acute on chronic alcoholic pancreatitis     7/11/2025 10:04 PM Osman Olsen [K85.90] Acute recurrent pancreatitis     7/11/2025 10:04 PM Osman Olsen [F10.90] Alcohol use disorder     7/12/2025  8:02 AM Lester Rudd [F10.930,  R56.9] Alcohol withdrawal seizure without complication (HCC)     7/12/2025  8:02 AM Lester Rudd [F10.10] Alcohol abuse           ED Disposition       ED Disposition   Admit    Condition   Stable    Date/Time   Fri Jul 11, 2025 10:03 PM    Comment   Case was discussed with [facility] and the patient's admission status was agreed to be Admission Status: inpatient status to the service of Dr. Maynard .               Follow-up Information    None       Current Discharge Medication List        CONTINUE these medications which  have NOT CHANGED    Details   HYDROmorphone (DILAUDID) 2 mg tablet Take 2 mg by mouth every 4 (four) hours as needed for moderate pain      hydrOXYzine HCL (ATARAX) 50 mg tablet Take 0.5 tablets (25 mg total) by mouth every 6 (six) hours as needed for anxiety Can take 1-2 tabs for anxiety as needed  Qty: 60 tablet, Refills: 0    Associated Diagnoses: Alcohol-induced acute pancreatitis, unspecified complication status      nicotine (NICODERM CQ) 14 mg/24hr TD 24 hr patch Place 1 patch on the skin daily over 24 hours  Qty: 28 patch, Refills: 0    Associated Diagnoses: Tobacco abuse      folic acid (FOLVITE) 1 mg tablet Take 1 tablet (1 mg total) by mouth daily  Qty: 30 tablet, Refills: 0    Associated Diagnoses: Alcohol-induced acute pancreatitis, unspecified complication status      ondansetron (ZOFRAN) 4 mg tablet Take 1 tablet (4 mg total) by mouth every 8 (eight) hours as needed for nausea or vomiting  Qty: 20 tablet, Refills: 0    Associated Diagnoses: Alcohol-induced acute pancreatitis with uninfected necrosis      thiamine 100 MG tablet Take 1 tablet (100 mg total) by mouth daily  Qty: 30 tablet, Refills: 0    Associated Diagnoses: Alcohol-induced acute pancreatitis, unspecified complication status           No discharge procedures on file.       ED Provider  Electronically Signed by     Osman Olsen DO  07/13/25 0706

## 2025-07-12 NOTE — TELEMEDICINE
Tele-Consultation - Medical Toxicology   Name: Kaykay Holland 35 y.o. female I MRN: [id number]  Unit/Bed#: -01 I Date of Admission: 7/11/2025   Date of Service: 7/12/2025 I Hospital Day: 1   Inpatient consult to Toxicology  Consult performed by: Selene Hickey MD  Consult ordered by: Lester Rudd DO        Physician Requesting Evaluation: Lester Rudd DO   Reason for Evaluation / Principal Problem: Alcohol withdrawal, alcohol use disorder    Assessment & Plan  Alcohol withdrawal with complication with inpatient treatment (HCC)  Continue supportive care measures, including airway monitoring, head of bed elevation, aspiration precautions, cardiac telemetry, and continuous pulse oximetry.    Patient is at high risk for complicated alcohol withdrawal given history of withdrawal seizures and alcoholic pancreatitis; she requires inpatient management of her alcohol withdrawal.    Patient showed significant improvement after phenobarbital 260 mg IV; recommend additional phenobarbital 260 mg IV to complete ideal body weight phenobarbital loading.    Continue symptom-triggered, as needed benzodiazepines or phenobarbital as needed for alcohol withdrawal symptoms. Can continue phenobarbital 130 mg IV every 1-2 hours as needed for CIWA 10-20. If patient is scoring higher than 20, consider phenobarbital 260 mg IV. Alternatively can give diazepam based CIWA as outlined below with doses ordered as needed for the scores. Avoid giving benzodiazepines and phenobarbital together due to risk of respiratory depression.    Titrate to RASS -1.  Alcohol use disorder, severe, dependence (HCC)  Recommend daily thiamine, folate, and multivitamin supplementation.    Patient is interested in naltrexone PO; however, in setting of pancreatitis, she is receiving opioids for pain. Will need to hold naltrexone for 10-14 days after last opioid use due to risk of precipitated withdrawal as naltrexone is an opioid  "receptor antagonist.    Recommend case management consult for disposition planning; patient desires outpatient follow up with AA.  Acute on chronic alcoholic pancreatitis  Patient reports history of recurrent pancreatitis; lipase is mildly elevated at 131. CT A/P shows \"mild worsening stranding involving the pancreaticoduodenal groove adjacent to the pancreatic head which likely represents recurrent acute pancreatitis in this region. No parenchymal necrosis. Multiple pancreatic pseudocysts...\"    Recommend multimodal pain control, NPO with progression of diet as tolerated; gastroenterology consulted.  Tobacco abuse  Continue nicotine replacement therapy  Hepatic steatosis  Recent Labs     07/11/25  1642 07/12/25  0401   AST 93* 63*   ALT 34 28     Likely due to chronic alcohol use and possible fatty liver disease; LFTs improving    I have discussed with Dr. Rudd the above plan to administer phenobarbital as needed for withdrawal management. Dr. Rudd agrees with the plan.  Medical Toxicology service will follow.    Please see additional teaching note below (if available):  Medical Toxicology recommendations for CIWA monitoring using diazepam for treatment:    CIWA score & Treatment     Monitoring:   Modified CIWA Score   Telemetry  Continuous pulse oximetry   Initiate RASS with dosing and hold any sedatives for RASS less than -2  Request provider re-evaluation after every three doses.  Step down for CIWA > 7    0  No intervention  Reassess q4 hours.   Consider discontinuing CIWA 24 hours after ethanol concentration of zero, with a score of zero    1-7  Diazepam 10 mg PO Q4hr PRN score 1-7  Reassess q4hr    8-14  Diazepam 10mg IV Q1hr PRN score 8-14  Reassess q1hr  Contact Medical Toxicology for additional treatment recommendations.     15-19  Diazepam 20mg IV Q1hr PRN score 15-19  Reassess q1hr  Contact Medical Toxicology and/or critical care for additional treatment recommendations.     >20  Diazepam 40mg IV Q1hr " PRN score > 20  Contact Medical Toxicology and/or critical care for additional treatment recommendations.     For further questions, please contact the medical  on call via SecureChat between 8am and 9pm. If between 9pm and 8am, please reach out to the [facility] at [phone].     History of Present Illness   Kaykay Holland is a 35 y.o. year old female who presents with abdominal pain found to have acute on chronic alcoholic pancreatitis for whom toxicology is consulted for elevated CIWA scores and concern for alcohol withdrawal. Patient states she has been drinking 1 full bottle of wine daily for the last 3 weeks with last drink on Thursday. Reports remote history of alcohol withdrawal seizures. Denies other substance use including benzodiazepines. Smokes 1/2 ppd. Patient denies previous treatment with naltrexone. She is interested in outpatient follow up with AA. Patient currently endorses tremor, nausea, abdominal pain, diaphoresis.    Review of Systems   Constitutional:  Positive for diaphoresis.   Gastrointestinal:  Positive for abdominal pain and nausea. Negative for vomiting.   Neurological:  Positive for tremors.       Historical Information   Medical History Review: I have reviewed the patient's PMH, PSH, Social History, Family History, Meds, and Allergies   Social History[1]  Family History[2]    Meds/Allergies   Prior to Admission medications    Medication Sig Start Date End Date Taking? Authorizing Provider   HYDROmorphone (DILAUDID) 2 mg tablet Take 2 mg by mouth every 4 (four) hours as needed for moderate pain   Yes Historical Provider, MD   hydrOXYzine HCL (ATARAX) 50 mg tablet Take 0.5 tablets (25 mg total) by mouth every 6 (six) hours as needed for anxiety Can take 1-2 tabs for anxiety as needed 6/6/25  Yes Eduardo Mercer PA-C   nicotine (NICODERM CQ) 14 mg/24hr TD 24 hr patch Place 1 patch on the skin daily over 24 hours 6/6/25  Yes Eduardo Mercer PA-C   folic acid  (FOLVITE) 1 mg tablet Take 1 tablet (1 mg total) by mouth daily  Patient not taking: Reported on 7/11/2025 5/21/25   Florina Arce PA-C   ondansetron (ZOFRAN) 4 mg tablet Take 1 tablet (4 mg total) by mouth every 8 (eight) hours as needed for nausea or vomiting  Patient not taking: Reported on 7/11/2025 6/6/25   Eduardo Mercer PA-C   thiamine 100 MG tablet Take 1 tablet (100 mg total) by mouth daily  Patient not taking: Reported on 7/11/2025 5/21/25   Florina Arce PA-C   Current Medications[3]   Allergies[4]    Objective :  Temp:  [97.6 °F (36.4 °C)-99.1 °F (37.3 °C)] 98.3 °F (36.8 °C)  HR:  [] 84  BP: ()/() 140/100  Resp:  [16-20] 20  SpO2:  [90 %-97 %] 96 %  O2 Device: None (Room air)      Intake/Output Summary (Last 24 hours) at 7/12/2025 1333  Last data filed at 7/12/2025 0407  Gross per 24 hour   Intake 1000 ml   Output 400 ml   Net 600 ml       Physical Exam  Vitals and nursing note reviewed.   Constitutional:       General: She is not in acute distress.     Appearance: She is ill-appearing and diaphoretic.   HENT:      Head: Normocephalic and atraumatic.     Eyes:      General: No scleral icterus.    Pulmonary:      Effort: Pulmonary effort is normal. No respiratory distress.     Neurological:      Mental Status: She is alert and oriented to person, place, and time.      Comments: + intention tremor   Psychiatric:      Comments: anxious       Limited by telemedicine platform      Lab Results: I have reviewed the following results:  Results from last 7 days   Lab Units 07/12/25  0401 07/11/25  1642   WBC Thousand/uL 6.40 5.89   HEMOGLOBIN g/dL 10.2* 11.6   HEMATOCRIT % 30.1* 34.8   PLATELETS Thousands/uL 185 235   SEGS PCT % 39* 59   LYMPHO PCT % 49* 31   MONO PCT % 9 8   EOS PCT % 2 1      Results from last 7 days   Lab Units 07/12/25  0401   POTASSIUM mmol/L 4.4   CHLORIDE mmol/L 100   CO2 mmol/L 26   BUN mg/dL 8   CREATININE mg/dL 0.53*   CALCIUM mg/dL 8.7   ALBUMIN g/dL 3.9   ALK PHOS  U/L 63   ALT U/L 28   AST U/L 63*        Results from last 7 days   Lab Units 07/12/25  0847   ETHANOL LVL mg/dL <10     Imaging Results Review: I reviewed radiology reports from this admission including: CT abdomen/pelvis.  Other Study Results Review: No additional pertinent studies reviewed.    Administrative Statements   VIRTUAL CARE DOCUMENTATION:     1. This service was provided via Telemedicine using Teams Virtual Rounding      2. Parties in the room with patient during teleconsult Patient only    3. Confidentiality My office door was closed     4. Participants No one else was in the room    5. Patient acknowledged consent and understanding of privacy and security of the  Telemedicine consult. I informed the patient that I have reviewed their record in Epic and presented the opportunity for them to ask any questions regarding the visit today.  The patient agreed to participate.    6. I have spent a total time of 45 minutes in caring for this patient on the day of the visit/encounter including Risks and benefits of tx options, Instructions for management, Patient and family education, Importance of tx compliance, Risk factor reductions, Impressions, Counseling / Coordination of care, Documenting in the medical record, Obtaining or reviewing history  , and Communicating with other healthcare professionals , not including the time spent for establishing the audio/video connection.          [1]   Social History  Tobacco Use    Smoking status: Every Day     Current packs/day: 0.50     Types: Cigarettes     Passive exposure: Never    Smokeless tobacco: Never    Tobacco comments:     Per pt last drink was today 9/24/24 one glass of wine. Had previously stop on 4/4/24 for 1 month    Vaping Use    Vaping status: Never Used   Substance and Sexual Activity    Alcohol use: Yes     Alcohol/week: 21.0 standard drinks of alcohol     Types: 21 Glasses of wine per week     Comment: 3 glasses of wine a day    Drug use: Never    [2] No family history on file.  [3]   Current Facility-Administered Medications:     acetaminophen (TYLENOL) tablet 650 mg, 650 mg, Oral, Q6H PRN, Eduardo Mercer PA-C    enoxaparin (LOVENOX) subcutaneous injection 40 mg, 40 mg, Subcutaneous, Daily, Eduardo Mercer PA-C, 40 mg at 07/12/25 0837    folic acid (FOLVITE) tablet 1 mg, 1 mg, Oral, Daily, Eduardo Mercer PA-C    HYDROmorphone (DILAUDID) injection 0.5 mg, 0.5 mg, Intravenous, Q2H PRN, Eduardo Mercer PA-C, 0.5 mg at 07/12/25 1200    hydrOXYzine HCL (ATARAX) tablet 25 mg, 25 mg, Oral, Q6H PRN, Eduardo Mercer PA-C, 25 mg at 07/12/25 0410    [COMPLETED] lactated ringers bolus 531 mL, 10 mL/kg, Intravenous, Once, Stopped at 07/11/25 2345 **FOLLOWED BY** lactated ringers infusion, 1.5 mL/kg/hr, Intravenous, Continuous, Eduardo Mercer PA-C, Last Rate: 79.7 mL/hr at 07/11/25 2350, 1.5 mL/kg/hr at 07/11/25 2350    nicotine (NICODERM CQ) 21 mg/24 hr TD 24 hr patch 1 patch, 1 patch, Transdermal, Daily, Eduardo Mercer PA-C, 1 patch at 07/12/25 0836    ondansetron (ZOFRAN) injection 4 mg, 4 mg, Intravenous, Q6H PRN, Eduardo Mercer PA-C, 4 mg at 07/12/25 0755    ondansetron (ZOFRAN) injection 4 mg, 4 mg, Intravenous, Once, Eduardo Mercer PA-C    PHENobarbital 260 mg in sodium chloride 0.9 % 100 mL IVPB, 260 mg, Intravenous, Once, Lester Rudd DO    polyethylene glycol (MIRALAX) packet 17 g, 17 g, Oral, Daily PRN, Eduardo Mercer PA-C    thiamine tablet 100 mg, 100 mg, Oral, Daily, Eduardo Mercer PA-C    trimethobenzamide (TIGAN) IM injection 200 mg, 200 mg, Intramuscular, Q6H PRN, Eduardo Mercer PA-C, 200 mg at 07/11/25 5354  [4]   Allergies  Allergen Reactions    Bee Pollen Anaphylaxis    Other Edema     Bee stings (localized edema)

## 2025-07-12 NOTE — ASSESSMENT & PLAN NOTE
"Patient reports history of recurrent pancreatitis; lipase is mildly elevated at 131. CT A/P shows \"mild worsening stranding involving the pancreaticoduodenal groove adjacent to the pancreatic head which likely represents recurrent acute pancreatitis in this region. No parenchymal necrosis. Multiple pancreatic pseudocysts...\"    Recommend multimodal pain control, NPO with progression of diet as tolerated; gastroenterology consulted.  "

## 2025-07-12 NOTE — ASSESSMENT & PLAN NOTE
Recurrent admissions for alcoholic pancreatitis, presenting with epigastric pain and CT findings suspicious for acute pancreatitis.  Mildly elevated lipase also noted  Keep n.p.o.  IV fluids  Pain control  GI consult  Encourage alcohol cessation

## 2025-07-12 NOTE — ASSESSMENT & PLAN NOTE
Patient already would like to try clear liquid diet, so advance diet as tolerated.  Continue IV fluids until tolerating p.o. diet

## 2025-07-12 NOTE — PLAN OF CARE
Problem: Potential for Falls  Goal: Patient will remain free of falls  Description: INTERVENTIONS:  - Educate patient/family on patient safety including physical limitations  - Instruct patient to call for assistance with activity   - Consider consulting OT/PT to assist with strengthening/mobility based on AM PAC & JH-HLM score  - Consult OT/PT to assist with strengthening/mobility   - Keep Call bell within reach  - Keep bed low and locked with side rails adjusted as appropriate  - Keep care items and personal belongings within reach  - Initiate and maintain comfort rounds  - Make Fall Risk Sign visible to staff  - Offer Toileting every 2 Hours, in advance of need  - Initiate/Maintain bed/chair alarm  - Obtain necessary fall risk management equipment:   - Apply yellow socks and bracelet for high fall risk patients  - Consider moving patient to room near nurses station  Outcome: Progressing     Problem: PAIN - ADULT  Goal: Verbalizes/displays adequate comfort level or baseline comfort level  Description: Interventions:  - Encourage patient to monitor pain and request assistance  - Assess pain using appropriate pain scale  - Administer analgesics as ordered based on type and severity of pain and evaluate response  - Implement non-pharmacological measures as appropriate and evaluate response  - Consider cultural and social influences on pain and pain management  - Notify physician/advanced practitioner if interventions unsuccessful or patient reports new pain  - Educate patient/family on pain management process including their role and importance of  reporting pain   - Provide non-pharmacologic/complimentary pain relief interventions  Outcome: Progressing     Problem: INFECTION - ADULT  Goal: Absence or prevention of progression during hospitalization  Description: INTERVENTIONS:  - Assess and monitor for signs and symptoms of infection  - Monitor lab/diagnostic results  - Monitor all insertion sites, i.e. indwelling  lines, tubes, and drains  - Monitor endotracheal if appropriate and nasal secretions for changes in amount and color  - [place] appropriate cooling/warming therapies per order  - Administer medications as ordered  - Instruct and encourage patient and family to use good hand hygiene technique  - Identify and instruct in appropriate isolation precautions for identified infection/condition  Outcome: Progressing  Goal: Absence of fever/infection during neutropenic period  Description: INTERVENTIONS:  - Monitor WBC  - Perform strict hand hygiene  - Limit to healthy visitors only  - No plants, dried, fresh or silk flowers with butler in patient room  Outcome: Progressing     Problem: SAFETY ADULT  Goal: Patient will remain free of falls  Description: INTERVENTIONS:  - Educate patient/family on patient safety including physical limitations  - Instruct patient to call for assistance with activity   - Consider consulting OT/PT to assist with strengthening/mobility based on AM PAC & -HLM score  - Consult OT/PT to assist with strengthening/mobility   - Keep Call bell within reach  - Keep bed low and locked with side rails adjusted as appropriate  - Keep care items and personal belongings within reach  - Initiate and maintain comfort rounds  - Make Fall Risk Sign visible to staff  - Offer Toileting every 2 Hours, in advance of need  - Initiate/Maintain bed/chair alarm  - Obtain necessary fall risk management equipment:   - Apply yellow socks and bracelet for high fall risk patients  - Consider moving patient to room near nurses station  Outcome: Progressing  Goal: Maintain or return to baseline ADL function  Description: INTERVENTIONS:  -  Assess patient's ability to carry out ADLs; assess patient's baseline for ADL function and identify physical deficits which impact ability to perform ADLs (bathing, care of mouth/teeth, toileting, grooming, dressing, etc.)  - Assess/evaluate cause of self-care deficits   - Assess range of  motion  - Assess patient's mobility; develop plan if impaired  - Assess patient's need for assistive devices and provide as appropriate  - Encourage maximum independence but intervene and supervise when necessary  - Involve family in performance of ADLs  - Assess for home care needs following discharge   - Consider OT consult to assist with ADL evaluation and planning for discharge  - Provide patient education as appropriate  - Monitor functional capacity and physical performance, use of AM PAC & JH-HLM   - Monitor gait, balance and fatigue with ambulation    Outcome: Progressing  Goal: Maintains/Returns to pre admission functional level  Description: INTERVENTIONS:  - Perform AM-PAC 6 Click Basic Mobility/ Daily Activity assessment daily.  - Set and communicate daily mobility goal to care team and patient/family/caregiver.   - Collaborate with rehabilitation services on mobility goals if consulted  - Perform Range of Motion 3 times a day.  - Reposition patient every 3 hours.  - Dangle patient 3 times a day  - Stand patient 3 times a day  - Ambulate patient 3 times a day  - Out of bed to chair 3 times a day   - Out of bed for meals 3 times a day  - Out of bed for toileting  - Record patient progress and toleration of activity level   Outcome: Progressing     Problem: DISCHARGE PLANNING  Goal: Discharge to home or other facility with appropriate resources  Description: INTERVENTIONS:  - Identify barriers to discharge w/patient and caregiver  - Arrange for needed discharge resources and transportation as appropriate  - Identify discharge learning needs (meds, wound care, etc.)  - Arrange for interpretive services to assist at discharge as needed  - Refer to Case Management Department for coordinating discharge planning if the patient needs post-hospital services based on physician/advanced practitioner order or complex needs related to functional status, cognitive ability, or social support system  Outcome:  Progressing     Problem: Knowledge Deficit  Goal: Patient/family/caregiver demonstrates understanding of disease process, treatment plan, medications, and discharge instructions  Description: Complete learning assessment and assess knowledge base.  Interventions:  - Provide teaching at level of understanding  - Provide teaching via preferred learning methods  Outcome: Progressing     Problem: Nutrition/Hydration-ADULT  Goal: Nutrient/Hydration intake appropriate for improving, restoring or maintaining nutritional needs  Description: Monitor and assess patient's nutrition/hydration status for malnutrition. Collaborate with interdisciplinary team and initiate plan and interventions as ordered.  Monitor patient's weight and dietary intake as ordered or per policy. Utilize nutrition screening tool and intervene as necessary. Determine patient's food preferences and provide high-protein, high-caloric foods as appropriate.     INTERVENTIONS:  - Monitor oral intake, urinary output, labs, and treatment plans  - Assess nutrition and hydration status and recommend course of action  - Evaluate amount of meals eaten  - Assist patient with eating if necessary   - Allow adequate time for meals  - Recommend/ encourage appropriate diets, oral nutritional supplements, and vitamin/mineral supplements  - Order, calculate, and assess calorie counts as needed  - Recommend, monitor, and adjust tube feedings and TPN/PPN based on assessed needs  - Assess need for intravenous fluids  - Provide specific nutrition/hydration education as appropriate  - Include patient/family/caregiver in decisions related to nutrition  Outcome: Progressing     Problem: GASTROINTESTINAL - ADULT  Goal: Minimal or absence of nausea and/or vomiting  Description: INTERVENTIONS:  - Administer IV fluids if ordered to ensure adequate hydration  - Maintain NPO status until nausea and vomiting are resolved  - Nasogastric tube if ordered  - Administer ordered antiemetic  medications as needed  - Provide nonpharmacologic comfort measures as appropriate  - Advance diet as tolerated, if ordered  - Consider nutrition services referral to assist patient with adequate nutrition and appropriate food choices  Outcome: Progressing  Goal: Maintains or returns to baseline bowel function  Description: INTERVENTIONS:  - Assess bowel function  - Encourage oral fluids to ensure adequate hydration  - Administer IV fluids if ordered to ensure adequate hydration  - Administer ordered medications as needed  - Encourage mobilization and activity  - Consider nutritional services referral to assist patient with adequate nutrition and appropriate food choices  Outcome: Progressing  Goal: Maintains adequate nutritional intake  Description: INTERVENTIONS:  - Monitor percentage of each meal consumed  - Identify factors contributing to decreased intake, treat as appropriate  - Assist with meals as needed  - Monitor I&O, weight, and lab values if indicated  - Obtain nutrition services referral as needed  Outcome: Progressing  Goal: Oral mucous membranes remain intact  Description: INTERVENTIONS  - Assess oral mucosa and hygiene practices  - Implement preventative oral hygiene regimen  - Implement oral medicated treatments as ordered  - Initiate Nutrition services referral as needed  Outcome: Progressing

## 2025-07-12 NOTE — PROGRESS NOTES
Progress Note - Hospitalist   Name: Kaykay Holland 35 y.o. female I MRN: [id number]  Unit/Bed#: -01 I Date of Admission: 7/11/2025   Date of Service: 7/12/2025 I Hospital Day: 1    Assessment & Plan  Acute on chronic alcoholic pancreatitis  Recurrent admissions for alcoholic pancreatitis, presenting with epigastric pain and CT findings suspicious for acute pancreatitis.  Mildly elevated lipase also noted  Francis to clear liquid diet  IV fluids  Pain control, oral and IV pain medication  Alcohol and tobacco cessation will be key  BISAP score is 0  Hepatic steatosis  Noted history, stable LFTs  Outpatient GI follow-up  Alcohol use disorder, severe, dependence (HCC)  With last alcohol consumption 24 hours ago  Given phenobarbital total dose of 520 mg , can consider additional 130mg -260mg every hour should patient have significant symptoms to a max of 2 g  Continue CIWA protocol, but would not give phenobarbital and Ativan together to avoid respiratory depression  Multivitamin, thiamine, folate supplementation  Previous alcohol withdrawal seizure  Continue thiamine  Tobacco abuse  NRT offered  Cessation encouraged  Alcohol withdrawal with complication with inpatient treatment (HCC)  Continue phenobarbital for withdrawal  Patient would benefit from Vivitrol upon discharge            Hospital Course:     35-year-old female patient with history of alcoholic pancreatitis presenting with abdominal pain from her GI visit.  The patient unfortunately recently relapsed and was drinking wine.  She has a history of alcohol withdrawal seizures.  Patient is status post phenobarbital load, her symptoms are improving.    Assessment:      Principal Problem:    Acute on chronic alcoholic pancreatitis  Active Problems:    Alcohol withdrawal with complication with inpatient treatment (HCC)    Tobacco abuse    Hepatic steatosis    Alcohol use disorder, severe, dependence (HCC)      Plan:    Continue phenobarbital  Continue  thiamine  Continue IV fluid  Advance to clear liquid diet       VTE Pharmacologic Prophylaxis:   Pharmacologic: Enoxaparin (Lovenox)  Mechanical VTE Prophylaxis in Place: Yes    AM-PAC Basic Mobility:  Basic Mobility Inpatient Raw Score: 24    JH-HLM Achieved: 7: Walk 25 feet or more  JH-HLM Goal: 8: Walk 250 feet or more    HLM Goal listed above. Continue with multidisciplinary rounding and encourage appropriate mobility to improve upon HLM goals.         Patient Centered Rounds: Case discussed and reviewed with nursing    Discussions with Specialists or Other Care Team Provider: Case management, discussed with consultant toxicology name Dr. Hickey    Education and Discussions with Family / Patient: Patient updated family      Current Length of Stay: 1 day(s)    Current Patient Status: Inpatient   Certification Statement: The patient will continue to require additional inpatient hospital stay due to need for additional treatment and management of alcoholic pancreatitis, as well as alcohol abuse disorder and withdrawal symptoms.    Discharge Plan / Estimated Discharge Date: Anticipate discharge in the next 24 to 48 hours to home    Code Status: Level 1 - Full Code      Subjective:   Seen and examined, no acute complaints.  She reports she is having abdominal pain.  She requests pain medication.    A complete and comprehensive 14 point organ system review has been performed and all other systems are negative other than stated above.    Objective:     Vitals:   Temp (24hrs), Av.4 °F (36.9 °C), Min:97.6 °F (36.4 °C), Max:99.1 °F (37.3 °C)    Temp:  [97.6 °F (36.4 °C)-99.1 °F (37.3 °C)] 98.3 °F (36.8 °C)  HR:  [] 84  Resp:  [16-20] 20  BP: ()/() 140/100  SpO2:  [90 %-97 %] 96 %  Body mass index is 19.94 kg/m².     Input and Output Summary (last 24 hours):       Intake/Output Summary (Last 24 hours) at 2025 1502  Last data filed at 2025 0407  Gross per 24 hour   Intake 1000 ml   Output  400 ml   Net 600 ml       Physical Exam:     General: well appearing, no acute distress  HEENT: atraumatic, PERRLA, moist mucosa, normal pharynx, normal tonsils and adenoids, normal tongue, no fluid in sinuses  Neck: Trachea midline, no carotid bruit, no masses  Respiratory: normal chest wall expansion, CTA B, no r/r/w, no rubs  Cardiovascular: RRR, no m/r/g, Normal S1 and S2  Abdomen: Soft, tender, non-distended, normal bowel sounds in all quadrants, no hepatosplenomegaly, no tympany  Rectal: deferred  Musculoskeletal: normal ROM in upper and lower extremities  Integumentary: warm, dry, and pink, with no rash, purpura, or petechia  Heme/Lymph: no lymphadenopathy, no bruises  Neurological: Cranial Nerves II-XII grossly intact, tremulous with tongue fasciculations  Tremor noted  Psychiatric: cooperative with normal mood, affect, and cognition      Additional Data:     Labs:    Results from last 7 days   Lab Units 07/12/25  0401   WBC Thousand/uL 6.40   HEMOGLOBIN g/dL 10.2*   HEMATOCRIT % 30.1*   PLATELETS Thousands/uL 185   SEGS PCT % 39*   LYMPHO PCT % 49*   MONO PCT % 9   EOS PCT % 2     Results from last 7 days   Lab Units 07/12/25  0401   POTASSIUM mmol/L 4.4   CHLORIDE mmol/L 100   CO2 mmol/L 26   BUN mg/dL 8   CREATININE mg/dL 0.53*   CALCIUM mg/dL 8.7   ALK PHOS U/L 63   ALT U/L 28   AST U/L 63*           * I Have Reviewed All Lab Data Listed Above.  * Additional Pertinent Lab Tests Reviewed: All Labs For Current Hospital Admission Reviewed      Lines/Drains:  Invasive Devices       Peripheral Intravenous Line  Duration             Peripheral IV 07/11/25 Right Antecubital <1 day                          Imaging:  Imaging Reviewed Today Include:   Personally reviewed and found   CT and abdomen reviewed personally by me shows pancreatic inflammation.  There are no obvious areas of pancreatic necrosis.  She does have multiple pancreatic pseudocysts.  There is thickening involving the stomach along the lesser  curvature  CT abdomen pelvis with contrast  Result Date: 7/11/2025  Impression: Mild worsening stranding involving the pancreaticoduodenal groove, adjacent to the pancreatic head, which likely represents recurrent acute pancreatitis in this region. No pancreatic parenchymal necrosis noted. Multiple pancreatic pseudocysts, which have decreased in size when compared to the prior study. Reactive wall thickening involving the stomach along the lesser curvature. No bowel obstruction noted. 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 Diffuse hepatic steatosis. Workstation performed: [id number]       Telemetry:       Recent Cultures (last 7 days):         Last 24 Hours Medication List:   Current Facility-Administered Medications   Medication Dose Route Frequency Provider Last Rate    acetaminophen  650 mg Oral Q6H PRN Eduardo Mercer PA-C      enoxaparin  40 mg Subcutaneous Daily Eduardo Mercer PA-C      folic acid  1 mg Oral Daily Eduardo Mercer PA-C      HYDROmorphone  0.5 mg Intravenous Q2H PRN Eduardo Mercer PA-C      hydrOXYzine HCL  25 mg Oral Q6H PRN Eduardo Mercer PA-C      lactated ringers  1.5 mL/kg/hr Intravenous Continuous Eduardo Mercer PA-C 1.5 mL/kg/hr (07/11/25 3430)    nicotine  1 patch Transdermal Daily Eduardo Mercer PA-C      ondansetron  4 mg Intravenous Q6H PRN Eduardo Mercer PA-C      ondansetron  4 mg Intravenous Once Eduardo Mercer PA-C      polyethylene glycol  17 g Oral Daily PRN Eduardo Mercer PA-C      thiamine  100 mg Oral Daily Eduardo Mercer PA-C      trimethobenzamide  200 mg Intramuscular Q6H PRN Eduardo Mercer PA-C         AM-PAC Basic Mobility:  Basic Mobility Inpatient Raw Score: 24    JH-HLM Achieved: 7: Walk 25 feet or more  JH-HLM Goal: 8: Walk 250 feet or more    HLM Goal listed above. Continue with multidisciplinary rounding and encourage appropriate mobility to improve upon HLM goals.     Today, Patient  Was Seen By: Lester Rudd, DO    ** Please Note: This note was completed in part utilizing [employer] software dictation.  Grammatical errors, random word insertions, spelling mistakes, and incomplete sentences may be an occasional consequence of this system secondary to software limitations, ambient noise, and hardware issues.  If you have any questions or concerns about the content, text, or information contained within the body of this dictation, please contact the provider for clarification. **

## 2025-07-12 NOTE — ASSESSMENT & PLAN NOTE
Continue supportive care measures, including airway monitoring, head of bed elevation, aspiration precautions, cardiac telemetry, and continuous pulse oximetry.    Patient is at high risk for complicated alcohol withdrawal given history of withdrawal seizures and alcoholic pancreatitis; she requires inpatient management of her alcohol withdrawal.    Patient showed significant improvement after phenobarbital 260 mg IV; recommend additional phenobarbital 260 mg IV to complete ideal body weight phenobarbital loading.    Continue symptom-triggered, as needed benzodiazepines or phenobarbital as needed for alcohol withdrawal symptoms. Can continue phenobarbital 130 mg IV every 1-2 hours as needed for CIWA 10-20. If patient is scoring higher than 20, consider phenobarbital 260 mg IV. Alternatively can give diazepam based CIWA as outlined below with doses ordered as needed for the scores. Avoid giving benzodiazepines and phenobarbital together due to risk of respiratory depression.    Titrate to RASS -1.

## 2025-07-12 NOTE — ASSESSMENT & PLAN NOTE
We have counseled her numerous times to stop drinking alcohol, but she never wishes to        GI will sign off--please call with questions

## 2025-07-12 NOTE — NURSING NOTE
Patient arrived to floor at approx 2235. Pt assessment WDL and CIWA protocol started. Provider made aware of pt CIWA score and all orders followed by nurse, see MAR and flowsheets for details. Seizure precautions in place including guard rails covered, suction and oxygen at bedside. Pt medicated for nausea and two episodes of vomiting, see MAR for details. Provider updated on patients status via epic secure chat, care continues, all safeties maintained including call bell and personal belongings within reach.

## 2025-07-12 NOTE — ASSESSMENT & PLAN NOTE
With last alcohol consumption 24 hours ago  Given phenobarbital total dose of 520 mg , can consider additional 130mg -260mg every hour should patient have significant symptoms to a max of 2 g  Continue CIWA protocol, but would not give phenobarbital and Ativan together to avoid respiratory depression  Multivitamin, thiamine, folate supplementation  Previous alcohol withdrawal seizure  Continue thiamine

## 2025-07-13 PROBLEM — G47.00 INSOMNIA: Status: ACTIVE | Noted: 2025-07-13

## 2025-07-13 LAB
ALBUMIN SERPL BCG-MCNC: 3.5 G/DL (ref 3.5–5)
ALP SERPL-CCNC: 58 U/L (ref 34–104)
ALT SERPL W P-5'-P-CCNC: 19 U/L (ref 7–52)
ANION GAP SERPL CALCULATED.3IONS-SCNC: 9 MMOL/L (ref 4–13)
AST SERPL W P-5'-P-CCNC: 45 U/L (ref 13–39)
BILIRUB SERPL-MCNC: 1.52 MG/DL (ref 0.2–1)
BUN SERPL-MCNC: 4 MG/DL (ref 5–25)
CALCIUM SERPL-MCNC: 8.7 MG/DL (ref 8.4–10.2)
CHLORIDE SERPL-SCNC: 93 MMOL/L (ref 96–108)
CO2 SERPL-SCNC: 32 MMOL/L (ref 21–32)
CREAT SERPL-MCNC: 0.48 MG/DL (ref 0.6–1.3)
ERYTHROCYTE [DISTWIDTH] IN BLOOD BY AUTOMATED COUNT: 14.4 % (ref 11.6–15.1)
GFR SERPL CREATININE-BSD FRML MDRD: 128 ML/MIN/1.73SQ M
GLUCOSE SERPL-MCNC: 79 MG/DL (ref 65–140)
HCT VFR BLD AUTO: 31.7 % (ref 34.8–46.1)
HGB BLD-MCNC: 10.4 G/DL (ref 11.5–15.4)
MCH RBC QN AUTO: 33.5 PG (ref 26.8–34.3)
MCHC RBC AUTO-ENTMCNC: 32.8 G/DL (ref 31.4–37.4)
MCV RBC AUTO: 102 FL (ref 82–98)
PLATELET # BLD AUTO: 131 THOUSANDS/UL (ref 149–390)
PMV BLD AUTO: 10.7 FL (ref 8.9–12.7)
POTASSIUM SERPL-SCNC: 3.8 MMOL/L (ref 3.5–5.3)
PROT SERPL-MCNC: 5.7 G/DL (ref 6.4–8.4)
RBC # BLD AUTO: 3.1 MILLION/UL (ref 3.81–5.12)
SODIUM SERPL-SCNC: 134 MMOL/L (ref 135–147)
WBC # BLD AUTO: 3.38 THOUSAND/UL (ref 4.31–10.16)

## 2025-07-13 PROCEDURE — 99233 SBSQ HOSP IP/OBS HIGH 50: CPT | Performed by: EMERGENCY MEDICINE

## 2025-07-13 PROCEDURE — 99233 SBSQ HOSP IP/OBS HIGH 50: CPT | Performed by: INTERNAL MEDICINE

## 2025-07-13 PROCEDURE — 80053 COMPREHEN METABOLIC PANEL: CPT | Performed by: INTERNAL MEDICINE

## 2025-07-13 PROCEDURE — 85027 COMPLETE CBC AUTOMATED: CPT | Performed by: INTERNAL MEDICINE

## 2025-07-13 RX ORDER — POLYETHYLENE GLYCOL 3350 17 G/17G
17 POWDER, FOR SOLUTION ORAL DAILY
Status: DISCONTINUED | OUTPATIENT
Start: 2025-07-13 | End: 2025-07-17 | Stop reason: HOSPADM

## 2025-07-13 RX ORDER — LORAZEPAM 1 MG/1
2 TABLET ORAL ONCE
Status: COMPLETED | OUTPATIENT
Start: 2025-07-13 | End: 2025-07-13

## 2025-07-13 RX ORDER — SENNOSIDES 8.6 MG
2 TABLET ORAL
Status: DISCONTINUED | OUTPATIENT
Start: 2025-07-13 | End: 2025-07-17 | Stop reason: HOSPADM

## 2025-07-13 RX ORDER — TRAZODONE HYDROCHLORIDE 50 MG/1
50 TABLET ORAL
Status: DISCONTINUED | OUTPATIENT
Start: 2025-07-13 | End: 2025-07-17 | Stop reason: HOSPADM

## 2025-07-13 RX ORDER — SODIUM CHLORIDE, SODIUM GLUCONATE, SODIUM ACETATE, POTASSIUM CHLORIDE, MAGNESIUM CHLORIDE, SODIUM PHOSPHATE, DIBASIC, AND POTASSIUM PHOSPHATE .53; .5; .37; .037; .03; .012; .00082 G/100ML; G/100ML; G/100ML; G/100ML; G/100ML; G/100ML; G/100ML
150 INJECTION, SOLUTION INTRAVENOUS CONTINUOUS
Status: DISCONTINUED | OUTPATIENT
Start: 2025-07-13 | End: 2025-07-14

## 2025-07-13 RX ORDER — GABAPENTIN 300 MG/1
300 CAPSULE ORAL 3 TIMES DAILY
Status: DISCONTINUED | OUTPATIENT
Start: 2025-07-13 | End: 2025-07-17 | Stop reason: HOSPADM

## 2025-07-13 RX ORDER — DOCUSATE SODIUM 100 MG/1
100 CAPSULE, LIQUID FILLED ORAL 2 TIMES DAILY
Status: DISCONTINUED | OUTPATIENT
Start: 2025-07-13 | End: 2025-07-17 | Stop reason: HOSPADM

## 2025-07-13 RX ADMIN — THIAMINE HCL TAB 100 MG 100 MG: 100 TAB at 11:28

## 2025-07-13 RX ADMIN — LORAZEPAM 2 MG: 1 TABLET ORAL at 22:32

## 2025-07-13 RX ADMIN — ONDANSETRON 4 MG: 2 INJECTION INTRAMUSCULAR; INTRAVENOUS at 17:07

## 2025-07-13 RX ADMIN — FOLIC ACID 1 MG: 1 TABLET ORAL at 11:28

## 2025-07-13 RX ADMIN — HYDROMORPHONE HYDROCHLORIDE 0.5 MG: 1 INJECTION, SOLUTION INTRAMUSCULAR; INTRAVENOUS; SUBCUTANEOUS at 14:47

## 2025-07-13 RX ADMIN — LORAZEPAM 2 MG: 1 TABLET ORAL at 03:58

## 2025-07-13 RX ADMIN — ENOXAPARIN SODIUM 40 MG: 40 INJECTION SUBCUTANEOUS at 09:35

## 2025-07-13 RX ADMIN — TRAZODONE HYDROCHLORIDE 50 MG: 50 TABLET ORAL at 21:22

## 2025-07-13 RX ADMIN — ONDANSETRON 4 MG: 2 INJECTION INTRAMUSCULAR; INTRAVENOUS at 00:24

## 2025-07-13 RX ADMIN — HYDROMORPHONE HYDROCHLORIDE 0.5 MG: 1 INJECTION, SOLUTION INTRAMUSCULAR; INTRAVENOUS; SUBCUTANEOUS at 23:30

## 2025-07-13 RX ADMIN — SODIUM CHLORIDE, SODIUM GLUCONATE, SODIUM ACETATE, POTASSIUM CHLORIDE, MAGNESIUM CHLORIDE, SODIUM PHOSPHATE, DIBASIC, AND POTASSIUM PHOSPHATE 150 ML: .53; .5; .37; .037; .03; .012; .00082 INJECTION, SOLUTION INTRAVENOUS at 09:07

## 2025-07-13 RX ADMIN — NICOTINE 1 PATCH: 21 PATCH, EXTENDED RELEASE TRANSDERMAL at 09:13

## 2025-07-13 RX ADMIN — HYDROXYZINE HYDROCHLORIDE 25 MG: 25 TABLET, FILM COATED ORAL at 00:24

## 2025-07-13 RX ADMIN — SODIUM CHLORIDE, SODIUM GLUCONATE, SODIUM ACETATE, POTASSIUM CHLORIDE, MAGNESIUM CHLORIDE, SODIUM PHOSPHATE, DIBASIC, AND POTASSIUM PHOSPHATE 150 ML: .53; .5; .37; .037; .03; .012; .00082 INJECTION, SOLUTION INTRAVENOUS at 16:16

## 2025-07-13 RX ADMIN — SODIUM CHLORIDE, SODIUM GLUCONATE, SODIUM ACETATE, POTASSIUM CHLORIDE, MAGNESIUM CHLORIDE, SODIUM PHOSPHATE, DIBASIC, AND POTASSIUM PHOSPHATE 150 ML: .53; .5; .37; .037; .03; .012; .00082 INJECTION, SOLUTION INTRAVENOUS at 22:56

## 2025-07-13 RX ADMIN — GABAPENTIN 300 MG: 300 CAPSULE ORAL at 15:51

## 2025-07-13 RX ADMIN — GABAPENTIN 300 MG: 300 CAPSULE ORAL at 20:14

## 2025-07-13 RX ADMIN — HYDROMORPHONE HYDROCHLORIDE 0.5 MG: 1 INJECTION, SOLUTION INTRAMUSCULAR; INTRAVENOUS; SUBCUTANEOUS at 21:22

## 2025-07-13 RX ADMIN — HYDROMORPHONE HYDROCHLORIDE 0.5 MG: 1 INJECTION, SOLUTION INTRAMUSCULAR; INTRAVENOUS; SUBCUTANEOUS at 01:28

## 2025-07-13 RX ADMIN — SENNOSIDES 17.2 MG: 8.6 TABLET, FILM COATED ORAL at 21:22

## 2025-07-13 RX ADMIN — HYDROMORPHONE HYDROCHLORIDE 0.5 MG: 1 INJECTION, SOLUTION INTRAMUSCULAR; INTRAVENOUS; SUBCUTANEOUS at 05:04

## 2025-07-13 RX ADMIN — TRIMETHOBENZAMIDE HYDROCHLORIDE 200 MG: 100 INJECTION INTRAMUSCULAR at 20:14

## 2025-07-13 RX ADMIN — HYDROMORPHONE HYDROCHLORIDE 0.5 MG: 1 INJECTION, SOLUTION INTRAMUSCULAR; INTRAVENOUS; SUBCUTANEOUS at 18:15

## 2025-07-13 RX ADMIN — HYDROXYZINE HYDROCHLORIDE 25 MG: 25 TABLET, FILM COATED ORAL at 20:17

## 2025-07-13 RX ADMIN — HYDROMORPHONE HYDROCHLORIDE 0.5 MG: 1 INJECTION, SOLUTION INTRAMUSCULAR; INTRAVENOUS; SUBCUTANEOUS at 09:10

## 2025-07-13 RX ADMIN — ONDANSETRON 4 MG: 2 INJECTION INTRAMUSCULAR; INTRAVENOUS at 23:30

## 2025-07-13 RX ADMIN — HYDROMORPHONE HYDROCHLORIDE 0.5 MG: 1 INJECTION, SOLUTION INTRAMUSCULAR; INTRAVENOUS; SUBCUTANEOUS at 11:28

## 2025-07-13 NOTE — PROGRESS NOTES
"Progress Note - Medical Toxicology   Name: Kaykay Holland 35 y.o. female I MRN: [id number]  Unit/Bed#: -01 I Date of Admission: 7/11/2025   Date of Service: 7/13/2025 I Hospital Day: 2    Assessment & Plan  Alcohol withdrawal with complication with inpatient treatment ([facility])  Continue supportive care measures, including airway monitoring, head of bed elevation, aspiration precautions, cardiac telemetry, and continuous pulse oximetry.    Patient has had improvement in withdrawal symptoms since the two doses of phenobarbital yesterday; has received an additional dose of lorazepam for withdrawal this morning. No HTN or tachycardia noted. Patient's CIWA 14 this morning scoring 5 for anxiety.    Can continue CIWA monitoring with symptom-triggered, as needed lorazepam.     If patient's main symptom is anxiety, can consider gabapentin 300 mg three times daily x 7 days for post-alcohol withdrawal syndrome.    Consider trazodone and/or melatonin for sleep. Discussed with patient that insomnia and sleep difficulties may persist months after alcohol cessation.  Alcohol use disorder, severe, dependence (HCC)  Recommend daily thiamine, folate, and multivitamin supplementation.    Patient is interested in naltrexone PO; however, in setting of pancreatitis, she is receiving opioids for pain. Will need to hold naltrexone for 10-14 days after last opioid use due to risk of precipitated withdrawal as naltrexone is an opioid receptor antagonist. Patient is agreeable to start naltrexone in outpatient setting once it has been 14 days from last opioid use. Can prescribe naltrexone 50 mg upon discharge.    Recommend case management consult for disposition planning; patient desires outpatient follow up with AA.  Acute on chronic alcoholic pancreatitis  Patient reports history of recurrent pancreatitis; lipase is mildly elevated at 131. CT A/P shows \"mild worsening stranding involving the pancreaticoduodenal groove adjacent to the " "pancreatic head which likely represents recurrent acute pancreatitis in this region. No parenchymal necrosis. Multiple pancreatic pseudocysts...\"    Recommend multimodal pain control and progression of diet as tolerated  Tobacco abuse  Continue nicotine replacement therapy  Hepatic steatosis  Recent Labs     07/11/25  1642 07/12/25  0401 07/13/25  0934   AST 93* 63* 45*   ALT 34 28 19     Likely due to chronic alcohol use and possible fatty liver disease; LFTs improving    Discussed plan for alcohol withdrawal management with Dr. Rudd who is in agreement with the plan.    Medical toxicology will sign off. Thank you for the consult. Please contact the medical  on call with questions or concerns.    Reason for current consult: Alcohol withdrawal, alcohol use disorder     Subjective   Patient tearful. States she feels better from a withdrawal perspective but is still reporting difficulty with sleep, stating only the phenobarbital allows her to rest.    Objective :  Temp:  [97.7 °F (36.5 °C)-99.2 °F (37.3 °C)] 99.2 °F (37.3 °C)  HR:  [56-92] 76  BP: (115-144)/(75-96) 115/75  Resp:  [17-18] 18  SpO2:  [92 %-98 %] 97 %  O2 Device: None (Room air)      Intake/Output Summary (Last 24 hours) at 7/13/2025 1129  Last data filed at 7/13/2025 0910  Gross per 24 hour   Intake 360 ml   Output 200 ml   Net 160 ml       Physical Exam  Vitals and nursing note reviewed.   Constitutional:       General: She is in acute distress.      Appearance: She is ill-appearing. She is not diaphoretic.      Comments: Tearful, anxious   Pulmonary:      Effort: Pulmonary effort is normal. No respiratory distress.     Neurological:      Mental Status: She is alert and oriented to person, place, and time.      Comments: No tremor   Psychiatric:      Comments: Anxious, tearful       Limited by telemedicine platform      Lab Results: I have reviewed the following results:CBC/BMP:   .     07/13/25  0934   WBC 3.38*   HGB 10.4*   HCT 31.7* "   *   SODIUM 134*   K 3.8   CL 93*   CO2 32   BUN 4*   CREATININE 0.48*   GLUC 79        Imaging Results Review: No pertinent imaging studies reviewed.  Other Study Results Review: No additional pertinent studies reviewed.    Administrative Statements   VIRTUAL CARE DOCUMENTATION:     1. This service was provided via Telemedicine using Teams Virtual Rounding      2. Parties in the room with patient during teleconsult Patient only    3. Confidentiality My office door was closed     4. Participants No one else was in the room    5. Patient acknowledged consent and understanding of privacy and security of the  Telemedicine consult. I informed the patient that I have reviewed their record in Epic and presented the opportunity for them to ask any questions regarding the visit today.  The patient agreed to participate.    6. I have spent a total time of 15 minutes in caring for this patient on the day of the visit/encounter including Instructions for management, Patient and family education, Importance of tx compliance, Risk factor reductions, Documenting in the medical record, and Communicating with other healthcare professionals , not including the time spent for establishing the audio/video connection.

## 2025-07-13 NOTE — PLAN OF CARE
Problem: Potential for Falls  Goal: Patient will remain free of falls  Description: INTERVENTIONS:  - Educate patient/family on patient safety including physical limitations  - Instruct patient to call for assistance with activity   - Consider consulting OT/PT to assist with strengthening/mobility based on AM PAC & JH-HLM score  - Consult OT/PT to assist with strengthening/mobility   - Keep Call bell within reach  - Keep bed low and locked with side rails adjusted as appropriate  - Keep care items and personal belongings within reach  - Initiate and maintain comfort rounds  - Make Fall Risk Sign visible to staff  - Offer Toileting every 2 Hours, in advance of need  - Initiate/Maintain bed/chair alarm  - Obtain necessary fall risk management equipment:   - Apply yellow socks and bracelet for high fall risk patients  - Consider moving patient to room near nurses station  Outcome: Progressing     Problem: PAIN - ADULT  Goal: Verbalizes/displays adequate comfort level or baseline comfort level  Description: Interventions:  - Encourage patient to monitor pain and request assistance  - Assess pain using appropriate pain scale  - Administer analgesics as ordered based on type and severity of pain and evaluate response  - Implement non-pharmacological measures as appropriate and evaluate response  - Consider cultural and social influences on pain and pain management  - Notify physician/advanced practitioner if interventions unsuccessful or patient reports new pain  - Educate patient/family on pain management process including their role and importance of  reporting pain   - Provide non-pharmacologic/complimentary pain relief interventions  Outcome: Progressing     Problem: INFECTION - ADULT  Goal: Absence or prevention of progression during hospitalization  Description: INTERVENTIONS:  - Assess and monitor for signs and symptoms of infection  - Monitor lab/diagnostic results  - Monitor all insertion sites, i.e. indwelling  lines, tubes, and drains  - Monitor endotracheal if appropriate and nasal secretions for changes in amount and color  - [place] appropriate cooling/warming therapies per order  - Administer medications as ordered  - Instruct and encourage patient and family to use good hand hygiene technique  - Identify and instruct in appropriate isolation precautions for identified infection/condition  Outcome: Progressing  Goal: Absence of fever/infection during neutropenic period  Description: INTERVENTIONS:  - Monitor WBC  - Perform strict hand hygiene  - Limit to healthy visitors only  - No plants, dried, fresh or silk flowers with butler in patient room  Outcome: Progressing     Problem: SAFETY ADULT  Goal: Patient will remain free of falls  Description: INTERVENTIONS:  - Educate patient/family on patient safety including physical limitations  - Instruct patient to call for assistance with activity   - Consider consulting OT/PT to assist with strengthening/mobility based on AM PAC & -HLM score  - Consult OT/PT to assist with strengthening/mobility   - Keep Call bell within reach  - Keep bed low and locked with side rails adjusted as appropriate  - Keep care items and personal belongings within reach  - Initiate and maintain comfort rounds  - Make Fall Risk Sign visible to staff  - Offer Toileting every 2 Hours, in advance of need  - Initiate/Maintain bed/chair alarm  - Obtain necessary fall risk management equipment:   - Apply yellow socks and bracelet for high fall risk patients  - Consider moving patient to room near nurses station  Outcome: Progressing  Goal: Maintain or return to baseline ADL function  Description: INTERVENTIONS:  -  Assess patient's ability to carry out ADLs; assess patient's baseline for ADL function and identify physical deficits which impact ability to perform ADLs (bathing, care of mouth/teeth, toileting, grooming, dressing, etc.)  - Assess/evaluate cause of self-care deficits   - Assess range of  motion  - Assess patient's mobility; develop plan if impaired  - Assess patient's need for assistive devices and provide as appropriate  - Encourage maximum independence but intervene and supervise when necessary  - Involve family in performance of ADLs  - Assess for home care needs following discharge   - Consider OT consult to assist with ADL evaluation and planning for discharge  - Provide patient education as appropriate  - Monitor functional capacity and physical performance, use of AM PAC & JH-HLM   - Monitor gait, balance and fatigue with ambulation    Outcome: Progressing  Goal: Maintains/Returns to pre admission functional level  Description: INTERVENTIONS:  - Perform AM-PAC 6 Click Basic Mobility/ Daily Activity assessment daily.  - Set and communicate daily mobility goal to care team and patient/family/caregiver.   - Collaborate with rehabilitation services on mobility goals if consulted  - Perform Range of Motion 3 times a day.  - Reposition patient every 3 hours.  - Dangle patient 3 times a day  - Stand patient 3 times a day  - Ambulate patient 3 times a day  - Out of bed to chair 3 times a day   - Out of bed for meals 3 times a day  - Out of bed for toileting  - Record patient progress and toleration of activity level   Outcome: Progressing     Problem: DISCHARGE PLANNING  Goal: Discharge to home or other facility with appropriate resources  Description: INTERVENTIONS:  - Identify barriers to discharge w/patient and caregiver  - Arrange for needed discharge resources and transportation as appropriate  - Identify discharge learning needs (meds, wound care, etc.)  - Arrange for interpretive services to assist at discharge as needed  - Refer to Case Management Department for coordinating discharge planning if the patient needs post-hospital services based on physician/advanced practitioner order or complex needs related to functional status, cognitive ability, or social support system  Outcome:  Progressing     Problem: Knowledge Deficit  Goal: Patient/family/caregiver demonstrates understanding of disease process, treatment plan, medications, and discharge instructions  Description: Complete learning assessment and assess knowledge base.  Interventions:  - Provide teaching at level of understanding  - Provide teaching via preferred learning methods  Outcome: Progressing     Problem: Nutrition/Hydration-ADULT  Goal: Nutrient/Hydration intake appropriate for improving, restoring or maintaining nutritional needs  Description: Monitor and assess patient's nutrition/hydration status for malnutrition. Collaborate with interdisciplinary team and initiate plan and interventions as ordered.  Monitor patient's weight and dietary intake as ordered or per policy. Utilize nutrition screening tool and intervene as necessary. Determine patient's food preferences and provide high-protein, high-caloric foods as appropriate.     INTERVENTIONS:  - Monitor oral intake, urinary output, labs, and treatment plans  - Assess nutrition and hydration status and recommend course of action  - Evaluate amount of meals eaten  - Assist patient with eating if necessary   - Allow adequate time for meals  - Recommend/ encourage appropriate diets, oral nutritional supplements, and vitamin/mineral supplements  - Order, calculate, and assess calorie counts as needed  - Recommend, monitor, and adjust tube feedings and TPN/PPN based on assessed needs  - Assess need for intravenous fluids  - Provide specific nutrition/hydration education as appropriate  - Include patient/family/caregiver in decisions related to nutrition  Outcome: Progressing     Problem: GASTROINTESTINAL - ADULT  Goal: Minimal or absence of nausea and/or vomiting  Description: INTERVENTIONS:  - Administer IV fluids if ordered to ensure adequate hydration  - Maintain NPO status until nausea and vomiting are resolved  - Nasogastric tube if ordered  - Administer ordered antiemetic  medications as needed  - Provide nonpharmacologic comfort measures as appropriate  - Advance diet as tolerated, if ordered  - Consider nutrition services referral to assist patient with adequate nutrition and appropriate food choices  Outcome: Progressing  Goal: Maintains or returns to baseline bowel function  Description: INTERVENTIONS:  - Assess bowel function  - Encourage oral fluids to ensure adequate hydration  - Administer IV fluids if ordered to ensure adequate hydration  - Administer ordered medications as needed  - Encourage mobilization and activity  - Consider nutritional services referral to assist patient with adequate nutrition and appropriate food choices  Outcome: Progressing  Goal: Maintains adequate nutritional intake  Description: INTERVENTIONS:  - Monitor percentage of each meal consumed  - Identify factors contributing to decreased intake, treat as appropriate  - Assist with meals as needed  - Monitor I&O, weight, and lab values if indicated  - Obtain nutrition services referral as needed  Outcome: Progressing  Goal: Oral mucous membranes remain intact  Description: INTERVENTIONS  - Assess oral mucosa and hygiene practices  - Implement preventative oral hygiene regimen  - Implement oral medicated treatments as ordered  - Initiate Nutrition services referral as needed  Outcome: Progressing

## 2025-07-13 NOTE — ASSESSMENT & PLAN NOTE
Recommend daily thiamine, folate, and multivitamin supplementation.    Patient is interested in naltrexone PO; however, in setting of pancreatitis, she is receiving opioids for pain. Will need to hold naltrexone for 10-14 days after last opioid use due to risk of precipitated withdrawal as naltrexone is an opioid receptor antagonist. Patient is agreeable to start naltrexone in outpatient setting once it has been 14 days from last opioid use. Can prescribe naltrexone 50 mg upon discharge.    Recommend case management consult for disposition planning; patient desires outpatient follow up with AA.

## 2025-07-13 NOTE — ASSESSMENT & PLAN NOTE
Recent Labs     07/11/25  1642 07/12/25  0401 07/13/25  0934   AST 93* 63* 45*   ALT 34 28 19     Likely due to chronic alcohol use and possible fatty liver disease; LFTs improving

## 2025-07-13 NOTE — PROGRESS NOTES
Progress Note - Hospitalist   Name: Kaykay Holland 35 y.o. female I MRN: [id number]  Unit/Bed#: -01 I Date of Admission: 7/11/2025   Date of Service: 7/13/2025 I Hospital Day: 2    Assessment & Plan  Acute on chronic alcoholic pancreatitis  Recurrent admissions for alcoholic pancreatitis, presenting with epigastric pain and CT findings suspicious for acute pancreatitis  Advance to low-fat  IV fluids-increased to 150 mL an hour of lactated Ringer's  Pain control, oral and IV pain medications ordered  Alcohol and tobacco cessation will be key  BISAP score is 0  Hepatic steatosis  Noted history, stable LFTs  Outpatient GI follow-up  Alcohol use disorder, severe, dependence (HCC)  With last alcohol consumption 24 hours prior to admission, history of alcohol withdrawal seizure in the past  Given phenobarbital total dose of 520 mg   Continue CIWA protocol  Multivitamin, thiamine, folate supplementation  Start gabapentin 300 mg 3 times a day for post alcohol withdrawal syndrome  Can start naltrexone 50 mg 2 weeks after  Continue thiamine  Tobacco abuse  NRT offered  Cessation encouraged  Alcohol withdrawal with complication with inpatient treatment (HCC)  Symptoms markedly improved from yesterday  See plan for alcohol use disorder above  Insomnia  Start trazodone at bedtime              Hospital Course:     35-year-old female patient presenting with acute on chronic alcoholic pancreatitis as well as alcohol withdrawal.  Patient is status post phenobarbital, diet advanced today.    Assessment:      Principal Problem:    Acute on chronic alcoholic pancreatitis  Active Problems:    Alcohol withdrawal with complication with inpatient treatment (HCC)    Tobacco abuse    Hepatic steatosis    Alcohol use disorder, severe, dependence (HCC)    Insomnia      Plan:    Start gabapentin for post alcohol withdrawal syndrome  Start trazodone for sleep  Increase IV fluid 250 mL an hour  Advance diet to low-fat       VTE  Pharmacologic Prophylaxis:   Pharmacologic: Enoxaparin (Lovenox)  Mechanical VTE Prophylaxis in Place: Yes    AM-PAC Basic Mobility:  Basic Mobility Inpatient Raw Score: 24    JH-HLM Achieved: 8: Walk 250 feet ot more  JH-HLM Goal: 8: Walk 250 feet or more    HLM Goal listed above. Continue with multidisciplinary rounding and encourage appropriate mobility to improve upon HLM goals.         Patient Centered Rounds: Case discussed and reviewed with nursing    Discussions with Specialists or Other Care Team Provider: Case management, discussed with consultant toxicology name Dr. Hickey    Education and Discussions with Family / Patient: Patient updating family      Current Length of Stay: 2 day(s)    Current Patient Status: Inpatient   Certification Statement: The patient will continue to require additional inpatient hospital stay due to need for additional treatment and management of pancreatitis    Discharge Plan / Estimated Discharge Date: 24 to 48 hours to home    Code Status: Level 1 - Full Code      Subjective:   Seen and examined, no acute complaints.  No nausea no vomiting, no abdominal pain    A complete and comprehensive 14 point organ system review has been performed and all other systems are negative other than stated above.    Objective:     Vitals:   Temp (24hrs), Av.5 °F (36.9 °C), Min:97.7 °F (36.5 °C), Max:99.2 °F (37.3 °C)    Temp:  [97.7 °F (36.5 °C)-99.2 °F (37.3 °C)] 99.2 °F (37.3 °C)  HR:  [56-92] 76  Resp:  [17-18] 18  BP: (115-144)/(75-96) 115/75  SpO2:  [92 %-98 %] 97 %  Body mass index is 19.94 kg/m².     Input and Output Summary (last 24 hours):       Intake/Output Summary (Last 24 hours) at 2025 1203  Last data filed at 2025 0910  Gross per 24 hour   Intake 360 ml   Output 200 ml   Net 160 ml       Physical Exam:     General: well appearing, no acute distress  HEENT: atraumatic, PERRLA, moist mucosa, normal pharynx, normal tonsils and adenoids, normal tongue, no fluid in  sinuses  Neck: Trachea midline, no carotid bruit, no masses  Respiratory: normal chest wall expansion, CTA B, no r/r/w, no rubs  Cardiovascular: RRR, no m/r/g, Normal S1 and S2  Abdomen: Soft, non-tender, non-distended, normal bowel sounds in all quadrants, no hepatosplenomegaly, no tympany  Rectal: deferred  Musculoskeletal: normal ROM in upper and lower extremities  Integumentary: warm, dry, and pink, with no rash, purpura, or petechia  Heme/Lymph: no lymphadenopathy, no bruises  Neurological: Cranial Nerves II-XII grossly intact  Psychiatric: cooperative with normal mood, affect, and cognition      Additional Data:     Labs:    Results from last 7 days   Lab Units 07/13/25  0934 07/12/25  0401   WBC Thousand/uL 3.38* 6.40   HEMOGLOBIN g/dL 10.4* 10.2*   HEMATOCRIT % 31.7* 30.1*   PLATELETS Thousands/uL 131* 185   SEGS PCT %  --  39*   LYMPHO PCT %  --  49*   MONO PCT %  --  9   EOS PCT %  --  2     Results from last 7 days   Lab Units 07/13/25  0934   POTASSIUM mmol/L 3.8   CHLORIDE mmol/L 93*   CO2 mmol/L 32   BUN mg/dL 4*   CREATININE mg/dL 0.48*   CALCIUM mg/dL 8.7   ALK PHOS U/L 58   ALT U/L 19   AST U/L 45*           * I Have Reviewed All Lab Data Listed Above.  * Additional Pertinent Lab Tests Reviewed: All Labs For Current Hospital Admission Reviewed      Lines/Drains:  Invasive Devices       Peripheral Intravenous Line  Duration             Peripheral IV 07/11/25 Right Antecubital 1 day                          Imaging:  Imaging Reviewed Today Include:   Personally reviewed and found none  CT abdomen pelvis with contrast  Result Date: 7/11/2025  Impression: Mild worsening stranding involving the pancreaticoduodenal groove, adjacent to the pancreatic head, which likely represents recurrent acute pancreatitis in this region. No pancreatic parenchymal necrosis noted. Multiple pancreatic pseudocysts, which have decreased in size when compared to the prior study. Reactive wall thickening involving the stomach  along the lesser curvature. No bowel obstruction noted. 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 Diffuse hepatic steatosis. Workstation performed: [id number]       Telemetry:       Recent Cultures (last 7 days):         Last 24 Hours Medication List:   Current Facility-Administered Medications   Medication Dose Route Frequency Provider Last Rate    acetaminophen  650 mg Oral Q6H PRN Eduardo Mercer PA-C      docusate sodium  100 mg Oral BID Lester Rudd DO      enoxaparin  40 mg Subcutaneous Daily Eduardo Mercer PA-C      folic acid  1 mg Oral Daily Eduardo Mercer PA-C      gabapentin  300 mg Oral TID Lester Rudd DO      HYDROmorphone  0.5 mg Intravenous Q2H PRN Eduardo Mercer PA-C      hydrOXYzine HCL  25 mg Oral Q6H PRN Eduardo Mercer PA-C      multi-electrolyte  150 mL Intravenous Continuous Lester Rudd DO      nicotine  1 patch Transdermal Daily Eduardo Mercer PA-C      ondansetron  4 mg Intravenous Q6H PRN Eduardo Mercer PA-C      ondansetron  4 mg Intravenous Once Eduardo Mercer PA-C      polyethylene glycol  17 g Oral Daily Lester Rudd DO      senna  2 tablet Oral HS Lester Rudd DO      thiamine  100 mg Oral Daily Eduardo Mercer PA-C      traZODone  50 mg Oral HS Lester Rudd DO      trimethobenzamide  200 mg Intramuscular Q6H PRN Eduardo Mercer PA-C         AM-PAC Basic Mobility:  Basic Mobility Inpatient Raw Score: 24    JH-HLM Achieved: 8: Walk 250 feet ot more  JH-HLM Goal: 8: Walk 250 feet or more    HLM Goal listed above. Continue with multidisciplinary rounding and encourage appropriate mobility to improve upon HLM goals.     Today, Patient Was Seen By: Lester Rudd DO    ** Please Note: This note was completed in part utilizing [employer] software dictation.  Grammatical errors, random word insertions, spelling mistakes, and incomplete sentences may be an occasional  consequence of this system secondary to software limitations, ambient noise, and hardware issues.  If you have any questions or concerns about the content, text, or information contained within the body of this dictation, please contact the provider for clarification. **

## 2025-07-13 NOTE — ASSESSMENT & PLAN NOTE
With last alcohol consumption 24 hours prior to admission, history of alcohol withdrawal seizure in the past  Given phenobarbital total dose of 520 mg   Continue CIWA protocol  Multivitamin, thiamine, folate supplementation  Start gabapentin 300 mg 3 times a day for post alcohol withdrawal syndrome  Can start naltrexone 50 mg 2 weeks after  Continue thiamine

## 2025-07-13 NOTE — ASSESSMENT & PLAN NOTE
Recurrent admissions for alcoholic pancreatitis, presenting with epigastric pain and CT findings suspicious for acute pancreatitis  Advance to low-fat  IV fluids-increased to 150 mL an hour of lactated Ringer's  Pain control, oral and IV pain medications ordered  Alcohol and tobacco cessation will be key  BISAP score is 0

## 2025-07-13 NOTE — ASSESSMENT & PLAN NOTE
"Patient reports history of recurrent pancreatitis; lipase is mildly elevated at 131. CT A/P shows \"mild worsening stranding involving the pancreaticoduodenal groove adjacent to the pancreatic head which likely represents recurrent acute pancreatitis in this region. No parenchymal necrosis. Multiple pancreatic pseudocysts...\"    Recommend multimodal pain control and progression of diet as tolerated  " DISPLAY PLAN FREE TEXT

## 2025-07-13 NOTE — UTILIZATION REVIEW
Initial Clinical Review    Admission: Date/Time/Statement:   Admission Orders (From admission, onward)       Ordered        07/11/25 2203  Inpatient Admission  Once                          Orders Placed This Encounter   Procedures    Inpatient Admission     Standing Status:   Standing     Number of Occurrences:   1     Level of Care:   Med Surg [16]     Estimated length of stay:   More than 2 Midnights     Certification:   I certify that inpatient services are medically necessary for this patient for a duration of greater than two midnights. See H&P and MD Progress Notes for additional information about the patient's course of treatment.     ED Arrival Information       Expected   -    Arrival   7/11/2025 16:07    Acuity   Urgent              Means of arrival   Walk-In    Escorted by   Family Member    Service   Hospitalist    Admission type   Emergency              Arrival complaint   abdominal pain             Chief Complaint   Patient presents with    Abdominal Pain     Pt states that she started a few days ago and saw a GI doctor today and was told to come here. Pt states that the pain has been going on for 3 weeks pt states that she started drinking again. Pt states that she drink 3 glasses of wine a day. Pt states that she has been taking oxycodone last taken 3 weeks ago and has 2 left, hydrophone was a few months ago        Initial Presentation: 35 y.o. female with a PMH of recurrent pancreatitis, alcohol use who presents with abdominal pain. Findings equivocal for acute pancreatitis with recent alcohol use 24 hours ago. Reporting pain, nausea, anxiety. Plan: Inpatient admission for evaluation and treatment of acute on chronic alcoholic pancreatitis, hepatic steatosis, alcohol use disorder, tobacco abuse: NPO, IV fluids, pain control, GI consult, CIWA protocol, Multivitamin, thiamine, folate supplementation, NRT.     Anticipated Length of Stay/Certification Statement: Patient will be admitted on an inpatient  basis with an anticipated length of stay of greater than 2 midnights secondary to acute alcoholic pancreatitis requiring IV fluids, pain control, GI consult.     Date: 7/12   Day 2:     Medical Toxicology consult: Continue supportive care measures, including airway monitoring, head of bed elevation, aspiration precautions, cardiac telemetry, and continuous pulse oximetry. Patient is at high risk for complicated alcohol withdrawal given history of withdrawal seizures and alcoholic pancreatitis; she requires inpatient management of her alcohol withdrawal. Patient showed significant improvement after phenobarbital 260 mg IV; recommend additional phenobarbital 260 mg IV to complete ideal body weight phenobarbital loading. Can continue phenobarbital 130 mg IV every 1-2 hours as needed for CIWA 10-20. If patient is scoring higher than 20, consider phenobarbital 260 mg IV.     Internal medicine: Advance to clear liquid diet. IV fluids. Pain control. Continue CIWA protocol. Multivitamin, thiamine, folate supplementation. NRT. Continue phenobarbital for withdrawal.    ED Treatment-Medication Administration from 07/11/2025 1607 to 07/11/2025 2233         Date/Time Order Dose Route Action     07/11/2025 1906 sodium chloride 0.9 % bolus 1,000 mL 1,000 mL Intravenous New Bag     07/11/2025 1912 fentaNYL injection 50 mcg 50 mcg Intravenous Given     07/11/2025 1906 ondansetron (ZOFRAN) injection 4 mg 4 mg Intravenous Given     07/11/2025 2021 iohexol (OMNIPAQUE) 350 MG/ML injection (MULTI-DOSE) 50 mL 50 mL Intravenous Given     07/11/2025 2054 fentaNYL injection 50 mcg 50 mcg Intravenous Given     07/11/2025 2214 HYDROmorphone (DILAUDID) injection 1 mg 1 mg Intravenous Given     07/11/2025 2214 ondansetron (ZOFRAN) injection 4 mg 4 mg Intravenous Given     07/11/2025 2214 nicotine (NICODERM CQ) 21 mg/24 hr TD 24 hr patch 1 patch 1 patch Transdermal Medication Applied            Scheduled Medications:  docusate sodium, 100 mg,  Oral, BID  enoxaparin, 40 mg, Subcutaneous, Daily  folic acid, 1 mg, Oral, Daily  gabapentin, 300 mg, Oral, TID  nicotine, 1 patch, Transdermal, Daily  ondansetron, 4 mg, Intravenous, Once  polyethylene glycol, 17 g, Oral, Daily  senna, 2 tablet, Oral, HS  thiamine, 100 mg, Oral, Daily  traZODone, 50 mg, Oral, HS      Continuous IV Infusions:  multi-electrolyte, 150 mL, Intravenous, Continuous      PRN Meds:  acetaminophen, 650 mg, Oral, Q6H PRN  HYDROmorphone, 0.5 mg, Intravenous, Q2H PRN  hydrOXYzine HCL, 25 mg, Oral, Q6H PRN  ondansetron, 4 mg, Intravenous, Q6H PRN  trimethobenzamide, 200 mg, Intramuscular, Q6H PRN      ED Triage Vitals   Temperature Pulse Respirations Blood Pressure SpO2 Pain Score   07/11/25 1640 07/11/25 1640 07/11/25 1640 07/11/25 1640 07/11/25 1640 07/11/25 1912   97.8 °F (36.6 °C) (!) 109 18 140/88 97 % 10 - Worst Possible Pain     Weight (last 2 days)       Date/Time Weight    07/11/25 2248 54.3 (119.8)    07/11/25 2233 54.3 (119.8)    07/11/25 1640 53.1 (117)            Vital Signs (last 3 days)       Date/Time Temp Pulse Resp BP MAP (mmHg) SpO2 O2 Device Patient Position - Orthostatic VS [place] Coma Scale Score CIWA-Ar Total Pain    07/13/25 1128 -- -- -- -- -- -- -- -- -- -- 10 - Worst Possible Pain    07/13/25 10:59:33 99.2 °F (37.3 °C) 76 -- 115/75 88 97 % -- -- -- -- --    07/13/25 0915 -- -- -- -- -- 93 % None (Room air) -- -- -- --    07/13/25 0910 -- -- -- -- -- -- -- -- -- -- 10 - Worst Possible Pain    07/13/25 07:25:40 99.2 °F (37.3 °C) 67 18 129/86 100 95 % None (Room air) Lying -- -- --    07/13/25 0504 -- -- -- -- -- -- -- -- -- -- 9 07/13/25 05:01:03 -- 72 -- 124/78 93 96 % -- -- -- -- --    07/13/25 0500 -- 67 -- 124/78 -- -- -- -- -- -- --    07/13/25 0458 -- 68 -- -- -- -- -- -- -- 14 --    07/13/25 03:56:27 -- 56 18 144/85 105 98 % None (Room air) Lying -- -- --    07/13/25 0353 -- 92 -- -- -- -- -- -- -- 9 --    07/13/25 0130 -- -- -- -- -- -- -- -- -- 6 --     07/13/25 0128 -- -- -- -- -- -- -- -- -- -- 10 - Worst Possible Pain    07/13/25 0100 -- 78 -- -- -- -- -- -- -- -- --    07/12/25 2309 -- -- -- -- -- -- -- -- -- -- 9    07/12/25 23:05:13 97.7 °F (36.5 °C) 78 17 127/87 100 98 % -- -- -- 11 --    07/12/25 2030 -- 61 -- -- -- -- -- -- -- 7 --    07/12/25 1943 -- -- -- -- -- -- -- -- 15 -- 9 07/12/25 19:30:21 97.8 °F (36.6 °C) 74 17 130/96 107 92 % -- -- -- 9 --    07/12/25 1927 -- 71 -- -- -- -- -- -- -- 9 --    07/12/25 1925 -- -- -- -- -- -- -- -- -- -- 9 07/12/25 1702 -- -- -- -- -- -- -- -- -- -- 10 - Worst Possible Pain    07/12/25 1600 -- -- -- -- -- -- -- -- -- 6 --    07/12/25 15:18:32 98.5 °F (36.9 °C) 68 18 123/85 98 96 % -- -- -- -- --    07/12/25 1420 -- -- -- -- -- -- -- -- -- -- 9    07/12/25 1200 -- -- -- -- -- -- -- -- -- 6 9    07/12/25 0900 -- -- -- -- -- 94 % None (Room air) -- -- -- --    07/12/25 08:39:43 98.3 °F (36.8 °C) 84 20 140/100 113 96 % -- -- -- -- --    07/12/25 0754 -- -- -- -- -- -- -- -- -- -- 9    07/12/25 0700 -- -- -- -- -- -- -- -- -- 12 --    07/12/25 04:07:24 97.6 °F (36.4 °C) 63 18 141/86 104 92 % -- -- -- -- --    07/12/25 0407 -- 63 -- 141/86 -- -- -- -- -- 12 --    07/12/25 0400 -- 76 -- -- -- -- -- -- -- 11 --    07/12/25 0357 -- -- -- -- -- -- -- -- -- -- 10 - Worst Possible Pain    07/11/25 23:50:01 -- 76 16 98/62 74 92 % -- -- -- -- --    07/11/25 2350 -- -- -- -- -- -- -- -- -- 10 --    07/11/25 2348 -- 78 -- -- -- -- -- -- -- 10 --    07/11/25 23:18:45 -- 78 16 122/74 90 90 % -- -- -- -- --    07/11/25 23:01:49 -- 78 -- 148/93 111 94 % -- -- -- -- --    07/11/25 2248 99.1 °F (37.3 °C) 72 16 139/80 100 95 % -- -- -- 25 --    07/11/25 22:37:05 99.1 °F (37.3 °C) 72 16 139/80 100 95 % None (Room air) Lying -- -- --    07/11/25 2237 -- -- -- -- -- -- -- -- 15 -- 9    07/11/25 2214 -- -- -- -- -- -- -- -- -- -- 10 - Worst Possible Pain    07/11/25 2213 -- -- -- -- -- -- None (Room air) -- -- -- --    07/11/25 2100 --  76 18 117/68 86 95 % None (Room air) Sitting -- -- --    07/11/25 2054 -- -- -- -- -- -- -- -- -- -- 10 - Worst Possible Pain    07/11/25 1915 -- 82 17 126/78 96 97 % None (Room air) Sitting -- -- --    07/11/25 1912 -- -- -- -- -- -- -- -- -- -- 10 - Worst Possible Pain    07/11/25 1900 -- -- -- -- -- -- None (Room air) -- 15 -- --    07/11/25 1640 97.8 °F (36.6 °C) 109 18 140/88 -- 97 % None (Room air) -- -- -- --           CIWA-Ar Score       Row Name 07/13/25 0500 07/13/25 0458 07/13/25 0353       CIWA-Ar    /78 -- --    Pulse 67 68 92    Nausea and Vomiting -- 2 1    Tactile Disturbances -- 2 0    Tremor -- 2 0    Auditory Disturbances -- 0 0    Paroxysmal Sweats -- 0 0    Visual Disturbances -- 0 0    Anxiety -- 5 6    Headache, Fullness in Head -- 3 2    Agitation -- 0 0    Orientation and Clouding of Sensorium -- 0 0    CIWA-Ar Total -- 14 9      Row Name 07/13/25 0130 07/13/25 0100 07/12/25 23:05:13       CIWA-Ar    Pulse -- 78 --    Nausea and Vomiting 1 -- 1    Tactile Disturbances 0 -- 3    Tremor 1 -- 2    Auditory Disturbances 0 -- 0    Paroxysmal Sweats 0 -- 0    Visual Disturbances 0 -- 0    Anxiety 4 -- 5    Headache, Fullness in Head 0 -- 0    Agitation 0 -- 0    Orientation and Clouding of Sensorium 0 -- 0    CIWA-Ar Total 6 -- --      Row Name 07/12/25 2030 07/12/25 19:30:21 07/12/25 1927       CIWA-Ar    Pulse 61 -- 71    Nausea and Vomiting 2 2 2    Tactile Disturbances 1 1 1    Tremor 1 2 2    Auditory Disturbances 0 0 0    Paroxysmal Sweats 0 0 0    Visual Disturbances 0 0 0    Anxiety 3 4 4    Headache, Fullness in Head 0 0 0    Agitation 0 0 0    Orientation and Clouding of Sensorium 0 0 0    CIWA-Ar Total 7 9 --      Row Name 07/12/25 1600 07/12/25 1200 07/12/25 0700       CIWA-Ar    Nausea and Vomiting 1 1 2    Tactile Disturbances 1 1 2    Tremor 3 3 4    Auditory Disturbances 0 0 0    Paroxysmal Sweats 0 0 0    Visual Disturbances 0 0 0    Anxiety 1 1 4    Headache, Fullness in  Head 0 0 0    Agitation 0 0 0    Orientation and Clouding of Sensorium 0 0 0    CIWA-Ar Total 6 6 12      Row Name 07/12/25 0407 07/12/25 0400 07/11/25 2350       CIWA-Ar    /86 -- --    Pulse 63 76 --    Nausea and Vomiting 1 1 2    Tactile Disturbances 2 2 2    Tremor 4 5 2    Auditory Disturbances 0 0 0    Paroxysmal Sweats 0 0 0    Visual Disturbances 0 0 0    Anxiety 3 1 1    Headache, Fullness in Head 2 2 3    Agitation 0 0 0    Orientation and Clouding of Sensorium 0 0 0    CIWA-Ar Total 12 -- 10      Row Name 07/11/25 2348 07/11/25 2248          CIWA-Ar    Pulse 78 --     Nausea and Vomiting 2 6     Tactile Disturbances 2 3     Tremor 2 6     Auditory Disturbances 0 0     Paroxysmal Sweats 0 1     Visual Disturbances 0 0     Anxiety 1 6     Headache, Fullness in Head 3 3     Agitation 0 0     Orientation and Clouding of Sensorium 0 0     CIWA-Ar Total 10 25                     Pertinent Labs/Diagnostic Test Results:   Radiology:  CT abdomen pelvis with contrast   Final Interpretation by Erica Oconnor MD (07/11 2127)      Mild worsening stranding involving the pancreaticoduodenal groove, adjacent to the pancreatic head, which likely represents recurrent acute pancreatitis in this region. No pancreatic parenchymal necrosis noted.      Multiple pancreatic pseudocysts, which have decreased in size when compared to the prior study.      Reactive wall thickening involving the stomach along the lesser curvature. No bowel obstruction noted.      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      Diffuse hepatic steatosis.         Workstation performed: [id number]           Cardiology:  No orders to display     GI:  No orders to display           Results from last 7 days   Lab Units 07/13/25  0934 07/12/25  0401 07/11/25  1642   WBC Thousand/uL 3.38* 6.40 5.89   HEMOGLOBIN g/dL 10.4* 10.2* 11.6   HEMATOCRIT % 31.7* 30.1* 34.8   PLATELETS Thousands/uL 131* 185  235   TOTAL NEUT ABS Thousands/µL  --  2.48 3.47         Results from last 7 days   Lab Units 07/13/25  0934 07/12/25  0401 07/11/25  1642   SODIUM mmol/L 134* 136 134*   POTASSIUM mmol/L 3.8 4.4 4.1   CHLORIDE mmol/L 93* 100 96   CO2 mmol/L 32 26 23   ANION GAP mmol/L 9 10 15*   BUN mg/dL 4* 8 8   CREATININE mg/dL 0.48* 0.53* 0.47*   EGFR ml/min/1.73sq m 128 124 129   CALCIUM mg/dL 8.7 8.7 9.3     Results from last 7 days   Lab Units 07/13/25  0934 07/12/25  0401 07/11/25  1642   AST U/L 45* 63* 93*   ALT U/L 19 28 34   ALK PHOS U/L 58 63 77   TOTAL PROTEIN g/dL 5.7* 6.1* 7.3   ALBUMIN g/dL 3.5 3.9 4.5   TOTAL BILIRUBIN mg/dL 1.52* 1.37* 1.12*         Results from last 7 days   Lab Units 07/13/25  0934 07/12/25  0401 07/11/25  1642   GLUCOSE RANDOM mg/dL 79 73 114           Results from last 7 days   Lab Units 07/11/25  1642   LIPASE u/L 131*     Results from last 7 days   Lab Units 07/11/25  1642   CRP mg/L <1.0             Results from last 7 days   Lab Units 07/11/25  1911   CLARITY UA  Clear   COLOR UA  Yellow   SPEC GRAV UA  1.010   PH UA  5.5   GLUCOSE UA mg/dl Negative   KETONES UA mg/dl Negative   BLOOD UA  Negative   PROTEIN UA mg/dl Negative   NITRITE UA  Negative   BILIRUBIN UA  Negative   UROBILINOGEN UA (BE) mg/dl <2.0   LEUKOCYTES UA  Negative           Results from last 7 days   Lab Units 07/12/25  0847   ETHANOL LVL mg/dL <10         Past Medical History[1]  Present on Admission:   Acute on chronic alcoholic pancreatitis   Hepatic steatosis   Alcohol use disorder, severe, dependence (HCC)   Tobacco abuse   Alcohol withdrawal with complication with inpatient treatment (HCC)      Admitting Diagnosis: Acute recurrent pancreatitis [K85.90]  Acute alcoholic pancreatitis [K85.20]  Alcohol use disorder [F10.90]  Age/Sex: 35 y.o. female    Network Utilization Review Department  ATTENTION: Please call with any questions or concerns to [phone] and carefully listen to the prompts so that you are directed  to the right person. All voicemails are confidential.   For Discharge needs, contact Care Management DC Support Team at [phone] opt. 2  Send all requests for admission clinical reviews, approved or denied determinations and any other requests to dedicated fax number below belonging to the [facility] where the patient is receiving treatment. List of dedicated fax numbers for the Facilities:  FACILITY NAME UR FAX NUMBER   ADMISSION DENIALS (Administrative/Medical Necessity) [phone]   DISCHARGE SUPPORT TEAM (NETWORK) [phone]   PARENT CHILD HEALTH (Maternity/NICU/Pediatrics) [phone]   [facility] [phone]   [facility] [phone]   [facility] [phone]   [facility] [phone]   [facility] [phone]   [facility] [phone]   [facility] [phone]   [facility] [phone]   [facility] [phone]   [facility] [phone]   [facility] [phone]   [facility] [phone]              [1]   Past Medical History:  Diagnosis Date    Acute alcoholic pancreatitis 11/03/2023    Hypertension     SIRS (systemic inflammatory response syndrome) (HCC) 04/10/2024

## 2025-07-13 NOTE — ASSESSMENT & PLAN NOTE
Continue supportive care measures, including airway monitoring, head of bed elevation, aspiration precautions, cardiac telemetry, and continuous pulse oximetry.    Patient has had improvement in withdrawal symptoms since the two doses of phenobarbital yesterday; has received an additional dose of lorazepam for withdrawal this morning. No HTN or tachycardia noted. Patient's CIWA 14 this morning scoring 5 for anxiety.    Can continue CIWA monitoring with symptom-triggered, as needed lorazepam.     If patient's main symptom is anxiety, can consider gabapentin 300 mg three times daily x 7 days for post-alcohol withdrawal syndrome.    Consider trazodone and/or melatonin for sleep. Discussed with patient that insomnia and sleep difficulties may persist months after alcohol cessation.

## 2025-07-13 NOTE — ASSESSMENT & PLAN NOTE
Start trazodone at bedtime              Hospital Course:     35-year-old female patient presenting with acute on chronic alcoholic pancreatitis as well as alcohol withdrawal.  Patient is status post phenobarbital, diet advanced today.    Assessment:      Principal Problem:    Acute on chronic alcoholic pancreatitis  Active Problems:    Alcohol withdrawal with complication with inpatient treatment (HCC)    Tobacco abuse    Hepatic steatosis    Alcohol use disorder, severe, dependence (HCC)    Insomnia      Plan:    Start gabapentin for post alcohol withdrawal syndrome  Start trazodone for sleep  Increase IV fluid 250 mL an hour  Advance diet to low-fat       VTE Pharmacologic Prophylaxis:   Pharmacologic: Enoxaparin (Lovenox)  Mechanical VTE Prophylaxis in Place: Yes    AM-PAC Basic Mobility:  Basic Mobility Inpatient Raw Score: 24    JH-HLM Achieved: 8: Walk 250 feet ot more  JH-HLM Goal: 8: Walk 250 feet or more    HLM Goal listed above. Continue with multidisciplinary rounding and encourage appropriate mobility to improve upon HLM goals.         Patient Centered Rounds: Case discussed and reviewed with nursing    Discussions with Specialists or Other Care Team Provider: Case management, discussed with consultant toxicology name Dr. Hickey    Education and Discussions with Family / Patient: Patient updating family      Current Length of Stay: 2 day(s)    Current Patient Status: Inpatient   Certification Statement: The patient will continue to require additional inpatient hospital stay due to need for additional treatment and management of pancreatitis    Discharge Plan / Estimated Discharge Date: 24 to 48 hours to home    Code Status: Level 1 - Full Code      Subjective:   Seen and examined, no acute complaints.  No nausea no vomiting, no abdominal pain    A complete and comprehensive 14 point organ system review has been performed and all other systems are negative other than stated above.    Objective:      Vitals:   Temp (24hrs), Av.5 °F (36.9 °C), Min:97.7 °F (36.5 °C), Max:99.2 °F (37.3 °C)    Temp:  [97.7 °F (36.5 °C)-99.2 °F (37.3 °C)] 99.2 °F (37.3 °C)  HR:  [56-92] 76  Resp:  [17-18] 18  BP: (115-144)/(75-96) 115/75  SpO2:  [92 %-98 %] 97 %  Body mass index is 19.94 kg/m².     Input and Output Summary (last 24 hours):       Intake/Output Summary (Last 24 hours) at 2025 1203  Last data filed at 2025 0910  Gross per 24 hour   Intake 360 ml   Output 200 ml   Net 160 ml       Physical Exam:     General: well appearing, no acute distress  HEENT: atraumatic, PERRLA, moist mucosa, normal pharynx, normal tonsils and adenoids, normal tongue, no fluid in sinuses  Neck: Trachea midline, no carotid bruit, no masses  Respiratory: normal chest wall expansion, CTA B, no r/r/w, no rubs  Cardiovascular: RRR, no m/r/g, Normal S1 and S2  Abdomen: Soft, non-tender, non-distended, normal bowel sounds in all quadrants, no hepatosplenomegaly, no tympany  Rectal: deferred  Musculoskeletal: normal ROM in upper and lower extremities  Integumentary: warm, dry, and pink, with no rash, purpura, or petechia  Heme/Lymph: no lymphadenopathy, no bruises  Neurological: Cranial Nerves II-XII grossly intact  Psychiatric: cooperative with normal mood, affect, and cognition      Additional Data:     Labs:    Results from last 7 days   Lab Units 25  0934 25  0401   WBC Thousand/uL 3.38* 6.40   HEMOGLOBIN g/dL 10.4* 10.2*   HEMATOCRIT % 31.7* 30.1*   PLATELETS Thousands/uL 131* 185   SEGS PCT %  --  39*   LYMPHO PCT %  --  49*   MONO PCT %  --  9   EOS PCT %  --  2     Results from last 7 days   Lab Units 25  0934   POTASSIUM mmol/L 3.8   CHLORIDE mmol/L 93*   CO2 mmol/L 32   BUN mg/dL 4*   CREATININE mg/dL 0.48*   CALCIUM mg/dL 8.7   ALK PHOS U/L 58   ALT U/L 19   AST U/L 45*           * I Have Reviewed All Lab Data Listed Above.  * Additional Pertinent Lab Tests Reviewed: All Labs For Current Hospital Admission  Reviewed      Lines/Drains:  Invasive Devices       Peripheral Intravenous Line  Duration             Peripheral IV 07/11/25 Right Antecubital 1 day                          Imaging:  Imaging Reviewed Today Include:   Personally reviewed and found none  CT abdomen pelvis with contrast  Result Date: 7/11/2025  Impression: Mild worsening stranding involving the pancreaticoduodenal groove, adjacent to the pancreatic head, which likely represents recurrent acute pancreatitis in this region. No pancreatic parenchymal necrosis noted. Multiple pancreatic pseudocysts, which have decreased in size when compared to the prior study. Reactive wall thickening involving the stomach along the lesser curvature. No bowel obstruction noted. 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 Diffuse hepatic steatosis. Workstation performed: [id number]       Telemetry:       Recent Cultures (last 7 days):         Last 24 Hours Medication List:   Current Facility-Administered Medications   Medication Dose Route Frequency Provider Last Rate    acetaminophen  650 mg Oral Q6H PRN Eduardo Mecrer PA-C      docusate sodium  100 mg Oral BID Lester Rudd DO      enoxaparin  40 mg Subcutaneous Daily Eduardo Mercer PA-C      folic acid  1 mg Oral Daily Eduardo Mercer PA-C      gabapentin  300 mg Oral TID Lester Rudd DO      HYDROmorphone  0.5 mg Intravenous Q2H PRN Eduardo Mercer PA-C      hydrOXYzine HCL  25 mg Oral Q6H PRN Eduardo Mercer PA-C      multi-electrolyte  150 mL Intravenous Continuous Lester Rudd DO      nicotine  1 patch Transdermal Daily Eduardo Mercer PA-C      ondansetron  4 mg Intravenous Q6H PRN Eduardo Mercer PA-C      ondansetron  4 mg Intravenous Once Eduardo Mercer PA-C      polyethylene glycol  17 g Oral Daily Lester Rudd DO      senna  2 tablet Oral HS Lester Rudd DO      thiamine  100 mg Oral Daily Eduardo Mercer PA-C       traZODone  50 mg Oral HS Lester Rudd DO      trimethobenzamide  200 mg Intramuscular Q6H PRN Eduardo Mercer PA-C         AM-PAC Basic Mobility:  Basic Mobility Inpatient Raw Score: 24    JH-HLM Achieved: 8: Walk 250 feet ot more  JH-HLM Goal: 8: Walk 250 feet or more    HLM Goal listed above. Continue with multidisciplinary rounding and encourage appropriate mobility to improve upon HLM goals.     Today, Patient Was Seen By: Lester Rudd DO    ** Please Note: This note was completed in part utilizing [employer] software dictation.  Grammatical errors, random word insertions, spelling mistakes, and incomplete sentences may be an occasional consequence of this system secondary to software limitations, ambient noise, and hardware issues.  If you have any questions or concerns about the content, text, or information contained within the body of this dictation, please contact the provider for clarification. **

## 2025-07-14 LAB
ALBUMIN SERPL BCG-MCNC: 3.6 G/DL (ref 3.5–5)
ALP SERPL-CCNC: 63 U/L (ref 34–104)
ALT SERPL W P-5'-P-CCNC: 20 U/L (ref 7–52)
ANION GAP SERPL CALCULATED.3IONS-SCNC: 4 MMOL/L (ref 4–13)
AST SERPL W P-5'-P-CCNC: 45 U/L (ref 13–39)
ATRIAL RATE: 82 BPM
BASE EXCESS BLDA CALC-SCNC: 4 MMOL/L (ref -2–3)
BASOPHILS # BLD AUTO: 0.02 THOUSANDS/ÂΜL (ref 0–0.1)
BASOPHILS NFR BLD AUTO: 1 % (ref 0–1)
BILIRUB SERPL-MCNC: 0.91 MG/DL (ref 0.2–1)
BUN SERPL-MCNC: 3 MG/DL (ref 5–25)
CA-I BLD-SCNC: 1.2 MMOL/L (ref 1.12–1.32)
CALCIUM SERPL-MCNC: 8.4 MG/DL (ref 8.4–10.2)
CHLORIDE SERPL-SCNC: 97 MMOL/L (ref 96–108)
CO2 SERPL-SCNC: 32 MMOL/L (ref 21–32)
CREAT SERPL-MCNC: 0.58 MG/DL (ref 0.6–1.3)
EOSINOPHIL # BLD AUTO: 0.15 THOUSAND/ÂΜL (ref 0–0.61)
EOSINOPHIL NFR BLD AUTO: 4 % (ref 0–6)
ERYTHROCYTE [DISTWIDTH] IN BLOOD BY AUTOMATED COUNT: 14.5 % (ref 11.6–15.1)
GFR SERPL CREATININE-BSD FRML MDRD: 120 ML/MIN/1.73SQ M
GLUCOSE SERPL-MCNC: 131 MG/DL (ref 65–140)
GLUCOSE SERPL-MCNC: 152 MG/DL (ref 65–140)
GLUCOSE SERPL-MCNC: 154 MG/DL (ref 65–140)
HCO3 BLDA-SCNC: 29.2 MMOL/L (ref 22–28)
HCT VFR BLD AUTO: 30.7 % (ref 34.8–46.1)
HCT VFR BLD CALC: 29 % (ref 34.8–46.1)
HGB BLD-MCNC: 9.9 G/DL (ref 11.5–15.4)
HGB BLDA-MCNC: 9.9 G/DL (ref 11.5–15.4)
IMM GRANULOCYTES # BLD AUTO: 0.02 THOUSAND/UL (ref 0–0.2)
IMM GRANULOCYTES NFR BLD AUTO: 1 % (ref 0–2)
LACTATE SERPL-SCNC: 1.8 MMOL/L (ref 0.5–2)
LYMPHOCYTES # BLD AUTO: 1.25 THOUSANDS/ÂΜL (ref 0.6–4.47)
LYMPHOCYTES NFR BLD AUTO: 35 % (ref 14–44)
MAGNESIUM SERPL-MCNC: 1.6 MG/DL (ref 1.9–2.7)
MCH RBC QN AUTO: 33.4 PG (ref 26.8–34.3)
MCHC RBC AUTO-ENTMCNC: 32.2 G/DL (ref 31.4–37.4)
MCV RBC AUTO: 104 FL (ref 82–98)
MONOCYTES # BLD AUTO: 0.35 THOUSAND/ÂΜL (ref 0.17–1.22)
MONOCYTES NFR BLD AUTO: 10 % (ref 4–12)
NEUTROPHILS # BLD AUTO: 1.75 THOUSANDS/ÂΜL (ref 1.85–7.62)
NEUTS SEG NFR BLD AUTO: 49 % (ref 43–75)
NRBC BLD AUTO-RTO: 0 /100 WBCS
P AXIS: 64 DEGREES
PCO2 BLD: 31 MMOL/L (ref 21–32)
PCO2 BLD: 48.2 MM HG (ref 36–44)
PH BLD: 7.39 [PH] (ref 7.35–7.45)
PHOSPHATE SERPL-MCNC: 3.6 MG/DL (ref 2.7–4.5)
PLATELET # BLD AUTO: 126 THOUSANDS/UL (ref 149–390)
PMV BLD AUTO: 10.9 FL (ref 8.9–12.7)
PO2 BLD: 68 MM HG (ref 75–129)
POTASSIUM BLD-SCNC: 3.7 MMOL/L (ref 3.5–5.3)
POTASSIUM SERPL-SCNC: 3.6 MMOL/L (ref 3.5–5.3)
PR INTERVAL: 136 MS
PROLACTIN SERPL-MCNC: 49.42 NG/ML (ref 3.34–26.72)
PROT SERPL-MCNC: 5.8 G/DL (ref 6.4–8.4)
QRS AXIS: 59 DEGREES
QRSD INTERVAL: 88 MS
QT INTERVAL: 374 MS
QTC INTERVAL: 436 MS
RBC # BLD AUTO: 2.96 MILLION/UL (ref 3.81–5.12)
SAO2 % BLD FROM PO2: 93 % (ref 60–85)
SODIUM BLD-SCNC: 133 MMOL/L (ref 136–145)
SODIUM SERPL-SCNC: 133 MMOL/L (ref 135–147)
SPECIMEN SOURCE: ABNORMAL
T WAVE AXIS: 57 DEGREES
VENTRICULAR RATE: 82 BPM
WBC # BLD AUTO: 3.54 THOUSAND/UL (ref 4.31–10.16)

## 2025-07-14 PROCEDURE — 84132 ASSAY OF SERUM POTASSIUM: CPT

## 2025-07-14 PROCEDURE — 82947 ASSAY GLUCOSE BLOOD QUANT: CPT

## 2025-07-14 PROCEDURE — 85025 COMPLETE CBC W/AUTO DIFF WBC: CPT | Performed by: PHYSICIAN ASSISTANT

## 2025-07-14 PROCEDURE — 93005 ELECTROCARDIOGRAM TRACING: CPT

## 2025-07-14 PROCEDURE — 83735 ASSAY OF MAGNESIUM: CPT | Performed by: PHYSICIAN ASSISTANT

## 2025-07-14 PROCEDURE — 99232 SBSQ HOSP IP/OBS MODERATE 35: CPT | Performed by: INTERNAL MEDICINE

## 2025-07-14 PROCEDURE — 84146 ASSAY OF PROLACTIN: CPT | Performed by: PHYSICIAN ASSISTANT

## 2025-07-14 PROCEDURE — 82803 BLOOD GASES ANY COMBINATION: CPT

## 2025-07-14 PROCEDURE — 99232 SBSQ HOSP IP/OBS MODERATE 35: CPT

## 2025-07-14 PROCEDURE — 80053 COMPREHEN METABOLIC PANEL: CPT | Performed by: PHYSICIAN ASSISTANT

## 2025-07-14 PROCEDURE — 85014 HEMATOCRIT: CPT

## 2025-07-14 PROCEDURE — 84100 ASSAY OF PHOSPHORUS: CPT | Performed by: PHYSICIAN ASSISTANT

## 2025-07-14 PROCEDURE — 82948 REAGENT STRIP/BLOOD GLUCOSE: CPT

## 2025-07-14 PROCEDURE — 84295 ASSAY OF SERUM SODIUM: CPT

## 2025-07-14 PROCEDURE — 93010 ELECTROCARDIOGRAM REPORT: CPT | Performed by: INTERNAL MEDICINE

## 2025-07-14 PROCEDURE — 82330 ASSAY OF CALCIUM: CPT

## 2025-07-14 PROCEDURE — 83605 ASSAY OF LACTIC ACID: CPT | Performed by: PHYSICIAN ASSISTANT

## 2025-07-14 RX ORDER — PHENOBARBITAL SODIUM 65 MG/ML
130 INJECTION, SOLUTION INTRAMUSCULAR; INTRAVENOUS ONCE
Status: COMPLETED | OUTPATIENT
Start: 2025-07-14 | End: 2025-07-14

## 2025-07-14 RX ORDER — HYDROMORPHONE HCL/PF 1 MG/ML
1 SYRINGE (ML) INJECTION ONCE
Status: COMPLETED | OUTPATIENT
Start: 2025-07-14 | End: 2025-07-15

## 2025-07-14 RX ORDER — SODIUM CHLORIDE, SODIUM GLUCONATE, SODIUM ACETATE, POTASSIUM CHLORIDE, MAGNESIUM CHLORIDE, SODIUM PHOSPHATE, DIBASIC, AND POTASSIUM PHOSPHATE .53; .5; .37; .037; .03; .012; .00082 G/100ML; G/100ML; G/100ML; G/100ML; G/100ML; G/100ML; G/100ML
75 INJECTION, SOLUTION INTRAVENOUS CONTINUOUS
Status: DISCONTINUED | OUTPATIENT
Start: 2025-07-14 | End: 2025-07-17 | Stop reason: HOSPADM

## 2025-07-14 RX ORDER — MAGNESIUM SULFATE HEPTAHYDRATE 40 MG/ML
2 INJECTION, SOLUTION INTRAVENOUS ONCE
Status: COMPLETED | OUTPATIENT
Start: 2025-07-14 | End: 2025-07-14

## 2025-07-14 RX ORDER — LORAZEPAM 2 MG/ML
INJECTION INTRAMUSCULAR
Status: DISCONTINUED
Start: 2025-07-14 | End: 2025-07-14 | Stop reason: WASHOUT

## 2025-07-14 RX ORDER — KETOROLAC TROMETHAMINE 30 MG/ML
15 INJECTION, SOLUTION INTRAMUSCULAR; INTRAVENOUS EVERY 6 HOURS PRN
Status: DISPENSED | OUTPATIENT
Start: 2025-07-14 | End: 2025-07-16

## 2025-07-14 RX ORDER — HYDROMORPHONE HCL/PF 1 MG/ML
0.5 SYRINGE (ML) INJECTION
Status: DISCONTINUED | OUTPATIENT
Start: 2025-07-14 | End: 2025-07-15

## 2025-07-14 RX ORDER — HYDROMORPHONE HCL/PF 1 MG/ML
0.5 SYRINGE (ML) INJECTION ONCE
Status: COMPLETED | OUTPATIENT
Start: 2025-07-14 | End: 2025-07-14

## 2025-07-14 RX ADMIN — KETOROLAC TROMETHAMINE 15 MG: 30 INJECTION, SOLUTION INTRAMUSCULAR; INTRAVENOUS at 13:26

## 2025-07-14 RX ADMIN — HYDROMORPHONE HYDROCHLORIDE 0.5 MG: 1 INJECTION, SOLUTION INTRAMUSCULAR; INTRAVENOUS; SUBCUTANEOUS at 03:40

## 2025-07-14 RX ADMIN — HYDROXYZINE HYDROCHLORIDE 25 MG: 25 TABLET, FILM COATED ORAL at 10:25

## 2025-07-14 RX ADMIN — ONDANSETRON 4 MG: 2 INJECTION INTRAMUSCULAR; INTRAVENOUS at 07:45

## 2025-07-14 RX ADMIN — GABAPENTIN 300 MG: 300 CAPSULE ORAL at 21:40

## 2025-07-14 RX ADMIN — NICOTINE 1 PATCH: 21 PATCH, EXTENDED RELEASE TRANSDERMAL at 08:54

## 2025-07-14 RX ADMIN — TRAZODONE HYDROCHLORIDE 50 MG: 50 TABLET ORAL at 21:40

## 2025-07-14 RX ADMIN — DOCUSATE SODIUM 100 MG: 100 CAPSULE, LIQUID FILLED ORAL at 08:53

## 2025-07-14 RX ADMIN — HYDROMORPHONE HYDROCHLORIDE 0.5 MG: 1 INJECTION, SOLUTION INTRAMUSCULAR; INTRAVENOUS; SUBCUTANEOUS at 05:36

## 2025-07-14 RX ADMIN — ONDANSETRON 4 MG: 2 INJECTION INTRAMUSCULAR; INTRAVENOUS at 22:22

## 2025-07-14 RX ADMIN — SODIUM CHLORIDE, SODIUM GLUCONATE, SODIUM ACETATE, POTASSIUM CHLORIDE, MAGNESIUM CHLORIDE, SODIUM PHOSPHATE, DIBASIC, AND POTASSIUM PHOSPHATE 75 ML/HR: .53; .5; .37; .037; .03; .012; .00082 INJECTION, SOLUTION INTRAVENOUS at 18:18

## 2025-07-14 RX ADMIN — GABAPENTIN 300 MG: 300 CAPSULE ORAL at 17:27

## 2025-07-14 RX ADMIN — HYDROMORPHONE HYDROCHLORIDE 0.5 MG: 1 INJECTION, SOLUTION INTRAMUSCULAR; INTRAVENOUS; SUBCUTANEOUS at 21:42

## 2025-07-14 RX ADMIN — KETOROLAC TROMETHAMINE 15 MG: 30 INJECTION, SOLUTION INTRAMUSCULAR; INTRAVENOUS at 23:07

## 2025-07-14 RX ADMIN — HYDROMORPHONE HYDROCHLORIDE 0.5 MG: 1 INJECTION, SOLUTION INTRAMUSCULAR; INTRAVENOUS; SUBCUTANEOUS at 00:30

## 2025-07-14 RX ADMIN — FOLIC ACID 1 MG: 1 TABLET ORAL at 08:54

## 2025-07-14 RX ADMIN — HYDROMORPHONE HYDROCHLORIDE 0.5 MG: 1 INJECTION, SOLUTION INTRAMUSCULAR; INTRAVENOUS; SUBCUTANEOUS at 07:50

## 2025-07-14 RX ADMIN — GABAPENTIN 300 MG: 300 CAPSULE ORAL at 08:54

## 2025-07-14 RX ADMIN — POLYETHYLENE GLYCOL 3350 17 G: 17 POWDER, FOR SOLUTION ORAL at 08:53

## 2025-07-14 RX ADMIN — HYDROMORPHONE HYDROCHLORIDE 0.5 MG: 1 INJECTION, SOLUTION INTRAMUSCULAR; INTRAVENOUS; SUBCUTANEOUS at 09:56

## 2025-07-14 RX ADMIN — ENOXAPARIN SODIUM 40 MG: 40 INJECTION SUBCUTANEOUS at 08:54

## 2025-07-14 RX ADMIN — PHENOBARBITAL SODIUM 130 MG: 65 INJECTION INTRAMUSCULAR; INTRAVENOUS at 01:40

## 2025-07-14 RX ADMIN — THIAMINE HCL TAB 100 MG 100 MG: 100 TAB at 08:54

## 2025-07-14 RX ADMIN — DOCUSATE SODIUM 100 MG: 100 CAPSULE, LIQUID FILLED ORAL at 17:27

## 2025-07-14 RX ADMIN — SODIUM CHLORIDE, SODIUM GLUCONATE, SODIUM ACETATE, POTASSIUM CHLORIDE, MAGNESIUM CHLORIDE, SODIUM PHOSPHATE, DIBASIC, AND POTASSIUM PHOSPHATE 150 ML: .53; .5; .37; .037; .03; .012; .00082 INJECTION, SOLUTION INTRAVENOUS at 13:30

## 2025-07-14 RX ADMIN — MAGNESIUM SULFATE HEPTAHYDRATE 2 G: 2 INJECTION, SOLUTION INTRAVENOUS at 10:08

## 2025-07-14 NOTE — RAPID RESPONSE
Rapid Response Note  Kaykay Holland 35 y.o. female MRN: [id number]  Unit/Bed#: -01 Encounter: [id number]    Rapid Response Notification(s):   Response called date/time:  7/14/2025 2:23 AM  Response team arrival date/time:  7/14/2025 2:24 AM  Response end date/time:  7/14/2025 2:40 AM  Level of care:  [facility]  Rapid response location:  [facility] unit  Primary reason for rapid response call:  Other (comment) (possible seizure)    Rapid Response Intervention(s):   Airway:  None  Breathing:  None  Circulation:  None  Fluids administered:  None  Medications administered:  None       Assessment:   Possible seizure like activity.     Plan:   Continue seizure precautions and CIWA protocol  No current indication for ativan given recent phenobarb administration and no current exam or lab findings c/w seizure   Send Lactate and Prolactin   Appreciate tox input  Would not use both phenobarb and ativan going forward and would attempt to use a single drug strategy.      Rapid Response Outcome:   Transfer:  Remain on floor    Family notified: Primary team to notify Family Member       Background/Situation:   Kaykay Holland is a 35 y.o. female who is admitted for alcoholic pancreatitis and has been on CIWA protocol. Rapid response called for presumed seizure like activity- witnessed only by Pt care tech and described as upper body shaking. Patient able to answer orientation questions without fault, VSS stable throughout event. ABG checked at time of event with normal base deficit.    Review of Systems   Respiratory:  Negative for shortness of breath.    Cardiovascular:  Negative for chest pain.   Gastrointestinal:  Negative for abdominal pain.   Neurological:  Negative for weakness, light-headedness and headaches.   Psychiatric/Behavioral:  Negative for confusion and hallucinations.        Objective:   Vitals:    07/13/25 2327 07/13/25 2328 07/14/25 0131 07/14/25 0224   BP: 125/86 125/86  122/83   BP Location:       Pulse:  73 82 81 86   Resp:       Temp:    97.7 °F (36.5 °C)   TempSrc:       SpO2:  96%  95%   Weight:       Height:         Physical Exam  Vitals reviewed.   Constitutional:       General: She is not in acute distress.     Appearance: She is not ill-appearing.     Eyes:      General: Lids are normal.      Extraocular Movements: Extraocular movements intact.      Conjunctiva/sclera: Conjunctivae normal.       Cardiovascular:      Rate and Rhythm: Normal rate and regular rhythm.   Pulmonary:      Effort: Pulmonary effort is normal. No accessory muscle usage.   Abdominal:      General: Abdomen is flat. There is no distension.      Palpations: Abdomen is soft.      Tenderness: There is no abdominal tenderness.     Skin:     General: Skin is warm and dry.      Capillary Refill: Capillary refill takes less than 2 seconds.     Neurological:      Mental Status: She is alert and oriented to person, place, and time.      GCS: GCS eye subscore is 3. GCS verbal subscore is 5. GCS motor subscore is 6.      Motor: Motor function is intact.     Psychiatric:         Behavior: Behavior is cooperative.

## 2025-07-14 NOTE — ASSESSMENT & PLAN NOTE
Start trazodone at bedtime              Hospital Course:     35-year-old female patient presenting with acute on chronic alcoholic pancreatitis as well as alcohol withdrawal.  Patient is status post phenobarbital, diet advanced today.    Assessment:      Principal Problem:    Acute on chronic alcoholic pancreatitis  Active Problems:    Alcohol withdrawal with complication with inpatient treatment (HCC)    Tobacco abuse    Hepatic steatosis    Alcohol use disorder, severe, dependence (HCC)    Insomnia      Plan:    Start gabapentin for post alcohol withdrawal syndrome  Start trazodone for sleep  Increase IV fluid 250 mL an hour  Advance diet to low-fat       VTE Pharmacologic Prophylaxis:   Pharmacologic: Enoxaparin (Lovenox)  Mechanical VTE Prophylaxis in Place: Yes    AM-PAC Basic Mobility:  Basic Mobility Inpatient Raw Score: 24    JH-HLM Achieved: 8: Walk 250 feet ot more  JH-HLM Goal: 8: Walk 250 feet or more    HLM Goal listed above. Continue with multidisciplinary rounding and encourage appropriate mobility to improve upon HLM goals.         Patient Centered Rounds: Case discussed and reviewed with nursing    Discussions with Specialists or Other Care Team Provider: Case management, discussed with consultant toxicology name Dr. Hickey    Education and Discussions with Family / Patient: Patient updating family      Current Length of Stay: 3 day(s)    Current Patient Status: Inpatient   Certification Statement: The patient will continue to require additional inpatient hospital stay due to need for additional treatment and management of pancreatitis    Discharge Plan / Estimated Discharge Date: 24 to 48 hours to home    Code Status: Level 1 - Full Code      Subjective:   Seen and examined, no acute complaints.  No nausea no vomiting, no abdominal pain    A complete and comprehensive 14 point organ system review has been performed and all other systems are negative other than stated above.    Objective:      Vitals:   Temp (24hrs), Av.9 °F (37.2 °C), Min:97.7 °F (36.5 °C), Max:99.3 °F (37.4 °C)    Temp:  [97.7 °F (36.5 °C)-99.3 °F (37.4 °C)] 99.2 °F (37.3 °C)  HR:  [70-95] 95  Resp:  [16-18] 16  BP: (114-137)/(75-93) 132/93  SpO2:  [94 %-98 %] 98 %  Body mass index is 19.94 kg/m².     Input and Output Summary (last 24 hours):       Intake/Output Summary (Last 24 hours) at 2025 1030  Last data filed at 2025 0859  Gross per 24 hour   Intake 1800 ml   Output 200 ml   Net 1600 ml       Physical Exam:     General: well appearing, no acute distress  HEENT: atraumatic, PERRLA, moist mucosa, normal pharynx, normal tonsils and adenoids, normal tongue, no fluid in sinuses  Neck: Trachea midline, no carotid bruit, no masses  Respiratory: normal chest wall expansion, CTA B, no r/r/w, no rubs  Cardiovascular: RRR, no m/r/g, Normal S1 and S2  Abdomen: Soft, non-tender, non-distended, normal bowel sounds in all quadrants, no hepatosplenomegaly, no tympany  Rectal: deferred  Musculoskeletal: normal ROM in upper and lower extremities  Integumentary: warm, dry, and pink, with no rash, purpura, or petechia  Heme/Lymph: no lymphadenopathy, no bruises  Neurological: Cranial Nerves II-XII grossly intact  Psychiatric: cooperative with normal mood, affect, and cognition      Additional Data:     Labs:    Results from last 7 days   Lab Units 25  0237   WBC Thousand/uL 3.54*   HEMOGLOBIN g/dL 9.9*   HEMATOCRIT % 30.7*   PLATELETS Thousands/uL 126*   SEGS PCT % 49   LYMPHO PCT % 35   MONO PCT % 10   EOS PCT % 4     Results from last 7 days   Lab Units 25  0237 25  0232   POTASSIUM mmol/L 3.6  --    CHLORIDE mmol/L 97  --    CO2 mmol/L 32  --    CO2, I-STAT mmol/L  --  31   BUN mg/dL 3*  --    CREATININE mg/dL 0.58*  --    CALCIUM mg/dL 8.4  --    ALK PHOS U/L 63  --    ALT U/L 20  --    AST U/L 45*  --    GLUCOSE, ISTAT mg/dl  --  152*           * I Have Reviewed All Lab Data Listed Above.  * Additional  Pertinent Lab Tests Reviewed: All Labs For Current Hospital Admission Reviewed      Lines/Drains:  Invasive Devices       Peripheral Intravenous Line  Duration             Peripheral IV 07/14/25 Left Antecubital <1 day                          Imaging:  Imaging Reviewed Today Include:   Personally reviewed and found none  CT abdomen pelvis with contrast  Result Date: 7/11/2025  Impression: Mild worsening stranding involving the pancreaticoduodenal groove, adjacent to the pancreatic head, which likely represents recurrent acute pancreatitis in this region. No pancreatic parenchymal necrosis noted. Multiple pancreatic pseudocysts, which have decreased in size when compared to the prior study. Reactive wall thickening involving the stomach along the lesser curvature. No bowel obstruction noted. 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 Diffuse hepatic steatosis. Workstation performed: [id number]       Telemetry:       Recent Cultures (last 7 days):         Last 24 Hours Medication List:   Current Facility-Administered Medications   Medication Dose Route Frequency Provider Last Rate    acetaminophen  650 mg Oral Q6H PRN Eduardo Mercer PA-C      docusate sodium  100 mg Oral BID Lester Rudd DO      enoxaparin  40 mg Subcutaneous Daily Eduardo Mercer PA-C      folic acid  1 mg Oral Daily Eduardo Mercer PA-C      gabapentin  300 mg Oral TID Lester Rudd DO      HYDROmorphone  0.5 mg Intravenous Q2H PRN Eduardo Mercer PA-C      hydrOXYzine HCL  25 mg Oral Q6H PRN Eduardo Mercer PA-C      magnesium sulfate  2 g Intravenous Once Liban Bowden MD 2 g (07/14/25 1008)    multi-electrolyte  150 mL Intravenous Continuous Lester Rudd DO      nicotine  1 patch Transdermal Daily Eduardo Mercer PA-C      ondansetron  4 mg Intravenous Q6H PRN Eduardo Mercer PA-C      ondansetron  4 mg Intravenous Once Eduardo Mercer PA-C      polyethylene  glycol  17 g Oral Daily Lester Rudd DO      senna  2 tablet Oral HS Lester Rudd DO      thiamine  100 mg Oral Daily Eduardo Mercer PA-C      traZODone  50 mg Oral HS Lester Rudd DO      trimethobenzamide  200 mg Intramuscular Q6H PRN Eduardo Mercer PA-C         AM-PAC Basic Mobility:  Basic Mobility Inpatient Raw Score: 24    JH-HLM Achieved: 8: Walk 250 feet ot more  JH-HLM Goal: 8: Walk 250 feet or more    HLM Goal listed above. Continue with multidisciplinary rounding and encourage appropriate mobility to improve upon HLM goals.     Today, Patient Was Seen By: Liban Bowden MD    ** Please Note: This note was completed in part utilizing [employer] software dictation.  Grammatical errors, random word insertions, spelling mistakes, and incomplete sentences may be an occasional consequence of this system secondary to software limitations, ambient noise, and hardware issues.  If you have any questions or concerns about the content, text, or information contained within the body of this dictation, please contact the provider for clarification. **

## 2025-07-14 NOTE — NURSING NOTE
Approx: 0220AM tech alerted nurse to come to bedside to assess patient due to seizure like activity. Patient lethargic but responsive to nurses questions, rapid response called. Vitals obtained and all orders followed. Patient safety ensured care continues.

## 2025-07-14 NOTE — PLAN OF CARE
Problem: Potential for Falls  Goal: Patient will remain free of falls  Description: INTERVENTIONS:  - Educate patient/family on patient safety including physical limitations  - Instruct patient to call for assistance with activity   - Consider consulting OT/PT to assist with strengthening/mobility based on AM PAC & JH-HLM score  - Consult OT/PT to assist with strengthening/mobility   - Keep Call bell within reach  - Keep bed low and locked with side rails adjusted as appropriate  - Keep care items and personal belongings within reach  - Initiate and maintain comfort rounds  - Make Fall Risk Sign visible to staff  - Offer Toileting every 2 Hours, in advance of need  - Initiate/Maintain bed/chair alarm  - Obtain necessary fall risk management equipment:   - Apply yellow socks and bracelet for high fall risk patients  - Consider moving patient to room near nurses station  Outcome: Progressing     Problem: PAIN - ADULT  Goal: Verbalizes/displays adequate comfort level or baseline comfort level  Description: Interventions:  - Encourage patient to monitor pain and request assistance  - Assess pain using appropriate pain scale  - Administer analgesics as ordered based on type and severity of pain and evaluate response  - Implement non-pharmacological measures as appropriate and evaluate response  - Consider cultural and social influences on pain and pain management  - Notify physician/advanced practitioner if interventions unsuccessful or patient reports new pain  - Educate patient/family on pain management process including their role and importance of  reporting pain   - Provide non-pharmacologic/complimentary pain relief interventions  Outcome: Progressing     Problem: INFECTION - ADULT  Goal: Absence or prevention of progression during hospitalization  Description: INTERVENTIONS:  - Assess and monitor for signs and symptoms of infection  - Monitor lab/diagnostic results  - Monitor all insertion sites, i.e. indwelling  lines, tubes, and drains  - Monitor endotracheal if appropriate and nasal secretions for changes in amount and color  - [place] appropriate cooling/warming therapies per order  - Administer medications as ordered  - Instruct and encourage patient and family to use good hand hygiene technique  - Identify and instruct in appropriate isolation precautions for identified infection/condition  Outcome: Progressing  Goal: Absence of fever/infection during neutropenic period  Description: INTERVENTIONS:  - Monitor WBC  - Perform strict hand hygiene  - Limit to healthy visitors only  - No plants, dried, fresh or silk flowers with btuler in patient room  Outcome: Progressing     Problem: SAFETY ADULT  Goal: Patient will remain free of falls  Description: INTERVENTIONS:  - Educate patient/family on patient safety including physical limitations  - Instruct patient to call for assistance with activity   - Consider consulting OT/PT to assist with strengthening/mobility based on AM PAC & -HLM score  - Consult OT/PT to assist with strengthening/mobility   - Keep Call bell within reach  - Keep bed low and locked with side rails adjusted as appropriate  - Keep care items and personal belongings within reach  - Initiate and maintain comfort rounds  - Make Fall Risk Sign visible to staff  - Offer Toileting every 2 Hours, in advance of need  - Initiate/Maintain bed/chair alarm  - Obtain necessary fall risk management equipment:   - Apply yellow socks and bracelet for high fall risk patients  - Consider moving patient to room near nurses station  Outcome: Progressing  Goal: Maintain or return to baseline ADL function  Description: INTERVENTIONS:  -  Assess patient's ability to carry out ADLs; assess patient's baseline for ADL function and identify physical deficits which impact ability to perform ADLs (bathing, care of mouth/teeth, toileting, grooming, dressing, etc.)  - Assess/evaluate cause of self-care deficits   - Assess range of  motion  - Assess patient's mobility; develop plan if impaired  - Assess patient's need for assistive devices and provide as appropriate  - Encourage maximum independence but intervene and supervise when necessary  - Involve family in performance of ADLs  - Assess for home care needs following discharge   - Consider OT consult to assist with ADL evaluation and planning for discharge  - Provide patient education as appropriate  - Monitor functional capacity and physical performance, use of AM PAC & JH-HLM   - Monitor gait, balance and fatigue with ambulation    Outcome: Progressing  Goal: Maintains/Returns to pre admission functional level  Description: INTERVENTIONS:  - Perform AM-PAC 6 Click Basic Mobility/ Daily Activity assessment daily.  - Set and communicate daily mobility goal to care team and patient/family/caregiver.   - Collaborate with rehabilitation services on mobility goals if consulted  - Perform Range of Motion 3 times a day.  - Reposition patient every 3 hours.  - Dangle patient 3 times a day  - Stand patient 3 times a day  - Ambulate patient 3 times a day  - Out of bed to chair 3 times a day   - Out of bed for meals 3 times a day  - Out of bed for toileting  - Record patient progress and toleration of activity level   Outcome: Progressing     Problem: DISCHARGE PLANNING  Goal: Discharge to home or other facility with appropriate resources  Description: INTERVENTIONS:  - Identify barriers to discharge w/patient and caregiver  - Arrange for needed discharge resources and transportation as appropriate  - Identify discharge learning needs (meds, wound care, etc.)  - Arrange for interpretive services to assist at discharge as needed  - Refer to Case Management Department for coordinating discharge planning if the patient needs post-hospital services based on physician/advanced practitioner order or complex needs related to functional status, cognitive ability, or social support system  Outcome:  Progressing     Problem: Knowledge Deficit  Goal: Patient/family/caregiver demonstrates understanding of disease process, treatment plan, medications, and discharge instructions  Description: Complete learning assessment and assess knowledge base.  Interventions:  - Provide teaching at level of understanding  - Provide teaching via preferred learning methods  Outcome: Progressing     Problem: Nutrition/Hydration-ADULT  Goal: Nutrient/Hydration intake appropriate for improving, restoring or maintaining nutritional needs  Description: Monitor and assess patient's nutrition/hydration status for malnutrition. Collaborate with interdisciplinary team and initiate plan and interventions as ordered.  Monitor patient's weight and dietary intake as ordered or per policy. Utilize nutrition screening tool and intervene as necessary. Determine patient's food preferences and provide high-protein, high-caloric foods as appropriate.     INTERVENTIONS:  - Monitor oral intake, urinary output, labs, and treatment plans  - Assess nutrition and hydration status and recommend course of action  - Evaluate amount of meals eaten  - Assist patient with eating if necessary   - Allow adequate time for meals  - Recommend/ encourage appropriate diets, oral nutritional supplements, and vitamin/mineral supplements  - Order, calculate, and assess calorie counts as needed  - Recommend, monitor, and adjust tube feedings and TPN/PPN based on assessed needs  - Assess need for intravenous fluids  - Provide specific nutrition/hydration education as appropriate  - Include patient/family/caregiver in decisions related to nutrition  Outcome: Progressing     Problem: GASTROINTESTINAL - ADULT  Goal: Minimal or absence of nausea and/or vomiting  Description: INTERVENTIONS:  - Administer IV fluids if ordered to ensure adequate hydration  - Maintain NPO status until nausea and vomiting are resolved  - Nasogastric tube if ordered  - Administer ordered antiemetic  medications as needed  - Provide nonpharmacologic comfort measures as appropriate  - Advance diet as tolerated, if ordered  - Consider nutrition services referral to assist patient with adequate nutrition and appropriate food choices  Outcome: Progressing  Goal: Maintains or returns to baseline bowel function  Description: INTERVENTIONS:  - Assess bowel function  - Encourage oral fluids to ensure adequate hydration  - Administer IV fluids if ordered to ensure adequate hydration  - Administer ordered medications as needed  - Encourage mobilization and activity  - Consider nutritional services referral to assist patient with adequate nutrition and appropriate food choices  Outcome: Progressing  Goal: Maintains adequate nutritional intake  Description: INTERVENTIONS:  - Monitor percentage of each meal consumed  - Identify factors contributing to decreased intake, treat as appropriate  - Assist with meals as needed  - Monitor I&O, weight, and lab values if indicated  - Obtain nutrition services referral as needed  Outcome: Progressing  Goal: Oral mucous membranes remain intact  Description: INTERVENTIONS  - Assess oral mucosa and hygiene practices  - Implement preventative oral hygiene regimen  - Implement oral medicated treatments as ordered  - Initiate Nutrition services referral as needed  Outcome: Progressing

## 2025-07-14 NOTE — CASE MANAGEMENT
Case Management Assessment & Discharge Planning Note    Patient name Kaykay Holland  Location /-01 MRN [id number]  : 1990 Date 2025       Current Admission Date: 2025  Current Admission Diagnosis:Acute on chronic alcoholic pancreatitis   Patient Active Problem List    Diagnosis Date Noted    Insomnia 2025    Alcohol abuse 2025    Moderate protein-calorie malnutrition (HCC) 2025    Abdominal pain 2025    Splenic vein thrombosis 2024    Leukocytosis 2024    Thrombocytopenia (HCC) 2024    Ovarian cyst 2024    Constipation 2024    Pancreatic pseudocyst 2024    Electrolyte abnormality 2024    Alcohol use disorder, severe, dependence (HCC) 2024    Hematemesis 2024    Necrotizing pancreatitis 2024    Hypomagnesemia 2024    Pancytopenia (HCC) 04/10/2024    Provoked seizures (HCC) 2024    Alcohol withdrawal with complication with inpatient treatment (HCC) 2024    Prolonged QT interval 2024    Tobacco abuse 2024    Acute on chronic alcoholic pancreatitis 2023    Hepatic steatosis 2023    Peptic ulcer disease 2021    Anxiety 2019    Depression 2019      LOS (days): 3  Geometric Mean LOS (GMLOS) (days):   Days to GMLOS:     OBJECTIVE:    Risk of Unplanned Readmission Score: 23.51         Current admission status: Inpatient       Preferred Pharmacy:   [facility]/pharmacy #[id number] - [place] - [address] [place] [address] AV[address]  [address] [place] [address], [address]  [facility] [address]  Phone: [phone] Fax: [phone]    [id number] [place] - [address] [address]  Phone: [phone] Fax: [phone]    Primary Care Provider: PATRICIA Moran    Primary Insurance: [employer]  Secondary Insurance:     ASSESSMENT:  Active Health Care Proxies       Chichi George Health Care Representative - Mother    Primary Phone: [phone] (Mobile)                 Advance Directives  Does patient have a Health Care POA?: No  Was patient offered paperwork?: Yes (declined)  Does patient currently have a Health Care decision maker?: Yes, please see Health Care Proxy section  Does patient have Advance Directives?: No  Was patient offered paperwork?: Yes (declined)  Primary Contact: Shyla George         Readmission Root Cause  30 Day Readmission: No    Patient Information  Admitted from:: Home  Mental Status: Alert  During Assessment patient was accompanied by: Not accompanied during assessment  Assessment information provided by:: Patient  Primary Caregiver: Self  Support Systems: Self, Spouse/significant other, Family members, Parent, /  County of Residence: [facility]  What city do you live in?: [place]  Home entry access options. Select all that apply.: Stairs  Number of steps to enter home.: 7  Do the steps have railings?: Yes  Type of Current Residence: 2 story home  Upon entering residence, is there a bedroom on the main floor (no further steps)?: No  A bedroom is located on the following floor levels of residence (select all that apply):: 2nd Floor  Upon entering residence, is there a bathroom on the main floor (no further steps)?: Yes  Number of steps to 2nd floor from main floor: One Flight  Living Arrangements: Lives Alone    Activities of Daily Living Prior to Admission  Functional Status: Independent  Completes ADLs independently?: Yes  Ambulates independently?: Yes  Does patient currently own DME?: No  Does patient have a history of Outpatient Therapy (PT/OT)?: No  Does the patient have a history of Short-Term Rehab?: No  Does patient have a history of HHC?: No  Does patient currently have HHC?: No         Patient Information Continued  Income Source: Unemployed  Does patient have prescription coverage?: No  Can the patient afford their medications and any related supplies (such as glucometers  or test strips)?: N/A  Does patient receive dialysis treatments?: No  Does patient have a history of substance abuse?: Yes  Historical substance use preference: Alcohol/ETOH  History of Withdrawal Symptoms: Seizures  Is patient currently in treatment for substance abuse?: No. Patient declined treatment information.  Does patient have a history of Mental Health Diagnosis?: No         Means of Transportation  Means of Transport to App:: Drives Self          DISCHARGE DETAILS:    Discharge planning discussed with:: patient  Freedom of Choice: Yes  Comments - Freedom of Choice: disucssed dc planning and role of Cm. Offered BCAREs and pt declined  CM contacted family/caregiver?: No- see comments (pt indpt in room)  Were Treatment Team discharge recommendations reviewed with patient/caregiver?: Yes  Did patient/caregiver verbalize understanding of patient care needs?: Yes       Contacts  Reason/Outcome: Discharge Planning    Requested Home Health Care         Is the patient interested in HHC at discharge?: No    DME Referral Provided  Referral made for DME?: No    Other Referral/Resources/Interventions Provided:  Referral Comments: pt declined bcares. otherwise indpt            Expected Discharge Disposition: Home or Self Care  Additional Discharge Dispositions: Home or Self Care  Transport at Discharge : Family                                      Additional Comments: Cm met with pt. Pt struggled to stay awake. Pt confirmed no changes to her assessment information from her last admission. Pt lives alone in [address] with 7 ubaldo. Pt indpt with ambualtion. Pt with etho d/o . Bcares offered and pt declined. Pt reports that drives and does not work. Per med tox note pt is starting naltrexone OP. Cm folloiwng for clearance.

## 2025-07-14 NOTE — PLAN OF CARE
Problem: Potential for Falls  Goal: Patient will remain free of falls  Description: INTERVENTIONS:  - Educate patient/family on patient safety including physical limitations  - Instruct patient to call for assistance with activity   - Consider consulting OT/PT to assist with strengthening/mobility based on AM PAC & JH-HLM score  - Consult OT/PT to assist with strengthening/mobility   - Keep Call bell within reach  - Keep bed low and locked with side rails adjusted as appropriate  - Keep care items and personal belongings within reach  - Initiate and maintain comfort rounds  - Make Fall Risk Sign visible to staff  - Offer Toileting every 2 Hours, in advance of need  - Initiate/Maintain bed/chair alarm  - Obtain necessary fall risk management equipment:   - Apply yellow socks and bracelet for high fall risk patients  - Consider moving patient to room near nurses station  Outcome: Progressing     Problem: PAIN - ADULT  Goal: Verbalizes/displays adequate comfort level or baseline comfort level  Description: Interventions:  - Encourage patient to monitor pain and request assistance  - Assess pain using appropriate pain scale  - Administer analgesics as ordered based on type and severity of pain and evaluate response  - Implement non-pharmacological measures as appropriate and evaluate response  - Consider cultural and social influences on pain and pain management  - Notify physician/advanced practitioner if interventions unsuccessful or patient reports new pain  - Educate patient/family on pain management process including their role and importance of  reporting pain   - Provide non-pharmacologic/complimentary pain relief interventions  Outcome: Progressing     Problem: INFECTION - ADULT  Goal: Absence or prevention of progression during hospitalization  Description: INTERVENTIONS:  - Assess and monitor for signs and symptoms of infection  - Monitor lab/diagnostic results  - Monitor all insertion sites, i.e. indwelling  lines, tubes, and drains  - Monitor endotracheal if appropriate and nasal secretions for changes in amount and color  - Badger appropriate cooling/warming therapies per order  - Administer medications as ordered  - Instruct and encourage patient and family to use good hand hygiene technique  - Identify and instruct in appropriate isolation precautions for identified infection/condition  Outcome: Progressing  Goal: Absence of fever/infection during neutropenic period  Description: INTERVENTIONS:  - Monitor WBC  - Perform strict hand hygiene  - Limit to healthy visitors only  - No plants, dried, fresh or silk flowers with butler in patient room  Outcome: Progressing     Problem: SAFETY ADULT  Goal: Patient will remain free of falls  Description: INTERVENTIONS:  - Educate patient/family on patient safety including physical limitations  - Instruct patient to call for assistance with activity   - Consider consulting OT/PT to assist with strengthening/mobility based on AM PAC & -HLM score  - Consult OT/PT to assist with strengthening/mobility   - Keep Call bell within reach  - Keep bed low and locked with side rails adjusted as appropriate  - Keep care items and personal belongings within reach  - Initiate and maintain comfort rounds  - Make Fall Risk Sign visible to staff  - Offer Toileting every 2 Hours, in advance of need  - Initiate/Maintain bed/chair alarm  - Obtain necessary fall risk management equipment:   - Apply yellow socks and bracelet for high fall risk patients  - Consider moving patient to room near nurses station  Outcome: Progressing  Goal: Maintain or return to baseline ADL function  Description: INTERVENTIONS:  -  Assess patient's ability to carry out ADLs; assess patient's baseline for ADL function and identify physical deficits which impact ability to perform ADLs (bathing, care of mouth/teeth, toileting, grooming, dressing, etc.)  - Assess/evaluate cause of self-care deficits   - Assess range of  motion  - Assess patient's mobility; develop plan if impaired  - Assess patient's need for assistive devices and provide as appropriate  - Encourage maximum independence but intervene and supervise when necessary  - Involve family in performance of ADLs  - Assess for home care needs following discharge   - Consider OT consult to assist with ADL evaluation and planning for discharge  - Provide patient education as appropriate  - Monitor functional capacity and physical performance, use of AM PAC & JH-HLM   - Monitor gait, balance and fatigue with ambulation    Outcome: Progressing  Goal: Maintains/Returns to pre admission functional level  Description: INTERVENTIONS:  - Perform AM-PAC 6 Click Basic Mobility/ Daily Activity assessment daily.  - Set and communicate daily mobility goal to care team and patient/family/caregiver.   - Collaborate with rehabilitation services on mobility goals if consulted  - Perform Range of Motion 3 times a day.  - Reposition patient every 3 hours.  - Dangle patient 3 times a day  - Stand patient 3 times a day  - Ambulate patient 3 times a day  - Out of bed to chair 3 times a day   - Out of bed for meals 3 times a day  - Out of bed for toileting  - Record patient progress and toleration of activity level   Outcome: Progressing

## 2025-07-14 NOTE — PROGRESS NOTES
Progress Note - Hospitalist   Name: Kaykay Holland 35 y.o. female I MRN: [id number]  Unit/Bed#: -01 I Date of Admission: 7/11/2025   Date of Service: 7/14/2025 I Hospital Day: 3    Assessment & Plan  Acute on chronic alcoholic pancreatitis  Recurrent admissions for alcoholic pancreatitis, presenting with epigastric pain and CT findings suspicious for acute pancreatitis  Advance to low-fat  IV fluids-increased to 150 mL an hour of lactated Ringer's  Pain control, oral and IV pain medications ordered  Alcohol and tobacco cessation will be key  BISAP score is 0  Hepatic steatosis  Noted history, stable LFTs  Outpatient GI follow-up  Alcohol use disorder, severe, dependence (HCC)  With last alcohol consumption 24 hours prior to admission, history of alcohol withdrawal seizure in the past  Given phenobarbital total dose of 520 mg   Continue CIWA protocol  Multivitamin, thiamine, folate supplementation  Start gabapentin 300 mg 3 times a day for post alcohol withdrawal syndrome  Can start naltrexone 50 mg 2 weeks after  Continue thiamine  Tobacco abuse  NRT offered  Cessation encouraged  Alcohol withdrawal with complication with inpatient treatment (HCC)  Symptoms markedly improved from yesterday  See plan for alcohol use disorder above  Insomnia  Start trazodone at bedtime              Hospital Course:     35-year-old female patient presenting with acute on chronic alcoholic pancreatitis as well as alcohol withdrawal.  Patient is status post phenobarbital, diet advanced today.    Assessment:      Principal Problem:    Acute on chronic alcoholic pancreatitis  Active Problems:    Alcohol withdrawal with complication with inpatient treatment (HCC)    Tobacco abuse    Hepatic steatosis    Alcohol use disorder, severe, dependence (HCC)    Insomnia      Plan:    Start gabapentin for post alcohol withdrawal syndrome  Start trazodone for sleep  Increase IV fluid 250 mL an hour  Advance diet to low-fat       VTE  Pharmacologic Prophylaxis:   Pharmacologic: Enoxaparin (Lovenox)  Mechanical VTE Prophylaxis in Place: Yes    AM-PAC Basic Mobility:  Basic Mobility Inpatient Raw Score: 24    JH-HLM Achieved: 8: Walk 250 feet ot more  JH-HLM Goal: 8: Walk 250 feet or more    HLM Goal listed above. Continue with multidisciplinary rounding and encourage appropriate mobility to improve upon HLM goals.         Patient Centered Rounds: Case discussed and reviewed with nursing    Discussions with Specialists or Other Care Team Provider: Case management, discussed with consultant toxicology name Dr. Hickey    Education and Discussions with Family / Patient: Patient updating family      Current Length of Stay: 3 day(s)    Current Patient Status: Inpatient   Certification Statement: The patient will continue to require additional inpatient hospital stay due to need for additional treatment and management of pancreatitis    Discharge Plan / Estimated Discharge Date: 24 to 48 hours to home    Code Status: Level 1 - Full Code      Subjective:   Seen and examined, no acute complaints.  No nausea no vomiting, no abdominal pain    A complete and comprehensive 14 point organ system review has been performed and all other systems are negative other than stated above.    Objective:     Vitals:   Temp (24hrs), Av.9 °F (37.2 °C), Min:97.7 °F (36.5 °C), Max:99.3 °F (37.4 °C)    Temp:  [97.7 °F (36.5 °C)-99.3 °F (37.4 °C)] 99.2 °F (37.3 °C)  HR:  [70-95] 95  Resp:  [16-18] 16  BP: (114-137)/(75-93) 132/93  SpO2:  [94 %-98 %] 98 %  Body mass index is 19.94 kg/m².     Input and Output Summary (last 24 hours):       Intake/Output Summary (Last 24 hours) at 2025 1030  Last data filed at 2025 0859  Gross per 24 hour   Intake 1800 ml   Output 200 ml   Net 1600 ml       Physical Exam:     General: well appearing, no acute distress  HEENT: atraumatic, PERRLA, moist mucosa, normal pharynx, normal tonsils and adenoids, normal tongue, no fluid in  sinuses  Neck: Trachea midline, no carotid bruit, no masses  Respiratory: normal chest wall expansion, CTA B, no r/r/w, no rubs  Cardiovascular: RRR, no m/r/g, Normal S1 and S2  Abdomen: Soft, non-tender, non-distended, normal bowel sounds in all quadrants, no hepatosplenomegaly, no tympany  Rectal: deferred  Musculoskeletal: normal ROM in upper and lower extremities  Integumentary: warm, dry, and pink, with no rash, purpura, or petechia  Heme/Lymph: no lymphadenopathy, no bruises  Neurological: Cranial Nerves II-XII grossly intact  Psychiatric: cooperative with normal mood, affect, and cognition      Additional Data:     Labs:    Results from last 7 days   Lab Units 07/14/25  0237   WBC Thousand/uL 3.54*   HEMOGLOBIN g/dL 9.9*   HEMATOCRIT % 30.7*   PLATELETS Thousands/uL 126*   SEGS PCT % 49   LYMPHO PCT % 35   MONO PCT % 10   EOS PCT % 4     Results from last 7 days   Lab Units 07/14/25  0237 07/14/25  0232   POTASSIUM mmol/L 3.6  --    CHLORIDE mmol/L 97  --    CO2 mmol/L 32  --    CO2, I-STAT mmol/L  --  31   BUN mg/dL 3*  --    CREATININE mg/dL 0.58*  --    CALCIUM mg/dL 8.4  --    ALK PHOS U/L 63  --    ALT U/L 20  --    AST U/L 45*  --    GLUCOSE, ISTAT mg/dl  --  152*           * I Have Reviewed All Lab Data Listed Above.  * Additional Pertinent Lab Tests Reviewed: All Labs For Current Hospital Admission Reviewed      Lines/Drains:  Invasive Devices       Peripheral Intravenous Line  Duration             Peripheral IV 07/14/25 Left Antecubital <1 day                          Imaging:  Imaging Reviewed Today Include:   Personally reviewed and found none  CT abdomen pelvis with contrast  Result Date: 7/11/2025  Impression: Mild worsening stranding involving the pancreaticoduodenal groove, adjacent to the pancreatic head, which likely represents recurrent acute pancreatitis in this region. No pancreatic parenchymal necrosis noted. Multiple pancreatic pseudocysts, which have decreased in size when compared to  the prior study. Reactive wall thickening involving the stomach along the lesser curvature. No bowel obstruction noted. 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 Diffuse hepatic steatosis. Workstation performed: [id number]       Telemetry:       Recent Cultures (last 7 days):         Last 24 Hours Medication List:   Current Facility-Administered Medications   Medication Dose Route Frequency Provider Last Rate    acetaminophen  650 mg Oral Q6H PRN Eduardo Mercer PA-C      docusate sodium  100 mg Oral BID Lester Rudd DO      enoxaparin  40 mg Subcutaneous Daily Eduardo Mercer PA-C      folic acid  1 mg Oral Daily Eduardo Mercer PA-C      gabapentin  300 mg Oral TID Lester Rudd DO      HYDROmorphone  0.5 mg Intravenous Q2H PRN Eduardo Mercer PA-C      hydrOXYzine HCL  25 mg Oral Q6H PRN Eduardo Mercer PA-C      magnesium sulfate  2 g Intravenous Once Liban Bowden MD 2 g (07/14/25 1008)    multi-electrolyte  150 mL Intravenous Continuous Lester Rudd DO      nicotine  1 patch Transdermal Daily Eduardo Mercer PA-C      ondansetron  4 mg Intravenous Q6H PRN Eduardo Mercer PA-C      ondansetron  4 mg Intravenous Once Eduardo Mercer PA-C      polyethylene glycol  17 g Oral Daily Lester Rudd DO      senna  2 tablet Oral HS Lester Rudd DO      thiamine  100 mg Oral Daily Eduardo Mercer PA-C      traZODone  50 mg Oral HS Lester Rudd DO      trimethobenzamide  200 mg Intramuscular Q6H PRN Eduardo Mercer PA-C         AM-PAC Basic Mobility:  Basic Mobility Inpatient Raw Score: 24    JH-HLM Achieved: 8: Walk 250 feet ot more  JH-HLM Goal: 8: Walk 250 feet or more    HLM Goal listed above. Continue with multidisciplinary rounding and encourage appropriate mobility to improve upon HLM goals.     Today, Patient Was Seen By: Liban Bowden MD    ** Please Note: This note was completed in part utilizing [employer] software dictation.  Grammatical errors, random word insertions, spelling mistakes, and incomplete sentences may be an occasional consequence of this system secondary to software limitations, ambient noise, and hardware issues.  If you have any questions or concerns about the content, text, or information contained within the body of this dictation, please contact the provider for clarification. **

## 2025-07-15 LAB
ALBUMIN SERPL BCG-MCNC: 3.4 G/DL (ref 3.5–5)
ALP SERPL-CCNC: 63 U/L (ref 34–104)
ALT SERPL W P-5'-P-CCNC: 24 U/L (ref 7–52)
ANION GAP SERPL CALCULATED.3IONS-SCNC: 6 MMOL/L (ref 4–13)
AST SERPL W P-5'-P-CCNC: 51 U/L (ref 13–39)
BASOPHILS # BLD AUTO: 0.03 THOUSANDS/ÂΜL (ref 0–0.1)
BASOPHILS NFR BLD AUTO: 1 % (ref 0–1)
BILIRUB SERPL-MCNC: 0.63 MG/DL (ref 0.2–1)
BUN SERPL-MCNC: 4 MG/DL (ref 5–25)
CALCIUM ALBUM COR SERPL-MCNC: 8.7 MG/DL (ref 8.3–10.1)
CALCIUM SERPL-MCNC: 8.2 MG/DL (ref 8.4–10.2)
CHLORIDE SERPL-SCNC: 100 MMOL/L (ref 96–108)
CO2 SERPL-SCNC: 29 MMOL/L (ref 21–32)
CREAT SERPL-MCNC: 0.55 MG/DL (ref 0.6–1.3)
EOSINOPHIL # BLD AUTO: 0.16 THOUSAND/ÂΜL (ref 0–0.61)
EOSINOPHIL NFR BLD AUTO: 5 % (ref 0–6)
ERYTHROCYTE [DISTWIDTH] IN BLOOD BY AUTOMATED COUNT: 14.8 % (ref 11.6–15.1)
GFR SERPL CREATININE-BSD FRML MDRD: 122 ML/MIN/1.73SQ M
GLUCOSE SERPL-MCNC: 102 MG/DL (ref 65–140)
HCT VFR BLD AUTO: 28.4 % (ref 34.8–46.1)
HGB BLD-MCNC: 9.5 G/DL (ref 11.5–15.4)
IMM GRANULOCYTES # BLD AUTO: 0.01 THOUSAND/UL (ref 0–0.2)
IMM GRANULOCYTES NFR BLD AUTO: 0 % (ref 0–2)
LYMPHOCYTES # BLD AUTO: 1.24 THOUSANDS/ÂΜL (ref 0.6–4.47)
LYMPHOCYTES NFR BLD AUTO: 38 % (ref 14–44)
MAGNESIUM SERPL-MCNC: 1.7 MG/DL (ref 1.9–2.7)
MCH RBC QN AUTO: 34.5 PG (ref 26.8–34.3)
MCHC RBC AUTO-ENTMCNC: 33.5 G/DL (ref 31.4–37.4)
MCV RBC AUTO: 103 FL (ref 82–98)
MONOCYTES # BLD AUTO: 0.49 THOUSAND/ÂΜL (ref 0.17–1.22)
MONOCYTES NFR BLD AUTO: 15 % (ref 4–12)
NEUTROPHILS # BLD AUTO: 1.34 THOUSANDS/ÂΜL (ref 1.85–7.62)
NEUTS SEG NFR BLD AUTO: 41 % (ref 43–75)
NRBC BLD AUTO-RTO: 0 /100 WBCS
PLATELET # BLD AUTO: 128 THOUSANDS/UL (ref 149–390)
PMV BLD AUTO: 11.9 FL (ref 8.9–12.7)
POTASSIUM SERPL-SCNC: 3.9 MMOL/L (ref 3.5–5.3)
PROT SERPL-MCNC: 5.5 G/DL (ref 6.4–8.4)
RBC # BLD AUTO: 2.75 MILLION/UL (ref 3.81–5.12)
SODIUM SERPL-SCNC: 135 MMOL/L (ref 135–147)
WBC # BLD AUTO: 3.27 THOUSAND/UL (ref 4.31–10.16)

## 2025-07-15 PROCEDURE — 83735 ASSAY OF MAGNESIUM: CPT | Performed by: INTERNAL MEDICINE

## 2025-07-15 PROCEDURE — 99232 SBSQ HOSP IP/OBS MODERATE 35: CPT | Performed by: INTERNAL MEDICINE

## 2025-07-15 PROCEDURE — 80053 COMPREHEN METABOLIC PANEL: CPT | Performed by: INTERNAL MEDICINE

## 2025-07-15 PROCEDURE — 85025 COMPLETE CBC W/AUTO DIFF WBC: CPT | Performed by: INTERNAL MEDICINE

## 2025-07-15 RX ORDER — HYDROMORPHONE HCL/PF 1 MG/ML
0.5 SYRINGE (ML) INJECTION EVERY 4 HOURS PRN
Status: DISCONTINUED | OUTPATIENT
Start: 2025-07-15 | End: 2025-07-16

## 2025-07-15 RX ORDER — MAGNESIUM SULFATE HEPTAHYDRATE 40 MG/ML
2 INJECTION, SOLUTION INTRAVENOUS ONCE
Status: COMPLETED | OUTPATIENT
Start: 2025-07-15 | End: 2025-07-15

## 2025-07-15 RX ORDER — OXYCODONE HYDROCHLORIDE 5 MG/1
5 TABLET ORAL EVERY 6 HOURS PRN
Refills: 0 | Status: DISCONTINUED | OUTPATIENT
Start: 2025-07-15 | End: 2025-07-17 | Stop reason: HOSPADM

## 2025-07-15 RX ADMIN — NICOTINE 1 PATCH: 21 PATCH, EXTENDED RELEASE TRANSDERMAL at 08:55

## 2025-07-15 RX ADMIN — SODIUM CHLORIDE, SODIUM GLUCONATE, SODIUM ACETATE, POTASSIUM CHLORIDE, MAGNESIUM CHLORIDE, SODIUM PHOSPHATE, DIBASIC, AND POTASSIUM PHOSPHATE 75 ML/HR: .53; .5; .37; .037; .03; .012; .00082 INJECTION, SOLUTION INTRAVENOUS at 19:27

## 2025-07-15 RX ADMIN — GABAPENTIN 300 MG: 300 CAPSULE ORAL at 16:14

## 2025-07-15 RX ADMIN — OXYCODONE HYDROCHLORIDE 5 MG: 5 TABLET ORAL at 20:14

## 2025-07-15 RX ADMIN — HYDROXYZINE HYDROCHLORIDE 25 MG: 25 TABLET, FILM COATED ORAL at 21:04

## 2025-07-15 RX ADMIN — THIAMINE HCL TAB 100 MG 100 MG: 100 TAB at 08:58

## 2025-07-15 RX ADMIN — HYDROMORPHONE HYDROCHLORIDE 0.5 MG: 1 INJECTION, SOLUTION INTRAMUSCULAR; INTRAVENOUS; SUBCUTANEOUS at 16:14

## 2025-07-15 RX ADMIN — TRAZODONE HYDROCHLORIDE 50 MG: 50 TABLET ORAL at 21:04

## 2025-07-15 RX ADMIN — HYDROXYZINE HYDROCHLORIDE 25 MG: 25 TABLET, FILM COATED ORAL at 12:59

## 2025-07-15 RX ADMIN — FOLIC ACID 1 MG: 1 TABLET ORAL at 08:58

## 2025-07-15 RX ADMIN — HYDROMORPHONE HYDROCHLORIDE 0.5 MG: 1 INJECTION, SOLUTION INTRAMUSCULAR; INTRAVENOUS; SUBCUTANEOUS at 22:16

## 2025-07-15 RX ADMIN — MAGNESIUM SULFATE HEPTAHYDRATE 2 G: 2 INJECTION, SOLUTION INTRAVENOUS at 16:14

## 2025-07-15 RX ADMIN — ENOXAPARIN SODIUM 40 MG: 40 INJECTION SUBCUTANEOUS at 08:58

## 2025-07-15 RX ADMIN — TRIMETHOBENZAMIDE HYDROCHLORIDE 200 MG: 100 INJECTION INTRAMUSCULAR at 12:59

## 2025-07-15 RX ADMIN — OXYCODONE HYDROCHLORIDE 5 MG: 5 TABLET ORAL at 12:54

## 2025-07-15 RX ADMIN — GABAPENTIN 300 MG: 300 CAPSULE ORAL at 20:14

## 2025-07-15 RX ADMIN — GABAPENTIN 300 MG: 300 CAPSULE ORAL at 08:58

## 2025-07-15 RX ADMIN — ONDANSETRON 4 MG: 2 INJECTION INTRAMUSCULAR; INTRAVENOUS at 08:53

## 2025-07-15 RX ADMIN — SODIUM CHLORIDE, SODIUM GLUCONATE, SODIUM ACETATE, POTASSIUM CHLORIDE, MAGNESIUM CHLORIDE, SODIUM PHOSPHATE, DIBASIC, AND POTASSIUM PHOSPHATE 75 ML/HR: .53; .5; .37; .037; .03; .012; .00082 INJECTION, SOLUTION INTRAVENOUS at 07:34

## 2025-07-15 RX ADMIN — KETOROLAC TROMETHAMINE 15 MG: 30 INJECTION, SOLUTION INTRAMUSCULAR; INTRAVENOUS at 21:07

## 2025-07-15 RX ADMIN — HYDROMORPHONE HYDROCHLORIDE 1 MG: 1 INJECTION, SOLUTION INTRAMUSCULAR; INTRAVENOUS; SUBCUTANEOUS at 00:47

## 2025-07-15 RX ADMIN — ONDANSETRON 4 MG: 2 INJECTION INTRAMUSCULAR; INTRAVENOUS at 18:51

## 2025-07-15 RX ADMIN — HYDROMORPHONE HYDROCHLORIDE 0.5 MG: 1 INJECTION, SOLUTION INTRAMUSCULAR; INTRAVENOUS; SUBCUTANEOUS at 07:40

## 2025-07-15 RX ADMIN — HYDROMORPHONE HYDROCHLORIDE 0.5 MG: 1 INJECTION, SOLUTION INTRAMUSCULAR; INTRAVENOUS; SUBCUTANEOUS at 03:50

## 2025-07-15 RX ADMIN — HYDROMORPHONE HYDROCHLORIDE 0.5 MG: 1 INJECTION, SOLUTION INTRAMUSCULAR; INTRAVENOUS; SUBCUTANEOUS at 11:49

## 2025-07-15 RX ADMIN — KETOROLAC TROMETHAMINE 15 MG: 30 INJECTION, SOLUTION INTRAMUSCULAR; INTRAVENOUS at 09:40

## 2025-07-16 LAB
ALBUMIN SERPL BCG-MCNC: 3.2 G/DL (ref 3.5–5)
ALP SERPL-CCNC: 61 U/L (ref 34–104)
ALT SERPL W P-5'-P-CCNC: 22 U/L (ref 7–52)
ANION GAP SERPL CALCULATED.3IONS-SCNC: 6 MMOL/L (ref 4–13)
AST SERPL W P-5'-P-CCNC: 39 U/L (ref 13–39)
BASOPHILS # BLD AUTO: 0.02 THOUSANDS/ÂΜL (ref 0–0.1)
BASOPHILS NFR BLD AUTO: 1 % (ref 0–1)
BILIRUB SERPL-MCNC: 0.39 MG/DL (ref 0.2–1)
BUN SERPL-MCNC: 3 MG/DL (ref 5–25)
CALCIUM ALBUM COR SERPL-MCNC: 8.7 MG/DL (ref 8.3–10.1)
CALCIUM SERPL-MCNC: 8.1 MG/DL (ref 8.4–10.2)
CHLORIDE SERPL-SCNC: 102 MMOL/L (ref 96–108)
CO2 SERPL-SCNC: 27 MMOL/L (ref 21–32)
CREAT SERPL-MCNC: 0.48 MG/DL (ref 0.6–1.3)
EOSINOPHIL # BLD AUTO: 0.17 THOUSAND/ÂΜL (ref 0–0.61)
EOSINOPHIL NFR BLD AUTO: 4 % (ref 0–6)
ERYTHROCYTE [DISTWIDTH] IN BLOOD BY AUTOMATED COUNT: 14.9 % (ref 11.6–15.1)
GFR SERPL CREATININE-BSD FRML MDRD: 128 ML/MIN/1.73SQ M
GLUCOSE SERPL-MCNC: 107 MG/DL (ref 65–140)
HCT VFR BLD AUTO: 29.6 % (ref 34.8–46.1)
HGB BLD-MCNC: 9.7 G/DL (ref 11.5–15.4)
IMM GRANULOCYTES # BLD AUTO: 0 THOUSAND/UL (ref 0–0.2)
IMM GRANULOCYTES NFR BLD AUTO: 0 % (ref 0–2)
LYMPHOCYTES # BLD AUTO: 1.66 THOUSANDS/ÂΜL (ref 0.6–4.47)
LYMPHOCYTES NFR BLD AUTO: 42 % (ref 14–44)
MAGNESIUM SERPL-MCNC: 1.7 MG/DL (ref 1.9–2.7)
MCH RBC QN AUTO: 33.9 PG (ref 26.8–34.3)
MCHC RBC AUTO-ENTMCNC: 32.8 G/DL (ref 31.4–37.4)
MCV RBC AUTO: 104 FL (ref 82–98)
MONOCYTES # BLD AUTO: 0.57 THOUSAND/ÂΜL (ref 0.17–1.22)
MONOCYTES NFR BLD AUTO: 14 % (ref 4–12)
NEUTROPHILS # BLD AUTO: 1.58 THOUSANDS/ÂΜL (ref 1.85–7.62)
NEUTS SEG NFR BLD AUTO: 39 % (ref 43–75)
NRBC BLD AUTO-RTO: 0 /100 WBCS
PLATELET # BLD AUTO: 120 THOUSANDS/UL (ref 149–390)
PMV BLD AUTO: 11.9 FL (ref 8.9–12.7)
POTASSIUM SERPL-SCNC: 3.9 MMOL/L (ref 3.5–5.3)
PROT SERPL-MCNC: 5.2 G/DL (ref 6.4–8.4)
RBC # BLD AUTO: 2.86 MILLION/UL (ref 3.81–5.12)
SODIUM SERPL-SCNC: 135 MMOL/L (ref 135–147)
WBC # BLD AUTO: 4 THOUSAND/UL (ref 4.31–10.16)

## 2025-07-16 PROCEDURE — 99232 SBSQ HOSP IP/OBS MODERATE 35: CPT | Performed by: INTERNAL MEDICINE

## 2025-07-16 PROCEDURE — 83735 ASSAY OF MAGNESIUM: CPT | Performed by: PHYSICIAN ASSISTANT

## 2025-07-16 PROCEDURE — 80053 COMPREHEN METABOLIC PANEL: CPT | Performed by: PHYSICIAN ASSISTANT

## 2025-07-16 PROCEDURE — 85025 COMPLETE CBC W/AUTO DIFF WBC: CPT | Performed by: PHYSICIAN ASSISTANT

## 2025-07-16 RX ORDER — KETOROLAC TROMETHAMINE 30 MG/ML
15 INJECTION, SOLUTION INTRAMUSCULAR; INTRAVENOUS EVERY 6 HOURS PRN
Status: DISCONTINUED | OUTPATIENT
Start: 2025-07-16 | End: 2025-07-17 | Stop reason: HOSPADM

## 2025-07-16 RX ORDER — HYDROMORPHONE HCL/PF 1 MG/ML
0.5 SYRINGE (ML) INJECTION EVERY 6 HOURS PRN
Status: DISCONTINUED | OUTPATIENT
Start: 2025-07-16 | End: 2025-07-17 | Stop reason: HOSPADM

## 2025-07-16 RX ORDER — MAGNESIUM SULFATE HEPTAHYDRATE 40 MG/ML
2 INJECTION, SOLUTION INTRAVENOUS ONCE
Status: COMPLETED | OUTPATIENT
Start: 2025-07-16 | End: 2025-07-16

## 2025-07-16 RX ADMIN — OXYCODONE HYDROCHLORIDE 5 MG: 5 TABLET ORAL at 03:46

## 2025-07-16 RX ADMIN — MAGNESIUM SULFATE HEPTAHYDRATE 2 G: 40 INJECTION, SOLUTION INTRAVENOUS at 11:50

## 2025-07-16 RX ADMIN — KETOROLAC TROMETHAMINE 15 MG: 30 INJECTION, SOLUTION INTRAMUSCULAR; INTRAVENOUS at 11:50

## 2025-07-16 RX ADMIN — HYDROMORPHONE HYDROCHLORIDE 0.5 MG: 1 INJECTION, SOLUTION INTRAMUSCULAR; INTRAVENOUS; SUBCUTANEOUS at 21:52

## 2025-07-16 RX ADMIN — ONDANSETRON 4 MG: 2 INJECTION INTRAMUSCULAR; INTRAVENOUS at 17:02

## 2025-07-16 RX ADMIN — HYDROXYZINE HYDROCHLORIDE 25 MG: 25 TABLET, FILM COATED ORAL at 03:57

## 2025-07-16 RX ADMIN — HYDROXYZINE HYDROCHLORIDE 25 MG: 25 TABLET, FILM COATED ORAL at 20:29

## 2025-07-16 RX ADMIN — GABAPENTIN 300 MG: 300 CAPSULE ORAL at 07:54

## 2025-07-16 RX ADMIN — KETOROLAC TROMETHAMINE 15 MG: 30 INJECTION, SOLUTION INTRAMUSCULAR; INTRAVENOUS at 18:47

## 2025-07-16 RX ADMIN — ACETAMINOPHEN 650 MG: 325 TABLET ORAL at 01:12

## 2025-07-16 RX ADMIN — TRAZODONE HYDROCHLORIDE 50 MG: 50 TABLET ORAL at 21:55

## 2025-07-16 RX ADMIN — SODIUM CHLORIDE, SODIUM GLUCONATE, SODIUM ACETATE, POTASSIUM CHLORIDE, MAGNESIUM CHLORIDE, SODIUM PHOSPHATE, DIBASIC, AND POTASSIUM PHOSPHATE 75 ML/HR: .53; .5; .37; .037; .03; .012; .00082 INJECTION, SOLUTION INTRAVENOUS at 20:31

## 2025-07-16 RX ADMIN — OXYCODONE HYDROCHLORIDE 5 MG: 5 TABLET ORAL at 16:20

## 2025-07-16 RX ADMIN — SODIUM CHLORIDE, SODIUM GLUCONATE, SODIUM ACETATE, POTASSIUM CHLORIDE, MAGNESIUM CHLORIDE, SODIUM PHOSPHATE, DIBASIC, AND POTASSIUM PHOSPHATE 75 ML/HR: .53; .5; .37; .037; .03; .012; .00082 INJECTION, SOLUTION INTRAVENOUS at 07:32

## 2025-07-16 RX ADMIN — GABAPENTIN 300 MG: 300 CAPSULE ORAL at 16:20

## 2025-07-16 RX ADMIN — HYDROMORPHONE HYDROCHLORIDE 0.5 MG: 1 INJECTION, SOLUTION INTRAMUSCULAR; INTRAVENOUS; SUBCUTANEOUS at 07:50

## 2025-07-16 RX ADMIN — FOLIC ACID 1 MG: 1 TABLET ORAL at 07:54

## 2025-07-16 RX ADMIN — HYDROMORPHONE HYDROCHLORIDE 0.5 MG: 1 INJECTION, SOLUTION INTRAMUSCULAR; INTRAVENOUS; SUBCUTANEOUS at 13:47

## 2025-07-16 RX ADMIN — NICOTINE 1 PATCH: 21 PATCH, EXTENDED RELEASE TRANSDERMAL at 07:56

## 2025-07-16 RX ADMIN — OXYCODONE HYDROCHLORIDE 5 MG: 5 TABLET ORAL at 10:40

## 2025-07-16 RX ADMIN — ONDANSETRON 4 MG: 2 INJECTION INTRAMUSCULAR; INTRAVENOUS at 11:50

## 2025-07-16 RX ADMIN — KETOROLAC TROMETHAMINE 15 MG: 30 INJECTION, SOLUTION INTRAMUSCULAR; INTRAVENOUS at 05:09

## 2025-07-16 RX ADMIN — GABAPENTIN 300 MG: 300 CAPSULE ORAL at 20:29

## 2025-07-16 RX ADMIN — ONDANSETRON 4 MG: 2 INJECTION INTRAMUSCULAR; INTRAVENOUS at 03:59

## 2025-07-16 RX ADMIN — THIAMINE HCL TAB 100 MG 100 MG: 100 TAB at 07:54

## 2025-07-16 RX ADMIN — ENOXAPARIN SODIUM 40 MG: 40 INJECTION SUBCUTANEOUS at 07:54

## 2025-07-16 NOTE — PROGRESS NOTES
Progress Note - Hospitalist   Name: Kaykay Holland 35 y.o. female I MRN: [id number]  Unit/Bed#: -01 I Date of Admission: 7/11/2025   Date of Service: 7/16/2025 I Hospital Day: 5    Assessment & Plan  Acute on chronic alcoholic pancreatitis  Recurrent admissions for alcoholic pancreatitis, presenting with epigastric pain and CT findings suspicious for acute pancreatitis  Advance to low-fat  IV fluids  Pain control, oral and IV pain medications ordered  Alcohol and tobacco cessation will be key  BISAP score is 0  Hepatic steatosis  Noted history, stable LFTs  Outpatient GI follow-up  Alcohol use disorder, severe, dependence (HCC)  With last alcohol consumption 24 hours prior to admission, history of alcohol withdrawal seizure in the past  Given phenobarbital total dose of 520 mg   Continue CIWA protocol  Multivitamin, thiamine, folate supplementation  Start gabapentin 300 mg 3 times a day for post alcohol withdrawal syndrome  Can start naltrexone 50 mg 2 weeks after  Continue thiamine  Tobacco abuse  NRT offered  Cessation encouraged  Alcohol withdrawal with complication with inpatient treatment (HCC)  Symptoms markedly improved from yesterday  See plan for alcohol use disorder above  Insomnia  Start trazodone at bedtime        Labs & Imaging: Results Review Statement: No pertinent imaging studies reviewed.    VTE Prophylaxis: in place.    Code Status:   Level 1 - Full Code    Patient Centered Rounds: I have performed bedside rounds with nursing staff today.    Mobility:   Basic Mobility Inpatient Raw Score: 24  JH-HLM Goal: 8: Walk 250 feet or more  JH-HLM Achieved: 7: Walk 25 feet or more  JH-HLM Goal achieved. Continue to encourage appropriate mobility.    Discussions with Specialists or Other Care Team Provider: RN    Education and Discussions with Family / Patient: Declined update    Total Time Spent on Date of Encounter in care of patient: 35 mins. This time was spent on one or more of the following:  "performing physical exam; counseling and coordination of care; obtaining or reviewing history; documenting in the medical record; reviewing/ordering tests, medications or procedures; communicating with other healthcare professionals and discussing with patient's family/caregivers.    Current Length of Stay: 5 day(s)    Current Patient Status: Inpatient   Certification Statement: The patient will continue to require additional inpatient hospital stay due to see my assessment and plan.     Subjective:   Patient is seen and examined at bedside.  Still with abdominal pain.    All other ROS are negative.    Objective:    Vitals: Blood pressure 129/86, pulse 57, temperature 98 °F (36.7 °C), resp. rate 18, height 5' 5\" (1.651 m), weight 54.3 kg (119 lb 12.8 oz), last menstrual period 04/07/2025, SpO2 97%.,Body mass index is 19.94 kg/m².  SPO2 RA Rest      Flowsheet Row ED to Hosp-Admission (Current) from 7/11/2025 in [facility] Med Surg Unit   SpO2 97 %   SpO2 Activity At Rest   O2 Device None (Room air)   O2 Flow Rate --          I&O:   Intake/Output Summary (Last 24 hours) at 7/16/2025 1115  Last data filed at 7/16/2025 0811  Gross per 24 hour   Intake 3587.5 ml   Output 400 ml   Net 3187.5 ml       Physical Exam:    General- Alert, lying comfortably in bed. Not in any acute distress.  Neck- Supple, No JVD  CVS- regular, S1 and S2 normal  Chest- Bilateral Air entry, No rhochi, crackles or wheezing present.  Abdomen- soft, tender, not distended, no guarding or rigidity, BS+  Extremities-  No pedal edema, No calf tenderness  CNS-   Alert, awake and orientedx3. No focal deficits present.    Invasive Devices       Peripheral Intravenous Line  Duration             Peripheral IV 07/14/25 Left Antecubital 2 days                          Social History  reviewed  No family history on file. reviewed    Meds:  Current Facility-Administered Medications   Medication Dose Route Frequency Provider Last Rate Last " Admin    acetaminophen (TYLENOL) tablet 650 mg  650 mg Oral Q6H PRN Eduardo Mercer PA-C   650 mg at 07/16/25 0112    docusate sodium (COLACE) capsule 100 mg  100 mg Oral BID Lester Rudd DO   100 mg at 07/14/25 1727    enoxaparin (LOVENOX) subcutaneous injection 40 mg  40 mg Subcutaneous Daily Eduardo Mercer PA-C   40 mg at 07/16/25 0754    folic acid (FOLVITE) tablet 1 mg  1 mg Oral Daily Eduardo Mercer PA-C   1 mg at 07/16/25 0754    gabapentin (NEURONTIN) capsule 300 mg  300 mg Oral TID Lester Rudd DO   300 mg at 07/16/25 0754    HYDROmorphone (DILAUDID) injection 0.5 mg  0.5 mg Intravenous Q4H PRN Liban Bowden MD   0.5 mg at 07/16/25 0750    hydrOXYzine HCL (ATARAX) tablet 25 mg  25 mg Oral Q6H PRN Eduardo Mercer PA-C   25 mg at 07/16/25 0357    ketorolac (TORADOL) injection 15 mg  15 mg Intravenous Q6H PRN Liban Bowden MD   15 mg at 07/16/25 0509    multi-electrolyte (Plasmalyte-A/Isolyte-S PH 7.4/Normosol-R) IV solution  75 mL/hr Intravenous Continuous Liban Bowden MD 75 mL/hr at 07/16/25 0732 75 mL/hr at 07/16/25 0732    nicotine (NICODERM CQ) 21 mg/24 hr TD 24 hr patch 1 patch  1 patch Transdermal Daily Eduardo Mercer PA-C   1 patch at 07/16/25 0756    ondansetron (ZOFRAN) injection 4 mg  4 mg Intravenous Q6H PRN Eduardo Mercer PA-C   4 mg at 07/16/25 0359    ondansetron (ZOFRAN) injection 4 mg  4 mg Intravenous Once Eduardo Mercer PA-C        oxyCODONE (ROXICODONE) IR tablet 5 mg  5 mg Oral Q6H PRN Liban Bowden MD   5 mg at 07/16/25 1040    polyethylene glycol (MIRALAX) packet 17 g  17 g Oral Daily Lester Rudd, DO   17 g at 07/14/25 0853    senna (SENOKOT) tablet 17.2 mg  2 tablet Oral HS Lester Rudd DO   17.2 mg at 07/13/25 2122    thiamine tablet 100 mg  100 mg Oral Daily Eduardo Mercer PA-C   100 mg at 07/16/25 0754    traZODone (DESYREL) tablet 50 mg  50 mg Oral HS Lester Rudd DO   50 mg at 07/15/25 2104    trimethobenzamide (TIGAN) IM  injection 200 mg  200 mg Intramuscular Q6H PRN Eduardo Mercer PA-C   200 mg at 07/15/25 1259        Medications Prior to Admission:     HYDROmorphone (DILAUDID) 2 mg tablet    hydrOXYzine HCL (ATARAX) 50 mg tablet    nicotine (NICODERM CQ) 14 mg/24hr TD 24 hr patch    folic acid (FOLVITE) 1 mg tablet    ondansetron (ZOFRAN) 4 mg tablet    thiamine 100 MG tablet    Labs:  Results from last 7 days   Lab Units 07/16/25  0347 07/15/25  0354 07/14/25  0237   WBC Thousand/uL 4.00* 3.27* 3.54*   HEMOGLOBIN g/dL 9.7* 9.5* 9.9*   HEMATOCRIT % 29.6* 28.4* 30.7*   PLATELETS Thousands/uL 120* 128* 126*   SEGS PCT % 39* 41* 49   LYMPHO PCT % 42 38 35   MONO PCT % 14* 15* 10   EOS PCT % 4 5 4     Results from last 7 days   Lab Units 07/16/25  0347 07/15/25  0354 07/14/25  0237 07/14/25  0232   POTASSIUM mmol/L 3.9 3.9 3.6  --    CHLORIDE mmol/L 102 100 97  --    CO2 mmol/L 27 29 32  --    CO2, I-STAT mmol/L  --   --   --  31   BUN mg/dL 3* 4* 3*  --    CREATININE mg/dL 0.48* 0.55* 0.58*  --    CALCIUM mg/dL 8.1* 8.2* 8.4  --    ALK PHOS U/L 61 63 63  --    ALT U/L 22 24 20  --    AST U/L 39 51* 45*  --    GLUCOSE, ISTAT mg/dl  --   --   --  152*     Lab Results   Component Value Date    TROPONINI 0.01 02/20/2021    TROPONINI <0.02 07/30/2019    TROPONINI <0.02 07/18/2019         Lab Results   Component Value Date    BLOODCX No Growth After 5 Days. 02/27/2025    BLOODCX No Growth After 5 Days. 02/27/2025    BLOODCX No Growth After 5 Days. 12/22/2024    URINECX >100,000 cfu/ml 11/23/2024         Imaging:  Results for orders placed during the hospital encounter of 04/08/24    XR chest portable    Narrative  XR CHEST PORTABLE    INDICATION: fever. Seizure        COMPARISON: 04/08/2024    FINDINGS:    Clear lungs. No pneumothorax or pleural effusion.    Normal cardiomediastinal silhouette.    Bones are unremarkable for age.    Normal upper abdomen.    Impression  No acute cardiopulmonary disease.        Workstation performed:  [id number]    No results found for this or any previous visit.      Last 24 Hours Medication List:   Current Facility-Administered Medications   Medication Dose Route Frequency Provider Last Rate    acetaminophen  650 mg Oral Q6H PRN Eduardo Mercer PA-C      docusate sodium  100 mg Oral BID Lester Rudd DO      enoxaparin  40 mg Subcutaneous Daily Eduardo Mercer PA-C      folic acid  1 mg Oral Daily Eduardo Mercer PA-C      gabapentin  300 mg Oral TID Lester Rudd DO      HYDROmorphone  0.5 mg Intravenous Q4H PRN Liban Bowden MD      hydrOXYzine HCL  25 mg Oral Q6H PRN Eduardo Mercer PA-C      ketorolac  15 mg Intravenous Q6H PRN Liban Bowden MD      multi-electrolyte  75 mL/hr Intravenous Continuous Liban Bowden MD 75 mL/hr (07/16/25 0732)    nicotine  1 patch Transdermal Daily Eduardo Mercer PA-C      ondansetron  4 mg Intravenous Q6H PRN Eduardo Mercer PA-C      ondansetron  4 mg Intravenous Once Eduardo Mercer PA-C      oxyCODONE  5 mg Oral Q6H PRN Liban Bowden MD      polyethylene glycol  17 g Oral Daily Lester Rudd DO      senna  2 tablet Oral HS Lester Rudd DO      thiamine  100 mg Oral Daily Eduardo Mercer PA-C      traZODone  50 mg Oral HS Lester Rudd DO      trimethobenzamide  200 mg Intramuscular Q6H PRN Eduardo Mercer PA-C          Today, Patient Was Seen By: Liban Bowden MD    ** Please Note: Dictation voice to text software may have been used in the creation of this document. **

## 2025-07-16 NOTE — ASSESSMENT & PLAN NOTE
"Start trazodone at bedtime        Labs & Imaging: Results Review Statement: No pertinent imaging studies reviewed.    VTE Prophylaxis: in place.    Code Status:   Level 1 - Full Code    Patient Centered Rounds: I have performed bedside rounds with nursing staff today.    Mobility:   Basic Mobility Inpatient Raw Score: 24  JH-HLM Goal: 8: Walk 250 feet or more  JH-HLM Achieved: 7: Walk 25 feet or more  JH-HLM Goal achieved. Continue to encourage appropriate mobility.    Discussions with Specialists or Other Care Team Provider: RN    Education and Discussions with Family / Patient: Declined update    Total Time Spent on Date of Encounter in care of patient: 35 mins. This time was spent on one or more of the following: performing physical exam; counseling and coordination of care; obtaining or reviewing history; documenting in the medical record; reviewing/ordering tests, medications or procedures; communicating with other healthcare professionals and discussing with patient's family/caregivers.    Current Length of Stay: 5 day(s)    Current Patient Status: Inpatient   Certification Statement: The patient will continue to require additional inpatient hospital stay due to see my assessment and plan.     Subjective:   Patient is seen and examined at bedside.  Still with abdominal pain.    All other ROS are negative.    Objective:    Vitals: Blood pressure 129/86, pulse 57, temperature 98 °F (36.7 °C), resp. rate 18, height 5' 5\" (1.651 m), weight 54.3 kg (119 lb 12.8 oz), last menstrual period 04/07/2025, SpO2 97%.,Body mass index is 19.94 kg/m².  SPO2 RA Rest      Flowsheet Row ED to Hosp-Admission (Current) from 7/11/2025 in [facility] Med Surg Unit   SpO2 97 %   SpO2 Activity At Rest   O2 Device None (Room air)   O2 Flow Rate --          I&O:   Intake/Output Summary (Last 24 hours) at 7/16/2025 1115  Last data filed at 7/16/2025 0811  Gross per 24 hour   Intake 3587.5 ml   Output 400 ml   Net 3187.5 " ml       Physical Exam:    General- Alert, lying comfortably in bed. Not in any acute distress.  Neck- Supple, No JVD  CVS- regular, S1 and S2 normal  Chest- Bilateral Air entry, No rhochi, crackles or wheezing present.  Abdomen- soft, tender, not distended, no guarding or rigidity, BS+  Extremities-  No pedal edema, No calf tenderness  CNS-   Alert, awake and orientedx3. No focal deficits present.    Invasive Devices       Peripheral Intravenous Line  Duration             Peripheral IV 07/14/25 Left Antecubital 2 days                          Social History  reviewed  No family history on file. reviewed    Meds:  Current Facility-Administered Medications   Medication Dose Route Frequency Provider Last Rate Last Admin    acetaminophen (TYLENOL) tablet 650 mg  650 mg Oral Q6H PRN Eduardo Mercer PA-C   650 mg at 07/16/25 0112    docusate sodium (COLACE) capsule 100 mg  100 mg Oral BID Lester Rudd DO   100 mg at 07/14/25 1727    enoxaparin (LOVENOX) subcutaneous injection 40 mg  40 mg Subcutaneous Daily Eduardo Mercer PA-C   40 mg at 07/16/25 0754    folic acid (FOLVITE) tablet 1 mg  1 mg Oral Daily Eduardo Mercer PA-C   1 mg at 07/16/25 0754    gabapentin (NEURONTIN) capsule 300 mg  300 mg Oral TID Lester Rudd DO   300 mg at 07/16/25 0754    HYDROmorphone (DILAUDID) injection 0.5 mg  0.5 mg Intravenous Q4H PRN Liban Bowden MD   0.5 mg at 07/16/25 0750    hydrOXYzine HCL (ATARAX) tablet 25 mg  25 mg Oral Q6H PRN Eduardo Mercer PA-C   25 mg at 07/16/25 0357    ketorolac (TORADOL) injection 15 mg  15 mg Intravenous Q6H PRN Liban Bowden MD   15 mg at 07/16/25 0509    multi-electrolyte (Plasmalyte-A/Isolyte-S PH 7.4/Normosol-R) IV solution  75 mL/hr Intravenous Continuous Liban Bowden MD 75 mL/hr at 07/16/25 0732 75 mL/hr at 07/16/25 0732    nicotine (NICODERM CQ) 21 mg/24 hr TD 24 hr patch 1 patch  1 patch Transdermal Daily Eduardo Mercer PA-C   1 patch at 07/16/25 0756    ondansetron  (ZOFRAN) injection 4 mg  4 mg Intravenous Q6H PRN Eduardo Mercer PA-C   4 mg at 07/16/25 0359    ondansetron (ZOFRAN) injection 4 mg  4 mg Intravenous Once Eduardo Mercer PA-C        oxyCODONE (ROXICODONE) IR tablet 5 mg  5 mg Oral Q6H PRN Liban Bowden MD   5 mg at 07/16/25 1040    polyethylene glycol (MIRALAX) packet 17 g  17 g Oral Daily Lester Rudd, DO   17 g at 07/14/25 0853    senna (SENOKOT) tablet 17.2 mg  2 tablet Oral HS Lester Rudd, DO   17.2 mg at 07/13/25 2122    thiamine tablet 100 mg  100 mg Oral Daily Eduardo Mercer PA-C   100 mg at 07/16/25 0754    traZODone (DESYREL) tablet 50 mg  50 mg Oral HS Lester Rudd, DO   50 mg at 07/15/25 2104    trimethobenzamide (TIGAN) IM injection 200 mg  200 mg Intramuscular Q6H PRN Eduardo Mercer PA-C   200 mg at 07/15/25 1259        Medications Prior to Admission:     HYDROmorphone (DILAUDID) 2 mg tablet    hydrOXYzine HCL (ATARAX) 50 mg tablet    nicotine (NICODERM CQ) 14 mg/24hr TD 24 hr patch    folic acid (FOLVITE) 1 mg tablet    ondansetron (ZOFRAN) 4 mg tablet    thiamine 100 MG tablet    Labs:  Results from last 7 days   Lab Units 07/16/25  0347 07/15/25  0354 07/14/25  0237   WBC Thousand/uL 4.00* 3.27* 3.54*   HEMOGLOBIN g/dL 9.7* 9.5* 9.9*   HEMATOCRIT % 29.6* 28.4* 30.7*   PLATELETS Thousands/uL 120* 128* 126*   SEGS PCT % 39* 41* 49   LYMPHO PCT % 42 38 35   MONO PCT % 14* 15* 10   EOS PCT % 4 5 4     Results from last 7 days   Lab Units 07/16/25  0347 07/15/25  0354 07/14/25  0237 07/14/25  0232   POTASSIUM mmol/L 3.9 3.9 3.6  --    CHLORIDE mmol/L 102 100 97  --    CO2 mmol/L 27 29 32  --    CO2, I-STAT mmol/L  --   --   --  31   BUN mg/dL 3* 4* 3*  --    CREATININE mg/dL 0.48* 0.55* 0.58*  --    CALCIUM mg/dL 8.1* 8.2* 8.4  --    ALK PHOS U/L 61 63 63  --    ALT U/L 22 24 20  --    AST U/L 39 51* 45*  --    GLUCOSE, ISTAT mg/dl  --   --   --  152*     Lab Results   Component Value Date    TROPONINI 0.01 02/20/2021     TROPONINI <0.02 07/30/2019    TROPONINI <0.02 07/18/2019         Lab Results   Component Value Date    BLOODCX No Growth After 5 Days. 02/27/2025    BLOODCX No Growth After 5 Days. 02/27/2025    BLOODCX No Growth After 5 Days. 12/22/2024    URINECX >100,000 cfu/ml 11/23/2024         Imaging:  Results for orders placed during the hospital encounter of 04/08/24    XR chest portable    Narrative  XR CHEST PORTABLE    INDICATION: fever. Seizure        COMPARISON: 04/08/2024    FINDINGS:    Clear lungs. No pneumothorax or pleural effusion.    Normal cardiomediastinal silhouette.    Bones are unremarkable for age.    Normal upper abdomen.    Impression  No acute cardiopulmonary disease.        Workstation performed: [id number]    No results found for this or any previous visit.      Last 24 Hours Medication List:   Current Facility-Administered Medications   Medication Dose Route Frequency Provider Last Rate    acetaminophen  650 mg Oral Q6H PRN Eduardo Mercer PA-C      docusate sodium  100 mg Oral BID Lester Rudd DO      enoxaparin  40 mg Subcutaneous Daily Eduardo Mercer PA-C      folic acid  1 mg Oral Daily Eduardo Mercer PA-C      gabapentin  300 mg Oral TID Lester Rudd DO      HYDROmorphone  0.5 mg Intravenous Q4H PRN Liban Bowden MD      hydrOXYzine HCL  25 mg Oral Q6H PRN Eduardo Mercer PA-C      ketorolac  15 mg Intravenous Q6H PRN Liban Bowden MD      multi-electrolyte  75 mL/hr Intravenous Continuous Liban Bowden MD 75 mL/hr (07/16/25 0732)    nicotine  1 patch Transdermal Daily Eduardo Mercer PA-C      ondansetron  4 mg Intravenous Q6H PRN Edaurdo Mercer PA-C      ondansetron  4 mg Intravenous Once Eduardo Mercer PA-C      oxyCODONE  5 mg Oral Q6H PRN Liban Bowden MD      polyethylene glycol  17 g Oral Daily Lester Rudd DO      senna  2 tablet Oral HS Lester Rudd DO      thiamine  100 mg Oral Daily Eduardo Mercer PA-C      traZODone  50 mg Oral HS  Lester Rudd, DO      trimethobenzamide  200 mg Intramuscular Q6H PRN Eduardo Mercer PA-C          Today, Patient Was Seen By: Liban Bowden MD    ** Please Note: Dictation voice to text software may have been used in the creation of this document. **

## 2025-07-16 NOTE — ASSESSMENT & PLAN NOTE
Recurrent admissions for alcoholic pancreatitis, presenting with epigastric pain and CT findings suspicious for acute pancreatitis  Advance to low-fat  IV fluids  Pain control, oral and IV pain medications ordered  Alcohol and tobacco cessation will be key  BISAP score is 0

## 2025-07-16 NOTE — PLAN OF CARE
Problem: Potential for Falls  Goal: Patient will remain free of falls  Description: INTERVENTIONS:  - Educate patient/family on patient safety including physical limitations  - Instruct patient to call for assistance with activity   - Consider consulting OT/PT to assist with strengthening/mobility based on AM PAC & JH-HLM score  - Consult OT/PT to assist with strengthening/mobility   - Keep Call bell within reach  - Keep bed low and locked with side rails adjusted as appropriate  - Keep care items and personal belongings within reach  - Initiate and maintain comfort rounds  - Make Fall Risk Sign visible to staff  - Offer Toileting every 2 Hours, in advance of need  - Initiate/Maintain bed/chair alarm  - Obtain necessary fall risk management equipment:   - Apply yellow socks and bracelet for high fall risk patients  - Consider moving patient to room near nurses station  Outcome: Progressing     Problem: PAIN - ADULT  Goal: Verbalizes/displays adequate comfort level or baseline comfort level  Description: Interventions:  - Encourage patient to monitor pain and request assistance  - Assess pain using appropriate pain scale  - Administer analgesics as ordered based on type and severity of pain and evaluate response  - Implement non-pharmacological measures as appropriate and evaluate response  - Consider cultural and social influences on pain and pain management  - Notify physician/advanced practitioner if interventions unsuccessful or patient reports new pain  - Educate patient/family on pain management process including their role and importance of  reporting pain   - Provide non-pharmacologic/complimentary pain relief interventions  Outcome: Progressing     Problem: INFECTION - ADULT  Goal: Absence or prevention of progression during hospitalization  Description: INTERVENTIONS:  - Assess and monitor for signs and symptoms of infection  - Monitor lab/diagnostic results  - Monitor all insertion sites, i.e. indwelling  lines, tubes, and drains  - Monitor endotracheal if appropriate and nasal secretions for changes in amount and color  - [place] appropriate cooling/warming therapies per order  - Administer medications as ordered  - Instruct and encourage patient and family to use good hand hygiene technique  - Identify and instruct in appropriate isolation precautions for identified infection/condition  Outcome: Progressing  Goal: Absence of fever/infection during neutropenic period  Description: INTERVENTIONS:  - Monitor WBC  - Perform strict hand hygiene  - Limit to healthy visitors only  - No plants, dried, fresh or silk flowers with butler in patient room  Outcome: Progressing     Problem: SAFETY ADULT  Goal: Patient will remain free of falls  Description: INTERVENTIONS:  - Educate patient/family on patient safety including physical limitations  - Instruct patient to call for assistance with activity   - Consider consulting OT/PT to assist with strengthening/mobility based on AM PAC & -HLM score  - Consult OT/PT to assist with strengthening/mobility   - Keep Call bell within reach  - Keep bed low and locked with side rails adjusted as appropriate  - Keep care items and personal belongings within reach  - Initiate and maintain comfort rounds  - Make Fall Risk Sign visible to staff  - Offer Toileting every 2 Hours, in advance of need  - Initiate/Maintain bed/chair alarm  - Obtain necessary fall risk management equipment:   - Apply yellow socks and bracelet for high fall risk patients  - Consider moving patient to room near nurses station  Outcome: Progressing  Goal: Maintain or return to baseline ADL function  Description: INTERVENTIONS:  -  Assess patient's ability to carry out ADLs; assess patient's baseline for ADL function and identify physical deficits which impact ability to perform ADLs (bathing, care of mouth/teeth, toileting, grooming, dressing, etc.)  - Assess/evaluate cause of self-care deficits   - Assess range of  motion  - Assess patient's mobility; develop plan if impaired  - Assess patient's need for assistive devices and provide as appropriate  - Encourage maximum independence but intervene and supervise when necessary  - Involve family in performance of ADLs  - Assess for home care needs following discharge   - Consider OT consult to assist with ADL evaluation and planning for discharge  - Provide patient education as appropriate  - Monitor functional capacity and physical performance, use of AM PAC & JH-HLM   - Monitor gait, balance and fatigue with ambulation    Outcome: Progressing  Goal: Maintains/Returns to pre admission functional level  Description: INTERVENTIONS:  - Perform AM-PAC 6 Click Basic Mobility/ Daily Activity assessment daily.  - Set and communicate daily mobility goal to care team and patient/family/caregiver.   - Collaborate with rehabilitation services on mobility goals if consulted  - Perform Range of Motion 3 times a day.  - Reposition patient every 3 hours.  - Dangle patient 3 times a day  - Stand patient 3 times a day  - Ambulate patient 3 times a day  - Out of bed to chair 3 times a day   - Out of bed for meals 3 times a day  - Out of bed for toileting  - Record patient progress and toleration of activity level   Outcome: Progressing     Problem: DISCHARGE PLANNING  Goal: Discharge to home or other facility with appropriate resources  Description: INTERVENTIONS:  - Identify barriers to discharge w/patient and caregiver  - Arrange for needed discharge resources and transportation as appropriate  - Identify discharge learning needs (meds, wound care, etc.)  - Arrange for interpretive services to assist at discharge as needed  - Refer to Case Management Department for coordinating discharge planning if the patient needs post-hospital services based on physician/advanced practitioner order or complex needs related to functional status, cognitive ability, or social support system  Outcome:  Progressing     Problem: Knowledge Deficit  Goal: Patient/family/caregiver demonstrates understanding of disease process, treatment plan, medications, and discharge instructions  Description: Complete learning assessment and assess knowledge base.  Interventions:  - Provide teaching at level of understanding  - Provide teaching via preferred learning methods  Outcome: Progressing     Problem: Nutrition/Hydration-ADULT  Goal: Nutrient/Hydration intake appropriate for improving, restoring or maintaining nutritional needs  Description: Monitor and assess patient's nutrition/hydration status for malnutrition. Collaborate with interdisciplinary team and initiate plan and interventions as ordered.  Monitor patient's weight and dietary intake as ordered or per policy. Utilize nutrition screening tool and intervene as necessary. Determine patient's food preferences and provide high-protein, high-caloric foods as appropriate.     INTERVENTIONS:  - Monitor oral intake, urinary output, labs, and treatment plans  - Assess nutrition and hydration status and recommend course of action  - Evaluate amount of meals eaten  - Assist patient with eating if necessary   - Allow adequate time for meals  - Recommend/ encourage appropriate diets, oral nutritional supplements, and vitamin/mineral supplements  - Order, calculate, and assess calorie counts as needed  - Recommend, monitor, and adjust tube feedings and TPN/PPN based on assessed needs  - Assess need for intravenous fluids  - Provide specific nutrition/hydration education as appropriate  - Include patient/family/caregiver in decisions related to nutrition  Outcome: Progressing     Problem: GASTROINTESTINAL - ADULT  Goal: Minimal or absence of nausea and/or vomiting  Description: INTERVENTIONS:  - Administer IV fluids if ordered to ensure adequate hydration  - Maintain NPO status until nausea and vomiting are resolved  - Nasogastric tube if ordered  - Administer ordered antiemetic  medications as needed  - Provide nonpharmacologic comfort measures as appropriate  - Advance diet as tolerated, if ordered  - Consider nutrition services referral to assist patient with adequate nutrition and appropriate food choices  Outcome: Progressing  Goal: Maintains or returns to baseline bowel function  Description: INTERVENTIONS:  - Assess bowel function  - Encourage oral fluids to ensure adequate hydration  - Administer IV fluids if ordered to ensure adequate hydration  - Administer ordered medications as needed  - Encourage mobilization and activity  - Consider nutritional services referral to assist patient with adequate nutrition and appropriate food choices  Outcome: Progressing  Goal: Maintains adequate nutritional intake  Description: INTERVENTIONS:  - Monitor percentage of each meal consumed  - Identify factors contributing to decreased intake, treat as appropriate  - Assist with meals as needed  - Monitor I&O, weight, and lab values if indicated  - Obtain nutrition services referral as needed  Outcome: Progressing  Goal: Oral mucous membranes remain intact  Description: INTERVENTIONS  - Assess oral mucosa and hygiene practices  - Implement preventative oral hygiene regimen  - Implement oral medicated treatments as ordered  - Initiate Nutrition services referral as needed  Outcome: Progressing

## 2025-07-17 VITALS
WEIGHT: 119.8 LBS | SYSTOLIC BLOOD PRESSURE: 144 MMHG | OXYGEN SATURATION: 96 % | TEMPERATURE: 97.7 F | RESPIRATION RATE: 16 BRPM | HEART RATE: 65 BPM | HEIGHT: 65 IN | BODY MASS INDEX: 19.96 KG/M2 | DIASTOLIC BLOOD PRESSURE: 92 MMHG

## 2025-07-17 LAB
ANION GAP SERPL CALCULATED.3IONS-SCNC: 7 MMOL/L (ref 4–13)
BASOPHILS # BLD AUTO: 0.04 THOUSANDS/ÂΜL (ref 0–0.1)
BASOPHILS NFR BLD AUTO: 1 % (ref 0–1)
BUN SERPL-MCNC: 4 MG/DL (ref 5–25)
CALCIUM SERPL-MCNC: 8.5 MG/DL (ref 8.4–10.2)
CHLORIDE SERPL-SCNC: 100 MMOL/L (ref 96–108)
CO2 SERPL-SCNC: 29 MMOL/L (ref 21–32)
CREAT SERPL-MCNC: 0.45 MG/DL (ref 0.6–1.3)
EOSINOPHIL # BLD AUTO: 0.16 THOUSAND/ÂΜL (ref 0–0.61)
EOSINOPHIL NFR BLD AUTO: 4 % (ref 0–6)
ERYTHROCYTE [DISTWIDTH] IN BLOOD BY AUTOMATED COUNT: 14.9 % (ref 11.6–15.1)
GFR SERPL CREATININE-BSD FRML MDRD: 131 ML/MIN/1.73SQ M
GLUCOSE SERPL-MCNC: 108 MG/DL (ref 65–140)
HCT VFR BLD AUTO: 31.5 % (ref 34.8–46.1)
HGB BLD-MCNC: 10.2 G/DL (ref 11.5–15.4)
IMM GRANULOCYTES # BLD AUTO: 0.02 THOUSAND/UL (ref 0–0.2)
IMM GRANULOCYTES NFR BLD AUTO: 1 % (ref 0–2)
LYMPHOCYTES # BLD AUTO: 1.56 THOUSANDS/ÂΜL (ref 0.6–4.47)
LYMPHOCYTES NFR BLD AUTO: 38 % (ref 14–44)
MCH RBC QN AUTO: 34.3 PG (ref 26.8–34.3)
MCHC RBC AUTO-ENTMCNC: 32.4 G/DL (ref 31.4–37.4)
MCV RBC AUTO: 106 FL (ref 82–98)
MONOCYTES # BLD AUTO: 0.59 THOUSAND/ÂΜL (ref 0.17–1.22)
MONOCYTES NFR BLD AUTO: 15 % (ref 4–12)
NEUTROPHILS # BLD AUTO: 1.69 THOUSANDS/ÂΜL (ref 1.85–7.62)
NEUTS SEG NFR BLD AUTO: 41 % (ref 43–75)
NRBC BLD AUTO-RTO: 1 /100 WBCS
PLATELET # BLD AUTO: 131 THOUSANDS/UL (ref 149–390)
PMV BLD AUTO: 12.1 FL (ref 8.9–12.7)
POTASSIUM SERPL-SCNC: 3.8 MMOL/L (ref 3.5–5.3)
RBC # BLD AUTO: 2.97 MILLION/UL (ref 3.81–5.12)
SODIUM SERPL-SCNC: 136 MMOL/L (ref 135–147)
WBC # BLD AUTO: 4.06 THOUSAND/UL (ref 4.31–10.16)

## 2025-07-17 PROCEDURE — 80048 BASIC METABOLIC PNL TOTAL CA: CPT | Performed by: INTERNAL MEDICINE

## 2025-07-17 PROCEDURE — 85025 COMPLETE CBC W/AUTO DIFF WBC: CPT | Performed by: INTERNAL MEDICINE

## 2025-07-17 PROCEDURE — 99239 HOSP IP/OBS DSCHRG MGMT >30: CPT | Performed by: INTERNAL MEDICINE

## 2025-07-17 RX ORDER — HYDROMORPHONE HYDROCHLORIDE 2 MG/1
2 TABLET ORAL EVERY 6 HOURS PRN
Qty: 10 TABLET | Refills: 0 | Status: SHIPPED | OUTPATIENT
Start: 2025-07-17 | End: 2025-07-20

## 2025-07-17 RX ORDER — ONDANSETRON 4 MG/1
4 TABLET, FILM COATED ORAL EVERY 8 HOURS PRN
Qty: 15 TABLET | Refills: 0 | Status: SHIPPED | OUTPATIENT
Start: 2025-07-17

## 2025-07-17 RX ORDER — HYDROXYZINE HYDROCHLORIDE 10 MG/1
10 TABLET, FILM COATED ORAL ONCE
Status: COMPLETED | OUTPATIENT
Start: 2025-07-17 | End: 2025-07-17

## 2025-07-17 RX ADMIN — HYDROXYZINE HYDROCHLORIDE 25 MG: 25 TABLET, FILM COATED ORAL at 12:21

## 2025-07-17 RX ADMIN — NICOTINE 1 PATCH: 21 PATCH, EXTENDED RELEASE TRANSDERMAL at 09:08

## 2025-07-17 RX ADMIN — GABAPENTIN 300 MG: 300 CAPSULE ORAL at 15:03

## 2025-07-17 RX ADMIN — THIAMINE HCL TAB 100 MG 100 MG: 100 TAB at 09:08

## 2025-07-17 RX ADMIN — OXYCODONE HYDROCHLORIDE 5 MG: 5 TABLET ORAL at 09:09

## 2025-07-17 RX ADMIN — KETOROLAC TROMETHAMINE 15 MG: 30 INJECTION, SOLUTION INTRAMUSCULAR; INTRAVENOUS at 00:10

## 2025-07-17 RX ADMIN — OXYCODONE HYDROCHLORIDE 5 MG: 5 TABLET ORAL at 15:02

## 2025-07-17 RX ADMIN — HYDROMORPHONE HYDROCHLORIDE 0.5 MG: 1 INJECTION, SOLUTION INTRAMUSCULAR; INTRAVENOUS; SUBCUTANEOUS at 03:49

## 2025-07-17 RX ADMIN — ACETAMINOPHEN 650 MG: 325 TABLET ORAL at 12:21

## 2025-07-17 RX ADMIN — HYDROMORPHONE HYDROCHLORIDE 0.5 MG: 1 INJECTION, SOLUTION INTRAMUSCULAR; INTRAVENOUS; SUBCUTANEOUS at 10:02

## 2025-07-17 RX ADMIN — GABAPENTIN 300 MG: 300 CAPSULE ORAL at 09:09

## 2025-07-17 RX ADMIN — ONDANSETRON 4 MG: 2 INJECTION INTRAMUSCULAR; INTRAVENOUS at 04:35

## 2025-07-17 RX ADMIN — ENOXAPARIN SODIUM 40 MG: 40 INJECTION SUBCUTANEOUS at 09:08

## 2025-07-17 RX ADMIN — KETOROLAC TROMETHAMINE 15 MG: 30 INJECTION, SOLUTION INTRAMUSCULAR; INTRAVENOUS at 07:43

## 2025-07-17 RX ADMIN — SODIUM CHLORIDE, SODIUM GLUCONATE, SODIUM ACETATE, POTASSIUM CHLORIDE, MAGNESIUM CHLORIDE, SODIUM PHOSPHATE, DIBASIC, AND POTASSIUM PHOSPHATE 75 ML/HR: .53; .5; .37; .037; .03; .012; .00082 INJECTION, SOLUTION INTRAVENOUS at 09:17

## 2025-07-17 RX ADMIN — FOLIC ACID 1 MG: 1 TABLET ORAL at 09:09

## 2025-07-17 RX ADMIN — HYDROXYZINE HYDROCHLORIDE 10 MG: 10 TABLET, FILM COATED ORAL at 00:10

## 2025-07-17 RX ADMIN — HYDROXYZINE HYDROCHLORIDE 25 MG: 25 TABLET, FILM COATED ORAL at 03:49

## 2025-07-17 NOTE — PLAN OF CARE
Problem: Potential for Falls  Goal: Patient will remain free of falls  Description: INTERVENTIONS:  - Educate patient/family on patient safety including physical limitations  - Instruct patient to call for assistance with activity   - Consider consulting OT/PT to assist with strengthening/mobility based on AM PAC & JH-HLM score  - Consult OT/PT to assist with strengthening/mobility   - Keep Call bell within reach  - Keep bed low and locked with side rails adjusted as appropriate  - Keep care items and personal belongings within reach  - Initiate and maintain comfort rounds  - Make Fall Risk Sign visible to staff  - Offer Toileting every 2 Hours, in advance of need  - Initiate/Maintain bed/chair alarm  - Obtain necessary fall risk management equipment:   - Apply yellow socks and bracelet for high fall risk patients  - Consider moving patient to room near nurses station  Outcome: Progressing     Problem: PAIN - ADULT  Goal: Verbalizes/displays adequate comfort level or baseline comfort level  Description: Interventions:  - Encourage patient to monitor pain and request assistance  - Assess pain using appropriate pain scale  - Administer analgesics as ordered based on type and severity of pain and evaluate response  - Implement non-pharmacological measures as appropriate and evaluate response  - Consider cultural and social influences on pain and pain management  - Notify physician/advanced practitioner if interventions unsuccessful or patient reports new pain  - Educate patient/family on pain management process including their role and importance of  reporting pain   - Provide non-pharmacologic/complimentary pain relief interventions  Outcome: Progressing     Problem: INFECTION - ADULT  Goal: Absence or prevention of progression during hospitalization  Description: INTERVENTIONS:  - Assess and monitor for signs and symptoms of infection  - Monitor lab/diagnostic results  - Monitor all insertion sites, i.e. indwelling  lines, tubes, and drains  - Monitor endotracheal if appropriate and nasal secretions for changes in amount and color  - [place] appropriate cooling/warming therapies per order  - Administer medications as ordered  - Instruct and encourage patient and family to use good hand hygiene technique  - Identify and instruct in appropriate isolation precautions for identified infection/condition  Outcome: Progressing  Goal: Absence of fever/infection during neutropenic period  Description: INTERVENTIONS:  - Monitor WBC  - Perform strict hand hygiene  - Limit to healthy visitors only  - No plants, dried, fresh or silk flowers with butler in patient room  Outcome: Progressing     Problem: SAFETY ADULT  Goal: Patient will remain free of falls  Description: INTERVENTIONS:  - Educate patient/family on patient safety including physical limitations  - Instruct patient to call for assistance with activity   - Consider consulting OT/PT to assist with strengthening/mobility based on AM PAC & -HLM score  - Consult OT/PT to assist with strengthening/mobility   - Keep Call bell within reach  - Keep bed low and locked with side rails adjusted as appropriate  - Keep care items and personal belongings within reach  - Initiate and maintain comfort rounds  - Make Fall Risk Sign visible to staff  - Offer Toileting every 2 Hours, in advance of need  - Initiate/Maintain bed/chair alarm  - Obtain necessary fall risk management equipment:   - Apply yellow socks and bracelet for high fall risk patients  - Consider moving patient to room near nurses station  Outcome: Progressing  Goal: Maintain or return to baseline ADL function  Description: INTERVENTIONS:  -  Assess patient's ability to carry out ADLs; assess patient's baseline for ADL function and identify physical deficits which impact ability to perform ADLs (bathing, care of mouth/teeth, toileting, grooming, dressing, etc.)  - Assess/evaluate cause of self-care deficits   - Assess range of  motion  - Assess patient's mobility; develop plan if impaired  - Assess patient's need for assistive devices and provide as appropriate  - Encourage maximum independence but intervene and supervise when necessary  - Involve family in performance of ADLs  - Assess for home care needs following discharge   - Consider OT consult to assist with ADL evaluation and planning for discharge  - Provide patient education as appropriate  - Monitor functional capacity and physical performance, use of AM PAC & JH-HLM   - Monitor gait, balance and fatigue with ambulation    Outcome: Progressing  Goal: Maintains/Returns to pre admission functional level  Description: INTERVENTIONS:  - Perform AM-PAC 6 Click Basic Mobility/ Daily Activity assessment daily.  - Set and communicate daily mobility goal to care team and patient/family/caregiver.   - Collaborate with rehabilitation services on mobility goals if consulted  - Perform Range of Motion 3 times a day.  - Reposition patient every 3 hours.  - Dangle patient 3 times a day  - Stand patient 3 times a day  - Ambulate patient 3 times a day  - Out of bed to chair 3 times a day   - Out of bed for meals 3 times a day  - Out of bed for toileting  - Record patient progress and toleration of activity level   Outcome: Progressing     Problem: DISCHARGE PLANNING  Goal: Discharge to home or other facility with appropriate resources  Description: INTERVENTIONS:  - Identify barriers to discharge w/patient and caregiver  - Arrange for needed discharge resources and transportation as appropriate  - Identify discharge learning needs (meds, wound care, etc.)  - Arrange for interpretive services to assist at discharge as needed  - Refer to Case Management Department for coordinating discharge planning if the patient needs post-hospital services based on physician/advanced practitioner order or complex needs related to functional status, cognitive ability, or social support system  Outcome:  Progressing     Problem: Knowledge Deficit  Goal: Patient/family/caregiver demonstrates understanding of disease process, treatment plan, medications, and discharge instructions  Description: Complete learning assessment and assess knowledge base.  Interventions:  - Provide teaching at level of understanding  - Provide teaching via preferred learning methods  Outcome: Progressing     Problem: Nutrition/Hydration-ADULT  Goal: Nutrient/Hydration intake appropriate for improving, restoring or maintaining nutritional needs  Description: Monitor and assess patient's nutrition/hydration status for malnutrition. Collaborate with interdisciplinary team and initiate plan and interventions as ordered.  Monitor patient's weight and dietary intake as ordered or per policy. Utilize nutrition screening tool and intervene as necessary. Determine patient's food preferences and provide high-protein, high-caloric foods as appropriate.     INTERVENTIONS:  - Monitor oral intake, urinary output, labs, and treatment plans  - Assess nutrition and hydration status and recommend course of action  - Evaluate amount of meals eaten  - Assist patient with eating if necessary   - Allow adequate time for meals  - Recommend/ encourage appropriate diets, oral nutritional supplements, and vitamin/mineral supplements  - Order, calculate, and assess calorie counts as needed  - Recommend, monitor, and adjust tube feedings and TPN/PPN based on assessed needs  - Assess need for intravenous fluids  - Provide specific nutrition/hydration education as appropriate  - Include patient/family/caregiver in decisions related to nutrition  Outcome: Progressing     Problem: GASTROINTESTINAL - ADULT  Goal: Minimal or absence of nausea and/or vomiting  Description: INTERVENTIONS:  - Administer IV fluids if ordered to ensure adequate hydration  - Maintain NPO status until nausea and vomiting are resolved  - Nasogastric tube if ordered  - Administer ordered antiemetic  medications as needed  - Provide nonpharmacologic comfort measures as appropriate  - Advance diet as tolerated, if ordered  - Consider nutrition services referral to assist patient with adequate nutrition and appropriate food choices  Outcome: Progressing  Goal: Maintains or returns to baseline bowel function  Description: INTERVENTIONS:  - Assess bowel function  - Encourage oral fluids to ensure adequate hydration  - Administer IV fluids if ordered to ensure adequate hydration  - Administer ordered medications as needed  - Encourage mobilization and activity  - Consider nutritional services referral to assist patient with adequate nutrition and appropriate food choices  Outcome: Progressing  Goal: Maintains adequate nutritional intake  Description: INTERVENTIONS:  - Monitor percentage of each meal consumed  - Identify factors contributing to decreased intake, treat as appropriate  - Assist with meals as needed  - Monitor I&O, weight, and lab values if indicated  - Obtain nutrition services referral as needed  Outcome: Progressing  Goal: Oral mucous membranes remain intact  Description: INTERVENTIONS  - Assess oral mucosa and hygiene practices  - Implement preventative oral hygiene regimen  - Implement oral medicated treatments as ordered  - Initiate Nutrition services referral as needed  Outcome: Progressing     Problem: NEUROSENSORY - ADULT  Goal: Achieves stable or improved neurological status  Description: INTERVENTIONS  - Monitor and report changes in neurological status  - Monitor vital signs such as temperature, blood pressure, glucose, and any other labs ordered   - Initiate measures to prevent increased intracranial pressure  - Monitor for seizure activity and implement precautions if appropriate      Outcome: Progressing  Goal: Remains free of injury related to seizures activity  Description: INTERVENTIONS  - Maintain airway, patient safety  and administer oxygen as ordered  - Monitor patient for seizure  activity, document and report duration and description of seizure to physician/advanced practitioner  - If seizure occurs,  ensure patient safety during seizure  - Reorient patient post seizure  - Seizure pads on all 4 side rails  - Instruct patient/family to notify RN of any seizure activity including if an aura is experienced  - Instruct patient/family to call for assistance with activity based on nursing assessment  - Administer anti-seizure medications if ordered    Outcome: Progressing  Goal: Achieves maximal functionality and self care  Description: INTERVENTIONS  - Monitor swallowing and airway patency with patient fatigue and changes in neurological status  - Encourage and assist patient to increase activity and self care.   - Encourage visually impaired, hearing impaired and aphasic patients to use assistive/communication devices  Outcome: Progressing     Problem: CARDIOVASCULAR - ADULT  Goal: Maintains optimal cardiac output and hemodynamic stability  Description: INTERVENTIONS:  - Monitor I/O, vital signs and rhythm  - Monitor for S/S and trends of decreased cardiac output  - Administer and titrate ordered vasoactive medications to optimize hemodynamic stability  - Assess quality of pulses, skin color and temperature  - Assess for signs of decreased coronary artery perfusion  - Instruct patient to report change in severity of symptoms  Outcome: Progressing  Goal: Absence of cardiac dysrhythmias or at baseline rhythm  Description: INTERVENTIONS:  - Continuous cardiac monitoring, vital signs, obtain 12 lead EKG if ordered  - Administer antiarrhythmic and heart rate control medications as ordered  - Monitor electrolytes and administer replacement therapy as ordered  Outcome: Progressing

## 2025-07-17 NOTE — ASSESSMENT & PLAN NOTE
Recurrent admissions for alcoholic pancreatitis, presenting with epigastric pain and CT findings suspicious for acute pancreatitis  Advance to low-fat  IV fluids  Pain control, oral and IV pain medications ordered  Alcohol and tobacco cessation will be key  BISAP score is 0  Patient will be discharged on pain meds with outpatient follow-up with GI and PCP

## 2025-07-17 NOTE — PLAN OF CARE
Problem: Potential for Falls  Goal: Patient will remain free of falls  Description: INTERVENTIONS:  - Educate patient/family on patient safety including physical limitations  - Instruct patient to call for assistance with activity   - Consider consulting OT/PT to assist with strengthening/mobility based on AM PAC & JH-HLM score  - Consult OT/PT to assist with strengthening/mobility   - Keep Call bell within reach  - Keep bed low and locked with side rails adjusted as appropriate  - Keep care items and personal belongings within reach  - Initiate and maintain comfort rounds  - Make Fall Risk Sign visible to staff  - Offer Toileting every 2 Hours, in advance of need  - Initiate/Maintain bed/chair alarm  - Obtain necessary fall risk management equipment:   - Apply yellow socks and bracelet for high fall risk patients  - Consider moving patient to room near nurses station  Outcome: Progressing     Problem: PAIN - ADULT  Goal: Verbalizes/displays adequate comfort level or baseline comfort level  Description: Interventions:  - Encourage patient to monitor pain and request assistance  - Assess pain using appropriate pain scale  - Administer analgesics as ordered based on type and severity of pain and evaluate response  - Implement non-pharmacological measures as appropriate and evaluate response  - Consider cultural and social influences on pain and pain management  - Notify physician/advanced practitioner if interventions unsuccessful or patient reports new pain  - Educate patient/family on pain management process including their role and importance of  reporting pain   - Provide non-pharmacologic/complimentary pain relief interventions  Outcome: Progressing     Problem: INFECTION - ADULT  Goal: Absence or prevention of progression during hospitalization  Description: INTERVENTIONS:  - Assess and monitor for signs and symptoms of infection  - Monitor lab/diagnostic results  - Monitor all insertion sites, i.e. indwelling  lines, tubes, and drains  - Monitor endotracheal if appropriate and nasal secretions for changes in amount and color  - Newman appropriate cooling/warming therapies per order  - Administer medications as ordered  - Instruct and encourage patient and family to use good hand hygiene technique  - Identify and instruct in appropriate isolation precautions for identified infection/condition  Outcome: Progressing  Goal: Absence of fever/infection during neutropenic period  Description: INTERVENTIONS:  - Monitor WBC  - Perform strict hand hygiene  - Limit to healthy visitors only  - No plants, dried, fresh or silk flowers with butler in patient room  Outcome: Progressing     Problem: SAFETY ADULT  Goal: Patient will remain free of falls  Description: INTERVENTIONS:  - Educate patient/family on patient safety including physical limitations  - Instruct patient to call for assistance with activity   - Consider consulting OT/PT to assist with strengthening/mobility based on AM PAC & -HLM score  - Consult OT/PT to assist with strengthening/mobility   - Keep Call bell within reach  - Keep bed low and locked with side rails adjusted as appropriate  - Keep care items and personal belongings within reach  - Initiate and maintain comfort rounds  - Make Fall Risk Sign visible to staff  - Offer Toileting every 2 Hours, in advance of need  - Initiate/Maintain bed/chair alarm  - Obtain necessary fall risk management equipment:   - Apply yellow socks and bracelet for high fall risk patients  - Consider moving patient to room near nurses station  Outcome: Progressing  Goal: Maintain or return to baseline ADL function  Description: INTERVENTIONS:  -  Assess patient's ability to carry out ADLs; assess patient's baseline for ADL function and identify physical deficits which impact ability to perform ADLs (bathing, care of mouth/teeth, toileting, grooming, dressing, etc.)  - Assess/evaluate cause of self-care deficits   - Assess range of  motion  - Assess patient's mobility; develop plan if impaired  - Assess patient's need for assistive devices and provide as appropriate  - Encourage maximum independence but intervene and supervise when necessary  - Involve family in performance of ADLs  - Assess for home care needs following discharge   - Consider OT consult to assist with ADL evaluation and planning for discharge  - Provide patient education as appropriate  - Monitor functional capacity and physical performance, use of AM PAC & JH-HLM   - Monitor gait, balance and fatigue with ambulation    Outcome: Progressing  Goal: Maintains/Returns to pre admission functional level  Description: INTERVENTIONS:  - Perform AM-PAC 6 Click Basic Mobility/ Daily Activity assessment daily.  - Set and communicate daily mobility goal to care team and patient/family/caregiver.   - Collaborate with rehabilitation services on mobility goals if consulted  - Perform Range of Motion 3 times a day.  - Reposition patient every 3 hours.  - Dangle patient 3 times a day  - Stand patient 3 times a day  - Ambulate patient 3 times a day  - Out of bed to chair 3 times a day   - Out of bed for meals 3 times a day  - Out of bed for toileting  - Record patient progress and toleration of activity level   Outcome: Progressing     Problem: DISCHARGE PLANNING  Goal: Discharge to home or other facility with appropriate resources  Description: INTERVENTIONS:  - Identify barriers to discharge w/patient and caregiver  - Arrange for needed discharge resources and transportation as appropriate  - Identify discharge learning needs (meds, wound care, etc.)  - Arrange for interpretive services to assist at discharge as needed  - Refer to Case Management Department for coordinating discharge planning if the patient needs post-hospital services based on physician/advanced practitioner order or complex needs related to functional status, cognitive ability, or social support system  Outcome:  Progressing     Problem: Knowledge Deficit  Goal: Patient/family/caregiver demonstrates understanding of disease process, treatment plan, medications, and discharge instructions  Description: Complete learning assessment and assess knowledge base.  Interventions:  - Provide teaching at level of understanding  - Provide teaching via preferred learning methods  Outcome: Progressing     Problem: Nutrition/Hydration-ADULT  Goal: Nutrient/Hydration intake appropriate for improving, restoring or maintaining nutritional needs  Description: Monitor and assess patient's nutrition/hydration status for malnutrition. Collaborate with interdisciplinary team and initiate plan and interventions as ordered.  Monitor patient's weight and dietary intake as ordered or per policy. Utilize nutrition screening tool and intervene as necessary. Determine patient's food preferences and provide high-protein, high-caloric foods as appropriate.     INTERVENTIONS:  - Monitor oral intake, urinary output, labs, and treatment plans  - Assess nutrition and hydration status and recommend course of action  - Evaluate amount of meals eaten  - Assist patient with eating if necessary   - Allow adequate time for meals  - Recommend/ encourage appropriate diets, oral nutritional supplements, and vitamin/mineral supplements  - Order, calculate, and assess calorie counts as needed  - Recommend, monitor, and adjust tube feedings and TPN/PPN based on assessed needs  - Assess need for intravenous fluids  - Provide specific nutrition/hydration education as appropriate  - Include patient/family/caregiver in decisions related to nutrition  Outcome: Progressing     Problem: GASTROINTESTINAL - ADULT  Goal: Minimal or absence of nausea and/or vomiting  Description: INTERVENTIONS:  - Administer IV fluids if ordered to ensure adequate hydration  - Maintain NPO status until nausea and vomiting are resolved  - Nasogastric tube if ordered  - Administer ordered antiemetic  medications as needed  - Provide nonpharmacologic comfort measures as appropriate  - Advance diet as tolerated, if ordered  - Consider nutrition services referral to assist patient with adequate nutrition and appropriate food choices  Outcome: Progressing  Goal: Maintains or returns to baseline bowel function  Description: INTERVENTIONS:  - Assess bowel function  - Encourage oral fluids to ensure adequate hydration  - Administer IV fluids if ordered to ensure adequate hydration  - Administer ordered medications as needed  - Encourage mobilization and activity  - Consider nutritional services referral to assist patient with adequate nutrition and appropriate food choices  Outcome: Progressing  Goal: Maintains adequate nutritional intake  Description: INTERVENTIONS:  - Monitor percentage of each meal consumed  - Identify factors contributing to decreased intake, treat as appropriate  - Assist with meals as needed  - Monitor I&O, weight, and lab values if indicated  - Obtain nutrition services referral as needed  Outcome: Progressing  Goal: Oral mucous membranes remain intact  Description: INTERVENTIONS  - Assess oral mucosa and hygiene practices  - Implement preventative oral hygiene regimen  - Implement oral medicated treatments as ordered  - Initiate Nutrition services referral as needed  Outcome: Progressing     Problem: NEUROSENSORY - ADULT  Goal: Achieves stable or improved neurological status  Description: INTERVENTIONS  - Monitor and report changes in neurological status  - Monitor vital signs such as temperature, blood pressure, glucose, and any other labs ordered   - Initiate measures to prevent increased intracranial pressure  - Monitor for seizure activity and implement precautions if appropriate      Outcome: Progressing  Goal: Remains free of injury related to seizures activity  Description: INTERVENTIONS  - Maintain airway, patient safety  and administer oxygen as ordered  - Monitor patient for seizure  activity, document and report duration and description of seizure to physician/advanced practitioner  - If seizure occurs,  ensure patient safety during seizure  - Reorient patient post seizure  - Seizure pads on all 4 side rails  - Instruct patient/family to notify RN of any seizure activity including if an aura is experienced  - Instruct patient/family to call for assistance with activity based on nursing assessment  - Administer anti-seizure medications if ordered    Outcome: Progressing  Goal: Achieves maximal functionality and self care  Description: INTERVENTIONS  - Monitor swallowing and airway patency with patient fatigue and changes in neurological status  - Encourage and assist patient to increase activity and self care.   - Encourage visually impaired, hearing impaired and aphasic patients to use assistive/communication devices  Outcome: Progressing     Problem: CARDIOVASCULAR - ADULT  Goal: Maintains optimal cardiac output and hemodynamic stability  Description: INTERVENTIONS:  - Monitor I/O, vital signs and rhythm  - Monitor for S/S and trends of decreased cardiac output  - Administer and titrate ordered vasoactive medications to optimize hemodynamic stability  - Assess quality of pulses, skin color and temperature  - Assess for signs of decreased coronary artery perfusion  - Instruct patient to report change in severity of symptoms  Outcome: Progressing  Goal: Absence of cardiac dysrhythmias or at baseline rhythm  Description: INTERVENTIONS:  - Continuous cardiac monitoring, vital signs, obtain 12 lead EKG if ordered  - Administer antiarrhythmic and heart rate control medications as ordered  - Monitor electrolytes and administer replacement therapy as ordered  Outcome: Progressing

## 2025-07-17 NOTE — DISCHARGE SUMMARY
Discharge Summary - Hospitalist   Name: Kaykay Holland 35 y.o. female I MRN: [id number]  Unit/Bed#: -01 I Date of Admission: 7/11/2025   Date of Service: 7/17/2025 I Hospital Day: 6     Assessment & Plan  Acute on chronic alcoholic pancreatitis  Recurrent admissions for alcoholic pancreatitis, presenting with epigastric pain and CT findings suspicious for acute pancreatitis  Advance to low-fat  IV fluids  Pain control, oral and IV pain medications ordered  Alcohol and tobacco cessation will be key  BISAP score is 0  Patient will be discharged on pain meds with outpatient follow-up with GI and PCP  Hepatic steatosis  Noted history, stable LFTs  Outpatient GI follow-up  Alcohol use disorder, severe, dependence (HCC)  With last alcohol consumption 24 hours prior to admission, history of alcohol withdrawal seizure in the past  Given phenobarbital total dose of 520 mg   Continue CIWA protocol  Multivitamin, thiamine, folate supplementation  Start gabapentin 300 mg 3 times a day for post alcohol withdrawal syndrome  Continue thiamine  No sign of withdrawal.  Outpatient follow-up recommended  Tobacco abuse  NRT offered  Cessation encouraged  Alcohol withdrawal with complication with inpatient treatment (HCC)  Symptoms markedly improved from yesterday  See plan for alcohol use disorder above  Insomnia  Start trazodone at bedtime   Hospital Course:     Kaykay Holland is a 35 y.o. female patient who originally presented to the hospital on   Admission Orders (From admission, onward)       Ordered        07/11/25 2203  Inpatient Admission  Once                         due to acute on chronic pancreatitis, alcohol abuse/withdrawal.  Patient was started on alcohol withdrawal protocol and kept n.p.o. and was seen by GI and was given IV fluids.  Patient was started on diet which was advanced.  Patient is tolerating diet and has no signs of withdrawal and is not interested in rehab.  Patient was given phenobarb during  this admission.  Patient is hemodynamically stable and will be discharged with outpatient follow-up with PCP and GI.  On Exam-  Chest-bilateral air entry, clear to auscultation  Abdomen-soft, nontender  Heart-S1 S2 regular  Extremities-no pedal edema or calf tenderness  Neuro-alert awake oriented x3.  No focal deficits    Please see above list of diagnoses and related plan for additional information.   Follow-up with PCP and GI as outpatient    CONSULTING PROVIDERS   IP CONSULT TO GASTROENTEROLOGY  IP CONSULT TO TOXICOLOGY    PROCEDURES PERFORMED  * No surgery found *    RADIOLOGY RESULTS  CT abdomen pelvis with contrast  Result Date: 7/11/2025  Narrative: CT ABDOMEN AND PELVIS WITH IV CONTRAST INDICATION: Epigastric pain. . COMPARISON: 5/16/2025, 3/30/2025 TECHNIQUE: CT examination of the abdomen and pelvis was performed. Multiplanar 2D reformatted images were created from the source data. This examination, like all CT scans performed in the [facility], was performed utilizing techniques to minimize radiation dose exposure, including the use of iterative reconstruction and automated exposure control. Radiation dose length product (DLP) for this visit: 410.91 mGy-cm. IV Contrast: 50 mL of iohexol (OMNIPAQUE) Enteric Contrast: Not administered. FINDINGS: ABDOMEN LOWER CHEST: No clinically significant abnormality in the visualized lower chest. LIVER/BILIARY TREE: Diffuse hepatic steatosis. GALLBLADDER: No calcified gallstones. No pericholecystic inflammatory change. SPLEEN: Unremarkable. PANCREAS: There is mild worsening stranding involving the pancreaticoduodenal groove adjacent to the pancreatic head, was likely represents recurrent acute pancreatitis in this region. - NECROSIS: No pancreatic parenchymal necrosis noted. - LOCAL COMPLICATIONS: Multiple pancreatic pseudocysts are again present, with the pseudocyst in the pancreatic head and uncinate process measuring approximately 2.6 cm x 2.4 cm in  maximal dimensions, slightly smaller in size when compared to the prior study. The pseudocyst involving the pancreatic tail has decreased in size, measuring 1.6 cm x 1.5 cm in maximal dimensions, whereas previously this measured approximately 4.5 cm in maximal dimensions. - INFECTION: No abnormal gas noted adjacent to the pancreas. ADRENAL GLANDS: Unremarkable. KIDNEYS/URETERS: Unremarkable. No hydronephrosis. STOMACH AND BOWEL: Adjacent reactive wall thickening involving the stomach, along the lesser curvature. APPENDIX: No findings to suggest appendicitis. ABDOMINOPELVIC CAVITY: No ascites. No pneumoperitoneum. No lymphadenopathy. VESSELS: Chronically occluded left splenic vein again noted, with multiple venous collaterals in the left upper quadrant. PELVIS REPRODUCTIVE ORGANS: Unremarkable for patient's age. URINARY BLADDER: Unremarkable. ABDOMINAL WALL/INGUINAL REGIONS: Unremarkable. BONES: No acute fracture or suspicious osseous lesion.     Impression: Mild worsening stranding involving the pancreaticoduodenal groove, adjacent to the pancreatic head, which likely represents recurrent acute pancreatitis in this region. No pancreatic parenchymal necrosis noted. Multiple pancreatic pseudocysts, which have decreased in size when compared to the prior study. Reactive wall thickening involving the stomach along the lesser curvature. No bowel obstruction noted. 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 Diffuse hepatic steatosis. Workstation performed: [id number]       LABS  Results from last 7 days   Lab Units 07/17/25  0415 07/16/25  0347 07/15/25  0354 07/14/25  0237 07/14/25  0232 07/13/25  0934 07/12/25  0401 07/11/25  1642   WBC Thousand/uL 4.06* 4.00* 3.27* 3.54*  --  3.38* 6.40 5.89   HEMOGLOBIN g/dL 10.2* 9.7* 9.5* 9.9*  --  10.4* 10.2* 11.6   I STAT HEMOGLOBIN g/dl  --   --   --   --  9.9*  --   --   --    HEMATOCRIT % 31.5* 29.6* 28.4* 30.7*  --   31.7* 30.1* 34.8   HEMATOCRIT, ISTAT %  --   --   --   --  29*  --   --   --    MCV fL 106* 104* 103* 104*  --  102* 100* 100*   PLATELETS Thousands/uL 131* 120* 128* 126*  --  131* 185 235     Results from last 7 days   Lab Units 07/17/25  0415 07/16/25  0347 07/15/25  0354 07/14/25  0237 07/14/25  0232 07/13/25  0934 07/12/25  0401 07/11/25  1642   SODIUM mmol/L 136 135 135 133*  --  134* 136 134*   POTASSIUM mmol/L 3.8 3.9 3.9 3.6  --  3.8 4.4 4.1   CHLORIDE mmol/L 100 102 100 97  --  93* 100 96   CO2 mmol/L 29 27 29 32  --  32 26 23   CO2, I-STAT mmol/L  --   --   --   --  31  --   --   --    BUN mg/dL 4* 3* 4* 3*  --  4* 8 8   CREATININE mg/dL 0.45* 0.48* 0.55* 0.58*  --  0.48* 0.53* 0.47*   CALCIUM mg/dL 8.5 8.1* 8.2* 8.4  --  8.7 8.7 9.3   ALBUMIN g/dL  --  3.2* 3.4* 3.6  --  3.5 3.9 4.5   TOTAL BILIRUBIN mg/dL  --  0.39 0.63 0.91  --  1.52* 1.37* 1.12*   ALK PHOS U/L  --  61 63 63  --  58 63 77   ALT U/L  --  22 24 20  --  19 28 34   AST U/L  --  39 51* 45*  --  45* 63* 93*   EGFR ml/min/1.73sq m 131 128 122 120  --  128 124 129   GLUCOSE RANDOM mg/dL 108 107 102 131  --  79 73 114                  Results from last 7 days   Lab Units 07/14/25  0225   POC GLUCOSE mg/dl 154*             Results from last 7 days   Lab Units 07/14/25  0237   LACTIC ACID mmol/L 1.8           Cultures:   Results from last 7 days   Lab Units 07/11/25  1911   COLOR UA  Yellow   CLARITY UA  Clear   SPEC GRAV UA  1.010   PH UA  5.5   LEUKOCYTES UA  Negative   NITRITE UA  Negative   GLUCOSE UA mg/dl Negative   KETONES UA mg/dl Negative   BILIRUBIN UA  Negative   BLOOD UA  Negative                 Condition at Discharge:  good      Discharge instructions/Information to patient and family:   See after visit summary for information provided to patient and family.      Provisions for Follow-Up Care:  See after visit summary for information related to follow-up care and any pertinent home health orders.      Disposition:     Home        Discharge Statement:  I spent 40 minutes discharging the patient. This time was spent on the day of discharge. I had direct contact with the patient on the day of discharge. Greater than 50% of the total time was spent examining patient, answering all patient questions, arranging and discussing plan of care with patient as well as directly providing post-discharge instructions.  Additional time then spent on discharge activities.    Discharge Medications:  See after visit summary for reconciled discharge medications provided to patient and family.      ** Please Note: This note has been constructed using a voice recognition system **

## 2025-07-17 NOTE — ASSESSMENT & PLAN NOTE
With last alcohol consumption 24 hours prior to admission, history of alcohol withdrawal seizure in the past  Given phenobarbital total dose of 520 mg   Continue CIWA protocol  Multivitamin, thiamine, folate supplementation  Start gabapentin 300 mg 3 times a day for post alcohol withdrawal syndrome  Continue thiamine  No sign of withdrawal.  Outpatient follow-up recommended

## 2025-07-18 ENCOUNTER — TRANSITIONAL CARE MANAGEMENT (OUTPATIENT)
Dept: FAMILY MEDICINE CLINIC | Facility: HOSPITAL | Age: 35
End: 2025-07-18

## 2025-07-18 DIAGNOSIS — Z59.41 FOOD INSECURITY: Primary | ICD-10-CM

## 2025-07-18 DIAGNOSIS — Z59.71 DOES NOT HAVE HEALTH INSURANCE: ICD-10-CM

## 2025-07-18 DIAGNOSIS — Z59.819 HOUSING INSECURITY: ICD-10-CM

## 2025-07-18 SDOH — ECONOMIC STABILITY - HOUSING INSECURITY: HOUSING INSTABILITY UNSPECIFIED: Z59.819

## 2025-07-18 SDOH — ECONOMIC STABILITY - FOOD INSECURITY: FOOD INSECURITY: Z59.41

## 2025-07-18 NOTE — PROGRESS NOTES
Spoke to pt, she will call back to schedule a TCM, didn't have her scheudle in front of her. Needs to be seen by 7/31/25

## 2025-07-21 ENCOUNTER — PATIENT OUTREACH (OUTPATIENT)
Dept: CASE MANAGEMENT | Facility: OTHER | Age: 35
End: 2025-07-21

## 2025-07-21 NOTE — PROGRESS NOTES
BUDDY BLOOD reviewed chart. Pt identified via [facility] report for food, housing and utilities. Pt has previous OP BUDDY BLOOD outreach in March, May and June 2025.    BUDDY LBOOD placed call to pt to introduce self and discuss needs. Pt did not answer. BUDDY BLOOD left a message and will try to reach pt again within one week if pt does not call back sooner.

## 2025-07-23 ENCOUNTER — PATIENT OUTREACH (OUTPATIENT)
Dept: CASE MANAGEMENT | Facility: OTHER | Age: 35
End: 2025-07-23

## 2025-07-23 NOTE — PROGRESS NOTES
BUDDY BLOOD made second phone outreach attempt to patient to f/u on SDOH referral. Patient did not answer. BUDDY BLOOD left message asking patient to return call. UTR letter sent. Referral closed at this time. BUDDY BLOOD will remain available for any needs.

## 2025-07-23 NOTE — LETTER
[address] [facility] [id number]  [phone]    Re: Unable to Reach   7/23/2025       Dear Kaykay,    I am a [facility]  and wanted to be certain you had information to contact me should you desire assistance with or have questions about non-medical aspects of your care such as [but not limited to] medical insurance, housing, transportation, material needs, or emergency needs.  If I do not have an answer I will assist you in finding the appropriate agency or individual who can help.      Please feel free to contact me at [phone]. Thank You.    Sincerely,         Mago Schulz LCSW